# Patient Record
Sex: FEMALE | Race: WHITE | NOT HISPANIC OR LATINO | Employment: OTHER | URBAN - METROPOLITAN AREA
[De-identification: names, ages, dates, MRNs, and addresses within clinical notes are randomized per-mention and may not be internally consistent; named-entity substitution may affect disease eponyms.]

---

## 2017-03-03 ENCOUNTER — TELEPHONE (OUTPATIENT)
Dept: FAMILY MEDICINE | Facility: CLINIC | Age: 62
End: 2017-03-03

## 2017-03-03 NOTE — LETTER
[facility]  [address]  [facility] [address]  Phone: [phone]    March 3, 2017    Jessie Tamayo  [address] [place] [id number]              Dear Ms. Tamayo,       This is a reminder that it is time to make your appointment for your annual mammogram. Your last mammogram we have on file is 11/12/14. You may make an appointment for your Digital Mammogram by calling our scheduling line at [phone] to schedule at one of our locations.    If you are experiencing breast symptoms or concerns such as a new lump or breast pain you should first contact your health care provider before scheduling your mammogram. Your physician may want to see you prior to your visit.    We would like to take this opportunity to remind you that along with an annual mammogram, the American Cancer Society recommends an annual breast examination by your physician or health care provider.    If you are a patient currently enrolled in the [place] [facility] Program or the [facility], you must be seen by your provider for a clinical breast examination prior to your mammogram.    Please disregard this notice if you have already scheduled an appointment. Or, if you have had this done elsewhere, please have your records sent to the clinic.     Thank you and we look forward to seeing you in the near future for your annual screening mammogram.        Sincerely,      Your [facility] Care Team/ la

## 2017-03-03 NOTE — LETTER
Formerly named [facility]  [address]  [facility] [address]  Phone: [phone]    March 3, 2017    Jessie Tamayo  [address] [place] [phone]              Dear Ms. Tamayo,      In review of our electronic medical record, we believe that your colon cancer screening is not up to date. (Your last colonoscopy we have on file is 12/1/06 and were to repeat in 5 years due to polyps). Routine screening is recommended for most patients every ten years between the ages of 50 and 80 if no polyps. Currently, most physicians recommend colonoscopy. If you have already had this procedure at another facility, we would like to have you get a copy of the report to us. If you would like more information about colonoscopy, and you have access to the internet, please visit the [facility] web site below.   If you are ready to schedule your colonoscopy, please call [phone] to schedule your appointment. The instructions for the prep are included in this letter.       [email or web address]    It is possible that you are seeing someone else for your primary health care.  If you have had either of these tests done elsewhere, let us know where it was done, approximately when, and the results or have your records sent to the clinic. We will update your chart and this will eliminate sending you reminder letters and phone calls.        Sincerely,      Your [facility] Care Team

## 2017-03-03 NOTE — TELEPHONE ENCOUNTER
Panel Management Review      Patient has the following on her problem list: None      Composite cancer screening  Chart review shows that this patient is due/due soon for the following Mammogram and Colonoscopy  Summary:    Patient is due/failing the following:   COLONOSCOPY and MAMMOGRAM    Action needed:   Pt due for mammogram and colonoscopy    Type of outreach:    Sent letter.    Questions for provider review:    None                                                                                                                                    Aimee Pacheco MA

## 2017-05-17 ENCOUNTER — TELEPHONE (OUTPATIENT)
Dept: FAMILY MEDICINE | Facility: CLINIC | Age: 62
End: 2017-05-17

## 2017-05-17 DIAGNOSIS — Z12.11 COLON CANCER SCREENING: Primary | ICD-10-CM

## 2017-05-17 NOTE — TELEPHONE ENCOUNTER
Panel Management Review      Patient has the following on her problem list: None      Composite cancer screening  Chart review shows that this patient is due/due soon for the following Mammogram and Colonoscopy  Summary:    Patient is due/failing the following:   COLONOSCOPY and MAMMOGRAM    Action needed:   Pt to schedule mammogram and colonoscopy. Please give pt schedule information when she calls back.    Type of outreach:    Phone, left message for patient to call back.     Questions for provider review:    None                                                                                                                                    If you should have any questions or desire further information about your condition or plan of care, please don't hesitate to call our office, or schedule an appointment at your convenience.         Chart routed to Care Team .

## 2017-05-30 NOTE — TELEPHONE ENCOUNTER
Left message on voice mail with phone # to schedule her mammogram and reminded her she is due for colonoscopy or FIT. Instructed pt to call back on what colon cancer screen she would like to complete and we can mail info to her home.

## 2017-06-03 ENCOUNTER — HEALTH MAINTENANCE LETTER (OUTPATIENT)
Age: 62
End: 2017-06-03

## 2017-06-24 ENCOUNTER — HEALTH MAINTENANCE LETTER (OUTPATIENT)
Age: 62
End: 2017-06-24

## 2017-07-11 ENCOUNTER — OFFICE VISIT (OUTPATIENT)
Dept: OBGYN | Facility: CLINIC | Age: 62
End: 2017-07-11
Payer: COMMERCIAL

## 2017-07-11 VITALS
HEART RATE: 80 BPM | DIASTOLIC BLOOD PRESSURE: 88 MMHG | WEIGHT: 210 LBS | HEIGHT: 65 IN | BODY MASS INDEX: 34.99 KG/M2 | SYSTOLIC BLOOD PRESSURE: 135 MMHG

## 2017-07-11 DIAGNOSIS — N94.11 INTROITAL DYSPAREUNIA: ICD-10-CM

## 2017-07-11 DIAGNOSIS — Z00.00 ROUTINE GENERAL MEDICAL EXAMINATION AT A HEALTH CARE FACILITY: Primary | ICD-10-CM

## 2017-07-11 DIAGNOSIS — R87.810 CERVICAL HIGH RISK HPV (HUMAN PAPILLOMAVIRUS) TEST POSITIVE: ICD-10-CM

## 2017-07-11 DIAGNOSIS — N95.2 VAGINAL ATROPHY: ICD-10-CM

## 2017-07-11 PROCEDURE — 88175 CYTOPATH C/V AUTO FLUID REDO: CPT | Performed by: OBSTETRICS & GYNECOLOGY

## 2017-07-11 PROCEDURE — 99396 PREV VISIT EST AGE 40-64: CPT | Performed by: OBSTETRICS & GYNECOLOGY

## 2017-07-11 PROCEDURE — 87624 HPV HI-RISK TYP POOLED RSLT: CPT | Performed by: OBSTETRICS & GYNECOLOGY

## 2017-07-11 NOTE — PROGRESS NOTES
SUBJECTIVE:   CC: Jessie Tamayo is an 61 year old woman who presents for preventive health visit.     Healthy Habits:    Do you get at least three servings of calcium containing foods daily (dairy, green leafy vegetables, etc.)? yes    Amount of exercise or daily activities, outside of work: 3-6 day(s) per week    Problems taking medications regularly not applicable    Medication side effects: No    Have you had an eye exam in the past two years? Yes-cataracts    Do you see a dentist twice per year? yes    Do you have sleep apnea, excessive snoring or daytime drowsiness?no        Still having pain with intercourse. Due for Mammo, Pap, colonoscopy    Today's PHQ-2 Score:   PHQ-2 ( 1999 Pf[employer]) 7/11/2017 4/7/2016   Q1: Little interest or pleasure in doing things 0 0   Q2: Feeling down, depressed or hopeless 0 0   PHQ-2 Score 0 0   Q1: Little interest or pleasure in doing things - -   Q2: Feeling down, depressed or hopeless - -   PHQ-2 Score - -       Abuse: Current or Past(Physical, Sexual or Emotional)- No  Do you feel safe in your environment - Yes    Social History   Substance Use Topics     Smoking status: Never Smoker     Smokeless tobacco: Never Used     Alcohol use Yes      Comment: rare     The patient does not drink >3 drinks per day nor >7 drinks per week.    Reviewed orders with patient.  Reviewed health maintenance and updated orders accordingly - Yes  BP Readings from Last 3 Encounters:   07/11/17 135/88   12/01/16 135/76   05/19/16 118/70    Wt Readings from Last 3 Encounters:   07/11/17 210 lb (95.3 kg)   12/01/16 210 lb (95.3 kg)   05/19/16 209 lb 9.6 oz (95.1 kg)                  Patient Active Problem List   Diagnosis     Hyperlipidemia LDL goal <130     Nevus, Pigmented - vulvar mohr     Recurrent cold sores     Hearing loss     health care home     Advanced directives, HC info letter sent     Fall     Cervical high risk HPV (human papillomavirus) test positive     Past Surgical History:    Procedure Laterality Date     C TREAT ECTOPIC PREG,RMV TUBE/OVARY  1985    ectopic, right side-ovary and tube removed     HC COLONOSCOPY THRU STOMA, DIAGNOSTIC  2006    normal     OPEN REDUCTION INTERNAL FIXATION FOREARM  7/31/2012    Procedure: OPEN REDUCTION INTERNAL FIXATION FOREARM;  Open Reduction Internal Fixation, Irrigation & Debridment  of Right Distal Radius, application short arm splint;  Surgeon: Wade Beard MD;  Location: [facility] OR     TONSILLECTOMY & ADENOIDECTOMY  1960       Social History   Substance Use Topics     Smoking status: Never Smoker     Smokeless tobacco: Never Used     Alcohol use Yes      Comment: rare     Family History   Problem Relation Age of Onset     CANCER Mother      lung cancer     CEREBROVASCULAR DISEASE Father      Hypertension Father      Alcohol/Drug Father      DIABETES No family hx of      C.A.D. No family hx of      Breast Cancer No family hx of      Cancer - colorectal No family hx of      Prostate Cancer No family hx of      Melanoma No family hx of          Current Outpatient Prescriptions   Medication Sig Dispense Refill     Multiple Vitamins-Minerals (DAILY MULTI VITAMIN/MINERALS PO)        ibuprofen (ADVIL,MOTRIN) 200 MG tablet Take 200 mg by mouth every 4 hours as needed for mild pain       No Known Allergies    Patient over age 50, mutual decision to screen reflected in health maintenance.    Pertinent mammograms are reviewed under the imaging tab.  History of abnormal Pap smear:   Last 3 Pap Results:   PAP (no units)   Date Value   04/07/2016 NIL   01/23/2015 NIL   11/01/2011 NIL   HR HPV + 16    Reviewed and updated as needed this visit by clinical staff  Tobacco  Allergies  Meds  Med Hx  Surg Hx  Fam Hx  Soc Hx        Reviewed and updated as needed this visit by Provider        Past Medical History:   Diagnosis Date     Actinic keratosis      Cervical high risk HPV (human papillomavirus) test positive 1/29/2015, 4/7/16     +HR HPV type 16.   "    H/O colposcopy with cervical biopsy 2015    Negative     Hyperlipemia      Osteopenias      Status post colposcopy 16    ECC - negative      Past Surgical History:   Procedure Laterality Date     C TREAT ECTOPIC PREG,RMV TUBE/OVARY      ectopic, right side-ovary and tube removed     HC COLONOSCOPY THRU STOMA, DIAGNOSTIC      normal     OPEN REDUCTION INTERNAL FIXATION FOREARM  2012    Procedure: OPEN REDUCTION INTERNAL FIXATION FOREARM;  Open Reduction Internal Fixation, Irrigation & Debridment  of Right Distal Radius, application short arm splint;  Surgeon: Wade Beard MD;  Location: [facility] OR     TONSILLECTOMY & ADENOIDECTOMY       Obstetric History       T0      L0     SAB0   TAB0   Ectopic1   Multiple0   Live Births0       # Outcome Date GA Lbr Felice/2nd Weight Sex Delivery Anes PTL Lv   1 Ectopic                   ROS:  C: NEGATIVE for fever, chills, change in weight  I: NEGATIVE for worrisome rashes, moles or lesions  E: NEGATIVE for vision changes or irritation  ENT: NEGATIVE for ear, mouth and throat problems  R: NEGATIVE for significant cough or SOB  B: NEGATIVE for masses, tenderness or discharge  CV: NEGATIVE for chest pain, palpitations or peripheral edema  GI: NEGATIVE for nausea, abdominal pain, heartburn, or change in bowel habits   menopausal female: vaginal dryness and sexual function using condoms as there is vaginal discomfort  M: NEGATIVE for significant arthralgias or myalgia  N: NEGATIVE for weakness, dizziness or paresthesias  E: NEGATIVE for temperature intolerance, skin/hair changes  H: NEGATIVE for bleeding problems  P: NEGATIVE for changes in mood or affect     OBJECTIVE:   /88 (BP Location: Left arm, Cuff Size: Adult Large)  Pulse 80  Ht 5' 5\" (1.651 m)  Wt 210 lb (95.3 kg)  LMP 2008  BMI 34.95 kg/m2  EXAM:  GENERAL APPEARANCE: healthy, alert and no distress  EYES: Eyes grossly normal to inspection, PERRL and " conjunctivae and sclerae normal  NECK: no adenopathy, no asymmetry, masses, or scars and thyroid normal to palpation  RESP: lungs clear to auscultation - no rales, rhonchi or wheezes  BREAST: normal without masses, tenderness or nipple discharge and no palpable axillary masses or adenopathy  CV: regular rate and rhythm, normal S1 S2, no S3 or S4, no murmur, click or rub, no peripheral edema and peripheral pulses strong  ABDOMEN: soft, nontender, no hepatosplenomegaly, no masses and bowel sounds normal   (female): normal female external genitalia, normal urethral meatus, vaginal mucosal atrophy noted, normal cervix, adnexae, and uterus without masses. and hyperemia noted of the introitus  MS: no musculoskeletal defects are noted and gait is age appropriate without ataxia  SKIN: no suspicious lesions or rashes  NEURO: Normal strength and tone, sensory exam grossly normal, mentation intact and speech normal  PSYCH: mentation appears normal and affect normal/bright    ASSESSMENT/PLAN:   1. Routine general medical examination at a health care facility  Normal examination with the exception of the vaginal atrophy/hyperemia of the introitus- GASTROENTEROLOGY ADULT REF PROCEDURE ONLY  - *MA Screening Digital Bilateral; Future  - Pap imaged thin layer diagnostic with HPV (select HPV order below)  - HPV High Risk Types DNA Cervical    2. Cervical high risk HPV (human papillomavirus) test positive  Negative Pap smear  - Pap imaged thin layer diagnostic with HPV (select HPV order below)  - HPV High Risk Types DNA Cervical  Consider next steps  3. Vaginal atrophy    - ESTRADIOL 0.1MG/GM CREAM; Apply a small amount to the vaginal opening 3 times weekly  Dispense: 60 g; Refill: 1    4. Introital dyspareunia    - ESTRADIOL 0.1MG/GM CREAM; Apply a small amount to the vaginal opening 3 times weekly  Dispense: 60 g; Refill: 1    COUNSELING:   Reviewed preventive health counseling, as reflected in patient instructions       Regular  "exercise       Healthy diet/nutrition         reports that she has never smoked. She has never used smokeless tobacco.    Estimated body mass index is 34.95 kg/(m^2) as calculated from the following:    Height as of this encounter: 5' 5\" (1.651 m).    Weight as of this encounter: 210 lb (95.3 kg).   Weight management plan: Patient was referred to their PCP to discuss a diet and exercise plan.    Counseling Resources:  ATP IV Guidelines  Pooled Cohorts Equation Calculator  Breast Cancer Risk Calculator  FRAX Risk Assessment  ICSI Preventive Guidelines  Dietary Guidelines for Americans, 2010  USDA's MyPlate  ASA Prophylaxis  Lung CA Screening    Jefe Koenig MD  [facility]  "

## 2017-07-11 NOTE — NURSING NOTE
"Initial /88 (BP Location: Left arm, Cuff Size: Adult Large)  Pulse 80  Ht 5' 5\" (1.651 m)  Wt 210 lb (95.3 kg)  LMP 03/08/2008  BMI 34.95 kg/m2 Estimated body mass index is 34.95 kg/(m^2) as calculated from the following:    Height as of this encounter: 5' 5\" (1.651 m).    Weight as of this encounter: 210 lb (95.3 kg). .      "

## 2017-07-11 NOTE — MR AVS SNAPSHOT
After Visit Summary   7/11/2017    Jessie Tamayo    MRN: [id number]           Patient Information     Date Of Birth          1955        Visit Information        Provider Department      7/11/2017 10:00 AM Jefe Koenig MD [facility]        Today's Diagnoses     Routine general medical examination at a health care facility    -  1    Cervical high risk HPV (human papillomavirus) test positive        Vaginal atrophy        Introital dyspareunia          Care Instructions      Preventive Health Recommendations  Female Ages 50 - 64    Yearly exam: See your health care provider every year in order to  o Review health changes.   o Discuss preventive care.    o Review your medicines if your doctor has prescribed any.      Get a Pap test every three years (unless you have an abnormal result and your provider advises testing more often).    If you get Pap tests with HPV test, you only need to test every 5 years, unless you have an abnormal result.     You do not need a Pap test if your uterus was removed (hysterectomy) and you have not had cancer.    You should be tested each year for STDs (sexually transmitted diseases) if you're at risk.     Have a mammogram every 1 to 2 years.    Have a colonoscopy at age 50, or have a yearly FIT test (stool test). These exams screen for colon cancer.      Have a cholesterol test every 5 years, or more often if advised.    Have a diabetes test (fasting glucose) every three years. If you are at risk for diabetes, you should have this test more often.     If you are at risk for osteoporosis (brittle bone disease), think about having a bone density scan (DEXA).    Shots: Get a flu shot each year. Get a tetanus shot every 10 years.    Nutrition:     Eat at least 5 servings of fruits and vegetables each day.    Eat whole-grain bread, whole-wheat pasta and brown rice instead of white grains and rice.    Talk to your provider about Calcium and  Vitamin D.     Lifestyle    Exercise at least 150 minutes a week (30 minutes a day, 5 days a week). This will help you control your weight and prevent disease.    Limit alcohol to one drink per day.    No smoking.     Wear sunscreen to prevent skin cancer.     See your dentist every six months for an exam and cleaning.    See your eye doctor every 1 to 2 years.            Follow-ups after your visit        Additional Services     GASTROENTEROLOGY ADULT REF PROCEDURE ONLY       Last Lab Result: Creatinine (mg/dL)       Date                     Value                 11/11/2009               0.78             ----------  Body mass index is 34.95 kg/(m^2).     Needed:  No  Language:  English    Patient will be contacted to schedule procedure.     Please be aware that coverage of these services is subject to the terms and limitations of your health insurance plan.  Call member services at your health plan with any benefit or coverage questions.  Any procedures must be performed at a [place] facility OR coordinated by your clinic's referral office.    Please bring the following with you to your appointment:    (1) Any X-Rays, CTs or MRIs which have been performed.  Contact the facility where they were done to arrange for  prior to your scheduled appointment.    (2) List of current medications   (3) This referral request   (4) Any documents/labs given to you for this referral                  Your next 10 appointments already scheduled     Aug 30, 2017   Procedure with Dioni Murcia MD   [facility] ([facility])    [address] [place], [place] [id number]   [phone]           Located in the Clinics and Surgery Center at [address] [place] [address].   parking is very convenient and highly recommended.  is a $6 flat rate fee.  Both  and self parkers should enter the main arrival plaza from [address]  "[address]; parking attendants will direct you based on your parking preference.              Who to contact     If you have questions or need follow up information about today's clinic visit or your schedule please contact [facility] directly at [phone].  Normal or non-critical lab and imaging results will be communicated to you by MyChart, letter or phone within 4 business days after the clinic has received the results. If you do not hear from us within 7 days, please contact the clinic through [facility] or phone. If you have a critical or abnormal lab result, we will notify you by phone as soon as possible.  Submit refill requests through [facility] or call your pharmacy and they will forward the refill request to us. Please allow 3 business days for your refill to be completed.          Additional Information About Your Visit        [facility] [employer] Information     [facility] gives you secure access to your electronic health record. If you see a primary care provider, you can also send messages to your care team and make appointments. If you have questions, please call your primary care clinic.  If you do not have a primary care provider, please call [phone] and they will assist you.        Care EveryWhere ID     This is your Care EveryWhere ID. This could be used by other organizations to access your [facility] medical records  [id number]        Your Vitals Were     Pulse Height Last Period BMI (Body Mass Index)          80 5' 5\" (1.651 m) 03/08/2008 34.95 kg/m2         Blood Pressure from Last 3 Encounters:   07/11/17 135/88   12/01/16 135/76   05/19/16 118/70    Weight from Last 3 Encounters:   07/11/17 210 lb (95.3 kg)   12/01/16 210 lb (95.3 kg)   05/19/16 209 lb 9.6 oz (95.1 kg)              We Performed the Following     GASTROENTEROLOGY ADULT REF PROCEDURE ONLY     HPV High Risk Types DNA Cervical     Pap imaged thin layer diagnostic with HPV (select HPV order below)          Today's Medication " Changes          These changes are accurate as of: 7/11/17 11:59 PM.  If you have any questions, ask your nurse or doctor.               Start taking these medicines.        Dose/Directions    ESTRADIOL 0.1MG/GM CREAM   Used for:  Vaginal atrophy, Introital dyspareunia   Started by:  Jefe Koenig MD        Apply a small amount to the vaginal opening 3 times weekly   Quantity:  60 g   Refills:  1         Stop taking these medicines if you haven't already. Please contact your care team if you have questions.     triamcinolone 0.1 % cream   Commonly known as:  KENALOG   Stopped by:  Jefe Koenig MD                Where to get your medicines      These medications were sent to [facility] [address] [place] [facility] [address] [facility]  [address] [facility] [address]     Phone:  [phone]     ESTRADIOL 0.1MG/GM CREAM                Primary Care Provider Office Phone #    [facility] [phone]       No address on file        Equal Access to Services     KAILEY ALMEIDA : Hadii marcus baltazar hadasho Soomaali, waaxda luqadaha, qaybta kaalmada adeegyada, sai marie . So [facility] [phone].    ATENCIÓN: Si habla español, tiene a avalos disposición servicios gratuitos de asistencia lingüística. Llame al [phone].    We comply with applicable federal civil rights laws and [place] laws. We do not discriminate on the basis of race, color, national origin, age, disability sex, sexual orientation or gender identity.            Thank you!     Thank you for choosing [facility]  for your care. Our goal is always to provide you with excellent care. Hearing back from our patients is one way we can continue to improve our services. Please take a few minutes to complete the written survey that you may receive in the mail after your visit with us. Thank you!             Your Updated Medication List - Protect others around you: Learn how to safely use, store  and throw away your medicines at www.disposemymeds.org.          This list is accurate as of: 7/11/17 11:59 PM.  Always use your most recent med list.                   Brand Name Dispense Instructions for use Diagnosis    DAILY MULTI VITAMIN/MINERALS PO           ESTRADIOL 0.1MG/GM CREAM     60 g    Apply a small amount to the vaginal opening 3 times weekly    Vaginal atrophy, Introital dyspareunia       ibuprofen 200 MG tablet    ADVIL/MOTRIN     Take 200 mg by mouth every 4 hours as needed for mild pain    Cervical high risk HPV (human papillomavirus) test positive

## 2017-07-13 LAB
COPATH REPORT: NORMAL
PAP: NORMAL

## 2017-07-14 LAB
FINAL DIAGNOSIS: ABNORMAL
HPV HR 12 DNA CVX QL NAA+PROBE: NEGATIVE
HPV16 DNA SPEC QL NAA+PROBE: POSITIVE
HPV18 DNA SPEC QL NAA+PROBE: NEGATIVE
SPECIMEN DESCRIPTION: ABNORMAL

## 2017-08-25 ENCOUNTER — TELEPHONE (OUTPATIENT)
Dept: GASTROENTEROLOGY | Facility: CLINIC | Age: 62
End: 2017-08-25

## 2017-08-25 DIAGNOSIS — Z12.11 ENCOUNTER FOR SCREENING COLONOSCOPY: Primary | ICD-10-CM

## 2017-08-25 NOTE — TELEPHONE ENCOUNTER
Patient scheduled for Colonoscopy    Indication for procedure. Screening    Referring Provider. Jefe Koenig MD    ? Not Needed    Arrival time verified? Yes, 0630    Facility location verified? Yes, [facility] [facility]    Instructions given regarding prep and procedure    Prep Type Golytely    Are you taking any anticoagulants or blood thinners? No    Instructions given? N/A    Electronic implanted devices? No    Pre procedure teaching completed? Yes    Transportation from procedure?     H&P / Pre op physical completed? N/A

## 2017-08-30 ENCOUNTER — SURGERY (OUTPATIENT)
Age: 62
End: 2017-08-30

## 2017-08-30 ENCOUNTER — HOSPITAL ENCOUNTER (OUTPATIENT)
Facility: AMBULATORY SURGERY CENTER | Age: 62
End: 2017-08-30
Attending: INTERNAL MEDICINE

## 2017-08-30 VITALS
RESPIRATION RATE: 22 BRPM | WEIGHT: 210 LBS | DIASTOLIC BLOOD PRESSURE: 83 MMHG | BODY MASS INDEX: 34.99 KG/M2 | HEIGHT: 65 IN | TEMPERATURE: 96.8 F | SYSTOLIC BLOOD PRESSURE: 135 MMHG | OXYGEN SATURATION: 97 %

## 2017-08-30 RX ORDER — LIDOCAINE 40 MG/G
CREAM TOPICAL
Status: DISCONTINUED | OUTPATIENT
Start: 2017-08-30 | End: 2017-08-31 | Stop reason: HOSPADM

## 2017-08-30 RX ORDER — FENTANYL CITRATE 50 UG/ML
INJECTION, SOLUTION INTRAMUSCULAR; INTRAVENOUS PRN
Status: DISCONTINUED | OUTPATIENT
Start: 2017-08-30 | End: 2017-08-30 | Stop reason: HOSPADM

## 2017-08-30 RX ORDER — ONDANSETRON 2 MG/ML
4 INJECTION INTRAMUSCULAR; INTRAVENOUS
Status: DISCONTINUED | OUTPATIENT
Start: 2017-08-30 | End: 2017-08-31 | Stop reason: HOSPADM

## 2017-08-30 RX ADMIN — FENTANYL CITRATE 50 MCG: 50 INJECTION, SOLUTION INTRAMUSCULAR; INTRAVENOUS at 07:59

## 2017-08-30 RX ADMIN — FENTANYL CITRATE 25 MCG: 50 INJECTION, SOLUTION INTRAMUSCULAR; INTRAVENOUS at 08:08

## 2017-08-30 RX ADMIN — FENTANYL CITRATE 50 MCG: 50 INJECTION, SOLUTION INTRAMUSCULAR; INTRAVENOUS at 08:14

## 2017-08-30 NOTE — DISCHARGE INSTRUCTIONS
[facility]  Home Care Following Your Procedure  Call a doctor if you have signs of infection (fever, growing tenderness at the surgery site, a large amount of drainage or bleeding, severe pain, foul-smelling drainage, redness, swelling).  Your doctor is:  Dr.Byron Kain Murcia, Colon Rectal: [phone]  Or dial [phone] and ask for the resident on call for:  Colon Rectal  For emergency care, call the:  [facility]:  [phone] (TTY for hearing impaired: [phone])

## 2017-08-31 LAB — COPATH REPORT: NORMAL

## 2017-09-01 LAB — COLONOSCOPY: NORMAL

## 2017-11-06 ENCOUNTER — HOSPITAL ENCOUNTER (OUTPATIENT)
Dept: MAMMOGRAPHY | Facility: CLINIC | Age: 62
Discharge: HOME | End: 2017-11-06
Attending: FAMILY MEDICINE | Admitting: FAMILY MEDICINE
Payer: COMMERCIAL

## 2017-11-06 DIAGNOSIS — Z12.31 VISIT FOR SCREENING MAMMOGRAM: ICD-10-CM

## 2017-11-06 PROCEDURE — G0202 SCR MAMMO BI INCL CAD: HCPCS

## 2018-01-26 ENCOUNTER — TELEPHONE (OUTPATIENT)
Dept: OBGYN | Facility: CLINIC | Age: 63
End: 2018-01-26

## 2018-01-26 NOTE — TELEPHONE ENCOUNTER
----- Message from Jefe Koenig MD sent at 1/25/2018  2:05 PM CST -----  Regarding: needs colpo  63 yo  HPV +16  Neg colposcopy  Persistent +16

## 2018-01-26 NOTE — TELEPHONE ENCOUNTER
Left message for patient to call back.  Dr Koenig would like patient to have a visit for a colposcopy in regards to her history of abnormal paps.  He went to a conference on abnormal pap smears, and upon return would like patient to come in for a colposcopy.  He will explain in more detail when the patient comes in for appointment.

## 2018-02-13 NOTE — TELEPHONE ENCOUNTER
I spoke with pt today and informed her that Dr. Koenig is recommending she come in for a colposcopy. The patient verbalizes understanding and agrees with the plan. Pt states that her insurance only allows her to have a pap or a colposcopy in one calendar year so now that it is 2018 she can have the colposcopy done. Pt states she will call the [facility] to schedule the colposcopy.   Vivien Johnson RN  Pap Tracking

## 2018-03-22 NOTE — TELEPHONE ENCOUNTER
Dr. Koenig,    ANGELES - Pt has not scheduled the colposcopy yet. Spoke with pt 2/13/18. Pt states she'll schedule the colp.     1/23/2015:Pap--NIL, +HR HPV type 16. Plan [place]-  2/27/15: [place] - Negative. Plan cotest in 1 year.   4/7/16: Pap - NIL, + HR HPV 16. Plan colp  5/19/16: [place] - ECC negative. Plan cotest in 1 year.   7/11/17: Pap: NIL, +HR HPV type 16. Plan colp  11/29/17 [place] not done within 3 months. Tracking updated for 6 mo colp/pap.   1/25/2018 Case reviewed @ [facility]- persistent HPV+16- needs to follow up with colposcopy    Vivien Johnson, RN  Pap Tracking

## 2018-04-13 ENCOUNTER — OFFICE VISIT (OUTPATIENT)
Dept: FAMILY MEDICINE | Facility: CLINIC | Age: 63
End: 2018-04-13
Payer: COMMERCIAL

## 2018-04-13 VITALS
HEART RATE: 72 BPM | RESPIRATION RATE: 16 BRPM | HEIGHT: 65 IN | DIASTOLIC BLOOD PRESSURE: 74 MMHG | TEMPERATURE: 98.1 F | SYSTOLIC BLOOD PRESSURE: 136 MMHG | WEIGHT: 209.4 LBS | BODY MASS INDEX: 34.89 KG/M2

## 2018-04-13 DIAGNOSIS — Z01.818 PREOP GENERAL PHYSICAL EXAM: Primary | ICD-10-CM

## 2018-04-13 DIAGNOSIS — Z11.59 ENCOUNTER FOR HEPATITIS C SCREENING TEST FOR LOW RISK PATIENT: ICD-10-CM

## 2018-04-13 DIAGNOSIS — H25.9 AGE-RELATED CATARACT OF BOTH EYES, UNSPECIFIED AGE-RELATED CATARACT TYPE: ICD-10-CM

## 2018-04-13 PROCEDURE — 99214 OFFICE O/P EST MOD 30 MIN: CPT | Performed by: NURSE PRACTITIONER

## 2018-04-13 PROCEDURE — 86803 HEPATITIS C AB TEST: CPT | Performed by: NURSE PRACTITIONER

## 2018-04-13 PROCEDURE — 36415 COLL VENOUS BLD VENIPUNCTURE: CPT | Performed by: NURSE PRACTITIONER

## 2018-04-13 ASSESSMENT — PAIN SCALES - GENERAL: PAINLEVEL: NO PAIN (0)

## 2018-04-13 NOTE — MR AVS SNAPSHOT
After Visit Summary   4/13/2018    Jessie Tamayo    MRN: [id number]           Patient Information     Date Of Birth          1955        Visit Information        Provider Department      4/13/2018 2:00 PM Sabine Krishnamurthy APRN [facility]        Today's Diagnoses     Preop general physical exam    -  1    Age-related cataract of both eyes, unspecified age-related cataract type        Encounter for hepatitis C screening test for low risk patient          Care Instructions      Before Your Surgery      Call your surgeon if there is any change in your health. This includes signs of a cold or flu (such as a sore throat, runny nose, cough, rash or fever).    Do not smoke, drink alcohol or take over the counter medicine (unless your surgeon or primary care doctor tells you to) for the 24 hours before and after surgery.    If you take prescribed drugs: Follow your doctor s orders about which medicines to take and which to stop until after surgery.    Eating and drinking prior to surgery: follow the instructions from your surgeon    Take a shower or bath the night before surgery. Use the soap your surgeon gave you to gently clean your skin. If you do not have soap from your surgeon, use your regular soap. Do not shave or scrub the surgery site.  Wear clean pajamas and have clean sheets on your bed.     Be well rested the night before surgery               Follow-ups after your visit        Who to contact     Normal or non-critical lab and imaging results will be communicated to you by Accelitechart, letter or phone within 4 business days after the clinic has received the results. If you do not hear from us within 7 days, please contact the clinic through MyChart or phone. If you have a critical or abnormal lab result, we will notify you by phone as soon as possible.  Submit refill requests through Zaise[employer] or call your pharmacy and they will forward the refill request to us. Please  "allow 3 business days for your refill to be completed.          If you need to speak with a  for additional information , please call: [phone]           Additional Information About Your Visit        MyChart Information     B[employer] gives you secure access to your electronic health record. If you see a primary care provider, you can also send messages to your care team and make appointments. If you have questions, please call your primary care clinic.  If you do not have a primary care provider, please call [phone] and they will assist you.        Care EveryWhere ID     This is your Care EveryWhere ID. This could be used by other organizations to access your [place] medical records  [id number]        Your Vitals Were     Pulse Temperature Respirations Height Last Period BMI (Body Mass Index)    72 98.1  F (36.7  C) (Tympanic) 16 5' 4.72\" (1.644 m) 03/08/2008 35.14 kg/m2       Blood Pressure from Last 3 Encounters:   04/13/18 136/74   08/30/17 135/83   07/11/17 135/88    Weight from Last 3 Encounters:   04/13/18 209 lb 6.4 oz (95 kg)   08/30/17 210 lb (95.3 kg)   07/11/17 210 lb (95.3 kg)              We Performed the Following     Hepatitis C Screen Reflex to HCV RNA Quant and Genotype        Primary Care Provider Office Phone # Fax #    [facility] [phone] [address] [facility] [address]        Equal Access to Services     KAILEY ALMEIDA : Hadii marucs ku hadasho Soomaali, waaxda luqadaha, qaybta kaalmada adeegkei, sai acuña. So [facility] [phone].    ATENCIÓN: Si habla español, tiene a avalos disposición servicios gratuitos de asistencia lingüística. Llame al [phone].    We comply with applicable federal civil rights laws and [place] laws. We do not discriminate on the basis of race, color, national origin, age, disability, sex, sexual orientation, or gender identity.            Thank you!     Thank you for " choosing [facility]  for your care. Our goal is always to provide you with excellent care. Hearing back from our patients is one way we can continue to improve our services. Please take a few minutes to complete the written survey that you may receive in the mail after your visit with us. Thank you!             Your Updated Medication List - Protect others around you: Learn how to safely use, store and throw away your medicines at www.disposemymeds.org.          This list is accurate as of 4/13/18  2:36 PM.  Always use your most recent med list.                   Brand Name Dispense Instructions for use Diagnosis    DAILY MULTI VITAMIN/MINERALS PO           ESTRADIOL 0.1MG/GM CREAM     60 g    Apply a small amount to the vaginal opening 3 times weekly    Vaginal atrophy, Introital dyspareunia       VITAMIN D (CHOLECALCIFEROL) PO      Take 800 Units by mouth daily

## 2018-04-13 NOTE — PATIENT INSTRUCTIONS
Before Your Surgery      Call your surgeon if there is any change in your health. This includes signs of a cold or flu (such as a sore throat, runny nose, cough, rash or fever).    Do not smoke, drink alcohol or take over the counter medicine (unless your surgeon or primary care doctor tells you to) for the 24 hours before and after surgery.    If you take prescribed drugs: Follow your doctor s orders about which medicines to take and which to stop until after surgery.    Eating and drinking prior to surgery: follow the instructions from your surgeon    Take a shower or bath the night before surgery. Use the soap your surgeon gave you to gently clean your skin. If you do not have soap from your surgeon, use your regular soap. Do not shave or scrub the surgery site.  Wear clean pajamas and have clean sheets on your bed.     Be well rested the night before surgery

## 2018-04-13 NOTE — PROGRESS NOTES
[facility]  [address] [address]  [phone]  Dept: [phone]    PRE-OP EVALUATION:  Today's date: 2018    Jessie Tamayo (: 1955) presents for pre-operative evaluation assessment as requested by Dr. Hand.  She requires evaluation and anesthesia risk assessment prior to undergoing surgery/procedure for treatment of bilateral cataracts .18 OD & 5-3-18 OS   Cataract removal with  IOL implant     Fax number for surgical facility: [phone]  Primary Physician: [facility]  Type of Anesthesia Anticipated: to be determined     Patient has a Health Care Directive or Living Will: no, but family is aware of wishes    Preop Questions 2018   Who is doing your surgery? Dr. Atul Hand-[facility]   What are you having done? fan cataracts   Date of Surgery/Procedure: 18 OD & 5-3-18 OS   Facility or Hospital where procedure/surgery will be performed: [facility]    1.  Do you have a history of Heart attack, stroke, stent, coronary bypass surgery, or other heart surgery? No   2.  Do you ever have any pain or discomfort in your chest? No   3.  Do you have a history of  Heart Failure? No   4.   Are you troubled by shortness of breath when:  walking on a level surface, or up a slight hill, or at night? No   5.  Do you currently have a cold, bronchitis or other respiratory infection? No   6.  Do you have a cough, shortness of breath, or wheezing? No   7.  Do you sometimes get pains in the calves of your legs when you walk? No   8. Do you or anyone in your family have previous history of blood clots? No   9.  Do you or does anyone in your family have a serious bleeding problem such as prolonged bleeding following surgeries or cuts? UNKNOWN   10. Have you ever had problems with anemia or been told to take iron pills? No   11. Have you had any abnormal blood loss such as black, tarry or bloody stools, or abnormal vaginal bleeding? No    12. Have you ever had a blood transfusion? UNKNOWN   13. Have you or any of your relatives ever had problems with anesthesia? UNKNOWN    14. Do you have sleep apnea, excessive snoring or daytime drowsiness? No   15. Do you have any prosthetic heart valves? No   16. Do you have prosthetic joints? No   17. Is there any chance that you may be pregnant? No         HPI:     HPI related to upcoming procedure: bilat cataract removal  With  IOL implant 4-19-18 OD & 5-3-18 OS      See problem list for active medical problems.  Problems all longstanding and stable, except as noted/documented.  See ROS for pertinent symptoms related to these conditions.                                                                                                                                                          .    MEDICAL HISTORY:     Patient Active Problem List    Diagnosis Date Noted     Introital dyspareunia 07/11/2017     Priority: Medium     Vaginal atrophy 07/11/2017     Priority: Medium     Cervical high risk HPV (human papillomavirus) test positive 01/29/2015     Priority: Medium     1/23/2015:Pap--NIL, +HR HPV type 16. Plan [place]-  2/27/15: [place] - Negative. Plan cotest in 1 year.   4/7/16: Pap - NIL, + HR HPV 16. Plan colp  5/19/16: [place] - ECC negative. Plan cotest in 1 year.   7/11/17: Pap: NIL, +HR HPV type 16. Plan colp  11/29/17 [place] not done within 3 months. Tracking updated for 6 mo colp/pap.   1/25/2018 Case reviewed @ [facility]- persistent HPV+16- needs to follow up with colposcopy       Fall 07/31/2012     Priority: Medium     Hearing loss 04/30/2009     Priority: Medium     Problem list name updated by automated process. 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 Please refer if Care Coordination services are needed.    Presenting Problem Signs and Symptoms Treatment Plan   Questions or concerns   during clinic hours   I will call my clinic directly:  [place] [address]  [phone].   Questions or concerns outside clinic hours   I will call the 24 hour nurse line at   [phone] or [facility].   Need to schedule an appointment   I will call the 24 hour scheduling team at [phone] or my clinic directly at [phone].    Same day treatment     I will call my clinic first, nurse line if after hours, urgent care and express care if needed.   Clinic care coordination services (regular clinic hours)     I will call a clinic care coordinator directly:     Segun Ritter RN  Mon, Tues, Fri - [phone]  Wed, Thurs - [phone]    Christen Newberry :    [phone]    Or call my clinic at [phone] and ask to speak with care coordination.   Crisis Services: Behavioral or Mental Health  Crisis Connection 24 Hour Phone Line  [phone]    [facility] 24 Hour Crisis Services  [phone]    [facility] (Behavioral Health Providers) Network [phone]    [facility]   [phone]       Emergency treatment -- Immediately    CAll 911             Past Medical History:   Diagnosis Date     Actinic keratosis      Cervical high risk HPV (human papillomavirus) test positive 1/29/2015, 4/7/16     +HR HPV type 16.      H/O colposcopy with cervical biopsy 2/2015    Negative     Hyperlipemia      Osteopenias      Status post colposcopy 5/19/16    ECC - negative     Past Surgical History:   Procedure Laterality Date     C TREAT ECTOPIC PREG,RMV TUBE/OVARY  1985    ectopic, right side-ovary and tube removed     HC COLONOSCOPY THRU STOMA, DIAGNOSTIC  2006    normal     OPEN REDUCTION INTERNAL  FIXATION FOREARM  7/31/2012    Procedure: OPEN REDUCTION INTERNAL FIXATION FOREARM;  Open Reduction Internal Fixation, Irrigation & Debridment  of Right Distal Radius, application short arm splint;  Surgeon: Wade Beard MD;  Location:  OR     TONSILLECTOMY & ADENOIDECTOMY  1960     Current Outpatient Prescriptions   Medication Sig Dispense Refill     VITAMIN D, CHOLECALCIFEROL, PO Take 800 Units by mouth daily       Multiple Vitamins-Minerals (DAILY MULTI VITAMIN/MINERALS PO)        ESTRADIOL 0.1MG/GM CREAM Apply a small amount to the vaginal opening 3 times weekly (Patient not taking: Reported on 4/13/2018) 60 g 1     ibuprofen (ADVIL,MOTRIN) 200 MG tablet Take 200 mg by mouth every 4 hours as needed for mild pain       OTC products: no recent use of OTC ASA, NSAIDS or Steroids    Not on File   Latex Allergy: NO    Social History   Substance Use Topics     Smoking status: Never Smoker     Smokeless tobacco: Never Used     Alcohol use Yes      Comment: very little     History   Drug Use No       REVIEW OF SYSTEMS:   CONSTITUTIONAL: NEGATIVE for fever, chills, change in weight  INTEGUMENTARY/SKIN: NEGATIVE for worrisome rashes, moles or lesions  EYES: POSITIVE for blurred vision bilateral related to cataracts   ENT/MOUTH: NEGATIVE for ear, mouth and throat problems  RESP: NEGATIVE for significant cough or SOB  BREAST: NEGATIVE for masses, tenderness or discharge  CV: NEGATIVE for chest pain, palpitations or peripheral edema  GI: NEGATIVE for nausea, abdominal pain, heartburn, or change in bowel habits  : NEGATIVE for frequency, dysuria, or hematuria  MUSCULOSKELETAL: NEGATIVE for significant arthralgias or myalgia  NEURO: NEGATIVE for weakness, dizziness or paresthesias  ENDOCRINE: NEGATIVE for temperature intolerance, skin/hair changes  HEME: NEGATIVE for bleeding problems  PSYCHIATRIC: NEGATIVE for changes in mood or affect    EXAM:   /74 (BP Location: Right arm, Patient Position: Chair,  "Cuff Size: Adult Large)  Pulse 72  Temp 98.1  F (36.7  C) (Tympanic)  Resp 16  Ht 5' 4.72\" (1.644 m)  Wt 209 lb 6.4 oz (95 kg)  LMP 03/08/2008  BMI 35.14 kg/m2    GENERAL APPEARANCE: healthy, alert and no distress     EYES: Eyes grossly normal to inspection, PERRL and eyes track well      HENT: ear canals and TM's normal, nose and mouth without ulcers or lesions, oral mucous membranes moist, oropharynx clear and teeth in good repair      NECK: no adenopathy, no asymmetry, masses, or scars and thyroid normal to palpation     RESP: lungs clear to auscultation - no rales, rhonchi or wheezes     CV: regular rates and rhythm, normal S1 S2, no S3 or S4 and no murmur, click or rub     ABDOMEN:  soft, nontender, no HSM or masses and bowel sounds normal     MS: extremities normal- no gross deformities noted, no evidence of inflammation in joints, FROM in all extremities.     SKIN: no suspicious lesions or rashes     NEURO: Normal strength and tone, sensory exam grossly normal, mentation intact, speech normal, oriented times 3, cranial nerves 2-12 intact, Romberg negative, rapid alternating movements normal and proprioception normal     PSYCH: mentation appears normal. and affect normal/bright     LYMPHATICS: No cervical adenopathy    DIAGNOSTICS:     ASSESSMENT/PLAN:      ICD-10-CM    1. Preop general physical exam Z01.818    2. Age-related cataract of both eyes, unspecified age-related cataract type H25.9    3. Encounter for hepatitis C screening test for low risk patient Z11.59 Hepatitis C Screen Reflex to HCV RNA Quant and Genotype       Patient Instructions     Before Your Surgery      Call your surgeon if there is any change in your health. This includes signs of a cold or flu (such as a sore throat, runny nose, cough, rash or fever).    Do not smoke, drink alcohol or take over the counter medicine (unless your surgeon or primary care doctor tells you to) for the 24 hours before and after surgery.    If you take " prescribed drugs: Follow your doctor s orders about which medicines to take and which to stop until after surgery.    Eating and drinking prior to surgery: follow the instructions from your surgeon    Take a shower or bath the night before surgery. Use the soap your surgeon gave you to gently clean your skin. If you do not have soap from your surgeon, use your regular soap. Do not shave or scrub the surgery site.  Wear clean pajamas and have clean sheets on your bed.     Be well rested the night before surgery                 No results for input(s): HGB, PLT, INR, NA, POTASSIUM, CR, A1C in the last 22128 hours.     IMPRESSION:   Reason for surgery/procedure: bilat cataract     The proposed surgical procedure is considered LOW risk.    REVISED CARDIAC RISK INDEX  The patient has the following serious cardiovascular risks for perioperative complications such as (MI, PE, VFib and 3  AV Block):  No serious cardiac risks  INTERPRETATION: 0 risks: Class I (very low risk - 0.4% complication rate)    The patient has the following additional risks for perioperative complications:  No identified additional risks  Morbid obesity      ICD-10-CM    1. Preop general physical exam Z01.818        RECOMMENDATIONS:       Cardiovascular Risk  Performs 4 METs exercise without symptoms (Climb a flight of stairs) .       --Patient is on no chronic medications    APPROVAL GIVEN to proceed with proposed procedure, without further diagnostic evaluation       Signed Electronically by: PRASAD JAIN NP, APRN CNP    Copy of this evaluation report is provided to requesting physician.    [facility] Preop Guidelines    Revised Cardiac Risk Index

## 2018-04-13 NOTE — NURSING NOTE
"Chief Complaint   Patient presents with     Pre-Op Exam       Initial /74 (BP Location: Right arm, Patient Position: Chair, Cuff Size: Adult Large)  Pulse 72  Temp 98.1  F (36.7  C) (Tympanic)  Resp 16  Ht 5' 4.72\" (1.644 m)  Wt 209 lb 6.4 oz (95 kg)  LMP 03/08/2008  BMI 35.14 kg/m2 Estimated body mass index is 35.14 kg/(m^2) as calculated from the following:    Height as of this encounter: 5' 4.72\" (1.644 m).    Weight as of this encounter: 209 lb 6.4 oz (95 kg).  Medication Reconciliation: complete     Rabia Kathleen CMA (AAMA)      "

## 2018-04-16 LAB — HCV AB SERPL QL IA: NONREACTIVE

## 2018-04-21 ENCOUNTER — HEALTH MAINTENANCE LETTER (OUTPATIENT)
Age: 63
End: 2018-04-21

## 2018-09-25 ENCOUNTER — OFFICE VISIT (OUTPATIENT)
Dept: OBGYN | Facility: CLINIC | Age: 63
End: 2018-09-25
Payer: COMMERCIAL

## 2018-09-25 ENCOUNTER — RESULT FOLLOW UP (OUTPATIENT)
Dept: OBGYN | Facility: CLINIC | Age: 63
End: 2018-09-25

## 2018-09-25 VITALS
DIASTOLIC BLOOD PRESSURE: 85 MMHG | SYSTOLIC BLOOD PRESSURE: 145 MMHG | RESPIRATION RATE: 16 BRPM | WEIGHT: 209 LBS | HEART RATE: 68 BPM | HEIGHT: 64 IN | BODY MASS INDEX: 35.68 KG/M2 | TEMPERATURE: 98.3 F

## 2018-09-25 DIAGNOSIS — N95.2 VAGINAL ATROPHY: ICD-10-CM

## 2018-09-25 DIAGNOSIS — R87.810 CERVICAL HIGH RISK HPV (HUMAN PAPILLOMAVIRUS) TEST POSITIVE: ICD-10-CM

## 2018-09-25 DIAGNOSIS — R87.611 PAP SMEAR OF CERVIX WITH ASCUS, CANNOT EXCLUDE HGSIL: ICD-10-CM

## 2018-09-25 DIAGNOSIS — R87.810 CERVICAL HIGH RISK HPV (HUMAN PAPILLOMAVIRUS) TEST POSITIVE: Primary | ICD-10-CM

## 2018-09-25 DIAGNOSIS — N94.11 INTROITAL DYSPAREUNIA: ICD-10-CM

## 2018-09-25 PROCEDURE — 88342 IMHCHEM/IMCYTCHM 1ST ANTB: CPT | Performed by: OBSTETRICS & GYNECOLOGY

## 2018-09-25 PROCEDURE — 88305 TISSUE EXAM BY PATHOLOGIST: CPT | Performed by: OBSTETRICS & GYNECOLOGY

## 2018-09-25 PROCEDURE — 88141 CYTOPATH C/V INTERPRET: CPT | Performed by: PATHOLOGY

## 2018-09-25 PROCEDURE — 99212 OFFICE O/P EST SF 10 MIN: CPT | Mod: 25 | Performed by: OBSTETRICS & GYNECOLOGY

## 2018-09-25 PROCEDURE — 87624 HPV HI-RISK TYP POOLED RSLT: CPT | Performed by: OBSTETRICS & GYNECOLOGY

## 2018-09-25 PROCEDURE — 88341 IMHCHEM/IMCYTCHM EA ADD ANTB: CPT | Performed by: OBSTETRICS & GYNECOLOGY

## 2018-09-25 PROCEDURE — 57454 BX/CURETT OF CERVIX W/SCOPE: CPT | Performed by: OBSTETRICS & GYNECOLOGY

## 2018-09-25 PROCEDURE — 88175 CYTOPATH C/V AUTO FLUID REDO: CPT | Performed by: OBSTETRICS & GYNECOLOGY

## 2018-09-25 NOTE — PROGRESS NOTES
Jessie Tamayo is a 62 year old female who presents for repeat colposcopy, referred by Pap RN. Pap smear 14 months ago showed: normal  with HR HPV 16. The prior pap showed same.   Colposcopy in 2016 showed negative   Past Medical History:   Diagnosis Date     Actinic keratosis      Cervical high risk HPV (human papillomavirus) test positive 1/29/2015, 4/7/16     +HR HPV type 16.      H/O colposcopy with cervical biopsy 2/2015    Negative     Hyperlipemia      Osteopenias      Status post colposcopy 5/19/16    ECC - negative     Family History   Problem Relation Age of Onset     Cancer Mother      lung cancer     Other Cancer Mother      Lung Ca     Cerebrovascular Disease Father      Hypertension Father      Alcohol/Drug Father      Diabetes Other      paternal aunt     Hyperlipidemia Other      Other Cancer Other      Cervical Ca     Other Cancer Other      Ovarian Ca     Obesity Other      Diabetes No family hx of      C.A.D. No family hx of      Breast Cancer No family hx of      Cancer - colorectal No family hx of      Prostate Cancer No family hx of      Melanoma No family hx of        Previous history of abnormal paps?: No  History of cryotherapy (freezing)?: : No  History of veneral diseases: : No  Do you desire testing for any of these diseases? : No  History of genital warts:  No  Visible warts now?:  No  Previously treated? If so, how?:  No     Patient's last menstrual period was 03/08/2008.  Type of contraception: post menopausal status    History   Smoking Status     Never Smoker   Smokeless Tobacco     Never Used     History of sexual abuse:  No  Allergies as of 09/25/2018     (No Known Allergies)        PROCEDURE:  Before the procedure, it was ensured that the patient was educated regarding the nature of her findings to date, the implications of them, and what was to be done. She has been made aware of the role of HPV, the natural history of infection, ways to minimize her future risk, the effect of  HPV on the cervix, and treatment options available should they be indicated. The   pathophysiology of the cervix, including a discussion of squamous vs. endometrial cells, and squamous metaplasia have all been reviewed, using illustrations and sketches. The details of the colposcopic procedure were reviewed, as well as the risks of missed diagnoses, pain, infection and bleeding. All questions were answered before proceeding, and informed consent was therefore obtained.    Bimanual examination: was not done  Unenhanced examination of the cervix was normal without lesions.  Pap smear and endocervical sampling  obtained    Pap repeated?:  Yes  Friable cervix  SCJ seen?:  no  Endocervical speculum needed?:  Yes not successful    ECC done?:  Yes plus brush  Lugol's solution used?:  Yes no lesions  Satisfactory examination?:  no    Vaginal vault: normal to cursory inspection  Vaginal stenosis pigmented nevus periurethral  Urethra normal?:  yes  Labia normal?:  yes  Perineum normal?:  yes  Rectum normal?:  yes    FINDINGS:  Please see image   Cervix: no visible lesions  Procedure: biopsies taken (not including ECC): 2.    Procedure summary: Patient tolerated procedure well     Assessment: Normal exam without visible pathology      Plan: Specimens labelled and sent to pathology., Will base further treatment on pathology findings., post biopsy instructions given to patient and call to discuss Pathology results.  (R82.985) Cervical high risk HPV (human papillomavirus) test positive  (primary encounter diagnosis)  Comment: 16 positive   Plan: COLP CERVIX/UPPER VAGINA W BX CERVIX/ENDOCERV         CURETT            (N95.2) Vaginal atrophy  Comment: improvement but ran out of estradiol  Plan: ESTRADIOL 0.1MG/GM CREAM        refill    (N94.11) Introital dyspareunia  Comment:   Plan: ESTRADIOL 0.1MG/GM CREAM            Jefe Koenig

## 2018-09-25 NOTE — LETTER
September 14, 2019      Jessie Tamayo  [address] [place] [id number]    Dear ,      At [facility], your health and wellness is our primary concern. That is why we are following up on a colposcopy from 9/25/18. Your provider had recommended that you have a Pap smear and HPV test completed by 9/25/19. Our records do not show that this has been scheduled.    It is important to complete the follow up that your provider has suggested for you to ensure that there are no worsening changes which may, over time, develop into cancer.      Please contact our office at  [phone] to schedule an appointment for a Pap smear and HPV test at your earliest convenience. If you have questions or concerns, please call the clinic and we will be happy to assist you.    If you have completed the tests outside of [facility], please have the results forwarded to our office. We will update the chart for your primary Physician to review before your next annual physical.     Thank you for choosing [facility]!    Sincerely,      Your [facility] Care Team/krishan

## 2018-09-25 NOTE — MR AVS SNAPSHOT
After Visit Summary   9/25/2018    Jessie Tamayo    MRN: [id number]           Patient Information     Date Of Birth          1955        Visit Information        Provider Department      9/25/2018 10:30 AM Jefe Koenig MD [facility] [address]        Today's Diagnoses     Cervical high risk HPV (human papillomavirus) test positive    -  1    Vaginal atrophy        Introital dyspareunia           Follow-ups after your visit        Follow-up notes from your care team     Return in about 1 year (around 9/25/2019).      Who to contact     If you have questions or need follow up information about today's clinic visit or your schedule please contact [facility] directly at [phone].  Normal or non-critical lab and imaging results will be communicated to you by MyChart, letter or phone within 4 business days after the clinic has received the results. If you do not hear from us within 7 days, please contact the clinic through Neolanehart or phone. If you have a critical or abnormal lab result, we will notify you by phone as soon as possible.  Submit refill requests through Sellaround or call your pharmacy and they will forward the refill request to us. Please allow 3 business days for your refill to be completed.          Additional Information About Your Visit        MyChart Information     Sellaround gives you secure access to your electronic health record. If you see a primary care provider, you can also send messages to your care team and make appointments. If you have questions, please call your primary care clinic.  If you do not have a primary care provider, please call [phone] and they will assist you.        Care EveryWhere ID     This is your Care EveryWhere ID. This could be used by other organizations to access your [place] medical records  [id number]        Your Vitals Were     Pulse Temperature Respirations Height Last Period BMI (Body Mass Index)    68 98.3  " F (36.8  C) 16 5' 4\" (1.626 m) 03/08/2008 35.87 kg/m2       Blood Pressure from Last 3 Encounters:   09/25/18 145/85   04/13/18 136/74   08/30/17 135/83    Weight from Last 3 Encounters:   09/25/18 209 lb (94.8 kg)   04/13/18 209 lb 6.4 oz (95 kg)   08/30/17 210 lb (95.3 kg)              We Performed the Following     COLP CERVIX/UPPER VAGINA W BX CERVIX/ENDOCERV CURETT          Where to get your medicines      These medications were sent to [facility] [address] [facility] [address]  [address], [facility] [address]     Phone:  [phone]     ESTRADIOL 0.1MG/GM CREAM          Primary Care Provider Office Phone # Fax #    [facility] [phone] [address] [facility] [address]        Equal Access to Services     KAILEY ALMEIDA AH: Hadii aad ku hadasho Soomaali, waaxda luqadaha, qaybta kaalmada adeegyada, waxay mehranin hayalonzo marie . So [facility] [phone].    ATENCIÓN: Si habla español, tiene a avalos disposición servicios gratuitos de asistencia lingüística. Llame al [phone].    We comply with applicable federal civil rights laws and [place] laws. We do not discriminate on the basis of race, color, national origin, age, disability, sex, sexual orientation, or gender identity.            Thank you!     Thank you for choosing [facility]  for your care. Our goal is always to provide you with excellent care. Hearing back from our patients is one way we can continue to improve our services. Please take a few minutes to complete the written survey that you may receive in the mail after your visit with us. Thank you!             Your Updated Medication List - Protect others around you: Learn how to safely use, store and throw away your medicines at www.disposemymeds.org.          This list is accurate as of 9/25/18 11:30 AM.  Always use your most recent med list.                   Brand Name Dispense Instructions for " use Diagnosis    DAILY MULTI VITAMIN/MINERALS PO           ESTRADIOL 0.1MG/GM CREAM     60 g    Apply a small amount to the vaginal opening 3 times weekly    Vaginal atrophy, Introital dyspareunia       VITAMIN D (CHOLECALCIFEROL) PO      Take 800 Units by mouth daily

## 2018-09-25 NOTE — LETTER
September 14, 2019      Jessie Tamayo  [address] [place] [id number]    Dear ,      At [facility], your health and wellness is our primary concern. That is why we are following up on a colposcopy from 9/25/18. Your provider had recommended that you have a Pap smear and HPV test completed by 9/25/19. Our records do not show that this has been scheduled.    It is important to complete the follow up that your provider has suggested for you to ensure that there are no worsening changes which may, over time, develop into cancer.      Please contact our office at  [phone] to schedule an appointment for a Pap smear and HPV test at your earliest convenience. If you have questions or concerns, please call the clinic and we will be happy to assist you.    If you have completed the tests outside of [facility] [place] [facility], please have the results forwarded to our office. We will update the chart for your primary Physician to review before your next annual physical.     Thank you for choosing [facility] [place] [facility]!    Sincerely,      Your [place] Care Team/krishan

## 2018-09-28 LAB — COPATH REPORT: NORMAL

## 2018-09-28 NOTE — PROGRESS NOTES
Please inform Jessie of her normal results.  I recommend that she have co-testing in 1 year  Jefe Koenig

## 2018-09-29 LAB
COPATH REPORT: ABNORMAL
PAP: ABNORMAL

## 2018-10-01 NOTE — PROGRESS NOTES
1/23/2015:Pap--NIL, +HR HPV type 16. Plan Ramona-  2/27/15: Ramona - Negative. Plan cotest in 1 year.   4/7/16: Pap - NIL, + HR HPV 16. Plan colp  5/19/16: Ramona - ECC negative. Plan cotest in 1 year.   7/11/17: Pap: NIL, +HR HPV type 16. Plan colp  11/29/17 Ramona not done within 3 months. Tracking updated for 6 mo colp/pap.   1/25/2018 Case reviewed @ [facility]- persistent HPV+16- needs to follow up with colposcopy  04/18/18 Patient is lost to follow-up.   9/25/18 Ramona Bx & ECC - Negative. ASCUS Pap, + HR HPV 16 (neg 18 & other). Plan cotest in 1 year.   10/1/18 Message left to return call. Pt notified.   9/14/19 Cotest reminder letter sent (rlm)  10/2/19 Pt notified. (casey) Pt transferring care to [facility] due to insurance. Tracking discontinued. (casey)

## 2018-10-03 LAB
FINAL DIAGNOSIS: ABNORMAL
HPV HR 12 DNA CVX QL NAA+PROBE: NEGATIVE
HPV16 DNA SPEC QL NAA+PROBE: POSITIVE
HPV18 DNA SPEC QL NAA+PROBE: NEGATIVE
SPECIMEN DESCRIPTION: ABNORMAL
SPECIMEN SOURCE CVX/VAG CYTO: ABNORMAL

## 2018-11-29 ENCOUNTER — OFFICE VISIT (OUTPATIENT)
Dept: FAMILY MEDICINE | Facility: CLINIC | Age: 63
End: 2018-11-29
Payer: COMMERCIAL

## 2018-11-29 VITALS
TEMPERATURE: 99 F | DIASTOLIC BLOOD PRESSURE: 70 MMHG | HEIGHT: 64 IN | SYSTOLIC BLOOD PRESSURE: 130 MMHG | HEART RATE: 68 BPM | BODY MASS INDEX: 35.58 KG/M2 | WEIGHT: 208.4 LBS | OXYGEN SATURATION: 95 %

## 2018-11-29 DIAGNOSIS — J18.9 PNEUMONIA OF LEFT LOWER LOBE DUE TO INFECTIOUS ORGANISM: Primary | ICD-10-CM

## 2018-11-29 DIAGNOSIS — L98.9 SKIN LESION: ICD-10-CM

## 2018-11-29 PROCEDURE — 99213 OFFICE O/P EST LOW 20 MIN: CPT | Performed by: NURSE PRACTITIONER

## 2018-11-29 RX ORDER — ALBUTEROL SULFATE 90 UG/1
AEROSOL, METERED RESPIRATORY (INHALATION)
Qty: 1 INHALER | Refills: 1 | Status: SHIPPED | OUTPATIENT
Start: 2018-11-29 | End: 2019-02-27

## 2018-11-29 RX ORDER — AZITHROMYCIN 250 MG/1
TABLET, FILM COATED ORAL
Qty: 6 TABLET | Refills: 0 | Status: SHIPPED | OUTPATIENT
Start: 2018-11-29 | End: 2019-02-27

## 2018-11-29 ASSESSMENT — ENCOUNTER SYMPTOMS
WHEEZING: 0
EYE DISCHARGE: 0
MYALGIAS: 1
FATIGUE: 1
LIGHT-HEADEDNESS: 0
FEVER: 0
RHINORRHEA: 0
COUGH: 1
DIAPHORESIS: 0
CONSTIPATION: 0
DIZZINESS: 0
DIARRHEA: 0
NAUSEA: 0
EYE ITCHING: 0
SORE THROAT: 1
SHORTNESS OF BREATH: 0
SINUS PRESSURE: 0
HEADACHES: 0
CHILLS: 0

## 2018-11-29 NOTE — MR AVS SNAPSHOT
After Visit Summary   11/29/2018    Jessie Tamayo    MRN: [id number]           Patient Information     Date Of Birth          1955        Visit Information        Provider Department      11/29/2018 3:20 PM Brenda Berkowitz APRN [facility]        Today's Diagnoses     Pneumonia of left lower lobe due to infectious organism (H)    -  1      Care Instructions    These are general instructions and may not be specific to you. Please call, email or follow up if you have any questions or concerns.     When You Have Pneumonia  You have been diagnosed with pneumonia. This is a serious lung infection. Most cases of pneumonia are caused by bacteria. Pneumonia most often occurs in older adults, young children, and people with chronic health problems.  Home care    Take your medicine exactly as directed. Don t skip doses. Continue taking your antibiotics as until they are all gone, even if you start to feel better. This will prevent the pneumonia from coming back.    Drink at least 8 glasses of water daily, unless directed otherwise. This helps to loosen and thin secretions so that you can cough them up.    Use a cool-mist humidifier in your bedroom. Be sure to clean the humidifier daily.    Don t use medicines to suppress your cough unless your cough is dry, painful, or interferes with your sleep. Coughing up mucus is normal. You may use an expectorant if your healthcare provider says it s okay.    You can use warm compresses or a heating pad on the lowest setting to relieve chest discomfort. Use several times a day for 15-20 minutes at a time. To prevent injury to your skin, set the temperature to warm, not hot. Don t put the compress or pad directly on your skin. Make certain it has a cover or wrap it in a towel. This is to prevent skin burns.    Get plenty of rest until your fever, shortness of breath, and chest pain go away.    Plan to get a flu shot every year. The flu is a  common cause of pneumonia. Getting a flu shot every year can help prevent both the flu and pneumonia.  Getting the pneumococcal vaccine  Talk with your healthcare provider about getting the pneumococcal vaccine. Pneumococcal pneumonia is caused by bacteria that spread from person to person. It can cause minor problems, such as ear infections. But it can also turn into life-threatening illnesses of the lungs (pneumonia), the covering of the brain and spinal cord (meningitis), and the blood (bacteremia).  Children under 2 years of age, adults over age 65, people with certain health conditions, and smokers are at the highest risk of pneumococcal disease. This vaccine can help prevent pneumococcal disease in both adults and children. Some people should not have the vaccine. Make sure to ask your healthcare provider if you should have the vaccine.   Follow-up care  Make a follow-up appointment as directed by our staff.  When to call your healthcare provider  Call your healthcare provider right away if you have any of the following:    Fever of 100.4 F (38 C) or higher, or as directed by your healthcare provider    Mucus from the lungs (sputum) that s yellow, green, bloody, or smells bad    A large amount of sputum    Vomiting    Symptoms that get worse  Call 911  Call 911 right away if you have any of the following:    Chest pain    Trouble breathing    Blue lips or fingernails   Date Last Reviewed: 11/1/2016 2000-2018 [employer]. [address] [place] [address]. All rights reserved. This information is not intended as a substitute for professional medical care. Always follow your healthcare professional's instructions.                Follow-ups after your visit        Who to contact     Normal or non-critical lab and imaging results will be communicated to you by MyChart, letter or phone within 4 business days after the clinic has received the results. If you do not hear from us within 7  "days, please contact the clinic through DediServe or phone. If you have a critical or abnormal lab result, we will notify you by phone as soon as possible.  Submit refill requests through DediServe or call your pharmacy and they will forward the refill request to us. Please allow 3 business days for your refill to be completed.          If you need to speak with a  for additional information , please call: [phone]           Additional Information About Your Visit        DediServe Information     DediServe gives you secure access to your electronic health record. If you see a primary care provider, you can also send messages to your care team and make appointments. If you have questions, please call your primary care clinic.  If you do not have a primary care provider, please call [phone] and they will assist you.        Care EveryWhere ID     This is your Care EveryWhere ID. This could be used by other organizations to access your [place] medical records  [id number]        Your Vitals Were     Pulse Temperature Height Last Period Pulse Oximetry BMI (Body Mass Index)    68 99  F (37.2  C) (Tympanic) 5' 4\" (1.626 m) 03/08/2008 95% 35.77 kg/m2       Blood Pressure from Last 3 Encounters:   11/29/18 130/70   09/25/18 145/85   04/13/18 136/74    Weight from Last 3 Encounters:   11/29/18 208 lb 6.4 oz (94.5 kg)   09/25/18 209 lb (94.8 kg)   04/13/18 209 lb 6.4 oz (95 kg)              Today, you had the following     No orders found for display         Today's Medication Changes          These changes are accurate as of 11/29/18  3:47 PM.  If you have any questions, ask your nurse or doctor.               Start taking these medicines.        Dose/Directions    albuterol 108 (90 Base) MCG/ACT inhaler   Commonly known as:  PROAIR HFA/PROVENTIL HFA/VENTOLIN HFA   Used for:  Pneumonia of left lower lobe due to infectious organism (H)   Started by:  Brenda Berkowitz APRN CNP        Use  2 puffs " three times per day for the next 2-3 days then every 6 hours as needed   Quantity:  1 Inhaler   Refills:  1       azithromycin 250 MG tablet   Commonly known as:  ZITHROMAX   Used for:  Pneumonia of left lower lobe due to infectious organism (H)   Started by:  Brenda Berkowitz APRN CNP        Two tablets first day, then one tablet daily for four days.   Quantity:  6 tablet   Refills:  0            Where to get your medicines      These medications were sent to [facility] [place] [address] [address]     Phone:  [phone]     albuterol 108 (90 Base) MCG/ACT inhaler    azithromycin 250 MG tablet                Primary Care Provider Office Phone # Fax #    [facility] [phone] [address] [facility] [address]        Equal Access to Services     KAILEY ALMEIDA : Hadii marcus baltazar hadasho Sowang, waaxda luqadaha, qaybta kaalmada adeegyada, sai cantu hayalonzo marie . So [facility] [phone].    ATENCIÓN: Si habla español, tiene a avalos disposición servicios gratuitos de asistencia lingüística. Llame al [phone].    We comply with applicable federal civil rights laws and [place] laws. We do not discriminate on the basis of race, color, national origin, age, disability, sex, sexual orientation, or gender identity.            Thank you!     Thank you for choosing [facility]  for your care. Our goal is always to provide you with excellent care. Hearing back from our patients is one way we can continue to improve our services. Please take a few minutes to complete the written survey that you may receive in the mail after your visit with us. Thank you!             Your Updated Medication List - Protect others around you: Learn how to safely use, store and throw away your medicines at www.disposemymeds.org.          This list is accurate as of 11/29/18  3:47 PM.  Always use your most recent  med list.                   Brand Name Dispense Instructions for use Diagnosis    albuterol 108 (90 Base) MCG/ACT inhaler    PROAIR HFA/PROVENTIL HFA/VENTOLIN HFA    1 Inhaler    Use  2 puffs three times per day for the next 2-3 days then every 6 hours as needed    Pneumonia of left lower lobe due to infectious organism (H)       azithromycin 250 MG tablet    ZITHROMAX    6 tablet    Two tablets first day, then one tablet daily for four days.    Pneumonia of left lower lobe due to infectious organism (H)       DAILY MULTI VITAMIN/MINERALS PO           ESTRADIOL 0.1MG/GM CREAM     60 g    Apply a small amount to the vaginal opening 3 times weekly    Vaginal atrophy, Introital dyspareunia       VITAMIN D (CHOLECALCIFEROL) PO      Take 800 Units by mouth daily

## 2018-11-29 NOTE — PATIENT INSTRUCTIONS
These are general instructions and may not be specific to you. Please call, email or follow up if you have any questions or concerns.     When You Have Pneumonia  You have been diagnosed with pneumonia. This is a serious lung infection. Most cases of pneumonia are caused by bacteria. Pneumonia most often occurs in older adults, young children, and people with chronic health problems.  Home care    Take your medicine exactly as directed. Don t skip doses. Continue taking your antibiotics as until they are all gone, even if you start to feel better. This will prevent the pneumonia from coming back.    Drink at least 8 glasses of water daily, unless directed otherwise. This helps to loosen and thin secretions so that you can cough them up.    Use a cool-mist humidifier in your bedroom. Be sure to clean the humidifier daily.    Don t use medicines to suppress your cough unless your cough is dry, painful, or interferes with your sleep. Coughing up mucus is normal. You may use an expectorant if your healthcare provider says it s okay.    You can use warm compresses or a heating pad on the lowest setting to relieve chest discomfort. Use several times a day for 15-20 minutes at a time. To prevent injury to your skin, set the temperature to warm, not hot. Don t put the compress or pad directly on your skin. Make certain it has a cover or wrap it in a towel. This is to prevent skin burns.    Get plenty of rest until your fever, shortness of breath, and chest pain go away.    Plan to get a flu shot every year. The flu is a common cause of pneumonia. Getting a flu shot every year can help prevent both the flu and pneumonia.  Getting the pneumococcal vaccine  Talk with your healthcare provider about getting the pneumococcal vaccine. Pneumococcal pneumonia is caused by bacteria that spread from person to person. It can cause minor problems, such as ear infections. But it can also turn into life-threatening illnesses of the lungs  (pneumonia), the covering of the brain and spinal cord (meningitis), and the blood (bacteremia).  Children under 2 years of age, adults over age 65, people with certain health conditions, and smokers are at the highest risk of pneumococcal disease. This vaccine can help prevent pneumococcal disease in both adults and children. Some people should not have the vaccine. Make sure to ask your healthcare provider if you should have the vaccine.   Follow-up care  Make a follow-up appointment as directed by our staff.  When to call your healthcare provider  Call your healthcare provider right away if you have any of the following:    Fever of 100.4 F (38 C) or higher, or as directed by your healthcare provider    Mucus from the lungs (sputum) that s yellow, green, bloody, or smells bad    A large amount of sputum    Vomiting    Symptoms that get worse  Call 911  Call 911 right away if you have any of the following:    Chest pain    Trouble breathing    Blue lips or fingernails   Date Last Reviewed: 11/1/2016 2000-2018 [employer]. [address] [place] [address], [place] [address]. All rights reserved. This information is not intended as a substitute for professional medical care. Always follow your healthcare professional's instructions.

## 2018-11-29 NOTE — PROGRESS NOTES
SUBJECTIVE:   Jessie Tamayo is a 63 year old female who presents to clinic today for the following health issues:      ENT Symptoms             Symptoms: cc Present Absent Comment   Fever/Chills   x improved   Fatigue  x  x1 week   Muscle Aches  x  x1 week   Eye Irritation   x    Sneezing  x     Nasal Jose Roberto/Drg  x  Congestion and drainage x2-3 weeks   Sinus Pressure/Pain   x improved   Loss of smell   x    Dental pain   x    Sore Throat  x  Sore throat x2-3 weeks   Swollen Glands  x     Ear Pain/Fullness  x  Ears feel full   Cough x   2-3 weeks   Wheeze   x    Chest Pain   x    Shortness of breath   x    Rash   x    Other         Symptom duration:  2-3 weeks   Symptom severity:  moderate   Treatments tried:  OTC medications   Contacts:  none     Has not been feeling well for the last few weeks. Then yesterday and over the last week has been worse. Unsure if had a fever at home. Has had chills at home. Cough had been productive, but now is dry. Congestion and headache.     Spot under right eye tht has been there for the last 6 mo. Has been getting some bigger and now is staying the same for the last 3 mo. Does not itch or bleed. Does not interfere with vision.     Problem list and histories reviewed & adjusted, as indicated.  Additional history: as documented    Current Outpatient Prescriptions   Medication Sig Dispense Refill     albuterol (PROAIR HFA/PROVENTIL HFA/VENTOLIN HFA) 108 (90 Base) MCG/ACT inhaler Use  2 puffs three times per day for the next 2-3 days then every 6 hours as needed 1 Inhaler 1     azithromycin (ZITHROMAX) 250 MG tablet Two tablets first day, then one tablet daily for four days. 6 tablet 0     Multiple Vitamins-Minerals (DAILY MULTI VITAMIN/MINERALS PO)        VITAMIN D, CHOLECALCIFEROL, PO Take 800 Units by mouth daily       ESTRADIOL 0.1MG/GM CREAM Apply a small amount to the vaginal opening 3 times weekly (Patient not taking: Reported on 11/29/2018) 60 g 3     No Known  "Allergies    Reviewed and updated as needed this visit by clinical staff  Tobacco  Allergies  Meds  Med Hx  Surg Hx  Fam Hx  Soc Hx      Reviewed and updated as needed this visit by Provider         ROS:  Review of Systems   Constitutional: Positive for fatigue. Negative for chills, diaphoresis and fever.   HENT: Positive for congestion, ear pain, sneezing and sore throat. Negative for ear discharge, hearing loss, rhinorrhea and sinus pressure.    Eyes: Negative for discharge and itching.   Respiratory: Positive for cough. Negative for shortness of breath and wheezing.    Gastrointestinal: Negative for constipation, diarrhea and nausea.   Musculoskeletal: Positive for myalgias.   Skin: Negative for rash.        Area under right eye     Neurological: Negative for dizziness, light-headedness and headaches.       OBJECTIVE:     /70 (BP Location: Right arm, Cuff Size: Adult Large)  Pulse 68  Temp 99  F (37.2  C) (Tympanic)  Ht 5' 4\" (1.626 m)  Wt 208 lb 6.4 oz (94.5 kg)  LMP 03/08/2008  SpO2 95%  BMI 35.77 kg/m2  Body mass index is 35.77 kg/(m^2).  Physical Exam   Constitutional: She appears well-developed.   HENT:   Head:       Right Ear: Tympanic membrane and external ear normal.   Left Ear: Tympanic membrane and external ear normal.   Nose: No mucosal edema or rhinorrhea.   Cardiovascular: Normal rate, regular rhythm and normal heart sounds.    Pulmonary/Chest: Effort normal. She has rhonchi in the left middle field and the left lower field.   Abdominal: Soft. Bowel sounds are normal.   Neurological: She is alert.   Skin: Skin is warm and dry.   Psychiatric: She has a normal mood and affect.         ASSESSMENT/PLAN:   1. Pneumonia of left lower lobe due to infectious organism (H)  Discussion held with Bailee Banegas chest x-ray in clinic today.  Educated regarding warning signs to watch for and when would need to seek immediate medical attention.  Reviewed treatment plan.  Reviewed fever and pain " control with tylenol and/or ibuprofen  Instructed to call or return for:  --Symptoms not improved in the next 3 days  --Worsening symptom  --New unexplained symptoms develop   - azithromycin (ZITHROMAX) 250 MG tablet; Two tablets first day, then one tablet daily for four days.  Dispense: 6 tablet; Refill: 0  - albuterol (PROAIR HFA/PROVENTIL HFA/VENTOLIN HFA) 108 (90 Base) MCG/ACT inhaler; Use  2 puffs three times per day for the next 2-3 days then every 6 hours as needed  Dispense: 1 Inhaler; Refill: 1    2. Skin lesion  Order placed, plans to call per self and set up  - DERMATOLOGY REFERRAL        SANDI [facility]dujar [facility]

## 2019-02-27 ENCOUNTER — OFFICE VISIT (OUTPATIENT)
Dept: FAMILY MEDICINE | Facility: CLINIC | Age: 64
End: 2019-02-27
Payer: COMMERCIAL

## 2019-02-27 VITALS
HEART RATE: 64 BPM | TEMPERATURE: 97.9 F | OXYGEN SATURATION: 97 % | SYSTOLIC BLOOD PRESSURE: 157 MMHG | RESPIRATION RATE: 16 BRPM | DIASTOLIC BLOOD PRESSURE: 84 MMHG

## 2019-02-27 DIAGNOSIS — Z71.84 TRAVEL ADVICE ENCOUNTER: Primary | ICD-10-CM

## 2019-02-27 PROCEDURE — 90707 MMR VACCINE SC: CPT | Mod: GA | Performed by: NURSE PRACTITIONER

## 2019-02-27 PROCEDURE — 90472 IMMUNIZATION ADMIN EACH ADD: CPT | Mod: GA | Performed by: NURSE PRACTITIONER

## 2019-02-27 PROCEDURE — 90632 HEPA VACCINE ADULT IM: CPT | Mod: GA | Performed by: NURSE PRACTITIONER

## 2019-02-27 PROCEDURE — 90691 TYPHOID VACCINE IM: CPT | Mod: GA | Performed by: NURSE PRACTITIONER

## 2019-02-27 PROCEDURE — 90471 IMMUNIZATION ADMIN: CPT | Mod: GA | Performed by: NURSE PRACTITIONER

## 2019-02-27 PROCEDURE — 99402 PREV MED CNSL INDIV APPRX 30: CPT | Mod: 25 | Performed by: NURSE PRACTITIONER

## 2019-02-27 RX ORDER — AZITHROMYCIN 500 MG/1
500 TABLET, FILM COATED ORAL DAILY
Qty: 3 TABLET | Refills: 0 | Status: SHIPPED | OUTPATIENT
Start: 2019-02-27 | End: 2019-03-02

## 2019-02-27 NOTE — PATIENT INSTRUCTIONS
Today February 27, 2019 you received the    Hepatitis A Vaccine - Please return on 8/26/19 or later for your 2nd and final dose.    MMR (Measles Mumps Rubella) Vaccine    Typhoid - injectable. This vaccine is valid for two years.   .    These appointments can be made as a NURSE ONLY visit.    **It is very important for the vaccinations to be given on the scheduled day(s), this helps ensure you receive the full effectiveness of the vaccine.**    Please call [facility] with any questions [phone]    Thank you for visiting [facility]

## 2019-02-27 NOTE — PROGRESS NOTES
Nurse Note      Itinerary:  [place]       Departure Date: 04/03/2019      Return Date: 04/10/2019      Length of Trip 7 days       Reason for Travel: Tourism           Urban or rural: both      Accommodations: Resort         IMMUNIZATION HISTORY  Have you received any immunizations within the past 4 weeks?  No  Have you ever fainted from having your blood drawn or from an injection?  No  Have you ever had a fever reaction to vaccination?  No  Have you ever had any bad reaction or side effect from any vaccination?  No  Have you ever had hepatitis A or B vaccine?  No  Do you live (or work closely) with anyone who has AIDS, an AIDS-like condition, any other immune disorder or who is on chemotherapy for cancer?  No  Do you have a family history of immunodeficiency?  No  Have you received any injection of immune globulin or any blood products during the past 12 months?  No    Patient roomed by CASANDRA Kapadia  Jessie Tamayo is a 63 year old female seen today with spouse for counsultation for international travel to [place] for Tourism.  Patient will be departing in  5 week(s) and staying for   1 week(s) and  traveling with a group.      Patient itinerary :  will be in the [facility] which presents risk for Dengue Fever, Chikungungya and Zika. exposure.      Patient's activities will include sightseeing and beach activities (salt water).    Patient's country of birth is [place]    Special medical concerns: none  Pre-travel questionnaire was completed by patient and reviewed by provider.     Vitals: LMP 03/08/2008   BMI= There is no height or weight on file to calculate BMI.    EXAM:  General:  Well-nourished, well-developed in no acute distress.  Appears to be stated age, interacts appropriately and expresses understanding of information given to patient.    Current Outpatient Medications   Medication Sig Dispense Refill     albuterol (PROAIR HFA/PROVENTIL HFA/VENTOLIN HFA) 108 (90  Base) MCG/ACT inhaler Use  2 puffs three times per day for the next 2-3 days then every 6 hours as needed 1 Inhaler 1     azithromycin (ZITHROMAX) 250 MG tablet Two tablets first day, then one tablet daily for four days. 6 tablet 0     ESTRADIOL 0.1MG/GM CREAM Apply a small amount to the vaginal opening 3 times weekly (Patient not taking: Reported on 11/29/2018) 60 g 3     Multiple Vitamins-Minerals (DAILY MULTI VITAMIN/MINERALS PO)        VITAMIN D, CHOLECALCIFEROL, PO Take 800 Units by mouth daily       Patient Active Problem List   Diagnosis     Hyperlipidemia LDL goal <130     Nevus, Pigmented - vulvar mohr     Recurrent cold sores     Hearing loss     health care home     Advanced directives, HC info letter sent     Fall     Cervical high risk HPV (human papillomavirus) test positive     Introital dyspareunia     Vaginal atrophy     No Known Allergies      Immunizations discussed include:   Hepatitis A:  Ordered/given today, risks, benefits and side effects reviewed  Hepatitis B: Declined  Not concerned about risk of disease  Influenza: Up to date  Typhoid: Ordered/given today, risks, benefits and side effects reviewed  Rabies: Declined  reviewed managment of a animal bite or scratch (washing wound, seek medical care within 24 hours for post exposure prophylaxis )  Yellow Fever: Not indicated  Japanese Encephalitis: Not indicated  Meningococcus: Not indicated  Tetanus/Diphtheria: Up to date  Measles/Mumps/Rubella: Ordered/given today  Cholera: Not needed  Polio: Up to date  Pneumococcal: Under age of 65  Varicella: Immune by disease history per patient report  Zostavax:  Not indicated  Shingrix: deferred  HPV:  Not indicated  TB:  Low risk     Altitude Exposure on this trip: no  Past tolerance to Altitude: na    ASSESSMENT/PLAN:    ICD-10-CM    1. Travel advice encounter Z71.89 azithromycin (ZITHROMAX) 500 MG tablet     VACCINE ADMINISTRATION, INITIAL     VACCINE ADMINISTRATION, EACH ADDITIONAL     I have  reviewed general recommendations for safe travel   including: food/water precautions, insect precautions, safer sex   practices given high prevalence of Zika, HIV and other STDs,   roadway safety. Educational materials and [employer] report provided.    Malaraia prophylaxis recommended: none  Symptomatic treatment for traveler's diarrhea: azithromycin  Altitude illness prevention and treatment: na      Evacuation insurance advised and resources were provided to patient.    Total visit time 30 minutes  with over 50% of time spent counseling patient as detailed above.    Andie Ritchie CNP

## 2019-02-28 NOTE — NURSING NOTE
Screening Questionnaire for Adult Immunization    Are you sick today?   No   Do you have allergies to medications, food, a vaccine component or latex?   No   Have you ever had a serious reaction after receiving a vaccination?   No   Do you have a long-term health problem with heart disease, lung disease, asthma, kidney disease, metabolic disease (e.g. diabetes), anemia, or other blood disorder?   No   Do you have cancer, leukemia, HIV/AIDS, or any other immune system problem?   No   In the past 3 months, have you taken medications that affect  your immune system, such as prednisone, other steroids, or anticancer drugs; drugs for the treatment of rheumatoid arthritis, Crohn s disease, or psoriasis; or have you had radiation treatments?   No   Have you had a seizure, or a brain or other nervous system problem?   No   During the past year, have you received a transfusion of blood or blood     products, or been given immune (gamma) globulin or antiviral drug?   No   For women: Are you pregnant or is there a chance you could become        pregnant during the next month?   No   Have you received any vaccinations in the past 4 weeks?   No     Immunization questionnaire answers were all negative.        Per orders of MIGUEL ANGEL Ritchie, injection of MMR, Typhoid, and Hep A given by Faye Garcia. Patient instructed to remain in clinic for 15 minutes afterwards, and to report any adverse reaction to me immediately.       Screening performed by Faye Garcia on 2/27/2019 at 6:16 PM.

## 2019-03-22 DIAGNOSIS — L98.9 SKIN LESION: Primary | ICD-10-CM

## 2019-03-25 ENCOUNTER — MYC MEDICAL ADVICE (OUTPATIENT)
Dept: FAMILY MEDICINE | Facility: CLINIC | Age: 64
End: 2019-03-25

## 2019-03-25 DIAGNOSIS — H91.93 DECREASED HEARING OF BOTH EARS: Primary | ICD-10-CM

## 2019-04-15 ENCOUNTER — OFFICE VISIT (OUTPATIENT)
Dept: AUDIOLOGY | Facility: CLINIC | Age: 64
End: 2019-04-15
Payer: COMMERCIAL

## 2019-04-15 DIAGNOSIS — H90.3 SENSORINEURAL HEARING LOSS, BILATERAL: Primary | ICD-10-CM

## 2019-04-15 PROCEDURE — 92557 COMPREHENSIVE HEARING TEST: CPT | Performed by: AUDIOLOGIST

## 2019-04-15 PROCEDURE — 92550 TYMPANOMETRY & REFLEX THRESH: CPT | Performed by: AUDIOLOGIST

## 2019-04-15 PROCEDURE — 99207 ZZC NO CHARGE LOS: CPT | Performed by: AUDIOLOGIST

## 2019-04-15 NOTE — PROGRESS NOTES
AUDIOLOGY REPORT    SUBJECTIVE:  Jessie Tamayo is a 63 year old female who was seen in the Audiology Clinic at [facility] for an audiologic evaluation, referred by Sabine Krishnamurthy CNP.  No previous audiograms are available at today's appointment.  The patient reports difficulty hearing on the phone and with her spouse. The patient denies bilateral tinnitus, bilateral otalgia and history of noise exposure.     OBJECTIVE:  Otoscopic exam indicates ears are clear of cerumen bilaterally     Pure Tone Thresholds assessed using conventional audiometry with good  reliability from 250-8000 Hz bilaterally using circumaural headphones     RIGHT:  normal, mild and moderate-severe sensorineural hearing loss    LEFT:    normal, mild and moderate-severe sensorineural hearing loss    Tympanogram:    RIGHT: normal eardrum mobility ,1000 Hz ipsi/contra reflexes present     LEFT:   normal eardrum mobility  ,1000 Hz ipsi/contra reflexes present     Speech Reception Threshold:    RIGHT: 20 dB HL    LEFT:   25 dB HL  Word Recognition Score:     RIGHT: 100% at 60 dB HL using NU-6 recorded word list.    LEFT:   96% at 60 dB HL using NU-6 recorded word list.      ASSESSMENT:   Normal sloping to mild to moderate-severe sensorineural hearing loss bilaterally.      Today s results were discussed with the patient in detail.     PLAN: Patient was counseled regarding hearing loss and impact on communication.    Please call this clinic with questions regarding these results or recommendations.        Renetta Hassan M.A. CHANDAN-AAA  Clinical audiologist [place] # [id number]  4/15/2019

## 2019-09-18 ASSESSMENT — ENCOUNTER SYMPTOMS
COUGH: 0
PARESTHESIAS: 0
NAUSEA: 0
CHILLS: 0
HEMATOCHEZIA: 0
NERVOUS/ANXIOUS: 0
ARTHRALGIAS: 0
SHORTNESS OF BREATH: 0
BREAST MASS: 0
FREQUENCY: 1
DIZZINESS: 0
SORE THROAT: 0
CONSTIPATION: 0
DIARRHEA: 0
HEARTBURN: 0
MYALGIAS: 0
EYE PAIN: 0
PALPITATIONS: 0
ABDOMINAL PAIN: 0
HEMATURIA: 0
JOINT SWELLING: 0
HEADACHES: 0
WEAKNESS: 0
DYSURIA: 0
FEVER: 0

## 2019-09-18 NOTE — PATIENT INSTRUCTIONS
I suggest you start fish oil once per day to help triglycerides.     Check with insurance to see when DEXA scan is covered.  Dx E28.39 Estrogen Deficiency    Discussed Shingrex vaccine, please inquire at pharmacy for coverage and administration.      I recommend you use over-the-counter Debrox ear drops, 5-10 drops to affected ear twice per day for a total of 4 days.  Follow-up if no improvement.            Preventive Health Recommendations  Female Ages 50 - 64    Yearly exam: See your health care provider every year in order to  o Review health changes.   o Discuss preventive care.    o Review your medicines if your doctor has prescribed any.      Get a Pap test every three years (unless you have an abnormal result and your provider advises testing more often).    If you get Pap tests with HPV test, you only need to test every 5 years, unless you have an abnormal result.     You do not need a Pap test if your uterus was removed (hysterectomy) and you have not had cancer.    You should be tested each year for STDs (sexually transmitted diseases) if you're at risk.     Have a mammogram every 1 to 2 years.    Have a colonoscopy at age 50, or have a yearly FIT test (stool test). These exams screen for colon cancer.      Have a cholesterol test every 5 years, or more often if advised.    Have a diabetes test (fasting glucose) every three years. If you are at risk for diabetes, you should have this test more often.     If you are at risk for osteoporosis (brittle bone disease), think about having a bone density scan (DEXA).    Shots: Get a flu shot each year. Get a tetanus shot every 10 years.    Nutrition:     Eat at least 5 servings of fruits and vegetables each day.    Eat whole-grain bread, whole-wheat pasta and brown rice instead of white grains and rice.    Get adequate Calcium and Vitamin D.     Lifestyle    Exercise at least 150 minutes a week (30 minutes a day, 5 days a week). This will help you control your  weight and prevent disease.    Limit alcohol to one drink per day.    No smoking.     Wear sunscreen to prevent skin cancer.     See your dentist every six months for an exam and cleaning.    See your eye doctor every 1 to 2 years.

## 2019-09-20 ENCOUNTER — OFFICE VISIT (OUTPATIENT)
Dept: FAMILY MEDICINE | Facility: CLINIC | Age: 64
End: 2019-09-20
Payer: COMMERCIAL

## 2019-09-20 VITALS
RESPIRATION RATE: 14 BRPM | SYSTOLIC BLOOD PRESSURE: 134 MMHG | TEMPERATURE: 98.5 F | DIASTOLIC BLOOD PRESSURE: 76 MMHG | HEART RATE: 68 BPM

## 2019-09-20 DIAGNOSIS — Z23 NEED FOR VACCINATION: ICD-10-CM

## 2019-09-20 DIAGNOSIS — R87.810 CERVICAL HIGH RISK HPV (HUMAN PAPILLOMAVIRUS) TEST POSITIVE: ICD-10-CM

## 2019-09-20 DIAGNOSIS — Z00.00 ROUTINE GENERAL MEDICAL EXAMINATION AT A HEALTH CARE FACILITY: Primary | ICD-10-CM

## 2019-09-20 DIAGNOSIS — N95.2 ATROPHIC VAGINITIS: ICD-10-CM

## 2019-09-20 DIAGNOSIS — Z12.39 BREAST CANCER SCREENING: ICD-10-CM

## 2019-09-20 DIAGNOSIS — Z12.4 SCREENING FOR CERVICAL CANCER: ICD-10-CM

## 2019-09-20 PROCEDURE — 99396 PREV VISIT EST AGE 40-64: CPT | Mod: 25 | Performed by: PHYSICIAN ASSISTANT

## 2019-09-20 PROCEDURE — G0123 SCREEN CERV/VAG THIN LAYER: HCPCS | Performed by: PHYSICIAN ASSISTANT

## 2019-09-20 PROCEDURE — 90682 RIV4 VACC RECOMBINANT DNA IM: CPT | Performed by: PHYSICIAN ASSISTANT

## 2019-09-20 PROCEDURE — 90632 HEPA VACCINE ADULT IM: CPT | Performed by: PHYSICIAN ASSISTANT

## 2019-09-20 PROCEDURE — 90471 IMMUNIZATION ADMIN: CPT | Performed by: PHYSICIAN ASSISTANT

## 2019-09-20 PROCEDURE — 90472 IMMUNIZATION ADMIN EACH ADD: CPT | Performed by: PHYSICIAN ASSISTANT

## 2019-09-20 PROCEDURE — G0124 SCREEN C/V THIN LAYER BY MD: HCPCS | Performed by: PHYSICIAN ASSISTANT

## 2019-09-20 PROCEDURE — 87624 HPV HI-RISK TYP POOLED RSLT: CPT | Performed by: PHYSICIAN ASSISTANT

## 2019-09-20 PROCEDURE — 90750 HZV VACC RECOMBINANT IM: CPT | Performed by: PHYSICIAN ASSISTANT

## 2019-09-20 RX ORDER — ESTRADIOL 0.1 MG/G
2 CREAM VAGINAL
Qty: 42.5 G | Refills: 1 | Status: SHIPPED | OUTPATIENT
Start: 2019-09-23 | End: 2021-01-19

## 2019-09-20 ASSESSMENT — ENCOUNTER SYMPTOMS
JOINT SWELLING: 0
PALPITATIONS: 0
FEVER: 0
NAUSEA: 0
SHORTNESS OF BREATH: 0
HEADACHES: 0
ABDOMINAL PAIN: 0
DYSURIA: 0
CONSTIPATION: 0
FREQUENCY: 1
CHILLS: 0
NERVOUS/ANXIOUS: 0
MYALGIAS: 0
HEMATOCHEZIA: 0
EYE PAIN: 0
WEAKNESS: 0
HEARTBURN: 0
HEMATURIA: 0
DIARRHEA: 0
ARTHRALGIAS: 0
COUGH: 0
PARESTHESIAS: 0
BREAST MASS: 0
SORE THROAT: 0
DIZZINESS: 0

## 2019-09-20 ASSESSMENT — PAIN SCALES - GENERAL: PAINLEVEL: NO PAIN (0)

## 2019-09-20 NOTE — NURSING NOTE
"Initial /76 (BP Location: Left arm, Patient Position: Chair, Cuff Size: Adult Large)   Pulse 68   Temp 98.5  F (36.9  C) (Tympanic)   Resp 14   LMP 03/08/2008  Estimated body mass index is 35.77 kg/m  as calculated from the following:    Height as of 11/29/18: 1.626 m (5' 4\").    Weight as of 11/29/18: 94.5 kg (208 lb 6.4 oz). .    Rabia Kathleen CMA ([facility])    "

## 2019-09-30 LAB
COPATH REPORT: ABNORMAL
PAP: ABNORMAL

## 2020-09-10 ENCOUNTER — TRANSFERRED RECORDS (OUTPATIENT)
Dept: HEALTH INFORMATION MANAGEMENT | Facility: CLINIC | Age: 65
End: 2020-09-10

## 2020-09-10 LAB
HPV ABSTRACT: ABNORMAL
PAP-ABSTRACT: ABNORMAL

## 2021-01-19 ENCOUNTER — TELEPHONE (OUTPATIENT)
Dept: OBGYN | Facility: CLINIC | Age: 66
End: 2021-01-19

## 2021-01-19 ENCOUNTER — OFFICE VISIT (OUTPATIENT)
Dept: OBGYN | Facility: CLINIC | Age: 66
End: 2021-01-19
Payer: COMMERCIAL

## 2021-01-19 VITALS
HEART RATE: 76 BPM | RESPIRATION RATE: 16 BRPM | HEIGHT: 65 IN | WEIGHT: 217.1 LBS | SYSTOLIC BLOOD PRESSURE: 140 MMHG | BODY MASS INDEX: 36.17 KG/M2 | DIASTOLIC BLOOD PRESSURE: 90 MMHG | TEMPERATURE: 98.8 F

## 2021-01-19 DIAGNOSIS — R87.810 CERVICAL HIGH RISK HPV (HUMAN PAPILLOMAVIRUS) TEST POSITIVE: ICD-10-CM

## 2021-01-19 DIAGNOSIS — R87.611 PAP SMEAR OF CERVIX WITH ASCUS, CANNOT EXCLUDE HGSIL: ICD-10-CM

## 2021-01-19 DIAGNOSIS — N95.2 ATROPHIC VAGINITIS: ICD-10-CM

## 2021-01-19 PROCEDURE — 57452 EXAM OF CERVIX W/SCOPE: CPT | Performed by: OBSTETRICS & GYNECOLOGY

## 2021-01-19 RX ORDER — ESTRADIOL 0.1 MG/G
2 CREAM VAGINAL
Qty: 42.5 G | Refills: 1 | Status: SHIPPED | OUTPATIENT
Start: 2021-01-21 | End: 2022-02-23

## 2021-01-19 ASSESSMENT — MIFFLIN-ST. JEOR: SCORE: 1526.67

## 2021-01-19 NOTE — PROGRESS NOTES
Colposcopy/LEEP    Date/Time: 1/19/2021 11:18 AM  Performed by: Jefe Koenig MD  Authorized by: Jefe Koenig MD     Consent:     Consent obtained:  Written    Consent given by:  Patient    Procedural risks discussed:  Bleeding and repeat procedure    Patient questions answered: yes      Patient agrees, verbalizes understanding, and wants to proceed: yes      Educational handouts given: no      Instructions and paperwork completed: yes    Pre-procedure:     Pre-procedure timeout performed: yes    Indication:     Indication:  ASC-H and HPV    HPV Indications:  16  Procedure:     Procedure: Colposcopy only      Under satisfactory analgesia the patient was prepped and draped in the dorsal lithotomy position: yes      [place] speculum was placed in the vagina: yes      Under colposcopic examination the transition zone was seen in entirety: no      Intracervical block was performed: no      Tampon inserted: no      Monsel's solution was applied: no      Biopsy(s): no      Specimen to pathology: no    Post-procedure:     Findings: Friable cervix      Patient tolerance of procedure:  Patient tolerated the procedure well with no immediate complications  Comments:      Colposcopy and LEEP under anesthesia

## 2021-01-19 NOTE — TELEPHONE ENCOUNTER
"You are now scheduled for surgery at [facility]. Below are the details for your surgery. Please read the \"Preparing for Your Surgery\" instructions and let us know if you have any questions.     Surgeon: Jefe Koenig MD    Surgical Procedure: COLPOSCOPY, WITH BIOPSY, LEEP procedure (N/A)     Date of Procedure: 02/08/21  Time: 9:20am  Arrival Time: 8:20am (Time can change; to be confirmed a couple days prior by pre-op surgery nurse who will be calling)    Home Preparation:   Preop physical exam:  Needed within 30 days of surgery  Pre-op Scheduled With:  KAYLEIGH Bah PCP/FP     Date: 01/25/21 Time: 10:20am    COVID:  02/05/21 @ 1:30 - [place]    Shower with Hibiclens the night before and the morning of surgery, gently cleaning skin from neck to feet - see included instructions.     Take medications with a sip of water the morning of surgery (if approved by your doctor).    May have a light meal, toast and clear liquids, up to 8 hrs before surgery.     May have clear liquids (liquids one can see through) up to 4 hrs before surgery.    Nothing after 4 hrs before surgery.    Stop aspirin 7-10 days before surgery; Stop NSAIDS (Ibuprofen, Naproxen, etc) 5 days before surgery; Stop Plavix 7-10 days before surgery.     Sincerely,    [facility] [phone]; Fax #: [phone]  Same Day Surgery [phone]; Fax: [phone]    Pre-Op Appt Date: Patient to schedule within 30 days of surgery  Post-Op Appt Date: To be determined by provider.   Packet sent out: Yes  Pre-cert/Authorization completed: TBD by Financial Securing Office  MA Sterilization/Hysterectomy Acknowledgment Consent signed: Not Applicable      "

## 2021-01-20 ENCOUNTER — TELEPHONE (OUTPATIENT)
Dept: OBGYN | Facility: CLINIC | Age: 66
End: 2021-01-20

## 2021-01-20 NOTE — TELEPHONE ENCOUNTER
Reason for Call:  Other call back    Detailed comments: pt calling stating she is having a procedure done in Feb for a Colposcopy and biopsy LEEP. She would like to get the CPT codes for this procedure so she can check w/ insurance on coverage     Phone Number Patient can be reached at: Home number on file [phone] (home)    Best Time: any    Can we leave a detailed message on this number? YES    Call taken on 1/20/2021 at 12:13 PM by Victoria Keys

## 2021-01-21 NOTE — TELEPHONE ENCOUNTER
Message sent to clinic  this morning - await response for codes.    -Yamile Szymanski  Clinic Station

## 2021-01-22 NOTE — TELEPHONE ENCOUNTER
Left a message for patient to call back.  Gave pt the code from our clinic  of [id number] - if procedures are done together she states there would be just one billing code.    -Yamile Szymanski  Clinic Station   -[facility]

## 2021-01-25 ENCOUNTER — OFFICE VISIT (OUTPATIENT)
Dept: FAMILY MEDICINE | Facility: CLINIC | Age: 66
End: 2021-01-25
Payer: COMMERCIAL

## 2021-01-25 VITALS
WEIGHT: 217.4 LBS | TEMPERATURE: 97.7 F | RESPIRATION RATE: 24 BRPM | DIASTOLIC BLOOD PRESSURE: 74 MMHG | HEART RATE: 72 BPM | BODY MASS INDEX: 36.46 KG/M2 | SYSTOLIC BLOOD PRESSURE: 136 MMHG

## 2021-01-25 DIAGNOSIS — E78.5 HYPERLIPIDEMIA LDL GOAL <100: ICD-10-CM

## 2021-01-25 DIAGNOSIS — Z13.1 SCREENING FOR DIABETES MELLITUS: ICD-10-CM

## 2021-01-25 DIAGNOSIS — Z01.818 PREOP GENERAL PHYSICAL EXAM: Primary | ICD-10-CM

## 2021-01-25 DIAGNOSIS — E66.01 MORBID OBESITY (H): ICD-10-CM

## 2021-01-25 DIAGNOSIS — Z12.31 ENCOUNTER FOR SCREENING MAMMOGRAM FOR BREAST CANCER: ICD-10-CM

## 2021-01-25 PROCEDURE — 99214 OFFICE O/P EST MOD 30 MIN: CPT | Performed by: NURSE PRACTITIONER

## 2021-01-25 RX ORDER — NYSTATIN 100000 [USP'U]/G
1 POWDER TOPICAL
COMMUNITY
Start: 2020-09-04 | End: 2022-02-23

## 2021-01-25 ASSESSMENT — ENCOUNTER SYMPTOMS
VOMITING: 0
ACTIVITY CHANGE: 0
NERVOUS/ANXIOUS: 0
FATIGUE: 0
RHINORRHEA: 0
SLEEP DISTURBANCE: 0
POLYPHAGIA: 0
DYSPHORIC MOOD: 0
NAUSEA: 0
HEADACHES: 0
DIARRHEA: 0
SORE THROAT: 0
ABDOMINAL PAIN: 0
DIZZINESS: 0
SHORTNESS OF BREATH: 0
POLYDIPSIA: 0
FREQUENCY: 0
CHEST TIGHTNESS: 0
NUMBNESS: 0
LIGHT-HEADEDNESS: 0
WHEEZING: 0
CONSTIPATION: 0
DIFFICULTY URINATING: 0
COUGH: 0
ABDOMINAL DISTENTION: 0
PALPITATIONS: 0
ARTHRALGIAS: 0

## 2021-01-25 ASSESSMENT — PAIN SCALES - GENERAL: PAINLEVEL: NO PAIN (0)

## 2021-01-25 NOTE — PATIENT INSTRUCTIONS

## 2021-01-25 NOTE — H&P (VIEW-ONLY)
[facility] [place]  [address]  [place] [address]  Phone: [phone]  [facility]  Pre-op Performing Provider: [facility]    PREOPERATIVE EVALUATION:  Today's date: 1/25/2021    Jessie Tamayo is a 65 year old female who presents for a preoperative evaluation.    Surgical Information:  Surgery/Procedure: colposcopy, with biopsy, leep procedure  Surgery Location: [facility]  Surgeon: Dr. Koenig  Surgery Date: 2/8/21  Time of Surgery: 9:20 am  Where patient plans to recover: At home with family  Fax number for surgical facility: Note does not need to be faxed, will be available electronically in Epic.    Type of Anesthesia Anticipated: MAC with Block    Subjective     HPI related to upcoming procedure: Here today for pre-op. Is going to be having COVID test on 2/5/21. Is going to be having colposcopy and a LEEP. Tried to do in the office and was unable.  Has high risk HPV and last Pap showed ASCUS.    History of elevated cholesterol.  Had been controlling with diet and exercise.Used to go to the gym. Now with COVID is not able to. Has been on statin in the past and that caused muscle pain. Took simvastatin in the past.  Is open to trying alternative medication in the future if needed.    Does self breast exams without area of concern.  Needs to get set up for mammogram.  No family history of breast cancer.    Had influenza vaccine in Oct 2020      Preop Questions 1/22/2021   1. Have you ever had a heart attack or stroke? No   2. Have you ever had surgery on your heart or blood vessels, such as a stent placement, a coronary artery bypass, or surgery on an artery in your head, neck, heart, or legs? No   3. Do you have chest pain with activity? No   4. Do you have a history of  heart failure? No   5. Do you currently have a cold, bronchitis or symptoms of other infection? No   6. Do you have a cough, shortness  of breath, or wheezing? No   7. Do you or anyone in your family have previous history of blood clots? No   8. Do you or does anyone in your family have a serious bleeding problem such as prolonged bleeding following surgeries or cuts? YES - Did have a lot of bleeding with tonsillectomy.    9. Have you ever had problems with anemia or been told to take iron pills? No   10. Have you had any abnormal blood loss such as black, tarry or bloody stools, or abnormal vaginal bleeding? YES - Is currently being seen by GYN for this.    11. Have you ever had a blood transfusion? No   12. Are you willing to have a blood transfusion if it is medically needed before, during, or after your surgery? Yes   13. Have you or any of your relatives ever had problems with anesthesia? No   14. Do you have sleep apnea, excessive snoring or daytime drowsiness? No   15. Do you have any artifical heart valves or other implanted medical devices like a pacemaker, defibrillator, or continuous glucose monitor? No   16. Do you have artificial joints? No   17. Are you allergic to latex? No   18. Is there any chance that you may be pregnant? -       Health Care Directive:  Patient does not have a Health Care Directive or Living Will: Patient states has Advance Directive and will bring in a copy to clinic.  [id number]}      Review of Systems   Constitutional: Negative for activity change and fatigue.   HENT: Negative for ear pain, rhinorrhea and sore throat.    Respiratory: Negative for cough, chest tightness, shortness of breath and wheezing.    Cardiovascular: Negative for chest pain, palpitations and leg swelling.   Gastrointestinal: Negative for abdominal distention, abdominal pain, constipation, diarrhea, nausea and vomiting.   Endocrine: Negative for cold intolerance, heat intolerance, polydipsia, polyphagia and polyuria.   Genitourinary: Negative for difficulty urinating, enuresis, frequency and urgency.        Vaginal bleeding     Musculoskeletal:  Negative for arthralgias.   Skin: Negative for rash.   Neurological: Negative for dizziness, light-headedness, numbness and headaches.   Psychiatric/Behavioral: Negative for dysphoric mood and sleep disturbance. The patient is not nervous/anxious.          Patient Active Problem List    Diagnosis Date Noted     Morbid obesity (H) 01/25/2021     Priority: Medium     Atrophic vaginitis 01/19/2021     Priority: Medium     Added automatically from request for surgery [id number]       Cervical high risk HPV (human papillomavirus) test positive 10/01/2019     Priority: Medium     Introital dyspareunia 07/11/2017     Priority: Medium     Vaginal atrophy 07/11/2017     Priority: Medium     Pap smear of cervix with ASCUS, cannot exclude HGSIL 01/29/2015     Priority: Medium     1/23/2015:Pap--NIL, +HR HPV type 16. Plan [place]-  2/27/15: [place] - Negative. Plan [place] in 1 year.   4/7/16: Pap - NIL, + HR HPV 16. Plan colp  5/19/16: [place] - ECC negative. Plan [place] in 1 year.   7/11/17: Pap: NIL, +HR HPV type 16. Plan colp  11/29/17 [place] not done within 3 months. Tracking updated for 6 mo colp/pap.   1/25/2018 Case reviewed @ [facility]- persistent HPV+16- needs to follow up with colposcopy  04/18/18 Patient is lost to follow-up.   9/25/18 [place] Bx & ECC - Negative. ASCUS Pap, + HR HPV 16 (neg 18 & other). Plan [place] in 1 year.   9/20/19 ASC-H Pap, + HR HPV 16 (Neg 18 & other). Plan colp  10/2/19 Pt transferring care due to insurance change.        Fall 07/31/2012     Priority: Medium     Hearing loss 04/30/2009     Priority: Medium     Problem list name updated by automated process. 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     Priority: Medium     Hyperlipidemia LDL goal <130 08/06/2008     Priority: Medium     Advanced directives, HC info letter sent 12/27/2011     Priority: Low     health care home 12/19/2011     Priority: Low     EMERGENCY CARE  PLAN  Geni 3, 2013: No current Care Coordination follow up planned. Please refer if Care Coordination services are needed.    Presenting Problem Signs and Symptoms Treatment Plan   Questions or concerns   during clinic hours   I will call my clinic directly:  [place] [address]  [phone].   Questions or concerns outside clinic hours   I will call the 24 hour nurse line at   [phone] or [facility].   Need to schedule an appointment   I will call the 24 hour scheduling team at [phone] or my clinic directly at [phone].    Same day treatment     I will call my clinic first, nurse line if after hours, urgent care and express care if needed.   Clinic care coordination services (regular clinic hours)     I will call a clinic care coordinator directly:     Segun Ritter RN  Mon, Tues, Fri - [phone]  Wed, Thurs - [phone]    Christen Newberry :    [phone]    Or call my clinic at [phone] and ask to speak with care coordination.   Crisis Services: Behavioral or Mental Health  [facility] 24 Hour Phone Line  [phone]    [facility] 24 Hour Crisis Services  [phone]    [facility] (Behavioral Health Providers) Network [phone]    [facility]   [phone]       Emergency treatment -- Immediately    CAll 911             Past Medical History:   Diagnosis Date     Abnormal Pap smear of cervix 2019    see problem list     Actinic keratosis      Cervical high risk HPV (human papillomavirus) test positive 1/29/2015, 4/7/16, 7/11/17, 2019    see problem list     H/O colposcopy with cervical biopsy 2/2015    Negative     Hyperlipemia      Lichen sclerosus 2020     Osteopenias      Status post colposcopy 5/19/16    ECC - negative     Past Surgical History:   Procedure Laterality Date     C TREAT ECTOPIC PREG,RMV TUBE/OVARY  1985    ectopic, right side-ovary and tube removed     EYE SURGERY      cataracts bilateral      HC COLONOSCOPY THRU STOMA, DIAGNOSTIC  2006    normal     OPEN REDUCTION INTERNAL FIXATION FOREARM  7/31/2012    Procedure: OPEN REDUCTION INTERNAL FIXATION FOREARM;  Open Reduction Internal Fixation, Irrigation & Debridment  of Right Distal Radius, application short arm splint;  Surgeon: Wade Beard MD;  Location: [facility] OR     TONSILLECTOMY & ADENOIDECTOMY  1960     Current Outpatient Medications   Medication Sig Dispense Refill     estradiol (ESTRACE) 0.1 MG/GM vaginal cream Place 2 g vaginally twice a week Start approximately 2 months prior to pelvic exam (Patient not taking: Reported on 1/25/2021) 42.5 g 1     nystatin (MYCOSTATIN) 524894 UNIT/GM external powder Apply 1 strip topically         Allergies   Allergen Reactions     Seasonal Allergies         Social History     Tobacco Use     Smoking status: Never Smoker     Smokeless tobacco: Never Used   Substance Use Topics     Alcohol use: Yes     Comment: very little     Family History   Problem Relation Age of Onset     Cancer Mother         lung cancer     Other Cancer Mother         Lung Ca     Cerebrovascular Disease Father      Hypertension Father      Alcohol/Drug Father      Diabetes Other         paternal aunt     Hyperlipidemia Other      Other Cancer Other         Cervical Ca     Other Cancer Other         Ovarian Ca     Obesity Other      Diabetes No family hx of      C.A.D. No family hx of      Breast Cancer No family hx of      Cancer - colorectal No family hx of      Prostate Cancer No family hx of      Melanoma No family hx of      History   Drug Use No         Objective     /74   Pulse 72   Temp 97.7  F (36.5  C) (Tympanic)   Resp 24   Wt 98.6 kg (217 lb 6.4 oz)   LMP 03/08/2008   BMI 36.46 kg/m      Physical Exam  Constitutional:       Appearance: Normal appearance. She is well-developed.   HENT:      Head: Normocephalic and atraumatic.      Right Ear: Tympanic membrane and external ear normal. No middle ear effusion.       Left Ear: Tympanic membrane and external ear normal.  No middle ear effusion.      Nose: No mucosal edema.      Mouth/Throat:      Pharynx: Uvula midline.   Eyes:      Pupils: Pupils are equal, round, and reactive to light.   Neck:      Musculoskeletal: Normal range of motion.      Thyroid: No thyromegaly.      Vascular: No carotid bruit.   Cardiovascular:      Rate and Rhythm: Normal rate and regular rhythm.      Heart sounds: Normal heart sounds.   Pulmonary:      Effort: Pulmonary effort is normal.      Breath sounds: Normal breath sounds.   Abdominal:      General: Bowel sounds are normal.      Palpations: Abdomen is soft.      Tenderness: There is no abdominal tenderness.   Musculoskeletal: Normal range of motion.   Skin:     General: Skin is warm and dry.      Findings: No rash.   Neurological:      Mental Status: She is alert.   Psychiatric:         Behavior: Behavior normal.         No results for input(s): HGB, PLT, INR, NA, POTASSIUM, CR, A1C in the last 50398 hours.     Diagnostics:  No labs were ordered during this visit.   No EKG required, no history of coronary heart disease, significant arrhythmia, peripheral arterial disease or other structural heart disease.    Revised Cardiac Risk Index (RCRI):  The patient has the following serious cardiovascular risks for perioperative complications:   - No serious cardiac risks = 0 points     RCRI Interpretation: 0 points: Class I (very low risk - 0.4% complication rate)       Assessment & Plan   The proposed surgical procedure is considered LOW risk.    Preop general physical exam  Okay for surgery    Encounter for screening mammogram for breast cancer  Order placed, plans to call per self and set up  - MA Screening Digital Bilateral; Future    Morbid obesity (H)  Activity last due to Covid.  Plans to restart when able.    Hyperlipidemia LDL goal <100  We will set up for fasting lab appointment.  Had previously been controlling by diet and exercise  however, due to Covid has not been as active as typically is.  Is open to starting alternative medication in the future if needed  - Lipid panel reflex to direct LDL Fasting; Future    Screening for diabetes mellitus  We will set up for fasting lab appointment  - Basic metabolic panel; Future       Risks and Recommendations:  The patient has the following additional risks and recommendations for perioperative complications:   - No identified additional risk factors other than previously addressed    Medication Instructions:  Patient is on no chronic medications    RECOMMENDATION:  APPROVAL GIVEN to proceed with proposed procedure, without further diagnostic evaluation.    Signed Electronically by: SANDI Andujar CNP    Copy of this evaluation report is provided to requesting physician.    Preop [facility] Preop Guidelines    Revised Cardiac Risk Index

## 2021-01-25 NOTE — PROGRESS NOTES
[facility] [place]  [address] [address]  [place] [address]  Phone: [phone]  [facility]  Pre-op Performing Provider: [facility]    PREOPERATIVE EVALUATION:  Today's date: 1/25/2021    Jessie Tamayo is a 65 year old female who presents for a preoperative evaluation.    Surgical Information:  Surgery/Procedure: colposcopy, with biopsy, leep procedure  Surgery Location: [facility]  Surgeon: Dr. Koenig  Surgery Date: 2/8/21  Time of Surgery: 9:20 am  Where patient plans to recover: At home with family  Fax number for surgical facility: Note does not need to be faxed, will be available electronically in Epic.    Type of Anesthesia Anticipated: MAC with Block    Subjective     HPI related to upcoming procedure: Here today for pre-op. Is going to be having COVID test on 2/5/21. Is going to be having colposcopy and a LEEP. Tried to do in the office and was unable.  Has high risk HPV and last Pap showed ASCUS.    History of elevated cholesterol.  Had been controlling with diet and exercise.Used to go to the gym. Now with COVID is not able to. Has been on statin in the past and that caused muscle pain. Took simvastatin in the past.  Is open to trying alternative medication in the future if needed.    Does self breast exams without area of concern.  Needs to get set up for mammogram.  No family history of breast cancer.    Had influenza vaccine in Oct 2020      Preop Questions 1/22/2021   1. Have you ever had a heart attack or stroke? No   2. Have you ever had surgery on your heart or blood vessels, such as a stent placement, a coronary artery bypass, or surgery on an artery in your head, neck, heart, or legs? No   3. Do you have chest pain with activity? No   4. Do you have a history of  heart failure? No   5. Do you currently have a cold, bronchitis or symptoms of other infection? No   6. Do you have a cough, shortness  of breath, or wheezing? No   7. Do you or anyone in your family have previous history of blood clots? No   8. Do you or does anyone in your family have a serious bleeding problem such as prolonged bleeding following surgeries or cuts? YES - Did have a lot of bleeding with tonsillectomy.    9. Have you ever had problems with anemia or been told to take iron pills? No   10. Have you had any abnormal blood loss such as black, tarry or bloody stools, or abnormal vaginal bleeding? YES - Is currently being seen by GYN for this.    11. Have you ever had a blood transfusion? No   12. Are you willing to have a blood transfusion if it is medically needed before, during, or after your surgery? Yes   13. Have you or any of your relatives ever had problems with anesthesia? No   14. Do you have sleep apnea, excessive snoring or daytime drowsiness? No   15. Do you have any artifical heart valves or other implanted medical devices like a pacemaker, defibrillator, or continuous glucose monitor? No   16. Do you have artificial joints? No   17. Are you allergic to latex? No   18. Is there any chance that you may be pregnant? -       Health Care Directive:  Patient does not have a Health Care Directive or Living Will: Patient states has Advance Directive and will bring in a copy to clinic.  [id number]}      Review of Systems   Constitutional: Negative for activity change and fatigue.   HENT: Negative for ear pain, rhinorrhea and sore throat.    Respiratory: Negative for cough, chest tightness, shortness of breath and wheezing.    Cardiovascular: Negative for chest pain, palpitations and leg swelling.   Gastrointestinal: Negative for abdominal distention, abdominal pain, constipation, diarrhea, nausea and vomiting.   Endocrine: Negative for cold intolerance, heat intolerance, polydipsia, polyphagia and polyuria.   Genitourinary: Negative for difficulty urinating, enuresis, frequency and urgency.        Vaginal bleeding     Musculoskeletal:  Negative for arthralgias.   Skin: Negative for rash.   Neurological: Negative for dizziness, light-headedness, numbness and headaches.   Psychiatric/Behavioral: Negative for dysphoric mood and sleep disturbance. The patient is not nervous/anxious.          Patient Active Problem List    Diagnosis Date Noted     Morbid obesity (H) 01/25/2021     Priority: Medium     Atrophic vaginitis 01/19/2021     Priority: Medium     Added automatically from request for surgery [id number]       Cervical high risk HPV (human papillomavirus) test positive 10/01/2019     Priority: Medium     Introital dyspareunia 07/11/2017     Priority: Medium     Vaginal atrophy 07/11/2017     Priority: Medium     Pap smear of cervix with ASCUS, cannot exclude HGSIL 01/29/2015     Priority: Medium     1/23/2015:Pap--NIL, +HR HPV type 16. Plan [place]-  2/27/15: [place] - Negative. Plan [place] in 1 year.   4/7/16: Pap - NIL, + HR HPV 16. Plan colp  5/19/16: [place] - ECC negative. Plan [place] in 1 year.   7/11/17: Pap: NIL, +HR HPV type 16. Plan colp  11/29/17 [place] not done within 3 months. Tracking updated for 6 mo colp/pap.   1/25/2018 Case reviewed @ [facility]- persistent HPV+16- needs to follow up with colposcopy  04/18/18 Patient is lost to follow-up.   9/25/18 [place] Bx & ECC - Negative. ASCUS Pap, + HR HPV 16 (neg 18 & other). Plan [place] in 1 year.   9/20/19 ASC-H Pap, + HR HPV 16 (Neg 18 & other). Plan colp  10/2/19 Pt transferring care due to insurance change.        Fall 07/31/2012     Priority: Medium     Hearing loss 04/30/2009     Priority: Medium     Problem list name updated by automated process. 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     Priority: Medium     Hyperlipidemia LDL goal <130 08/06/2008     Priority: Medium     Advanced directives, HC info letter sent 12/27/2011     Priority: Low     health care home 12/19/2011     Priority: Low     EMERGENCY CARE  PLAN  Geni 3, 2013: No current Care Coordination follow up planned. Please refer if Care Coordination services are needed.    Presenting Problem Signs and Symptoms Treatment Plan   Questions or concerns   during clinic hours   I will call my clinic directly:  [place] [address]  [phone].   Questions or concerns outside clinic hours   I will call the 24 hour nurse line at   [phone] or [facility].   Need to schedule an appointment   I will call the 24 hour scheduling team at [phone] or my clinic directly at [phone].    Same day treatment     I will call my clinic first, nurse line if after hours, urgent care and express care if needed.   Clinic care coordination services (regular clinic hours)     I will call a clinic care coordinator directly:     Segun Ritter RN  Mon, Tues, Fri - [phone]  Wed, Thurs - [phone]    Christen Newberry :    [phone]    Or call my clinic at [phone] and ask to speak with care coordination.   Crisis Services: Behavioral or Mental Health  [facility] 24 Hour Phone Line  [phone]    [facility] 24 Hour Crisis Services  [phone]    [facility] (Behavioral Health Providers) Network [phone]    State mental health facility   [phone]       Emergency treatment -- Immediately    CAll 911             Past Medical History:   Diagnosis Date     Abnormal Pap smear of cervix 2019    see problem list     Actinic keratosis      Cervical high risk HPV (human papillomavirus) test positive 1/29/2015, 4/7/16, 7/11/17, 2019    see problem list     H/O colposcopy with cervical biopsy 2/2015    Negative     Hyperlipemia      Lichen sclerosus 2020     Osteopenias      Status post colposcopy 5/19/16    ECC - negative     Past Surgical History:   Procedure Laterality Date     C TREAT ECTOPIC PREG,RMV TUBE/OVARY  1985    ectopic, right side-ovary and tube removed     EYE SURGERY      cataracts bilateral      HC COLONOSCOPY THRU STOMA, DIAGNOSTIC  2006    normal     OPEN REDUCTION INTERNAL FIXATION FOREARM  7/31/2012    Procedure: OPEN REDUCTION INTERNAL FIXATION FOREARM;  Open Reduction Internal Fixation, Irrigation & Debridment  of Right Distal Radius, application short arm splint;  Surgeon: Wade Beard MD;  Location: [facility] OR     TONSILLECTOMY & ADENOIDECTOMY  1960     Current Outpatient Medications   Medication Sig Dispense Refill     estradiol (ESTRACE) 0.1 MG/GM vaginal cream Place 2 g vaginally twice a week Start approximately 2 months prior to pelvic exam (Patient not taking: Reported on 1/25/2021) 42.5 g 1     nystatin (MYCOSTATIN) 584988 UNIT/GM external powder Apply 1 strip topically         Allergies   Allergen Reactions     Seasonal Allergies         Social History     Tobacco Use     Smoking status: Never Smoker     Smokeless tobacco: Never Used   Substance Use Topics     Alcohol use: Yes     Comment: very little     Family History   Problem Relation Age of Onset     Cancer Mother         lung cancer     Other Cancer Mother         Lung Ca     Cerebrovascular Disease Father      Hypertension Father      Alcohol/Drug Father      Diabetes Other         paternal aunt     Hyperlipidemia Other      Other Cancer Other         Cervical Ca     Other Cancer Other         Ovarian Ca     Obesity Other      Diabetes No family hx of      C.A.D. No family hx of      Breast Cancer No family hx of      Cancer - colorectal No family hx of      Prostate Cancer No family hx of      Melanoma No family hx of      History   Drug Use No         Objective     /74   Pulse 72   Temp 97.7  F (36.5  C) (Tympanic)   Resp 24   Wt 98.6 kg (217 lb 6.4 oz)   LMP 03/08/2008   BMI 36.46 kg/m      Physical Exam  Constitutional:       Appearance: Normal appearance. She is well-developed.   HENT:      Head: Normocephalic and atraumatic.      Right Ear: Tympanic membrane and external ear normal. No middle ear effusion.       Left Ear: Tympanic membrane and external ear normal.  No middle ear effusion.      Nose: No mucosal edema.      Mouth/Throat:      Pharynx: Uvula midline.   Eyes:      Pupils: Pupils are equal, round, and reactive to light.   Neck:      Musculoskeletal: Normal range of motion.      Thyroid: No thyromegaly.      Vascular: No carotid bruit.   Cardiovascular:      Rate and Rhythm: Normal rate and regular rhythm.      Heart sounds: Normal heart sounds.   Pulmonary:      Effort: Pulmonary effort is normal.      Breath sounds: Normal breath sounds.   Abdominal:      General: Bowel sounds are normal.      Palpations: Abdomen is soft.      Tenderness: There is no abdominal tenderness.   Musculoskeletal: Normal range of motion.   Skin:     General: Skin is warm and dry.      Findings: No rash.   Neurological:      Mental Status: She is alert.   Psychiatric:         Behavior: Behavior normal.         No results for input(s): HGB, PLT, INR, NA, POTASSIUM, CR, A1C in the last 96669 hours.     Diagnostics:  No labs were ordered during this visit.   No EKG required, no history of coronary heart disease, significant arrhythmia, peripheral arterial disease or other structural heart disease.    Revised Cardiac Risk Index (RCRI):  The patient has the following serious cardiovascular risks for perioperative complications:   - No serious cardiac risks = 0 points     RCRI Interpretation: 0 points: Class I (very low risk - 0.4% complication rate)       Assessment & Plan   The proposed surgical procedure is considered LOW risk.    Preop general physical exam  Okay for surgery    Encounter for screening mammogram for breast cancer  Order placed, plans to call per self and set up  - MA Screening Digital Bilateral; Future    Morbid obesity (H)  Activity last due to Covid.  Plans to restart when able.    Hyperlipidemia LDL goal <100  We will set up for fasting lab appointment.  Had previously been controlling by diet and exercise  however, due to Covid has not been as active as typically is.  Is open to starting alternative medication in the future if needed  - Lipid panel reflex to direct LDL Fasting; Future    Screening for diabetes mellitus  We will set up for fasting lab appointment  - Basic metabolic panel; Future       Risks and Recommendations:  The patient has the following additional risks and recommendations for perioperative complications:   - No identified additional risk factors other than previously addressed    Medication Instructions:  Patient is on no chronic medications    RECOMMENDATION:  APPROVAL GIVEN to proceed with proposed procedure, without further diagnostic evaluation.    Signed Electronically by: SANDI Andujar CNP    Copy of this evaluation report is provided to requesting physician.    Preop [facility] Preop Guidelines    Revised Cardiac Risk Index

## 2021-01-27 DIAGNOSIS — Z11.59 ENCOUNTER FOR SCREENING FOR OTHER VIRAL DISEASES: Primary | ICD-10-CM

## 2021-02-02 ENCOUNTER — ANESTHESIA EVENT (OUTPATIENT)
Dept: SURGERY | Facility: CLINIC | Age: 66
End: 2021-02-02
Payer: COMMERCIAL

## 2021-02-05 DIAGNOSIS — Z13.1 SCREENING FOR DIABETES MELLITUS: ICD-10-CM

## 2021-02-05 DIAGNOSIS — Z11.59 ENCOUNTER FOR SCREENING FOR OTHER VIRAL DISEASES: ICD-10-CM

## 2021-02-05 DIAGNOSIS — E78.5 HYPERLIPIDEMIA LDL GOAL <100: ICD-10-CM

## 2021-02-05 LAB
SARS-COV-2 RNA RESP QL NAA+PROBE: NORMAL
SPECIMEN SOURCE: NORMAL

## 2021-02-05 PROCEDURE — U0005 INFEC AGEN DETEC AMPLI PROBE: HCPCS | Performed by: OBSTETRICS & GYNECOLOGY

## 2021-02-05 PROCEDURE — U0003 INFECTIOUS AGENT DETECTION BY NUCLEIC ACID (DNA OR RNA); SEVERE ACUTE RESPIRATORY SYNDROME CORONAVIRUS 2 (SARS-COV-2) (CORONAVIRUS DISEASE [COVID-19]), AMPLIFIED PROBE TECHNIQUE, MAKING USE OF HIGH THROUGHPUT TECHNOLOGIES AS DESCRIBED BY CMS-2020-01-R: HCPCS | Performed by: OBSTETRICS & GYNECOLOGY

## 2021-02-06 LAB
LABORATORY COMMENT REPORT: NORMAL
SARS-COV-2 RNA RESP QL NAA+PROBE: NEGATIVE
SPECIMEN SOURCE: NORMAL

## 2021-02-08 ENCOUNTER — HOSPITAL ENCOUNTER (OUTPATIENT)
Facility: CLINIC | Age: 66
Discharge: HOME | End: 2021-02-08
Attending: OBSTETRICS & GYNECOLOGY | Admitting: OBSTETRICS & GYNECOLOGY
Payer: COMMERCIAL

## 2021-02-08 ENCOUNTER — ANESTHESIA (OUTPATIENT)
Dept: SURGERY | Facility: CLINIC | Age: 66
End: 2021-02-08
Payer: COMMERCIAL

## 2021-02-08 VITALS
SYSTOLIC BLOOD PRESSURE: 142 MMHG | TEMPERATURE: 98.2 F | HEART RATE: 59 BPM | DIASTOLIC BLOOD PRESSURE: 70 MMHG | BODY MASS INDEX: 36.15 KG/M2 | HEIGHT: 65 IN | RESPIRATION RATE: 16 BRPM | OXYGEN SATURATION: 96 % | WEIGHT: 217 LBS

## 2021-02-08 DIAGNOSIS — R87.611 PAP SMEAR OF CERVIX WITH ASCUS, CANNOT EXCLUDE HGSIL: ICD-10-CM

## 2021-02-08 DIAGNOSIS — R87.810 CERVICAL HIGH RISK HPV (HUMAN PAPILLOMAVIRUS) TEST POSITIVE: ICD-10-CM

## 2021-02-08 DIAGNOSIS — R87.611 ATYPICAL SQUAMOUS CELLS CANNOT EXCLUDE HIGH GRADE SQUAMOUS INTRAEPITHELIAL LESION ON CYTOLOGIC SMEAR OF CERVIX (ASC-H): Primary | ICD-10-CM

## 2021-02-08 DIAGNOSIS — N95.2 ATROPHIC VAGINITIS: ICD-10-CM

## 2021-02-08 PROCEDURE — 250N000011 HC RX IP 250 OP 636: Performed by: OBSTETRICS & GYNECOLOGY

## 2021-02-08 PROCEDURE — 360N000075 HC SURGERY LEVEL 2, PER MIN: Performed by: OBSTETRICS & GYNECOLOGY

## 2021-02-08 PROCEDURE — 258N000003 HC RX IP 258 OP 636: Performed by: NURSE ANESTHETIST, CERTIFIED REGISTERED

## 2021-02-08 PROCEDURE — 250N000009 HC RX 250: Performed by: NURSE ANESTHETIST, CERTIFIED REGISTERED

## 2021-02-08 PROCEDURE — 250N000009 HC RX 250: Performed by: OBSTETRICS & GYNECOLOGY

## 2021-02-08 PROCEDURE — 272N000001 HC OR GENERAL SUPPLY STERILE: Performed by: OBSTETRICS & GYNECOLOGY

## 2021-02-08 PROCEDURE — 250N000013 HC RX MED GY IP 250 OP 250 PS 637: Performed by: NURSE ANESTHETIST, CERTIFIED REGISTERED

## 2021-02-08 PROCEDURE — 999N000141 HC STATISTIC PRE-PROCEDURE NURSING ASSESSMENT: Performed by: OBSTETRICS & GYNECOLOGY

## 2021-02-08 PROCEDURE — 88307 TISSUE EXAM BY PATHOLOGIST: CPT | Mod: 26 | Performed by: PATHOLOGY

## 2021-02-08 PROCEDURE — 370N000017 HC ANESTHESIA TECHNICAL FEE, PER MIN: Performed by: OBSTETRICS & GYNECOLOGY

## 2021-02-08 PROCEDURE — 57461 CONZ OF CERVIX W/SCOPE LEEP: CPT | Performed by: OBSTETRICS & GYNECOLOGY

## 2021-02-08 PROCEDURE — 88305 TISSUE EXAM BY PATHOLOGIST: CPT | Mod: TC | Performed by: OBSTETRICS & GYNECOLOGY

## 2021-02-08 PROCEDURE — 88305 TISSUE EXAM BY PATHOLOGIST: CPT | Mod: 26 | Performed by: PATHOLOGY

## 2021-02-08 PROCEDURE — 88307 TISSUE EXAM BY PATHOLOGIST: CPT | Mod: TC | Performed by: OBSTETRICS & GYNECOLOGY

## 2021-02-08 PROCEDURE — 250N000013 HC RX MED GY IP 250 OP 250 PS 637: Performed by: OBSTETRICS & GYNECOLOGY

## 2021-02-08 PROCEDURE — 250N000011 HC RX IP 250 OP 636: Performed by: NURSE ANESTHETIST, CERTIFIED REGISTERED

## 2021-02-08 PROCEDURE — 710N000012 HC RECOVERY PHASE 2, PER MINUTE: Performed by: OBSTETRICS & GYNECOLOGY

## 2021-02-08 RX ORDER — IBUPROFEN 200 MG
600 TABLET ORAL EVERY 6 HOURS PRN
COMMUNITY
Start: 2021-02-08 | End: 2022-02-23

## 2021-02-08 RX ORDER — SODIUM CHLORIDE, SODIUM LACTATE, POTASSIUM CHLORIDE, CALCIUM CHLORIDE 600; 310; 30; 20 MG/100ML; MG/100ML; MG/100ML; MG/100ML
INJECTION, SOLUTION INTRAVENOUS CONTINUOUS
Status: DISCONTINUED | OUTPATIENT
Start: 2021-02-08 | End: 2021-02-08 | Stop reason: HOSPADM

## 2021-02-08 RX ORDER — KETOROLAC TROMETHAMINE 15 MG/ML
15 INJECTION, SOLUTION INTRAMUSCULAR; INTRAVENOUS ONCE
Status: COMPLETED | OUTPATIENT
Start: 2021-02-08 | End: 2021-02-08

## 2021-02-08 RX ORDER — MEPERIDINE HYDROCHLORIDE 25 MG/ML
12.5 INJECTION INTRAMUSCULAR; INTRAVENOUS; SUBCUTANEOUS
Status: DISCONTINUED | OUTPATIENT
Start: 2021-02-08 | End: 2021-02-08 | Stop reason: HOSPADM

## 2021-02-08 RX ORDER — FENTANYL CITRATE 50 UG/ML
25-50 INJECTION, SOLUTION INTRAMUSCULAR; INTRAVENOUS
Status: DISCONTINUED | OUTPATIENT
Start: 2021-02-08 | End: 2021-02-08 | Stop reason: HOSPADM

## 2021-02-08 RX ORDER — NALOXONE HYDROCHLORIDE 0.4 MG/ML
0.4 INJECTION, SOLUTION INTRAMUSCULAR; INTRAVENOUS; SUBCUTANEOUS
Status: DISCONTINUED | OUTPATIENT
Start: 2021-02-08 | End: 2021-02-08 | Stop reason: HOSPADM

## 2021-02-08 RX ORDER — METOPROLOL TARTRATE 1 MG/ML
INJECTION, SOLUTION INTRAVENOUS PRN
Status: DISCONTINUED | OUTPATIENT
Start: 2021-02-08 | End: 2021-02-08

## 2021-02-08 RX ORDER — HYDROCODONE BITARTRATE AND ACETAMINOPHEN 5; 325 MG/1; MG/1
1 TABLET ORAL
Status: DISCONTINUED | OUTPATIENT
Start: 2021-02-08 | End: 2021-02-08 | Stop reason: HOSPADM

## 2021-02-08 RX ORDER — IBUPROFEN 600 MG/1
600 TABLET, FILM COATED ORAL
Status: DISCONTINUED | OUTPATIENT
Start: 2021-02-08 | End: 2021-02-08 | Stop reason: HOSPADM

## 2021-02-08 RX ORDER — NALOXONE HYDROCHLORIDE 0.4 MG/ML
0.2 INJECTION, SOLUTION INTRAMUSCULAR; INTRAVENOUS; SUBCUTANEOUS
Status: DISCONTINUED | OUTPATIENT
Start: 2021-02-08 | End: 2021-02-08 | Stop reason: HOSPADM

## 2021-02-08 RX ORDER — ONDANSETRON 2 MG/ML
4 INJECTION INTRAMUSCULAR; INTRAVENOUS EVERY 30 MIN PRN
Status: DISCONTINUED | OUTPATIENT
Start: 2021-02-08 | End: 2021-02-08 | Stop reason: HOSPADM

## 2021-02-08 RX ORDER — PROPOFOL 10 MG/ML
INJECTION, EMULSION INTRAVENOUS CONTINUOUS PRN
Status: DISCONTINUED | OUTPATIENT
Start: 2021-02-08 | End: 2021-02-08

## 2021-02-08 RX ORDER — ONDANSETRON 4 MG/1
4 TABLET, ORALLY DISINTEGRATING ORAL EVERY 30 MIN PRN
Status: DISCONTINUED | OUTPATIENT
Start: 2021-02-08 | End: 2021-02-08 | Stop reason: HOSPADM

## 2021-02-08 RX ORDER — IODINE AND POTASSIUM IODIDE 50; 100 MG/ML; MG/ML
LIQUID ORAL PRN
Status: DISCONTINUED | OUTPATIENT
Start: 2021-02-08 | End: 2021-02-08 | Stop reason: HOSPADM

## 2021-02-08 RX ORDER — BUPIVACAINE HYDROCHLORIDE AND EPINEPHRINE 5; 5 MG/ML; UG/ML
INJECTION, SOLUTION PERINEURAL PRN
Status: DISCONTINUED | OUTPATIENT
Start: 2021-02-08 | End: 2021-02-08 | Stop reason: HOSPADM

## 2021-02-08 RX ORDER — KETAMINE HYDROCHLORIDE 10 MG/ML
INJECTION, SOLUTION INTRAMUSCULAR; INTRAVENOUS PRN
Status: DISCONTINUED | OUTPATIENT
Start: 2021-02-08 | End: 2021-02-08

## 2021-02-08 RX ORDER — LIDOCAINE 40 MG/G
CREAM TOPICAL
Status: DISCONTINUED | OUTPATIENT
Start: 2021-02-08 | End: 2021-02-08 | Stop reason: HOSPADM

## 2021-02-08 RX ORDER — ACETAMINOPHEN 325 MG/1
975 TABLET ORAL ONCE
Status: COMPLETED | OUTPATIENT
Start: 2021-02-08 | End: 2021-02-08

## 2021-02-08 RX ORDER — FENTANYL CITRATE 50 UG/ML
INJECTION, SOLUTION INTRAMUSCULAR; INTRAVENOUS PRN
Status: DISCONTINUED | OUTPATIENT
Start: 2021-02-08 | End: 2021-02-08

## 2021-02-08 RX ADMIN — SODIUM CHLORIDE, POTASSIUM CHLORIDE, SODIUM LACTATE AND CALCIUM CHLORIDE: 600; 310; 30; 20 INJECTION, SOLUTION INTRAVENOUS at 07:01

## 2021-02-08 RX ADMIN — LIDOCAINE HYDROCHLORIDE 0.1 ML: 10 INJECTION, SOLUTION EPIDURAL; INFILTRATION; INTRACAUDAL; PERINEURAL at 07:01

## 2021-02-08 RX ADMIN — ACETAMINOPHEN 975 MG: 325 TABLET, FILM COATED ORAL at 07:05

## 2021-02-08 RX ADMIN — SODIUM CHLORIDE, POTASSIUM CHLORIDE, SODIUM LACTATE AND CALCIUM CHLORIDE: 600; 310; 30; 20 INJECTION, SOLUTION INTRAVENOUS at 07:37

## 2021-02-08 RX ADMIN — FENTANYL CITRATE 50 MCG: 50 INJECTION, SOLUTION INTRAMUSCULAR; INTRAVENOUS at 07:39

## 2021-02-08 RX ADMIN — FENTANYL CITRATE 50 MCG: 50 INJECTION, SOLUTION INTRAMUSCULAR; INTRAVENOUS at 07:47

## 2021-02-08 RX ADMIN — KETOROLAC TROMETHAMINE 15 MG: 15 INJECTION, SOLUTION INTRAMUSCULAR; INTRAVENOUS at 07:02

## 2021-02-08 RX ADMIN — KETAMINE HYDROCHLORIDE 25 MG: 10 INJECTION INTRAMUSCULAR; INTRAVENOUS at 07:47

## 2021-02-08 RX ADMIN — LIDOCAINE HYDROCHLORIDE 50 MG: 10 INJECTION, SOLUTION EPIDURAL; INFILTRATION; INTRACAUDAL; PERINEURAL at 07:47

## 2021-02-08 RX ADMIN — METOPROLOL TARTRATE 2.5 MG: 5 INJECTION INTRAVENOUS at 08:00

## 2021-02-08 RX ADMIN — PROPOFOL 125 MCG/KG/MIN: 10 INJECTION, EMULSION INTRAVENOUS at 07:47

## 2021-02-08 RX ADMIN — MIDAZOLAM 2 MG: 1 INJECTION INTRAMUSCULAR; INTRAVENOUS at 07:39

## 2021-02-08 ASSESSMENT — MIFFLIN-ST. JEOR: SCORE: 1526.22

## 2021-02-08 NOTE — ANESTHESIA POSTPROCEDURE EVALUATION
Patient: Jessie Tamayo    Procedure(s):  COLPOSCOPY, WITH BIOPSY, LEEP procedure    Diagnosis:Cervical high risk HPV (human papillomavirus) test positive [R87.810]  Pap smear of cervix with ASCUS, cannot exclude HGSIL [R87.611]  Atrophic vaginitis [N95.2]  Diagnosis Additional Information: No value filed.    Anesthesia Type:  MAC    Note:     Postop Pain Control: Uneventful            Sign Out: Well controlled pain   PONV: No   Neuro/Psych: Uneventful            Sign Out: Acceptable/Baseline neuro status   Airway/Respiratory: Uneventful            Sign Out: AIRWAY IN SITU/Resp. Support   CV/Hemodynamics: Uneventful            Sign Out: Acceptable CV status   Other NRE: NONE   DID A NON-ROUTINE EVENT OCCUR? No         Last vitals:  Vitals:    02/08/21 0632   BP: (!) 146/65   Resp: 20   Temp: 36.8  C (98.2  F)   SpO2: 95%       Last vitals prior to Anesthesia Care Transfer:  CRNA VITALS  2/8/2021 0747 - 2/8/2021 0824      2/8/2021             Resp Rate (observed):  (!) 3          Electronically Signed By: SANDI Domingo CRNA  February 8, 2021  8:24 AM

## 2021-02-08 NOTE — ANESTHESIA CARE TRANSFER NOTE
Patient: Jessie Tamayo    Procedure(s):  COLPOSCOPY, WITH BIOPSY, LEEP procedure    Diagnosis: Cervical high risk HPV (human papillomavirus) test positive [R87.810]  Pap smear of cervix with ASCUS, cannot exclude HGSIL [R87.611]  Atrophic vaginitis [N95.2]  Diagnosis Additional Information: No value filed.    Anesthesia Type:   MAC     Note:    Oropharynx: spontaneously breathing  Level of Consciousness: drowsy  Oxygen Supplementation: face mask  Level of Supplemental Oxygen (L/min / FiO2): 8  Independent Airway: airway patency satisfactory and stable  Dentition: dentition changed  Vital Signs Stable: post-procedure vital signs reviewed and stable  Report to RN Given: handoff report given  Patient transferred to: Phase II    Handoff Report: Identifed the Patient, Identified the Reponsible Provider, Reviewed the pertinent medical history, Discussed the surgical course, Reviewed Intra-OP anesthesia mangement and issues during anesthesia, Set expectations for post-procedure period and Allowed opportunity for questions and acknowledgement of understanding      Vitals: (Last set prior to Anesthesia Care Transfer)  CRNA VITALS  2/8/2021 0747 - 2/8/2021 0824      2/8/2021             Resp Rate (observed):  (!) 3        Electronically Signed By: SANDI Domingo CRNA  February 8, 2021  8:24 AM

## 2021-02-08 NOTE — OP NOTE
[facility] Gynecology  Operative Note    Pre-operative diagnosis: Cervical high risk HPV (human papillomavirus) test positive [R87.810]  Pap smear of cervix with ASCUS, cannot exclude HGSIL [R87.611]  Atrophic vaginitis [N95.2]   Post-operative diagnosis: Same   Procedure: Colposcopy  LEEP   Surgeon: Jefe Koenig MD   Assistant(s): None   Anesthesia: Paracervical block:  .5% Buvicaine and with epinephrine   Estimated blood loss: 10 ml   Total IV fluids: (See anesthesia record)  600ml   Blood transfusion: No transfusion was given during surgery   Total urine output: Not measured   Drains: None   Specimens: LEEP and Post LEEP ECC   Findings: Normal appearing vagina, vulva and cervox  Vaginal stenosis   Complications: None   Condition: Stable   Comments: Jessie Tamayo   1955  [id number]      Jessie Tamayo  presented for the above procedure.  She has ASC-H and HPV#16, is s/p attempted colposcopy in clinic  I met with Jessie  and her , Baljeet and discussed the planned procedure as well as the expected post operative course.  Risks of complications were noted and postoperative signs to watch for outlined.  Questions were answered and consent signed.  She was taken to the OR [facility] where she was placed in the supine position. She underwent MAC anesthesia.   She was then placed in the Dorso-lithotomy position in [facility].  An examination under anesthesia was performed that showed: vaginal stenosis    She was prepped and draped.  A timeout was held confirming her identity and proposed procedures. All were in agreement.        Attempt was made to place a coated speculum in the vagina.  The speculum was able to be placed but not opened due to her vaginal stenosis.  The speculum was removed and a lateral vaginal wall retractor was placed with a combination of this plus [employer] retractor and diallo swabs was able to visualize the cervix.  There were no significant abnormalities  noted.  Tissue was treated with 5% acetic acid solution with no acetowhite change noted.  Lugol solution was placed with no significant decrease in uptake.  Due to the fact that she had an ASCUS H and HPV #16, I LEEP was performed using a 1 x 1 cm loop.  A single pass was made and the tissue removed and sent for pathologic evaluation.  A post LEEP ECC was performed and the tissue sent separately.  Hemostasis was assured with electrocautery.  The cervix had been instilled with 0.5% bupivacaine with epinephrine prior to the LEEP procedure.  She did tolerate procedure well.  All vaginal instruments removed.  Sponge needle counts were correct.  She was awakened and taken to the PACU in good condition.    Jefe Koenig MD

## 2021-02-08 NOTE — DISCHARGE INSTRUCTIONS
Same Day Surgery Discharge Instructions  Special Precautions After Surgery - Adult    1. It is not unusual to feel lightheaded or faint, up to 24 hours after surgery or while taking pain medication.  If you have these symptoms; sit for a few minutes before standing and have someone assist you when getting up.  2. You should rest and relax for the next 24 hours and must have someone stay with you for at least 24 hours after your discharge.  3. DO NOT DRIVE any vehicle or operate mechanical equipment for 24 hours following the end of your surgery.  DO NOT DRIVE while taking narcotic pain medications that have been prescribed by your physician.  If you had a limb operated on, you must be able to use it fully to drive.  4. DO NOT drink alcoholic beverages for 24 hours following surgery or while taking prescription pain medication.  5. Drink clear liquids (apple juice, ginger ale, broth, 7-Up, etc.).  Progress to your regular diet as you feel able.  6. Any questions call your physician and do not make important decisions for 24 hours.       INCISIONAL CARE  ? May bathe / shower after 12 hours. Sitz baths will help with any discomfort.     Call for an appointment to return to the clinic   Dr Koenig will call you with pathology    Medications:  ? Ibuprofen (Motrin, Advil):  Next dose: anytime after 12:30 p.m..  ? Follow the instructions on the bottle.     Additional discharge instructions: as written per MD.  __________________________________________________________________________________________________________________________________  IMPORTANT NUMBERS:    [facility] Main Number:  [phone], [phone]  Pharmacy:  [phone]  Same Day Surgery:  [phone], Monday - Friday until 8:30 p.m.  Urgent Care:  [phone]  Emergency Room:  [phone]      [facility]:  [phone]                                                                             [facility]:   [phone] option 1  [facility]:  [phone]     [facility]:  [phone]   [facility]:  [phone]   [facility]: [phone]  [facility]:  [phone]

## 2021-02-08 NOTE — ANESTHESIA PREPROCEDURE EVALUATION
Anesthesia Pre-Procedure Evaluation    Patient: Jessie Tamayo   MRN: [id number] : 1955        Preoperative Diagnosis: Cervical high risk HPV (human papillomavirus) test positive [R87.810]  Pap smear of cervix with ASCUS, cannot exclude HGSIL [R87.611]  Atrophic vaginitis [N95.2]   Procedure : Procedure(s):  COLPOSCOPY, WITH BIOPSY, LEEP procedure     Past Medical History:   Diagnosis Date     Abnormal Pap smear of cervix     see problem list     Actinic keratosis      Cervical high risk HPV (human papillomavirus) test positive 2015, 16, 2019    see problem list     H/O colposcopy with cervical biopsy 2015    Negative     Hyperlipemia      Lichen sclerosus      Osteopenias      Status post colposcopy 16    ECC - negative      Past Surgical History:   Procedure Laterality Date     C TREAT ECTOPIC PREG,RMV TUBE/OVARY  1985    ectopic, right side-ovary and tube removed     EYE SURGERY      cataracts bilateral     HC COLONOSCOPY THRU STOMA, DIAGNOSTIC      normal     OPEN REDUCTION INTERNAL FIXATION FOREARM  2012    Procedure: OPEN REDUCTION INTERNAL FIXATION FOREARM;  Open Reduction Internal Fixation, Irrigation & Debridment  of Right Distal Radius, application short arm splint;  Surgeon: Wade Beard MD;  Location: [facility]     TONSILLECTOMY & ADENOIDECTOMY  1960      Allergies   Allergen Reactions     Seasonal Allergies       Social History     Tobacco Use     Smoking status: Never Smoker     Smokeless tobacco: Never Used   Substance Use Topics     Alcohol use: Yes     Comment: very little      Wt Readings from Last 1 Encounters:   21 98.4 kg (217 lb)        Anesthesia Evaluation            ROS/MED HX  ENT/Pulmonary:  - neg pulmonary ROS     Neurologic:  - neg neurologic ROS     Cardiovascular:  - neg cardiovascular ROS     METS/Exercise Tolerance:     Hematologic:  - neg hematologic  ROS     Musculoskeletal:  - neg musculoskeletal ROS      GI/Hepatic:  - neg GI/hepatic ROS     Renal/Genitourinary: Comment: HPV +  Vaginal atrophy  - neg Renal ROS     Endo:     (+) Obesity,     Psychiatric/Substance Use:  - neg psychiatric ROS     Infectious Disease:  - neg infectious disease ROS     Malignancy:  - neg malignancy ROS     Other:  - neg other ROS          Physical Exam    Airway  airway exam normal      Mallampati: II   TM distance: > 3 FB   Neck ROM: full   Mouth opening: > 3 cm    Respiratory Devices and Support         Dental  no notable dental history         Cardiovascular   cardiovascular exam normal          Pulmonary   pulmonary exam normal                OUTSIDE LABS:  CBC:   Lab Results   Component Value Date    WBC 8.4 11/11/2009    WBC 6.0 11/10/2009    HGB 12.3 11/11/2009    HGB 12.0 11/10/2009    HCT 37.9 11/11/2009    HCT 37.2 11/10/2009     11/11/2009     11/10/2009     BMP:   Lab Results   Component Value Date     11/11/2009     11/10/2009    POTASSIUM 4.0 11/11/2009    POTASSIUM 3.9 11/10/2009    CHLORIDE 102 11/11/2009    CHLORIDE 100 11/10/2009    CO2 30 11/11/2009    CO2 33 (H) 11/10/2009    BUN 11 11/11/2009    BUN 10 11/10/2009    CR 0.78 11/11/2009    CR 0.90 11/10/2009     (A) 02/18/2012    GLC 82 12/22/2009     COAGS:   Lab Results   Component Value Date    PTT 41 (H) 11/10/2009    INR 1.08 11/10/2009     POC: No results found for: BGM, HCG, HCGS  HEPATIC:   Lab Results   Component Value Date    ALBUMIN 4.7 10/15/2008    PROTTOTAL 8.0 10/15/2008    ALT 15 07/06/2011    AST 28 10/15/2008    ALKPHOS 84 10/15/2008    BILITOTAL 0.4 10/15/2008     OTHER:   Lab Results   Component Value Date    KIM 9.4 11/11/2009    TSH 2.02 04/14/2009       Anesthesia Plan    ASA Status:  2   NPO Status:  NPO Appropriate    Anesthesia Type: MAC     - Reason for MAC: Deep or markedly invasive procedure (G8)   Induction: Intravenous           Consents    Anesthesia Plan(s) and associated risks, benefits, and realistic  alternatives discussed. Questions answered and patient/representative(s) expressed understanding.     - Discussed with:  Patient         Postoperative Care    Pain management: IV analgesics, Multi-modal analgesia.   PONV prophylaxis: Ondansetron (or other 5HT-3)     Comments:                SANDI Domingo CRNA

## 2021-02-09 LAB — COPATH REPORT: NORMAL

## 2021-02-13 ENCOUNTER — MYC MEDICAL ADVICE (OUTPATIENT)
Dept: FAMILY MEDICINE | Facility: CLINIC | Age: 66
End: 2021-02-13

## 2021-02-15 NOTE — TELEPHONE ENCOUNTER
Jayy message sent to patient to clarify message.    Faye BSN-RN  Triage Nurse  [facility]: [facility]

## 2021-03-08 DIAGNOSIS — E78.5 HYPERLIPIDEMIA LDL GOAL <100: ICD-10-CM

## 2021-03-08 DIAGNOSIS — Z13.1 SCREENING FOR DIABETES MELLITUS: ICD-10-CM

## 2021-03-08 LAB
ANION GAP SERPL CALCULATED.3IONS-SCNC: 3 MMOL/L (ref 3–14)
BUN SERPL-MCNC: 14 MG/DL (ref 7–30)
CALCIUM SERPL-MCNC: 9.3 MG/DL (ref 8.5–10.1)
CHLORIDE SERPL-SCNC: 106 MMOL/L (ref 94–109)
CHOLEST SERPL-MCNC: 243 MG/DL
CO2 SERPL-SCNC: 29 MMOL/L (ref 20–32)
CREAT SERPL-MCNC: 0.84 MG/DL (ref 0.52–1.04)
GFR SERPL CREATININE-BSD FRML MDRD: 73 ML/MIN/{1.73_M2}
GLUCOSE SERPL-MCNC: 83 MG/DL (ref 70–99)
HDLC SERPL-MCNC: 58 MG/DL
LDLC SERPL CALC-MCNC: 167 MG/DL
NONHDLC SERPL-MCNC: 185 MG/DL
POTASSIUM SERPL-SCNC: 4.2 MMOL/L (ref 3.4–5.3)
SODIUM SERPL-SCNC: 138 MMOL/L (ref 133–144)
TRIGL SERPL-MCNC: 88 MG/DL

## 2021-03-08 PROCEDURE — 80061 LIPID PANEL: CPT | Performed by: NURSE PRACTITIONER

## 2021-03-08 PROCEDURE — 36415 COLL VENOUS BLD VENIPUNCTURE: CPT | Performed by: NURSE PRACTITIONER

## 2021-03-08 PROCEDURE — 80048 BASIC METABOLIC PNL TOTAL CA: CPT | Performed by: NURSE PRACTITIONER

## 2021-06-08 ENCOUNTER — TELEPHONE (OUTPATIENT)
Dept: OBGYN | Facility: CLINIC | Age: 66
End: 2021-06-08

## 2021-06-08 NOTE — TELEPHONE ENCOUNTER
Panel Management Review    Health Maintenance List    Health Maintenance   Topic Date Due     HIV SCREENING  Never done     MAMMO SCREENING  11/06/2019     COLPOSCOPY  11/20/2019     MEDICARE ANNUAL WELLNESS VISIT  11/05/2020     FALL RISK ASSESSMENT  Never done     Pneumococcal Vaccine: 65+ Years (1 of 1 - PPSV23) Never done     PHQ-2  01/01/2021     COVID-19 Vaccine (2 - Moderna 2-dose series) 03/12/2021     DTAP/TDAP/TD IMMUNIZATION (3 - Td) 07/31/2022     ADVANCE CARE PLANNING  01/25/2026     LIPID  03/08/2026     DEXA  08/16/2026     COLORECTAL CANCER SCREENING  08/30/2027     HEPATITIS C SCREENING  Completed     INFLUENZA VACCINE  Completed     ZOSTER IMMUNIZATION  Completed     Pneumococcal Vaccine: Pediatrics (0 to 5 Years) and At-Risk Patients (6 to 64 Years)  Aged Out     IPV IMMUNIZATION  Aged Out     MENINGITIS IMMUNIZATION  Aged Out     HEPATITIS B IMMUNIZATION  Aged Out       Composite cancer screening  Chart review shows that this patient is due/due soon for the following Mammogram  Lab Results   Component Value Date    PAP ASC-H 09/20/2019     Past Surgical History:   Procedure Laterality Date     C TREAT ECTOPIC PREG,RMV TUBE/OVARY  1985    ectopic, right side-ovary and tube removed     COLPOSCOPY, BIOPSY, COMBINED N/A 2/8/2021    Procedure: COLPOSCOPY, WITH BIOPSY, LEEP procedure;  Surgeon: Jefe Koenig MD;  Location: [facility] OR     EYE SURGERY      cataracts bilateral     HC COLONOSCOPY THRU STOMA, DIAGNOSTIC  2006    normal     OPEN REDUCTION INTERNAL FIXATION FOREARM  7/31/2012    Procedure: OPEN REDUCTION INTERNAL FIXATION FOREARM;  Open Reduction Internal Fixation, Irrigation & Debridment  of Right Distal Radius, application short arm splint;  Surgeon: Wade Beard MD;  Location:  OR     TONSILLECTOMY & ADENOIDECTOMY  1960       Is hysterectomy listed in surgical history? No   Is mastectomy listed in surgical history? No     Summary:    Patient is due/failing the  following:   Mammogram    Action needed: Patient needs office visit for mammogram.    Type of outreach:  Sent [employer] message.      Staff Signature:  HOLLY AGUSTIN MA

## 2021-12-30 ENCOUNTER — TELEPHONE (OUTPATIENT)
Dept: OBGYN | Facility: CLINIC | Age: 66
End: 2021-12-30
Payer: COMMERCIAL

## 2021-12-30 NOTE — TELEPHONE ENCOUNTER
Panel Management Review        Health Maintenance List    Health Maintenance   Topic Date Due     MAMMO SCREENING  11/06/2019     COLPOSCOPY  11/20/2019     MEDICARE ANNUAL WELLNESS VISIT  11/05/2020     FALL RISK ASSESSMENT  Never done     Pneumococcal Vaccine: 65+ Years (1 of 1 - PPSV23) Never done     PHQ-2  01/01/2021     DTAP/TDAP/TD IMMUNIZATION (3 - Td or Tdap) 07/31/2022     ADVANCE CARE PLANNING  01/25/2026     LIPID  03/08/2026     DEXA  08/16/2026     COLORECTAL CANCER SCREENING  08/30/2027     HEPATITIS C SCREENING  Completed     INFLUENZA VACCINE  Completed     ZOSTER IMMUNIZATION  Completed     COVID-19 Vaccine  Completed     IPV IMMUNIZATION  Aged Out     MENINGITIS IMMUNIZATION  Aged Out     HEPATITIS B IMMUNIZATION  Aged Out       Composite cancer screening  Chart review shows that this patient is due/due soon for the following Mammogram  Lab Results   Component Value Date    PAP ASC-H 09/20/2019     Past Surgical History:   Procedure Laterality Date     COLPOSCOPY, BIOPSY, COMBINED N/A 2/8/2021    Procedure: COLPOSCOPY, WITH BIOPSY, LEEP procedure;  Surgeon: Jefe Koenig MD;  Location: [facility] OR     EYE SURGERY      cataracts bilateral     OPEN REDUCTION INTERNAL FIXATION FOREARM  7/31/2012    Procedure: OPEN REDUCTION INTERNAL FIXATION FOREARM;  Open Reduction Internal Fixation, Irrigation & Debridment  of Right Distal Radius, application short arm splint;  Surgeon: Wade Beard MD;  Location: [facility] OR     TONSILLECTOMY & ADENOIDECTOMY  1960     Z TREAT ECTOPIC PREG,RMV TUBE/OVARY  1985    ectopic, right side-ovary and tube removed     ZZ COLONOSCOPY THRU STOMA, DIAGNOSTIC  2006    normal       Is hysterectomy listed in surgical history? No   Is mastectomy listed in surgical history? No     Summary:    Patient is due/failing the following:   Mammogram    Action needed: Patient needs office visit for mammogram.    Type of outreach:  Sent [email or web address] message.      Staff Signature:   Reina Sam MA

## 2021-12-30 NOTE — LETTER
December 30, 2021      Jessie Tamayo  [address] [place] [id number]        Dear Jessie,         To ensure we are providing the best quality care, we have reviewed your chart and see that you are due for:    Breast Cancer Screening:    Please call [phone] to schedule a Mammogram.    You may call Dept: [phone] if you have any questions. If you have completed the mammogram outside of [facility], please have the results forwarded to our office Fax # [phone]. We will update the chart for your primary Physician to review before your next annual physical.            Sincerely,        Jefe Koenig MD

## 2022-02-23 ENCOUNTER — OFFICE VISIT (OUTPATIENT)
Dept: FAMILY MEDICINE | Facility: CLINIC | Age: 67
End: 2022-02-23
Payer: COMMERCIAL

## 2022-02-23 VITALS
BODY MASS INDEX: 34.66 KG/M2 | HEART RATE: 70 BPM | WEIGHT: 208 LBS | TEMPERATURE: 97.3 F | RESPIRATION RATE: 12 BRPM | OXYGEN SATURATION: 98 % | DIASTOLIC BLOOD PRESSURE: 78 MMHG | SYSTOLIC BLOOD PRESSURE: 152 MMHG | HEIGHT: 65 IN

## 2022-02-23 DIAGNOSIS — B37.2 YEAST INFECTION OF THE SKIN: Primary | ICD-10-CM

## 2022-02-23 DIAGNOSIS — Z12.31 VISIT FOR SCREENING MAMMOGRAM: ICD-10-CM

## 2022-02-23 DIAGNOSIS — L90.0 LICHEN SCLEROSUS: ICD-10-CM

## 2022-02-23 PROBLEM — E66.09 CLASS 1 OBESITY DUE TO EXCESS CALORIES WITHOUT SERIOUS COMORBIDITY WITH BODY MASS INDEX (BMI) OF 34.0 TO 34.9 IN ADULT: Status: ACTIVE | Noted: 2021-01-25

## 2022-02-23 PROBLEM — E66.811 CLASS 1 OBESITY DUE TO EXCESS CALORIES WITHOUT SERIOUS COMORBIDITY WITH BODY MASS INDEX (BMI) OF 34.0 TO 34.9 IN ADULT: Status: ACTIVE | Noted: 2021-01-25

## 2022-02-23 PROCEDURE — 99213 OFFICE O/P EST LOW 20 MIN: CPT | Performed by: NURSE PRACTITIONER

## 2022-02-23 RX ORDER — NYSTATIN 100000 [USP'U]/G
POWDER TOPICAL 2 TIMES DAILY PRN
Qty: 30 G | Refills: 3 | Status: SHIPPED | OUTPATIENT
Start: 2022-02-23 | End: 2023-01-05

## 2022-02-23 NOTE — PROGRESS NOTES
Assessment & Plan     Yeast infection of the skin  - nystatin (MYCOSTATIN) 739074 UNIT/GM external powder; Apply topically 2 times daily as needed    Lichen sclerosus  Patient has a long h/o lichen sclerosus.  Exam today concerning - recommend OB/GYN referral for further eval, consider biopsy to r/o squamous cell carcinoma.  - Ob/Gyn Referral; Future    Visit for screening mammogram  - MA SCREENING DIGITAL BILAT - Future  (s+30); Future      The risks, benefits and treatment options of prescribed medications or other treatments have been discussed with the patient. The patient verbalized their understanding and should call or follow up if no improvement or if they develop further problems.    SANDI Yeager CNP  [facility] LINDY Roman is a 66 year old who presents for the following health issues     History of Present Illness     Reason for visit:  #1=bleeding Right labia area,  #2-rash in panniculus region  Symptom onset:  3-7 days ago  Symptoms include:  Bright red bleeding for #1, rash for #2  Symptom intensity:  Mild  Symptom progression:  Improving  Had these symptoms before:  Yes  Has tried/received treatment for these symptoms:  Yes  Previous treatment was successful:  Yes  Prior treatment description:  Fungal powder for #2  What makes it better:  Triple antibiotic ointment on #1    She eats 2-3 servings of fruits and vegetables daily.She consumes 1 sweetened beverage(s) daily.She exercises with enough effort to increase her heart rate 10 to 19 minutes per day.  She exercises with enough effort to increase her heart rate 4 days per week.   She is taking medications regularly.             Review of Systems   Constitutional, HEENT, cardiovascular, pulmonary, gi and gu systems are negative, except as otherwise noted.      Objective    BP (!) 152/78 (BP Location: Right arm, Cuff Size: Adult Large)   Pulse 70   Temp 97.3  F (36.3  C) (Tympanic)   Resp 12   Ht  "1.645 m (5' 4.75\")   Wt 94.3 kg (208 lb)   LMP 03/08/2008   SpO2 98%   BMI 34.88 kg/m    Body mass index is 34.88 kg/m .  Physical Exam   GENERAL: healthy, alert and no distress   (female): right labia, close to introitus, erythematous plaque with erosions, friable.   SKIN: lower abdominal folds - mild amount of shiny erythema.                "

## 2022-03-15 ENCOUNTER — HOSPITAL ENCOUNTER (OUTPATIENT)
Dept: MAMMOGRAPHY | Facility: CLINIC | Age: 67
Discharge: HOME | End: 2022-03-15
Attending: NURSE PRACTITIONER | Admitting: NURSE PRACTITIONER
Payer: COMMERCIAL

## 2022-03-15 DIAGNOSIS — Z12.31 VISIT FOR SCREENING MAMMOGRAM: ICD-10-CM

## 2022-03-15 PROCEDURE — 77067 SCR MAMMO BI INCL CAD: CPT

## 2022-03-23 ENCOUNTER — OFFICE VISIT (OUTPATIENT)
Dept: OBGYN | Facility: CLINIC | Age: 67
End: 2022-03-23
Payer: COMMERCIAL

## 2022-03-23 VITALS
BODY MASS INDEX: 35.22 KG/M2 | RESPIRATION RATE: 16 BRPM | WEIGHT: 211.4 LBS | SYSTOLIC BLOOD PRESSURE: 150 MMHG | TEMPERATURE: 97 F | HEIGHT: 65 IN | DIASTOLIC BLOOD PRESSURE: 78 MMHG | HEART RATE: 73 BPM

## 2022-03-23 DIAGNOSIS — R87.810 CERVICAL HIGH RISK HPV (HUMAN PAPILLOMAVIRUS) TEST POSITIVE: ICD-10-CM

## 2022-03-23 DIAGNOSIS — R87.611 PAP SMEAR OF CERVIX WITH ASCUS, CANNOT EXCLUDE HGSIL: ICD-10-CM

## 2022-03-23 PROCEDURE — 88305 TISSUE EXAM BY PATHOLOGIST: CPT | Performed by: PATHOLOGY

## 2022-03-23 PROCEDURE — 88342 IMHCHEM/IMCYTCHM 1ST ANTB: CPT | Performed by: PATHOLOGY

## 2022-03-23 PROCEDURE — 57454 BX/CURETT OF CERVIX W/SCOPE: CPT | Performed by: OBSTETRICS & GYNECOLOGY

## 2022-03-23 PROCEDURE — 88360 TUMOR IMMUNOHISTOCHEM/MANUAL: CPT | Mod: 59 | Performed by: PATHOLOGY

## 2022-03-23 RX ORDER — PENICILLIN V POTASSIUM 500 MG/1
500 TABLET, FILM COATED ORAL 4 TIMES DAILY
COMMUNITY
End: 2022-05-26

## 2022-03-23 NOTE — PROGRESS NOTES
GYN: Colposcopy/LEEP    Date/Time: 3/23/2022 1:35 PM  Performed by: Jefe Koenig MD  Authorized by: Jefe Koenig MD     Consent:     Consent obtained:  Written    Consent given by:  Patient    Procedural risks discussed:  Bleeding, infection, possible continued pain and repeat procedure    Patient questions answered: yes      Patient agrees, verbalizes understanding, and wants to proceed: yes      Educational handouts given: no      Instructions and paperwork completed: yes    Pre-procedure:     Pre-procedure timeout performed: yes      Premeds:  Ibuprofen    Prepped with: acetic acid and Lugol      Local anesthetic:  Lidocaine 1% W/O epi  Indication:     Indication:  ASC-US and HPV    HPV Indications:  16  Procedure:     Procedure: Colposcopy w/ cervical biopsy and ECC      Under satisfactory analgesia the patient was prepped and draped in the dorsal lithotomy position: yes      [place] speculum was placed in the vagina: yes      Under colposcopic examination the transition zone was seen in entirety: yes      Intracervical block was performed: yes      Endocervix was curetted using a Kevorkian curette: yes      Cervical biopsy performed with a cervical biopsy punch: yes      Tampon inserted: no      Monsel's solution was applied: yes      Biopsy(s): yes      Location:  3:00    Specimen to pathology: yes    Post-procedure:     Findings: Negative      Impression: Normal appearing cervix      Patient tolerance of procedure:  Patient tolerated the procedure well with no immediate complications  Comments:        (R87.810) Cervical high risk HPV (human papillomavirus) test positive  Comment:  Previous   Plan: colpo    (R87.611) Pap smear of cervix with ASCUS, cannot exclude HGSIL  Comment: ASCUS-H now S/p LEEP with negative pathology  Plan: colposcopy  [facility]    Jefe Koenig MD

## 2022-03-30 ENCOUNTER — PATIENT OUTREACH (OUTPATIENT)
Dept: OBGYN | Facility: CLINIC | Age: 67
End: 2022-03-30
Payer: COMMERCIAL

## 2022-03-30 LAB
PATH REPORT.COMMENTS IMP SPEC: NORMAL
PATH REPORT.FINAL DX SPEC: NORMAL
PATH REPORT.FINAL DX SPEC: NORMAL
PATH REPORT.GROSS SPEC: NORMAL
PATH REPORT.GROSS SPEC: NORMAL
PATH REPORT.MICROSCOPIC SPEC OTHER STN: NORMAL
PATH REPORT.RELEVANT HX SPEC: NORMAL
PATH REPORT.RELEVANT HX SPEC: NORMAL
PHOTO IMAGE: NORMAL
PHOTO IMAGE: NORMAL

## 2022-05-22 ASSESSMENT — ENCOUNTER SYMPTOMS
DIARRHEA: 0
HEMATOCHEZIA: 0
SORE THROAT: 0
JOINT SWELLING: 0
PARESTHESIAS: 0
ABDOMINAL PAIN: 0
FREQUENCY: 1
EYE PAIN: 0
BREAST MASS: 0
FEVER: 0
HEADACHES: 0
MYALGIAS: 0
WEAKNESS: 0
CHILLS: 0
ARTHRALGIAS: 0
NAUSEA: 0
HEARTBURN: 0
COUGH: 0
DIZZINESS: 0
NERVOUS/ANXIOUS: 0
DYSURIA: 0
SHORTNESS OF BREATH: 0
CONSTIPATION: 0
PALPITATIONS: 0

## 2022-05-22 ASSESSMENT — ACTIVITIES OF DAILY LIVING (ADL): CURRENT_FUNCTION: NO ASSISTANCE NEEDED

## 2022-05-26 ENCOUNTER — OFFICE VISIT (OUTPATIENT)
Dept: FAMILY MEDICINE | Facility: CLINIC | Age: 67
End: 2022-05-26
Payer: COMMERCIAL

## 2022-05-26 VITALS
HEIGHT: 64 IN | OXYGEN SATURATION: 97 % | HEART RATE: 74 BPM | DIASTOLIC BLOOD PRESSURE: 78 MMHG | TEMPERATURE: 98 F | BODY MASS INDEX: 36.54 KG/M2 | WEIGHT: 214 LBS | SYSTOLIC BLOOD PRESSURE: 136 MMHG

## 2022-05-26 DIAGNOSIS — E66.01 MORBID OBESITY (H): ICD-10-CM

## 2022-05-26 DIAGNOSIS — Z00.00 ENCOUNTER FOR MEDICARE ANNUAL WELLNESS EXAM: Primary | ICD-10-CM

## 2022-05-26 DIAGNOSIS — L43.9 LICHEN PLANUS: ICD-10-CM

## 2022-05-26 PROCEDURE — G0438 PPPS, INITIAL VISIT: HCPCS | Performed by: FAMILY MEDICINE

## 2022-05-26 RX ORDER — TRIAMCINOLONE ACETONIDE 5 MG/G
CREAM TOPICAL 2 TIMES DAILY
Qty: 15 G | Refills: 1 | Status: SHIPPED | OUTPATIENT
Start: 2022-05-26 | End: 2023-07-27

## 2022-05-26 RX ORDER — NYSTATIN 100000 U/G
CREAM TOPICAL 2 TIMES DAILY
Qty: 15 G | Refills: 1 | Status: SHIPPED | OUTPATIENT
Start: 2022-05-26 | End: 2023-07-27

## 2022-05-26 ASSESSMENT — ENCOUNTER SYMPTOMS
NAUSEA: 0
BREAST MASS: 0
DYSURIA: 0
JOINT SWELLING: 0
HEARTBURN: 0
NERVOUS/ANXIOUS: 0
HEMATOCHEZIA: 0
DIARRHEA: 0
FREQUENCY: 1
PARESTHESIAS: 0
ABDOMINAL PAIN: 0
SHORTNESS OF BREATH: 0
PALPITATIONS: 0
CHILLS: 0
CONSTIPATION: 0
SORE THROAT: 0
ARTHRALGIAS: 0
HEADACHES: 0
FEVER: 0
MYALGIAS: 0
COUGH: 0
EYE PAIN: 0
WEAKNESS: 0
DIZZINESS: 0

## 2022-05-26 ASSESSMENT — ACTIVITIES OF DAILY LIVING (ADL): CURRENT_FUNCTION: NO ASSISTANCE NEEDED

## 2022-05-26 NOTE — PATIENT INSTRUCTIONS
Patient Education   Personalized Prevention Plan  You are due for the preventive services outlined below.  Your care team is available to assist you in scheduling these services.  If you have already completed any of these items, please share that information with your care team to update in your medical record.  Health Maintenance Due   Topic Date Due     COVID-19 Vaccine (4 - Booster for Moderna series) 02/28/2022

## 2022-05-26 NOTE — PROGRESS NOTES
"SUBJECTIVE:   Jessie Tamayo is a 66 year old female who presents for Preventive Visit.    Labs done throught   Patient has been advised of split billing requirements and indicates understanding: Yes  Are you in the first 12 months of your Medicare coverage?  No    Healthy Habits:     In general, how would you rate your overall health?  Good    Frequency of exercise:  2-3 days/week    Duration of exercise:  Other    Do you usually eat at least 4 servings of fruit and vegetables a day, include whole grains    & fiber and avoid regularly eating high fat or \"junk\" foods?  No    Taking medications regularly:  Yes    Medication side effects:  None    Ability to successfully perform activities of daily living:  No assistance needed    Home Safety:  No safety concerns identified    Hearing Impairment:  Feel that people are mumbling or not speaking clearly and difficulty understanding soft or whispered speech    In the past 6 months, have you been bothered by leaking of urine?  No    In general, how would you rate your overall mental or emotional health?  Good      PHQ-2 Total Score: 0    Do you feel safe in your environment? Yes    Have you ever done Advance Care Planning? (For example, a Health Directive, POLST, or a discussion with a medical provider or your loved ones about your wishes): No, advance care planning information given to patient to review.  Patient plans to discuss their wishes with loved ones or provider.        Fall risk  Fallen 2 or more times in the past year?: No  Any fall with injury in the past year?: No      Cognitive Screening   [facility]        Do you have sleep apnea, excessive snoring or daytime drowsiness?: no      Reviewed and updated as needed this visit by clinical staff   Tobacco  Allergies  Meds   Med Hx  Surg Hx  Fam Hx  Soc Hx          Reviewed and updated as needed this visit by Provider                   Social History     Tobacco Use     Smoking status: Never " Smoker     Smokeless tobacco: Never Used   Substance Use Topics     Alcohol use: Yes     Comment: very little     If you drink alcohol do you typically have >3 drinks per day or >7 drinks per week? No    No flowsheet data found.          Current providers sharing in care for this patient include:   Patient Care Team:  [facility] as PCP - Jefe Mosley MD as Assigned OBGYN Provider  Mary Mayo APRN CNP as Assigned PCP    The following health maintenance items are reviewed in Epic and correct as of today:  Health Maintenance Due   Topic Date Due     COVID-19 Vaccine (4 - Booster for Moderna series) 02/28/2022     Labs reviewed in [facility]  Mammogram Screening: Mammogram Screening: Recommended mammography every 1-2 years with patient discussion and risk factor consideration    Breast CA Risk Assessment (FHS-7) 5/22/2022   Do you have a family history of breast, colon, or ovarian cancer? No / Unknown           Pertinent mammograms are reviewed under the imaging tab.    Review of Systems   Constitutional: Negative for chills and fever.   HENT: Negative for congestion, ear pain, hearing loss and sore throat.    Eyes: Negative for pain and visual disturbance.   Respiratory: Negative for cough and shortness of breath.    Cardiovascular: Negative for chest pain, palpitations and peripheral edema.   Gastrointestinal: Negative for abdominal pain, constipation, diarrhea, heartburn, hematochezia and nausea.   Breasts:  Negative for tenderness, breast mass and discharge.   Genitourinary: Positive for frequency, genital sores, pelvic pain, vaginal bleeding and vaginal discharge. Negative for dysuria and urgency.   Musculoskeletal: Negative for arthralgias, joint swelling and myalgias.   Skin: Negative for rash.   Neurological: Negative for dizziness, weakness, headaches and paresthesias.   Psychiatric/Behavioral: Negative for mood changes. The patient is not nervous/anxious.   "      History   Smoking Status     Never Smoker   Smokeless Tobacco     Never Used     BP Readings from Last 1 Encounters:   05/26/22 136/78     Recent Labs   Lab Test 03/08/21  0748 02/11/16  0000   CHOL 243* 219*   HDL 58 53   * 136   TRIG 88 153     No results for input(s): A1C in the last 00993 hours.  The 10-year ASCVD risk score (Clara BARRAZA Jr., et al., 2013) is: 7.5%    Values used to calculate the score:      Age: 66 years      Sex: Female      Is Non- : No      Diabetic: No      Tobacco smoker: No      Systolic Blood Pressure: 136 mmHg      Is BP treated: No      HDL Cholesterol: 58 mg/dL      Total Cholesterol: 243 mg/dL  She has been using the lume for her rashes and it has worked   She also has a red area on the labia that is bleeding and sore has been coming and going     OBJECTIVE:   /78   Pulse 74   Temp 98  F (36.7  C) (Tympanic)   Ht 1.632 m (5' 4.25\")   Wt 97.1 kg (214 lb)   LMP 03/08/2008   SpO2 97%   BMI 36.45 kg/m   Estimated body mass index is 36.45 kg/m  as calculated from the following:    Height as of this encounter: 1.632 m (5' 4.25\").    Weight as of this encounter: 97.1 kg (214 lb).  Physical Exam   GENERAL: healthy, alert and no distress  EYES: Eyes grossly normal to inspection, PERRL and conjunctivae and sclerae normal  HENT: ear canals and TM's normal, nose and mouth without ulcers or lesions  NECK: no adenopathy, no asymmetry, masses, or scars and thyroid normal to palpation  RESP: lungs clear to auscultation - no rales, rhonchi or wheezes  BREAST: normal without masses, tenderness or nipple discharge and no palpable axillary masses or adenopathy  CV: regular rate and rhythm, normal S1 S2, no S3 or S4, no murmur, click or rub, no peripheral edema and peripheral pulses strong  ABDOMEN: soft, nontender, no hepatosplenomegaly, no masses and bowel sounds normal  MS: no gross musculoskeletal defects noted, no edema  SKIN: no suspicious lesions or " "rashes  NEURO: Normal strength and tone, mentation intact and speech normal  PSYCH: mentation appears normal, affect normal/bright    Diagnostic Test Results:  Labs reviewed in Epic  No results found for any visits on 05/26/22.    ASSESSMENT / PLAN:   (Z00.00) Encounter for Medicare annual wellness exam  (primary encounter diagnosis)  Comment:   Plan:     (L43.9) Lichen planus  Comment:   Plan: nystatin (MYCOSTATIN) 513175 UNIT/GM external         cream, triamcinolone (ARISTOCORT HP) 0.5 %         external cream        She has not treated this in a while will restart medications consider topical estrogen     (E66.01) Morbid obesity (H)  Comment:   Plan: we did discuss workin on her diet and increasing exercise         COUNSELING:  Reviewed preventive health counseling, as reflected in patient instructions    Estimated body mass index is 36.45 kg/m  as calculated from the following:    Height as of this encounter: 1.632 m (5' 4.25\").    Weight as of this encounter: 97.1 kg (214 lb).    Weight management plan: Discussed healthy diet and exercise guidelines  Discussed referral to weight management   She reports that she has never smoked. She has never used smokeless tobacco.      Appropriate preventive services were discussed with this patient, including applicable screening as appropriate for cardiovascular disease, diabetes, osteopenia/osteoporosis, and glaucoma.  As appropriate for age/gender, discussed screening for colorectal cancer, prostate cancer, breast cancer, and cervical cancer. Checklist reviewing preventive services available has been given to the patient.    Reviewed patients plan of care and provided an AVS. The Basic Care Plan (routine screening as documented in Health Maintenance) for Jessie meets the Care Plan requirement. This Care Plan has been established and reviewed with the Patient.    Counseling Resources:  ATP IV Guidelines  Pooled Cohorts Equation Calculator  Breast Cancer Risk " Calculator  Breast Cancer: Medication to Reduce Risk  FRAX Risk Assessment  ICSI Preventive Guidelines  Dietary Guidelines for Americans, 2010  [employer]'s MyPlate  ASA Prophylaxis  Lung CA Screening    lAba Layton MD  [facility]    Identified Health Risks:

## 2022-08-11 ENCOUNTER — OFFICE VISIT (OUTPATIENT)
Dept: FAMILY MEDICINE | Facility: CLINIC | Age: 67
End: 2022-08-11
Payer: COMMERCIAL

## 2022-08-11 VITALS — DIASTOLIC BLOOD PRESSURE: 86 MMHG | HEART RATE: 79 BPM | RESPIRATION RATE: 32 BRPM | SYSTOLIC BLOOD PRESSURE: 140 MMHG

## 2022-08-11 DIAGNOSIS — J20.9 ACUTE BRONCHITIS, UNSPECIFIED ORGANISM: Primary | ICD-10-CM

## 2022-08-11 DIAGNOSIS — E66.01 MORBID OBESITY (H): ICD-10-CM

## 2022-08-11 PROCEDURE — 99213 OFFICE O/P EST LOW 20 MIN: CPT | Mod: CS | Performed by: FAMILY MEDICINE

## 2022-08-11 PROCEDURE — U0003 INFECTIOUS AGENT DETECTION BY NUCLEIC ACID (DNA OR RNA); SEVERE ACUTE RESPIRATORY SYNDROME CORONAVIRUS 2 (SARS-COV-2) (CORONAVIRUS DISEASE [COVID-19]), AMPLIFIED PROBE TECHNIQUE, MAKING USE OF HIGH THROUGHPUT TECHNOLOGIES AS DESCRIBED BY CMS-2020-01-R: HCPCS | Performed by: FAMILY MEDICINE

## 2022-08-11 PROCEDURE — U0005 INFEC AGEN DETEC AMPLI PROBE: HCPCS | Performed by: FAMILY MEDICINE

## 2022-08-11 NOTE — PROGRESS NOTES
Assessment/ Plan  1. Acute bronchitis, unspecified organism  10 days of cough, some shortness of breath, initially had some wheezing  Now, exam without signs of pneumonitis, O2 sat between 95 to 97%  No rales or egophony    Home COVID tests negative, will retest again.  Would be atypical since cough is productive    Treat for acute bronchitis  Augmentin    Patient recently had minor dog bite, right leg.  Mostly abrasion but some puncture.  No sign of infection.  Up-to-date with tetanus  - amoxicillin-clavulanate (AUGMENTIN) 875-125 MG tablet; Take 1 tablet by mouth 2 times daily  Dispense: 10 tablet; Refill: 0  - Symptomatic; Unknown COVID-19 Virus (Coronavirus) by PCR Nose     There is no height or weight on file to calculate BMI.    Subjective  CC:  chief complaint  HPI:  66-year-old  10 days of cough, initial chills, no fever noted  Sore throat, right ear pain and plugging  Now cough which is productive and severe  Moderate shortness of breath  Wheezing at first and heard a few crackles  No exposure to COVID.  Granddaughter was sick as well, tested negative as did patient with home test.  Patient Active Problem List   Diagnosis     Hyperlipidemia LDL goal <130     Nevus, Pigmented - vulvar mohr     Recurrent cold sores     Hearing loss     Pap smear of cervix with ASCUS, cannot exclude HGSIL     Introital dyspareunia     Vaginal atrophy     Cervical high risk HPV (human papillomavirus) test positive     Atrophic vaginitis     Class 1 obesity due to excess calories without serious comorbidity with body mass index (BMI) of 34.0 to 34.9 in adult     Morbid obesity (H)     Current medications reviewed as follows:  nystatin (MYCOSTATIN) 971915 UNIT/GM external cream, Apply topically 2 times daily Use with the triamcinolone  nystatin (MYCOSTATIN) 367177 UNIT/GM external powder, Apply topically 2 times daily as needed  triamcinolone (ARISTOCORT HP) 0.5 % external cream, Apply topically 2 times daily    No current  facility-administered medications on file prior to visit.     History   Smoking Status     Never Smoker   Smokeless Tobacco     Never Used     Social History     Social History Narrative     Not on file     Patient Care Team:  Clinic, [facility] (Inactive) as PCP - Jefe Mosley MD as Assigned OBGYN Provider  Alba Layton MD as Assigned PCP  ROS  As above      Objective  Physical Exam  Vitals:    08/11/22 1543   BP: (!) 140/86   BP Location: Left arm   Patient Position: Sitting   Cuff Size: Adult Large   Pulse: 79   Resp: (!) 32     Patient is alert, oriented and in no distress.    Conjunctiva, lids appear normal.  Nares are normal bilaterally.    TMs are visualized bilaterally and appear normal    There is no adenopathy in the neck.  Oral cavity is without any notable lesion,   oropharynx appears normal without any erythema, exudate, petechia    Chest appears normal,   auscultation reveals :  normal breath sounds,   no wheezing,  no rales   no rhonchi.  No egophony    Diagnostics  COVID test pending    Please note: Voice recognition software was used in this dictation.  It may therefore contain typographical errors.

## 2022-08-12 LAB — SARS-COV-2 RNA RESP QL NAA+PROBE: NEGATIVE

## 2022-12-15 ENCOUNTER — VIRTUAL VISIT (OUTPATIENT)
Dept: FAMILY MEDICINE | Facility: CLINIC | Age: 67
End: 2022-12-15
Payer: COMMERCIAL

## 2022-12-15 DIAGNOSIS — R05.8 UPPER AIRWAY COUGH SYNDROME: ICD-10-CM

## 2022-12-15 DIAGNOSIS — E66.01 MORBID OBESITY (H): ICD-10-CM

## 2022-12-15 DIAGNOSIS — U07.1 INFECTION DUE TO 2019 NOVEL CORONAVIRUS: Primary | ICD-10-CM

## 2022-12-15 PROCEDURE — 99213 OFFICE O/P EST LOW 20 MIN: CPT | Mod: CS | Performed by: INTERNAL MEDICINE

## 2022-12-15 RX ORDER — AZITHROMYCIN 250 MG/1
TABLET, FILM COATED ORAL
Qty: 6 TABLET | Refills: 2 | Status: SHIPPED | OUTPATIENT
Start: 2022-12-15 | End: 2023-01-05

## 2022-12-15 RX ORDER — NIRMATRELVIR AND RITONAVIR 300-100 MG
3 KIT ORAL 2 TIMES DAILY
Qty: 30 EACH | Refills: 0 | Status: SHIPPED | OUTPATIENT
Start: 2022-12-15 | End: 2023-01-05

## 2022-12-15 NOTE — PROGRESS NOTES
Jessie is a 67 year old who is being evaluated via a billable video visit.      How would you like to obtain your AVS? MyChart  If the video visit is dropped, the invitation should be resent by: Text to cell phone: [phone]  Will anyone else be joining your video visit? No    [facility] Internal Medicine Progress Note           Assessment and Plan:     Infection due to 2019 novel coronavirus  - nirmatrelvir and ritonavir (PAXLOVID, 300/100,) therapy pack; Take 3 tablets by mouth 2 times daily    Upper airway cough syndrome  - azithromycin (ZITHROMAX) 250 MG tablet; Two tablets first day, then one tablet daily for four days.    Morbid obesity (H)  No associated conditions such as diabetes.        Interval History:     COVID-19 Symptom Review  How many days ago did these symptoms start? Yesterday    Are any of the following symptoms significant for you?    New or worsening difficulty breathing? No    Worsening cough? Yes, it's a dry cough.     Fever or chills? Yes, I felt feverish or had chills.    Headache: YES    Sore throat: YES    Chest pain: No    Diarrhea: No    Body aches? YES    What treatments has patient tried? Dayquil and Ibuprofen   Does patient live in a nursing home, group home, or shelter? No  Does patient have a way to get food/medications during quarantined? Yes, I have a friend or family member who can help me.                Significant Problems:   Patient Active Problem List   Diagnosis     Hyperlipidemia LDL goal <130     Nevus, Pigmented - vulvar mohr     Recurrent cold sores     Hearing loss     Pap smear of cervix with ASCUS, cannot exclude HGSIL     Introital dyspareunia     Vaginal atrophy     Cervical high risk HPV (human papillomavirus) test positive     Atrophic vaginitis     Class 1 obesity due to excess calories without serious comorbidity with body mass index (BMI) of 34.0 to 34.9 in adult     Morbid obesity (H)              Review of Systems:   CONSTITUTIONAL: NEGATIVE  for fever, chills, change in weight  INTEGUMENTARY/SKIN: NEGATIVE for worrisome rashes, moles or lesions  EYES: NEGATIVE for vision changes or irritation  RESP:POSITIVE for cough-non productive  BREAST: NEGATIVE for masses, tenderness or discharge  CV: NEGATIVE for chest pain, palpitations or peripheral edema  GI: NEGATIVE for nausea, abdominal pain, heartburn, or change in bowel habits  : NEGATIVE for frequency, dysuria, or hematuria  MUSCULOSKELETAL: NEGATIVE for significant arthralgias or myalgia  NEURO: POSITIVE for HX headaches-musculoskelatal  ENDOCRINE: NEGATIVE for temperature intolerance, skin/hair changes  HEME: NEGATIVE for bleeding problems  PSYCHIATRIC: NEGATIVE for changes in mood or affect               Physical Exam:   Vital signs not obtained due to nature of visit.    Constitutional: Awake, alert, cooperative, no apparent distress, and appears stated age.  Eyes: extra-ocular muscles intact and sclera clear  ENT: normocepalic, without obvious abnormality  Lungs: no increased work of breathing, good air exchange and no retractions  Neurologic: Mental Status Exam:  Level of Alertness:   awake  Orientation:   person, place, time  Memory:   normal  Neuropsychiatric: Normal affect, mood, orientation, memory and insight.          Data:   None     Disposition:  Follow-up in two weeks.      Lefty Farris MD  Internal Medicine  [facility] Team       Video-Visit Details     Type of service:  Video Visit     Video Start Time: 5:40 PM  Video End Time: 5:50 PM    Originating Location (pt. Location): Home     Distant Location (provider location):  [facility]      Platform used for Video Visit: [employer]

## 2023-01-05 ENCOUNTER — OFFICE VISIT (OUTPATIENT)
Dept: FAMILY MEDICINE | Facility: CLINIC | Age: 68
End: 2023-01-05
Payer: COMMERCIAL

## 2023-01-05 VITALS
DIASTOLIC BLOOD PRESSURE: 78 MMHG | HEIGHT: 64 IN | BODY MASS INDEX: 36.88 KG/M2 | SYSTOLIC BLOOD PRESSURE: 126 MMHG | HEART RATE: 74 BPM | WEIGHT: 216 LBS | OXYGEN SATURATION: 96 %

## 2023-01-05 DIAGNOSIS — S76.311A HAMSTRING MUSCLE STRAIN, RIGHT, INITIAL ENCOUNTER: Primary | ICD-10-CM

## 2023-01-05 PROCEDURE — 99213 OFFICE O/P EST LOW 20 MIN: CPT | Performed by: FAMILY MEDICINE

## 2023-01-05 NOTE — PATIENT INSTRUCTIONS
If you need to get a sooner appointment , call [phone] and push option 2 and this should get you to the [facility]

## 2023-01-05 NOTE — PROGRESS NOTES
"  Assessment & Plan     Hamstring muscle strain, right, initial encounter  Ice heat as needed  - Physical Therapy Referral; Future      BMI:   Estimated body mass index is 36.79 kg/m  as calculated from the following:    Height as of this encounter: 1.632 m (5' 4.25\").    Weight as of this encounter: 98 kg (216 lb).       Physical therapy an dstretching     No follow-ups on file.    Alba Layton MD  [facility] SVETA Roman is a 67 year old, presenting for the following health issues:  Leg Pain (3 months. )      History of Present Illness       Reason for visit:  Left hamstring pain with walking  Symptom onset:  More than a month  Symptoms include:  Left hamstring pain with walking, fan leg pain at night  Symptom intensity:  Moderate  Symptom progression:  Worsening  Had these symptoms before:  No  What makes it worse:  More walking  What makes it better:  Stop walking, leg elevated, rest, water    She eats 2-3 servings of fruits and vegetables daily.She consumes 0 sweetened beverage(s) daily.She exercises with enough effort to increase her heart rate 10 to 19 minutes per day.  She exercises with enough effort to increase her heart rate 3 or less days per week.   She is taking medications regularly.         She has been having increased posterior leg pain and she has been having to decrease the length of the   Dr. Liberty is seeing her in a few weeks   She did have covid recently and she is recovered   Review of Systems   Constitutional, HEENT, cardiovascular, pulmonary, gi and gu systems are negative, except as otherwise noted.      Objective    /78   Pulse 74   Ht 1.632 m (5' 4.25\")   Wt 98 kg (216 lb)   LMP 03/08/2008   SpO2 96%   BMI 36.79 kg/m    Body mass index is 36.79 kg/m .  Physical Exam   GENERAL: healthy, alert and no distress  MS: LLE exam shows normal strength and muscle mass, no deformities, no erythema, induration, or nodules, no evidence of joint " instability and tender along the hamstring distally   SKIN: no suspicious lesions or rashes        Alba Layton M.D.

## 2023-01-09 ENCOUNTER — THERAPY VISIT (OUTPATIENT)
Dept: PHYSICAL THERAPY | Facility: CLINIC | Age: 68
End: 2023-01-09
Attending: FAMILY MEDICINE
Payer: COMMERCIAL

## 2023-01-09 DIAGNOSIS — S76.311A HAMSTRING MUSCLE STRAIN, RIGHT, INITIAL ENCOUNTER: ICD-10-CM

## 2023-01-09 DIAGNOSIS — S76.302A LEFT HAMSTRING INJURY: ICD-10-CM

## 2023-01-09 PROCEDURE — 97161 PT EVAL LOW COMPLEX 20 MIN: CPT | Mod: GP | Performed by: PHYSICAL THERAPIST

## 2023-01-09 PROCEDURE — 97110 THERAPEUTIC EXERCISES: CPT | Mod: GP | Performed by: PHYSICAL THERAPIST

## 2023-01-09 NOTE — PROGRESS NOTES
Physical Therapy Initial Evaluation  Subjective:  The history is provided by the patient and medical records. No  was used.   Patient Health History  Jessie Tamayo being seen for Left hamstring pain when walking.     Problem began: 8/25/2022.   Problem occurred: While walking   Pain is reported as 0/10 on pain scale.  General health as reported by patient is good.  Pertinent medical history includes: history of fractures, menopausal and overweight.   Red flags:  None as reported by patient.  Medical allergies: none.   Surgeries include:  Orthopedic surgery and other. Other surgery history details: T&A 1960,  Ectopic Preg removal.    Current medications:  Other. Other medications details: PRN creams.    Current occupation is happily retired.   Primary job tasks include:  Computer work, driving and prolonged sitting.                  Therapist Generated HPI Evaluation  Problem details: L hamstring pain  Typically walks/bikes  Used to walk 3 miles, is very limited now and no longer walks  Can bike without restriction  Improves w/ sitting and elevated, icing.  Of note, will be leaving for [place] for the month so no follow ups scheduled.  .         Type of problem:  Left hip.    This is a chronic condition.  Condition occurred with:  Insidious onset.  Where condition occurred: for unknown reasons.  Site of Pain: hamstring, distally.  Pain is described as cramping, sharp and shooting and is intermittent.    Since onset symptoms are gradually worsening.  Symptoms are exacerbated by walking  and relieved by ice and rest (stopping walking).      Barriers include:  None as reported by patient.                        Objective:  System         Lumbar/SI Evaluation  ROM:    AROM Lumbar:   Flexion:        To floor , no symptoms  Ext:                    Moderate restriction, no symptoms. no change w/ repetitive ext in stand   Side Bend:        Left:     Right:   Rotation:           Left:     Right:   Side  Glide:        Left:     Right:                   Neural Tension/Mobility:      Left side:SLR; SLR w/DF or Slump  negative.     Right side:   SLR w/DF; Slump or SLR  negative.                                             Hip Evaluation  Hip PROM:  Hip PROM:  Left Hip:   Normal  Right Hip:                           Hip Strength:  Hip Strength:    Left:    Normal (mild weakness w/ L knee flexion/hamstring testing compared to R. painfre)  Right:  Normal                Knee Flexion:  Left: 4+/5   Pain:  Knee Extension:  Left: 5/5   Pain:                 Knee Evaluation:  ROM:  PROM: normal                              General     ROS    Assessment/Plan:    Patient is a 67 year old female with left side hip and left side knee complaints.    Patient has the following significant findings with corresponding treatment plan.                Diagnosis 1:  Left hamstring pain  Pain -  US, electric stimulation, manual therapy and home program  Decreased ROM/flexibility - manual therapy and therapeutic exercise  Decreased strength - therapeutic exercise, therapeutic activities and home program  Impaired muscle performance - neuro re-education and home program    Therapy Evaluation Codes:   1) History comprised of:   Personal factors that impact the plan of care:      Time since onset of symptoms.    Comorbidity factors that impact the plan of care are:      Menopausal and Overweight.     Medications impacting care: None.  2) Examination of Body Systems comprised of:   Body structures and functions that impact the plan of care:      Hip, Knee and Lumbar spine.   Activity limitations that impact the plan of care are:      Walking.  3) Clinical presentation characteristics are:   Stable/Uncomplicated.  4) Decision-Making    Low complexity using standardized patient assessment instrument and/or measureable assessment of functional outcome.  Cumulative Therapy Evaluation is: Low complexity.    Previous and current functional  limitations:  (See Goal Flow Sheet for this information)    Short term and Long term goals: (See Goal Flow Sheet for this information)     Communication ability:  Patient appears to be able to clearly communicate and understand verbal and written communication and follow directions correctly.  Treatment Explanation - The following has been discussed with the patient:   RX ordered/plan of care  Anticipated outcomes  Possible risks and side effects  This patient would benefit from PT intervention to resume normal activities.   Rehab potential is good.    Frequency:  1 X week, once daily  Duration:  for 6 weeks  (However pt will be out of town for month in [place], recheck upon return.   Discharge Plan:  Achieve all LTG.  Independent in home treatment program.  Reach maximal therapeutic benefit.    Please refer to the daily flowsheet for treatment today, total treatment time and time spent performing 1:1 timed codes.

## 2023-01-09 NOTE — PROGRESS NOTES
[facility]    OUTPATIENT Physical Therapy ORTHOPEDIC EVALUATION  PLAN OF TREATMENT FOR OUTPATIENT REHABILITATION  (COMPLETE FOR INITIAL CLAIMS ONLY)  Patient's Last Name, First Name, M.I.  YOB: 1955  Jessie Tamayo    Provider s Name:  [facility]   Medical Record No.  [id number]   Start of Care Date:  01/09/23   Onset Date:   08/25/23   Treatment Diagnosis:  Left hamstring pain Medical Diagnosis:     Hamstring muscle strain, right, initial encounter  Left hamstring injury       Goals:     01/09/23 0500   Body Part   Goals listed below are for Left leg   Goal #1   Goal #1 ambulation   Previous Functional Level Miles patient could walk   Performance Level 3 +   Current Functional Level Miles patient can walk   Performance Level <1/2 mile w/ significant symptoms   STG Target Performance Miles patient will be able to  walk   Performance Level 1/2 without increased symptoms   Rationale for safe household ambulation;for safe outdoor household ambulation;for safe community ambulation;to maintain proper body mechanics/posture while ambulating to avoid additional compensatory injury due to improper gait mechanics;to promote a healthy and active lifestyle   Due Date 01/30/23    LTG Target Performance Miles patient will be able to  walk   Performance Level 1 without increased symptoms   Rationale for safe household ambulation;for safe outdoor household ambulation;for safe community ambulation;to maintain proper body mechanics/posture while ambulating to avoid additional compensatory injury due to improper gait mechanics;to promote a healthy and active lifestyle   Due Date 03/06/23         Therapy Frequency:  1x/week  Predicted Duration of Therapy Intervention:  6 week    Maral Donovan PT                 I CERTIFY THE NEED FOR THESE SERVICES FURNISHED UNDER        THIS PLAN OF  TREATMENT AND WHILE UNDER MY CARE     (Physician co-signature of this document indicates review and certification of the therapy plan).                     Certification Date From:  01/09/23   Certification Date To:  02/20/23    Referring Provider:  Alba Layton    Initial Assessment        See Epic Evaluation SOC Date: 01/09/23

## 2023-03-13 PROBLEM — S76.302A LEFT HAMSTRING INJURY: Status: RESOLVED | Noted: 2023-01-09 | Resolved: 2023-03-13

## 2023-03-28 ENCOUNTER — OFFICE VISIT (OUTPATIENT)
Dept: OBGYN | Facility: CLINIC | Age: 68
End: 2023-03-28
Payer: COMMERCIAL

## 2023-03-28 VITALS
HEIGHT: 64 IN | SYSTOLIC BLOOD PRESSURE: 157 MMHG | BODY MASS INDEX: 37.37 KG/M2 | RESPIRATION RATE: 16 BRPM | TEMPERATURE: 97.5 F | HEART RATE: 69 BPM | DIASTOLIC BLOOD PRESSURE: 88 MMHG | WEIGHT: 218.9 LBS

## 2023-03-28 DIAGNOSIS — N36.8 URETHRAL BLEEDING: ICD-10-CM

## 2023-03-28 DIAGNOSIS — R87.810 CERVICAL HIGH RISK HPV (HUMAN PAPILLOMAVIRUS) TEST POSITIVE: Primary | ICD-10-CM

## 2023-03-28 DIAGNOSIS — R87.611 PAP SMEAR OF CERVIX WITH ASCUS, CANNOT EXCLUDE HGSIL: ICD-10-CM

## 2023-03-28 PROCEDURE — 87624 HPV HI-RISK TYP POOLED RSLT: CPT | Performed by: OBSTETRICS & GYNECOLOGY

## 2023-03-28 PROCEDURE — 99213 OFFICE O/P EST LOW 20 MIN: CPT | Performed by: OBSTETRICS & GYNECOLOGY

## 2023-03-28 PROCEDURE — 88141 CYTOPATH C/V INTERPRET: CPT | Performed by: PATHOLOGY

## 2023-03-28 PROCEDURE — 88175 CYTOPATH C/V AUTO FLUID REDO: CPT | Performed by: OBSTETRICS & GYNECOLOGY

## 2023-03-28 NOTE — PROGRESS NOTES
Jessie Tamayo is a 67 year old female  is here today for a follow-up Pap Smear.  Her last Pap was:  9/10/2020 and results were:  ASCUS-H.  She has had colposcopy on 2/8/2021.  The colposcopic results were:    She did have a procedure performed on her cervix.  The procedure was:  LEEP on 2/8/2021.   She underwent a Colposcopy in 3/23/2022. Biopsies were negative for dysplasia This is her first follow-up pap smear.    Pelvic exam:  External genitalia with erythematous plaques bilaterally  Urethra- mid position and well supported, suburethral tissue thickened and friable with bleeding elicited after placement of the speculum  Vagina is stenotic and cervix difficult to see pap smear done, exam chaperoned by nurse.       The patient's past medical history, social history and family history is reviewed at our visit and updated in EPIC.    New diagnosis of Lichen Planus- on topical steroids.  She is not sexually active    ASSESSMENT:/PLAN:  (R87.810) Cervical high risk HPV (human papillomavirus) test positive  (primary encounter diagnosis)  Comment: past history  Plan: Pap diagnostic with HPV            (R87.611) Pap smear of cervix with ASCUS, cannot exclude HGSIL  Comment: previous history  Plan: Pap diagnostic with HPV            (N36.8) Urethral bleeding  Comment: suburethral area  Plan: Adult Uro/Gyn  Referral      Jefe Koenig MD

## 2023-04-03 LAB
BKR LAB AP GYN ADEQUACY: ABNORMAL
BKR LAB AP GYN INTERPRETATION: ABNORMAL
BKR LAB AP HPV REFLEX: ABNORMAL
BKR LAB AP PREVIOUS ABNL DX: ABNORMAL
BKR LAB AP PREVIOUS ABNORMAL: ABNORMAL
PATH REPORT.COMMENTS IMP SPEC: ABNORMAL
PATH REPORT.COMMENTS IMP SPEC: ABNORMAL
PATH REPORT.RELEVANT HX SPEC: ABNORMAL

## 2023-04-03 NOTE — TELEPHONE ENCOUNTER
MEDICAL RECORDS REQUEST   [facility]  Urology Clinic  [address] [place] [address]  PHONE: [phone]  Fax: [phone]        FUTURE VISIT INFORMATION                                                   Jessie Tamayo : 1955 scheduled for future visit at [facility]    APPOINTMENT INFORMATION:    Date: 5/10/23    Provider:  Avril    Reason for Visit/Diagnosis: Urethral bleeding    REFERRAL INFORMATION:    Referring provider:  Jefe Koenig MD    Specialty: OBGYN    Referring providers clinic:  [facility]    Clinic contact number:      RECORDS REQUESTED FOR VISIT                                                     NOTES  STATUS/DETAILS   OFFICE NOTE from referring provider  yes- OV/referral 3/28/23- Jefe Koenig MD  Ov 3/23/22, 21   OFFICE NOTE from other specialist  no   DISCHARGE SUMMARY from hospital  no   DISCHARGE REPORT from the ER  no   OPERATIVE REPORT  no   MEDICATION LIST  yes   LABS     URINALYSIS (UA)  no   URINE CYTOLOGY  no   PATHOLOGY REPORT & SLIDES  yes- Endocervix, Cervix Path report from 3/23/22     PRE-VISIT CHECKLIST      Record collection complete Yes

## 2023-04-05 LAB
HUMAN PAPILLOMA VIRUS 16 DNA: POSITIVE
HUMAN PAPILLOMA VIRUS 18 DNA: NEGATIVE
HUMAN PAPILLOMA VIRUS FINAL DIAGNOSIS: ABNORMAL
HUMAN PAPILLOMA VIRUS OTHER HR: NEGATIVE

## 2023-04-06 ENCOUNTER — PATIENT OUTREACH (OUTPATIENT)
Dept: OBGYN | Facility: CLINIC | Age: 68
End: 2023-04-06
Payer: COMMERCIAL

## 2023-04-06 DIAGNOSIS — R87.611 PAP SMEAR OF CERVIX WITH ASCUS, CANNOT EXCLUDE HGSIL: ICD-10-CM

## 2023-05-10 ENCOUNTER — PRE VISIT (OUTPATIENT)
Dept: UROLOGY | Facility: CLINIC | Age: 68
End: 2023-05-10

## 2023-05-10 ENCOUNTER — OFFICE VISIT (OUTPATIENT)
Dept: UROLOGY | Facility: CLINIC | Age: 68
End: 2023-05-10
Payer: COMMERCIAL

## 2023-05-10 ENCOUNTER — TELEPHONE (OUTPATIENT)
Dept: DERMATOLOGY | Facility: CLINIC | Age: 68
End: 2023-05-10

## 2023-05-10 ENCOUNTER — ALLIED HEALTH/NURSE VISIT (OUTPATIENT)
Dept: UROLOGY | Facility: CLINIC | Age: 68
End: 2023-05-10
Payer: COMMERCIAL

## 2023-05-10 VITALS
HEIGHT: 64 IN | WEIGHT: 210 LBS | SYSTOLIC BLOOD PRESSURE: 146 MMHG | HEART RATE: 75 BPM | BODY MASS INDEX: 35.85 KG/M2 | OXYGEN SATURATION: 96 % | DIASTOLIC BLOOD PRESSURE: 83 MMHG

## 2023-05-10 DIAGNOSIS — R35.0 FREQUENCY OF MICTURITION: ICD-10-CM

## 2023-05-10 DIAGNOSIS — N36.8 URETHRAL BLEEDING: Primary | ICD-10-CM

## 2023-05-10 DIAGNOSIS — L90.0 LICHEN SCLEROSUS: Primary | ICD-10-CM

## 2023-05-10 DIAGNOSIS — N95.2 VAGINAL ATROPHY: Primary | ICD-10-CM

## 2023-05-10 DIAGNOSIS — N36.8 URETHRAL BLEEDING: ICD-10-CM

## 2023-05-10 DIAGNOSIS — N89.8 VAGINAL MASS: ICD-10-CM

## 2023-05-10 PROCEDURE — 99204 OFFICE O/P NEW MOD 45 MIN: CPT | Performed by: UROLOGY

## 2023-05-10 PROCEDURE — 99207 PR NO CHARGE NURSE ONLY: CPT

## 2023-05-10 RX ORDER — CEFAZOLIN SODIUM 2 G/50ML
2 SOLUTION INTRAVENOUS
Status: CANCELLED | OUTPATIENT
Start: 2023-05-10

## 2023-05-10 RX ORDER — ESTRADIOL 0.1 MG/G
2 CREAM VAGINAL
Qty: 42.5 G | Refills: 11 | Status: SHIPPED | OUTPATIENT
Start: 2023-05-11 | End: 2023-07-27

## 2023-05-10 RX ORDER — CEFAZOLIN SODIUM 2 G/50ML
2 SOLUTION INTRAVENOUS SEE ADMIN INSTRUCTIONS
Status: CANCELLED | OUTPATIENT
Start: 2023-05-10

## 2023-05-10 NOTE — NURSING NOTE
"Chief Complaint   Patient presents with     Consult For     Pt stated \"that she has been bleeding in her vaginal area\". \"Doctor in [place] said that she has a lump in the opening of her urethra\". Pt also stated that she has \"HVP 16 positive in the vaginal area as well as Lichen\".        Blood pressure (!) 146/83, pulse 75, height 1.626 m (5' 4\"), weight 95.3 kg (210 lb), last menstrual period 03/08/2008, SpO2 96 %, not currently breastfeeding. Body mass index is 36.05 kg/m .    Patient Active Problem List   Diagnosis     Hyperlipidemia LDL goal <130     Nevus, Pigmented - vulvar mohr     Recurrent cold sores     Hearing loss     Pap smear of cervix with ASCUS, cannot exclude HGSIL     Introital dyspareunia     Vaginal atrophy     Cervical high risk HPV (human papillomavirus) test positive     Atrophic vaginitis     Class 1 obesity due to excess calories without serious comorbidity with body mass index (BMI) of 34.0 to 34.9 in adult     Morbid obesity (H)       Allergies   Allergen Reactions     Seasonal Allergies        Current Outpatient Medications   Medication Sig Dispense Refill     nystatin (MYCOSTATIN) 782402 UNIT/GM external cream Apply topically 2 times daily Use with the triamcinolone (Patient not taking: Reported on 5/10/2023) 15 g 1     triamcinolone (ARISTOCORT HP) 0.5 % external cream Apply topically 2 times daily (Patient not taking: Reported on 5/10/2023) 15 g 1       Social History     Tobacco Use     Smoking status: Never     Smokeless tobacco: Never   Vaping Use     Vaping status: Never Used     Passive vaping exposure: Yes   Substance Use Topics     Alcohol use: Yes     Comment: very little     Drug use: No       Amarilys Tay  5/10/2023  8:29 AM     "

## 2023-05-10 NOTE — LETTER
"5/10/2023       RE: Jessie Tamayo  [address] [address]     Dear Colleague,    Thank you for referring your patient, Jessie Tamayo, to the [facility] [place] at [facility]. Please see a copy of my visit note below.    HPI:  Jessie Tamayo is a 67 year old female with vaginal bleeding and thickening around the urethra    Reviewed previous notes from Dr. Koenig on 3/28/23   Per her exam:  \"External genitalia with erythematous plaques bilaterally  Urethra- mid position and well supported, suburethral tissue thickened and friable with bleeding elicited after placement of the speculum\"    Exam:  BP (!) 146/83   Pulse 75   Ht 1.626 m (5' 4\")   Wt 95.3 kg (210 lb)   LMP 03/08/2008   SpO2 96%   BMI 36.05 kg/m    GENERAL: Healthy, alert and no distress  EYES: Eyes grossly normal to inspection.  No discharge or erythema, or obvious scleral/conjunctival abnormalities.  RESP: No audible wheeze, cough, or visible cyanosis.  No visible retractions or increased work of breathing.    SKIN: Visible skin clear. No significant rash, abnormal pigmentation or lesions.  NEURO: Cranial nerves grossly intact.  Mentation and speech appropriate for age.  PSYCH: Mentation appears normal, affect normal/bright, judgement and insight intact, normal speech and appearance well-groomed.   On the urethra in the vagina towards the left is a plaque like lesion with surrounding erythema  The external vaginal tissues appear atrophic and there is Lichen sclerosis present.      Assessment & Plan   68 y/o female with some vaginal bleeding and lichen sclerosis as well as vaginal atrophy.  She also has a lesion on the urethra in the vagina as well as lesions in the vagina.  We discussed referral to derm as well as cystoscopy with excision biopsy of the lesions.  -schedule above procedure.    Lakeisha Mackenzie MD  [facility] " [place]      ==========================      Additional Coding Information:    Time spent:  45 minutes spent on the date of the encounter doing chart review, history and exam, documentation and further activities per the note

## 2023-05-10 NOTE — PROGRESS NOTES
Pre Op Teaching Flowsheet       Pre and Post op Patient Education  Relevant Diagnosis:  Urethral bleeding  Surgical procedure:  CYSTOSCOPY AND EXCISIONAL BIOPSY OF THE VAGINAL TISSUE AROUND THE URETHRA. (look in the bladder and sample small area in the vagina near the urethra)  Teaching Topic:  Pre and post op teaching  Person Involved in teaching: Yes    Motivation Level:  Asks Questions: Yes  Eager to Learn: Yes  Cooperative: Yes  Receptive (willing/able to accept information):  Yes    Patient demonstrates understanding of the following:  Date of surgery:  Surgery scheduler to call the patient   Location of surgery:  [facility] - 5th Floor  History and Physical and any other testing necessary prior to surgery: Yes  Required time line for completion of History and Physical and any pre-op testing: Yes    Patient demonstrates understanding of the following:  Pre-op bowel prep:  N/A  Pre-op showering/scrub information with PCMX Soap: Yes  Blood thinner medications discussed and when to stop (if applicable):  N/A only using creams  Discussed no visitor's at this time due to increase Covid-19 cases and how we need to make sure everyone stays safe.    Infection Prevention:   Patient demonstrates understanding of the following:  Surgical procedure site care taught: Yes  Signs and symptoms of infection taught: Yes      Post-op follow-up:  Discussed how to contact the hospital, nurse, and clinic scheduling staff if necessary. (See packet information)    Instructional materials used/given/mailed:  [facility] Packet, post op teaching sheet, Map, Soap, and with the arrival/location information to come closer to the surgery date.    Surgical instructions packet given to patient in office:  N/A    Follow up: Discussed arranging for someone to drive you home. ( No public transportation)  Someone needed to stay the first twenty hours after surgery: Yes      referral: not needed     home:  Spouse     Care Giver:  Spouse    PCP:  Dr Narayanan    Patient notified she will need a urine culture one week before surgery.    Discuss details post procedure. Patient does have a 8 day trip the last week of June

## 2023-05-10 NOTE — TELEPHONE ENCOUNTER
This encounter is being sent to inform the clinic that this patient has a referral fromLakeisha Mackenzie MD  for the diagnoses ofLichen sclerosus [L90.0]  and has requested that this patient be seen within 1-2 weeks and/or with Any dr any location.  Based on the availability of our provider(s), we are unable to accommodate this request.      Were all sites offered this patient?  Yes      Does scheduling algorithm request to schedule next available?  Patient appointment has not been scheduled.  Please review the referral request for accommodation and contact the patient.  If unable to accommodate, please resubmit a referral and indicate a preferred partner or affiliate location using Provider Finder or Scheduling Instructions field.

## 2023-05-10 NOTE — PROGRESS NOTES
"HPI:  Jessie Tamayo is a 67 year old female with vaginal bleeding and thickening around the urethra    Reviewed previous notes from Dr. Koenig on 3/28/23   Per her exam:  \"External genitalia with erythematous plaques bilaterally  Urethra- mid position and well supported, suburethral tissue thickened and friable with bleeding elicited after placement of the speculum\"    Exam:  BP (!) 146/83   Pulse 75   Ht 1.626 m (5' 4\")   Wt 95.3 kg (210 lb)   LMP 03/08/2008   SpO2 96%   BMI 36.05 kg/m    GENERAL: Healthy, alert and no distress  EYES: Eyes grossly normal to inspection.  No discharge or erythema, or obvious scleral/conjunctival abnormalities.  RESP: No audible wheeze, cough, or visible cyanosis.  No visible retractions or increased work of breathing.    SKIN: Visible skin clear. No significant rash, abnormal pigmentation or lesions.  NEURO: Cranial nerves grossly intact.  Mentation and speech appropriate for age.  PSYCH: Mentation appears normal, affect normal/bright, judgement and insight intact, normal speech and appearance well-groomed.   On the urethra in the vagina towards the left is a plaque like lesion with surrounding erythema  The external vaginal tissues appear atrophic and there is Lichen sclerosis present.      Assessment & Plan   66 y/o female with some vaginal bleeding and lichen sclerosis as well as vaginal atrophy.  She also has a lesion on the urethra in the vagina as well as lesions in the vagina.  We discussed referral to derm as well as cystoscopy with excision biopsy of the lesions.  -schedule above procedure.    Lakeisha Mackenzie MD  [facility] [place]      ==========================      Additional Coding Information:    Time spent:  45 minutes spent on the date of the encounter doing chart review, history and exam, documentation and further activities per the note            "

## 2023-05-11 NOTE — TELEPHONE ENCOUNTER
This encounter is being sent to inform the clinic that this patient has a referral fromLakeisha Mackenzie MD  for the diagnoses ofLichen sclerosus [L90.0]    and has requested that this patient be seen within 1-2 weeks and/or with Any.  Based on the availability of our provider(s), we are unable to accommodate this request.      Were all sites offered this patient?  Yes      Does scheduling algorithm request to schedule next available?  Patient has been scheduled for the first available opening with Dr Banuelos on 06/13/2023.  We have informed the patient that the clinic will review their referral and reach out if a sooner appointment is medically necessary.

## 2023-05-15 ENCOUNTER — MYC MEDICAL ADVICE (OUTPATIENT)
Dept: UROLOGY | Facility: CLINIC | Age: 68
End: 2023-05-15
Payer: COMMERCIAL

## 2023-05-15 PROBLEM — N89.8 VAGINAL MASS: Status: ACTIVE | Noted: 2023-05-15

## 2023-05-15 PROBLEM — N95.2 VAGINAL ATROPHY: Status: ACTIVE | Noted: 2017-07-11

## 2023-05-15 PROBLEM — N36.8 URETHRAL BLEEDING: Status: ACTIVE | Noted: 2023-05-15

## 2023-05-16 NOTE — TELEPHONE ENCOUNTER
FUTURE VISIT INFORMATION      FUTURE VISIT INFORMATION:    Date: 6.13.23    Time: 8:00    Location: [facility]  REFERRAL INFORMATION:    Referring provider:  Avril    Referring providers clinic:  Urology    Reason for visit/diagnosis  Lichen sclerosus [L90.0], referred by Lakeisha Mackenzie MD, recs in [facility], Per Pt    RECORDS REQUESTED FROM:       Clinic name Comments Records Status Imaging Status   Urology 5.10.23  [facility]    OB/Gyn 3.28.23  [facility]    FP 2.23.22  [facility]

## 2023-05-24 ENCOUNTER — OFFICE VISIT (OUTPATIENT)
Dept: FAMILY MEDICINE | Facility: CLINIC | Age: 68
End: 2023-05-24
Payer: COMMERCIAL

## 2023-05-24 VITALS
DIASTOLIC BLOOD PRESSURE: 64 MMHG | HEIGHT: 65 IN | TEMPERATURE: 97.3 F | SYSTOLIC BLOOD PRESSURE: 138 MMHG | RESPIRATION RATE: 18 BRPM | BODY MASS INDEX: 36.02 KG/M2 | HEART RATE: 92 BPM | WEIGHT: 216.2 LBS | OXYGEN SATURATION: 96 %

## 2023-05-24 DIAGNOSIS — R35.0 FREQUENCY OF MICTURITION: ICD-10-CM

## 2023-05-24 DIAGNOSIS — Z78.9 NONSMOKER: ICD-10-CM

## 2023-05-24 DIAGNOSIS — H61.21 IMPACTED CERUMEN OF RIGHT EAR: ICD-10-CM

## 2023-05-24 DIAGNOSIS — N89.8 VAGINAL MASS: ICD-10-CM

## 2023-05-24 DIAGNOSIS — N36.8 URETHRAL BLEEDING: ICD-10-CM

## 2023-05-24 DIAGNOSIS — Z01.818 PREOP GENERAL PHYSICAL EXAM: Primary | ICD-10-CM

## 2023-05-24 DIAGNOSIS — N95.2 ATROPHIC VAGINITIS: ICD-10-CM

## 2023-05-24 DIAGNOSIS — L90.0 LICHEN SCLEROSUS: ICD-10-CM

## 2023-05-24 PROBLEM — E66.01 MORBID OBESITY (H): Status: RESOLVED | Noted: 2022-05-26 | Resolved: 2023-05-24

## 2023-05-24 LAB
ALBUMIN UR-MCNC: ABNORMAL MG/DL
ANION GAP SERPL CALCULATED.3IONS-SCNC: 5 MMOL/L (ref 3–14)
APPEARANCE UR: CLEAR
BILIRUB UR QL STRIP: NEGATIVE
BUN SERPL-MCNC: 13 MG/DL (ref 7–30)
CALCIUM SERPL-MCNC: 9 MG/DL (ref 8.5–10.1)
CHLORIDE BLD-SCNC: 109 MMOL/L (ref 94–109)
CO2 SERPL-SCNC: 28 MMOL/L (ref 20–32)
COLOR UR AUTO: YELLOW
CREAT SERPL-MCNC: 0.84 MG/DL (ref 0.52–1.04)
ERYTHROCYTE [DISTWIDTH] IN BLOOD BY AUTOMATED COUNT: 13 % (ref 10–15)
GFR SERPL CREATININE-BSD FRML MDRD: 76 ML/MIN/1.73M2
GLUCOSE BLD-MCNC: 103 MG/DL (ref 70–99)
GLUCOSE UR STRIP-MCNC: NEGATIVE MG/DL
HCT VFR BLD AUTO: 39.9 % (ref 35–47)
HGB BLD-MCNC: 13 G/DL (ref 11.7–15.7)
HGB UR QL STRIP: ABNORMAL
KETONES UR STRIP-MCNC: NEGATIVE MG/DL
LEUKOCYTE ESTERASE UR QL STRIP: ABNORMAL
MCH RBC QN AUTO: 30.7 PG (ref 26.5–33)
MCHC RBC AUTO-ENTMCNC: 32.6 G/DL (ref 31.5–36.5)
MCV RBC AUTO: 94 FL (ref 78–100)
MUCOUS THREADS #/AREA URNS LPF: PRESENT /LPF
NITRATE UR QL: NEGATIVE
PH UR STRIP: 5.5 [PH] (ref 5–7)
PLATELET # BLD AUTO: 224 10E3/UL (ref 150–450)
POTASSIUM BLD-SCNC: 4.2 MMOL/L (ref 3.4–5.3)
RBC # BLD AUTO: 4.23 10E6/UL (ref 3.8–5.2)
RBC #/AREA URNS AUTO: ABNORMAL /HPF
SODIUM SERPL-SCNC: 142 MMOL/L (ref 133–144)
SP GR UR STRIP: 1.02 (ref 1–1.03)
SQUAMOUS #/AREA URNS AUTO: ABNORMAL /LPF
UROBILINOGEN UR STRIP-ACNC: 0.2 E.U./DL
WBC # BLD AUTO: 6.5 10E3/UL (ref 4–11)
WBC #/AREA URNS AUTO: ABNORMAL /HPF
WBC CLUMPS #/AREA URNS HPF: PRESENT /HPF

## 2023-05-24 PROCEDURE — 87086 URINE CULTURE/COLONY COUNT: CPT | Performed by: STUDENT IN AN ORGANIZED HEALTH CARE EDUCATION/TRAINING PROGRAM

## 2023-05-24 PROCEDURE — 81001 URINALYSIS AUTO W/SCOPE: CPT | Performed by: STUDENT IN AN ORGANIZED HEALTH CARE EDUCATION/TRAINING PROGRAM

## 2023-05-24 PROCEDURE — 87186 SC STD MICRODIL/AGAR DIL: CPT | Performed by: STUDENT IN AN ORGANIZED HEALTH CARE EDUCATION/TRAINING PROGRAM

## 2023-05-24 PROCEDURE — 93000 ELECTROCARDIOGRAM COMPLETE: CPT | Performed by: STUDENT IN AN ORGANIZED HEALTH CARE EDUCATION/TRAINING PROGRAM

## 2023-05-24 PROCEDURE — 85027 COMPLETE CBC AUTOMATED: CPT | Performed by: STUDENT IN AN ORGANIZED HEALTH CARE EDUCATION/TRAINING PROGRAM

## 2023-05-24 PROCEDURE — 99213 OFFICE O/P EST LOW 20 MIN: CPT | Performed by: STUDENT IN AN ORGANIZED HEALTH CARE EDUCATION/TRAINING PROGRAM

## 2023-05-24 PROCEDURE — 87088 URINE BACTERIA CULTURE: CPT | Performed by: STUDENT IN AN ORGANIZED HEALTH CARE EDUCATION/TRAINING PROGRAM

## 2023-05-24 PROCEDURE — 36415 COLL VENOUS BLD VENIPUNCTURE: CPT | Performed by: STUDENT IN AN ORGANIZED HEALTH CARE EDUCATION/TRAINING PROGRAM

## 2023-05-24 PROCEDURE — 80048 BASIC METABOLIC PNL TOTAL CA: CPT | Performed by: STUDENT IN AN ORGANIZED HEALTH CARE EDUCATION/TRAINING PROGRAM

## 2023-05-24 ASSESSMENT — PAIN SCALES - GENERAL: PAINLEVEL: NO PAIN (0)

## 2023-05-24 NOTE — PATIENT INSTRUCTIONS
For informational purposes only. Not to replace the advice of your health care provider. Copyright   2003,  [employer] [facility]. All rights reserved. Clinically reviewed by Marivel Duong MD. [employer] [id number] - REV .  Preparing for Your Surgery  Getting started  A nurse will call you to review your health history and instructions. They will give you an arrival time based on your scheduled surgery time. Please be ready to share:  Your doctor's clinic name and phone number  Your medical, surgical, and anesthesia history  A list of allergies and sensitivities  A list of medicines, including herbal treatments and over-the-counter drugs  Whether the patient has a legal guardian (ask how to send us the papers in advance)  Please tell us if you're pregnant--or if there's any chance you might be pregnant. Some surgeries may injure a fetus (unborn baby), so they require a pregnancy test. Surgeries that are safe for a fetus don't always need a test, and you can choose whether to have one.   If you have a child who's having surgery, please ask for a copy of Preparing for Your Child's Surgery.    Preparing for surgery  Within 10 to 30 days of surgery: Have a pre-op exam (sometimes called an H&P, or History and Physical). This can be done at a clinic or pre-operative center.  If you're having a , you may not need this exam. Talk to your care team.  At your pre-op exam, talk to your care team about all medicines you take. If you need to stop any medicines before surgery, ask when to start taking them again.  We do this for your safety. Many medicines can make you bleed too much during surgery. Some change how well surgery (anesthesia) drugs work.  Call your insurance company to let them know you're having surgery. (If you don't have insurance, call [phone].)  Call your clinic if there's any change in your health. This includes signs of a cold or flu (sore throat, runny nose, cough, rash, fever). It  also includes a scrape or scratch near the surgery site.  If you have questions on the day of surgery, call your hospital or surgery center.  Eating and drinking guidelines  For your safety: Unless your surgeon tells you otherwise, follow the guidelines below.  Eat and drink as usual until 8 hours before you arrive for surgery. After that, no food or milk.  Drink clear liquids until 2 hours before you arrive. These are liquids you can see through, like water, Gatorade, and Propel Water. They also include plain black coffee and tea (no cream or milk), candy, and breath mints. You can spit out gum when you arrive.  If you drink alcohol: Stop drinking it the night before surgery.  If your care team tells you to take medicine on the morning of surgery, it's okay to take it with a sip of water.  Preventing infection  Shower or bathe the night before and morning of your surgery. Follow the instructions your clinic gave you. (If no instructions, use regular soap.)  Don't shave or clip hair near your surgery site. We'll remove the hair if needed.  Don't smoke or vape the morning of surgery. You may chew nicotine gum up to 2 hours before surgery. A nicotine patch is okay.  Note: Some surgeries require you to completely quit smoking and nicotine. Check with your surgeon.  Your care team will make every effort to keep you safe from infection. We will:  Clean our hands often with soap and water (or an alcohol-based hand rub).  Clean the skin at your surgery site with a special soap that kills germs.  Give you a special gown to keep you warm. (Cold raises the risk of infection.)  Wear special hair covers, masks, gowns and gloves during surgery.  Give antibiotic medicine, if prescribed. Not all surgeries need antibiotics.  What to bring on the day of surgery  Photo ID and insurance card  Copy of your health care directive, if you have one  Glasses and hearing aids (bring cases)  You can't wear contacts during surgery  Inhaler and  eye drops, if you use them (tell us about these when you arrive)  CPAP machine or breathing device, if you use them  A few personal items, if spending the night  If you have . . .  A pacemaker, ICD (cardiac defibrillator) or other implant: Bring the ID card.  An implanted stimulator: Bring the remote control.  A legal guardian: Bring a copy of the certified (court-stamped) guardianship papers.  Please remove any jewelry, including body piercings. Leave jewelry and other valuables at home.  If you're going home the day of surgery  You must have a responsible adult drive you home. They should stay with you overnight as well.  If you don't have someone to stay with you, and you aren't safe to go home alone, we may keep you overnight. Insurance often won't pay for this.  After surgery  If it's hard to control your pain or you need more pain medicine, please call your surgeon's office.  Questions?   If you have any questions for your care team, list them here: _________________________________________________________________________________________________________________________________________________________________________ ____________________________________ ____________________________________ ____________________________________    How to Take Your Medication Before Surgery  - Take all of your medications before surgery as usual

## 2023-05-24 NOTE — H&P (VIEW-ONLY)
[facility] [place]  [address]  [place] [address]  Phone: [phone]  [facility]  Pre-op Performing Provider: SHAQ CHIU      PREOPERATIVE EVALUATION:  Today's date: 5/24/2023    Jessie Tamayo is a 67 year old female who presents for a preoperative evaluation.      5/24/2023     8:29 AM   Additional Questions   Roomed by Bebe   Accompanied by Self     Surgical Information:  Surgery/Procedure: CYSTOSCOPY AND EXCISIONAL BIOPSY OF THE VAGINAL TISSUE AROUND THE URETHRA.  (look in the bladder and sample small area in the vagina near the urethra)  Surgery Location: [facility]   Surgeon: Lakeisha Mackenzie MD  Surgery Date: 5/30/23  Time of Surgery: 2:05pm  Where patient plans to recover: At home with family  Fax number for surgical facility: Note does not need to be faxed, will be available electronically in Epic.    Assessment & Plan     The proposed surgical procedure is considered INTERMEDIATE risk.    1. Preop general physical exam    - EKG 12-lead complete w/read - Clinics  - Basic metabolic panel  (Ca, Cl, CO2, Creat, Gluc, K, Na, BUN); Future  - CBC with platelets; Future  - CBC with platelets  - Basic metabolic panel  (Ca, Cl, CO2, Creat, Gluc, K, Na, BUN)    2. Lichen sclerosus    - Routine UA with microscopic - No culture  - Urine Culture Aerobic Bacterial    3. Atrophic vaginitis    4. Frequency of micturition    - Urine Culture Aerobic Bacterial    5. Urethral bleeding  6. Vaginal mass    7. Nonsmoker    8 Cerumen impaction , right  Recommend Debrox            - No identified additional risk factors other than previously addressed    Antiplatelet or Anticoagulation Medication Instructions:   - Patient is on no antiplatelet or anticoagulation medications.    Additional Medication Instructions:  Patient is to take all scheduled medications on the day of surgery    RECOMMENDATION:  APPROVAL  GIVEN to proceed with proposed procedure, without further diagnostic evaluation.    Subjective       HPI related to upcoming procedure: vaginal atrophy with erythematous plaques, thickened suburethral tissue and bleeding        5/23/2023     1:22 PM   Preop Questions   1. Have you ever had a heart attack or stroke? No   2. Have you ever had surgery on your heart or blood vessels, such as a stent placement, a coronary artery bypass, or surgery on an artery in your head, neck, heart, or legs? No   3. Do you have chest pain with activity? No   4. Do you have a history of  heart failure? No   5. Do you currently have a cold, bronchitis or symptoms of other infection? No   6. Do you have a cough, shortness of breath, or wheezing? No   7. Do you or anyone in your family have previous history of blood clots? UNKNOWN -    8. Do you or does anyone in your family have a serious bleeding problem such as prolonged bleeding following surgeries or cuts? UNKNOWN -    9. Have you ever had problems with anemia or been told to take iron pills? No   10. Have you had any abnormal blood loss such as black, tarry or bloody stools, or abnormal vaginal bleeding? YES - vaginal bleeding   11. Have you ever had a blood transfusion? No   12. Are you willing to have a blood transfusion if it is medically needed before, during, or after your surgery? Yes   13. Have you or any of your relatives ever had problems with anesthesia? UNKNOWN -    14. Do you have sleep apnea, excessive snoring or daytime drowsiness? No   15. Do you have any artifical heart valves or other implanted medical devices like a pacemaker, defibrillator, or continuous glucose monitor? No   16. Do you have artificial joints? No   17. Are you allergic to latex? No       Health Care Directive:  Patient does not have a Health Care Directive or Living Will: Discussed advance care planning with patient; information given to patient to review.    Preoperative Review of :    reviewed - no record of controlled substances prescribed.      Status of Chronic Conditions:  See problem list for active medical problems.  Problems all longstanding and stable, except as noted/documented.  See ROS for pertinent symptoms related to these conditions.      Review of Systems  Constitutional, neuro, ENT, endocrine, pulmonary, cardiac, gastrointestinal, genitourinary, musculoskeletal, integument and psychiatric systems are negative, except as otherwise noted.    Patient Active Problem List    Diagnosis Date Noted     Urethral bleeding 05/15/2023     Priority: Medium     Added automatically from request for surgery [id number]       Vaginal mass 05/15/2023     Priority: Medium     Added automatically from request for surgery [id number]       Morbid obesity (H) 05/26/2022     Priority: Medium     Class 1 obesity due to excess calories without serious comorbidity with body mass index (BMI) of 34.0 to 34.9 in adult 01/25/2021     Priority: Medium     Atrophic vaginitis 01/19/2021     Priority: Medium     Added automatically from request for surgery [id number]       Cervical high risk HPV (human papillomavirus) test positive 10/01/2019     Priority: Medium     Introital dyspareunia 07/11/2017     Priority: Medium     Vaginal atrophy 07/11/2017     Priority: Medium     Pap smear of cervix with ASCUS, cannot exclude HGSIL 01/29/2015     Priority: Medium     1/23/15 NIL, +HR HPV type 16. Plan Artemas  2/27/15 Artemas Negative. Plan cotest in 1 year.   4/7/16 NIL, + HR HPV 16. Plan colp  5/19/16 Artemas ECC negative. Plan cotest in 1 year.   7/11/17 NIL, +HR HPV type 16. Plan colp  4/18/18 Lost to follow-up for pap tracking   9/25/18 Artemas Bx & ECC - Negative. ASCUS Pap, + HR HPV 16. Plan cotest in 1 year.   9/20/19 ASC-H Pap, + HR HPV 16. Plan colp  10/2/19 Pt transferring care due to insurance change.   12/16/19 Colpo bx suggestive of KAYLA I, ECC neg (Care Everywhere)  9/10/20 ASC-H, +HR HPV (unknown strain) per Care  Everywhere  2/8/21 [place] negative  3/23/22 Colpo bx and ECC neg. Plan: cotest in 1 year  3/28/23 HSIL, +HR HPV 16. Plan: colposcopy due before 6/28/23 4/7/23 Pt notified       Hearing loss 04/30/2009     Priority: Medium     Problem list name updated by automated process. 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     Priority: Medium     Hyperlipidemia LDL goal <130 08/06/2008     Priority: Medium      Past Medical History:   Diagnosis Date     Abnormal Pap smear of cervix 2019    see problem list     Actinic keratosis      Cervical high risk HPV (human papillomavirus) test positive 1/29/2015, 4/7/16, 7/11/17, 2019    see problem list     H/O colposcopy with cervical biopsy 2/2015    Negative     Hyperlipemia      Lichen sclerosus 2020     Osteopenias      Status post colposcopy 5/19/16    ECC - negative     Past Surgical History:   Procedure Laterality Date     COLPOSCOPY, BIOPSY, COMBINED N/A 2/8/2021    Procedure: COLPOSCOPY, WITH BIOPSY, [place] procedure;  Surgeon: Jefe Koenig MD;  Location: [facility]     EYE SURGERY      cataracts bilateral     OPEN REDUCTION INTERNAL FIXATION FOREARM  7/31/2012    Procedure: OPEN REDUCTION INTERNAL FIXATION FOREARM;  Open Reduction Internal Fixation, Irrigation & Debridment  of Right Distal Radius, application short arm splint;  Surgeon: Wade Beard MD;  Location: [facility]     TONSILLECTOMY & ADENOIDECTOMY  1960     [facility] TREAT ECTOPIC PREG,RMV TUBE/OVARY  1985    ectopic, right side-ovary and tube removed     [facility] COLONOSCOPY THRU STOMA, DIAGNOSTIC  2006    normal     Current Outpatient Medications   Medication Sig Dispense Refill     estradiol (ESTRACE) 0.1 MG/GM vaginal cream Place 2 g vaginally twice a week 42.5 g 11     nystatin (MYCOSTATIN) 173191 UNIT/GM external cream Apply topically 2 times daily Use with the triamcinolone (Patient not taking: Reported on 5/24/2023) 15 g  1     triamcinolone (ARISTOCORT HP) 0.5 % external cream Apply topically 2 times daily (Patient not taking: Reported on 5/24/2023) 15 g 1       Allergies   Allergen Reactions     Seasonal Allergies         Social History     Tobacco Use     Smoking status: Never     Passive exposure: Never     Smokeless tobacco: Never   Vaping Use     Vaping status: Never Used     Passive vaping exposure: Yes   Substance Use Topics     Alcohol use: Yes     Comment: very little     Family History   Problem Relation Age of Onset     Cancer Mother         lung cancer     Other Cancer Mother         Lung Ca     Cerebrovascular Disease Father      Hypertension Father      Alcohol/Drug Father      Diabetes Other         paternal aunt     Hyperlipidemia Other      Obesity Other         self     Other Cancer Other         Cervical Ca     Other Cancer Other         Ovarian Ca     Obesity Other      Diabetes Other         paternal aunt     Hypertension Other         father     Cerebrovascular Disease Other         father     Other Cancer Other         mother, Lung     Mental Illness Nephew         nephew     Obesity Niece         niece     C.A.D. No family hx of      Breast Cancer No family hx of      Cancer - colorectal No family hx of      Prostate Cancer No family hx of      Melanoma No family hx of      History   Drug Use No         Objective     LMP 03/08/2008     Physical Exam    GENERAL APPEARANCE: healthy, alert and no distress     EYES: EOMI,  PERRL     HENT: ear canals is otherwise normal and cerumen impaction in the right sean and nose and mouth without ulcers or lesions     NECK:  thyroid normal to palpation     RESP: lungs clear to auscultation - no rales, rhonchi or wheezes     CV: regular rates and rhythm, normal S1 S2, no S3 or S4 and no murmur, click or rub     ABDOMEN:  soft, nontender, no HSM or masses and bowel sounds normal     MS: extremities normal- no gross deformities noted, no evidence of inflammation in joints, FROM  in all extremities.     SKIN: no suspicious lesions or rashes     NEURO: Normal strength and tone, sensory exam grossly normal, mentation intact and speech normal     PSYCH: mentation appears normal. and affect normal/bright      No results for input(s): HGB, PLT, INR, NA, POTASSIUM, CR, A1C in the last 80533 hours.     Diagnostics:  Labs pending at this time.  Results will be reviewed when available.   EKG: appears normal, NSR, unchanged from previous tracings    Revised Cardiac Risk Index (RCRI):  The patient has the following serious cardiovascular risks for perioperative complications:   - No serious cardiac risks = 0 points     RCRI Interpretation: 0 points: Class I (very low risk - 0.4% complication rate)           Signed Electronically by: Chloe Hernandez MD  Copy of this evaluation report is provided to requesting physician.

## 2023-05-24 NOTE — PROGRESS NOTES
[facility] [place]  [address]  [place] [address]  Phone: [phone]  [facility]  Pre-op Performing Provider: SHAQ CHIU      PREOPERATIVE EVALUATION:  Today's date: 5/24/2023    Jessie Tamayo is a 67 year old female who presents for a preoperative evaluation.      5/24/2023     8:29 AM   Additional Questions   Roomed by Bebe   Accompanied by Self     Surgical Information:  Surgery/Procedure: CYSTOSCOPY AND EXCISIONAL BIOPSY OF THE VAGINAL TISSUE AROUND THE URETHRA.  (look in the bladder and sample small area in the vagina near the urethra)  Surgery Location: [facility]   Surgeon: Lakeisha Mackenzie MD  Surgery Date: 5/30/23  Time of Surgery: 2:05pm  Where patient plans to recover: At home with family  Fax number for surgical facility: Note does not need to be faxed, will be available electronically in Epic.    Assessment & Plan     The proposed surgical procedure is considered INTERMEDIATE risk.    1. Preop general physical exam    - EKG 12-lead complete w/read - Clinics  - Basic metabolic panel  (Ca, Cl, CO2, Creat, Gluc, K, Na, BUN); Future  - CBC with platelets; Future  - CBC with platelets  - Basic metabolic panel  (Ca, Cl, CO2, Creat, Gluc, K, Na, BUN)    2. Lichen sclerosus    - Routine UA with microscopic - No culture  - Urine Culture Aerobic Bacterial    3. Atrophic vaginitis    4. Frequency of micturition    - Urine Culture Aerobic Bacterial    5. Urethral bleeding  6. Vaginal mass    7. Nonsmoker    8 Cerumen impaction , right  Recommend Debrox            - No identified additional risk factors other than previously addressed    Antiplatelet or Anticoagulation Medication Instructions:   - Patient is on no antiplatelet or anticoagulation medications.    Additional Medication Instructions:  Patient is to take all scheduled medications on the day of surgery    RECOMMENDATION:  APPROVAL  GIVEN to proceed with proposed procedure, without further diagnostic evaluation.    Subjective       HPI related to upcoming procedure: vaginal atrophy with erythematous plaques, thickened suburethral tissue and bleeding        5/23/2023     1:22 PM   Preop Questions   1. Have you ever had a heart attack or stroke? No   2. Have you ever had surgery on your heart or blood vessels, such as a stent placement, a coronary artery bypass, or surgery on an artery in your head, neck, heart, or legs? No   3. Do you have chest pain with activity? No   4. Do you have a history of  heart failure? No   5. Do you currently have a cold, bronchitis or symptoms of other infection? No   6. Do you have a cough, shortness of breath, or wheezing? No   7. Do you or anyone in your family have previous history of blood clots? UNKNOWN -    8. Do you or does anyone in your family have a serious bleeding problem such as prolonged bleeding following surgeries or cuts? UNKNOWN -    9. Have you ever had problems with anemia or been told to take iron pills? No   10. Have you had any abnormal blood loss such as black, tarry or bloody stools, or abnormal vaginal bleeding? YES - vaginal bleeding   11. Have you ever had a blood transfusion? No   12. Are you willing to have a blood transfusion if it is medically needed before, during, or after your surgery? Yes   13. Have you or any of your relatives ever had problems with anesthesia? UNKNOWN -    14. Do you have sleep apnea, excessive snoring or daytime drowsiness? No   15. Do you have any artifical heart valves or other implanted medical devices like a pacemaker, defibrillator, or continuous glucose monitor? No   16. Do you have artificial joints? No   17. Are you allergic to latex? No       Health Care Directive:  Patient does not have a Health Care Directive or Living Will: Discussed advance care planning with patient; information given to patient to review.    Preoperative Review of :    reviewed - no record of controlled substances prescribed.      Status of Chronic Conditions:  See problem list for active medical problems.  Problems all longstanding and stable, except as noted/documented.  See ROS for pertinent symptoms related to these conditions.      Review of Systems  Constitutional, neuro, ENT, endocrine, pulmonary, cardiac, gastrointestinal, genitourinary, musculoskeletal, integument and psychiatric systems are negative, except as otherwise noted.    Patient Active Problem List    Diagnosis Date Noted     Urethral bleeding 05/15/2023     Priority: Medium     Added automatically from request for surgery [id number]       Vaginal mass 05/15/2023     Priority: Medium     Added automatically from request for surgery [id number]       Morbid obesity (H) 05/26/2022     Priority: Medium     Class 1 obesity due to excess calories without serious comorbidity with body mass index (BMI) of 34.0 to 34.9 in adult 01/25/2021     Priority: Medium     Atrophic vaginitis 01/19/2021     Priority: Medium     Added automatically from request for surgery [id number]       Cervical high risk HPV (human papillomavirus) test positive 10/01/2019     Priority: Medium     Introital dyspareunia 07/11/2017     Priority: Medium     Vaginal atrophy 07/11/2017     Priority: Medium     Pap smear of cervix with ASCUS, cannot exclude HGSIL 01/29/2015     Priority: Medium     1/23/15 NIL, +HR HPV type 16. Plan [place]  2/27/15 [place] Negative. Plan [place] in 1 year.   4/7/16 NIL, + HR HPV 16. Plan colp  5/19/16 [place] ECC negative. Plan [place] in 1 year.   7/11/17 NIL, +HR HPV type 16. Plan colp  4/18/18 Lost to follow-up for pap tracking   9/25/18 [place] Bx & ECC - Negative. ASCUS Pap, + HR HPV 16. Plan [place] in 1 year.   9/20/19 ASC-H Pap, + HR HPV 16. Plan colp  10/2/19 Pt transferring care due to insurance change.   12/16/19 Colpo bx suggestive of KAYLA I, ECC neg (Care Everywhere)  9/10/20 ASC-H, +HR HPV (unknown strain) per Care  Everywhere  2/8/21 [place] negative  3/23/22 Colpo bx and ECC neg. Plan: [place] in 1 year  3/28/23 HSIL, +HR HPV 16. Plan: colposcopy due before 6/28/23 4/7/23 Pt notified       Hearing loss 04/30/2009     Priority: Medium     Problem list name updated by automated process. 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     Priority: Medium     Hyperlipidemia LDL goal <130 08/06/2008     Priority: Medium      Past Medical History:   Diagnosis Date     Abnormal Pap smear of cervix 2019    see problem list     Actinic keratosis      Cervical high risk HPV (human papillomavirus) test positive 1/29/2015, 4/7/16, 7/11/17, 2019    see problem list     H/O colposcopy with cervical biopsy 2/2015    Negative     Hyperlipemia      Lichen sclerosus 2020     Osteopenias      Status post colposcopy 5/19/16    ECC - negative     Past Surgical History:   Procedure Laterality Date     COLPOSCOPY, BIOPSY, COMBINED N/A 2/8/2021    Procedure: COLPOSCOPY, WITH BIOPSY, [place] procedure;  Surgeon: Jefe Koenig MD;  Location: [facility]     EYE SURGERY      cataracts bilateral     OPEN REDUCTION INTERNAL FIXATION FOREARM  7/31/2012    Procedure: OPEN REDUCTION INTERNAL FIXATION FOREARM;  Open Reduction Internal Fixation, Irrigation & Debridment  of Right Distal Radius, application short arm splint;  Surgeon: Wade Beard MD;  Location: [facility]     TONSILLECTOMY & ADENOIDECTOMY  1960     [facility] TREAT ECTOPIC PREG,RMV TUBE/OVARY  1985    ectopic, right side-ovary and tube removed     [facility] COLONOSCOPY THRU STOMA, DIAGNOSTIC  2006    normal     Current Outpatient Medications   Medication Sig Dispense Refill     estradiol (ESTRACE) 0.1 MG/GM vaginal cream Place 2 g vaginally twice a week 42.5 g 11     nystatin (MYCOSTATIN) [id number] UNIT/GM external cream Apply topically 2 times daily Use with the triamcinolone (Patient not taking: Reported on 5/24/2023) 15 g  1     triamcinolone (ARISTOCORT HP) 0.5 % external cream Apply topically 2 times daily (Patient not taking: Reported on 5/24/2023) 15 g 1       Allergies   Allergen Reactions     Seasonal Allergies         Social History     Tobacco Use     Smoking status: Never     Passive exposure: Never     Smokeless tobacco: Never   Vaping Use     Vaping status: Never Used     Passive vaping exposure: Yes   Substance Use Topics     Alcohol use: Yes     Comment: very little     Family History   Problem Relation Age of Onset     Cancer Mother         lung cancer     Other Cancer Mother         Lung Ca     Cerebrovascular Disease Father      Hypertension Father      Alcohol/Drug Father      Diabetes Other         paternal aunt     Hyperlipidemia Other      Obesity Other         self     Other Cancer Other         Cervical Ca     Other Cancer Other         Ovarian Ca     Obesity Other      Diabetes Other         paternal aunt     Hypertension Other         father     Cerebrovascular Disease Other         father     Other Cancer Other         mother, Lung     Mental Illness Nephew         nephew     Obesity Niece         niece     C.A.D. No family hx of      Breast Cancer No family hx of      Cancer - colorectal No family hx of      Prostate Cancer No family hx of      Melanoma No family hx of      History   Drug Use No         Objective     LMP 03/08/2008     Physical Exam    GENERAL APPEARANCE: healthy, alert and no distress     EYES: EOMI,  PERRL     HENT: ear canals is otherwise normal and cerumen impaction in the right sean and nose and mouth without ulcers or lesions     NECK:  thyroid normal to palpation     RESP: lungs clear to auscultation - no rales, rhonchi or wheezes     CV: regular rates and rhythm, normal S1 S2, no S3 or S4 and no murmur, click or rub     ABDOMEN:  soft, nontender, no HSM or masses and bowel sounds normal     MS: extremities normal- no gross deformities noted, no evidence of inflammation in joints, FROM  in all extremities.     SKIN: no suspicious lesions or rashes     NEURO: Normal strength and tone, sensory exam grossly normal, mentation intact and speech normal     PSYCH: mentation appears normal. and affect normal/bright      No results for input(s): HGB, PLT, INR, NA, POTASSIUM, CR, A1C in the last 74177 hours.     Diagnostics:  Labs pending at this time.  Results will be reviewed when available.   EKG: appears normal, NSR, unchanged from previous tracings    Revised Cardiac Risk Index (RCRI):  The patient has the following serious cardiovascular risks for perioperative complications:   - No serious cardiac risks = 0 points     RCRI Interpretation: 0 points: Class I (very low risk - 0.4% complication rate)           Signed Electronically by: Chloe Hernandez MD  Copy of this evaluation report is provided to requesting physician.

## 2023-05-25 DIAGNOSIS — N39.0 UTI (URINARY TRACT INFECTION): Primary | ICD-10-CM

## 2023-05-25 RX ORDER — SULFAMETHOXAZOLE/TRIMETHOPRIM 800-160 MG
1 TABLET ORAL 2 TIMES DAILY
Qty: 6 TABLET | Refills: 0 | Status: SHIPPED | OUTPATIENT
Start: 2023-05-26 | End: 2023-05-29

## 2023-05-26 ENCOUNTER — ANESTHESIA EVENT (OUTPATIENT)
Dept: SURGERY | Facility: AMBULATORY SURGERY CENTER | Age: 68
End: 2023-05-26
Payer: COMMERCIAL

## 2023-05-27 LAB
BACTERIA UR CULT: ABNORMAL

## 2023-05-30 ENCOUNTER — ANESTHESIA (OUTPATIENT)
Dept: SURGERY | Facility: AMBULATORY SURGERY CENTER | Age: 68
End: 2023-05-30
Payer: COMMERCIAL

## 2023-05-30 ENCOUNTER — HOSPITAL ENCOUNTER (OUTPATIENT)
Facility: AMBULATORY SURGERY CENTER | Age: 68
Discharge: HOME | End: 2023-05-30
Attending: UROLOGY
Payer: COMMERCIAL

## 2023-05-30 VITALS
TEMPERATURE: 97.4 F | BODY MASS INDEX: 36.02 KG/M2 | SYSTOLIC BLOOD PRESSURE: 122 MMHG | HEART RATE: 73 BPM | RESPIRATION RATE: 16 BRPM | OXYGEN SATURATION: 97 % | WEIGHT: 216.2 LBS | HEIGHT: 65 IN | DIASTOLIC BLOOD PRESSURE: 72 MMHG

## 2023-05-30 DIAGNOSIS — N36.8 URETHRAL BLEEDING: ICD-10-CM

## 2023-05-30 DIAGNOSIS — N89.8 VAGINAL MASS: ICD-10-CM

## 2023-05-30 PROCEDURE — 88305 TISSUE EXAM BY PATHOLOGIST: CPT | Mod: TC | Performed by: UROLOGY

## 2023-05-30 PROCEDURE — 88342 IMHCHEM/IMCYTCHM 1ST ANTB: CPT | Mod: 26 | Performed by: PATHOLOGY

## 2023-05-30 PROCEDURE — 52204 CYSTOSCOPY W/BIOPSY(S): CPT | Mod: GC | Performed by: UROLOGY

## 2023-05-30 PROCEDURE — 88305 TISSUE EXAM BY PATHOLOGIST: CPT | Mod: 26 | Performed by: PATHOLOGY

## 2023-05-30 PROCEDURE — 52204 CYSTOSCOPY W/BIOPSY(S): CPT

## 2023-05-30 RX ORDER — SODIUM CHLORIDE, SODIUM LACTATE, POTASSIUM CHLORIDE, CALCIUM CHLORIDE 600; 310; 30; 20 MG/100ML; MG/100ML; MG/100ML; MG/100ML
INJECTION, SOLUTION INTRAVENOUS CONTINUOUS
Status: DISCONTINUED | OUTPATIENT
Start: 2023-05-30 | End: 2023-05-31 | Stop reason: HOSPADM

## 2023-05-30 RX ORDER — CEFAZOLIN SODIUM 2 G/50ML
2 SOLUTION INTRAVENOUS
Status: COMPLETED | OUTPATIENT
Start: 2023-05-30 | End: 2023-05-30

## 2023-05-30 RX ORDER — OXYCODONE HYDROCHLORIDE 5 MG/1
5 TABLET ORAL
Status: DISCONTINUED | OUTPATIENT
Start: 2023-05-30 | End: 2023-05-31 | Stop reason: HOSPADM

## 2023-05-30 RX ORDER — ACETAMINOPHEN 325 MG/1
975 TABLET ORAL ONCE
Status: COMPLETED | OUTPATIENT
Start: 2023-05-30 | End: 2023-05-30

## 2023-05-30 RX ORDER — PROPOFOL 10 MG/ML
INJECTION, EMULSION INTRAVENOUS PRN
Status: DISCONTINUED | OUTPATIENT
Start: 2023-05-30 | End: 2023-05-30

## 2023-05-30 RX ORDER — GLYCOPYRROLATE 0.2 MG/ML
INJECTION, SOLUTION INTRAMUSCULAR; INTRAVENOUS PRN
Status: DISCONTINUED | OUTPATIENT
Start: 2023-05-30 | End: 2023-05-30

## 2023-05-30 RX ORDER — ONDANSETRON 4 MG/1
4 TABLET, ORALLY DISINTEGRATING ORAL EVERY 30 MIN PRN
Status: DISCONTINUED | OUTPATIENT
Start: 2023-05-30 | End: 2023-05-31 | Stop reason: HOSPADM

## 2023-05-30 RX ORDER — FENTANYL CITRATE 50 UG/ML
25 INJECTION, SOLUTION INTRAMUSCULAR; INTRAVENOUS EVERY 5 MIN PRN
Status: DISCONTINUED | OUTPATIENT
Start: 2023-05-30 | End: 2023-05-31 | Stop reason: HOSPADM

## 2023-05-30 RX ORDER — ONDANSETRON 2 MG/ML
4 INJECTION INTRAMUSCULAR; INTRAVENOUS EVERY 30 MIN PRN
Status: DISCONTINUED | OUTPATIENT
Start: 2023-05-30 | End: 2023-05-31 | Stop reason: HOSPADM

## 2023-05-30 RX ORDER — ONDANSETRON 2 MG/ML
INJECTION INTRAMUSCULAR; INTRAVENOUS PRN
Status: DISCONTINUED | OUTPATIENT
Start: 2023-05-30 | End: 2023-05-30

## 2023-05-30 RX ORDER — LIDOCAINE 40 MG/G
CREAM TOPICAL
Status: DISCONTINUED | OUTPATIENT
Start: 2023-05-30 | End: 2023-05-31 | Stop reason: HOSPADM

## 2023-05-30 RX ORDER — LIDOCAINE HYDROCHLORIDE 20 MG/ML
INJECTION, SOLUTION INFILTRATION; PERINEURAL PRN
Status: DISCONTINUED | OUTPATIENT
Start: 2023-05-30 | End: 2023-05-30

## 2023-05-30 RX ORDER — OXYCODONE HYDROCHLORIDE 5 MG/1
10 TABLET ORAL
Status: DISCONTINUED | OUTPATIENT
Start: 2023-05-30 | End: 2023-05-31 | Stop reason: HOSPADM

## 2023-05-30 RX ORDER — PROPOFOL 10 MG/ML
INJECTION, EMULSION INTRAVENOUS CONTINUOUS PRN
Status: DISCONTINUED | OUTPATIENT
Start: 2023-05-30 | End: 2023-05-30

## 2023-05-30 RX ORDER — BUPIVACAINE HYDROCHLORIDE AND EPINEPHRINE 5; 5 MG/ML; UG/ML
INJECTION, SOLUTION PERINEURAL PRN
Status: DISCONTINUED | OUTPATIENT
Start: 2023-05-30 | End: 2023-05-30 | Stop reason: HOSPADM

## 2023-05-30 RX ORDER — FENTANYL CITRATE 50 UG/ML
50 INJECTION, SOLUTION INTRAMUSCULAR; INTRAVENOUS EVERY 5 MIN PRN
Status: DISCONTINUED | OUTPATIENT
Start: 2023-05-30 | End: 2023-05-31 | Stop reason: HOSPADM

## 2023-05-30 RX ORDER — CEFAZOLIN SODIUM 2 G/50ML
2 SOLUTION INTRAVENOUS SEE ADMIN INSTRUCTIONS
Status: DISCONTINUED | OUTPATIENT
Start: 2023-05-30 | End: 2023-05-31 | Stop reason: HOSPADM

## 2023-05-30 RX ORDER — HYDROMORPHONE HYDROCHLORIDE 1 MG/ML
0.2 INJECTION, SOLUTION INTRAMUSCULAR; INTRAVENOUS; SUBCUTANEOUS EVERY 5 MIN PRN
Status: DISCONTINUED | OUTPATIENT
Start: 2023-05-30 | End: 2023-05-31 | Stop reason: HOSPADM

## 2023-05-30 RX ORDER — HYDROMORPHONE HYDROCHLORIDE 1 MG/ML
0.4 INJECTION, SOLUTION INTRAMUSCULAR; INTRAVENOUS; SUBCUTANEOUS EVERY 5 MIN PRN
Status: DISCONTINUED | OUTPATIENT
Start: 2023-05-30 | End: 2023-05-31 | Stop reason: HOSPADM

## 2023-05-30 RX ORDER — FENTANYL CITRATE 50 UG/ML
INJECTION, SOLUTION INTRAMUSCULAR; INTRAVENOUS PRN
Status: DISCONTINUED | OUTPATIENT
Start: 2023-05-30 | End: 2023-05-30

## 2023-05-30 RX ADMIN — PROPOFOL 250 MCG/KG/MIN: 10 INJECTION, EMULSION INTRAVENOUS at 13:55

## 2023-05-30 RX ADMIN — FENTANYL CITRATE 50 MCG: 50 INJECTION, SOLUTION INTRAMUSCULAR; INTRAVENOUS at 14:08

## 2023-05-30 RX ADMIN — SODIUM CHLORIDE, SODIUM LACTATE, POTASSIUM CHLORIDE, CALCIUM CHLORIDE: 600; 310; 30; 20 INJECTION, SOLUTION INTRAVENOUS at 13:49

## 2023-05-30 RX ADMIN — PROPOFOL 50 MG: 10 INJECTION, EMULSION INTRAVENOUS at 14:13

## 2023-05-30 RX ADMIN — GLYCOPYRROLATE 0.2 MG: 0.2 INJECTION, SOLUTION INTRAMUSCULAR; INTRAVENOUS at 13:56

## 2023-05-30 RX ADMIN — Medication 0.5 MG: at 14:22

## 2023-05-30 RX ADMIN — FENTANYL CITRATE 50 MCG: 50 INJECTION, SOLUTION INTRAMUSCULAR; INTRAVENOUS at 13:57

## 2023-05-30 RX ADMIN — ONDANSETRON 4 MG: 2 INJECTION INTRAMUSCULAR; INTRAVENOUS at 14:01

## 2023-05-30 RX ADMIN — PROPOFOL 50 MG: 10 INJECTION, EMULSION INTRAVENOUS at 14:09

## 2023-05-30 RX ADMIN — ACETAMINOPHEN 975 MG: 325 TABLET ORAL at 13:01

## 2023-05-30 RX ADMIN — LIDOCAINE HYDROCHLORIDE 60 MG: 20 INJECTION, SOLUTION INFILTRATION; PERINEURAL at 13:55

## 2023-05-30 RX ADMIN — CEFAZOLIN SODIUM 2 G: 2 SOLUTION INTRAVENOUS at 13:49

## 2023-05-30 RX ADMIN — PROPOFOL 50 MG: 10 INJECTION, EMULSION INTRAVENOUS at 14:05

## 2023-05-30 RX ADMIN — PROPOFOL 50 MG: 10 INJECTION, EMULSION INTRAVENOUS at 14:18

## 2023-05-30 NOTE — OP NOTE
OPERATIVE REPORT 5/30/23    PREOPERATIVE DIAGNOSIS: urethral bleeding, lichen sclerosis    POSTOPERATIVE DIAGNOSIS: Same    PROCEDURES PERFORMED:   1. Cystourethroscopy  2. Vaginal biopsy    STAFF SURGEON: Lakeisha Mackenzie MD  RESIDENT(S):  Annalise Patricio MD    ANESTHESIA: MAC    ESTIMATED BLOOD LOSS: < 10 mL.     DRAINS: None    OPERATIVE INDICATIONS:   Jessie Tamayo is a(n) 67 year old female with a history of urethral bleeding in setting of vaginal atrophy and lichen sclerosus. The patient elected for cystoscopy and vaginal biopsy, after a discussion of all risks, benefits, and alternatives.    DESCRIPTION OF PROCEDURE:   After verification of informed consent was obtained, the patient was brought to the operating room, and moved to the operating table. After adequate anesthesia was induced, the patient was repositioned in dorsal lithotomy position and prepped and draped in the usual sterile fashion. A formal timeout was performed to confirm the correct patient, procedure and operative site.     Exam revealed lichenified changes to bilateral labia majora and friable vestibular tissue (patient had significant bleeding from prep alone.) The urethra was somewhat stenotic and would not accommodate 19Fr rigid cystoscope, therefore a 16 Fr flexible cystoscope was inserted. Gentle pressure was required to access the bladder. The ureteral orifices were in their orthotopic positions bilaterally. There were no intravesical stones or diverticuli and the bladder was mildly trabeculated. There were no intravesical lesions. See media tab for urethral pictures - there were no obvious lesions but the entire distal urethra was erythematous and stenosed (16Fr.)    A representative sample of periurethral tissue was obtained and passed off for permanent pathology. The surrounding tissue was extremely friable and therefore we opted not to place any sutures. The biopsied area was dressed with bacitracin.    The procedure was  then concluded. The patient was transferred to the postanesthesia care unit in stable condition and tolerated the procedure well.    POSTOP PLAN:  1. Discharge when meeting PACU criteria  2. Follow up for path review in 1-2 weeks    Patient was seen, evaluated and plan was formulated in conjunction with me and I agree with the above.  I was present for the entire procedure.  Lakeisha Mackenzie MD

## 2023-05-30 NOTE — INTERVAL H&P NOTE
"I have reviewed the surgical (or preoperative) H&P that is linked to this encounter, and examined the patient. There are no significant changes    Clinical Conditions Present on Arrival:  Clinically Significant Risk Factors Present on Admission                  # Obesity: Estimated body mass index is 36.24 kg/m  as calculated from the following:    Height as of this encounter: 1.645 m (5' 4.76\").    Weight as of this encounter: 98.1 kg (216 lb 3.2 oz).       "

## 2023-05-30 NOTE — ANESTHESIA POSTPROCEDURE EVALUATION
Patient: Jessie Tamayo    Procedure: Procedure(s):  CYSTOSCOPY AND EXCISIONAL BIOPSY OF THE VAGINAL TISSUE AROUND THE URETHRA.  (look in the bladder and sample small area in the vagina near the urethra)       Anesthesia Type:  MAC    Note:  Disposition: Outpatient   Postop Pain Control: Uneventful            Sign Out: Well controlled pain   PONV: No   Neuro/Psych: Uneventful            Sign Out: Acceptable/Baseline neuro status   Airway/Respiratory: Uneventful            Sign Out: Acceptable/Baseline resp. status   CV/Hemodynamics: Uneventful            Sign Out: Acceptable CV status; No obvious hypovolemia; No obvious fluid overload   Other NRE: NONE   DID A NON-ROUTINE EVENT OCCUR? No           Last vitals:  Vitals Value Taken Time   /72 05/30/23 1515   Temp 36.3  C (97.4  F) 05/30/23 1515   Pulse 73 05/30/23 1500   Resp 16 05/30/23 1432   SpO2 97 % 05/30/23 1515       Electronically Signed By: Romeo Rao MD  May 30, 2023  4:21 PM

## 2023-05-30 NOTE — DISCHARGE INSTRUCTIONS
[facility]  Home Care Following Anesthesia  For 24 hours after surgery:  Get plenty of rest.  A responsible adult must stay with you for at least 24 hours after you leave the surgery center.  Do not drive or use heavy equipment.  If you have weakness or tingling, don't drive or use heavy equipment until this feeling goes away.   Do not drink alcohol.   Avoid strenuous or risky activities.  Ask for help when climbing stairs.  You may feel lightheaded.  IF so, sit for a few minutes before standing.  Have someone help you get up.   If you have nausea (feel sick to your stomach): Drink only clear liquids such as apple juice, ginger ale, broth or 7-Up.  Rest may also help.  Be sure to drink enough fluids.  Move to a regular diet as you feel able.   You may have a slight fever.  Call the doctor if your fever is over 100 F (37.7 C) (taken under the tongue) or lasts longer than 24 hours.  You may have a dry mouth, a sore throat, muscle aches or trouble sleeping. These should go away after 24 hours.  Do not make important or legal decisions.   It is recommended to avoid smoking.               Tips for taking pain medications  To get the best pain relief possible, remember these points:  Take pain medications as directed, before pain becomes severe.  Pain medication can upset your stomach: taking it with food may help.  Constipation is a common side effect of pain medication. Drink plenty of  fluids.  Eat foods high in fiber. Take a stool softener if recommended by your doctor or pharmacist.  Do not drink alcohol, drive or operate machinery while taking pain medications.  Ask about other ways to control pain, such as with heat, ice or relaxation.    Tylenol/Acetaminophen Consumption  To help encourage the safe use of acetaminophen, the makers of TYLENOL  have lowered the maximum daily dose for single-ingredient Extra Strength TYLENOL  (acetaminophen) products sold in the [place] from 8 pills  per day (4,000 mg) to 6 pills per day (3,000 mg). The dosing interval has also changed from 2 pills every 4-6 hours to 2 pills every 6 hours.  If you feel your pain relief is insufficient, you may take Tylenol/Acetaminophen in addition to your narcotic pain medication.   Be careful not to exceed 3,000 mg of Tylenol/Acetaminophen in a 24 hour period from all sources.  If you are taking extra strength Tylenol/acetaminophen (500 mg), the maximum dose is 6 tablets in 24 hours.  If you are taking regular strength acetaminophen (325 mg), the maximum dose is 9 tablets in 24 hours.  You received 975 mg of Tylenol at 13:01 PM today. Next dose is available at 7:01 PM as needed per package instruction.    Call a doctor for any of the following:  Signs of infection (fever, growing tenderness at the surgery site, a large amount of drainage or bleeding, severe pain, foul-smelling drainage, redness, swelling).  It has been over 8 to 10 hours since surgery and you are still not able to urinate (pass water).  Headache for over 24 hours.  Numbness, tingling or weakness the day after surgery (if you had spinal anesthesia).  Signs of Covid-19 infection (temperature over 100 degrees, shortness of breath, cough, loss of taste/smell, generalized body aches, persistent headache, chills, sore throat, nausea/vomiting/diarrhea)  Your doctor is:       Dr. Lakeisha Mackenzie, Prostate and Urology: [phone]               Or dial [phone] and ask for the resident on call for:  Prostate Urology  For emergency care, call the:  [facility]:  [phone] (TTY for hearing impaired: [phone])

## 2023-05-30 NOTE — ANESTHESIA CARE TRANSFER NOTE
Patient: Jessie Tamayo    Procedure: Procedure(s):  CYSTOSCOPY AND EXCISIONAL BIOPSY OF THE VAGINAL TISSUE AROUND THE URETHRA.  (look in the bladder and sample small area in the vagina near the urethra)       Diagnosis: Urethral bleeding [N36.8]  Vaginal atrophy [N95.2]  Vaginal mass [N89.8]  Diagnosis Additional Information: No value filed.    Anesthesia Type:   MAC     Note:    Oropharynx: oropharynx clear of all foreign objects and spontaneously breathing  Level of Consciousness: awake  Oxygen Supplementation: room air    Independent Airway: airway patency satisfactory and stable  Dentition: dentition unchanged  Vital Signs Stable: post-procedure vital signs reviewed and stable  Report to RN Given: handoff report given  Patient transferred to: Phase II    Handoff Report: Identifed the Patient, Identified the Reponsible Provider, Reviewed the pertinent medical history, Discussed the surgical course, Reviewed Intra-OP anesthesia mangement and issues during anesthesia, Set expectations for post-procedure period and Allowed opportunity for questions and acknowledgement of understanding      Vitals:  Vitals Value Taken Time   /65 05/30/23 1432   Temp 36.4  C (97.6  F) 05/30/23 1432   Pulse 99 05/30/23 1432   Resp 16 05/30/23 1432   SpO2 97 % 05/30/23 1432       Electronically Signed By: SANDI Rascon CRNA  May 30, 2023  2:35 PM

## 2023-05-30 NOTE — ANESTHESIA PREPROCEDURE EVALUATION
Anesthesia Pre-Procedure Evaluation    Patient: Jessie Tamayo   MRN: [id number] : 1955        Procedure : Procedure(s):  CYSTOSCOPY AND EXCISIONAL BIOPSY OF THE VAGINAL TISSUE AROUND THE URETHRA.  (look in the bladder and sample small area in the vagina near the urethra)          Past Medical History:   Diagnosis Date     Abnormal Pap smear of cervix     see problem list     Actinic keratosis      Cervical high risk HPV (human papillomavirus) test positive 2015, 16, 2019    see problem list     H/O colposcopy with cervical biopsy 2015    Negative     Hyperlipemia      Lichen sclerosus      Osteopenias      Status post colposcopy 16    ECC - negative      Past Surgical History:   Procedure Laterality Date     COLPOSCOPY, BIOPSY, COMBINED N/A 2021    Procedure: COLPOSCOPY, WITH BIOPSY, LEEP procedure;  Surgeon: Jefe Koenig MD;  Location: [facility]     EYE SURGERY      cataracts bilateral     OPEN REDUCTION INTERNAL FIXATION FOREARM  2012    Procedure: OPEN REDUCTION INTERNAL FIXATION FOREARM;  Open Reduction Internal Fixation, Irrigation & Debridment  of Right Distal Radius, application short arm splint;  Surgeon: Wade Beard MD;  Location: [facility]     TONSILLECTOMY & ADENOIDECTOMY       Z TREAT ECTOPIC PREG,RMV TUBE/OVARY  1985    ectopic, right side-ovary and tube removed     [facility]      normal      Allergies   Allergen Reactions     Seasonal Allergies       Social History     Tobacco Use     Smoking status: Never     Passive exposure: Never     Smokeless tobacco: Never   Vaping Use     Vaping status: Never Used     Passive vaping exposure: Yes   Substance Use Topics     Alcohol use: Yes     Comment: very little      Wt Readings from Last 1 Encounters:   23 98.1 kg (216 lb 3.2 oz)        Anesthesia Evaluation   Pt has had prior anesthetic.     No history of anesthetic complications       ROS/MED  HX  ENT/Pulmonary:  - neg pulmonary ROS     Neurologic:  - neg neurologic ROS     Cardiovascular:  - neg cardiovascular ROS     METS/Exercise Tolerance:     Hematologic:  - neg hematologic  ROS     Musculoskeletal:  - neg musculoskeletal ROS     GI/Hepatic:  - neg GI/hepatic ROS     Renal/Genitourinary: Comment: HPV +  Vaginal atrophy  - neg Renal ROS     Endo:     (+) Obesity,     Psychiatric/Substance Use:  - neg psychiatric ROS     Infectious Disease:  - neg infectious disease ROS     Malignancy:  - neg malignancy ROS     Other:  - neg other ROS          Physical Exam    Airway  airway exam normal      Mallampati: II       Respiratory Devices and Support         Dental       (+) Completely normal teeth      Cardiovascular   cardiovascular exam normal          Pulmonary   pulmonary exam normal                OUTSIDE LABS:  CBC:   Lab Results   Component Value Date    WBC 6.5 05/24/2023    WBC 8.4 11/11/2009    HGB 13.0 05/24/2023    HGB 12.3 11/11/2009    HCT 39.9 05/24/2023    HCT 37.9 11/11/2009     05/24/2023     11/11/2009     BMP:   Lab Results   Component Value Date     05/24/2023     03/08/2021    POTASSIUM 4.2 05/24/2023    POTASSIUM 4.2 03/08/2021    CHLORIDE 109 05/24/2023    CHLORIDE 106 03/08/2021    CO2 28 05/24/2023    CO2 29 03/08/2021    BUN 13 05/24/2023    BUN 14 03/08/2021    CR 0.84 05/24/2023    CR 0.84 03/08/2021     (H) 05/24/2023    GLC 83 03/08/2021     COAGS:   Lab Results   Component Value Date    PTT 41 (H) 11/10/2009    INR 1.08 11/10/2009     POC: No results found for: BGM, HCG, HCGS  HEPATIC:   Lab Results   Component Value Date    ALBUMIN 4.7 10/15/2008    PROTTOTAL 8.0 10/15/2008    ALT 15 07/06/2011    AST 28 10/15/2008    ALKPHOS 84 10/15/2008    BILITOTAL 0.4 10/15/2008     OTHER:   Lab Results   Component Value Date    KIM 9.0 05/24/2023    TSH 2.02 04/14/2009       Anesthesia Plan    ASA Status:  2   NPO Status:  NPO Appropriate    Anesthesia  Type: MAC.     - Reason for MAC: immobility needed   Induction: Intravenous.   Maintenance: TIVA.        Consents    Anesthesia Plan(s) and associated risks, benefits, and realistic alternatives discussed. Questions answered and patient/representative(s) expressed understanding.    - Discussed:     - Discussed with:  Patient      - Extended Intubation/Ventilatory Support Discussed: No.      - Patient is DNR/DNI Status: No    Use of blood products discussed: No .     Postoperative Care    Pain management: Multi-modal analgesia, IV analgesics.   PONV prophylaxis: Background Propofol Infusion     Comments:                Romeo Rao MD

## 2023-06-01 ENCOUNTER — TELEPHONE (OUTPATIENT)
Dept: UROLOGY | Facility: CLINIC | Age: 68
End: 2023-06-01
Payer: COMMERCIAL

## 2023-06-01 NOTE — TELEPHONE ENCOUNTER
Spoke with spouse praveen, pt not available and he stated she is having issues receiving calls on her cell. Provided direct callback number for when pt is available to schedule her post op

## 2023-06-01 NOTE — TELEPHONE ENCOUNTER
[facility] Call Center    Phone Message    May a detailed message be left on voicemail: yes     Reason for Call: Patient is calling to schedule her post op visit. Please reach out to her via [employer] or home phone number listed. Thank you    Action Taken: Message routed to:  [facility]    Travel Screening: Not Applicable

## 2023-06-06 NOTE — TELEPHONE ENCOUNTER
----- Message from Lucy Helton RN sent at 6/2/2023  5:38 PM CDT -----  Regarding: FW: follow up path review 1 week  Please call to schedule this patient to see Dr Mackenzie for post op visit in one week  6/7/23 at 9 am  Please call  Lucy  ----- Message -----  From: Annalise Patricio MD  Sent: 5/30/2023   2:31 PM CDT  To: Lucy Helton RN  Subject: follow up path review 1 week                     Hi Lucy,    Can you please help schedule a path review for Ms Tamayo in around 1 week with Dr Mackenzie? Thank you!    Shala

## 2023-06-07 ENCOUNTER — TELEPHONE (OUTPATIENT)
Dept: OBGYN | Facility: CLINIC | Age: 68
End: 2023-06-07

## 2023-06-07 ENCOUNTER — VIRTUAL VISIT (OUTPATIENT)
Dept: UROLOGY | Facility: CLINIC | Age: 68
End: 2023-06-07
Payer: COMMERCIAL

## 2023-06-07 ENCOUNTER — PATIENT OUTREACH (OUTPATIENT)
Dept: ONCOLOGY | Facility: CLINIC | Age: 68
End: 2023-06-07

## 2023-06-07 ENCOUNTER — PATIENT OUTREACH (OUTPATIENT)
Dept: UROLOGY | Facility: CLINIC | Age: 68
End: 2023-06-07

## 2023-06-07 DIAGNOSIS — L90.0 LICHEN SCLEROSUS: ICD-10-CM

## 2023-06-07 DIAGNOSIS — C68.0 PRIMARY SQUAMOUS CELL CARCINOMA OF URETHRA (H): Primary | ICD-10-CM

## 2023-06-07 DIAGNOSIS — D06.9 CIN III (CERVICAL INTRAEPITHELIAL NEOPLASIA III): Primary | ICD-10-CM

## 2023-06-07 DIAGNOSIS — N36.8 URETHRAL BLEEDING: ICD-10-CM

## 2023-06-07 PROCEDURE — 99213 OFFICE O/P EST LOW 20 MIN: CPT | Mod: VID | Performed by: UROLOGY

## 2023-06-07 NOTE — PATIENT INSTRUCTIONS
-CT abd/pelvis  -apt with Dr. Ahmadei ASAP  -apt. With Dr. Rose ALLISON   He took medication for three days stop taking on Monday.

## 2023-06-07 NOTE — PROGRESS NOTES
"New Patient Hematology / Oncology Nurse Navigator Note     Referral Date: 6/7/23    Referring provider: Lakeisha Mackenzie MD  Urology    Referring Clinic/Organization: [facility]     Referred to: GynOnc (referral was placed for OBGYN in error)    Requested provider (if applicable): Dr. Rose. Will discuss scheduling with Dr. Rose or Dr. Stahl's dysplasia given her medical history.    Evaluation for : \"Dr Mackenzie wants patient to see Dr Rose-vaginal bleeding and lichen sclerosis as well as vaginal atrophy but then found to have SCC of periurethral tissue with HPV association and some suspicion for lympatic invasion.\"     Clinical History (per Nurse review of records provided):      5/30 path showing:  Final Diagnosis   Periurethral tissue, biopsy:  -Invasive moderately differentiated squamous cell carcinoma, HPV-associated  -Focus suspicious for lymphovascular invasion    -- BOOKMARKED    3/28 PAP/HPV  Component Ref Range & Units      Other HR HPV Negative Negative     HPV16 DNA Negative Positive Abnormal      HPV18 DNA Negative Negative     FINAL DIAGNOSIS     This patient's sample is positive for HPV 16 DNA.     Interpretation    Abnormal   High-grade squamous intraepithelial lesion (HSIL) encompassing mod/severe dysplasia, CIS, CIN2, CIN3         Virtual Visit with Urology today (referrin) -- BOOKMARKED     5/30 Op Note: CYSTOSCOPY AND EXCISIONAL BIOPSY OF THE VAGINAL TISSUE AROUND THE URETHRA.  (look in the bladder and sample small area in the vagina near the urethra)-- BOOKMARKED    Clinical Assessment / Barriers to Care (Per Nurse):  Pt lives in [facility]     Records Location: [facility]     Records Needed:   N/A    Additional testing needed prior to consult:   N/A    Referral updates and Plan:   OUTGOING CALL to pt, spouse answered.     Gave generic introduction introducing my role as nurse navigator with [facility]. Spouse stated pt is aware of the referral, but out currently and her cell phone " isn't working. Provided my direct contact info and pt will call me back to discuss referral/scheduling.     Patient returned call. Discussed referral and scheduling. She is open to seeing any GynOnc provider. Reports frustration with how long it took to get in with urology and to obtain biopsy. Patient states she was considering transferring care to [facility] until I called. Discussed scheduling with Dr. Stahl as she specializes in Dysplasia given her h/o dysplasia and also new SCC. Dr. Stahl has openings tomorrow at [facility]. Pt is thrilled to be seen so quickly and would be happy to establish with Dr. Stahl. Call transferred to NPS to arrange appointment and also assist with scheduling CT ordered by urology. Pt has my direct contact info if any future questions arise. Patient was very thankful for the call.     Sharri Fajardo, LISBETHN, RN, PHN, OCN  Hematology/Oncology Nurse Navigator  [facility]  [phone]

## 2023-06-07 NOTE — PROGRESS NOTES
"Reason for Visit:  F/u on pathology    Clinical Data:  Ms. Tamayo is a 67 year old female with vaginal bleeding and thickening around the urethra    Reviewed previous notes from Dr. Koenig on 3/28/23   Per her exam:  \"External genitalia with erythematous plaques bilaterally  Urethra- mid position and well supported, suburethral tissue thickened and friable with bleeding elicited after placement of the speculum\"    On previous exam the urethra in the vagina towards the left is a plaque like lesion with surrounding erythema  The external vaginal tissues appear atrophic and there is Lichen sclerosis present.    She therefore went to the OR for biopsy  The pathology was reviewed with the patient  5/30/23  Final Diagnosis   Periurethral tissue, biopsy:  -Invasive moderately differentiated squamous cell carcinoma, HPV-associated  -Focus suspicious for lymphovascular invasion       Assessment & Plan   66 y/o female with some vaginal bleeding and lichen sclerosis as well as vaginal atrophy but then found to have SCC of periurethral tissue with HPV association and some suspicion for lympatic invasion.  She also has some involvement of the vagina and on cystoscopy there is some stenosis of the meatus with erythema distally.  Results were discussed with the patient.  Will obtain CT for staging and have her see urological oncology and gyn/onc  -CT abd/pelvis  -apt with Dr. Ahmadei ASAP  -apt. With Dr. Rose Mackenzie MD  [facility]OLOGY [facility] [place]      "

## 2023-06-07 NOTE — TELEPHONE ENCOUNTER
"[facility] Call Center    Phone Message    May a detailed message be left on voicemail: yes     Reason for Call: Appointment Intake    Referring Provider Name: Dr. Mackenzie   Diagnosis and/or Symptoms: Dr Mackenzie wants patient to see Dr Rose-vaginal bleeding and lichen sclerosis as well as vaginal atrophy but then found to have SCC of periurethral tissue with HPV association and some suspicion for lympatic invasion.    Referral sent to OBGYN. No provider named \"Dr. Rose\". Writer sending to [facility] for clinic review. Please review and follow-up with patient. Appt is needed in 3-5 days.    Action Taken: Message routed to:  Other: [facility]    Travel Screening: Not Applicable                                                                      "

## 2023-06-07 NOTE — LETTER
"6/7/2023       RE: Jessie Tamayo  [address] [address]     Dear Colleague,    Thank you for referring your patient, Jessie Tamayo, to the [facility] UROLOGY CLINIC [place] at [facility]. Please see a copy of my visit note below.    Virtual Visit Details    Type of service:  Video Visit   Video Start Time: 8:58 AM  Video End Time:9:19 AM    Originating Location (pt. Location): Home    Distant Location (provider location):  Off-site  Platform used for Video Visit: Daly    Reason for Visit:  F/u on pathology    Clinical Data:  Ms. Tamayo is a 67 year old female with vaginal bleeding and thickening around the urethra    Reviewed previous notes from Dr. Koenig on 3/28/23   Per her exam:  \"External genitalia with erythematous plaques bilaterally  Urethra- mid position and well supported, suburethral tissue thickened and friable with bleeding elicited after placement of the speculum\"    On previous exam the urethra in the vagina towards the left is a plaque like lesion with surrounding erythema  The external vaginal tissues appear atrophic and there is Lichen sclerosis present.    She therefore went to the OR for biopsy  The pathology was reviewed with the patient  5/30/23  Final Diagnosis   Periurethral tissue, biopsy:  -Invasive moderately differentiated squamous cell carcinoma, HPV-associated  -Focus suspicious for lymphovascular invasion       Assessment & Plan   68 y/o female with some vaginal bleeding and lichen sclerosis as well as vaginal atrophy but then found to have SCC of periurethral tissue with HPV association and some suspicion for lympatic invasion.  She also has some involvement of the vagina and on cystoscopy there is some stenosis of the meatus with erythema distally.  Results were discussed with the patient.  Will obtain CT for staging and have her see urological oncology and gyn/onc  -CT abd/pelvis  -apt with Dr. Ji " ASAP  -apt. With Dr. Rose Mackenzie MD  [facility] UROLOGY CLINIC [place]

## 2023-06-07 NOTE — TELEPHONE ENCOUNTER
Writer reached out to pt and spoke with pts  as pt was not home.     Writer relayed the need for a follow up appointment with Dr Márquez per Dr. Shabazz recommendations. Pts  expressed understanding.     Writer scheduled pt for June 14th at 2:45 for inperson visit. Pt will reach out to writer with any questions or concerns.     Writer provided contact information.     Will continue to follow as needed.

## 2023-06-07 NOTE — PROGRESS NOTES
Virtual Visit Details    Type of service:  Video Visit   Video Start Time: 8:58 AM  Video End Time:9:19 AM    Originating Location (pt. Location): Home    Distant Location (provider location):  Off-site  Platform used for Video Visit: Well

## 2023-06-07 NOTE — NURSING NOTE
Is the patient currently in the state of [place]? YES    Visit mode:VIDEO    If the visit is dropped, the patient can be reconnected by: VIDEO VISIT: Text to cell phone: [phone]    Will anyone else be joining the visit? NO      How would you like to obtain your AVS? MyChart    Are changes needed to the allergy or medication list? NO    Reason for visit: RECHECK (post op )

## 2023-06-07 NOTE — PROGRESS NOTES
Consult Note on Referred Patient  [facility]  [facility]       Date: 2023       Referring Provider/Urologist:  Dr. 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      History of Present Illness:   Jessie Tamayo (she/her/hers) is a 67 year old referred to the [facility] by urologist Dr. Mackenzie for evaluation of a periurethral biopsy showing HPV-associated squamous cell carcinoma. Medical history significat for lichen sclerosus. History as follows:    *HPV vaccination status: unvaccinated    07, 10/15/08, 09, 11: Pap test NILM.     1/23/15: Pap test NILM, hrHPV16+.  2/27/15: Cervical polyp & cervical biopsy pathology: Negative for dysplasia/malignancy.    16: Pap test NILM, hrHPV16+.   16: Endocervical curettings pathology negative for dysplasia/malignancy.    17: Pap test NILM, hrHPV16+.   -Colposcopy recommended, not performed.     18:   -Pap test ASCUS, hrHPV16+.   -Endocervical curettings & cervical biopsy pathology: negative for dysplasia/malignancy.     19:  Pap test ASC-H, hrHPV16+.   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     9/10/20: Pap test ASC-H, hrHPV+ (NOS).  21: LEEP.   -Pathology: LEEP & endocervical curettings: negative for dysplasia/malignancy.    3/23/22: Endocervical curettings & cervical biopsy pathology: negative for dysplasia/malignancy.    3/28/23:   -Pap test HSIL, hrHPV16+.   -Findings by gynecologist Dr. Koenig: External genitalia with erythematous plaques bilaterally  Urethra- mid position and well  supported, suburethral tissue thickened and friable with bleeding elicited after placement of the speculum  Vagina is stenotic and cervix difficult to see.   5/30/23: Cystourethroscopy, vaginal biopsy by urologist Dr. Mackenzie.   -Findings: Exam revealed lichenified changes to bilateral labia majora and friable vestibular tissue (patient had significant bleeding from prep alone.) The urethra was somewhat stenotic and would not accommodate 19Fr rigid cystoscope, therefore a 16 Fr flexible cystoscope was inserted. Gentle pressure was required to access the bladder. The ureteral orifices were in their orthotopic positions bilaterally. There were no intravesical stones or diverticuli and the bladder was mildly trabeculated. There were no intravesical lesions. See media tab for urethral pictures - there were no obvious lesions but the entire distal urethra was erythematous and stenosed (16Fr.)  -Pathology: Invasive moderately-differentiated squamous cell carcinoma, HPV-associated, with focus suspicious for lymphovascular space invasion; IHC: p16+.        Subjective:  Jessie presents to the gyn onc HPV clinic today for a scheduled consultation, unaccompanied.  Jessie reports that has has never felt quite right since an ectopic pregnancy years ago. She describes mild pelvic pain, bloating. She thought things would improve after menopause, but she has continued having irregular bleeding. She has never had a 1 year period of time without bleeding. Her pelvic pain is mild and chronic. She does note possibly an increased inguinal nodule on the right side since spring 2023.    Jessie has a long history of lichen sclerosus, monitored by Dr. Koenig, although has never been prescribed treatment until ~1 year ago when her PCP prescribed triamcinolone cream and estradiol cream.   She has been following with Dr. Koenig with multiple colposcopies, as reviewed above.   She does have bilateral elbow pain, although notes that she has  been gardening lately.  Jessie does note over the past few months she has had urinary frequency and decreased flow, increased nocturia. She does note that she has to increase the pressure by bending forward to void. She does note improved voiding ability since the cystoscopy.   Jessie reports that she has a cycling trip to [facility] - and would prefer not to cancel this trip unless it would significantly impact her prognosis.       Review of Systems:   ROS: 10 point ROS neg other than the symptoms noted above in the HPI.       Past Medical History:  Past Medical History:   Diagnosis Date     Actinic keratosis      Hyperlipemia      Lichen sclerosus      Osteopenias        Past Surgical History:  Past Surgical History:   Procedure Laterality Date     COLPOSCOPY, BIOPSY, COMBINED N/A 2021    Procedure: COLPOSCOPY, WITH BIOPSY, LEEP procedure;  Surgeon: Jefe Koenig MD;  Location: [facility] OR     CYSTOSCOPY N/A 2023    Procedure: CYSTOSCOPY AND EXCISIONAL BIOPSY OF THE VAGINAL TISSUE AROUND THE URETHRA.  (look in the bladder and sample small area in the vagina near the urethra);  Surgeon: Lakeisha Mackenzie MD;  Location: [facility] OR     EYE SURGERY      cataracts bilateral     OPEN REDUCTION INTERNAL FIXATION FOREARM  2012    Procedure: OPEN REDUCTION INTERNAL FIXATION FOREARM;  Open Reduction Internal Fixation, Irrigation & Debridment  of Right Distal Radius, application short arm splint;  Surgeon: Wade Beard MD;  Location:  OR     TONSILLECTOMY & ADENOIDECTOMY       [facility] TREAT ECTOPIC PREG,RMV TUBE/OVARY      ectopic, right side-ovary and tube removed     [facility] COLONOSCOPY THRU STOMA, DIAGNOSTIC      normal       Gynecologic History:  Menopause age unknown.   History of hormone therapy in .   No history of STIs.  Not currently sexually active due to perineal pain and bleeding since 2019.       Obstetric History:  OB History    Para Term  AB Living    1 0 0 0 1 0   SAB IAB Ectopic Multiple Live Births   0 0 1 0 0      # Outcome Date GA Lbr Felice/2nd Weight Sex Delivery Anes PTL Lv   1 Ectopic             She has 1 adopted child.       Health Maintenance:  Last Mammogram: 3/15/22              Result: normal        Last Colonoscopy: 17              Result: normal (polyps)                    Last DEXA Scan: 11              Result: abnormal (osteopenia)    Last Diabetes Screening; 19 Result: Hemoglobin A1C 5.4%    Last Thyroid Screenin/14/09 Result: Normal TSH    Last Cholest Screening:      3/8/17 Result: Elevated total & LDL cholesterol    COVID vaccination: Completed Moderna vaccine 3/12/21; Boosters 10/28/21, 6/10/22, 22.       Current Medications:   Current Outpatient Medications   Medication     estradiol (ESTRACE) 0.1 MG/GM vaginal cream     nystatin (MYCOSTATIN) 912089 UNIT/GM external cream     triamcinolone (ARISTOCORT HP) 0.5 % external cream     No current facility-administered medications for this visit.        Allergies:   Allergies   Allergen Reactions     Seasonal Allergies          Social History:  Patient lives with her . Retired, previously worked as a [employer] at [employer]. She now spends her days cycling and gardening. No activity limitations. She has a son who is a pathologist in the [employer], who trained at [employer].  She does not have Advanced Directives, but has identified her son Prakash Tamayo as her desired Healthcare Power of .    Social History     Tobacco Use     Smoking status: Never     Passive exposure: Never     Smokeless tobacco: Never   Vaping Use     Vaping status: Never Used     Passive vaping exposure: Yes   Substance Use Topics     Alcohol use: Yes     Comment: very little       History   Drug Use No       Family History:   The patient's family history is notable for a second-degree maternal relative with ovarian cancer. No known family history of breast,  uterine, colon, urothelial/renal,  "prostate or pancreatic cancers.  No known family history of melanoma.   Family History   Problem Relation Age of Onset     Lung Cancer Mother      Cerebrovascular Disease Father      Hypertension Father      Alcohol/Drug Father      Obesity Niece         niece     Mental Illness Nephew         nephew     Diabetes Other         paternal aunt     Hyperlipidemia Other      Obesity Other         self     Cervical Cancer Other      Ovarian Cancer Other      Obesity Other      Diabetes Other         paternal aunt     Hypertension Other         father     Cerebrovascular Disease Other         father     C.A.D. No family hx of      Breast Cancer No family hx of      Cancer - colorectal No family hx of      Prostate Cancer No family hx of      Melanoma No family hx of        Physical Exam:   /82 (BP Location: Right arm, Patient Position: Chair, Cuff Size: Adult Large)   Pulse 66   Ht 1.645 m (5' 4.76\")   Wt 97.5 kg (215 lb)   LMP 03/08/2008   SpO2 97%   BMI 36.04 kg/m    Body mass index is 36.04 kg/m .    General Appearance: healthy and alert, no distress     HEENT:  no thyromegaly, no palpable nodules or masses        Cardiovascular: regular rate and rhythm, no gallops, rubs or murmurs     Respiratory: lungs clear, no rales, rhonchi or wheezes, normal diaphragmatic excursion    Musculoskeletal: extremities non tender and without edema    Skin: no lesions or rashes     Neurological: normal gait, no gross defects     Psychiatric: appropriate mood and affect                              Hematological: normal cervical, supraclavicular lymph nodes. Left inguinal lymph nodes normal to palpation. Enlarged firm, fixed right inguinal lymph node ~3-4 cm in size.      Gastrointestinal:       abdomen soft, non-tender, non-distended, no organomegaly or masses    Genitourinary: External genitalia notable for erythema and desquamation of the posterior medial labia, c/w lichen sclerosus changes. When the labia are , a " friable mass is visible at the distal anterior vagina, just posterior to the urethra. On bimanual exam, the cervix is small and normal in contour. The palpable vaginal tumor is limited to the anterior distal vagina, approximately 4-5 cm laterally x 3 cm cranio-caudal. Tumor is friable. Remainder of the vagina smooth to palpation. Digital anorectal exam is without nodularity. No palpable tumor in the rectovaginal septum.   On speculum exam, the proximal vaginal/cervical exam is limited due to patient discomfort. The anterior vaginal tumor cannot be visualized until the speculum is dissembled and only the posterior blade is placed.       Lichen sclerosus changes      Vaginal tumor        Laboratory Examination:  5/24/23 CBC: WBC 6.5, hemoglobin 13.0, platelets 224.   5/24/23 BMP: Creatinine 0.84.         Performance Status:  ECOG Grade 0.      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     Medical history significant for lichen sclerosus.     Social history significant for the fact that her son is a pathologist in the [place] who trained at [employer].    A total of 70 minutes was spent with the patient, 50 minutes of which were spent in counseling the patient and/or treatment planning; an additional 10 minutes was spent in chart review/documentation.      Plan:     1.)    Vaginal cancer: I reviewed the pathology results with Jessie and discussed recommended management. Will need to evaluate for metastatic disease. Provided the disease is localized, recommended primary chemoradiation therapy (likely external beam pelvic radiation with inguinal boost and chemosensitization with cisplatin, followed by interstitial brachytherapy); if distant metastatic disease is diagnosed then would recommend chemotherapy with consideration of radiation therapy prn for  palliation of symptoms.     After discussion of risks/benefits, Jessie would like to proceed with the recommended management. Plan as follows:  -PET/CT in the next  2 weeks to evaluate for distant metastatic disease (will cancel currently scheduled CT).   -Pelvic MRI with vaginal gel in the next 2 weeks to evaluate the local tumor.   -Referral to radiation oncology for consultation.  -Discussed possible need for EUA, placement of fiducial markers--will call to schedule prn.   -RTC after imaging and radiation oncology consultation complete to review results and discuss definitive treatment plan (virtual visit okay).   -Will discuss with Dr. Mackenzie--my clinical and pathology impression is that this is a primary vaginal tumor not a urologic primary. Jessie currently has a urologic oncology consultation scheduled for next week, and may be able to cancel that appointment if Dr. Mackenzie agrees with this assessment. I will notify Jessie of my recommendation after talking with Dr. Mackenzie.  We may also need to work with Dr. Mackenzie re: urinary management during/after treatment.  ADDENDUM:   Discussed with Dr. Mackenzie, who agrees this is a primary vaginal cancer. She has also discussed this with the urologic oncologist Dr. Márquez, who agrees with gyn onc being primary, but who is happy to be involved as needed due to the proximity of the tumor to the urethra. We will also work with him regarding urinary management during/after treatment.   Tried to call patient, but she was not available. I sent her a [employer] message with the information, and telephone number for her to call with questions.  -Will schedule treatment initiation after her biking trip to [facility].     2.) Lichen sclerosus:   -Will biopsy the desquamated tissue at the posterior medial vulva for confirmation if we go to the OR for an EUA.  -Continue current management for now (triamcinolone cream + estrogen cream), but discussed that we will likely change to a higher-potency  clobetasol cream in the future.      3.) Genetic risk factors were assessed and the patient does not meet the qualifications for a referral.      4.) Labs and/or tests ordered include:  1) Pelvic MRI; 2) PET/CT.      5.) Health maintenance issues addressed today include none.    6.) Code status:  Full-code.    7.) Prescriptions: None.      Reina Stahl MD, MS, FACOG, FACS  6/8/2023  3:42 PM        CC  Patient Care Team:  Alba Layton MD as PCP - Jefe Mosley MD as Assigned OBGYN Provider  Alba Layton MD as Assigned PCP  Lakeisha Matos MD as MD (Urology)  Lawrence Banuelos MD as MD (Dermatology)  Lakeisha Matos MD as Assigned Surgical Provider  Lakeisha Matos MD as MD (Urology)  Reina Stahl MD as MD (Gynecologic Oncology)  LAKEISHA MATOS

## 2023-06-08 ENCOUNTER — OFFICE VISIT (OUTPATIENT)
Dept: ONCOLOGY | Facility: CLINIC | Age: 68
End: 2023-06-08
Attending: UROLOGY
Payer: COMMERCIAL

## 2023-06-08 VITALS
SYSTOLIC BLOOD PRESSURE: 134 MMHG | WEIGHT: 215 LBS | BODY MASS INDEX: 35.82 KG/M2 | DIASTOLIC BLOOD PRESSURE: 82 MMHG | HEIGHT: 65 IN | OXYGEN SATURATION: 97 % | HEART RATE: 66 BPM

## 2023-06-08 DIAGNOSIS — C52 VAGINAL CANCER (H): Primary | ICD-10-CM

## 2023-06-08 DIAGNOSIS — L90.0 LICHEN SCLEROSUS: ICD-10-CM

## 2023-06-08 PROCEDURE — 99205 OFFICE O/P NEW HI 60 MIN: CPT | Performed by: OBSTETRICS & GYNECOLOGY

## 2023-06-08 ASSESSMENT — PAIN SCALES - GENERAL: PAINLEVEL: NO PAIN (0)

## 2023-06-08 NOTE — NURSING NOTE
"Oncology Rooming Note    June 8, 2023 1:26 PM   Jessie Tamayo is a 67 year old female who presents for:    Chief Complaint   Patient presents with     Oncology Clinic Visit     New Patient     Initial Vitals: /82 (BP Location: Right arm, Patient Position: Chair, Cuff Size: Adult Large)   Pulse 66   Ht 1.645 m (5' 4.76\")   Wt 97.5 kg (215 lb)   LMP 03/08/2008   SpO2 97%   BMI 36.04 kg/m   Estimated body mass index is 36.04 kg/m  as calculated from the following:    Height as of this encounter: 1.645 m (5' 4.76\").    Weight as of this encounter: 97.5 kg (215 lb). Body surface area is 2.11 meters squared.  No Pain (0) Comment: Data Unavailable   Patient's last menstrual period was 03/08/2008.  Allergies reviewed: Yes  Medications reviewed: Yes    Medications: Medication refills not needed today.  Pharmacy name entered into EPIC:    [id number] - [place], [place] - [address] [facility] - [place], [place] - [address] [id number] - [place], [place] - [address] [address]    Clinical concerns: New Patient     Dr. Stahl was notified.      Rabia Johnson CMA              "

## 2023-06-08 NOTE — Clinical Note
Hi Dr. Willi Tamayo is a patient you are seeing for consultation next week regarding her suburethral vaginal cancer. Feel free to contact me if you would like to discuss--pager [phone]; cell [phone] (best to text first since I rarely answer numbers I do not recognize).   Thanks for sharing in her care.  --florinda Stahl MD, MS, FACOG, FACS 6/8/2023 4:14 PM

## 2023-06-08 NOTE — LETTER
2023         RE: Jessie Tamayo  [address] [facility] [address]        Dear Colleague,    Thank you for referring your patient, Jessie Tamayo, to the [facility]. Please see a copy of my visit note below.    Consult Note on Referred Patient  [facility]  [facility]       Date: 2023       Referring Provider/Urologist:  Dr. Lakeisha Mackenzie MD  [address] [place] [address]     Gynecologist:   Jefe Koenig MD  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      History of Present Illness:   Jessie Tamayo (she/her/hers) is a 67 year old referred to the [facility] by urologist Dr. Mackenzie for evaluation of a periurethral biopsy showing HPV-associated squamous cell carcinoma. Medical history significat for lichen sclerosus. History as follows:    *HPV vaccination status: unvaccinated    07, 10/15/08, 09, 11: Pap test NILM.     1/23/15: Pap test NILM, hrHPV16+.  2/27/15: Cervical polyp & cervical biopsy pathology: Negative for dysplasia/malignancy.    16: Pap test NILM, hrHPV16+.   16: Endocervical curettings pathology negative for dysplasia/malignancy.    17: Pap test NILM, hrHPV16+.   -Colposcopy recommended, not performed.     18:   -Pap test ASCUS, hrHPV16+.   -Endocervical curettings & cervical biopsy pathology: negative for dysplasia/malignancy.     19:  Pap test ASC-H, hrHPV16+.   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     9/10/20: Pap test ASC-H, hrHPV+ (NOS).  21: LEEP.   -Pathology: LEEP & endocervical curettings: negative for dysplasia/malignancy.    3/23/22:  Endocervical curettings & cervical biopsy pathology: negative for dysplasia/malignancy.    3/28/23:   -Pap test HSIL, hrHPV16+.   -Findings by gynecologist Dr. Koenig: External genitalia with erythematous plaques bilaterally  Urethra- mid position and well supported, suburethral tissue thickened and friable with bleeding elicited after placement of the speculum  Vagina is stenotic and cervix difficult to see.   5/30/23: Cystourethroscopy, vaginal biopsy by urologist Dr. Mackenzie.   -Findings: Exam revealed lichenified changes to bilateral labia majora and friable vestibular tissue (patient had significant bleeding from prep alone.) The urethra was somewhat stenotic and would not accommodate 19Fr rigid cystoscope, therefore a 16 Fr flexible cystoscope was inserted. Gentle pressure was required to access the bladder. The ureteral orifices were in their orthotopic positions bilaterally. There were no intravesical stones or diverticuli and the bladder was mildly trabeculated. There were no intravesical lesions. See media tab for urethral pictures - there were no obvious lesions but the entire distal urethra was erythematous and stenosed (16Fr.)  -Pathology: Invasive moderately-differentiated squamous cell carcinoma, HPV-associated, with focus suspicious for lymphovascular space invasion; IHC: p16+.        Subjective:  Jessie presents to the gyn onc HPV clinic today for a scheduled consultation, unaccompanied.  Jessie reports that has has never felt quite right since an ectopic pregnancy years ago. She describes mild pelvic pain, bloating. She thought things would improve after menopause, but she has continued having irregular bleeding. She has never had a 1 year period of time without bleeding. Her pelvic pain is mild and chronic. She does note possibly an increased inguinal nodule on the right side since spring 2023.    Jessie has a long history of lichen sclerosus, monitored by Dr. Koenig, although has never been  prescribed treatment until ~1 year ago when her PCP prescribed triamcinolone cream and estradiol cream.   She has been following with Dr. Koenig with multiple colposcopies, as reviewed above.   She does have bilateral elbow pain, although notes that she has been gardening lately.  Jessie does note over the past few months she has had urinary frequency and decreased flow, increased nocturia. She does note that she has to increase the pressure by bending forward to void. She does note improved voiding ability since the cystoscopy.   Jessie reports that she has a cycling trip to [facility] 6/22-7/2 and would prefer not to cancel this trip unless it would significantly impact her prognosis.       Review of Systems:   ROS: 10 point ROS neg other than the symptoms noted above in the HPI.       Past Medical History:  Past Medical History:   Diagnosis Date     Actinic keratosis      Hyperlipemia      Lichen sclerosus 2020     Osteopenias        Past Surgical History:  Past Surgical History:   Procedure Laterality Date     COLPOSCOPY, BIOPSY, COMBINED N/A 2/8/2021    Procedure: COLPOSCOPY, WITH BIOPSY, LEEP procedure;  Surgeon: Jefe Koenig MD;  Location: [facility] OR     CYSTOSCOPY N/A 5/30/2023    Procedure: CYSTOSCOPY AND EXCISIONAL BIOPSY OF THE VAGINAL TISSUE AROUND THE URETHRA.  (look in the bladder and sample small area in the vagina near the urethra);  Surgeon: Lakeisha Mackenzie MD;  Location: [facility] OR     EYE SURGERY      cataracts bilateral     OPEN REDUCTION INTERNAL FIXATION FOREARM  7/31/2012    Procedure: OPEN REDUCTION INTERNAL FIXATION FOREARM;  Open Reduction Internal Fixation, Irrigation & Debridment  of Right Distal Radius, application short arm splint;  Surgeon: Wade Beard MD;  Location: [facility] OR     TONSILLECTOMY & ADENOIDECTOMY  1960     Z TREAT ECTOPIC PREG,RMV TUBE/OVARY  1985    ectopic, right side-ovary and tube removed     [facility]    normal        Gynecologic History:  Menopause age unknown.   History of hormone therapy in .   No history of STIs.  Not currently sexually active due to perineal pain and bleeding since 2019.       Obstetric History:  OB History    Para Term  AB Living   1 0 0 0 1 0   SAB IAB Ectopic Multiple Live Births   0 0 1 0 0      # Outcome Date GA Lbr Felice/2nd Weight Sex Delivery Anes PTL Lv   1 Ectopic             She has 1 adopted child.       Health Maintenance:  Last Mammogram: 3/15/22              Result: normal        Last Colonoscopy: 17              Result: normal (polyps)                    Last DEXA Scan: 11              Result: abnormal (osteopenia)    Last Diabetes Screening; 19 Result: Hemoglobin A1C 5.4%    Last Thyroid Screenin/14/09 Result: Normal TSH    Last Cholest Screening:      3/8/17 Result: Elevated total & LDL cholesterol    COVID vaccination: Completed Moderna vaccine 3/12/21; Boosters 10/28/21, 6/10/22, 22.       Current Medications:   Current Outpatient Medications   Medication     estradiol (ESTRACE) 0.1 MG/GM vaginal cream     nystatin (MYCOSTATIN) 201567 UNIT/GM external cream     triamcinolone (ARISTOCORT HP) 0.5 % external cream     No current facility-administered medications for this visit.        Allergies:   Allergies   Allergen Reactions     Seasonal Allergies          Social History:  Patient lives with her . 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 She now spends her days cycling and gardening. No activity limitations. She has a son who is a pathologist in the [facility], who trained at [employer] [facility] [employer].  She does not have Advanced Directives, but has identified her son Prakash Tamayo as her desired Healthcare Power of .    Social History     Tobacco Use     Smoking status: Never     Passive exposure: Never     Smokeless tobacco: Never   Vaping Use     Vaping status: Never Used     Passive vaping exposure: Yes   Substance Use Topics      "Alcohol use: Yes     Comment: very little       History   Drug Use No       Family History:   The patient's family history is notable for a second-degree maternal relative with ovarian cancer. No known family history of breast,  uterine, colon, urothelial/renal, prostate or pancreatic cancers.  No known family history of melanoma.   Family History   Problem Relation Age of Onset     Lung Cancer Mother      Cerebrovascular Disease Father      Hypertension Father      Alcohol/Drug Father      Obesity Niece         niece     Mental Illness Nephew         nephew     Diabetes Other         paternal aunt     Hyperlipidemia Other      Obesity Other         self     Cervical Cancer Other      Ovarian Cancer Other      Obesity Other      Diabetes Other         paternal aunt     Hypertension Other         father     Cerebrovascular Disease Other         father     C.A.D. No family hx of      Breast Cancer No family hx of      Cancer - colorectal No family hx of      Prostate Cancer No family hx of      Melanoma No family hx of        Physical Exam:   /82 (BP Location: Right arm, Patient Position: Chair, Cuff Size: Adult Large)   Pulse 66   Ht 1.645 m (5' 4.76\")   Wt 97.5 kg (215 lb)   LMP 03/08/2008   SpO2 97%   BMI 36.04 kg/m    Body mass index is 36.04 kg/m .    General Appearance: healthy and alert, no distress     HEENT:  no thyromegaly, no palpable nodules or masses        Cardiovascular: regular rate and rhythm, no gallops, rubs or murmurs     Respiratory: lungs clear, no rales, rhonchi or wheezes, normal diaphragmatic excursion    Musculoskeletal: extremities non tender and without edema    Skin: no lesions or rashes     Neurological: normal gait, no gross defects     Psychiatric: appropriate mood and affect                              Hematological: normal cervical, supraclavicular lymph nodes. Left inguinal lymph nodes normal to palpation. Enlarged firm, fixed right inguinal lymph node ~3-4 cm in size. "      Gastrointestinal:       abdomen soft, non-tender, non-distended, no organomegaly or masses    Genitourinary: External genitalia notable for erythema and desquamation of the posterior medial labia, c/w lichen sclerosus changes. When the labia are , a friable mass is visible at the distal anterior vagina, just posterior to the urethra. On bimanual exam, the cervix is small and normal in contour. The palpable vaginal tumor is limited to the anterior distal vagina, approximately 4-5 cm laterally x 3 cm cranio-caudal. Tumor is friable. Remainder of the vagina smooth to palpation. Digital anorectal exam is without nodularity. No palpable tumor in the rectovaginal septum.   On speculum exam, the proximal vaginal/cervical exam is limited due to patient discomfort. The anterior vaginal tumor cannot be visualized until the speculum is dissembled and only the posterior blade is placed.       Lichen sclerosus changes      Vaginal tumor        Laboratory Examination:  5/24/23 CBC: WBC 6.5, hemoglobin 13.0, platelets 224.   5/24/23 BMP: Creatinine 0.84.         Performance Status:  ECOG Grade 0.      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     Medical history significant for lichen sclerosus.     Social history significant for the fact that her son is a pathologist in the [place] who trained at [employer].    A total of 70 minutes was spent with the patient, 50 minutes of which were spent in counseling the patient and/or treatment planning; an additional 10 minutes was spent in chart review/documentation.      Plan:     1.)    Vaginal cancer: I reviewed the pathology results with Jessie and discussed recommended management. Will need to evaluate for metastatic disease. Provided the disease is localized, recommended primary chemoradiation therapy  (likely external beam pelvic radiation with inguinal boost and chemosensitization with cisplatin, followed by interstitial brachytherapy); if distant metastatic disease is diagnosed then would recommend chemotherapy with consideration of radiation therapy prn for palliation of symptoms.     After discussion of risks/benefits, Jessie would like to proceed with the recommended management. Plan as follows:  -PET/CT in the next  2 weeks to evaluate for distant metastatic disease (will cancel currently scheduled CT).   -Pelvic MRI with vaginal gel in the next 2 weeks to evaluate the local tumor.   -Referral to radiation oncology for consultation.  -Discussed possible need for EUA, placement of fiducial markers--will call to schedule prn.   -RTC after imaging and radiation oncology consultation complete to review results and discuss definitive treatment plan (virtual visit okay).   -Will discuss with Dr. Mackenzie--my clinical and pathology impression is that this is a primary vaginal tumor not a urologic primary. Jessie currently has a urologic oncology consultation scheduled for next week, and may be able to cancel that appointment if Dr. Mackenzie agrees with this assessment. I will notify Jessie of my recommendation after talking with Dr. Mackenzie.  We may also need to work with Dr. Mackenzie re: urinary management during/after treatment.   -Will schedule treatment initiation after her biking trip to [facility].     2.) Lichen sclerosus:   -Will biopsy the desquamated tissue at the posterior medial vulva for confirmation if we go to the OR for an EUA.  -Continue current management for now (triamcinolone cream + estrogen cream), but discussed that we will likely change to a higher-potency clobetasol cream in the future.      3.) Genetic risk factors were assessed and the patient does not meet the qualifications for a referral.      4.) Labs and/or tests ordered include:  1) Pelvic MRI; 2) PET/CT.      5.) Health maintenance issues  addressed today include none.    6.) Code status:  Full-code.    7.) Prescriptions: None.      Reina Stahl MD, MS, FACOG, FACS  6/8/2023  3:42 PM        CC  Patient Care Team:  Alba Layton MD as PCP - Jefe Mosley MD as Assigned OBGYN Provider  Alba Layton MD as Assigned PCP  Lakeisha Matos MD as MD (Urology)  Lawrence Banuelos MD as MD (Dermatology)  Lakeisha Matos MD as Assigned Surgical Provider  Lakiesha Matos MD as MD (Urology)  Reina Stahl MD as MD (Gynecologic Oncology)  LAKEISHA MATOS        Again, thank you for allowing me to participate in the care of your patient.        Sincerely,        Reina Stahl MD

## 2023-06-08 NOTE — Clinical Note
Hi Dr. 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 Given her exam and history, and your cystourethroscopy findings, I favor that this is all a primary vaginal cancer. We are going to get a pelvic MRI to evaluate the local tumor and PET/CT to assess for metastatic disease, and likely recommend primary chemoradiation therapy unless she has distant metastatic disease. I would love to talk with you to ensure that you do not think she also has a second urologic cancer--pager [phone], cell [phone] (texting first best).  --florinda Stahl MD, MS, FACOG, FACS 6/8/2023 3:26 PM

## 2023-06-08 NOTE — PATIENT INSTRUCTIONS
SCHEDULING:  -Pelvic MRI in the next 2 weeks (UMN)  -PET/CT in the next 2 weeks (UMN or MG)  -Radiation oncology visit in the next 2 weeks (UMN)  -RTC after imaging/radiation consult to discuss management (MD, virtual visit)      DIAGNOSIS:  Vaginal cancer, HPV-associated, with concern for right inguinal lymph node metastasis.    Lichen Sclerosus.      PLAN:  1) Vaginal cancer:   -Pelvic MRI to evaluate extent of the vaginal tumor.   -PET/CT to evaluate for metastatic disease.   -Radiation oncology consultation.   -It is possible that the radiation oncologists will want to go to the OR for a pelvic exam under anesthesia--if so, then we will call to schedule.   -Return to clinic after the imaging is complete to review results and discuss treatment.   --Treatment will likely comprise chemoradiation therapy followed by interstitial brachytherapy with needles inserted directly into the vaginal tumor.   --If distant metastatic disease is diagnosed, then will likely start with systemic therapy (chemotherapy) with consideration of radiation therapy for bleeding management.     -Keep the urologic oncology consultation for now, but we may advise cancelling after I talk with Dr. Mackenzie if she agrees that this is all likely primary vaginal cancer.     2) Lichen sclerosus: Continue current treatment (triamcinolone cream, estradiol cream), but in the future may discuss changing to a higher potency steroid cream called clobetasol.       Please call with any questions or concerns. [phone]       Reina Stahl MD, MS, FACOG, FACS  6/8/2023  3:23 PM

## 2023-06-08 NOTE — Clinical Note
Reinaldo Arriaga I am referring this patient to you who has an HPV-associated vaginal cancer. I am orderling a PET/CT and pelvic MRI with vaginal gel, but on exam I think she has a bulky right inguinal lymph node. Let me know if you think we should schedule an EUA, fiducial marker placement. The tumor is just posterior to the urethra, but on cystourethroscopy it looks like the tumor is not invading into the urethra; I am also trying to talk with Dr. Mackenzie to ensure she does not think there is a second urologic cancer, as she also referred this patient to urologic oncology.  Thanks.  --florinda Stahl MD, MS, FACOG, FACS 6/8/2023 3:28 PM

## 2023-06-09 ENCOUNTER — OFFICE VISIT (OUTPATIENT)
Dept: DERMATOLOGY | Facility: CLINIC | Age: 68
End: 2023-06-09
Attending: UROLOGY
Payer: COMMERCIAL

## 2023-06-09 ENCOUNTER — PATIENT OUTREACH (OUTPATIENT)
Dept: ONCOLOGY | Facility: CLINIC | Age: 68
End: 2023-06-09

## 2023-06-09 DIAGNOSIS — L90.0 LICHEN SCLEROSUS: ICD-10-CM

## 2023-06-09 DIAGNOSIS — D48.5 NEOPLASM OF UNCERTAIN BEHAVIOR OF SKIN: ICD-10-CM

## 2023-06-09 LAB
PATH REPORT.ADDENDUM SPEC: ABNORMAL
PATH REPORT.COMMENTS IMP SPEC: ABNORMAL
PATH REPORT.COMMENTS IMP SPEC: YES
PATH REPORT.FINAL DX SPEC: ABNORMAL
PATH REPORT.GROSS SPEC: ABNORMAL
PATH REPORT.MICROSCOPIC SPEC OTHER STN: ABNORMAL
PATH REPORT.RELEVANT HX SPEC: ABNORMAL
PHOTO IMAGE: ABNORMAL

## 2023-06-09 PROCEDURE — 11102 TANGNTL BX SKIN SINGLE LES: CPT | Performed by: STUDENT IN AN ORGANIZED HEALTH CARE EDUCATION/TRAINING PROGRAM

## 2023-06-09 PROCEDURE — 99204 OFFICE O/P NEW MOD 45 MIN: CPT | Mod: 25 | Performed by: STUDENT IN AN ORGANIZED HEALTH CARE EDUCATION/TRAINING PROGRAM

## 2023-06-09 PROCEDURE — 88305 TISSUE EXAM BY PATHOLOGIST: CPT | Mod: TC | Performed by: STUDENT IN AN ORGANIZED HEALTH CARE EDUCATION/TRAINING PROGRAM

## 2023-06-09 PROCEDURE — 88305 TISSUE EXAM BY PATHOLOGIST: CPT | Mod: 26 | Performed by: DERMATOLOGY

## 2023-06-09 RX ORDER — CLOBETASOL PROPIONATE 0.5 MG/G
OINTMENT TOPICAL 2 TIMES DAILY
Qty: 60 G | Refills: 1 | Status: SHIPPED | OUTPATIENT
Start: 2023-06-09 | End: 2023-07-27

## 2023-06-09 NOTE — NURSING NOTE
Dermatology Rooming Note    Jessie Tamayo's goals for this visit include:   Chief Complaint   Patient presents with     Derm Problem     Patient reeces she has lichen scerosus and also two spots of concern on her lip and right wrist.     Romeo Pillai, EMT-B

## 2023-06-09 NOTE — PROGRESS NOTES
[facility] Dermatology Note  Encounter Date: Jun 9, 2023  Office Visit     Dermatology Problem List:  1. 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   There is a well-demarcated red friable plaque extending from the introitus with more predominance on the right labia majora. There is less cigarette paper like pale skin changes that would be more consistent with LSA. Furthermore, there is no fusion of the labia major to the labia minora. A biopsy was performed today on the right vaginal introitus to confirm the diagnosis. On physical exam there appears to be a larger burden of SCC over lichen sclerosis changes. Notes from Dr. Jose Ramon paez they gynecologic oncologist was reviewed. Dr. Stahl also was planning on a biopsy to be performed in the OR.    - See biopsy note below   - Will follow up with pathology results and develop a plan  - Clobetasol 0.05% ointment prescribed, however, patient will hold on using the topical medication until path results are confirmed.       # Impetigo vs HSV   Patient has a one month history of a crusting, possibly blistered lesion located on her philtrum. The lesion is in the late stages of resolution and given her history of HSV, it is unclear of the diagnosis.    - Patient reassured that she is likely not infectious at this time and that both possible diagnosis are not harmful to her health   - If her symptoms recur, she will send a photo via my chart for early evaluation.     #Actinic keratosis  Proper evaluation and treatment was deferred at this visit secondary to prioritization of her vulvar irration.  -Plan for dedicated evaluation and discussion of treatment options at next visit.     Procedures  Performed:   - Shave biopsy procedure note, location(s): right vaginal introitus. After discussion of benefits and risks including but not limited to bleeding, infection, scar, incomplete removal, recurrence, and non-diagnostic biopsy, written consent and photographs were obtained. The area was cleaned with isopropyl alcohol. 0.5mL of 1% lidocaine with epinephrine was injected to obtain adequate anesthesia of lesion(s). Shave biopsy at site(s) performed. Hemostasis was achieved with aluminium chloride. Petrolatum ointment and a sterile dressing were applied. The patient tolerated the procedure and no complications were noted. The patient was provided with verbal and written post care instructions.   - Procedure(s) performed by faculty.     Follow-up: pending path results    Staff and Medical Student:     Celia Flower, MS4    Lawrence Banuelos MD  ____________________________________________    CC: No chief complaint on file.    HPI:  Ms. Jessie Tamayo is a(n) 67 year old female who presents today as a new patient for evaluation of persistent vulvar irritation and bleeding. She has had these symptoms for approximately 2 years, and her symptoms have gotten worse. She states that she is extremely itchy and every time she wipes she experiences pain. She had been applying triamcinolone cream two to five times daily, however, she stopped therapy recently to make sure her symptoms were present for this appointment. She also has been prescribed nystatin cream and more recently estrogen cream for her symptoms.     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     Patient is otherwise feeling well, without additional skin concerns.      Physical Exam:  SKIN: Focused examination of vulva and the face was performed.  - Vulva: well-demarcated red friable plaque with an appreciable lip that can be slightly lifted off the skin extending from the  introitus with predominance over the right labia majora. No significant cigarette paper changes are appreciated and there is no labia major/labia minora fusion.   - Face: scabbed relatively round papule present on the philtrum not overlapping the upper vermilion boarder, multiple scaly papules and plaques present on lateral face bilaterally and on the forehead.   - No other lesions of concern on areas examined.     Medications:  Current Outpatient Medications   Medication     estradiol (ESTRACE) 0.1 MG/GM vaginal cream     nystatin (MYCOSTATIN) 974796 UNIT/GM external cream     triamcinolone (ARISTOCORT HP) 0.5 % external cream     No current facility-administered medications for this visit.      Past Medical History:   Patient Active Problem List   Diagnosis     Hyperlipidemia LDL goal <130     Recurrent cold sores     Hearing loss     Pap smear of cervix with ASCUS, cannot exclude HGSIL     Introital dyspareunia     Vaginal atrophy     Cervical high risk HPV (human papillomavirus) test positive     Atrophic vaginitis     Class 1 obesity due to excess calories without serious comorbidity with body mass index (BMI) of 34.0 to 34.9 in adult     Urethral bleeding     Vaginal cancer (H)     Benign neoplasm of colon     Past Medical History:   Diagnosis Date     Actinic keratosis      Hyperlipemia      Lichen sclerosus 2020     Osteopenias         CC Lakeisha Mackenzie MD  [address] [place] [address] on close of this encounter.

## 2023-06-09 NOTE — PROGRESS NOTES
Dermatology Rooming Note    Jessie Tamayo's goals for this visit include:   Chief Complaint   Patient presents with     Derm Problem     Patient believes she has lichen sclerosus near and around the groin and also two spots of concern on her lip and right wrist.     Romeo Pillai, EMT-B

## 2023-06-09 NOTE — PROGRESS NOTES
Referral received for Radiation ONcology services for vaginal cancer. Per Dr Stahl, to be scheduled with Dr. Klein or Dr. Pacheco. Referral instructions updated and sent to [facility] for completion. Patient aware of referral per chart review.

## 2023-06-09 NOTE — TELEPHONE ENCOUNTER
MEDICAL RECORDS REQUEST   Radiation Oncology  [address] [place] [address]  Fax: [phone]          FUTURE VISIT INFORMATION                                                   Jessie Tamayo, : 1955 scheduled for future visit at [facility] Radiation Oncology    RECORDS REQUESTED FOR VISIT                                                     GYNECOLOGY     OFFICE NOTE from PCP [facility] 23: Dr. Chloe Hernandez   OFFICE NOTE from OB/GYN [facility] 23: Dr. Jefe Koenig   OFFICE NOTE from urologist Epic 23: Dr. Lakeisha Mackenzie   OFFICE NOTE from gyn onc [facility] 23: Dr. Reina Stahl   OPERATIVE/BIOPSY REPORTS (include exam under anes) [facility] 23: CYSTOSCOPY AND EXCISIONAL BIOPSY OF THE VAGINAL TISSUE AROUND THE URETHRA.  (look in the bladder and sample small area in the vagina near the urethra)    21: COLPOSCOPY, WITH BIOPSY, LEEP procedure   MEDICATION LIST [facility]    LABS     PATHOLOGY REPORTS Reports in Epic 23: [id number]  22: [id number]  21:   18: [id number]  16: [id number]  02/27/15: [id number]   ANYTHING RELATED TO DIAGNOSIS Epic Most recent 23

## 2023-06-09 NOTE — PATIENT INSTRUCTIONS
We prescribed you clobetasol and reviewed pictures of LSA. Hold off on using the clobetasol until we get the results of the biopsy. At that time we will have a better picture of what is LSA and what SCC and we can review a treatment plan at that time       Wound Care After a Biopsy    What is a skin biopsy?  A skin biopsy allows the doctor to examine a very small piece of tissue under the microscope to determine the diagnosis and the best treatment for the skin condition. A local anesthetic (numbing medicine) is injected with a very small needle into the skin area to be tested. A small piece of skin is taken from the area. Sometimes a suture (stitch) is used.     What are the risks of a skin biopsy?  I will experience scar, bleeding, swelling, pain, crusting and redness. I may experience incomplete removal or recurrence. Risks of this procedure are excessive bleeding, bruising, infection, nerve damage, numbness, thick (hypertrophic or keloidal) scar and non-diagnostic biopsy.    How should I care for my wound for the first 24 hours?  Keep the wound dry and covered for 24 hours  If it bleeds, hold direct pressure on the area for 15 minutes. If bleeding does not stop, call us or go to the emergency room  Avoid strenuous exercise the first 1-2 days or as your doctor instructs you    How should I care for the wound after 24 hours?  After 24 hours, remove the bandage  You may bathe or shower as normal  If you had a scalp biopsy, you can shampoo as usual and can use shower water to clean the biopsy site daily  Clean the wound once a day with gentle soap and water  Do not scrub, be gentle  Apply white petroleum/Vaseline after cleaning the wound with a cotton swab or a clean finger, and keep the site covered with a Bandaid /bandage. Bandages are not necessary with a scalp biopsy  If you are unable to cover the site with a Bandaid /bandage, re-apply ointment 2-3 times a day to keep the site moist. Moisture will help with  healing  Avoid strenuous activity for first 1-2 days  Avoid lakes, rivers, pools, and oceans until the stitches are removed or the site is healed    How do I clean my wound?  Wash hands thoroughly with soap or use hand  before all wound care  Clean the wound with gentle soap and water  Apply white petroleum/Vaseline  to wound after it is clean  Replace the Bandaid /bandage to keep the wound covered for the first few days or as instructed by your doctor  If you had a scalp biopsy, warm shower water to the area on a daily basis should suffice    What should I use to clean my wound?   Cotton-tipped applicators (Qtips )  White petroleum jelly (Vaseline ). Use a clean new container and use Q-tips to apply.  Bandaids  as needed  Gentle soap     How should I care for my wound long term?  Do not get your wound dirty  Keep up with wound care for one week or until the area is healed.  A small scab will form and fall off by itself when the area is completely healed. The area will be red and will become pink in color as it heals. Sun protection is very important for how your scar will turn out. Sunscreen with an SPF 30 or greater is recommended once the area is healed.  You should have some soreness but it should be mild and slowly go away over several days. Talk to your doctor about using tylenol for pain,    When should I call my doctor?  If you have increased:   Pain or swelling  Pus or drainage (clear or slightly yellow drainage is ok)  Temperature over 100F  Spreading redness or warmth around wound    When will I hear about my results?  The biopsy results can take 2 weeks to come back.  Your results will automatically release to [employer] before your provider has even reviewed them.  The clinic will call you with the results, send you a [employer] message, or have you schedule a follow-up clinic or phone time to discuss the results.  Contact our clinics if you do not hear from us in 2 weeks.    Who should I call with  questions?  [facility]: [phone]  [facility]: [phone]  For urgent needs outside of business hours call the [facility] at [phone] and ask for the dermatology resident on call

## 2023-06-09 NOTE — LETTER
6/9/2023       RE: Jessie Tamayo  [address] [address]     Dear Colleague,    Thank you for referring your patient, Jessie Tamayo, to the [facility] DERMATOLOGY CLINIC [place] at [facility]. Please see a copy of my visit note below.    [facility] Dermatology Note  Encounter Date: Jun 9, 2023  Office Visit     Dermatology Problem List:  1. 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   There is a well-demarcated red friable plaque extending from the introitus with more predominance on the right labia majora. There is less cigarette paper like pale skin changes that would be more consistent with LSA. Furthermore, there is no fusion of the labia major to the labia minora. A biopsy was performed today on the right vaginal introitus to confirm the diagnosis. On physical exam there appears to be a larger burden of SCC over lichen sclerosis changes. Notes from Dr. Jose Ramon paez they gynecologic oncologist was reviewed. Dr. Stahl also was planning on a biopsy to be performed in the OR.    - See biopsy note below   - Will follow up with pathology results and develop a plan  - Clobetasol 0.05% ointment prescribed, however, patient will hold on using the topical medication until path results are confirmed.       # Impetigo vs HSV   Patient has a one month history of a crusting, possibly blistered lesion located on her philtrum. The lesion is in the late stages of resolution and given her history of HSV, it is unclear of the diagnosis.    - Patient reassured that she is likely not infectious at this time and that both possible diagnosis are not harmful to her health   - If  her symptoms recur, she will send a photo via my chart for early evaluation.     #Actinic keratosis  Proper evaluation and treatment was deferred at this visit secondary to prioritization of her vulvar irration.  -Plan for dedicated evaluation and discussion of treatment options at next visit.     Procedures Performed:   - Shave biopsy procedure note, location(s): right vaginal introitus. After discussion of benefits and risks including but not limited to bleeding, infection, scar, incomplete removal, recurrence, and non-diagnostic biopsy, written consent and photographs were obtained. The area was cleaned with isopropyl alcohol. 0.5mL of 1% lidocaine with epinephrine was injected to obtain adequate anesthesia of lesion(s). Shave biopsy at site(s) performed. Hemostasis was achieved with aluminium chloride. Petrolatum ointment and a sterile dressing were applied. The patient tolerated the procedure and no complications were noted. The patient was provided with verbal and written post care instructions.   - Procedure(s) performed by faculty.     Follow-up: pending path results    Staff and Medical Student:     Celia Flower, MS4    Lawrence Banuelos MD  ____________________________________________    CC: No chief complaint on file.    HPI:  Ms. Jessie Tamayo is a(n) 67 year old female who presents today as a new patient for evaluation of persistent vulvar irritation and bleeding. She has had these symptoms for approximately 2 years, and her symptoms have gotten worse. She states that she is extremely itchy and every time she wipes she experiences pain. She had been applying triamcinolone cream two to five times daily, however, she stopped therapy recently to make sure her symptoms were present for this appointment. She also has been prescribed nystatin cream and more recently estrogen cream for her symptoms.     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     Patient is otherwise feeling well, without additional skin concerns.      Physical Exam:  SKIN: Focused examination of vulva and the face was performed.  - Vulva: well-demarcated red friable plaque with an appreciable lip that can be slightly lifted off the skin extending from the introitus with predominance over the right labia majora. No significant cigarette paper changes are appreciated and there is no labia major/labia minora fusion.   - Face: scabbed relatively round papule present on the philtrum not overlapping the upper vermilion boarder, multiple scaly papules and plaques present on lateral face bilaterally and on the forehead.   - No other lesions of concern on areas examined.     Medications:  Current Outpatient Medications   Medication    estradiol (ESTRACE) 0.1 MG/GM vaginal cream    nystatin (MYCOSTATIN) 259407 UNIT/GM external cream    triamcinolone (ARISTOCORT HP) 0.5 % external cream     No current facility-administered medications for this visit.      Past Medical History:   Patient Active Problem List   Diagnosis    Hyperlipidemia LDL goal <130    Recurrent cold sores    Hearing loss    Pap smear of cervix with ASCUS, cannot exclude HGSIL    Introital dyspareunia    Vaginal atrophy    Cervical high risk HPV (human papillomavirus) test positive    Atrophic vaginitis    Class 1 obesity due to excess calories without serious comorbidity with body mass index (BMI) of 34.0 to 34.9 in adult    Urethral bleeding    Vaginal cancer (H)    Benign neoplasm of colon     Past Medical History:   Diagnosis Date    Actinic keratosis     Hyperlipemia     Lichen sclerosus 2020    Osteopenias         CC Lakeisha Mackenzie MD  [address] [place] [address] on close of this encounter.      Attestation signed by Lawrence Banuelos MD at 6/9/2023  9:33 AM (Updated):  I have personally examined this patient and agree with the medical student's documentation and plan of care. I  have reviewed and amended the medical student's note as necessary. I personally performed all procedure(s).The documentation accurately reflects my clinical observations, diagnoses, treatment and follow-up plans.     #LSA vs SCC  - Friable plaque suspicious for SCC bx today, results will differentiate whether topical vs oncologic treatment is most appropriate. Suspicious for SCC given friability and lack of extensive LSA involvement elsewhere   - clobetasol rx'd incase path c/w LSA  - Son is pathologist, discussed I would be happy to set up a time to talk with him on the phone          Lawrence Banuelos M.D.   Dermatology Staff       Dermatology Rooming Note    Jessie Tamayo's goals for this visit include:   Chief Complaint   Patient presents with    Derm Problem     Patient believes she has lichen sclerosus near and around the groin and also two spots of concern on her lip and right wrist.     Romeo Pillai, EMT-B

## 2023-06-12 ENCOUNTER — PRE VISIT (OUTPATIENT)
Dept: RADIATION ONCOLOGY | Facility: CLINIC | Age: 68
End: 2023-06-12
Payer: COMMERCIAL

## 2023-06-12 ENCOUNTER — OFFICE VISIT (OUTPATIENT)
Dept: RADIATION ONCOLOGY | Facility: CLINIC | Age: 68
End: 2023-06-12
Attending: OBSTETRICS & GYNECOLOGY
Payer: COMMERCIAL

## 2023-06-12 VITALS — BODY MASS INDEX: 36.38 KG/M2 | WEIGHT: 217 LBS

## 2023-06-12 DIAGNOSIS — C52 VAGINAL CANCER (H): ICD-10-CM

## 2023-06-12 LAB
PATH REPORT.COMMENTS IMP SPEC: ABNORMAL
PATH REPORT.COMMENTS IMP SPEC: ABNORMAL
PATH REPORT.COMMENTS IMP SPEC: YES
PATH REPORT.FINAL DX SPEC: ABNORMAL
PATH REPORT.GROSS SPEC: ABNORMAL
PATH REPORT.MICROSCOPIC SPEC OTHER STN: ABNORMAL
PATH REPORT.RELEVANT HX SPEC: ABNORMAL

## 2023-06-12 PROCEDURE — G0463 HOSPITAL OUTPT CLINIC VISIT: HCPCS | Performed by: RADIOLOGY

## 2023-06-12 PROCEDURE — 99205 OFFICE O/P NEW HI 60 MIN: CPT | Mod: GC | Performed by: RADIOLOGY

## 2023-06-12 PROCEDURE — 99417 PROLNG OP E/M EACH 15 MIN: CPT | Performed by: RADIOLOGY

## 2023-06-12 PROCEDURE — A4648 IMPLANTABLE TISSUE MARKER: HCPCS | Performed by: RADIOLOGY

## 2023-06-12 ASSESSMENT — ENCOUNTER SYMPTOMS
CONSTITUTIONAL NEGATIVE: 1
CARDIOVASCULAR NEGATIVE: 1
NEUROLOGICAL NEGATIVE: 1
FREQUENCY: 1
HEMATURIA: 1
EYES NEGATIVE: 1
GASTROINTESTINAL NEGATIVE: 1
PSYCHIATRIC NEGATIVE: 1
RESPIRATORY NEGATIVE: 1

## 2023-06-12 NOTE — PROGRESS NOTES
RADIATION ONCOLOGY CONSULTATION  DATE OF VISIT: 2023    Name: Jessie Tamayo MRN: [id number]   : 1955   Date of Service: 2023 Referring: Dr. 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  Pelvic MRI and PET CT scan will be scheduled in the near future.    Oncologic History:  3/28/2023: Pap smear: High-grade squamous intraepithelial lesion encompassing moderate/severe dysplasia, CIS, KAYLA-2, KAYLA-3.  HPV 16+.  Patient was experiencing urethral/vaginal bleeding.    2023: Procedure with Dr. Mackenzie (Urology): Patient undergoes cystourethroscopy and vaginal biopsy. Exam showed lichenified changes the bilateral labia majora and friable vestibular tissue.  Urethra was somewhat stenotic.  The urethra showed no  obvious lesions but the entire distal urethra was erythematous and stenosed. Pathology demonstrated invasive moderately differentiated squamous cell carcinoma, HPV associated.  LVSI suspected.  Combined positive score: 50%.  Tumor proportion score: 40%.  Low PD-L1 expression.    2023: Follow-up with Dr. Stahl (Gynecologic Oncology): Patient reported that over the few months she has had increased urinary frequency and decreased flow.  Reported increased nocturia.  Patient noted that she has a cycling trip to [place] on 2022 - 2022 and would prefer not to cancel this trip.  Patient also reported increased size of a right inguinal nodule since the spring 2023.  Physical exam at this encounter showed thinning  and desquamation of the posterior medial labia consistent with lichen sclerotic changes.  When the labia are  there was a friable mass visible at the distal anterior vagina just posterior to the urethra.  Notable vaginal tumor is limited to the anterior distal vagina, approximately 4-5 cm laterally and 3 cm craniocaudally. No palpable tumor in the rectovaginal septum.  There was then enlarged, firm, fixed right inguinal lymph node approximately 3-4 cm in greatest dimension. Dr. Stahl recommended definitive chemoradiotherapy.  Her case was discussed with Dr. Mackenzie and Dr. Márquez and both are in agreement that this is a primary vaginal cancer.  A PET CT scan and a pelvic MRI scan were ordered.  If these can be done before her trip to [place], then treatment will start after she returns from her trip.    On interview, the patient reports continued hematuria every time she voids.  This is accompanied with dysuria rated at 4/10.  Patient also reports increased urinary frequency, up to 3 times per night.  She denies any recent unexplained weight loss, change in bowel habits, lower extremity edema, pelvic pain, fever, chills, nausea, vomiting.    Past Medical History:  Past Medical History:   Diagnosis Date     Actinic keratosis      Hyperlipemia      Lichen sclerosus 2020     Osteopenias      Past Surgical History:  Past Surgical History:   Procedure Laterality Date     COLONOSCOPY  2006    normal     COLPOSCOPY, BIOPSY, COMBINED N/A 02/08/2021    Procedure: COLPOSCOPY, WITH BIOPSY, LEEP procedure;  Surgeon: Jefe Koenig MD;  Location: [facility] OR     CYSTOSCOPY N/A 05/30/2023    Procedure: CYSTOSCOPY AND EXCISIONAL BIOPSY OF THE VAGINAL TISSUE AROUND THE URETHRA.  (look in the bladder and sample small area in the vagina near the urethra);  Surgeon: Lakeisha Mackenzie MD;  Location: [facility] OR     EYE SURGERY      cataracts bilateral     OPEN REDUCTION INTERNAL FIXATION FOREARM  07/31/2012    Procedure: OPEN  REDUCTION INTERNAL FIXATION FOREARM;  Open Reduction Internal Fixation, Irrigation & Debridment  of Right Distal Radius, application short arm splint;  Surgeon: Wade Beard MD;  Location: [facility] OR     TONSILLECTOMY & ADENOIDECTOMY  1960     [facility] TREAT ECTOPIC PREG,RMV TUBE/OVARY  1985    ectopic, right side-ovary and tube removed     Chemotherapy History: None    Radiation History: None    Implanted Cardiac Devices: None    Medications:  Current Outpatient Medications   Medication     clobetasol (TEMOVATE) 0.05 % external ointment     estradiol (ESTRACE) 0.1 MG/GM vaginal cream     nystatin (MYCOSTATIN) 039391 UNIT/GM external cream     triamcinolone (ARISTOCORT HP) 0.5 % external cream     No current facility-administered medications for this visit.     Allergies:  Allergies   Allergen Reactions     Seasonal Allergies      Social History:  Social History     Socioeconomic History     Marital status:      Spouse name: Jesse     Number of children: 0     Years of education: Not on file     Highest education level: Not on file   Occupational History     Employer: [employer]   Tobacco Use     Smoking status: Never     Passive exposure: Never     Smokeless tobacco: Never   Vaping Use     Vaping status: Never Used     Passive vaping exposure: Yes   Substance and Sexual Activity     Alcohol use: Yes     Comment: very little     Drug use: No     Sexual activity: Not Currently     Partners: Male     Birth control/protection: Post-menopausal   Other Topics Concern     Parent/sibling w/ CABG, MI or angioplasty before 65F 55M? No   Social History Narrative     Not on file     Social Determinants of Health     Financial Resource Strain: Not on file   Food Insecurity: Not on file   Transportation Needs: Not on file   Physical Activity: Not on file   Stress: Not on file   Social Connections: Not on file   Intimate Partner Violence: Not on file   Housing Stability: Not on file     Family  History:  Family History   Problem Relation Age of Onset     Lung Cancer Mother 44     Cerebrovascular Disease Father      Hypertension Father      Alcohol/Drug Father      Obesity Niece         niece     Mental Illness Nephew         nephew     Diabetes Other         paternal aunt     Hyperlipidemia Other      Obesity Other         self     Cervical Cancer Maternal Aunt      Ovarian Cancer Maternal Aunt 60     Obesity Other      Diabetes Other         paternal aunt     Hypertension Other         father     Cerebrovascular Disease Other         father     C.A.D. No family hx of      Breast Cancer No family hx of      Cancer - colorectal No family hx of      Prostate Cancer No family hx of      Melanoma No family hx of      Review of Systems   A 12-point review of systems was performed. Pertinent findings are noted in the HPI.    Physical Exam   ECOG Status: 1    Vitals:  Wt 98.4 kg (217 lb)   BMI 36.38 kg/m    Gen: Alert, in no acute distress  HEENT: Normocephalic, atraumatic  Neck: No palpable adenopathy  Pulm: Breathing comfortably on room air, no wheezing, stridor or respiratory distress  CV: Regular rate, extremities are warm and well-perfused, no cyanosis, no pedal edema  Abdominal: Nondistended  Extremities: Without cyanosis or edema  Pelvic: There is a polypoid, friable mass visualized in the anterior introitus, on palpation it extends approximately 3 to 4 cm from the introitus and from 8:00 to 5:00, the rectovaginal septum is free of tumor, cervix was not visualized due to patient discomfort.  Musculoskeletal: Normal bulk and tone  Skin: Normal color and turgor  Neuro: A/Ox3, CN II-XII intact, normal gait   Lymph: There is a palpable right inguinal lymph node measuring approximately 3.5 cm which is mobile, no other inguinal adenopathy palpable    Procedure: A single gold fiducial marker was placed at the superior aspect of the midline vaginal mass.  Patient tolerated procedure well.    Imaging/Path/Labs    Imaging: Please refer to history of present illness.    Path: Please refer to history of present illness.    Assessment and Plan   Ms. 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    We reviewed the findings so far on physical examination and imaging and discussed the rationale for recommending definitive chemoradiation for her squamous cell carcinoma of the vagina.  We discussed the anticipated course of therapy including external beam chemoradiation followed by HDR brachytherapy, likely interstitial.  We reviewed the anticipated acute side effects, potential long-term risks and expected outcome from treatment.  The patient had several very pertinent questions which were answered such that she verbalized understanding of the issues.    She is planning a bicycle trip to [place].  We will try and schedule her MRI pelvis and PET CT scan before she leaves on her trip.  If this can be accomplished, then she will start shortly after she returns from [place].    Patient was seen and discussed with my attending physician, Dr. Pacheco.    Josue Bowen MD, MS PGY-2  PGY-2 Radiation Oncology  Department of Radiation Oncology  [facility]  Phone: [phone]    Ms. Tamayo was seen and examined by me. Note above by Dr. Bowen was reviewed and edited by me and reflects our mutual findings and plan of care.  125 minutes spent by me on the date of the encounter doing chart review, history and exam, documentation and further activities per the note.  I independently reviewed patient's imaging, medical records with the majority of time spent in counseling and coordination of care.    Marisa Pacheco MD  Department of Radiation Oncology  [facility]  [facility]  Patient Care Team:  Alba Layton MD as PCP - Jefe Mosley MD as Assigned OBGYN Provider  Alba Layton MD as Assigned PCP  Lakeisha Mackenzie MD as MD (Urology)  Lawrence Banuelos MD as MD (Dermatology)  Lakeisha Mackenzie MD as Assigned Surgical Provider  Lakeisha Mackenzie MD as MD (Urology)  Reina Stahl MD as MD (Gynecologic Oncology)  Marisa Pacheco MD as MD (Radiation Oncology)

## 2023-06-12 NOTE — LETTER
2023         RE: Jessie Tamayo  [address] [facility] [address]        Dear Colleague,    Thank you for referring your patient, Jessie Tamayo, to the [facility] RADIATION ONCOLOGY. Please see a copy of my visit note below.      RADIATION ONCOLOGY CONSULTATION  DATE OF VISIT: 2023    Name: Jessie Tamayo MRN: [id number]   : 1955   Date of Service: 2023 Referring: Dr. 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  Pelvic MRI and PET CT scan will be scheduled in the near future.    Oncologic History:  3/28/2023: Pap smear: High-grade squamous intraepithelial lesion encompassing moderate/severe dysplasia, CIS, KAYLA-2, KAYLA-3.  HPV 16+.  Patient was experiencing urethral/vaginal bleeding.    2023: Procedure with Dr. Mackenzie (Urology): Patient undergoes cystourethroscopy and vaginal biopsy. Exam showed lichenified changes the bilateral labia majora and friable vestibular tissue.  Urethra was somewhat stenotic.  The urethra showed no  obvious lesions but the entire distal urethra was erythematous and stenosed. Pathology demonstrated invasive moderately differentiated squamous cell carcinoma, HPV associated.  LVSI suspected.  Combined positive score: 50%.  Tumor proportion score: 40%.  Low PD-L1 expression.    2023: Follow-up with Dr. Stahl (Gynecologic Oncology): Patient reported that over the few months she has had increased urinary frequency and decreased flow.  Reported increased nocturia.   Patient noted that she has a cycling trip to [facility] on 6/21/2022 - 7/7/2022 and would prefer not to cancel this trip.  Patient also reported increased size of a right inguinal nodule since the spring 2023.  Physical exam at this encounter showed thinning and desquamation of the posterior medial labia consistent with lichen sclerotic changes.  When the labia are  there was a friable mass visible at the distal anterior vagina just posterior to the urethra.  Notable vaginal tumor is limited to the anterior distal vagina, approximately 4-5 cm laterally and 3 cm craniocaudally. No palpable tumor in the rectovaginal septum.  There was then enlarged, firm, fixed right inguinal lymph node approximately 3-4 cm in greatest dimension. Dr. Stahl recommended definitive chemoradiotherapy.  Her case was discussed with Dr. Mackenzie and Dr. Márquez and both are in agreement that this is a primary vaginal cancer.  A PET CT scan and a pelvic MRI scan were ordered.  If these can be done before her trip to [facility], then treatment will start after she returns from her trip.    On interview, the patient reports continued hematuria every time she voids.  This is accompanied with dysuria rated at 4/10.  Patient also reports increased urinary frequency, up to 3 times per night.  She denies any recent unexplained weight loss, change in bowel habits, lower extremity edema, pelvic pain, fever, chills, nausea, vomiting.    Past Medical History:  Past Medical History:   Diagnosis Date    Actinic keratosis     Hyperlipemia     Lichen sclerosus 2020    Osteopenias      Past Surgical History:  Past Surgical History:   Procedure Laterality Date    COLONOSCOPY  2006    normal    COLPOSCOPY, BIOPSY, COMBINED N/A 02/08/2021    Procedure: COLPOSCOPY, WITH BIOPSY, LEEP procedure;  Surgeon: Jefe Koenig MD;  Location: [facility] OR    CYSTOSCOPY N/A 05/30/2023    Procedure: CYSTOSCOPY AND EXCISIONAL BIOPSY OF THE VAGINAL TISSUE AROUND THE URETHRA.   (look in the bladder and sample small area in the vagina near the urethra);  Surgeon: Lakeisha Mackenzie MD;  Location: [facility] OR    EYE SURGERY      cataracts bilateral    OPEN REDUCTION INTERNAL FIXATION FOREARM  07/31/2012    Procedure: OPEN REDUCTION INTERNAL FIXATION FOREARM;  Open Reduction Internal Fixation, Irrigation & Debridment  of Right Distal Radius, application short arm splint;  Surgeon: Wade Beard MD;  Location: [facility] OR    TONSILLECTOMY & ADENOIDECTOMY  1960    [facility] TREAT ECTOPIC PREG,RMV TUBE/OVARY  1985    ectopic, right side-ovary and tube removed     Chemotherapy History: None    Radiation History: None    Implanted Cardiac Devices: None    Medications:  Current Outpatient Medications   Medication    clobetasol (TEMOVATE) 0.05 % external ointment    estradiol (ESTRACE) 0.1 MG/GM vaginal cream    nystatin (MYCOSTATIN) 566837 UNIT/GM external cream    triamcinolone (ARISTOCORT HP) 0.5 % external cream     No current facility-administered medications for this visit.     Allergies:  Allergies   Allergen Reactions    Seasonal Allergies      Social History:  Social History     Socioeconomic History    Marital status:      Spouse name: Jesse    Number of children: 0    Years of education: Not on file    Highest education level: Not on file   Occupational History     Employer: [employer]   Tobacco Use    Smoking status: Never     Passive exposure: Never    Smokeless tobacco: Never   Vaping Use    Vaping status: Never Used     Passive vaping exposure: Yes   Substance and Sexual Activity    Alcohol use: Yes     Comment: very little    Drug use: No    Sexual activity: Not Currently     Partners: Male     Birth control/protection: Post-menopausal   Other Topics Concern    Parent/sibling w/ CABG, MI or angioplasty before 65F 55M? No   Social History Narrative    Not on file     Social Determinants of Health     Financial Resource Strain: Not on file   Food Insecurity: Not on  file   Transportation Needs: Not on file   Physical Activity: Not on file   Stress: Not on file   Social Connections: Not on file   Intimate Partner Violence: Not on file   Housing Stability: Not on file     Family History:  Family History   Problem Relation Age of Onset    Lung Cancer Mother 44    Cerebrovascular Disease Father     Hypertension Father     Alcohol/Drug Father     Obesity Niece         niece    Mental Illness Nephew         nephew    Diabetes Other         paternal aunt    Hyperlipidemia Other     Obesity Other         self    Cervical Cancer Maternal Aunt     Ovarian Cancer Maternal Aunt 60    Obesity Other     Diabetes Other         paternal aunt    Hypertension Other         father    Cerebrovascular Disease Other         father    C.A.D. No family hx of     Breast Cancer No family hx of     Cancer - colorectal No family hx of     Prostate Cancer No family hx of     Melanoma No family hx of      Review of Systems   A 12-point review of systems was performed. Pertinent findings are noted in the HPI.    Physical Exam   ECOG Status: 1    Vitals:  Wt 98.4 kg (217 lb)   BMI 36.38 kg/m    Gen: Alert, in no acute distress  HEENT: Normocephalic, atraumatic  Neck: No palpable adenopathy  Pulm: Breathing comfortably on room air, no wheezing, stridor or respiratory distress  CV: Regular rate, extremities are warm and well-perfused, no cyanosis, no pedal edema  Abdominal: Nondistended  Extremities: Without cyanosis or edema  Pelvic: There is a polypoid, friable mass visualized in the anterior introitus, on palpation it extends approximately 3 to 4 cm from the introitus and from 8:00 to 5:00, the rectovaginal septum is free of tumor, cervix was not visualized due to patient discomfort.  Musculoskeletal: Normal bulk and tone  Skin: Normal color and turgor  Neuro: A/Ox3, CN II-XII intact, normal gait   Lymph: There is a palpable right inguinal lymph node measuring approximately 3.5 cm which is mobile, no other  inguinal adenopathy palpable    Procedure: A single gold fiducial marker was placed at the superior aspect of the midline vaginal mass.  Patient tolerated procedure well.    Imaging/Path/Labs   Imaging: Please refer to history of present illness.    Path: Please refer to history of present illness.    Assessment and Plan   Ms. 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    We reviewed the findings so far on physical examination and imaging and discussed the rationale for recommending definitive chemoradiation for her squamous cell carcinoma of the vagina.  We discussed the anticipated course of therapy including external beam chemoradiation followed by HDR brachytherapy, likely interstitial.  We reviewed the anticipated acute side effects, potential long-term risks and expected outcome from treatment.  The patient had several very pertinent questions which were answered such that she verbalized understanding of the issues.    She is planning a bicycle trip to [place].  We will try and schedule her MRI pelvis and PET CT scan before she leaves on her trip.  If this can be accomplished, then she will start shortly after she returns from [place].    Patient was seen and discussed with my attending physician, Dr. Pacheco.    Josue Bowen MD, MS PGY-2  PGY-2 Radiation Oncology  Department of Radiation Oncology  [facility] [place]  Phone: [phone]    Ms. Tamayo was seen and examined by me. Note above by Dr. Bowen was reviewed and edited by me and reflects our mutual findings and plan of care.  125 minutes spent by me on the date of the encounter doing chart review, history and exam, documentation and further activities per the note.  I independently reviewed patient's imaging,  medical records with the majority of time spent in counseling and coordination of care.    Marisa Pacheco MD  Department of Radiation Oncology  [facility]    CC  Patient Care Team:  Alba Layton MD as PCP - Jefe Mosley MD as Assigned OBGYN Provider

## 2023-06-12 NOTE — PROGRESS NOTES
HPI  INITIAL PATIENT ASSESSMENT    Diagnosis: Cancer    Prior radiation therapy: None    Prior chemotherapy: None    Prior hormonal therapy:No    Pain Eval:  Denies    Psychosocial  Living arrangements: Lives with   Fall Risk: independent   referral needs: Not needed    Advanced Directive: No  Implantable Cardiac Device? No    Onset of menarche: 16  LMP: Patient's last menstrual period was 03/08/2008.  Onset of menopause: 50's  Abnormal vaginal bleeding/discharge: No  Are you pregnant? No        Review of Systems   Constitutional: Negative.    HENT: Negative.    Eyes: Negative.    Respiratory: Negative.    Cardiovascular: Negative.    Gastrointestinal: Negative.    Genitourinary: Positive for frequency, hematuria and urgency.   Musculoskeletal: Positive for joint pain.   Skin: Negative.    Neurological: Negative.    Endo/Heme/Allergies: Negative.    Psychiatric/Behavioral: Negative.

## 2023-06-13 ENCOUNTER — PRE VISIT (OUTPATIENT)
Dept: DERMATOLOGY | Facility: CLINIC | Age: 68
End: 2023-06-13

## 2023-06-14 ENCOUNTER — OFFICE VISIT (OUTPATIENT)
Dept: UROLOGY | Facility: CLINIC | Age: 68
End: 2023-06-14
Payer: COMMERCIAL

## 2023-06-14 VITALS
BODY MASS INDEX: 37.22 KG/M2 | WEIGHT: 218 LBS | HEIGHT: 64 IN | SYSTOLIC BLOOD PRESSURE: 146 MMHG | HEART RATE: 69 BPM | DIASTOLIC BLOOD PRESSURE: 85 MMHG

## 2023-06-14 DIAGNOSIS — C52 PRIMARY SQUAMOUS CELL CARCINOMA OF VAGINA (H): Primary | ICD-10-CM

## 2023-06-14 DIAGNOSIS — N35.819 OTHER STRICTURE OF URETHRA IN MALE: ICD-10-CM

## 2023-06-14 DIAGNOSIS — E66.01 MORBID OBESITY (H): ICD-10-CM

## 2023-06-14 PROCEDURE — 99215 OFFICE O/P EST HI 40 MIN: CPT | Performed by: UROLOGY

## 2023-06-14 ASSESSMENT — PAIN SCALES - GENERAL: PAINLEVEL: NO PAIN (0)

## 2023-06-14 NOTE — NURSING NOTE
"Chief Complaint   Patient presents with     Follow Up     SCC of urethra       Blood pressure (!) 146/85, pulse 69, height 1.626 m (5' 4\"), weight 98.9 kg (218 lb), last menstrual period 03/08/2008, not currently breastfeeding. Body mass index is 37.42 kg/m .    Patient Active Problem List   Diagnosis     Hyperlipidemia LDL goal <130     Recurrent cold sores     Hearing loss     Pap smear of cervix with ASCUS, cannot exclude HGSIL     Introital dyspareunia     Vaginal atrophy     Cervical high risk HPV (human papillomavirus) test positive     Atrophic vaginitis     Class 1 obesity due to excess calories without serious comorbidity with body mass index (BMI) of 34.0 to 34.9 in adult     Urethral bleeding     Vaginal cancer (H)     Benign neoplasm of colon       Allergies   Allergen Reactions     Seasonal Allergies        Current Outpatient Medications   Medication Sig Dispense Refill     clobetasol (TEMOVATE) 0.05 % external ointment Apply topically 2 times daily 60 g 1     estradiol (ESTRACE) 0.1 MG/GM vaginal cream Place 2 g vaginally twice a week 42.5 g 11     nystatin (MYCOSTATIN) [id number] UNIT/GM external cream Apply topically 2 times daily Use with the triamcinolone 15 g 1     triamcinolone (ARISTOCORT HP) 0.5 % external cream Apply topically 2 times daily 15 g 1       Social History     Tobacco Use     Smoking status: Never     Passive exposure: Never     Smokeless tobacco: Never   Vaping Use     Vaping status: Never Used     Passive vaping exposure: Yes   Substance Use Topics     Alcohol use: Yes     Comment: very little     Drug use: No       Priscilla Rao  6/14/2023  3:10 PM  "

## 2023-06-14 NOTE — LETTER
"6/14/2023       RE: Jessie Tamayo  [address] [place] [id number]     Dear Colleague,    Thank you for referring your patient, Jessie Tamayo, to the [facility] UROLOGY CL[facility]IC [place] at [facility]. Please see a copy of my visit note below.    Urology Clinic     HPI  Jessie Tamayo is a 67 year old female with recent diagnosis locally advanced vaginal SCC, here to further discuss the management.    She was initially seen by Dr. Mackenzie for vaginal bleeding and thickening around the urethra causing lower urinary tract symptoms including weak stream and straining.  Office exam revealed a plaque-like lesion in the urethra/vagina and therefore she was taken to the OR for exam under anesthesia, cystoscopy and potential biopsy on 5/30/2023: \"Exam revealed lichenified changes to bilateral labia majora and friable vestibular tissue (patient had significant bleeding from prep alone.) The urethra was somewhat stenotic and would not accommodate 19Fr rigid cystoscope, therefore a 16 Fr flexible cystoscope was inserted. Gentle pressure was required to access the bladder. The ureteral orifices were in their orthotopic positions bilaterally. There were no intravesical stones or diverticuli and the bladder was mildly trabeculated. There were no intravesical lesions. 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  She was then referred to me and also gynecology oncology.  She saw Dr. Reina Stahl on 6/8/2023 and her examination revealed a vaginal tumor: \"When the labia are , a friable mass is visible at the distal anterior vagina, just posterior to the urethra. On bimanual exam, the cervix is small and normal in contour. The palpable vaginal tumor is limited to the anterior " "distal vagina, approximately 4-5 cm laterally x 3 cm cranio-caudal. Tumor is friable. Remainder of the vagina smooth to palpation. Digital anorectal exam is without nodularity. No palpable tumor in the rectovaginal septum. On speculum exam, the proximal vaginal/cervical exam is limited due to patient discomfort. The anterior vaginal tumor cannot be visualized until the speculum is dissembled and only the posterior blade is placed.\"    She is now scheduled for staging imaging in the form of MRI of pelvis and PET scan.  The tentative plan per Dr. Stahl is chemoradiation followed by consolidative surgery.    She reports temporary relief in her urinary symptoms after the procedure with Dr. Mackenzie but he straining and weak stream have recurred and is quite bothersome for her.    PHYSICAL EXAM  BP (!) 146/85   Pulse 69   Ht 1.626 m (5' 4\")   Wt 98.9 kg (218 lb)   LMP 03/08/2008   BMI 37.42 kg/m     Constitutional: AO, pleasant, NAD  Resp: Non-labored breathing on room air  Abd: Soft, NT, ND  : Deferred  Extremities: WWP  Labs  Lab Results   Component Value Date    WBC 6.5 05/24/2023    WBC 8.4 11/11/2009     Lab Results   Component Value Date    RBC 4.23 05/24/2023    RBC 4.00 11/11/2009     Lab Results   Component Value Date    HGB 13.0 05/24/2023    HGB 12.3 11/11/2009     Lab Results   Component Value Date    HCT 39.9 05/24/2023    HCT 37.9 11/11/2009     No components found for: MCT  Lab Results   Component Value Date    MCV 94 05/24/2023    MCV 95 11/11/2009     Lab Results   Component Value Date    MCH 30.7 05/24/2023    MCH 30.8 11/11/2009     Lab Results   Component Value Date    MCHC 32.6 05/24/2023    MCHC 32.5 11/11/2009     Lab Results   Component Value Date    RDW 13.0 05/24/2023    RDW 12.7 11/11/2009     Lab Results   Component Value Date     05/24/2023     11/11/2009        Last Comprehensive Metabolic Panel:  Sodium   Date Value Ref Range Status   05/24/2023 142 133 - 144 mmol/L Final "   03/08/2021 138 133 - 144 mmol/L Final     Potassium   Date Value Ref Range Status   05/24/2023 4.2 3.4 - 5.3 mmol/L Final   03/08/2021 4.2 3.4 - 5.3 mmol/L Final     Chloride   Date Value Ref Range Status   05/24/2023 109 94 - 109 mmol/L Final   03/08/2021 106 94 - 109 mmol/L Final     Carbon Dioxide   Date Value Ref Range Status   03/08/2021 29 20 - 32 mmol/L Final     Carbon Dioxide (CO2)   Date Value Ref Range Status   05/24/2023 28 20 - 32 mmol/L Final     Anion Gap   Date Value Ref Range Status   05/24/2023 5 3 - 14 mmol/L Final   03/08/2021 3 3 - 14 mmol/L Final     Glucose   Date Value Ref Range Status   05/24/2023 103 (H) 70 - 99 mg/dL Final   03/08/2021 83 70 - 99 mg/dL Final     Comment:     Fasting specimen     Urea Nitrogen   Date Value Ref Range Status   05/24/2023 13 7 - 30 mg/dL Final   03/08/2021 14 7 - 30 mg/dL Final     Creatinine   Date Value Ref Range Status   05/24/2023 0.84 0.52 - 1.04 mg/dL Final   03/08/2021 0.84 0.52 - 1.04 mg/dL Final     GFR Estimate   Date Value Ref Range Status   05/24/2023 76 >60 mL/min/1.73m2 Final     Comment:     eGFR calculated using 2021 CKD-EPI equation.   03/08/2021 73 >60 mL/min/[1.73_m2] Final     Comment:     Non  GFR Calc  Starting 12/18/2018, serum creatinine based estimated GFR (eGFR) will be   calculated using the Chronic Kidney Disease Epidemiology Collaboration   (CKD-EPI) equation.       Calcium   Date Value Ref Range Status   05/24/2023 9.0 8.5 - 10.1 mg/dL Final   03/08/2021 9.3 8.5 - 10.1 mg/dL Final     Bilirubin Total   Date Value Ref Range Status   10/15/2008 0.4 0.2 - 1.3 mg/dL Final     Alkaline Phosphatase   Date Value Ref Range Status   10/15/2008 84 40 - 150 U/L Final     ALT   Date Value Ref Range Status   07/06/2011 15 0 - 50 U/L Final     AST   Date Value Ref Range Status   10/15/2008 28 0 - 45 U/L Final       Imaging   No new imaging to review    ASSESSMENT AND PLAN  67 year old female with locally advanced vaginal SCC  as well as meatal stenosis    I told Jessie that the plan put in place by gynecology oncology team sounds reasonable and based on the cystoscopy and exam by both Dr. Mackenzie and Favio, it does not appear that urethra or bladder are directly involved.  The meatus however could be involved by the tumor.  I told her that a distal urethrectomy or meatoplasty could be an option prior to the start of chemoradiation but after further deliberation and discussion with Dr. Mackenzie, I would be worried about the postoperative healing with the start of chemoradiation and potential delay in her treatment.  Therefore I would recommend an nursing visit for PVR assessment and if needed CIC teaching for the period of chemoradiation treatment and then address her meatal stenosis/distal urethral stricture intraoperatively at the time of consolidative surgery.    Plan   Proceed with staging imaging as scheduled  Nursing visit next week for PVR assessment and CIC teaching    40 total minutes spent on the date of the encounter including direct interaction with the patient, performing chart review, documentation and further activities as noted above    Kieran Márquez MD   Department of Urology   [facility]

## 2023-06-16 ENCOUNTER — HOSPITAL ENCOUNTER (OUTPATIENT)
Dept: MRI IMAGING | Facility: CLINIC | Age: 68
Discharge: HOME | End: 2023-06-16
Attending: OBSTETRICS & GYNECOLOGY | Admitting: OBSTETRICS & GYNECOLOGY
Payer: COMMERCIAL

## 2023-06-16 DIAGNOSIS — C52 VAGINAL CANCER (H): ICD-10-CM

## 2023-06-16 PROCEDURE — 72197 MRI PELVIS W/O & W/DYE: CPT | Mod: 26 | Performed by: RADIOLOGY

## 2023-06-16 PROCEDURE — 255N000002 HC RX 255 OP 636: Performed by: OBSTETRICS & GYNECOLOGY

## 2023-06-16 PROCEDURE — A9585 GADOBUTROL INJECTION: HCPCS | Performed by: OBSTETRICS & GYNECOLOGY

## 2023-06-16 PROCEDURE — 72197 MRI PELVIS W/O & W/DYE: CPT

## 2023-06-16 RX ORDER — GADOBUTROL 604.72 MG/ML
10 INJECTION INTRAVENOUS ONCE
Status: COMPLETED | OUTPATIENT
Start: 2023-06-16 | End: 2023-06-16

## 2023-06-16 RX ADMIN — GADOBUTROL 10 ML: 604.72 INJECTION INTRAVENOUS at 15:01

## 2023-06-18 PROBLEM — E66.01 MORBID OBESITY (H): Status: ACTIVE | Noted: 2023-06-18

## 2023-06-18 NOTE — PROGRESS NOTES
"Urology Clinic     HPI  Jessie Tamayo is a 67 year old female with recent diagnosis locally advanced vaginal SCC, here to further discuss the management.    She was initially seen by Dr. Mackenzie for vaginal bleeding and thickening around the urethra causing lower urinary tract symptoms including weak stream and straining.  Office exam revealed a plaque-like lesion in the urethra/vagina and therefore she was taken to the OR for exam under anesthesia, cystoscopy and potential biopsy on 5/30/2023: \"Exam revealed lichenified changes to bilateral labia majora and friable vestibular tissue (patient had significant bleeding from prep alone.) The urethra was somewhat stenotic and would not accommodate 19Fr rigid cystoscope, therefore a 16 Fr flexible cystoscope was inserted. Gentle pressure was required to access the bladder. The ureteral orifices were in their orthotopic positions bilaterally. There were no intravesical stones or diverticuli and the bladder was mildly trabeculated. There were no intravesical lesions. 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  She was then referred to me and also gynecology oncology.  She saw Dr. Reina Stahl on 6/8/2023 and her examination revealed a vaginal tumor: \"When the labia are , a friable mass is visible at the distal anterior vagina, just posterior to the urethra. On bimanual exam, the cervix is small and normal in contour. The palpable vaginal tumor is limited to the anterior distal vagina, approximately 4-5 cm laterally x 3 cm cranio-caudal. Tumor is friable. Remainder of the vagina smooth to palpation. Digital anorectal exam is without nodularity. No palpable tumor in the rectovaginal septum. On speculum exam, the proximal vaginal/cervical exam is limited due to patient " "discomfort. The anterior vaginal tumor cannot be visualized until the speculum is dissembled and only the posterior blade is placed.\"    She is now scheduled for staging imaging in the form of MRI of pelvis and PET scan.  The tentative plan per Dr. Stahl is chemoradiation followed by consolidative surgery.    She reports temporary relief in her urinary symptoms after the procedure with Dr. Mackenzie but he straining and weak stream have recurred and is quite bothersome for her.    PHYSICAL EXAM  BP (!) 146/85   Pulse 69   Ht 1.626 m (5' 4\")   Wt 98.9 kg (218 lb)   LMP 03/08/2008   BMI 37.42 kg/m     Constitutional: AO, pleasant, NAD  Resp: Non-labored breathing on room air  Abd: Soft, NT, ND  : Deferred  Extremities: WWP  Labs  Lab Results   Component Value Date    WBC 6.5 05/24/2023    WBC 8.4 11/11/2009     Lab Results   Component Value Date    RBC 4.23 05/24/2023    RBC 4.00 11/11/2009     Lab Results   Component Value Date    HGB 13.0 05/24/2023    HGB 12.3 11/11/2009     Lab Results   Component Value Date    HCT 39.9 05/24/2023    HCT 37.9 11/11/2009     No components found for: MCT  Lab Results   Component Value Date    MCV 94 05/24/2023    MCV 95 11/11/2009     Lab Results   Component Value Date    MCH 30.7 05/24/2023    MCH 30.8 11/11/2009     Lab Results   Component Value Date    MCHC 32.6 05/24/2023    MCHC 32.5 11/11/2009     Lab Results   Component Value Date    RDW 13.0 05/24/2023    RDW 12.7 11/11/2009     Lab Results   Component Value Date     05/24/2023     11/11/2009        Last Comprehensive Metabolic Panel:  Sodium   Date Value Ref Range Status   05/24/2023 142 133 - 144 mmol/L Final   03/08/2021 138 133 - 144 mmol/L Final     Potassium   Date Value Ref Range Status   05/24/2023 4.2 3.4 - 5.3 mmol/L Final   03/08/2021 4.2 3.4 - 5.3 mmol/L Final     Chloride   Date Value Ref Range Status   05/24/2023 109 94 - 109 mmol/L Final   03/08/2021 106 94 - 109 mmol/L Final     Carbon Dioxide "   Date Value Ref Range Status   03/08/2021 29 20 - 32 mmol/L Final     Carbon Dioxide (CO2)   Date Value Ref Range Status   05/24/2023 28 20 - 32 mmol/L Final     Anion Gap   Date Value Ref Range Status   05/24/2023 5 3 - 14 mmol/L Final   03/08/2021 3 3 - 14 mmol/L Final     Glucose   Date Value Ref Range Status   05/24/2023 103 (H) 70 - 99 mg/dL Final   03/08/2021 83 70 - 99 mg/dL Final     Comment:     Fasting specimen     Urea Nitrogen   Date Value Ref Range Status   05/24/2023 13 7 - 30 mg/dL Final   03/08/2021 14 7 - 30 mg/dL Final     Creatinine   Date Value Ref Range Status   05/24/2023 0.84 0.52 - 1.04 mg/dL Final   03/08/2021 0.84 0.52 - 1.04 mg/dL Final     GFR Estimate   Date Value Ref Range Status   05/24/2023 76 >60 mL/min/1.73m2 Final     Comment:     eGFR calculated using 2021 CKD-EPI equation.   03/08/2021 73 >60 mL/min/[1.73_m2] Final     Comment:     Non  GFR Calc  Starting 12/18/2018, serum creatinine based estimated GFR (eGFR) will be   calculated using the Chronic Kidney Disease Epidemiology Collaboration   (CKD-EPI) equation.       Calcium   Date Value Ref Range Status   05/24/2023 9.0 8.5 - 10.1 mg/dL Final   03/08/2021 9.3 8.5 - 10.1 mg/dL Final     Bilirubin Total   Date Value Ref Range Status   10/15/2008 0.4 0.2 - 1.3 mg/dL Final     Alkaline Phosphatase   Date Value Ref Range Status   10/15/2008 84 40 - 150 U/L Final     ALT   Date Value Ref Range Status   07/06/2011 15 0 - 50 U/L Final     AST   Date Value Ref Range Status   10/15/2008 28 0 - 45 U/L Final       Imaging   No new imaging to review    ASSESSMENT AND PLAN  67 year old female with locally advanced vaginal SCC as well as meatal stenosis    I told Jessie that the plan put in place by gynecology oncology team sounds reasonable and based on the cystoscopy and exam by both Dr. Mackenzie and Favio, it does not appear that urethra or bladder are directly involved.  The meatus however could be involved by the tumor.  I  told her that a distal urethrectomy or meatoplasty could be an option prior to the start of chemoradiation but after further deliberation and discussion with Dr. Mackenzie, I would be worried about the postoperative healing with the start of chemoradiation and potential delay in her treatment.  Therefore I would recommend an nursing visit for PVR assessment and if needed CIC teaching for the period of chemoradiation treatment and then address her meatal stenosis/distal urethral stricture intraoperatively at the time of consolidative surgery.    Plan   Proceed with staging imaging as scheduled  Nursing visit next week for PVR assessment and CIC teaching    40 total minutes spent on the date of the encounter including direct interaction with the patient, performing chart review, documentation and further activities as noted above    Kieran Márquez MD   Department of Urology   [facility]

## 2023-06-20 ENCOUNTER — ANCILLARY PROCEDURE (OUTPATIENT)
Dept: PET IMAGING | Facility: CLINIC | Age: 68
End: 2023-06-20
Attending: OBSTETRICS & GYNECOLOGY
Payer: COMMERCIAL

## 2023-06-20 ENCOUNTER — PATIENT OUTREACH (OUTPATIENT)
Dept: UROLOGY | Facility: CLINIC | Age: 68
End: 2023-06-20
Payer: COMMERCIAL

## 2023-06-20 DIAGNOSIS — C52 VAGINAL CANCER (H): ICD-10-CM

## 2023-06-20 DIAGNOSIS — Z08 ENCOUNTER FOR FOLLOW-UP EXAMINATION AFTER COMPLETED TREATMENT FOR MALIGNANT NEOPLASM: ICD-10-CM

## 2023-06-20 LAB — GLUCOSE SERPL-MCNC: 80 MG/DL (ref 70–99)

## 2023-06-20 RX ORDER — FUROSEMIDE 10 MG/ML
40 INJECTION INTRAMUSCULAR; INTRAVENOUS ONCE
Status: COMPLETED | OUTPATIENT
Start: 2023-06-20 | End: 2023-06-20

## 2023-06-20 RX ORDER — IOPAMIDOL 755 MG/ML
50-125 INJECTION, SOLUTION INTRAVASCULAR ONCE
Status: COMPLETED | OUTPATIENT
Start: 2023-06-20 | End: 2023-06-20

## 2023-06-20 RX ADMIN — IOPAMIDOL 112 ML: 755 INJECTION, SOLUTION INTRAVASCULAR at 09:49

## 2023-06-20 RX ADMIN — FUROSEMIDE 40 MG: 10 INJECTION INTRAMUSCULAR; INTRAVENOUS at 09:49

## 2023-06-20 NOTE — DISCHARGE INSTRUCTIONS

## 2023-06-20 NOTE — TELEPHONE ENCOUNTER
Writer reached out to pt at the request of the provider to inform her of the need of a NV for a PVR and CIC teaching prior to chemoradiation tx.     Pt is going on a planned trip and wont be back until mid July. Pt notes that she would be available July 13th at 3:00. Pt has been scheduled for that day and time.     All questions answered.     Will continue to follow as needed.

## 2023-06-21 NOTE — TELEPHONE ENCOUNTER
"Copied note from provider:    \"  Reina Stahl MD  You 7 minutes ago (12:11 PM)     DT  This patient no longer needs to be in the Pap tracking system. She has a metastatic vaginal cancer and will be completely taken care of by gyn onc.    \"  "

## 2023-06-21 NOTE — TELEPHONE ENCOUNTER
Hi Dr Stahl,    This patient saw you on 6/8/23 for a new diagnosis of vaginal cancer found on a biopsy by urologist Dr Mackenzie. This patient has been being tracked by our pap team as she had HSIL pap, +HPV 16 on 3/28/23 and was recommended to have a colposcopy. Colposcopy not yet done. Recent MRI shows no evidence of invasion to the cervix. Would you still advise the colposcopy to be completed? If so, would you like to do this at your office or have her return to Dr Koenig for this? Thank you    Andie Richard RN BSN, Pap Tracking

## 2023-07-11 NOTE — PATIENT INSTRUCTIONS
SCHEDULING:  -RTC to initiate cisplatin chemosensitization day 1 or 2 of radiation therapy.  -Q2 week follow-up during chemotherapy to assess side-effects (NP, virtual visit).  -Prechemotherapy labs: CBC with diff, CMP, magnesium.      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       PLAN:  1) Vaginal cancer: Plan for primary chemoradiation therapy with curative intent.  -External beam pelvic radiation therapy with weekly chemosensitization with cisplatin x~5 weeks.  -Interstitial brachytherapy (radiation implants in the primary tumor).   -1-month post-chemoradiation visit, then 4-months post-chemoradiation visit with repeat PET/CT and pelvic exam to assess disease response to treatment.    Chemotherapy counseling and written information provided by [facility].    2) Risk of urethral stenosis: Follow-up with urologist Dr. Márquez's nurse for post-void residual assessment and catheterization teaching. If you have trouble voiding/catheterizing during therapy, please call Dr. Márquez's clinic and/or go to the ED for evaluation if after business hours.      Please call with any questions or concerns. [phone]       Reina Stahl MD, MS, FACOG, FACS  7/14/2023  12:46 PM

## 2023-07-11 NOTE — PROGRESS NOTES
Follow-up Note on Referred Patient  Gynecologic Oncology Clinic       Date: 2023       Referring Provider/Urologist:  Dr. 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      History of Present Illness:   Jessie Tamayo (she/her/hers) is a 67 year old referred to the [facility] Gynecologic Oncology HPV Clinic by urologist Dr. Mackenzie for evaluation of a periurethral biopsy showing HPV-associated squamous cell carcinoma. Medical history significat for lichen sclerosus. History as follows:    *HPV vaccination status: unvaccinated    07, 10/15/08, 09, 11: Pap test NILM.     1/23/15: Pap test NILM, hrHPV16+.  2/27/15: Cervical polyp & cervical biopsy pathology: Negative for dysplasia/malignancy.    16: Pap test NILM, hrHPV16+.   16: Endocervical curettings pathology negative for dysplasia/malignancy.    17: Pap test NILM, hrHPV16+.   -Colposcopy recommended, not performed.     18:   -Pap test ASCUS, hrHPV16+.   -Endocervical curettings & cervical biopsy pathology: negative for dysplasia/malignancy.     19:  Pap test ASC-H, hrHPV16+.   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     9/10/20: Pap test ASC-H, hrHPV+ (NOS).  21: LEEP.   -Pathology: LEEP & endocervical curettings: negative for dysplasia/malignancy.    3/23/22: Endocervical curettings & cervical biopsy pathology: negative for dysplasia/malignancy.    3/28/23:   -Pap test HSIL, hrHPV16+.    -Findings by gynecologist Dr. Koenig: External genitalia with erythematous plaques bilaterally  Urethra- mid position and well supported, suburethral tissue thickened and friable with bleeding elicited after placement of the speculum  Vagina is stenotic and cervix difficult to see.   5/30/23: Cystourethroscopy, vaginal biopsy by urologist Dr. Mackenzie.   -Findings: Exam revealed lichenified changes to bilateral labia majora and friable vestibular tissue (patient had significant bleeding from prep alone.) The urethra was somewhat stenotic and would not accommodate 19Fr rigid cystoscope, therefore a 16 Fr flexible cystoscope was inserted. Gentle pressure was required to access the bladder. The ureteral orifices were in their orthotopic positions bilaterally. There were no intravesical stones or diverticuli and the bladder was mildly trabeculated. There were no intravesical lesions. See media tab for urethral pictures - there were no obvious lesions but the entire distal urethra was erythematous and stenosed (16Fr.)  -Pathology: Invasive moderately-differentiated squamous cell carcinoma, HPV-associated, with focus suspicious for lymphovascular space invasion; IHC: p16+.    6/8/23: Gynecologic Oncology consultation.  -Findings:   External genitalia notable for erythema and desquamation of the posterior medial labia, c/w lichen sclerosus changes. When the labia are , a friable mass is visible at the distal anterior vagina, just posterior to the urethra. On bimanual exam, the cervix is small and normal in contour. The palpable vaginal tumor is limited to the anterior distal vagina, approximately 4-5 cm laterally x 3 cm cranio-caudal. Tumor is friable. Remainder of the vagina smooth to palpation. Digital anorectal exam is without nodularity. No palpable tumor in the rectovaginal septum.   Enlarged firm, fixed right inguinal lymph node ~3-4 cm in size.       Subjective:  Jessie returns to the gyn onc clinic today  for a scheduled follow-up visit, accompanied by her  and son.   Jessie had a wonderful time in [place] biking. She had no vaginal bleeding, although did have some yellow discharge; unclear if this is urinary leakage or other discharge. No pain. She thinks the right inguinal mass may have increased in size a bit.   She saw Dr. Márquez's nurse yesterday to learn how to straight cath. She reports that the RN had some difficulty accessing the urethra, and Jessie has not yet tried catheterizing.   Overall, Jessie is feeling well and is ready to start treatment.       Review of Systems:   ROS: 10 point ROS neg other than the symptoms noted above in the HPI.       Past Medical History:  Past Medical History:   Diagnosis Date     Actinic keratosis      Hyperlipemia      Lichen sclerosus 2020     Osteopenias        Past Surgical History:  Past Surgical History:   Procedure Laterality Date     COLONOSCOPY  2006    normal     COLPOSCOPY, BIOPSY, COMBINED N/A 02/08/2021    Procedure: COLPOSCOPY, WITH BIOPSY, LEEP procedure;  Surgeon: Jefe Koenig MD;  Location: [facility] OR     CYSTOSCOPY N/A 05/30/2023    Procedure: CYSTOSCOPY AND EXCISIONAL BIOPSY OF THE VAGINAL TISSUE AROUND THE URETHRA.  (look in the bladder and sample small area in the vagina near the urethra);  Surgeon: Lakeisha Mackenzie MD;  [place] [facility] [place]     EYE SURGERY      cataracts bilateral     OPEN REDUCTION INTERNAL FIXATION FOREARM  07/31/2012    Procedure: OPEN REDUCTION INTERNAL FIXATION FOREARM;  Open Reduction Internal Fixation, Irrigation & Debridment  of Right Distal Radius, application short arm splint;  Surgeon: Wade Beard MD;  Location: [facility] OR     TONSILLECTOMY & ADENOIDECTOMY  1960     [facility] TREAT ECTOPIC PREG,RMV TUBE/OVARY  1985    ectopic, right side-ovary and tube removed       Gynecologic History:  Menopause age unknown.   History of hormone therapy in 2005.   No history of STIs.  Not currently sexually active due to  perineal pain and bleeding since 2019.       Obstetric History:  OB History    Para Term  AB Living   1 0 0 0 1 0   SAB IAB Ectopic Multiple Live Births   0 0 1 0 0      # Outcome Date GA Lbr Felice/2nd Weight Sex Delivery Anes PTL Lv   1 Ectopic            1 adopted child.       Health Maintenance:  Last Mammogram: 3/15/22              Result: normal        Last Colonoscopy: 17              Result: normal (polyps)                    Last DEXA Scan: 11              Result: abnormal (osteopenia)    Last Diabetes Screening; 19 Result: Hemoglobin A1C 5.4%    Last Thyroid Screenin/14/09 Result: Normal TSH    Last Cholest Screening:      3/8/17 Result: Elevated total & LDL cholesterol    COVID vaccination: Completed Moderna vaccine 3/12/21; Boosters 10/28/21, 6/10/22, 22.       Current Medications:   Current Outpatient Medications   Medication     clobetasol (TEMOVATE) 0.05 % external ointment     estradiol (ESTRACE) 0.1 MG/GM vaginal cream     nystatin (MYCOSTATIN) 414763 UNIT/GM external cream     triamcinolone (ARISTOCORT HP) 0.5 % external cream     No current facility-administered medications for this visit.        Allergies:   Allergies   Allergen Reactions     Seasonal Allergies          Social History:  Patient lives with her . Retired, previously worked as a H[employer] at [employer]. She now spends her days cycling and gardening. No activity limitations. She has a son who is a pathologist in the [employer], who trained at [employer].  She does not have Advanced Directives, but has identified her son Prakash Tamayo as her desired Healthcare Power of .    Social History     Tobacco Use     Smoking status: Never     Passive exposure: Never     Smokeless tobacco: Never   Substance Use Topics     Alcohol use: Yes     Comment: very little       History   Drug Use No       Family History:   The patient's family history is notable for a second-degree maternal relative with ovarian  "cancer. No known family history of breast,  uterine, colon, urothelial/renal, prostate or pancreatic cancers.  No known family history of melanoma.   Family History   Problem Relation Age of Onset     Lung Cancer Mother 44     Cerebrovascular Disease Father      Hypertension Father      Alcohol/Drug Father      Obesity Niece         niece     Mental Illness Nephew         nephew     Diabetes Other         paternal aunt     Hyperlipidemia Other      Obesity Other         self     Cervical Cancer Maternal Aunt      Ovarian Cancer Maternal Aunt 60     Obesity Other      Diabetes Other         paternal aunt     Hypertension Other         father     Cerebrovascular Disease Other         father     C.A.D. No family hx of      Breast Cancer No family hx of      Cancer - colorectal No family hx of      Prostate Cancer No family hx of      Melanoma No family hx of        Physical Exam:   BP (!) 145/91 (BP Location: Right arm, Patient Position: Sitting, Cuff Size: Adult Regular)   Pulse 86   Temp 98.8  F (37.1  C) (Oral)   Resp 16   Ht 1.626 m (5' 4\")   Wt 98.2 kg (216 lb 9.6 oz)   LMP 03/08/2008   SpO2 96%   BMI 37.18 kg/m    Body mass index is 37.18 kg/m .    General Appearance: healthy and alert, no distress     Musculoskeletal: extremities without edema    Skin: no lesions or rashes     Neurological: normal gait, no gross defects     Psychiatric: appropriate mood and affect                                 Radiographic Examination:  6/16/23: Pelvic MRI: Radiographic Stage IVB vaginal cancer. I have personally reviewed and interpreted the MRI images.   Irregular enhancing lesion along the anterior vaginal wall  measuring approximately 2.5 x 0.9 cm at the  level of the vaginal introitus with irregular enhancement along the  anterior vagina extending towards the vaginal cuff. The cervix  demonstrates normal enhancement without evidence for invasion.   Partially visualized right inguinal lymphadenopathy concerning " for  metastasis. Partially visualized 3.5 cm short axis node. Noted medial to this measures 3.6 x 2.4 cm. Subcentimeter  left inguinal nodes. Right external iliac borderline node measuring  1.1 cm short axis also noted.  There is subtle asymmetric enhancement in the left sacrum partially  Visualized and sagittal. This region is not fully evaluated on  remainder of the sequences. There may be some minimal restricted  diffusion in this region.    6/20/23: Staging PET/CT: Radiographic Stage IVB vaginal cancer. I have personally reviewed and interpreted the PET/CT images.   HEAD/NECK:  1.3 cm hypoattenuating right thyroid nodule demonstrates mild FDG  uptake with SUV max of 4.6.  There is a 1.0 x 0.9 cm enhancing nodule in the left parotid gland  with hypermetabolic uptake, SUV max of 5.4.  CHEST:   There are scattered sub-4 mm solid pulmonary nodules bilaterally,  below the size threshold for characterization on PET, for example a 2  mm solid pulmonary nodule in the right upper lobe anteriorly. Additionally, there are multiple non-FDG avid nodules  along the major and minor fissures, likely intrafissural lymph nodes.  ABDOMEN AND PELVIS:  Intense focal hypermetabolic uptake within the vaginal introitus with  associated soft tissue thickening on CT, SUV max of 13.6.  There are multiple enlarged, markedly hypermetabolic right inguinal  lymph nodes, the largest most superficial lymph node measures 3.5 x  3.3 x 3.7 cm with SUV  max of 15.6.   Few mildly avid, non-enlarged left inguinal nodes are likely reactive.  Non-avid subcentimeter hypodense lesion in the caudate lobe likely  represents a cyst. Non-avid 1 cm hypodense lesion in the  spleen likely represent an incidental cyst/hemangioma.       Performance Status:  ECOG Grade 0.      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     Medical  history significant for lichen sclerosus.     Social history significant for the fact that her son is a pathologist in the [place] who trained at [facility].    A total of 20 minutes was spent with the patient, 100% of which were spent in counseling the patient and/or treatment planning; an additional 5 minutes was spent in chart review/documentation.      Plan:     1.)    Vaginal cancer: I reviewed the PET/CT images with Jessie, and provided her with copies of the PET/CT and MRI reports; also previously provided her with copies of the reports via [employer]. Imaging results/staging are consistent with the clinical exam. Her case was also discussed at the [facility] gyn onc/radiation oncology tumor conference, and recommendation is for standard-of-care chemoradiation therapy; surgical resection is not an option given the metastatic disease.     After discussion of risks/benefits, Jessie agrees to proceed with the recommended primary chemoradiation therapy with curative intent with weekly IV cisplatin 40 mg/m2 chemosensitization followed by interstitial brachytherapy. Plan as follows:  -Proceed with radiation simulation today as planned.  -Will schedule follow-up for initiation of cisplatin chemosensitization day 1 or 2 of radiation therapy. Chemosensitization plan extrapolated from cervical cancer data. Given the rarity of vaginal cancer, there are no vaginal cancer specific studies and no NCCN guidelines; all treatments are extrapolated from the closely-reated cervical cancer data.   -NP evaluation every 2 weeks to assess chemotherapy side-effects (virtual visits okay).  -RTC ~1 month after completion of chemoradiation therapy for post-therapy visit (MD, virtual or in-person per patient preference), and then 3 months later (4 months post-chemoradiation) with repeat PET/CT and exam to assess diease response (MD, in-person).    Side-effects:  --Chemotherapy counseling to be provided by [facility] via telephone.  --Risk of urethral  stenosis: Instructed in self-catheterization. Follow-up with Dr. Márquez's clinic if she has difficulty.     2.) Lichen sclerosus: Biopsy at right vaginal introitus shows HSIL. This will be included in the radiation field for her vaginal cancer treatment. Vulvar exam is also clinically concerning for lichen sclerosus. Will address topical treatment after skin healing post-radiation therapy.      3.) Genetic risk factors were assessed and the patient does not meet the qualifications for a referral.      4.) Labs and/or tests ordered include: 1) Pre-chemotherapy labs: CBC with diff, CMP, magnesium.     5.) Health maintenance issues addressed today include none.    6.) Code status:  Full-code.    7.) Prescriptions: None; Standard prophylactic antiemetics to be prescribed at the time of chemotherapy.         Reina Stahl MD, MS, FACOG, FACS  7/14/2023  1:31 PM          CC  Patient Care Team:  Alba Layton MD as PCP - General  LibertyJefe russo MD as Assigned OBGYN Provider  Alba Layton MD as Assigned PCP  Lakeisha Matos MD as MD (Urology)  Lawrence Banuelos MD as MD (Dermatology)  Lakeisha Matos MD as Assigned Surgical Provider  Lakeisha Matos MD as MD (Urology)  Reina Stahl MD as MD (Gynecologic Oncology)  Marisa Pacheco MD as MD (Radiation Oncology)  Marisa Pacheco MD as Assigned Cancer Care Provider  LAKEISHA MATOS

## 2023-07-13 ENCOUNTER — OFFICE VISIT (OUTPATIENT)
Dept: UROLOGY | Facility: CLINIC | Age: 68
End: 2023-07-13
Payer: COMMERCIAL

## 2023-07-13 DIAGNOSIS — C52 PRIMARY SQUAMOUS CELL CARCINOMA OF VAGINA (H): Primary | ICD-10-CM

## 2023-07-13 PROCEDURE — 51798 US URINE CAPACITY MEASURE: CPT

## 2023-07-13 NOTE — PROGRESS NOTES
No chief complaint on file.      Patient Active Problem List   Diagnosis     Hyperlipidemia LDL goal <130     Recurrent cold sores     Hearing loss     Pap smear of cervix with ASCUS, cannot exclude HGSIL     Introital dyspareunia     Vaginal atrophy     Cervical high risk HPV (human papillomavirus) test positive     Atrophic vaginitis     Class 1 obesity due to excess calories without serious comorbidity with body mass index (BMI) of 34.0 to 34.9 in adult     Urethral bleeding     Vaginal cancer (H)     Benign neoplasm of colon     Morbid obesity (H)       Allergies   Allergen Reactions     Seasonal Allergies        Current Outpatient Medications   Medication Sig Dispense Refill     clobetasol (TEMOVATE) 0.05 % external ointment Apply topically 2 times daily 60 g 1     estradiol (ESTRACE) 0.1 MG/GM vaginal cream Place 2 g vaginally twice a week 42.5 g 11     nystatin (MYCOSTATIN) 412345 UNIT/GM external cream Apply topically 2 times daily Use with the triamcinolone 15 g 1     triamcinolone (ARISTOCORT HP) 0.5 % external cream Apply topically 2 times daily 15 g 1       Social History     Tobacco Use     Smoking status: Never     Passive exposure: Never     Smokeless tobacco: Never   Vaping Use     Vaping Use: Never used   Substance Use Topics     Alcohol use: Yes     Comment: very little     Drug use: No       Jessie Tamayo comes into clinic today at the request of Dr Márquez for PVR and CIC teaching.    Patient diagnosis: Vaginal cancer    This service provided today was under the direct supervision of Dr Betancourt, who was available if needed.    Jessie Tamayo presented to clinic today to be taught CIC. Patient to catheterize with a 14 Fr female catheter 2 times daily and as needed. Patient educated about the importance of hand hygiene before and after catheterizing. Patient then taught to cleanse urethral meatus. Patient taught CIC in normal clean fashion with a 14fr female catheter  Patient tolerated this without  complications noted. Patient was informed to contact us with any questions or concerns.    Pts PVR 56    Writer will fax order to [facility] per pts preference.     Cyndi Rouse RN  7/13/2023  3:23 PM

## 2023-07-13 NOTE — PROGRESS NOTES
[facility]    Once completed please fax to [phone]  E-script to [email or web address]    A. Please include patient demographics and chart notes (ex: indefinite retention) with this order     Name: Jessie Tamayo   : 1955   Phone: [phone]  Address: [address] [place] [id number]    B. Diagnosis     Retention of urine (788.20/R33.9)   Urinary Incontinence (788.30/R30)    Incomplete Bladder Emptying (788.21/R39-14)   Urge Incontinence (788.31/N39.41)    Other Specified Retention of Urine (788.29/R33.8)  x Other:vaginal cancer     C. Order Information/Start Date: 2023    Number of Refills: lifetime  Length of Need: lifetime     Patient may receive up to a 90-day supply at one time; therefore, quantity will be three (3) times amount below.                                       CHECK PRODUCT Frisian FREQ OF USE QUANTITY PER STRAIGHT  COUDE    ONE  SIZE PER DAY MONTH TO DISPESE     x Intermittent female Catheter 12 3 60 x     Intermittent Catheter with         Insertion Supplies          Condom or External Catheters          ADDITIONAL PRODUCTS/ITEMS ORDERED (check all that apply)     PRODUCT FREQ OF USE QUANTITY PER  ___ Patient Choice     PER MONTH MONTH TO DISPENSE  ___ Bard   x Tube of Lubricant  2 2  __x_ Coloplast    Leg Bags    ___ C[employer] Medical    Night Bags    ___ GentleCath    Penile Clamp (Cunningham)    ___ Hi-Slip    Disinfection/BZK-PDI Wipes    ___ Chesapeake    Thomas Insertion Tray    ___Lo Fric    Vacuum Erection Device    ___Magic3    Thomas Catheters:    ___ MTG    Straight    ___ Tyson-Teleflex    Coude    ___ SpediCath    Arabic size        Balloon size         D. Physician's Information:      Physician's Name: Dr Márquez-Urology  Phone: [phone]  Date: 23    [facility]  [place] [facility]  [address] [place] [address] [address] [place] [address], [address]    Nilam Trejo    Office: [phone]  Mobile: [phone]  Fax: [phone]  [email or web address] [address].[facility]

## 2023-07-14 ENCOUNTER — HOSPITAL ENCOUNTER (OUTPATIENT)
Dept: CT IMAGING | Facility: CLINIC | Age: 68
Discharge: HOME | End: 2023-07-14
Attending: RADIOLOGY
Payer: COMMERCIAL

## 2023-07-14 ENCOUNTER — ONCOLOGY VISIT (OUTPATIENT)
Dept: ONCOLOGY | Facility: CLINIC | Age: 68
End: 2023-07-14
Attending: OBSTETRICS & GYNECOLOGY
Payer: COMMERCIAL

## 2023-07-14 ENCOUNTER — TRANSFERRED RECORDS (OUTPATIENT)
Dept: HEALTH INFORMATION MANAGEMENT | Facility: CLINIC | Age: 68
End: 2023-07-14

## 2023-07-14 ENCOUNTER — OFFICE VISIT (OUTPATIENT)
Dept: RADIATION ONCOLOGY | Facility: CLINIC | Age: 68
End: 2023-07-14
Attending: OBSTETRICS & GYNECOLOGY
Payer: COMMERCIAL

## 2023-07-14 VITALS
WEIGHT: 216.6 LBS | OXYGEN SATURATION: 96 % | BODY MASS INDEX: 36.98 KG/M2 | TEMPERATURE: 98.8 F | RESPIRATION RATE: 16 BRPM | DIASTOLIC BLOOD PRESSURE: 91 MMHG | SYSTOLIC BLOOD PRESSURE: 145 MMHG | HEIGHT: 64 IN | HEART RATE: 86 BPM

## 2023-07-14 DIAGNOSIS — C52 VAGINAL CANCER (H): Primary | ICD-10-CM

## 2023-07-14 DIAGNOSIS — C44.92 SCC (SQUAMOUS CELL CARCINOMA): ICD-10-CM

## 2023-07-14 DIAGNOSIS — C52 VAGINAL CANCER (H): ICD-10-CM

## 2023-07-14 DIAGNOSIS — N36.8 URETHRAL OBSTRUCTION: ICD-10-CM

## 2023-07-14 PROCEDURE — 250N000011 HC RX IP 250 OP 636: Performed by: RADIOLOGY

## 2023-07-14 PROCEDURE — G0463 HOSPITAL OUTPT CLINIC VISIT: HCPCS | Performed by: OBSTETRICS & GYNECOLOGY

## 2023-07-14 PROCEDURE — 999N000128 HC STATISTIC PERIPHERAL IV START W/O US GUIDANCE

## 2023-07-14 PROCEDURE — 99213 OFFICE O/P EST LOW 20 MIN: CPT | Performed by: OBSTETRICS & GYNECOLOGY

## 2023-07-14 PROCEDURE — 77014 CT THERAPY CORRELATE: CPT | Mod: TC

## 2023-07-14 PROCEDURE — 77334 RADIATION TREATMENT AID(S): CPT | Mod: 26 | Performed by: RADIOLOGY

## 2023-07-14 PROCEDURE — 77334 RADIATION TREATMENT AID(S): CPT | Performed by: RADIOLOGY

## 2023-07-14 RX ORDER — DEXTROSE, SODIUM CHLORIDE, AND POTASSIUM CHLORIDE 5; .45; .15 G/100ML; G/100ML; G/100ML
2000 INJECTION INTRAVENOUS ONCE
Status: CANCELLED
Start: 2023-08-28 | End: 2023-08-28

## 2023-07-14 RX ORDER — HEPARIN SODIUM (PORCINE) LOCK FLUSH IV SOLN 100 UNIT/ML 100 UNIT/ML
5 SOLUTION INTRAVENOUS
Status: CANCELLED | OUTPATIENT
Start: 2023-08-14

## 2023-07-14 RX ORDER — ALBUTEROL SULFATE 90 UG/1
1-2 AEROSOL, METERED RESPIRATORY (INHALATION)
Status: CANCELLED
Start: 2023-08-21

## 2023-07-14 RX ORDER — PALONOSETRON 0.05 MG/ML
0.25 INJECTION, SOLUTION INTRAVENOUS ONCE
Status: CANCELLED | OUTPATIENT
Start: 2023-09-04

## 2023-07-14 RX ORDER — MEPERIDINE HYDROCHLORIDE 25 MG/ML
25 INJECTION INTRAMUSCULAR; INTRAVENOUS; SUBCUTANEOUS EVERY 30 MIN PRN
Status: CANCELLED | OUTPATIENT
Start: 2023-08-28

## 2023-07-14 RX ORDER — PALONOSETRON 0.05 MG/ML
0.25 INJECTION, SOLUTION INTRAVENOUS ONCE
Status: CANCELLED | OUTPATIENT
Start: 2023-08-07

## 2023-07-14 RX ORDER — PALONOSETRON 0.05 MG/ML
0.25 INJECTION, SOLUTION INTRAVENOUS ONCE
Status: CANCELLED | OUTPATIENT
Start: 2023-07-31

## 2023-07-14 RX ORDER — IOPAMIDOL 755 MG/ML
120 INJECTION, SOLUTION INTRAVASCULAR ONCE
Status: COMPLETED | OUTPATIENT
Start: 2023-07-14 | End: 2023-07-14

## 2023-07-14 RX ORDER — HEPARIN SODIUM,PORCINE 10 UNIT/ML
5-20 VIAL (ML) INTRAVENOUS DAILY PRN
Status: CANCELLED | OUTPATIENT
Start: 2023-08-14

## 2023-07-14 RX ORDER — HEPARIN SODIUM,PORCINE 10 UNIT/ML
5-20 VIAL (ML) INTRAVENOUS DAILY PRN
Status: CANCELLED | OUTPATIENT
Start: 2023-08-21

## 2023-07-14 RX ORDER — DEXTROSE, SODIUM CHLORIDE, AND POTASSIUM CHLORIDE 5; .45; .15 G/100ML; G/100ML; G/100ML
2000 INJECTION INTRAVENOUS ONCE
Status: CANCELLED
Start: 2023-08-07 | End: 2023-08-07

## 2023-07-14 RX ORDER — DIPHENHYDRAMINE HYDROCHLORIDE 50 MG/ML
50 INJECTION INTRAMUSCULAR; INTRAVENOUS
Status: CANCELLED
Start: 2023-08-14

## 2023-07-14 RX ORDER — METHYLPREDNISOLONE SODIUM SUCCINATE 125 MG/2ML
125 INJECTION, POWDER, LYOPHILIZED, FOR SOLUTION INTRAMUSCULAR; INTRAVENOUS
Status: CANCELLED
Start: 2023-08-14

## 2023-07-14 RX ORDER — HEPARIN SODIUM,PORCINE 10 UNIT/ML
5-20 VIAL (ML) INTRAVENOUS DAILY PRN
Status: CANCELLED | OUTPATIENT
Start: 2023-08-28

## 2023-07-14 RX ORDER — MEPERIDINE HYDROCHLORIDE 25 MG/ML
25 INJECTION INTRAMUSCULAR; INTRAVENOUS; SUBCUTANEOUS EVERY 30 MIN PRN
Status: CANCELLED | OUTPATIENT
Start: 2023-08-14

## 2023-07-14 RX ORDER — DEXTROSE, SODIUM CHLORIDE, AND POTASSIUM CHLORIDE 5; .45; .15 G/100ML; G/100ML; G/100ML
2000 INJECTION INTRAVENOUS ONCE
Status: CANCELLED
Start: 2023-09-04 | End: 2023-09-04

## 2023-07-14 RX ORDER — EPINEPHRINE 1 MG/ML
0.3 INJECTION, SOLUTION INTRAMUSCULAR; SUBCUTANEOUS EVERY 5 MIN PRN
Status: CANCELLED | OUTPATIENT
Start: 2023-09-04

## 2023-07-14 RX ORDER — HEPARIN SODIUM (PORCINE) LOCK FLUSH IV SOLN 100 UNIT/ML 100 UNIT/ML
5 SOLUTION INTRAVENOUS
Status: CANCELLED | OUTPATIENT
Start: 2023-08-07

## 2023-07-14 RX ORDER — MEPERIDINE HYDROCHLORIDE 25 MG/ML
25 INJECTION INTRAMUSCULAR; INTRAVENOUS; SUBCUTANEOUS EVERY 30 MIN PRN
Status: CANCELLED | OUTPATIENT
Start: 2023-08-07

## 2023-07-14 RX ORDER — ALBUTEROL SULFATE 90 UG/1
1-2 AEROSOL, METERED RESPIRATORY (INHALATION)
Status: CANCELLED
Start: 2023-08-07

## 2023-07-14 RX ORDER — LORAZEPAM 2 MG/ML
0.5 INJECTION INTRAMUSCULAR EVERY 4 HOURS PRN
Status: CANCELLED | OUTPATIENT
Start: 2023-08-07

## 2023-07-14 RX ORDER — HEPARIN SODIUM (PORCINE) LOCK FLUSH IV SOLN 100 UNIT/ML 100 UNIT/ML
5 SOLUTION INTRAVENOUS
Status: CANCELLED | OUTPATIENT
Start: 2023-08-21

## 2023-07-14 RX ORDER — ALBUTEROL SULFATE 90 UG/1
1-2 AEROSOL, METERED RESPIRATORY (INHALATION)
Status: CANCELLED
Start: 2023-08-14

## 2023-07-14 RX ORDER — DIPHENHYDRAMINE HYDROCHLORIDE 50 MG/ML
50 INJECTION INTRAMUSCULAR; INTRAVENOUS
Status: CANCELLED
Start: 2023-08-21

## 2023-07-14 RX ORDER — PALONOSETRON 0.05 MG/ML
0.25 INJECTION, SOLUTION INTRAVENOUS ONCE
Status: CANCELLED | OUTPATIENT
Start: 2023-08-14

## 2023-07-14 RX ORDER — EPINEPHRINE 1 MG/ML
0.3 INJECTION, SOLUTION INTRAMUSCULAR; SUBCUTANEOUS EVERY 5 MIN PRN
Status: CANCELLED | OUTPATIENT
Start: 2023-08-14

## 2023-07-14 RX ORDER — DIPHENHYDRAMINE HYDROCHLORIDE 50 MG/ML
50 INJECTION INTRAMUSCULAR; INTRAVENOUS
Status: CANCELLED
Start: 2023-09-04

## 2023-07-14 RX ORDER — METHYLPREDNISOLONE SODIUM SUCCINATE 125 MG/2ML
125 INJECTION, POWDER, LYOPHILIZED, FOR SOLUTION INTRAMUSCULAR; INTRAVENOUS
Status: CANCELLED
Start: 2023-08-21

## 2023-07-14 RX ORDER — EPINEPHRINE 1 MG/ML
0.3 INJECTION, SOLUTION INTRAMUSCULAR; SUBCUTANEOUS EVERY 5 MIN PRN
Status: CANCELLED | OUTPATIENT
Start: 2023-08-07

## 2023-07-14 RX ORDER — HEPARIN SODIUM (PORCINE) LOCK FLUSH IV SOLN 100 UNIT/ML 100 UNIT/ML
5 SOLUTION INTRAVENOUS
Status: CANCELLED | OUTPATIENT
Start: 2023-09-04

## 2023-07-14 RX ORDER — HEPARIN SODIUM (PORCINE) LOCK FLUSH IV SOLN 100 UNIT/ML 100 UNIT/ML
5 SOLUTION INTRAVENOUS
Status: CANCELLED | OUTPATIENT
Start: 2023-07-31

## 2023-07-14 RX ORDER — MEPERIDINE HYDROCHLORIDE 25 MG/ML
25 INJECTION INTRAMUSCULAR; INTRAVENOUS; SUBCUTANEOUS EVERY 30 MIN PRN
Status: CANCELLED | OUTPATIENT
Start: 2023-07-31

## 2023-07-14 RX ORDER — MEPERIDINE HYDROCHLORIDE 25 MG/ML
25 INJECTION INTRAMUSCULAR; INTRAVENOUS; SUBCUTANEOUS EVERY 30 MIN PRN
Status: CANCELLED | OUTPATIENT
Start: 2023-08-21

## 2023-07-14 RX ORDER — PALONOSETRON 0.05 MG/ML
0.25 INJECTION, SOLUTION INTRAVENOUS ONCE
Status: CANCELLED | OUTPATIENT
Start: 2023-08-28

## 2023-07-14 RX ORDER — HEPARIN SODIUM (PORCINE) LOCK FLUSH IV SOLN 100 UNIT/ML 100 UNIT/ML
5 SOLUTION INTRAVENOUS
Status: CANCELLED | OUTPATIENT
Start: 2023-08-28

## 2023-07-14 RX ORDER — EPINEPHRINE 1 MG/ML
0.3 INJECTION, SOLUTION INTRAMUSCULAR; SUBCUTANEOUS EVERY 5 MIN PRN
Status: CANCELLED | OUTPATIENT
Start: 2023-08-28

## 2023-07-14 RX ORDER — ALBUTEROL SULFATE 0.83 MG/ML
2.5 SOLUTION RESPIRATORY (INHALATION)
Status: CANCELLED | OUTPATIENT
Start: 2023-08-28

## 2023-07-14 RX ORDER — ALBUTEROL SULFATE 0.83 MG/ML
2.5 SOLUTION RESPIRATORY (INHALATION)
Status: CANCELLED | OUTPATIENT
Start: 2023-08-07

## 2023-07-14 RX ORDER — LORAZEPAM 2 MG/ML
0.5 INJECTION INTRAMUSCULAR EVERY 4 HOURS PRN
Status: CANCELLED | OUTPATIENT
Start: 2023-07-31

## 2023-07-14 RX ORDER — ALBUTEROL SULFATE 90 UG/1
1-2 AEROSOL, METERED RESPIRATORY (INHALATION)
Status: CANCELLED
Start: 2023-08-28

## 2023-07-14 RX ORDER — DIPHENHYDRAMINE HYDROCHLORIDE 50 MG/ML
50 INJECTION INTRAMUSCULAR; INTRAVENOUS
Status: CANCELLED
Start: 2023-08-07

## 2023-07-14 RX ORDER — HEPARIN SODIUM,PORCINE 10 UNIT/ML
5-20 VIAL (ML) INTRAVENOUS DAILY PRN
Status: CANCELLED | OUTPATIENT
Start: 2023-08-07

## 2023-07-14 RX ORDER — HEPARIN SODIUM,PORCINE 10 UNIT/ML
5-20 VIAL (ML) INTRAVENOUS DAILY PRN
Status: CANCELLED | OUTPATIENT
Start: 2023-09-04

## 2023-07-14 RX ORDER — MEPERIDINE HYDROCHLORIDE 25 MG/ML
25 INJECTION INTRAMUSCULAR; INTRAVENOUS; SUBCUTANEOUS EVERY 30 MIN PRN
Status: CANCELLED | OUTPATIENT
Start: 2023-09-04

## 2023-07-14 RX ORDER — LORAZEPAM 2 MG/ML
0.5 INJECTION INTRAMUSCULAR EVERY 4 HOURS PRN
Status: CANCELLED | OUTPATIENT
Start: 2023-08-14

## 2023-07-14 RX ORDER — LORAZEPAM 2 MG/ML
0.5 INJECTION INTRAMUSCULAR EVERY 4 HOURS PRN
Status: CANCELLED | OUTPATIENT
Start: 2023-09-04

## 2023-07-14 RX ORDER — ALBUTEROL SULFATE 0.83 MG/ML
2.5 SOLUTION RESPIRATORY (INHALATION)
Status: CANCELLED | OUTPATIENT
Start: 2023-08-14

## 2023-07-14 RX ORDER — EPINEPHRINE 1 MG/ML
0.3 INJECTION, SOLUTION INTRAMUSCULAR; SUBCUTANEOUS EVERY 5 MIN PRN
Status: CANCELLED | OUTPATIENT
Start: 2023-07-31

## 2023-07-14 RX ORDER — DEXTROSE, SODIUM CHLORIDE, AND POTASSIUM CHLORIDE 5; .45; .15 G/100ML; G/100ML; G/100ML
2000 INJECTION INTRAVENOUS ONCE
Status: CANCELLED
Start: 2023-08-14 | End: 2023-08-14

## 2023-07-14 RX ORDER — DEXTROSE, SODIUM CHLORIDE, AND POTASSIUM CHLORIDE 5; .45; .15 G/100ML; G/100ML; G/100ML
2000 INJECTION INTRAVENOUS ONCE
Status: CANCELLED
Start: 2023-07-31 | End: 2023-07-31

## 2023-07-14 RX ORDER — METHYLPREDNISOLONE SODIUM SUCCINATE 125 MG/2ML
125 INJECTION, POWDER, LYOPHILIZED, FOR SOLUTION INTRAMUSCULAR; INTRAVENOUS
Status: CANCELLED
Start: 2023-08-07

## 2023-07-14 RX ORDER — PALONOSETRON 0.05 MG/ML
0.25 INJECTION, SOLUTION INTRAVENOUS ONCE
Status: CANCELLED | OUTPATIENT
Start: 2023-08-21

## 2023-07-14 RX ORDER — ALBUTEROL SULFATE 0.83 MG/ML
2.5 SOLUTION RESPIRATORY (INHALATION)
Status: CANCELLED | OUTPATIENT
Start: 2023-08-21

## 2023-07-14 RX ORDER — METHYLPREDNISOLONE SODIUM SUCCINATE 125 MG/2ML
125 INJECTION, POWDER, LYOPHILIZED, FOR SOLUTION INTRAMUSCULAR; INTRAVENOUS
Status: CANCELLED
Start: 2023-09-04

## 2023-07-14 RX ORDER — METHYLPREDNISOLONE SODIUM SUCCINATE 125 MG/2ML
125 INJECTION, POWDER, LYOPHILIZED, FOR SOLUTION INTRAMUSCULAR; INTRAVENOUS
Status: CANCELLED
Start: 2023-08-28

## 2023-07-14 RX ORDER — DEXTROSE, SODIUM CHLORIDE, AND POTASSIUM CHLORIDE 5; .45; .15 G/100ML; G/100ML; G/100ML
2000 INJECTION INTRAVENOUS ONCE
Status: CANCELLED
Start: 2023-08-21 | End: 2023-08-21

## 2023-07-14 RX ORDER — LORAZEPAM 2 MG/ML
0.5 INJECTION INTRAMUSCULAR EVERY 4 HOURS PRN
Status: CANCELLED | OUTPATIENT
Start: 2023-08-21

## 2023-07-14 RX ORDER — LORAZEPAM 2 MG/ML
0.5 INJECTION INTRAMUSCULAR EVERY 4 HOURS PRN
Status: CANCELLED | OUTPATIENT
Start: 2023-08-28

## 2023-07-14 RX ORDER — METHYLPREDNISOLONE SODIUM SUCCINATE 125 MG/2ML
125 INJECTION, POWDER, LYOPHILIZED, FOR SOLUTION INTRAMUSCULAR; INTRAVENOUS
Status: CANCELLED
Start: 2023-07-31

## 2023-07-14 RX ORDER — ALBUTEROL SULFATE 0.83 MG/ML
2.5 SOLUTION RESPIRATORY (INHALATION)
Status: CANCELLED | OUTPATIENT
Start: 2023-07-31

## 2023-07-14 RX ORDER — HEPARIN SODIUM,PORCINE 10 UNIT/ML
5-20 VIAL (ML) INTRAVENOUS DAILY PRN
Status: CANCELLED | OUTPATIENT
Start: 2023-07-31

## 2023-07-14 RX ORDER — EPINEPHRINE 1 MG/ML
0.3 INJECTION, SOLUTION INTRAMUSCULAR; SUBCUTANEOUS EVERY 5 MIN PRN
Status: CANCELLED | OUTPATIENT
Start: 2023-08-21

## 2023-07-14 RX ORDER — ALBUTEROL SULFATE 0.83 MG/ML
2.5 SOLUTION RESPIRATORY (INHALATION)
Status: CANCELLED | OUTPATIENT
Start: 2023-09-04

## 2023-07-14 RX ORDER — ALBUTEROL SULFATE 90 UG/1
1-2 AEROSOL, METERED RESPIRATORY (INHALATION)
Status: CANCELLED
Start: 2023-09-04

## 2023-07-14 RX ORDER — DIPHENHYDRAMINE HYDROCHLORIDE 50 MG/ML
50 INJECTION INTRAMUSCULAR; INTRAVENOUS
Status: CANCELLED
Start: 2023-08-28

## 2023-07-14 RX ORDER — DIPHENHYDRAMINE HYDROCHLORIDE 50 MG/ML
50 INJECTION INTRAMUSCULAR; INTRAVENOUS
Status: CANCELLED
Start: 2023-07-31

## 2023-07-14 RX ORDER — ALBUTEROL SULFATE 90 UG/1
1-2 AEROSOL, METERED RESPIRATORY (INHALATION)
Status: CANCELLED
Start: 2023-07-31

## 2023-07-14 RX ADMIN — IOPAMIDOL 120 ML: 755 INJECTION, SOLUTION INTRAVENOUS at 14:32

## 2023-07-14 ASSESSMENT — PAIN SCALES - GENERAL: PAINLEVEL: NO PAIN (0)

## 2023-07-14 NOTE — NURSING NOTE
"Oncology Rooming Note    July 14, 2023 11:33 AM   Jessie Tamayo is a 67 year old female who presents for:    Chief Complaint   Patient presents with     Oncology Clinic Visit     Follow-up for vaginal cancer.      Initial Vitals: BP (!) 145/91 (BP Location: Right arm, Patient Position: Sitting, Cuff Size: Adult Regular)   Pulse 86   Temp 98.8  F (37.1  C) (Oral)   Resp 16   Ht 1.626 m (5' 4\")   Wt 98.2 kg (216 lb 9.6 oz)   LMP 03/08/2008   SpO2 96%   BMI 37.18 kg/m   Estimated body mass index is 37.18 kg/m  as calculated from the following:    Height as of this encounter: 1.626 m (5' 4\").    Weight as of this encounter: 98.2 kg (216 lb 9.6 oz). Body surface area is 2.11 meters squared.  No Pain (0) Comment: Data Unavailable   Patient's last menstrual period was 03/08/2008.  Allergies reviewed: Yes  Medications reviewed: Yes    Medications: Medication refills not needed today.  Pharmacy name entered into EPIC:    [id number] - [place] - [address] [facility] [address] [facility] - [place] - [address] [facility] - [place] - [address] [facility]    Clinical concerns: Mass on right inguinal area is slightly larger.       Arron Franks              "

## 2023-07-14 NOTE — LETTER
2023         RE: Jessie Tamayo  [address] [facility] [address]        Dear Colleague,    Thank you for referring your patient, Jessie Tamayo, to the [facility]. Please see a copy of my visit note below.    Follow-up Note on Referred Patient  Gynecologic Oncology Clinic       Date: 2023       Referring Provider/Urologist:  Dr. 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      History of Present Illness:   Jessie Tamayo (she/her/hers) is a 67 year old referred to the [facility] Gynecologic Oncology HPV Clinic by urologist Dr. Mackenzie for evaluation of a periurethral biopsy showing HPV-associated squamous cell carcinoma. Medical history significat for lichen sclerosus. History as follows:    *HPV vaccination status: unvaccinated    07, 10/15/08, 09, 11: Pap test NILM.     1/23/15: Pap test NILM, hrHPV16+.  2/27/15: Cervical polyp & cervical biopsy pathology: Negative for dysplasia/malignancy.    16: Pap test NILM, hrHPV16+.   16: Endocervical curettings pathology negative for dysplasia/malignancy.    17: Pap test NILM, hrHPV16+.   -Colposcopy recommended, not performed.     18:   -Pap test ASCUS, hrHPV16+.   -Endocervical curettings & cervical biopsy pathology: negative for dysplasia/malignancy.     19:  Pap test ASC-H, hrHPV16+.   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     9/10/20: Pap test ASC-H, hrHPV+  (NOS).  2/8/21: LEEP.   -Pathology: LEEP & endocervical curettings: negative for dysplasia/malignancy.    3/23/22: Endocervical curettings & cervical biopsy pathology: negative for dysplasia/malignancy.    3/28/23:   -Pap test HSIL, hrHPV16+.   -Findings by gynecologist Dr. Koenig: External genitalia with erythematous plaques bilaterally  Urethra- mid position and well supported, suburethral tissue thickened and friable with bleeding elicited after placement of the speculum  Vagina is stenotic and cervix difficult to see.   5/30/23: Cystourethroscopy, vaginal biopsy by urologist Dr. Mackenzie.   -Findings: Exam revealed lichenified changes to bilateral labia majora and friable vestibular tissue (patient had significant bleeding from prep alone.) The urethra was somewhat stenotic and would not accommodate 19Fr rigid cystoscope, therefore a 16 Fr flexible cystoscope was inserted. Gentle pressure was required to access the bladder. The ureteral orifices were in their orthotopic positions bilaterally. There were no intravesical stones or diverticuli and the bladder was mildly trabeculated. There were no intravesical lesions. See media tab for urethral pictures - there were no obvious lesions but the entire distal urethra was erythematous and stenosed (16Fr.)  -Pathology: Invasive moderately-differentiated squamous cell carcinoma, HPV-associated, with focus suspicious for lymphovascular space invasion; IHC: p16+.    6/8/23: Gynecologic Oncology consultation.  -Findings:   External genitalia notable for erythema and desquamation of the posterior medial labia, c/w lichen sclerosus changes. When the labia are , a friable mass is visible at the distal anterior vagina, just posterior to the urethra. On bimanual exam, the cervix is small and normal in contour. The palpable vaginal tumor is limited to the anterior distal vagina, approximately 4-5 cm laterally x 3 cm cranio-caudal. Tumor is friable. Remainder of the  vagina smooth to palpation. Digital anorectal exam is without nodularity. No palpable tumor in the rectovaginal septum.   Enlarged firm, fixed right inguinal lymph node ~3-4 cm in size.       Subjective:  Jessie returns to the gyn onc clinic today for a scheduled follow-up visit, accompanied by her  and son.   Jessie had a wonderful time in [place] biking. She had no vaginal bleeding, although did have some yellow discharge; unclear if this is urinary leakage or other discharge. No pain. She thinks the right inguinal mass may have increased in size a bit.   She saw Dr. Márquez's nurse yesterday to learn how to straight cath. She reports that the RN had some difficulty accessing the urethra, and Jessie has not yet tried catheterizing.   Overall, Jessie is feeling well and is ready to start treatment.       Review of Systems:   ROS: 10 point ROS neg other than the symptoms noted above in the HPI.       Past Medical History:  Past Medical History:   Diagnosis Date    Actinic keratosis     Hyperlipemia     Lichen sclerosus 2020    Osteopenias        Past Surgical History:  Past Surgical History:   Procedure Laterality Date    COLONOSCOPY  2006    normal    COLPOSCOPY, BIOPSY, COMBINED N/A 02/08/2021    Procedure: COLPOSCOPY, WITH BIOPSY, LEEP procedure;  Surgeon: Jefe Koenig MD;  Location: [facility] OR    CYSTOSCOPY N/A 05/30/2023    Procedure: CYSTOSCOPY AND EXCISIONAL BIOPSY OF THE VAGINAL TISSUE AROUND THE URETHRA.  (look in the bladder and sample small area in the vagina near the urethra);  Surgeon: Lakeisha Mackenzie MD;  [place] [facility] [place]    EYE SURGERY      cataracts bilateral    OPEN REDUCTION INTERNAL FIXATION FOREARM  07/31/2012    Procedure: OPEN REDUCTION INTERNAL FIXATION FOREARM;  Open Reduction Internal Fixation, Irrigation & Debridment  of Right Distal Radius, application short arm splint;  Surgeon: Wade Beard MD;  Location:  OR    TONSILLECTOMY & ADENOIDECTOMY  [address] [place] [address] TREAT  ECTOPIC PREG,RMV TUBE/OVARY      ectopic, right side-ovary and tube removed       Gynecologic History:  Menopause age unknown.   History of hormone therapy in .   No history of STIs.  Not currently sexually active due to perineal pain and bleeding since .       Obstetric History:  OB History    Para Term  AB Living   1 0 0 0 1 0   SAB IAB Ectopic Multiple Live Births   0 0 1 0 0      # Outcome Date GA Lbr Felice/2nd Weight Sex Delivery Anes PTL Lv   1 Ectopic            1 adopted child.       Health Maintenance:  Last Mammogram: 3/15/22              Result: normal        Last Colonoscopy: 17              Result: normal (polyps)                    Last DEXA Scan: 11              Result: abnormal (osteopenia)    Last Diabetes Screening; 19 Result: Hemoglobin A1C 5.4%    Last Thyroid Screenin/14/09 Result: Normal TSH    Last Cholest Screening:      3/8/17 Result: Elevated total & LDL cholesterol    COVID vaccination: Completed Moderna vaccine 3/12/21; Boosters 10/28/21, 6/10/22, 22.       Current Medications:   Current Outpatient Medications   Medication    clobetasol (TEMOVATE) 0.05 % external ointment    estradiol (ESTRACE) 0.1 MG/GM vaginal cream    nystatin (MYCOSTATIN) 378857 UNIT/GM external cream    triamcinolone (ARISTOCORT HP) 0.5 % external cream     No current facility-administered medications for this visit.        Allergies:   Allergies   Allergen Reactions    Seasonal Allergies          Social History:  Patient lives with her . 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 She now spends her days cycling and gardening. No activity limitations. She has a son who is a pathologist in the [facility], who trained at [employer].  She does not have Advanced Directives, but has identified her son Prakash Tamayo as her desired Healthcare Power of .    Social History     Tobacco Use    Smoking status: Never     Passive exposure: Never    Smokeless  "tobacco: Never   Substance Use Topics    Alcohol use: Yes     Comment: very little       History   Drug Use No       Family History:   The patient's family history is notable for a second-degree maternal relative with ovarian cancer. No known family history of breast,  uterine, colon, urothelial/renal, prostate or pancreatic cancers.  No known family history of melanoma.   Family History   Problem Relation Age of Onset    Lung Cancer Mother 44    Cerebrovascular Disease Father     Hypertension Father     Alcohol/Drug Father     Obesity Niece         niece    Mental Illness Nephew         nephew    Diabetes Other         paternal aunt    Hyperlipidemia Other     Obesity Other         self    Cervical Cancer Maternal Aunt     Ovarian Cancer Maternal Aunt 60    Obesity Other     Diabetes Other         paternal aunt    Hypertension Other         father    Cerebrovascular Disease Other         father    C.A.D. No family hx of     Breast Cancer No family hx of     Cancer - colorectal No family hx of     Prostate Cancer No family hx of     Melanoma No family hx of        Physical Exam:   BP (!) 145/91 (BP Location: Right arm, Patient Position: Sitting, Cuff Size: Adult Regular)   Pulse 86   Temp 98.8  F (37.1  C) (Oral)   Resp 16   Ht 1.626 m (5' 4\")   Wt 98.2 kg (216 lb 9.6 oz)   LMP 03/08/2008   SpO2 96%   BMI 37.18 kg/m    Body mass index is 37.18 kg/m .    General Appearance: healthy and alert, no distress     Musculoskeletal: extremities without edema    Skin: no lesions or rashes     Neurological: normal gait, no gross defects     Psychiatric: appropriate mood and affect                                 Radiographic Examination:  6/16/23: Pelvic MRI: Radiographic Stage IVB vaginal cancer. I have personally reviewed and interpreted the MRI images.   Irregular enhancing lesion along the anterior vaginal wall  measuring approximately 2.5 x 0.9 cm at the  level of the vaginal introitus with irregular enhancement " along the  anterior vagina extending towards the vaginal cuff. The cervix  demonstrates normal enhancement without evidence for invasion.   Partially visualized right inguinal lymphadenopathy concerning for  metastasis. Partially visualized 3.5 cm short axis node. Noted medial to this measures 3.6 x 2.4 cm. Subcentimeter  left inguinal nodes. Right external iliac borderline node measuring  1.1 cm short axis also noted.  There is subtle asymmetric enhancement in the left sacrum partially  Visualized and sagittal. This region is not fully evaluated on  remainder of the sequences. There may be some minimal restricted  diffusion in this region.    6/20/23: Staging PET/CT: Radiographic Stage IVB vaginal cancer. I have personally reviewed and interpreted the PET/CT images.   HEAD/NECK:  1.3 cm hypoattenuating right thyroid nodule demonstrates mild FDG  uptake with SUV max of 4.6.  There is a 1.0 x 0.9 cm enhancing nodule in the left parotid gland  with hypermetabolic uptake, SUV max of 5.4.  CHEST:   There are scattered sub-4 mm solid pulmonary nodules bilaterally,  below the size threshold for characterization on PET, for example a 2  mm solid pulmonary nodule in the right upper lobe anteriorly. Additionally, there are multiple non-FDG avid nodules  along the major and minor fissures, likely intrafissural lymph nodes.  ABDOMEN AND PELVIS:  Intense focal hypermetabolic uptake within the vaginal introitus with  associated soft tissue thickening on CT, SUV max of 13.6.  There are multiple enlarged, markedly hypermetabolic right inguinal  lymph nodes, the largest most superficial lymph node measures 3.5 x  3.3 x 3.7 cm with SUV  max of 15.6.   Few mildly avid, non-enlarged left inguinal nodes are likely reactive.  Non-avid subcentimeter hypodense lesion in the caudate lobe likely  represents a cyst. Non-avid 1 cm hypodense lesion in the  spleen likely represent an incidental cyst/hemangioma.       Performance Status:  ECOG  Grade 0.      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     Medical history significant for lichen sclerosus.     Social history significant for the fact that her son is a pathologist in the [place] who trained at [facility].    A total of 20 minutes was spent with the patient, 100% of which were spent in counseling the patient and/or treatment planning; an additional 5 minutes was spent in chart review/documentation.      Plan:     1.)    Vaginal cancer: I reviewed the PET/CT images with Jessie, and provided her with copies of the PET/CT and MRI reports; also previously provided her with copies of the reports via [employer]. Imaging results/staging are consistent with the clinical exam. Her case was also discussed at the [facility] gyn onc/radiation oncology tumor conference, and recommendation is for standard-of-care chemoradiation therapy; surgical resection is not an option given the metastatic disease.     After discussion of risks/benefits, Jessie agrees to proceed with the recommended primary chemoradiation therapy with curative intent with weekly IV cisplatin 40 mg/m2 chemosensitization followed by interstitial brachytherapy. Plan as follows:  -Proceed with radiation simulation today as planned.  -Will schedule follow-up for initiation of cisplatin chemosensitization day 1 or 2 of radiation therapy. Chemosensitization plan extrapolated from cervical cancer data. Given the rarity of vaginal cancer, there are no vaginal cancer specific studies and no NCCN guidelines; all treatments are extrapolated from the closely-reated cervical cancer data.   -NP evaluation every 2 weeks to assess chemotherapy side-effects (virtual visits okay).  -RTC ~1 month after completion of chemoradiation therapy for post-therapy visit (MD, virtual or in-person per patient preference), and then 3 months later (4  months post-chemoradiation) with repeat PET/CT and exam to assess diease response (MD, in-person).    Side-effects:  --Chemotherapy counseling to be provided by SANT[facility] Chavarria via telephone.  --Risk of urethral stenosis: Instructed in self-catheterization. Follow-up with Dr. Márquez's clinic if she has difficulty.     2.) Lichen sclerosus: Biopsy at right vaginal introitus shows HSIL. This will be included in the radiation field for her vaginal cancer treatment. Vulvar exam is also clinically concerning for lichen sclerosus. Will address topical treatment after skin healing post-radiation therapy.      3.) Genetic risk factors were assessed and the patient does not meet the qualifications for a referral.      4.) Labs and/or tests ordered include: 1) Pre-chemotherapy labs: CBC with diff, CMP, magnesium.     5.) Health maintenance issues addressed today include none.    6.) Code status:  Full-code.    7.) Prescriptions: None; Standard prophylactic antiemetics to be prescribed at the time of chemotherapy.         Reina Stahl MD, MS, FACOG, FACS  7/14/2023  1:31 PM          CC  Patient Care Team:  Alba Layton MD as PCP - General

## 2023-07-14 NOTE — LETTER
7/14/2023         RE: Jessie Tamayo  [address] [place] [id number]        Dear Colleague,    Thank you for referring your patient, Jessie Tamayo, to the [facility] RADIATION ONCOLOGY. Please see a copy of my visit note below.    A radiation therapy treatment planning simulation was performed.  Please see the [facility] record for documentation.    Marisa Pacheco MD  Radiation Oncology      Again, thank you for allowing me to participate in the care of your patient.        Sincerely,        Marisa Pacheco MD

## 2023-07-14 NOTE — PROGRESS NOTES
A radiation therapy treatment planning simulation was performed.  Please see the [facility] record for documentation.    Marisa Pacheco MD  Radiation Oncology

## 2023-07-19 ASSESSMENT — ENCOUNTER SYMPTOMS
HEMATURIA: 0
DECREASED CONCENTRATION: 0
FLANK PAIN: 1
INSOMNIA: 1
DIFFICULTY URINATING: 0
NERVOUS/ANXIOUS: 1
DEPRESSION: 0
DECREASED LIBIDO: 0
HOT FLASHES: 0
BRUISES/BLEEDS EASILY: 0
DYSURIA: 1
PANIC: 0
SWOLLEN GLANDS: 1

## 2023-07-20 NOTE — PROGRESS NOTES
Virtual Visit Details    Type of service:  Vid:eo Visit   Video Start Time:  8:04 am  Video End Time: 8:56 am    Originating Location (pt. 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  She presents for a disease management visit by video.    Oncology History:  07, 10/15/08, 09, 11: Pap test NILM.      1/23/15: Pap test NILM, hrHPV16+.  2/27/15: Cervical polyp & cervical biopsy pathology: Negative for dysplasia/malignancy.     16: Pap test NILM, hrHPV16+.   16: Endocervical curettings pathology negative for dysplasia/malignancy.     17: Pap test NILM, hrHPV16+.   -Colposcopy recommended, not performed.      18:   -Pap test ASCUS, hrHPV16+.   -Endocervical curettings & cervical biopsy pathology: negative for dysplasia/malignancy.      19:  Pap test ASC-H, hrHPV16+.   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      9/10/20: Pap test ASC-H, hrHPV+ (NOS).  21: LEEP.   -Pathology: LEEP & endocervical curettings: negative for dysplasia/malignancy.     3/23/22: Endocervical curettings & cervical biopsy pathology: negative for dysplasia/malignancy.     3/28/23:   -Pap test HSIL, hrHPV16+.   -Findings by gynecologist Dr. Koenig: External genitalia with erythematous plaques bilaterally  Urethra- mid position and well supported, suburethral tissue thickened and friable with bleeding elicited after placement of the speculum  Vagina is stenotic and cervix difficult to see.   23: Cystourethroscopy, vaginal biopsy by urologist Dr. Mackenzie.   -Findings:  Exam revealed lichenified changes to bilateral labia majora and friable vestibular tissue (patient had significant bleeding from prep alone.) The urethra was somewhat stenotic and would not accommodate 19Fr rigid cystoscope, therefore a 16 Fr flexible cystoscope was inserted. Gentle pressure was required to access the bladder. The ureteral orifices were in their orthotopic positions bilaterally. There were no intravesical stones or diverticuli and the bladder was mildly trabeculated. There were no intravesical lesions. See media tab for urethral pictures - there were no obvious lesions but the entire distal urethra was erythematous and stenosed (16Fr.)  -Pathology: Invasive moderately-differentiated squamous cell carcinoma, HPV-associated, with focus suspicious for lymphovascular space invasion; IHC: p16+.    6/8/23: Gynecologic Oncology consultation.  -Findings:   External genitalia notable for erythema and desquamation of the posterior medial labia, c/w lichen sclerosus changes. When the labia are , a friable mass is visible at the distal anterior vagina, just posterior to the urethra. On bimanual exam, the cervix is small and normal in contour. The palpable vaginal tumor is limited to the anterior distal vagina, approximately 4-5 cm laterally x 3 cm cranio-caudal. Tumor is friable. Remainder of the vagina smooth to palpation. Digital anorectal exam is without nodularity. No palpable tumor in the rectovaginal septum.   Enlarged firm, fixed right inguinal lymph node ~3-4 cm in size.     6/16/23: Pelvic MRI  IMPRESSION:   1. Irregular enhancing lesion along the anterior vaginal wall at the  level of the introitus measuring 2.5 x 0.9 cm with irregular  enhancement along the anterior vaginal wall towards the level of the  vaginal cuff however there is no definitive evidence or abnormal  signal intensity involving the cervix to suggest invasion.  2. Right inguinal lymphadenopathy compatible with metastatic  disease.   3. There is some asymmetric enhancement involving the left sacrum,  incompletely evaluated on this study. Further evaluation of this and  further staging of the chest, abdomen and pelvis will be performed on  PET/CT scheduled for 6/20/2023.    6/20/23: PET CT  IMPRESSION:   1. Intense focal hypermetabolic FDG uptake in biopsy-proven vaginal  squamous cell carcinoma.  2. Multiple enlarged, hypermetabolic right inguinal nodes likely  represent regional metastasis. No definite evidence of distant  metastatic disease.  3. Scattered sub-4 mm solid pulmonary nodules are below the size  threshold for characterization on PET. Attention on follow-up with CT.  4. 1.0 cm enhancing left parotid gland nodule with hypermetabolic  uptake could represent a primary parotid neoplasm such as pleomorphic  adenoma or Warthin's tumor; consider ultrasound for further  evaluation.  5. 1.3 cm hypoattenuating right thyroid nodule with mild FDG uptake  warrants ultrasound for characterization.     Plan: Cisplatin radiation                Today she reports feeling well overall but is having increasing pelvic discomfort and urethral pain.  She is concerned she may have a UTI.  She did reach out to urology yesterday who offered a suprapubic catheter versus a Thomas catheter as she has been unable to successfully cath herself due to the periurethral mass.  She is urinating in small amounts frequently.  She does not feel as though she cannot void.  No fevers or chills.  She is leaning towards choosing a suprapubic catheter which she can have placed tomorrow no vaginal bleeding.  She is having some yellow vaginal/urethral discharge.  She did have some leg swelling after flying that has since resolved.  She is drinking about 40 to 60 ounces of water per day.  Has some hot flashes and feels like she did when she went through menopause.  Normal bowel function.  No nausea or vomiting.  Mild decrease in her appetite but has been able to eat  and drink without difficulty.  No weight loss.  No chest pain or shortness of breath.  No history of diabetes.  No baseline numbness or tingling in her fingers or toes.  She is hoping to not need a port placed.  She has noticed that her right inguinal mass has increased in size.  It is firm and mobile as it has been.              Review of Systems     Constitutional:  Negative for fever, chills, weight loss, weight gain, fatigue, decreased appetite, night sweats, recent stressors, height gain, height loss, post-operative complications, incisional pain, hallucinations, increased energy, hyperactivity and confused.   HENT:  Negative for ear pain, hearing loss, tinnitus, nosebleeds, trouble swallowing, hoarse voice, mouth sores, sore throat, ear discharge, tooth pain, gum tenderness, taste disturbance, smell disturbance, hearing aid, bleeding gums, dry mouth, sinus pain, sinus congestion and neck mass.    Eyes:  Negative for double vision, eye pain, redness, eye pain, decreased vision, eye watering, eye bulging, eye dryness, flashing lights, spots, floaters, strabismus, tunnel vision, jaundice and eye irritation.   Respiratory:   Negative for cough, hemoptysis, sputum production, shortness of breath, wheezing, sleep disturbances due to breathing, snores loudly, respiratory pain, dyspnea on exertion, cough disturbing sleep and postural dyspnea.    Cardiovascular:  Positive for edema. Negative for chest pain, dyspnea on exertion, palpitations, orthopnea, claudication, leg swelling, fingers/toes turn blue, hypertension, hypotension, syncope, history of heart murmur, chest pain on exertion, chest pain at rest, pacemaker, few scattered varicosities, leg pain, sleep disturbances due to breathing, tachycardia, light-headedness and exercise intolerance.   Gastrointestinal:  Negative for heartburn, nausea, vomiting, abdominal pain, diarrhea, constipation, blood in stool, melena, rectal pain, bloating, hemorrhoids, bowel  incontinence, jaundice, rectal bleeding, coffee ground emesis and change in stool.   Genitourinary:  Positive for bladder incontinence, dysuria, urgency, flank pain and nocturia. Negative for hematuria, vaginal discharge, difficulty urinating, genital sores, dyspareunia, decreased libido, voiding less frequently, arousal difficulty, abnormal vaginal bleeding, excessive menstruation, menstrual changes, hot flashes, vaginal dryness and postmenopausal bleeding.   Musculoskeletal:  Negative for myalgias, back pain, joint swelling, arthralgias, stiffness, muscle cramps, neck pain, bone pain, muscle weakness and fracture.   Skin:  Negative for nail changes, itching, poor wound healing, rash, hair changes, skin changes, acne, warts, poor wound healing, scarring, flaky skin, Raynaud's phenomenon, sensitivity to sunlight and skin thickening.   Neurological:  Negative for dizziness, tingling, tremors, speech change, seizures, loss of consciousness, weakness, light-headedness, numbness, headaches, disturbances in coordination, extremity numbness, memory loss, difficulty walking and paralysis.   Endo/Heme:  Positive for swollen glands. Negative for anemia and bruises/bleeds easily.   Psychiatric/Behavioral:  Negative for depression, hallucinations, memory loss, decreased concentration, mood swings and panic attacks.    Breast:  Negative for breast discharge, breast mass, breast pain and nipple retraction.   Endocrine:  Negative for altered temperature regulation, polyphagia, polydipsia, unwanted hair growth and change in facial hair.        Past Medical History:    Past Medical History:   Diagnosis Date    Actinic keratosis     Hyperlipemia     Lichen sclerosus 2020    Osteopenias          Past Surgical History:    Past Surgical History:   Procedure Laterality Date    COLONOSCOPY  2006    normal    COLPOSCOPY, BIOPSY, COMBINED N/A 02/08/2021    Procedure: COLPOSCOPY, WITH BIOPSY, LEEP procedure;  Surgeon: Jefe Koenig,  MD;  Location: [facility] OR    CYSTOSCOPY N/A 05/30/2023    Procedure: CYSTOSCOPY AND EXCISIONAL BIOPSY OF THE VAGINAL TISSUE AROUND THE URETHRA.  (look in the bladder and sample small area in the vagina near the urethra);  Surgeon: Lakeisha Mackenzie MD;  Location: [facility] OR    EYE SURGERY      cataracts bilateral    OPEN REDUCTION INTERNAL FIXATION FOREARM  07/31/2012    Procedure: OPEN REDUCTION INTERNAL FIXATION FOREARM;  Open Reduction Internal Fixation, Irrigation & Debridment  of Right Distal Radius, application short arm splint;  Surgeon: Wade Beard MD;  Location: [facility] OR    TONSILLECTOMY & ADENOIDECTOMY  1960    [facility] TREAT ECTOPIC PREG,RMV TUBE/OVARY  1985    ectopic, right side-ovary and tube removed         Health Maintenance Due   Topic Date Due    COVID-19 Vaccine (6 - Moderna series) 03/11/2023    MEDICARE ANNUAL WELLNESS VISIT  05/26/2023    COLPOSCOPY  06/28/2023       Current Medications:     Current Outpatient Medications   Medication Sig Dispense Refill    clobetasol (TEMOVATE) 0.05 % external ointment Apply topically 2 times daily 60 g 1    estradiol (ESTRACE) 0.1 MG/GM vaginal cream Place 2 g vaginally twice a week 42.5 g 11    nystatin (MYCOSTATIN) 072151 UNIT/GM external cream Apply topically 2 times daily Use with the triamcinolone 15 g 1    triamcinolone (ARISTOCORT HP) 0.5 % external cream Apply topically 2 times daily 15 g 1         Allergies:        Allergies   Allergen Reactions    Seasonal Allergies         Social History:     Social History     Tobacco Use    Smoking status: Never     Passive exposure: Never    Smokeless tobacco: Never   Substance Use Topics    Alcohol use: Yes     Comment: very little       History   Drug Use No         Family History:     The patient's family history is notable for:    Family History   Problem Relation Age of Onset    Lung Cancer Mother 44    Cerebrovascular Disease Father     Hypertension Father     Alcohol/Drug Father     Obesity Niece         " niece    Mental Illness Nephew         nephew    Diabetes Other         paternal aunt    Hyperlipidemia Other     Obesity Other         self    Cervical Cancer Maternal Aunt     Ovarian Cancer Maternal Aunt 60    Obesity Other     Diabetes Other         paternal aunt    Hypertension Other         father    Cerebrovascular Disease Other         father    C.A.D. No family hx of     Breast Cancer No family hx of     Cancer - colorectal No family hx of     Prostate Cancer No family hx of     Melanoma No family hx of          Physical Exam:     Ht 1.626 m (5' 4\")   Wt 98 kg (216 lb)   LMP 03/08/2008   BMI 37.08 kg/m    Body mass index is 37.08 kg/m .      General Appearance: healthy and alert, no distress    Eyes:  Eyes grossly normal to inspection.  No discharge or erythema, or obvious scleral/conjunctival abnormalities.    Respiratory: No audible wheeze, cough, or visible cyanosis.  No visible retractions or increased work of breathing.     Musculoskeletal: extremities non tender and without edema    Skin: no lesions or rashes on visible skin    Neurological: normal gait, no gross defects     Psychiatric: appropriate mood and affect. Mentation appears normal, affect normal/bright, judgement and insight intact, normal speech and appearance well-groomed                            The rest of a comprehensive physical examination is deferred due to video visit restrictions.      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  She presents for a disease management visit by video.      70 minutes spent on the date of the encounter doing chart review, history and exam, documentation, and further activities as noted above.              Plan:     1.) Vaginal cancer:  Reviewed patient s diagnosis and treatment plan (including cisplatin) as recommended by Dr. Stahl. The goal of treatment is curative intent.  Plan for MD visit ~1 month after " completion of chemoradiation therapy for post-therapy visit (virtual or in-person per patient preference), and then 3 months later (4 months post-chemoradiation) with repeat PET/CT and exam to assess diease response (MD, in-person).  Currently scheduled appointments available via My Chart.  Reviewed signs and symptoms for when she should contact the clinic or seek additional care; and contact information of the Gynecologic Oncology Clinic.  Patient to contact the clinic with any questions or concerns in the interim.     Discussed that the administration of chemotherapy can carry certain risks, including failure to obtain the desired result, discomforts, injury, and the need for additional therapy. Reviewed possible side effects of these drugs including: Allergic reaction; Pancytopenia including Anemia causing weakness/fatigue, Low WBC causing infection, Low platelets causing bruising/bleeding; Mouth sores; Hair loss; Fatigue; Brief periods of forgetfulness; Nausea and vomiting; Neuropathy; Diarrhea/Constipation; Hair loss; Skin and nail changes; Liver effects; Heart effects; Kidney/Bladder effects; Lung effects; Loss of appetite; Weight gain or loss; Visual/Auditory changes; Sexual effects; Mood effects; Menopausal symptoms; Reproductive/Fertility Effects; Other. Discussed that the patient may have side effects from chemotherapy that have not been discussed today because each patient may respond differently to chemotherapy and could have side effects that have not been previously reported by others.     Discussed ways to manage certain side effects at home and when to call the clinic or go to the emergency department.     Patient was provided via mail by [facility] with a copy of [facility] drug information regarding (cisplation drugs); [employer] [facility] Cancer Care packet including: Getting Ready for Chemotherapy, Comparing Chemotherapy, Immunotherapy, and targeted Therapy, Your Cancer Care team and [facility]  Understanding Clinical Trials, Honoring Choices, Cancer Care Services, and Integrative Therapies.     Reviewed resources available including nutrition services, rehabilitation and exercise, pharmacy services, tobacco cessation programs, social work services, spiritual health, cancer risk management program, cancer survivorship program, and palliative care program.     Discussed nutrition and the importance of eating small frequent meal, high protein, and drinking 8-10 glasses of noncaffeinated and nonalcoholic beverages.         Genetic risk factors were assessed and she does not meet qualification for referral.    Labs and/or tests ordered include:  None.     2.) Health maintenance:  Issues addressed today include following up with PCP for annual health maintenance and non-gynecologic issues.     3.) Dysuria: Increased pain and burning with urination.  Unable to successfully straight cath at home.  She has been in touch with urology who offered a suprapubic catheter.  Let her know that from our perspective a suprapubic catheter is ok.  Encouraged her to reach out to radiation oncology so they are aware and can weigh in but suspect it will be ok from their perspective as well.  She should also let urology know about her increased pelvic pressure and discomfort with urination as she may have a UTI but these may also be symptoms from the mass.  Regardless they may recommend an antibiotic.         Andie Rios, DNP, APRN, FNP-C  Nurse Practitioner   Division of Gynecologic Oncology  Pager: [phone]     CC  Patient Care Team:  Alba Layton MD as PCP - Jefe Mosley MD as Assigned OBGYN Provider  Alba Layton MD as Assigned PCP  Lakeisha Mackenzie MD as MD (Urology)  Lawrence Banuelos MD as MD (Dermatology)  Lakeisha Mackenzie MD as Assigned Surgical Provider  Lakeisha Mackenzie MD as MD (Urology)  Reina Stahl MD as MD (Gynecologic Oncology)  Marisa Pacheco MD as MD  (Radiation Oncology)  Marisa Pacheco MD as Assigned Cancer Care Provider  SELF, REFERRED

## 2023-07-24 ENCOUNTER — PATIENT OUTREACH (OUTPATIENT)
Dept: UROLOGY | Facility: CLINIC | Age: 68
End: 2023-07-24
Payer: COMMERCIAL

## 2023-07-24 NOTE — TELEPHONE ENCOUNTER
Writer reached out to pt to discuss options regarding difficulty cathing.     Per provider, pt could have an SPT placed for the duration of her treatment or an indwelling catheter that is exchanged every 2 weeks.     No answer, left generic name and number.     Will continue to follow as needed

## 2023-07-25 ENCOUNTER — VIRTUAL VISIT (OUTPATIENT)
Dept: ONCOLOGY | Facility: CLINIC | Age: 68
End: 2023-07-25
Attending: NURSE PRACTITIONER
Payer: COMMERCIAL

## 2023-07-25 ENCOUNTER — TELEPHONE (OUTPATIENT)
Dept: UROLOGY | Facility: CLINIC | Age: 68
End: 2023-07-25

## 2023-07-25 ENCOUNTER — APPOINTMENT (OUTPATIENT)
Dept: RADIATION ONCOLOGY | Facility: CLINIC | Age: 68
End: 2023-07-25
Attending: OBSTETRICS & GYNECOLOGY
Payer: COMMERCIAL

## 2023-07-25 ENCOUNTER — LAB (OUTPATIENT)
Dept: LAB | Facility: CLINIC | Age: 68
End: 2023-07-25
Payer: COMMERCIAL

## 2023-07-25 VITALS — BODY MASS INDEX: 36.88 KG/M2 | WEIGHT: 216 LBS | HEIGHT: 64 IN

## 2023-07-25 DIAGNOSIS — N39.0 UTI (URINARY TRACT INFECTION): Primary | ICD-10-CM

## 2023-07-25 DIAGNOSIS — C52 VAGINAL CANCER (H): Primary | ICD-10-CM

## 2023-07-25 DIAGNOSIS — N39.0 UTI (URINARY TRACT INFECTION): ICD-10-CM

## 2023-07-25 PROCEDURE — 87086 URINE CULTURE/COLONY COUNT: CPT | Performed by: UROLOGY

## 2023-07-25 PROCEDURE — 99000 SPECIMEN HANDLING OFFICE-LAB: CPT | Performed by: PATHOLOGY

## 2023-07-25 PROCEDURE — 99417 PROLNG OP E/M EACH 15 MIN: CPT | Mod: VID | Performed by: NURSE PRACTITIONER

## 2023-07-25 PROCEDURE — 99215 OFFICE O/P EST HI 40 MIN: CPT | Mod: VID | Performed by: NURSE PRACTITIONER

## 2023-07-25 ASSESSMENT — ENCOUNTER SYMPTOMS
VOMITING: 0
BOWEL INCONTINENCE: 0
NAIL CHANGES: 0
SMELL DISTURBANCE: 0
MEMORY LOSS: 0
EYE IRRITATION: 0
NAUSEA: 0
LOSS OF CONSCIOUSNESS: 0
ABDOMINAL PAIN: 0
NERVOUS/ANXIOUS: 1
DOUBLE VISION: 0
TREMORS: 0
PARALYSIS: 0
HEADACHES: 0
EXTREMITY NUMBNESS: 0
JAUNDICE: 0
EXERCISE INTOLERANCE: 0
STIFFNESS: 0
POLYPHAGIA: 0
FLANK PAIN: 1
DIFFICULTY URINATING: 0
PALPITATIONS: 0
SKIN CHANGES: 0
SPUTUM PRODUCTION: 0
TASTE DISTURBANCE: 0
DISTURBANCES IN COORDINATION: 0
ALTERED TEMPERATURE REGULATION: 0
BREAST MASS: 0
EYE WATERING: 0
DIARRHEA: 0
NECK PAIN: 0
DYSPNEA ON EXERTION: 0
DYSURIA: 1
HEMOPTYSIS: 0
HEMATURIA: 0
SINUS PAIN: 0
HOT FLASHES: 0
WHEEZING: 0
SPEECH CHANGE: 0
BLOOD IN STOOL: 0
EYE PAIN: 0
SYNCOPE: 0
NUMBNESS: 0
CONSTIPATION: 0
POLYDIPSIA: 0
FEVER: 0
MUSCLE CRAMPS: 0
SLEEP DISTURBANCES DUE TO BREATHING: 0
LEG PAIN: 0
WEIGHT LOSS: 0
COUGH: 0
POSTURAL DYSPNEA: 0
DECREASED CONCENTRATION: 0
BACK PAIN: 0
HYPERTENSION: 0
PANIC: 0
LEG SWELLING: 0
ORTHOPNEA: 0
ARTHRALGIAS: 0
FATIGUE: 0
SHORTNESS OF BREATH: 0
MYALGIAS: 0
LIGHT-HEADEDNESS: 0
HOARSE VOICE: 0
NECK MASS: 0
WEAKNESS: 0
TINGLING: 0
RECTAL PAIN: 0
POOR WOUND HEALING: 0
RESPIRATORY PAIN: 0
DEPRESSION: 0
BREAST PAIN: 0
EYE REDNESS: 0
HEARTBURN: 0
TROUBLE SWALLOWING: 0
COUGH DISTURBING SLEEP: 0
HYPOTENSION: 0
JOINT SWELLING: 0
DECREASED APPETITE: 0
CLAUDICATION: 0
HALLUCINATIONS: 0
CHILLS: 0
TACHYCARDIA: 0
SWOLLEN GLANDS: 1
DECREASED LIBIDO: 0
SORE THROAT: 0
BRUISES/BLEEDS EASILY: 0
DIZZINESS: 0
BLOATING: 0
INSOMNIA: 1
RECTAL BLEEDING: 0
SNORES LOUDLY: 0
WEIGHT GAIN: 0
SEIZURES: 0
NIGHT SWEATS: 0
INCREASED ENERGY: 0
SINUS CONGESTION: 0
MUSCLE WEAKNESS: 0

## 2023-07-25 ASSESSMENT — PAIN SCALES - GENERAL: PAINLEVEL: NO PAIN (0)

## 2023-07-25 NOTE — NURSING NOTE
Is the patient currently in the state of [place]? YES    Visit mode:VIDEO    If the visit is dropped, the patient can be reconnected by: VIDEO VISIT: Send to e-mail at: [email or web address]    Will anyone else be joining the visit? NO      How would you like to obtain your AVS? MyChart    Are changes needed to the allergy or medication list? NO    Reason for visit: CAMILO GIBSON, VF

## 2023-07-25 NOTE — PATIENT INSTRUCTIONS
When to call for help:   A fever of 100.4 F or greater   Shaking or chills   Shortness of breath   Repeated signs of bleeding, such as nose bleeds, easy bruising or black tarry stools   Mouth sores that stop you from eating or cause pain when you swallow   Nausea and vomiting that do not get better   Diarrhea that does not get better   Extreme weakness   Feeling confused or extremely sleepy   Constipation for more than 48 hours   Swelling of feet or arms   Any new symptoms that you did not have before your treatments.    SANTOS Chavarria [phone]  The phone number for triage is [phone]  The phone number for the hospital is [phone], ask the  to page the gyn onc resident on call.

## 2023-07-25 NOTE — TELEPHONE ENCOUNTER
Patient calling to say she is not happy with her care and wanted to speak with Dr Mackenzie to discuss plan of care.   She reports urinary frequency, urinary urgency, flank pain, and blood in her urine after she tries to self catheterize.   Patient requested to discuss plan of care with Dr Mackenzie   Scheduled patient for UA/UC and appointment tomorrow with Dr Avril Helton, RN, BSN  Care Coordinator Urology  [facility]  Urology Clinic  [phone]

## 2023-07-26 ENCOUNTER — HOSPITAL ENCOUNTER (OUTPATIENT)
Facility: CLINIC | Age: 68
End: 2023-07-26
Attending: UROLOGY | Admitting: UROLOGY
Payer: COMMERCIAL

## 2023-07-26 ENCOUNTER — VIRTUAL VISIT (OUTPATIENT)
Dept: UROLOGY | Facility: CLINIC | Age: 68
End: 2023-07-26
Payer: COMMERCIAL

## 2023-07-26 DIAGNOSIS — N30.01 ACUTE CYSTITIS WITH HEMATURIA: Primary | ICD-10-CM

## 2023-07-26 DIAGNOSIS — C44.92 SCC (SQUAMOUS CELL CARCINOMA): Primary | ICD-10-CM

## 2023-07-26 DIAGNOSIS — R33.9 URINARY RETENTION: Primary | ICD-10-CM

## 2023-07-26 PROCEDURE — 99215 OFFICE O/P EST HI 40 MIN: CPT | Mod: VID | Performed by: UROLOGY

## 2023-07-26 RX ORDER — SULFAMETHOXAZOLE/TRIMETHOPRIM 800-160 MG
1 TABLET ORAL 2 TIMES DAILY
Qty: 10 TABLET | Refills: 0 | Status: SHIPPED | OUTPATIENT
Start: 2023-07-26 | End: 2023-08-01

## 2023-07-26 RX ORDER — CEFAZOLIN SODIUM 2 G/50ML
2 SOLUTION INTRAVENOUS
Status: CANCELLED | OUTPATIENT
Start: 2023-07-26

## 2023-07-26 RX ORDER — CEFAZOLIN SODIUM 2 G/50ML
2 SOLUTION INTRAVENOUS SEE ADMIN INSTRUCTIONS
Status: CANCELLED | OUTPATIENT
Start: 2023-07-26

## 2023-07-26 ASSESSMENT — PAIN SCALES - GENERAL: PAINLEVEL: EXTREME PAIN (8)

## 2023-07-26 NOTE — NURSING NOTE
Is the patient currently in the state of [place]? YES    Visit mode:VIDEO    If the visit is dropped, the patient can be reconnected by: VIDEO VISIT: Send to e-mail at: [email or web address]    Will anyone else be joining the visit? NO      How would you like to obtain your AVS? MyChart    Are changes needed to the allergy or medication list? NO    Reason for visit: RECHECK (Discuss care and plan )    Shelby Kocher

## 2023-07-26 NOTE — PROGRESS NOTES
Virtual Visit Details    Type of service:  Video Visit   Video Start Time: 2:19 PM  Video End Time:2:36 PM    Originating Location (pt. Location): Home    Distant Location (provider location):  Off-site  Platform used for Video Visit: [employer]

## 2023-07-26 NOTE — PROGRESS NOTES
"Reason for Visit:  F/u on self cathing    Clinical Data:  Ms. Tamayo is a 67 year old female with vaginal bleeding and thickening around the urethra    Reviewed previous notes from Dr. Koenig on 3/28/23   Per her exam:  \"External genitalia with erythematous plaques bilaterally  Urethra- mid position and well supported, suburethral tissue thickened and friable with bleeding elicited after placement of the speculum\"    On previous exam the urethra in the vagina towards the left is a plaque like lesion with surrounding erythema  The external vaginal tissues appear atrophic and there is Lichen sclerosis present.    She therefore went to the OR for biopsy  The pathology was reviewed with the patient  5/30/23  Final Diagnosis   Periurethral tissue, biopsy:  -Invasive moderately differentiated squamous cell carcinoma, HPV-associated  -Focus suspicious for lymphovascular invasion     She has been having some difficulty voiding.  She tried CIC but was not able to do it.  She is planning to do Chemo and radiation for the this.  She is voiding slowly but regularly.      Assessment & Plan   68 y/o female with some vaginal bleeding and lichen sclerosis as well as vaginal atrophy but then found to have SCC of periurethral tissue with HPV association and some suspicion for lympatic invasion.  She also has some involvement of the vagina and on cystoscopy there is some stenosis of the meatus with erythema distally.  She has been having difficulty voiding and so tried CIC but was not able to.  We therefore discussed an indwelling gonsalez.  She would like to proceed with this.  She was going to have an SPT by Dr. Márquez but would rather try a regular catheter first.    -treat acute cystitis with bactrim  -place catheter in on Friday in nursing clinic     Lakeisha Mackenzie MD  [facility] [place]    45 minutes spent by me on the date of the encounter doing chart review, history and exam, documentation and " further activities per the note

## 2023-07-26 NOTE — LETTER
"7/26/2023       RE: Jessie Tamayo  [address] [facility] [id number]     Dear Colleague,    Thank you for referring your patient, Jessie Tamayo, to the [facility] UROLOGY CLIN[facility] [place] at [facility]. Please see a copy of my visit note below.    Virtual Visit Details    Type of service:  Video Visit   Video Start Time: 2:19 PM  Video End Time:2:36 PM    Originating Location (pt. Location): Home    Distant Location (provider location):  Off-site  Platform used for Video Visit: Daly      Reason for Visit:  F/u on self cathing    Clinical Data:  Ms. Tamayo is a 67 year old female with vaginal bleeding and thickening around the urethra    Reviewed previous notes from Dr. Koenig on 3/28/23   Per her exam:  \"External genitalia with erythematous plaques bilaterally  Urethra- mid position and well supported, suburethral tissue thickened and friable with bleeding elicited after placement of the speculum\"    On previous exam the urethra in the vagina towards the left is a plaque like lesion with surrounding erythema  The external vaginal tissues appear atrophic and there is Lichen sclerosis present.    She therefore went to the OR for biopsy  The pathology was reviewed with the patient  5/30/23  Final Diagnosis   Periurethral tissue, biopsy:  -Invasive moderately differentiated squamous cell carcinoma, HPV-associated  -Focus suspicious for lymphovascular invasion     She has been having some difficulty voiding.  She tried CIC but was not able to do it.  She is planning to do Chemo and radiation for the this.  She is voiding slowly but regularly.      Assessment & Plan   66 y/o female with some vaginal bleeding and lichen sclerosis as well as vaginal atrophy but then found to have SCC of periurethral tissue with HPV association and some suspicion for lympatic invasion.  She also has some involvement of the vagina and on cystoscopy there is some stenosis of " the meatus with erythema distally.  She has been having difficulty voiding and so tried CIC but was not able to.  We therefore discussed an indwelling gonsalez.  She would like to proceed with this.  She was going to have an SPT by Dr. Márquez but would rather try a regular catheter first.    -treat acute cystitis with bactrim  -place catheter in on Friday in nursing clinic     Lakeisha Mackenzie MD  [facility] UROLOGY CLINIC [place]    45 minutes spent by me on the date of the encounter doing chart review, history and exam, documentation and further activities per the note

## 2023-07-27 ENCOUNTER — ALLIED HEALTH/NURSE VISIT (OUTPATIENT)
Dept: UROLOGY | Facility: CLINIC | Age: 68
End: 2023-07-27
Payer: COMMERCIAL

## 2023-07-27 ENCOUNTER — PREP FOR PROCEDURE (OUTPATIENT)
Dept: RADIATION ONCOLOGY | Facility: CLINIC | Age: 68
End: 2023-07-27

## 2023-07-27 ENCOUNTER — DOCUMENTATION ONLY (OUTPATIENT)
Dept: ONCOLOGY | Facility: CLINIC | Age: 68
End: 2023-07-27

## 2023-07-27 DIAGNOSIS — C52 VAGINAL CANCER (H): Primary | ICD-10-CM

## 2023-07-27 DIAGNOSIS — C52 PRIMARY SQUAMOUS CELL CARCINOMA OF VAGINA (H): ICD-10-CM

## 2023-07-27 DIAGNOSIS — R33.9 URINARY RETENTION: Primary | ICD-10-CM

## 2023-07-27 LAB — BACTERIA UR CULT: NORMAL

## 2023-07-27 PROCEDURE — 51702 INSERT TEMP BLADDER CATH: CPT

## 2023-07-27 NOTE — PROGRESS NOTES
Jessie Tamayo comes into clinic today at the request of Dr Mackenzie  Ordering Provider for Catheter insertion.        This service provided today was under the supervising provider of the day Dr. Turner, who was available if needed.    Lucy Helton RN Patient here for gonsalez catheter placement. Several attempts made to insert the catheter with success. A 14 fr coude with 10mL balloon placed. Patient was instructed how to empty the leg bag, large drainage bag, and exchanging between the two.  Patient verbalized understanding. Notified KJ the surgery scheduler.  Patient to return in one month for catheter exchange  No need for antibiotics since patient is taking bactrim currently for bladder infection.  Lucy Helton RN, BSN  Care Coordinator Urology  [facility]  Urology Clinic  [phone]

## 2023-07-27 NOTE — PROGRESS NOTES
Virtual Visit Details    Type of service:  Video Visit     Video Start time: 1434    Video End Time: 1503    Originating Location (pt. 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  She presents for a disease management visit by video.    Oncology History:  07, 10/15/08, 09, 11: Pap test NILM.      1/23/15: Pap test NILM, hrHPV16+.  2/27/15: Cervical polyp & cervical biopsy pathology: Negative for dysplasia/malignancy.     16: Pap test NILM, hrHPV16+.   16: Endocervical curettings pathology negative for dysplasia/malignancy.     17: Pap test NILM, hrHPV16+.   -Colposcopy recommended, not performed.      18:   -Pap test ASCUS, hrHPV16+.   -Endocervical curettings & cervical biopsy pathology: negative for dysplasia/malignancy.      19:  Pap test ASC-H, hrHPV16+.   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      9/10/20: Pap test ASC-H, hrHPV+ (NOS).  21: LEEP.   -Pathology: LEEP & endocervical curettings: negative for dysplasia/malignancy.     3/23/22: Endocervical curettings & cervical biopsy pathology: negative for dysplasia/malignancy.     3/28/23:   -Pap test HSIL, hrHPV16+.   -Findings by gynecologist Dr. Koenig: External genitalia with erythematous plaques bilaterally  Urethra- mid position and well supported, suburethral tissue thickened and friable with bleeding elicited after placement of the speculum  Vagina is stenotic and cervix difficult to see.   23: Cystourethroscopy, vaginal biopsy by urologist Dr. Mackenzie.   -Findings: Exam revealed lichenified changes to bilateral labia  majora and friable vestibular tissue (patient had significant bleeding from prep alone.) The urethra was somewhat stenotic and would not accommodate 19Fr rigid cystoscope, therefore a 16 Fr flexible cystoscope was inserted. Gentle pressure was required to access the bladder. The ureteral orifices were in their orthotopic positions bilaterally. There were no intravesical stones or diverticuli and the bladder was mildly trabeculated. There were no intravesical lesions. See media tab for urethral pictures - there were no obvious lesions but the entire distal urethra was erythematous and stenosed (16Fr.)  -Pathology: Invasive moderately-differentiated squamous cell carcinoma, HPV-associated, with focus suspicious for lymphovascular space invasion; IHC: p16+.    6/8/23: Gynecologic Oncology consultation.  -Findings:   External genitalia notable for erythema and desquamation of the posterior medial labia, c/w lichen sclerosus changes. When the labia are , a friable mass is visible at the distal anterior vagina, just posterior to the urethra. On bimanual exam, the cervix is small and normal in contour. The palpable vaginal tumor is limited to the anterior distal vagina, approximately 4-5 cm laterally x 3 cm cranio-caudal. Tumor is friable. Remainder of the vagina smooth to palpation. Digital anorectal exam is without nodularity. No palpable tumor in the rectovaginal septum.   Enlarged firm, fixed right inguinal lymph node ~3-4 cm in size.     6/16/23: Pelvic MRI  IMPRESSION:   1. Irregular enhancing lesion along the anterior vaginal wall at the  level of the introitus measuring 2.5 x 0.9 cm with irregular  enhancement along the anterior vaginal wall towards the level of the  vaginal cuff however there is no definitive evidence or abnormal  signal intensity involving the cervix to suggest invasion.  2. Right inguinal lymphadenopathy compatible with metastatic disease.   3. There is some asymmetric enhancement  involving the left sacrum,  incompletely evaluated on this study. Further evaluation of this and  further staging of the chest, abdomen and pelvis will be performed on  PET/CT scheduled for 6/20/2023.    6/20/23: PET CT  IMPRESSION:   1. Intense focal hypermetabolic FDG uptake in biopsy-proven vaginal  squamous cell carcinoma.  2. Multiple enlarged, hypermetabolic right inguinal nodes likely  represent regional metastasis. No definite evidence of distant  metastatic disease.  3. Scattered sub-4 mm solid pulmonary nodules are below the size  threshold for characterization on PET. Attention on follow-up with CT.  4. 1.0 cm enhancing left parotid gland nodule with hypermetabolic  uptake could represent a primary parotid neoplasm such as pleomorphic  adenoma or Warthin's tumor; consider ultrasound for further  evaluation.  5. 1.3 cm hypoattenuating right thyroid nodule with mild FDG uptake  warrants ultrasound for characterization.     Plan: Cisplatin radiation      7/27/2023: urinary gonsalez catheter placement. Treat acute cystitis with bactrim per Urology.     7/31/2023: plan C1D1 Cisplatin radiation             Today Jessie presents with her  via video. Jessie reports that she was able to have the gonsalez catheter placed yesterday. Pt is on Bactrim for 5 days for acute cystitis. Urine in gonsalez bag is clear and not cloudy no hematuria. No fevers or chills. No lower back pain. Reports that her leg swelling has resolved. No calf pain or erythema. Eating and drinking ok. No nausea or vomiting. Bowels are overall stable. Some constipation however last BM was yesterday. Weight stable. She denies any vaginal bleeding, no nausea/emesis, no lower extremity edema, and no difficulties eating or sleeping. She denies fevers or chills, and no chest pain or shortness of breath. Is having some pelvic discomfort post urinary catheter placement. No discomfort over right LN. She has noticed that her right inguinal mass has increased  "in size. It is firm and mobile as it has been. No baseline numbness or tingling in her fingers or toes. \"hard of hearing\" per pt age changes. No hearing loss medically diagnosed. No tinnitus.               Past Medical History:    Past Medical History:   Diagnosis Date    Actinic keratosis     Hyperlipemia     Lichen sclerosus 2020    Osteopenias     SCC (squamous cell carcinoma) 7/26/2023         Past Surgical History:    Past Surgical History:   Procedure Laterality Date    COLONOSCOPY  2006    normal    COLPOSCOPY, BIOPSY, COMBINED N/A 02/08/2021    Procedure: COLPOSCOPY, WITH BIOPSY, LEEP procedure;  Surgeon: Jefe Koenig MD;  Location: [facility] OR    CYSTOSCOPY N/A 05/30/2023    Procedure: CYSTOSCOPY AND EXCISIONAL BIOPSY OF THE VAGINAL TISSUE AROUND THE URETHRA.  (look in the bladder and sample small area in the vagina near the urethra);  Surgeon: Lakeisha Mackenzie MD;  Location: [facility] OR    EYE SURGERY      cataracts bilateral    OPEN REDUCTION INTERNAL FIXATION FOREARM  07/31/2012    Procedure: OPEN REDUCTION INTERNAL FIXATION FOREARM;  Open Reduction Internal Fixation, Irrigation & Debridment  of Right Distal Radius, application short arm splint;  Surgeon: Wade Beard MD;  Location: [facility] OR    TONSILLECTOMY & ADENOIDECTOMY  1960    [facility] TREAT ECTOPIC PREG,RMV TUBE/OVARY  1985    ectopic, right side-ovary and tube removed         Health Maintenance Due   Topic Date Due    COVID-19 Vaccine (6 - Moderna series) 03/11/2023    MEDICARE ANNUAL WELLNESS VISIT  05/26/2023    COLPOSCOPY  06/28/2023       Current Medications:     Current Outpatient Medications   Medication Sig Dispense Refill    sulfamethoxazole-trimethoprim (BACTRIM DS) 800-160 MG tablet Take 1 tablet by mouth 2 times daily 10 tablet 0         Allergies:        Allergies   Allergen Reactions    Seasonal Allergies         Social History:     Social History     Tobacco Use    Smoking status: Never     Passive exposure: Never    " "Smokeless tobacco: Never   Substance Use Topics    Alcohol use: Yes     Comment: very little       History   Drug Use No         Family History:     The patient's family history is notable for:    Family History   Problem Relation Age of Onset    Lung Cancer Mother 44    Cerebrovascular Disease Father     Hypertension Father     Alcohol/Drug Father     Obesity Niece         niece    Mental Illness Nephew         nephew    Diabetes Other         paternal aunt    Hyperlipidemia Other     Obesity Other         self    Cervical Cancer Maternal Aunt     Ovarian Cancer Maternal Aunt 60    Obesity Other     Diabetes Other         paternal aunt    Hypertension Other         father    Cerebrovascular Disease Other         father    C.A.D. No family hx of     Breast Cancer No family hx of     Cancer - colorectal No family hx of     Prostate Cancer No family hx of     Melanoma No family hx of          Physical Exam:     Ht 1.626 m (5' 4\")   Wt 98 kg (216 lb)   LMP 03/08/2008   BMI 37.08 kg/m    Body mass index is 37.08 kg/m .      General Appearance: healthy and alert, no distress    Eyes:  Eyes grossly normal to inspection.  No discharge or erythema, or obvious scleral/conjunctival abnormalities.    Respiratory: No audible wheeze, cough, or visible cyanosis.  No visible retractions or increased work of breathing.     Musculoskeletal: extremities non tender and without edema    Skin: no lesions or rashes on visible skin    Neurological: normal gait, no gross defects     Psychiatric: appropriate mood and affect. Mentation appears normal, affect normal/bright, judgement and insight intact, normal speech and appearance well-groomed                            The rest of a comprehensive physical examination is deferred due to video visit restrictions.      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  She presents for a disease management visit " "by video.      40 minutes spent on the date of the encounter doing chart review, history and exam, documentation, and further activities as noted above.              Plan:     1.) Vaginal cancer:  Plan for MD visit ~1 month after completion of chemoradiation therapy for post-therapy visit (virtual or in-person per patient preference), and then 3 months later (4 months post-chemoradiation) with repeat PET/CT and exam to assess diease response (MD, in-person).  Currently scheduled appointments available via My Chart.  Reviewed signs and symptoms for when she should contact the clinic or seek additional care; and contact information of the Gynecologic Oncology Clinic.  Patient to contact the clinic with any questions or concerns in the interim.     2.) Health maintenance:  Issues addressed today include following up with PCP for annual health maintenance and non-gynecologic issues.     3.)       Urinary retention: gonsalez catheter placed by urology on 7/27/23. Plan for one month return for exchange. Pt on bactrim currently for acute cystitis. Pt will contact urology to inquire about suppression abx after bactrim while on chemo and with current gonsalez in place.     4.)       Left parotid nodule: message sent to Dr. Stahl to clarify plan  -Addendum 8/1/2023: Per Dr. Stahl, \"We did not discuss the parotic nodule specifically. Since it is favored to be benign (pleomorphic adenoma or Warthin's tumor), I plan to just monitor on follow-up imaging, and at the end of treatment if still present we can consider ultrasound for additional evaluation. The more urgent issue is her metastatic vaginal cancer. \"    5.)        Mild pelvic pain: controlled with APAP. Reviewed safe dosing and to call in if pain is not controlled.              SANDI Wiggins, NP-BC  Women's Health Nurse Practitioner  Division of Gynecologic Oncology  [facility]            CC  Patient Care Team:  Alba Layton MD as PCP - " Jefe Mosley MD as Assigned OBGYN Provider  Alba Layton MD as Assigned PCP  Lakeisha Mackenzie MD as MD (Urology)  Lawrence Banuelos MD as MD (Dermatology)  Lakeisha Mackenzie MD as Assigned Surgical Provider  Lakeisha Mackenzie MD as MD (Urology)  Reina Stahl MD as MD (Gynecologic Oncology)  Marisa Pacheco MD as MD (Radiation Oncology)  Marisa Pacheco MD as Assigned Cancer Care Provider  SELF, REFERRED

## 2023-07-28 ENCOUNTER — VIRTUAL VISIT (OUTPATIENT)
Dept: ONCOLOGY | Facility: CLINIC | Age: 68
End: 2023-07-28
Attending: OBSTETRICS & GYNECOLOGY
Payer: COMMERCIAL

## 2023-07-28 ENCOUNTER — HOSPITAL ENCOUNTER (OUTPATIENT)
Facility: CLINIC | Age: 68
End: 2023-07-28
Attending: RADIOLOGY | Admitting: RADIOLOGY
Payer: COMMERCIAL

## 2023-07-28 VITALS — WEIGHT: 216 LBS | BODY MASS INDEX: 36.88 KG/M2 | HEIGHT: 64 IN

## 2023-07-28 DIAGNOSIS — C52 VAGINAL CANCER (H): Primary | ICD-10-CM

## 2023-07-28 PROCEDURE — 99215 OFFICE O/P EST HI 40 MIN: CPT | Mod: VID | Performed by: OBSTETRICS & GYNECOLOGY

## 2023-07-28 RX ORDER — CEFAZOLIN SODIUM 2 G/100ML
2 INJECTION, SOLUTION INTRAVENOUS SEE ADMIN INSTRUCTIONS
Status: CANCELLED | OUTPATIENT
Start: 2023-07-28

## 2023-07-28 RX ORDER — CEFAZOLIN SODIUM 2 G/100ML
2 INJECTION, SOLUTION INTRAVENOUS
Status: CANCELLED | OUTPATIENT
Start: 2023-07-28

## 2023-07-28 ASSESSMENT — PAIN SCALES - GENERAL: PAINLEVEL: MILD PAIN (2)

## 2023-07-28 NOTE — PATIENT INSTRUCTIONS
When to call for help:   A fever of 100.4 F or greater   Shaking or chills   Shortness of breath   Repeated signs of bleeding, such as nose bleeds, easy bruising or black tarry stools   Mouth sores that stop you from eating or cause pain when you swallow   Nausea and vomiting that do not get better   Diarrhea that does not get better   Extreme weakness   Feeling confused or extremely sleepy   Constipation for more than 48 hours   Swelling of feet or arms   Any new symptoms that you did not have before your treatments.     SANTOS Chavarria [phone]  The phone number for triage is [phone]  The phone number for the hospital is [phone], ask the  to page the gyn onc resident on call.

## 2023-07-28 NOTE — NURSING NOTE
Is the patient currently in the state of [place]? YES    Visit mode:VIDEO    If the visit is dropped, the patient can be reconnected by: VIDEO VISIT: Send to e-mail at: [email or web address]    Will anyone else be joining the visit? NO      How would you like to obtain your AVS? MyChart    Are changes needed to the allergy or medication list? NO    Reason for visit: CAMILO GIBSON, VF

## 2023-07-28 NOTE — LETTER
2023         RE: Jessie Tamayo  [address] [id number]        Dear Colleague,    Thank you for referring your patient, Jessie Tamayo, to the [facility]. Please see a copy of my visit note below.    Virtual Visit Details    Type of service:  Video Visit     Video Start time: 1434    Video End Time: 1503    Originating Location (pt. 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  She presents for a disease management visit by video.    Oncology History:  07, 10/15/08, 09, 11: Pap test NILM.      1/23/15: Pap test NILM, hrHPV16+.  2/27/15: Cervical polyp & cervical biopsy pathology: Negative for dysplasia/malignancy.     16: Pap test NILM, hrHPV16+.   16: Endocervical curettings pathology negative for dysplasia/malignancy.     17: Pap test NILM, hrHPV16+.   -Colposcopy recommended, not performed.      18:   -Pap test ASCUS, hrHPV16+.   -Endocervical curettings & cervical biopsy pathology: negative for dysplasia/malignancy.      19:  Pap test ASC-H, hrHPV16+.   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      9/10/20: Pap test ASC-H, hrHPV+ (NOS).  21: LEEP.   -Pathology: LEEP & endocervical curettings: negative for dysplasia/malignancy.     3/23/22: Endocervical curettings & cervical biopsy pathology: negative for dysplasia/malignancy.     3/28/23:   -Pap test HSIL, hrHPV16+.   -Findings by gynecologist Dr. Koenig: External genitalia with erythematous plaques bilaterally  Urethra- mid position and well supported, suburethral tissue thickened  and friable with bleeding elicited after placement of the speculum  Vagina is stenotic and cervix difficult to see.   5/30/23: Cystourethroscopy, vaginal biopsy by urologist Dr. Mackenzie.   -Findings: Exam revealed lichenified changes to bilateral labia majora and friable vestibular tissue (patient had significant bleeding from prep alone.) The urethra was somewhat stenotic and would not accommodate 19Fr rigid cystoscope, therefore a 16 Fr flexible cystoscope was inserted. Gentle pressure was required to access the bladder. The ureteral orifices were in their orthotopic positions bilaterally. There were no intravesical stones or diverticuli and the bladder was mildly trabeculated. There were no intravesical lesions. See media tab for urethral pictures - there were no obvious lesions but the entire distal urethra was erythematous and stenosed (16Fr.)  -Pathology: Invasive moderately-differentiated squamous cell carcinoma, HPV-associated, with focus suspicious for lymphovascular space invasion; IHC: p16+.    6/8/23: Gynecologic Oncology consultation.  -Findings:   External genitalia notable for erythema and desquamation of the posterior medial labia, c/w lichen sclerosus changes. When the labia are , a friable mass is visible at the distal anterior vagina, just posterior to the urethra. On bimanual exam, the cervix is small and normal in contour. The palpable vaginal tumor is limited to the anterior distal vagina, approximately 4-5 cm laterally x 3 cm cranio-caudal. Tumor is friable. Remainder of the vagina smooth to palpation. Digital anorectal exam is without nodularity. No palpable tumor in the rectovaginal septum.   Enlarged firm, fixed right inguinal lymph node ~3-4 cm in size.     6/16/23: Pelvic MRI  IMPRESSION:   1. Irregular enhancing lesion along the anterior vaginal wall at the  level of the introitus measuring 2.5 x 0.9 cm with irregular  enhancement along the anterior vaginal wall towards the level  of the  vaginal cuff however there is no definitive evidence or abnormal  signal intensity involving the cervix to suggest invasion.  2. Right inguinal lymphadenopathy compatible with metastatic disease.   3. There is some asymmetric enhancement involving the left sacrum,  incompletely evaluated on this study. Further evaluation of this and  further staging of the chest, abdomen and pelvis will be performed on  PET/CT scheduled for 6/20/2023.    6/20/23: PET CT  IMPRESSION:   1. Intense focal hypermetabolic FDG uptake in biopsy-proven vaginal  squamous cell carcinoma.  2. Multiple enlarged, hypermetabolic right inguinal nodes likely  represent regional metastasis. No definite evidence of distant  metastatic disease.  3. Scattered sub-4 mm solid pulmonary nodules are below the size  threshold for characterization on PET. Attention on follow-up with CT.  4. 1.0 cm enhancing left parotid gland nodule with hypermetabolic  uptake could represent a primary parotid neoplasm such as pleomorphic  adenoma or Warthin's tumor; consider ultrasound for further  evaluation.  5. 1.3 cm hypoattenuating right thyroid nodule with mild FDG uptake  warrants ultrasound for characterization.     Plan: Cisplatin radiation      7/27/2023: urinary gonsalez catheter placement. Treat acute cystitis with bactrim per Urology.     7/31/2023: plan C1D1 Cisplatin radiation             Today Jessie presents with her  via video. Jessie reports that she was able to have the gonsalez catheter placed yesterday. Pt is on Bactrim for 5 days for acute cystitis. Urine in gonsalez bag is clear and not cloudy no hematuria. No fevers or chills. No lower back pain. Reports that her leg swelling has resolved. No calf pain or erythema. Eating and drinking ok. No nausea or vomiting. Bowels are overall stable. Some constipation however last BM was yesterday. Weight stable. She denies any vaginal bleeding, no nausea/emesis, no lower extremity edema, and no difficulties  "eating or sleeping. She denies fevers or chills, and no chest pain or shortness of breath. Is having some pelvic discomfort post urinary catheter placement. No discomfort over right LN. She has noticed that her right inguinal mass has increased in size. It is firm and mobile as it has been. No baseline numbness or tingling in her fingers or toes. \"hard of hearing\" per pt age changes. No hearing loss medically diagnosed. No tinnitus.               Past Medical History:    Past Medical History:   Diagnosis Date     Actinic keratosis      Hyperlipemia      Lichen sclerosus 2020     Osteopenias      SCC (squamous cell carcinoma) 7/26/2023         Past Surgical History:    Past Surgical History:   Procedure Laterality Date     COLONOSCOPY  2006    normal     COLPOSCOPY, BIOPSY, COMBINED N/A 02/08/2021    Procedure: COLPOSCOPY, WITH BIOPSY, LEEP procedure;  Surgeon: Jefe Koenig MD;  Location: [facility] OR     CYSTOSCOPY N/A 05/30/2023    Procedure: CYSTOSCOPY AND EXCISIONAL BIOPSY OF THE VAGINAL TISSUE AROUND THE URETHRA.  (look in the bladder and sample small area in the vagina near the urethra);  Surgeon: Lakeisha Mackenzie MD;  Location: [facility] OR     EYE SURGERY      cataracts bilateral     OPEN REDUCTION INTERNAL FIXATION FOREARM  07/31/2012    Procedure: OPEN REDUCTION INTERNAL FIXATION FOREARM;  Open Reduction Internal Fixation, Irrigation & Debridment  of Right Distal Radius, application short arm splint;  Surgeon: Wade Beard MD;  Location:  OR     TONSILLECTOMY & ADENOIDECTOMY  1960     [facility] TREAT ECTOPIC PREG,RMV TUBE/OVARY  1985    ectopic, right side-ovary and tube removed         Health Maintenance Due   Topic Date Due     COVID-19 Vaccine (6 - Moderna series) 03/11/2023     MEDICARE ANNUAL WELLNESS VISIT  05/26/2023     COLPOSCOPY  06/28/2023       Current Medications:     Current Outpatient Medications   Medication Sig Dispense Refill     sulfamethoxazole-trimethoprim (BACTRIM DS) " "800-160 MG tablet Take 1 tablet by mouth 2 times daily 10 tablet 0         Allergies:        Allergies   Allergen Reactions     Seasonal Allergies         Social History:     Social History     Tobacco Use     Smoking status: Never     Passive exposure: Never     Smokeless tobacco: Never   Substance Use Topics     Alcohol use: Yes     Comment: very little       History   Drug Use No         Family History:     The patient's family history is notable for:    Family History   Problem Relation Age of Onset     Lung Cancer Mother 44     Cerebrovascular Disease Father      Hypertension Father      Alcohol/Drug Father      Obesity Niece         niece     Mental Illness Nephew         nephew     Diabetes Other         paternal aunt     Hyperlipidemia Other      Obesity Other         self     Cervical Cancer Maternal Aunt      Ovarian Cancer Maternal Aunt 60     Obesity Other      Diabetes Other         paternal aunt     Hypertension Other         father     Cerebrovascular Disease Other         father     C.A.D. No family hx of      Breast Cancer No family hx of      Cancer - colorectal No family hx of      Prostate Cancer No family hx of      Melanoma No family hx of          Physical Exam:     Ht 1.626 m (5' 4\")   Wt 98 kg (216 lb)   LMP 03/08/2008   BMI 37.08 kg/m    Body mass index is 37.08 kg/m .      General Appearance: healthy and alert, no distress    Eyes:  Eyes grossly normal to inspection.  No discharge or erythema, or obvious scleral/conjunctival abnormalities.    Respiratory: No audible wheeze, cough, or visible cyanosis.  No visible retractions or increased work of breathing.     Musculoskeletal: extremities non tender and without edema    Skin: no lesions or rashes on visible skin    Neurological: normal gait, no gross defects     Psychiatric: appropriate mood and affect. Mentation appears normal, affect normal/bright, judgement and insight intact, normal speech and appearance well-groomed                 "            The rest of a comprehensive physical examination is deferred due to video visit restrictions.      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  She presents for a disease management visit by video.      40 minutes spent on the date of the encounter doing chart review, history and exam, documentation, and further activities as noted above.              Plan:     1.) Vaginal cancer:  Plan for MD visit ~1 month after completion of chemoradiation therapy for post-therapy visit (virtual or in-person per patient preference), and then 3 months later (4 months post-chemoradiation) with repeat PET/CT and exam to assess diease response (MD, in-person).  Currently scheduled appointments available via My Chart.  Reviewed signs and symptoms for when she should contact the clinic or seek additional care; and contact information of the Gynecologic Oncology Clinic.  Patient to contact the clinic with any questions or concerns in the interim.     2.) Health maintenance:  Issues addressed today include following up with PCP for annual health maintenance and non-gynecologic issues.     3.)       Urinary retention: gonsalez catheter placed by urology on 7/27/23. Plan for one month return for exchange. Pt on bactrim currently for acute cystitis. Pt will contact urology to inquire about suppression abx after bactrim while on chemo and with current gonsalez in place.     4.)       Left parotid nodule: message sent to Dr. Stahl to clarify plan    5.)        Mild pelvic pain: controlled with APAP. Reviewed safe dosing and to call in if pain is not controlled.              SANDI Wiggins, NP-BC  Women's Health Nurse Practitioner  Division of Gynecologic Oncology  [facility]            CC  Patient Care Team:  Alba Layton MD as PCP - Jefe Mosley MD as Assigned OBGYN Provider  Alba Layton MD  as Assigned PCP  Lakeisha Mackenzie MD as MD (Urology)  Lawrence Banuelos MD as MD (Dermatology)  Lakeisha Mackenzie MD as Assigned Surgical Provider  Lakeisha Mackenzie MD as MD (Urology)  Reina Stahl MD as MD (Gynecologic Oncology)  Marisa Pacheco MD as MD (Radiation Oncology)  Marisa Pacheco MD as Assigned Cancer Care Provider  SELF, REFERRED              Again, thank you for allowing me to participate in the care of your patient.        Sincerely,        SANDI Munoz CNP

## 2023-07-28 NOTE — LETTER
2023         RE: Jessie Tamayo  [address] [address]        Dear Colleague,    Thank you for referring your patient, Jessie Tamayo, to the [facility]. Please see a copy of my visit note below.    Virtual Visit Details    Type of service:  Video Visit     Video Start time: 1434    Video End Time: 1503    Originating Location (pt. 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  She presents for a disease management visit by video.    Oncology History:  07, 10/15/08, 09, 11: Pap test NILM.      1/23/15: Pap test NILM, hrHPV16+.  2/27/15: Cervical polyp & cervical biopsy pathology: Negative for dysplasia/malignancy.     16: Pap test NILM, hrHPV16+.   16: Endocervical curettings pathology negative for dysplasia/malignancy.     17: Pap test NILM, hrHPV16+.   -Colposcopy recommended, not performed.      18:   -Pap test ASCUS, hrHPV16+.   -Endocervical curettings & cervical biopsy pathology: negative for dysplasia/malignancy.      19:  Pap test ASC-H, hrHPV16+.   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      9/10/20: Pap test ASC-H, hrHPV+ (NOS).  21: LEEP.   -Pathology: LEEP & endocervical curettings: negative for dysplasia/malignancy.     3/23/22: Endocervical curettings & cervical biopsy pathology: negative for dysplasia/malignancy.     3/28/23:   -Pap test HSIL, hrHPV16+.   -Findings by gynecologist Dr. Koenig: External genitalia with erythematous plaques bilaterally  Urethra- mid position and well supported, suburethral tissue thickened  and friable with bleeding elicited after placement of the speculum  Vagina is stenotic and cervix difficult to see.   5/30/23: Cystourethroscopy, vaginal biopsy by urologist Dr. Mackenzie.   -Findings: Exam revealed lichenified changes to bilateral labia majora and friable vestibular tissue (patient had significant bleeding from prep alone.) The urethra was somewhat stenotic and would not accommodate 19Fr rigid cystoscope, therefore a 16 Fr flexible cystoscope was inserted. Gentle pressure was required to access the bladder. The ureteral orifices were in their orthotopic positions bilaterally. There were no intravesical stones or diverticuli and the bladder was mildly trabeculated. There were no intravesical lesions. See media tab for urethral pictures - there were no obvious lesions but the entire distal urethra was erythematous and stenosed (16Fr.)  -Pathology: Invasive moderately-differentiated squamous cell carcinoma, HPV-associated, with focus suspicious for lymphovascular space invasion; IHC: p16+.    6/8/23: Gynecologic Oncology consultation.  -Findings:   External genitalia notable for erythema and desquamation of the posterior medial labia, c/w lichen sclerosus changes. When the labia are , a friable mass is visible at the distal anterior vagina, just posterior to the urethra. On bimanual exam, the cervix is small and normal in contour. The palpable vaginal tumor is limited to the anterior distal vagina, approximately 4-5 cm laterally x 3 cm cranio-caudal. Tumor is friable. Remainder of the vagina smooth to palpation. Digital anorectal exam is without nodularity. No palpable tumor in the rectovaginal septum.   Enlarged firm, fixed right inguinal lymph node ~3-4 cm in size.     6/16/23: Pelvic MRI  IMPRESSION:   1. Irregular enhancing lesion along the anterior vaginal wall at the  level of the introitus measuring 2.5 x 0.9 cm with irregular  enhancement along the anterior vaginal wall towards the level  of the  vaginal cuff however there is no definitive evidence or abnormal  signal intensity involving the cervix to suggest invasion.  2. Right inguinal lymphadenopathy compatible with metastatic disease.   3. There is some asymmetric enhancement involving the left sacrum,  incompletely evaluated on this study. Further evaluation of this and  further staging of the chest, abdomen and pelvis will be performed on  PET/CT scheduled for 6/20/2023.    6/20/23: PET CT  IMPRESSION:   1. Intense focal hypermetabolic FDG uptake in biopsy-proven vaginal  squamous cell carcinoma.  2. Multiple enlarged, hypermetabolic right inguinal nodes likely  represent regional metastasis. No definite evidence of distant  metastatic disease.  3. Scattered sub-4 mm solid pulmonary nodules are below the size  threshold for characterization on PET. Attention on follow-up with CT.  4. 1.0 cm enhancing left parotid gland nodule with hypermetabolic  uptake could represent a primary parotid neoplasm such as pleomorphic  adenoma or Warthin's tumor; consider ultrasound for further  evaluation.  5. 1.3 cm hypoattenuating right thyroid nodule with mild FDG uptake  warrants ultrasound for characterization.     Plan: Cisplatin radiation      7/27/2023: urinary gonsalez catheter placement. Treat acute cystitis with bactrim per Urology.     7/31/2023: plan C1D1 Cisplatin radiation             Today Jessie presents with her  via video. Jessie reports that she was able to have the gonsalez catheter placed yesterday. Pt is on Bactrim for 5 days for acute cystitis. Urine in gonsalez bag is clear and not cloudy no hematuria. No fevers or chills. No lower back pain. Reports that her leg swelling has resolved. No calf pain or erythema. Eating and drinking ok. No nausea or vomiting. Bowels are overall stable. Some constipation however last BM was yesterday. Weight stable. She denies any vaginal bleeding, no nausea/emesis, no lower extremity edema, and no difficulties  "eating or sleeping. She denies fevers or chills, and no chest pain or shortness of breath. Is having some pelvic discomfort post urinary catheter placement. No discomfort over right LN. She has noticed that her right inguinal mass has increased in size. It is firm and mobile as it has been. No baseline numbness or tingling in her fingers or toes. \"hard of hearing\" per pt age changes. No hearing loss medically diagnosed. No tinnitus.               Past Medical History:    Past Medical History:   Diagnosis Date     Actinic keratosis      Hyperlipemia      Lichen sclerosus 2020     Osteopenias      SCC (squamous cell carcinoma) 7/26/2023         Past Surgical History:    Past Surgical History:   Procedure Laterality Date     COLONOSCOPY  2006    normal     COLPOSCOPY, BIOPSY, COMBINED N/A 02/08/2021    Procedure: COLPOSCOPY, WITH BIOPSY, LEEP procedure;  Surgeon: Jefe Koenig MD;  Location: [facility] OR     CYSTOSCOPY N/A 05/30/2023    Procedure: CYSTOSCOPY AND EXCISIONAL BIOPSY OF THE VAGINAL TISSUE AROUND THE URETHRA.  (look in the bladder and sample small area in the vagina near the urethra);  Surgeon: Lakeisha Mackenzie MD;  Location: [facility] OR     EYE SURGERY      cataracts bilateral     OPEN REDUCTION INTERNAL FIXATION FOREARM  07/31/2012    Procedure: OPEN REDUCTION INTERNAL FIXATION FOREARM;  Open Reduction Internal Fixation, Irrigation & Debridment  of Right Distal Radius, application short arm splint;  Surgeon: Wade Beard MD;  Location:  OR     TONSILLECTOMY & ADENOIDECTOMY  1960     [facility] TREAT ECTOPIC PREG,RMV TUBE/OVARY  1985    ectopic, right side-ovary and tube removed         Health Maintenance Due   Topic Date Due     COVID-19 Vaccine (6 - Moderna series) 03/11/2023     MEDICARE ANNUAL WELLNESS VISIT  05/26/2023     COLPOSCOPY  06/28/2023       Current Medications:     Current Outpatient Medications   Medication Sig Dispense Refill     sulfamethoxazole-trimethoprim (BACTRIM DS) " "800-160 MG tablet Take 1 tablet by mouth 2 times daily 10 tablet 0         Allergies:        Allergies   Allergen Reactions     Seasonal Allergies         Social History:     Social History     Tobacco Use     Smoking status: Never     Passive exposure: Never     Smokeless tobacco: Never   Substance Use Topics     Alcohol use: Yes     Comment: very little       History   Drug Use No         Family History:     The patient's family history is notable for:    Family History   Problem Relation Age of Onset     Lung Cancer Mother 44     Cerebrovascular Disease Father      Hypertension Father      Alcohol/Drug Father      Obesity Niece         niece     Mental Illness Nephew         nephew     Diabetes Other         paternal aunt     Hyperlipidemia Other      Obesity Other         self     Cervical Cancer Maternal Aunt      Ovarian Cancer Maternal Aunt 60     Obesity Other      Diabetes Other         paternal aunt     Hypertension Other         father     Cerebrovascular Disease Other         father     C.A.D. No family hx of      Breast Cancer No family hx of      Cancer - colorectal No family hx of      Prostate Cancer No family hx of      Melanoma No family hx of          Physical Exam:     Ht 1.626 m (5' 4\")   Wt 98 kg (216 lb)   LMP 03/08/2008   BMI 37.08 kg/m    Body mass index is 37.08 kg/m .      General Appearance: healthy and alert, no distress    Eyes:  Eyes grossly normal to inspection.  No discharge or erythema, or obvious scleral/conjunctival abnormalities.    Respiratory: No audible wheeze, cough, or visible cyanosis.  No visible retractions or increased work of breathing.     Musculoskeletal: extremities non tender and without edema    Skin: no lesions or rashes on visible skin    Neurological: normal gait, no gross defects     Psychiatric: appropriate mood and affect. Mentation appears normal, affect normal/bright, judgement and insight intact, normal speech and appearance well-groomed                 "            The rest of a comprehensive physical examination is deferred due to video visit restrictions.      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  She presents for a disease management visit by video.      40 minutes spent on the date of the encounter doing chart review, history and exam, documentation, and further activities as noted above.              Plan:     1.) Vaginal cancer:  Plan for MD visit ~1 month after completion of chemoradiation therapy for post-therapy visit (virtual or in-person per patient preference), and then 3 months later (4 months post-chemoradiation) with repeat PET/CT and exam to assess diease response (MD, in-person).  Currently scheduled appointments available via My Chart.  Reviewed signs and symptoms for when she should contact the clinic or seek additional care; and contact information of the Gynecologic Oncology Clinic.  Patient to contact the clinic with any questions or concerns in the interim.     2.) Health maintenance:  Issues addressed today include following up with PCP for annual health maintenance and non-gynecologic issues.     3.)       Urinary retention: gonsalez catheter placed by urology on 7/27/23. Plan for one month return for exchange. Pt on bactrim currently for acute cystitis. Pt will contact urology to inquire about suppression abx after bactrim while on chemo and with current gonsalez in place.     4.)       Left parotid nodule: message sent to Dr. Stahl to clarify plan    5.)        Mild pelvic pain: controlled with APAP. Reviewed safe dosing and to call in if pain is not controlled.              SANDI Wiggins, NP-BC  Women's Health Nurse Practitioner  Division of Gynecologic Oncology  [facility]            CC  Patient Care Team:  Alba Layton MD as PCP - Jefe Mosley MD as Assigned OBGYN Provider  Alba Layton MD  as Assigned PCP  Lakeisha Mackenzie MD as MD (Urology)  Lawrence Banuelos MD as MD (Dermatology)  Lakeisha Mackenzie MD as Assigned Surgical Provider  Lakeisha Mackenzie MD as MD (Urology)  Reina Stahl MD as MD (Gynecologic Oncology)  Marisa Pacheco MD as MD (Radiation Oncology)  Marisa Pacheco MD as Assigned Cancer Care Provider  SELF, REFERRED              Again, thank you for allowing me to participate in the care of your patient.        Sincerely,        SANDI Munoz CNP

## 2023-07-29 RX ORDER — DEXAMETHASONE 4 MG/1
8 TABLET ORAL DAILY
Qty: 12 TABLET | Refills: 2 | Status: SHIPPED | OUTPATIENT
Start: 2023-07-29 | End: 2023-09-09

## 2023-07-29 RX ORDER — PROCHLORPERAZINE MALEATE 10 MG
10 TABLET ORAL EVERY 6 HOURS PRN
Qty: 30 TABLET | Refills: 2 | Status: SHIPPED | OUTPATIENT
Start: 2023-07-29 | End: 2023-11-01

## 2023-07-29 RX ORDER — ONDANSETRON 8 MG/1
8 TABLET, FILM COATED ORAL EVERY 8 HOURS PRN
Qty: 30 TABLET | Refills: 2 | Status: SHIPPED | OUTPATIENT
Start: 2023-07-29 | End: 2023-12-29

## 2023-07-31 ENCOUNTER — APPOINTMENT (OUTPATIENT)
Dept: RADIATION ONCOLOGY | Facility: CLINIC | Age: 68
End: 2023-07-31
Attending: RADIOLOGY
Payer: COMMERCIAL

## 2023-07-31 PROCEDURE — 77386 HC IMRT TREATMENT DELIVERY, COMPLEX: CPT | Performed by: RADIOLOGY

## 2023-07-31 PROCEDURE — 77014 PR CT GUIDE FOR PLACEMENT RADIATION THERAPY FIELDS: CPT | Mod: 26 | Performed by: RADIOLOGY

## 2023-08-01 ENCOUNTER — INFUSION THERAPY VISIT (OUTPATIENT)
Dept: ONCOLOGY | Facility: CLINIC | Age: 68
End: 2023-08-01
Attending: OBSTETRICS & GYNECOLOGY
Payer: COMMERCIAL

## 2023-08-01 ENCOUNTER — APPOINTMENT (OUTPATIENT)
Dept: LAB | Facility: CLINIC | Age: 68
End: 2023-08-01
Attending: OBSTETRICS & GYNECOLOGY
Payer: COMMERCIAL

## 2023-08-01 ENCOUNTER — OFFICE VISIT (OUTPATIENT)
Dept: RADIATION ONCOLOGY | Facility: CLINIC | Age: 68
End: 2023-08-01
Attending: RADIOLOGY
Payer: COMMERCIAL

## 2023-08-01 VITALS
WEIGHT: 211.5 LBS | BODY MASS INDEX: 36.11 KG/M2 | OXYGEN SATURATION: 97 % | DIASTOLIC BLOOD PRESSURE: 77 MMHG | RESPIRATION RATE: 16 BRPM | HEART RATE: 64 BPM | SYSTOLIC BLOOD PRESSURE: 148 MMHG | HEIGHT: 64 IN | TEMPERATURE: 98 F

## 2023-08-01 VITALS — BODY MASS INDEX: 36.53 KG/M2 | WEIGHT: 215.3 LBS

## 2023-08-01 DIAGNOSIS — C52 VAGINAL CANCER (H): Primary | ICD-10-CM

## 2023-08-01 DIAGNOSIS — C44.92 SCC (SQUAMOUS CELL CARCINOMA): ICD-10-CM

## 2023-08-01 LAB
ALBUMIN SERPL BCG-MCNC: 4.5 G/DL (ref 3.5–5.2)
ALP SERPL-CCNC: 76 U/L (ref 35–104)
ALT SERPL W P-5'-P-CCNC: 13 U/L (ref 0–50)
ANION GAP SERPL CALCULATED.3IONS-SCNC: 9 MMOL/L (ref 7–15)
AST SERPL W P-5'-P-CCNC: 21 U/L (ref 0–45)
BASOPHILS # BLD AUTO: 0.1 10E3/UL (ref 0–0.2)
BASOPHILS NFR BLD AUTO: 1 %
BILIRUB SERPL-MCNC: 0.3 MG/DL
BUN SERPL-MCNC: 14.2 MG/DL (ref 8–23)
CALCIUM SERPL-MCNC: 9.6 MG/DL (ref 8.8–10.2)
CHLORIDE SERPL-SCNC: 105 MMOL/L (ref 98–107)
CREAT SERPL-MCNC: 0.99 MG/DL (ref 0.51–0.95)
DEPRECATED HCO3 PLAS-SCNC: 24 MMOL/L (ref 22–29)
EOSINOPHIL # BLD AUTO: 0.1 10E3/UL (ref 0–0.7)
EOSINOPHIL NFR BLD AUTO: 1 %
ERYTHROCYTE [DISTWIDTH] IN BLOOD BY AUTOMATED COUNT: 13 % (ref 10–15)
GFR SERPL CREATININE-BSD FRML MDRD: 62 ML/MIN/1.73M2
GLUCOSE SERPL-MCNC: 113 MG/DL (ref 70–99)
HCT VFR BLD AUTO: 42.1 % (ref 35–47)
HGB BLD-MCNC: 13.5 G/DL (ref 11.7–15.7)
IMM GRANULOCYTES # BLD: 0.1 10E3/UL
IMM GRANULOCYTES NFR BLD: 1 %
LYMPHOCYTES # BLD AUTO: 1.2 10E3/UL (ref 0.8–5.3)
LYMPHOCYTES NFR BLD AUTO: 16 %
MAGNESIUM SERPL-MCNC: 2.3 MG/DL (ref 1.7–2.3)
MCH RBC QN AUTO: 29.8 PG (ref 26.5–33)
MCHC RBC AUTO-ENTMCNC: 32.1 G/DL (ref 31.5–36.5)
MCV RBC AUTO: 93 FL (ref 78–100)
MONOCYTES # BLD AUTO: 0.5 10E3/UL (ref 0–1.3)
MONOCYTES NFR BLD AUTO: 7 %
NEUTROPHILS # BLD AUTO: 5.8 10E3/UL (ref 1.6–8.3)
NEUTROPHILS NFR BLD AUTO: 74 %
NRBC # BLD AUTO: 0 10E3/UL
NRBC BLD AUTO-RTO: 0 /100
PLATELET # BLD AUTO: 248 10E3/UL (ref 150–450)
POTASSIUM SERPL-SCNC: 4.2 MMOL/L (ref 3.4–5.3)
PROT SERPL-MCNC: 7.2 G/DL (ref 6.4–8.3)
RBC # BLD AUTO: 4.53 10E6/UL (ref 3.8–5.2)
SODIUM SERPL-SCNC: 138 MMOL/L (ref 136–145)
WBC # BLD AUTO: 7.7 10E3/UL (ref 4–11)

## 2023-08-01 PROCEDURE — 250N000011 HC RX IP 250 OP 636: Mod: JZ | Performed by: OBSTETRICS & GYNECOLOGY

## 2023-08-01 PROCEDURE — 36415 COLL VENOUS BLD VENIPUNCTURE: CPT | Performed by: OBSTETRICS & GYNECOLOGY

## 2023-08-01 PROCEDURE — 83735 ASSAY OF MAGNESIUM: CPT | Performed by: OBSTETRICS & GYNECOLOGY

## 2023-08-01 PROCEDURE — 96361 HYDRATE IV INFUSION ADD-ON: CPT

## 2023-08-01 PROCEDURE — 96375 TX/PRO/DX INJ NEW DRUG ADDON: CPT

## 2023-08-01 PROCEDURE — 80053 COMPREHEN METABOLIC PANEL: CPT | Performed by: OBSTETRICS & GYNECOLOGY

## 2023-08-01 PROCEDURE — 258N000003 HC RX IP 258 OP 636: Performed by: OBSTETRICS & GYNECOLOGY

## 2023-08-01 PROCEDURE — 96413 CHEMO IV INFUSION 1 HR: CPT

## 2023-08-01 PROCEDURE — 77386 HC IMRT TREATMENT DELIVERY, COMPLEX: CPT | Performed by: RADIOLOGY

## 2023-08-01 PROCEDURE — 85048 AUTOMATED LEUKOCYTE COUNT: CPT | Performed by: OBSTETRICS & GYNECOLOGY

## 2023-08-01 PROCEDURE — 96367 TX/PROPH/DG ADDL SEQ IV INF: CPT

## 2023-08-01 PROCEDURE — 85014 HEMATOCRIT: CPT | Performed by: OBSTETRICS & GYNECOLOGY

## 2023-08-01 RX ORDER — PALONOSETRON 0.05 MG/ML
0.25 INJECTION, SOLUTION INTRAVENOUS ONCE
Status: COMPLETED | OUTPATIENT
Start: 2023-08-01 | End: 2023-08-01

## 2023-08-01 RX ORDER — DEXTROSE, SODIUM CHLORIDE, AND POTASSIUM CHLORIDE 5; .45; .15 G/100ML; G/100ML; G/100ML
2000 INJECTION INTRAVENOUS ONCE
Status: COMPLETED | OUTPATIENT
Start: 2023-08-01 | End: 2023-08-01

## 2023-08-01 RX ORDER — DOCUSATE SODIUM 100 MG/1
100 CAPSULE, LIQUID FILLED ORAL DAILY PRN
COMMUNITY
End: 2023-11-01

## 2023-08-01 RX ADMIN — DEXAMETHASONE SODIUM PHOSPHATE: 10 INJECTION, SOLUTION INTRAMUSCULAR; INTRAVENOUS at 08:35

## 2023-08-01 RX ADMIN — POTASSIUM CHLORIDE, DEXTROSE MONOHYDRATE AND SODIUM CHLORIDE 1000 ML: 150; 5; 450 INJECTION, SOLUTION INTRAVENOUS at 11:48

## 2023-08-01 RX ADMIN — POTASSIUM CHLORIDE, DEXTROSE MONOHYDRATE AND SODIUM CHLORIDE 1000 ML: 150; 5; 450 INJECTION, SOLUTION INTRAVENOUS at 08:06

## 2023-08-01 RX ADMIN — SODIUM CHLORIDE 250 ML: 9 INJECTION, SOLUTION INTRAVENOUS at 08:35

## 2023-08-01 RX ADMIN — PALONOSETRON HYDROCHLORIDE 0.25 MG: 0.25 INJECTION INTRAVENOUS at 08:19

## 2023-08-01 RX ADMIN — SODIUM CHLORIDE 80 MG: 9 INJECTION, SOLUTION INTRAVENOUS at 09:57

## 2023-08-01 ASSESSMENT — PAIN SCALES - GENERAL: PAINLEVEL: SEVERE PAIN (6)

## 2023-08-01 NOTE — PROGRESS NOTES
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 Tamayo presents to clinic today for her weekly on-treatment visit.  She tolerated chemotherapy well.  Since her simulation, she has had an indwelling Thomas catheter placed for urinary obstruction.    Nursing ROS:   Nutrition Alteration  Diet Type: Patient's Preference  Skin  Skin Reaction: 0 - No changes     Cardiovascular  Respiratory effort: 1 - Normal - without distress  Gastrointestinal  Nausea: 0 - None     Pain Assessment  0-10 Pain Scale: 0    PEG Tube: No  Electronic Cardiac Implant: No    Objective:   Wt 97.7 kg (215 lb 4.8 oz)   LMP 2008   BMI 36.53 kg/m    Gen: Appears well, NAD  Skin: No erythema  Extremities: No edema    Laboratory:  Lab Results   Component Value Date    WBC 7.7 2023    HGB 13.5 2023    HCT 42.1 2023    MCV 93 2023     2023     Lab Results   Component Value Date     2023    POTASSIUM 4.2 2023    CHLORIDE 105 2023    CO2 24 2023     (H) 2023     Magnesium   Date Value Ref Range Status   2023 2.3 1.7 - 2.3 mg/dL Final       Treatment-related toxicities (CTCAE v5.0):  Anorexia: Grade 0: No toxicity  Fatigue: Grade 0: No toxicity  Nausea: Grade 0: No toxicity  Pain: Grade 0: No toxicity  Diarrhea: Grade 0: No toxicity  Urinary tract pain: Grade 1: Mild pain  Dermatitis: Grade 0: No toxicity      ED visits/Hospitalizations: None    Missed Treatments: None    Mosaiq chart and setup  information reviewed  IGRT images reviewed    Medication Review  Med list reviewed with patient?: Yes    Assessment:    Ms. 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  She has tolerated the start of chemoradiation.    Plan:   1.  Continue treatment as planned      Marisa Pacheco  Department of Radiation Oncology  [facility]

## 2023-08-01 NOTE — PROGRESS NOTES
Infusion Nursing Note:  Jessie Tamayo presents today for Day 1 Cycle 1 Cisplatin.    Patient seen by provider today: No   present during visit today: Not Applicable.    Note: Patient new to oncology infusion room and is receiving Cisplatin for the first time. Pt oriented to infusion room and call light. New patient teaching done previously. Pt received new pt folder in infusion. Writer reinforced chemotherapy teaching/side effects and schedule. Patient instructed to call triage with questions/concerns or if he/she has chills and/or temperature greater than or equal to 100.5. Triage number: [phone]; After hours, weekends, or holidays, call [phone] and ask for oncology doctor on call. Patient presents to infusion feeling ok. Patient denies new acute discomfort and states no acute complaints or concerns needing to be addressed today. Specifically, pt denies s/s of infection such as fever, sore throat, cough, chest pain, shortness of breath, body aches, chills, headache, increased nasal congestion, or changes in taste/smell. Thomas catheter instructions reviewed with patient and . Lucy Helton RN with Urology made aware of patients inquiry whether PO Antibiotics were warranted post completion of Bactrim-she will reach out to patient if additions are ordered.      Intravenous Access:  Peripheral IV placed.    Treatment Conditions:  Lab Results   Component Value Date    HGB 13.5 08/01/2023    WBC 7.7 08/01/2023    ANEU 11.4 (H) 11/07/2009    ANEUTAUTO 5.8 08/01/2023     08/01/2023        Lab Results   Component Value Date     08/01/2023    POTASSIUM 4.2 08/01/2023    MAG 2.3 08/01/2023    CR 0.99 (H) 08/01/2023    KIM 9.6 08/01/2023    BILITOTAL 0.3 08/01/2023    ALBUMIN 4.5 08/01/2023    ALT 13 08/01/2023    AST 21 08/01/2023       Results reviewed, labs MET treatment parameters, ok to proceed with treatment.      Post Infusion Assessment:  Patient tolerated infusion without  incident.  Thomas catheter emptied multiple times pre-during infusion. Thomas clamped at 1320 in preparation for radiation  Blood return noted pre and post infusion.  Site patent and intact, free from redness, edema or discomfort.  No evidence of extravasations.  Access discontinued per protocol.       Discharge Plan:   Prescription refills given for Dex, compazine, zofran.  Discharge instructions reviewed with: Patient.  Patient and/or family verbalized understanding of discharge instructions and all questions answered.  Copy of AVS reviewed with patient and/or family.  Patient will return 8/7 for next appointment.  Patient discharged in stable condition accompanied by: .  Departure Mode: Ambulatory.      Aaron Reyes RN

## 2023-08-01 NOTE — NURSING NOTE
Chief Complaint   Patient presents with    Blood Draw     Labs drawn via PIV by RN in lab.  VS taken       Labs drawn from PIV placed by RN. Line flushed with saline. Vitals taken. Pt checked in for appointment(s).    Sarahy Diaz RN

## 2023-08-01 NOTE — LETTER
2023         RE: Jessie Tamayo  [address] [address]        Dear Colleague,    Thank you for referring your patient, Jessie Tamayo, to the [facility] RADIATION ONCOLOGY. Please see a copy of my visit note below.    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 Tamayo presents to clinic today for her weekly on-treatment visit.  She tolerated chemotherapy well.  Since her simulation, she has had an indwelling Thomas catheter placed for urinary obstruction.    Nursing ROS:   Nutrition Alteration  Diet Type: Patient's Preference  Skin  Skin Reaction: 0 - No changes     Cardiovascular  Respiratory effort: 1 - Normal - without distress  Gastrointestinal  Nausea: 0 - None     Pain Assessment  0-10 Pain Scale: 0    PEG Tube: No  Electronic Cardiac Implant: No    Objective:   Wt 97.7 kg (215 lb 4.8 oz)   LMP 2008   BMI 36.53 kg/m    Gen: Appears well, NAD  Skin: No erythema  Extremities: No edema    Laboratory:  Lab Results   Component Value Date    WBC 7.7 2023    HGB 13.5 2023    HCT 42.1 2023    MCV 93 2023     2023     Lab Results   Component Value Date     2023    POTASSIUM 4.2 2023    CHLORIDE 105 2023    CO2 24 2023     (H) 2023     Magnesium   Date Value Ref Range Status   2023 2.3 1.7 - 2.3 mg/dL Final       Treatment-related toxicities (CTCAE v5.0):  Anorexia: Grade 0: No toxicity  Fatigue: Grade 0: No  toxicity  Nausea: Grade 0: No toxicity  Pain: Grade 0: No toxicity  Diarrhea: Grade 0: No toxicity  Urinary tract pain: Grade 1: Mild pain  Dermatitis: Grade 0: No toxicity      ED visits/Hospitalizations: None    Missed Treatments: None    Mosaiq chart and setup information reviewed  IGRT images reviewed    Medication Review  Med list reviewed with patient?: Yes    Assessment:    Ms. 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  She has tolerated the start of chemoradiation.    Plan:   1.  Continue treatment as planned      Marisa Pacheco  Department of Radiation Oncology  [facility]

## 2023-08-01 NOTE — PATIENT INSTRUCTIONS
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 The catheter is placed into the bladder by inserting it through the urethra. The urethra is the opening that carries urine from the bladder to the outside of the body.  When the catheter is in the bladder, a small balloon is used to keep the catheter in place. The catheter lets urine drain from the bladder into a collection bag. Urinary catheters can be used in both men and women. A catheter that stays in place for a longer period of time is called an indwelling catheter.  A catheter may be needed because of certain medical conditions. These include an enlarged prostate or problems controlling urine. It may be used after surgery on the pelvis or urinary tract. Urinary catheters are also used when the lower part of the body is paralyzed.  When helping a loved one with a catheter, try to be relaxed. Caring for a catheter can be embarrassing for both of you. If you are calm and don't seem embarrassed, the person may feel more comfortable.  How do you take care of the catheter?  Wear disposable gloves when handling someone's catheter. Make sure to follow all of the instructions the doctor has given. And always wash your hands before and after you're done.  Here are some other things to remember when caring for someone's catheter:  Make sure that urine is running out of the catheter into the urine collection bag. And make sure that the catheter tubing does not get twisted or bent.  Keep the urine collection bag below the level of the bladder. At night it may be helpful to hang the bag on the side of the bed.  Make sure that the urine collection  bag does not drag and pull on the catheter.  It's okay to shower with a catheter and urine collection bag in place, unless the doctor says not to.  Check for swelling or signs of infection in the area around the catheter. Signs of infection include pus and irritated, swollen, red, or tender skin.  Clean the area around the catheter daily with soap and water. Dry with a clean towel afterward.  Do not apply powder or lotion to the skin around the catheter.  Do not tug or pull on the catheter.  Sexual intercourse may still be possible for people who wear a catheter. It's best to talk with a doctor about options.  How do you empty the bag?  The urine collection bag needs to be emptied regularly. It's best to empty the bag when it's about half full or at bedtime. If the doctor has asked you to measure the amount of urine, do that before you empty the urine into the toilet.  When you are ready to empty the bag, follow these steps:  Put on disposable gloves.  Remove the drain spout from its sleeve at the bottom of the collection bag. Open the valve on the spout.  Let the urine flow out of the bag and into the toilet or a container. Do not let the tubing or drain spout touch anything.  After you empty the bag, close the valve and put the drain spout back into its sleeve.  Remove your gloves, and throw them away.  Wash your hands with soap and water.  How do you care for someone after the catheter is removed?  After the catheter is taken out, the person may have trouble urinating. If this happens, try helping them sit in a few inches of warm water (sitz bath). If the urge to urinate comes during the sitz bath, it may be easier for them to urinate while still in the bath.  Some burning may happen the first few times the person urinates. If the burning lasts longer, it may be a sign of an infection.  If the catheter causes irritation or a rash, wearing loose, cotton underwear may help.  Watch closely for changes in the  "person's health. Be sure to contact their doctor if you notice any problems or if they are unable to urinate at all.  Where can you learn more?  Go to https://www.[employer].net/patiented  Enter X535 in the search box to learn more about \"Learning About How to Care for a Person's Indwelling Urinary Catheter.\"  Current as of: March 1, 2023               Content Version: 13.7    [id number] [employer].   Care instructions adapted under license by your healthcare professional. If you have questions about a medical condition or this instruction, always ask your healthcare professional. [employer] disclaims any warranty or liability for your use of this information.               [employer] Triage and after hours / weekends / holidays:  [phone]    Please call the triage or after hours line if you experience a temperature greater than or equal to 100.4, shaking chills, have uncontrolled nausea, vomiting and/or diarrhea, dizziness, shortness of breath, chest pain, bleeding, unexplained bruising, or if you have any other new/concerning symptoms, questions or concerns.      If you are having any concerning symptoms or wish to speak to a provider before your next infusion visit, please call triage to notify them so we can adequately serve you.     If you need a refill on a narcotic prescription or other medication, please call before your infusion appointment.                 August 2023 Sunday Monday Tuesday Wednesday Thursday Friday Saturday             1    LAB PERIPHERAL   7:00 AM   (15 min.)    [facility] DRAW   [facility]    ONC INFUSION 6 HR (360 MIN)   7:30 AM   (360 min.)    ONC INFUSION NURSE   [facility]    TREATMENT   9:15 AM   (30 min.)   [facility] RAD ONC ISAEL   [facility]    OTV   9:45 AM   (15 min.)   Marisa Pacheco MD   [facility] On[facility] 2    TREATMENT   9:15 " AM   (30 min.)   [facility] ONC [facility]   [facility] 3    TREATMENT   9:15 AM   (30 min.)   [facility] ONC [facility]   [facility] 4    TREATMENT   9:15 AM   (30 min.)   [facility] ONC [facility]   [facility]    RETURN CCSL   2:30 PM   (45 min.)   Brandy Oden APRN CNP   [facility] 5       6     7    LAB   6:30 AM   (15 min.)   [facility]   [facility]    ONC INFUSION 6 HR (360 MIN)   7:00 AM   (360 min.)   [facility] ONC INFUSION NURSE   [facility]    TREATMENT   9:15 AM   (30 min.)   [facility] ONC [facility]   [facility] 8    TREATMENT   9:15 AM   (30 min.)   [facility] ONC [facility]   [facility]    OTV   9:45 AM   (15 min.)   Marisa Pacheco MD   [facility] 9    TREATMENT   9:15 AM   (30 min.)   [facility]   [facility] 10    TREATMENT   9:15 AM   (30 min.)   [facility] ONC [facility]   [facility]    RETURN CCSL   1:15 PM   (45 min.)   Andie Rios APRN CNP   [facility] 11    TREATMENT   9:15 AM   (30 min.)   [facility] ONC [facility]EL   [facility] 12       13     14    LAB PERIPHERAL   6:30 AM   (15 min.)   [facility]   [facility]    ONC INFUSION 6 HR (360 MIN)   7:00 AM   (360 min.)   [facility] ONC INFUSION NURSE   [facility]    TREATMENT   9:15 AM   (30 min.)   [facility] ONC [facility]   [facility] 15    TREATMENT   9:15 AM   (30 min.)   [facility] ONC [facility]   [facility]    OTV   9:45 AM   (15 min.)   Marisa Pacheco MD   [facility] 16    TREATMENT   9:15 AM   (30 min.)   [facility] ON[facility]   [facility]  17    TREATMENT   9:15 AM   (30 min.)   [facility] ONC [facility]   [facility] 18    TREATMENT   9:15 AM   (30 min.)   [facility] ONC [facility]   [facility]    RETURN CCSL   2:30 PM   (45 min.)   Brandy Oden APRN CNP   [facility] 19       20     21    LAB PERIPHERAL   6:30 AM   (15 min.)   [facility]   [facility]    ONC INFUSION 6 HR (360 MIN)   7:00 AM   (360 min.)   [facility] ONC INFUSION NURSE   [facility]    TREATMENT   9:15 AM   (30 min.)   [facility] ONC [facility]EL   [facility] 22    TREATMENT   9:15 AM   (30 min.)   [facility] ONC [facility]EL   [facility]    OTV   9:45 AM   (15 min.)   Marisa Pacheco MD   [facility] 23    TREATMENT   9:15 AM   (30 min.)   [facility] ONC [facility]   [facility] 24    TREATMENT   9:15 AM   (30 min.)   [facility] ONC [facility]   [facility] 25    TREATMENT   9:15 AM   (30 min.)   [facility] ONC [facility]   [facility]    RETURN CCSL   1:30 PM   (45 min.)   Brandy Oden APRN CNP   [facility] [place] 26       27     28    LAB PERIPHERAL   6:30 AM   (15 min.)   [facility]   [facility]    ONC INFUSION 6 HR (360 MIN)   7:00 AM   (360 min.)   [facility] ONC INFUSION NURSE   [facility]    TREATMENT   9:15 AM   (30 min.)   [facility] ONC [facility]AEL   [facility] 29    TREATMENT   9:15 AM   (30 min.)   [facility] ONC IASEL   [facility]    OTV   9:45 AM   (15 min.)   Marisa Pacheco MD   [facility] 30    TREATMENT   9:15 AM   (30 min.)   [facility] ONC [facility]AEL   [facility] 31    TREATMENT   9:15 AM   (30 min.)    [facility] RAD ONC [facility]                         September 2023 Sunday Monday Tuesday Wednesday Thursday Friday Saturday                            1    TREATMENT   9:15 AM   (30 min.)   [facility] RAD ONC [facility] 2       3     4     5    TREATMENT   9:15 AM   (30 min.)   [facility] RAD ONC [facility]    OTV   9:45 AM   (15 min.)   Marisa Pacheco MD   [facility] Radiation On[facility] 6     7     8     9       10     11     12     13     14     15     16       17     18    INSERTION, NEEDLE, WITH ULTRASOUND GUIDANCE, FOR INTERSTITIAL BRACHYTHERAPY   7:30 AM   Marisa Pacheco MD [facility] OR 19     20    REMOVAL, INTERSTITIAL NEEDLE, AFTER TEMPORARY BRACHYTHERAPY   4:00 PM   Marisa Pacheco MD [facility] OR 21     22     23       24     25     26     27     28     29     30                       Lab Results:  Recent Results (from the past 12 hour(s))   Comprehensive metabolic panel    Collection Time: 08/01/23  6:44 AM   Result Value Ref Range    Sodium 138 136 - 145 mmol/L    Potassium 4.2 3.4 - 5.3 mmol/L    Chloride 105 98 - 107 mmol/L    Carbon Dioxide (CO2) 24 22 - 29 mmol/L    Anion Gap 9 7 - 15 mmol/L    Urea Nitrogen 14.2 8.0 - 23.0 mg/dL    Creatinine 0.99 (H) 0.51 - 0.95 mg/dL    Calcium 9.6 8.8 - 10.2 mg/dL    Glucose 113 (H) 70 - 99 mg/dL    Alkaline Phosphatase 76 35 - 104 U/L    AST 21 0 - 45 U/L    ALT 13 0 - 50 U/L    Protein Total 7.2 6.4 - 8.3 g/dL    Albumin 4.5 3.5 - 5.2 g/dL    Bilirubin Total 0.3 <=1.2 mg/dL    GFR Estimate 62 >60 mL/min/1.73m2   Magnesium    Collection Time: 08/01/23  6:44 AM   Result Value Ref Range    Magnesium 2.3 1.7 - 2.3 mg/dL   CBC with platelets and differential    Collection Time: 08/01/23  6:44 AM   Result Value Ref Range    WBC Count 7.7 4.0 - 11.0 10e3/uL    RBC Count 4.53 3.80 - 5.20 10e6/uL    Hemoglobin 13.5 11.7 - 15.7 g/dL    Hematocrit 42.1  35.0 - 47.0 %    MCV 93 78 - 100 fL    MCH 29.8 26.5 - 33.0 pg    MCHC 32.1 31.5 - 36.5 g/dL    RDW 13.0 10.0 - 15.0 %    Platelet Count 248 150 - 450 10e3/uL    % Neutrophils 74 %    % Lymphocytes 16 %    % Monocytes 7 %    % Eosinophils 1 %    % Basophils 1 %    % Immature Granulocytes 1 %    NRBCs per 100 WBC 0 <1 /100    Absolute Neutrophils 5.8 1.6 - 8.3 10e3/uL    Absolute Lymphocytes 1.2 0.8 - 5.3 10e3/uL    Absolute Monocytes 0.5 0.0 - 1.3 10e3/uL    Absolute Eosinophils 0.1 0.0 - 0.7 10e3/uL    Absolute Basophils 0.1 0.0 - 0.2 10e3/uL    Absolute Immature Granulocytes 0.1 <=0.4 10e3/uL    Absolute NRBCs 0.0 10e3/uL

## 2023-08-02 ENCOUNTER — APPOINTMENT (OUTPATIENT)
Dept: RADIATION ONCOLOGY | Facility: CLINIC | Age: 68
End: 2023-08-02
Attending: RADIOLOGY
Payer: COMMERCIAL

## 2023-08-02 PROCEDURE — 77014 PR CT GUIDE FOR PLACEMENT RADIATION THERAPY FIELDS: CPT | Mod: 26 | Performed by: RADIOLOGY

## 2023-08-02 PROCEDURE — 77386 HC IMRT TREATMENT DELIVERY, COMPLEX: CPT | Performed by: RADIOLOGY

## 2023-08-03 ENCOUNTER — APPOINTMENT (OUTPATIENT)
Dept: RADIATION ONCOLOGY | Facility: CLINIC | Age: 68
End: 2023-08-03
Attending: RADIOLOGY
Payer: COMMERCIAL

## 2023-08-03 PROCEDURE — 77386 HC IMRT TREATMENT DELIVERY, COMPLEX: CPT | Performed by: RADIOLOGY

## 2023-08-03 NOTE — PROGRESS NOTES
Virtual Visit Details    Type of service:  Video Visit   Video Start Time: 1429  Video End Time: 1501    Originating Location (pt. 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  She presents for follow up and disease management.    Oncology History:  07, 10/15/08, 09, 11: Pap test NILM.      1/23/15: Pap test NILM, hrHPV16+.  2/27/15: Cervical polyp & cervical biopsy pathology: Negative for dysplasia/malignancy.     16: Pap test NILM, hrHPV16+.   16: Endocervical curettings pathology negative for dysplasia/malignancy.     17: Pap test NILM, hrHPV16+.   -Colposcopy recommended, not performed.      18:   -Pap test ASCUS, hrHPV16+.   -Endocervical curettings & cervical biopsy pathology: negative for dysplasia/malignancy.      19:  Pap test ASC-H, hrHPV16+.   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      9/10/20: Pap test ASC-H, hrHPV+ (NOS).  21: LEEP.   -Pathology: LEEP & endocervical curettings: negative for dysplasia/malignancy.     3/23/22: Endocervical curettings & cervical biopsy pathology: negative for dysplasia/malignancy.     3/28/23:   -Pap test HSIL, hrHPV16+.   -Findings by gynecologist Dr. Koenig: External genitalia with erythematous plaques bilaterally  Urethra- mid position and well supported, suburethral tissue thickened and friable with bleeding elicited after placement of the speculum  Vagina is stenotic and cervix difficult to see.   23: Cystourethroscopy, vaginal biopsy by urologist Dr. Mackenzie.   -Findings: Exam revealed lichenified changes to bilateral labia majora and friable vestibular  tissue (patient had significant bleeding from prep alone.) The urethra was somewhat stenotic and would not accommodate 19Fr rigid cystoscope, therefore a 16 Fr flexible cystoscope was inserted. Gentle pressure was required to access the bladder. The ureteral orifices were in their orthotopic positions bilaterally. There were no intravesical stones or diverticuli and the bladder was mildly trabeculated. There were no intravesical lesions. See media tab for urethral pictures - there were no obvious lesions but the entire distal urethra was erythematous and stenosed (16Fr.)  -Pathology: Invasive moderately-differentiated squamous cell carcinoma, HPV-associated, with focus suspicious for lymphovascular space invasion; IHC: p16+.    6/8/23: Gynecologic Oncology consultation.  -Findings:   External genitalia notable for erythema and desquamation of the posterior medial labia, c/w lichen sclerosus changes. When the labia are , a friable mass is visible at the distal anterior vagina, just posterior to the urethra. On bimanual exam, the cervix is small and normal in contour. The palpable vaginal tumor is limited to the anterior distal vagina, approximately 4-5 cm laterally x 3 cm cranio-caudal. Tumor is friable. Remainder of the vagina smooth to palpation. Digital anorectal exam is without nodularity. No palpable tumor in the rectovaginal septum.   Enlarged firm, fixed right inguinal lymph node ~3-4 cm in size.     6/16/23: Pelvic MRI  IMPRESSION:   1. Irregular enhancing lesion along the anterior vaginal wall at the  level of the introitus measuring 2.5 x 0.9 cm with irregular  enhancement along the anterior vaginal wall towards the level of the  vaginal cuff however there is no definitive evidence or abnormal  signal intensity involving the cervix to suggest invasion.  2. Right inguinal lymphadenopathy compatible with metastatic disease.   3. There is some asymmetric enhancement involving the left  sacrum,  incompletely evaluated on this study. Further evaluation of this and  further staging of the chest, abdomen and pelvis will be performed on  PET/CT scheduled for 6/20/2023.    6/20/23: PET CT  IMPRESSION:   1. Intense focal hypermetabolic FDG uptake in biopsy-proven vaginal  squamous cell carcinoma.  2. Multiple enlarged, hypermetabolic right inguinal nodes likely  represent regional metastasis. No definite evidence of distant  metastatic disease.  3. Scattered sub-4 mm solid pulmonary nodules are below the size  threshold for characterization on PET. Attention on follow-up with CT.  4. 1.0 cm enhancing left parotid gland nodule with hypermetabolic  uptake could represent a primary parotid neoplasm such as pleomorphic  adenoma or Warthin's tumor; consider ultrasound for further  evaluation.  5. 1.3 cm hypoattenuating right thyroid nodule with mild FDG uptake  warrants ultrasound for characterization.     Plan: Cisplatin radiation      7/27/2023: urinary gonsalez catheter placement. Treat acute cystitis with bactrim per Urology.     8/1/2023: plan C1D1 Cisplatin radiation             Chemo went well this week with C1D1. Fatigue ongoing however this was manageable. Was hungry after chemotherapy with steroids. Has been taking Decadron as prescribed. Had dizziness this morning once after standing up too quickly  which she took Zofran for. Mild reflux yesterday. No n/v. Bowels are functional with Miralax. Taking Colace daily. Appetite is up and down. Eating peanut butter bagels and protein shakes, granola, yogurt. She denies any vaginal bleeding, no nausea/emesis, no lower extremity edema, and no difficulties eating or sleeping. She denies fevers or chills, and no chest pain or shortness of breath. Is having some pelvic discomfort post urinary catheter placement. No discomfort over right LN. She has noticed that her right inguinal mass has increased in size. It is firm and mobile as it has been. No baseline  "numbness or tingling in her fingers or toes. \"hard of hearing\" per pt age changes. No hearing loss medically diagnosed. No tinnitus.       Having issues with the gonsalez bag and gonsalez catheter. Pt feels that the tumor is resting on the gonsalez. With movement, pt will have discomfort. Did take Tylenol for this and leg pain which helped.                 Past Medical History:    Past Medical History:   Diagnosis Date    Actinic keratosis     Hyperlipemia     Lichen sclerosus 2020    Osteopenias     SCC (squamous cell carcinoma) 7/26/2023         Past Surgical History:    Past Surgical History:   Procedure Laterality Date    COLONOSCOPY  2006    normal    COLPOSCOPY, BIOPSY, COMBINED N/A 02/08/2021    Procedure: COLPOSCOPY, WITH BIOPSY, LEEP procedure;  Surgeon: Jefe Koenig MD;  Location: [facility] OR    CYSTOSCOPY N/A 05/30/2023    Procedure: CYSTOSCOPY AND EXCISIONAL BIOPSY OF THE VAGINAL TISSUE AROUND THE URETHRA.  (look in the bladder and sample small area in the vagina near the urethra);  Surgeon: Lakeisha Mackenzie MD;  Location: [facility] OR    EYE SURGERY      cataracts bilateral    OPEN REDUCTION INTERNAL FIXATION FOREARM  07/31/2012    Procedure: OPEN REDUCTION INTERNAL FIXATION FOREARM;  Open Reduction Internal Fixation, Irrigation & Debridment  of Right Distal Radius, application short arm splint;  Surgeon: Wade Beard MD;  Location: [facility]    TONSILLECTOMY & ADENOIDECTOMY  1960    [facility] TREAT ECTOPIC PREG,RMV TUBE/OVARY  1985    ectopic, right side-ovary and tube removed         Health Maintenance Due   Topic Date Due    MEDICARE ANNUAL WELLNESS VISIT  05/26/2023    COLPOSCOPY  06/28/2023       Current Medications:     Current Outpatient Medications   Medication Sig Dispense Refill    dexamethasone (DECADRON) 4 MG tablet Take 2 tablets (8 mg) by mouth daily Take for 3 days, starting the day after chemo on day 2 and 9. Take with food. If no nausea and vomiting with first cisplatin doses, may stop " "dexamethasone. 12 tablet 2    docusate sodium (COLACE) 100 MG capsule Take 100 mg by mouth daily      ondansetron (ZOFRAN) 8 MG tablet Take 1 tablet (8 mg) by mouth every 8 hours as needed for nausea (vomiting) Do not take for 3 days after chemo. 30 tablet 2    prochlorperazine (COMPAZINE) 10 MG tablet Take 1 tablet (10 mg) by mouth every 6 hours as needed for nausea or vomiting 30 tablet 2         Allergies:        Allergies   Allergen Reactions    Seasonal Allergies         Social History:     Social History     Tobacco Use    Smoking status: Never     Passive exposure: Never    Smokeless tobacco: Never   Substance Use Topics    Alcohol use: Yes     Comment: very little       History   Drug Use No         Family History:     The patient's family history is notable for:    Family History   Problem Relation Age of Onset    Lung Cancer Mother 44    Cerebrovascular Disease Father     Hypertension Father     Alcohol/Drug Father     Obesity Niece         niece    Mental Illness Nephew         nephew    Diabetes Other         paternal aunt    Hyperlipidemia Other     Obesity Other         self    Cervical Cancer Maternal Aunt     Ovarian Cancer Maternal Aunt 60    Obesity Other     Diabetes Other         paternal aunt    Hypertension Other         father    Cerebrovascular Disease Other         father    C.A.D. No family hx of     Breast Cancer No family hx of     Cancer - colorectal No family hx of     Prostate Cancer No family hx of     Melanoma No family hx of          Physical Exam:     Ht 1.626 m (5' 4\")   Wt 95.7 kg (211 lb)   LMP 03/08/2008   BMI 36.22 kg/m    Body mass index is 36.22 kg/m .      General Appearance: healthy and alert, no distress    Eyes:  Eyes grossly normal to inspection.  No discharge or erythema, or obvious scleral/conjunctival abnormalities.    Respiratory: No audible wheeze, cough, or visible cyanosis.  No visible retractions or increased work of breathing. " "    Musculoskeletal: extremities non tender and without edema    Skin: no lesions or rashes on visible skin    Neurological: normal gait, no gross defects     Psychiatric: appropriate mood and affect. Mentation appears normal, affect normal/bright, judgement and insight intact, normal speech and appearance well-groomed                            The rest of a comprehensive physical examination is deferred due to video visit restrictions.      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  She presents for follow up and disease management.    40 minutes spent on the date of the encounter doing chart review, history and exam, documentation, and further activities as noted above.              Plan:     1.) Vaginal cancer:  Plan for MD visit ~1 month after completion of chemoradiation therapy for post-therapy visit (virtual or in-person per patient preference), and then 3 months later (4 months post-chemoradiation) with repeat PET/CT and exam to assess diease response (MD, in-person).  Currently scheduled appointments available via My Chart.  Reviewed signs and symptoms for when she should contact the clinic or seek additional care; and contact information of the Gynecologic Oncology Clinic.  Patient to contact the clinic with any questions or concerns in the interim.     2.) Health maintenance:  Issues addressed today include following up with PCP for annual health maintenance and non-gynecologic issues.     3.)       Urinary retention: gonsalez catheter placed by urology on 7/27/23. Plan for one month return for exchange. Pt will contact urology to inquire about suppression abx after bactrim while on chemo and with current gonsalez in place. Reviewed gonsalez care with pt today.     4.)       Left parotid nodule: message sent to Dr. Stahl to clarify plan  -Addendum 8/1/2023: Per Dr. Stahl, \"We did not discuss the parotic nodule specifically. Since " "it is favored to be benign (pleomorphic adenoma or Warthin's tumor), I plan to just monitor on follow-up imaging, and at the end of treatment if still present we can consider ultrasound for additional evaluation. The more urgent issue is her metastatic vaginal cancer. \"    5.)        Mild pelvic pain: controlled with APAP. Reviewed safe dosing and to call in if pain is not controlled.      6.)        Mild reflux: reviewed to keep spacing meals every 2-3 hours. Ok to try Pepcid otc.     7.)        Dizziness: one episode however overall feeling well. Reviewed sitting to standing slower to prevent any orthostatic hypotension and to only take Zofran for nausea.         SANDI Wiggins, NP-BC  Women's Health Nurse Practitioner  Division of Gynecologic Oncology  [facility]            CC  Patient Care Team:  Alba Layton MD as PCP - Jefe Mosley MD as Assigned OBGYN Provider  Alba Layton MD as Assigned PCP  Lakeisha Mackenzie MD as MD (Urology)  Lawrence Banuelos MD as MD (Dermatology)  Lakeisha Mackenzie MD as Assigned Surgical Provider  Lakeisha Mackenzie MD as MD (Urology)  Reina Stahl MD as MD (Gynecologic Oncology)  Marisa Pacheco MD as MD (Radiation Oncology)  Reina Stahl MD as Assigned Cancer Care Provider  SELF, REFERRED          "

## 2023-08-04 ENCOUNTER — VIRTUAL VISIT (OUTPATIENT)
Dept: ONCOLOGY | Facility: CLINIC | Age: 68
End: 2023-08-04
Attending: OBSTETRICS & GYNECOLOGY
Payer: COMMERCIAL

## 2023-08-04 ENCOUNTER — APPOINTMENT (OUTPATIENT)
Dept: RADIATION ONCOLOGY | Facility: CLINIC | Age: 68
End: 2023-08-04
Attending: RADIOLOGY
Payer: COMMERCIAL

## 2023-08-04 VITALS — BODY MASS INDEX: 36.02 KG/M2 | WEIGHT: 211 LBS | HEIGHT: 64 IN

## 2023-08-04 DIAGNOSIS — C52 VAGINAL CANCER (H): Primary | ICD-10-CM

## 2023-08-04 DIAGNOSIS — C44.92 SCC (SQUAMOUS CELL CARCINOMA): ICD-10-CM

## 2023-08-04 DIAGNOSIS — L90.0 LICHEN SCLEROSUS: ICD-10-CM

## 2023-08-04 PROCEDURE — 77427 RADIATION TX MANAGEMENT X5: CPT | Performed by: RADIOLOGY

## 2023-08-04 PROCEDURE — 77336 RADIATION PHYSICS CONSULT: CPT | Performed by: RADIOLOGY

## 2023-08-04 PROCEDURE — 77014 PR CT GUIDE FOR PLACEMENT RADIATION THERAPY FIELDS: CPT | Mod: 26 | Performed by: RADIOLOGY

## 2023-08-04 PROCEDURE — 77386 HC IMRT TREATMENT DELIVERY, COMPLEX: CPT | Performed by: RADIOLOGY

## 2023-08-04 PROCEDURE — 99215 OFFICE O/P EST HI 40 MIN: CPT | Mod: VID | Performed by: OBSTETRICS & GYNECOLOGY

## 2023-08-04 ASSESSMENT — PAIN SCALES - GENERAL: PAINLEVEL: MILD PAIN (3)

## 2023-08-04 NOTE — TELEPHONE ENCOUNTER
FUTURE VISIT INFORMATION      SURGERY INFORMATION:  Date: 23  [facility]  Surgeon:  Marisa Pacheco MD   Anesthesia Type:  general with block  Procedure: INSERTION, NEEDLE, WITH ULTRASOUND GUIDANCE, FOR INTERSTITIAL BRACHYTHERAPY possible INSERTION, FIDUCIAL MARKER SEED, CERVIX, FOR RADIATION THERAPY     RECORDS REQUESTED FROM:       Primary Care Provider: [facility]    Most recent EKG+ [id number]

## 2023-08-04 NOTE — NURSING NOTE
Is the patient currently in the state of [place]? YES    Visit mode:VIDEO    If the visit is dropped, the patient can be reconnected by: VIDEO VISIT: Text to cell phone: [phone] Pt's  cell per pt    Will anyone else be joining the visit? Yes, Pt's  ( Baljeet) is with the pt and will be joining the video visit per pt.      How would you like to obtain your AVS? MyChart    Are changes needed to the allergy or medication list? NO    Reason for visit: RECHECK     No other pt vitals to report per pt    Marlin Medrano [facility]

## 2023-08-04 NOTE — LETTER
2023         RE: Jessie Tamayo  [address] [id number]        Dear Colleague,    Thank you for referring your patient, Jessie Tamayo, to the [facility]. Please see a copy of my visit note below.     Virtual Visit Details    Type of service:  Video Visit   Video Start Time: 1429  Video End Time: 1501    Originating Location (pt. 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  She presents for follow up and disease management.    Oncology History:  07, 10/15/08, 09, 11: Pap test NILM.      1/23/15: Pap test NILM, hrHPV16+.  2/27/15: Cervical polyp & cervical biopsy pathology: Negative for dysplasia/malignancy.     16: Pap test NILM, hrHPV16+.   16: Endocervical curettings pathology negative for dysplasia/malignancy.     17: Pap test NILM, hrHPV16+.   -Colposcopy recommended, not performed.      18:   -Pap test ASCUS, hrHPV16+.   -Endocervical curettings & cervical biopsy pathology: negative for dysplasia/malignancy.      19:  Pap test ASC-H, hrHPV16+.   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      9/10/20: Pap test ASC-H, hrHPV+ (NOS).  21: LEEP.   -Pathology: LEEP & endocervical curettings: negative for dysplasia/malignancy.     3/23/22: Endocervical curettings & cervical biopsy pathology: negative for dysplasia/malignancy.     3/28/23:   -Pap test HSIL, hrHPV16+.   -Findings by gynecologist Dr. Koenig: External genitalia with erythematous plaques bilaterally  Urethra- mid position and well supported, suburethral tissue thickened and friable with bleeding  elicited after placement of the speculum  Vagina is stenotic and cervix difficult to see.   5/30/23: Cystourethroscopy, vaginal biopsy by urologist Dr. Mackenzie.   -Findings: Exam revealed lichenified changes to bilateral labia majora and friable vestibular tissue (patient had significant bleeding from prep alone.) The urethra was somewhat stenotic and would not accommodate 19Fr rigid cystoscope, therefore a 16 Fr flexible cystoscope was inserted. Gentle pressure was required to access the bladder. The ureteral orifices were in their orthotopic positions bilaterally. There were no intravesical stones or diverticuli and the bladder was mildly trabeculated. There were no intravesical lesions. See media tab for urethral pictures - there were no obvious lesions but the entire distal urethra was erythematous and stenosed (16Fr.)  -Pathology: Invasive moderately-differentiated squamous cell carcinoma, HPV-associated, with focus suspicious for lymphovascular space invasion; IHC: p16+.    6/8/23: Gynecologic Oncology consultation.  -Findings:   External genitalia notable for erythema and desquamation of the posterior medial labia, c/w lichen sclerosus changes. When the labia are , a friable mass is visible at the distal anterior vagina, just posterior to the urethra. On bimanual exam, the cervix is small and normal in contour. The palpable vaginal tumor is limited to the anterior distal vagina, approximately 4-5 cm laterally x 3 cm cranio-caudal. Tumor is friable. Remainder of the vagina smooth to palpation. Digital anorectal exam is without nodularity. No palpable tumor in the rectovaginal septum.   Enlarged firm, fixed right inguinal lymph node ~3-4 cm in size.     6/16/23: Pelvic MRI  IMPRESSION:   1. Irregular enhancing lesion along the anterior vaginal wall at the  level of the introitus measuring 2.5 x 0.9 cm with irregular  enhancement along the anterior vaginal wall towards the level of the  vaginal cuff  however there is no definitive evidence or abnormal  signal intensity involving the cervix to suggest invasion.  2. Right inguinal lymphadenopathy compatible with metastatic disease.   3. There is some asymmetric enhancement involving the left sacrum,  incompletely evaluated on this study. Further evaluation of this and  further staging of the chest, abdomen and pelvis will be performed on  PET/CT scheduled for 6/20/2023.    6/20/23: PET CT  IMPRESSION:   1. Intense focal hypermetabolic FDG uptake in biopsy-proven vaginal  squamous cell carcinoma.  2. Multiple enlarged, hypermetabolic right inguinal nodes likely  represent regional metastasis. No definite evidence of distant  metastatic disease.  3. Scattered sub-4 mm solid pulmonary nodules are below the size  threshold for characterization on PET. Attention on follow-up with CT.  4. 1.0 cm enhancing left parotid gland nodule with hypermetabolic  uptake could represent a primary parotid neoplasm such as pleomorphic  adenoma or Warthin's tumor; consider ultrasound for further  evaluation.  5. 1.3 cm hypoattenuating right thyroid nodule with mild FDG uptake  warrants ultrasound for characterization.     Plan: Cisplatin radiation      7/27/2023: urinary gonsalez catheter placement. Treat acute cystitis with bactrim per Urology.     8/1/2023: plan C1D1 Cisplatin radiation             Chemo went well this week with C1D1. Fatigue ongoing however this was manageable. Was hungry after chemotherapy with steroids. Has been taking Decadron as prescribed. Had dizziness this morning once after standing up too quickly  which she took Zofran for. Mild reflux yesterday. No n/v. Bowels are functional with Miralax. Taking Colace daily. Appetite is up and down. Eating peanut butter bagels and protein shakes, granola, yogurt. She denies any vaginal bleeding, no nausea/emesis, no lower extremity edema, and no difficulties eating or sleeping. She denies fevers or chills, and no chest pain  "or shortness of breath. Is having some pelvic discomfort post urinary catheter placement. No discomfort over right LN. She has noticed that her right inguinal mass has increased in size. It is firm and mobile as it has been. No baseline numbness or tingling in her fingers or toes. \"hard of hearing\" per pt age changes. No hearing loss medically diagnosed. No tinnitus.       Having issues with the gonsalez bag and gonsalez catheter. Pt feels that the tumor is resting on the gonsalez. With movement, pt will have discomfort. Did take Tylenol for this and leg pain which helped.                 Past Medical History:    Past Medical History:   Diagnosis Date     Actinic keratosis      Hyperlipemia      Lichen sclerosus 2020     Osteopenias      SCC (squamous cell carcinoma) 7/26/2023         Past Surgical History:    Past Surgical History:   Procedure Laterality Date     COLONOSCOPY  2006    normal     COLPOSCOPY, BIOPSY, COMBINED N/A 02/08/2021    Procedure: COLPOSCOPY, WITH BIOPSY, LEEP procedure;  Surgeon: Jefe Koenig MD;  Location: [facility] OR     CYSTOSCOPY N/A 05/30/2023    Procedure: CYSTOSCOPY AND EXCISIONAL BIOPSY OF THE VAGINAL TISSUE AROUND THE URETHRA.  (look in the bladder and sample small area in the vagina near the urethra);  Surgeon: Lakeisha Mackenzie MD;  Location: [facility] OR     EYE SURGERY      cataracts bilateral     OPEN REDUCTION INTERNAL FIXATION FOREARM  07/31/2012    Procedure: OPEN REDUCTION INTERNAL FIXATION FOREARM;  Open Reduction Internal Fixation, Irrigation & Debridment  of Right Distal Radius, application short arm splint;  Surgeon: Wade Beard MD;  Location: [facility] OR     TONSILLECTOMY & ADENOIDECTOMY  1960     [facility] TREAT ECTOPIC PREG,RMV TUBE/OVARY  1985    ectopic, right side-ovary and tube removed         Health Maintenance Due   Topic Date Due     MEDICARE ANNUAL WELLNESS VISIT  05/26/2023     COLPOSCOPY  06/28/2023       Current Medications:     Current Outpatient Medications " "  Medication Sig Dispense Refill     dexamethasone (DECADRON) 4 MG tablet Take 2 tablets (8 mg) by mouth daily Take for 3 days, starting the day after chemo on day 2 and 9. Take with food. If no nausea and vomiting with first cisplatin doses, may stop dexamethasone. 12 tablet 2     docusate sodium (COLACE) 100 MG capsule Take 100 mg by mouth daily       ondansetron (ZOFRAN) 8 MG tablet Take 1 tablet (8 mg) by mouth every 8 hours as needed for nausea (vomiting) Do not take for 3 days after chemo. 30 tablet 2     prochlorperazine (COMPAZINE) 10 MG tablet Take 1 tablet (10 mg) by mouth every 6 hours as needed for nausea or vomiting 30 tablet 2         Allergies:        Allergies   Allergen Reactions     Seasonal Allergies         Social History:     Social History     Tobacco Use     Smoking status: Never     Passive exposure: Never     Smokeless tobacco: Never   Substance Use Topics     Alcohol use: Yes     Comment: very little       History   Drug Use No         Family History:     The patient's family history is notable for:    Family History   Problem Relation Age of Onset     Lung Cancer Mother 44     Cerebrovascular Disease Father      Hypertension Father      Alcohol/Drug Father      Obesity Niece         niece     Mental Illness Nephew         nephew     Diabetes Other         paternal aunt     Hyperlipidemia Other      Obesity Other         self     Cervical Cancer Maternal Aunt      Ovarian Cancer Maternal Aunt 60     Obesity Other      Diabetes Other         paternal aunt     Hypertension Other         father     Cerebrovascular Disease Other         father     C.A.D. No family hx of      Breast Cancer No family hx of      Cancer - colorectal No family hx of      Prostate Cancer No family hx of      Melanoma No family hx of          Physical Exam:     Ht 1.626 m (5' 4\")   Wt 95.7 kg (211 lb)   LMP 03/08/2008   BMI 36.22 kg/m    Body mass index is 36.22 kg/m .      General Appearance: healthy and alert, no " distress    Eyes:  Eyes grossly normal to inspection.  No discharge or erythema, or obvious scleral/conjunctival abnormalities.    Respiratory: No audible wheeze, cough, or visible cyanosis.  No visible retractions or increased work of breathing.     Musculoskeletal: extremities non tender and without edema    Skin: no lesions or rashes on visible skin    Neurological: normal gait, no gross defects     Psychiatric: appropriate mood and affect. Mentation appears normal, affect normal/bright, judgement and insight intact, normal speech and appearance well-groomed                            The rest of a comprehensive physical examination is deferred due to video visit restrictions.      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  She presents for follow up and disease management.    40 minutes spent on the date of the encounter doing chart review, history and exam, documentation, and further activities as noted above.              Plan:     1.) Vaginal cancer:  Plan for MD visit ~1 month after completion of chemoradiation therapy for post-therapy visit (virtual or in-person per patient preference), and then 3 months later (4 months post-chemoradiation) with repeat PET/CT and exam to assess diease response (MD, in-person).  Currently scheduled appointments available via My Chart.  Reviewed signs and symptoms for when she should contact the clinic or seek additional care; and contact information of the Gynecologic Oncology Clinic.  Patient to contact the clinic with any questions or concerns in the interim.     2.) Health maintenance:  Issues addressed today include following up with PCP for annual health maintenance and non-gynecologic issues.     3.)       Urinary retention: gonsalez catheter placed by urology on 7/27/23. Plan for one month return for exchange. Pt will contact urology to inquire about suppression abx after bactrim while  "on chemo and with current gonsalez in place. Reviewed gonsalez care with pt today.     4.)       Left parotid nodule: message sent to Dr. Stahl to clarify plan  -Addendum 8/1/2023: Per Dr. Stahl, \"We did not discuss the parotic nodule specifically. Since it is favored to be benign (pleomorphic adenoma or Warthin's tumor), I plan to just monitor on follow-up imaging, and at the end of treatment if still present we can consider ultrasound for additional evaluation. The more urgent issue is her metastatic vaginal cancer. \"    5.)        Mild pelvic pain: controlled with APAP. Reviewed safe dosing and to call in if pain is not controlled.      6.)        Mild reflux: reviewed to keep spacing meals every 2-3 hours. Ok to try Pepcid otc.     7.)        Dizziness: one episode however overall feeling well. Reviewed sitting to standing slower to prevent any orthostatic hypotension and to only take Zofran for nausea.         SANDI Wiggins, NP-BC  Women's Health Nurse Practitioner  Division of Gynecologic Oncology  [facility]            CC  Patient Care Team:  Alba Layton MD as PCP - General  LibertyJefe russo MD as Assigned OBGYN Provider  Alba Layton MD as Assigned PCP  Lakeisha Mackenzie MD as MD (Urology)  Lawrence Banuelos MD as MD (Dermatology)  Lakeisha Mackenzie MD as Assigned Surgical Provider  aLkeisha Mackenzie MD as MD (Urology)  Reina Stahl MD as MD (Gynecologic Oncology)  Marisa Pacheco MD as MD (Radiation Oncology)  Reina Stahl MD as Assigned Cancer Care Provider  SELF, REFERRED            Again, thank you for allowing me to participate in the care of your patient.        Sincerely,        SANDI Munoz CNP  "

## 2023-08-04 NOTE — LETTER
2023         RE: Jessie Tamayo  [address] [facility] [id number]        Dear Colleague,    Thank you for referring your patient, Jessie Tamayo, to the [facility]. Please see a copy of my visit note below.     Virtual Visit Details    Type of service:  Video Visit   Video Start Time: 1429  Video End Time: 1501    Originating Location (pt. 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  She presents for follow up and disease management.    Oncology History:  07, 10/15/08, 09, 11: Pap test NILM.      1/23/15: Pap test NILM, hrHPV16+.  2/27/15: Cervical polyp & cervical biopsy pathology: Negative for dysplasia/malignancy.     16: Pap test NILM, hrHPV16+.   16: Endocervical curettings pathology negative for dysplasia/malignancy.     17: Pap test NILM, hrHPV16+.   -Colposcopy recommended, not performed.      18:   -Pap test ASCUS, hrHPV16+.   -Endocervical curettings & cervical biopsy pathology: negative for dysplasia/malignancy.      19:  Pap test ASC-H, hrHPV16+.   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      9/10/20: Pap test ASC-H, hrHPV+ (NOS).  21: LEEP.   -Pathology: LEEP & endocervical curettings: negative for dysplasia/malignancy.     3/23/22: Endocervical curettings & cervical biopsy pathology: negative for dysplasia/malignancy.     3/28/23:   -Pap test HSIL, hrHPV16+.   -Findings by gynecologist Dr. Koenig: External genitalia with erythematous plaques bilaterally  Urethra- mid position and well supported, suburethral tissue thickened and friable with bleeding  elicited after placement of the speculum  Vagina is stenotic and cervix difficult to see.   5/30/23: Cystourethroscopy, vaginal biopsy by urologist Dr. Mackenzie.   -Findings: Exam revealed lichenified changes to bilateral labia majora and friable vestibular tissue (patient had significant bleeding from prep alone.) The urethra was somewhat stenotic and would not accommodate 19Fr rigid cystoscope, therefore a 16 Fr flexible cystoscope was inserted. Gentle pressure was required to access the bladder. The ureteral orifices were in their orthotopic positions bilaterally. There were no intravesical stones or diverticuli and the bladder was mildly trabeculated. There were no intravesical lesions. See media tab for urethral pictures - there were no obvious lesions but the entire distal urethra was erythematous and stenosed (16Fr.)  -Pathology: Invasive moderately-differentiated squamous cell carcinoma, HPV-associated, with focus suspicious for lymphovascular space invasion; IHC: p16+.    6/8/23: Gynecologic Oncology consultation.  -Findings:   External genitalia notable for erythema and desquamation of the posterior medial labia, c/w lichen sclerosus changes. When the labia are , a friable mass is visible at the distal anterior vagina, just posterior to the urethra. On bimanual exam, the cervix is small and normal in contour. The palpable vaginal tumor is limited to the anterior distal vagina, approximately 4-5 cm laterally x 3 cm cranio-caudal. Tumor is friable. Remainder of the vagina smooth to palpation. Digital anorectal exam is without nodularity. No palpable tumor in the rectovaginal septum.   Enlarged firm, fixed right inguinal lymph node ~3-4 cm in size.     6/16/23: Pelvic MRI  IMPRESSION:   1. Irregular enhancing lesion along the anterior vaginal wall at the  level of the introitus measuring 2.5 x 0.9 cm with irregular  enhancement along the anterior vaginal wall towards the level of the  vaginal cuff  however there is no definitive evidence or abnormal  signal intensity involving the cervix to suggest invasion.  2. Right inguinal lymphadenopathy compatible with metastatic disease.   3. There is some asymmetric enhancement involving the left sacrum,  incompletely evaluated on this study. Further evaluation of this and  further staging of the chest, abdomen and pelvis will be performed on  PET/CT scheduled for 6/20/2023.    6/20/23: PET CT  IMPRESSION:   1. Intense focal hypermetabolic FDG uptake in biopsy-proven vaginal  squamous cell carcinoma.  2. Multiple enlarged, hypermetabolic right inguinal nodes likely  represent regional metastasis. No definite evidence of distant  metastatic disease.  3. Scattered sub-4 mm solid pulmonary nodules are below the size  threshold for characterization on PET. Attention on follow-up with CT.  4. 1.0 cm enhancing left parotid gland nodule with hypermetabolic  uptake could represent a primary parotid neoplasm such as pleomorphic  adenoma or Warthin's tumor; consider ultrasound for further  evaluation.  5. 1.3 cm hypoattenuating right thyroid nodule with mild FDG uptake  warrants ultrasound for characterization.     Plan: Cisplatin radiation      7/27/2023: urinary gonsalez catheter placement. Treat acute cystitis with bactrim per Urology.     8/1/2023: plan C1D1 Cisplatin radiation             Chemo went well this week with C1D1. Fatigue ongoing however this was manageable. Was hungry after chemotherapy with steroids. Has been taking Decadron as prescribed. Had dizziness this morning once after standing up too quickly  which she took Zofran for. Mild reflux yesterday. No n/v. Bowels are functional with Miralax. Taking Colace daily. Appetite is up and down. Eating peanut butter bagels and protein shakes, granola, yogurt. She denies any vaginal bleeding, no nausea/emesis, no lower extremity edema, and no difficulties eating or sleeping. She denies fevers or chills, and no chest pain  "or shortness of breath. Is having some pelvic discomfort post urinary catheter placement. No discomfort over right LN. She has noticed that her right inguinal mass has increased in size. It is firm and mobile as it has been. No baseline numbness or tingling in her fingers or toes. \"hard of hearing\" per pt age changes. No hearing loss medically diagnosed. No tinnitus.       Having issues with the gonsalez bag and gonsalez catheter. Pt feels that the tumor is resting on the gonsalez. With movement, pt will have discomfort. Did take Tylenol for this and leg pain which helped.                 Past Medical History:    Past Medical History:   Diagnosis Date     Actinic keratosis      Hyperlipemia      Lichen sclerosus 2020     Osteopenias      SCC (squamous cell carcinoma) 7/26/2023         Past Surgical History:    Past Surgical History:   Procedure Laterality Date     COLONOSCOPY  2006    normal     COLPOSCOPY, BIOPSY, COMBINED N/A 02/08/2021    Procedure: COLPOSCOPY, WITH BIOPSY, LEEP procedure;  Surgeon: Jefe Koenig MD;  Location: [facility] OR     CYSTOSCOPY N/A 05/30/2023    Procedure: CYSTOSCOPY AND EXCISIONAL BIOPSY OF THE VAGINAL TISSUE AROUND THE URETHRA.  (look in the bladder and sample small area in the vagina near the urethra);  Surgeon: Lakeisha Mackenzie MD;  [place] [facility]     EYE SURGERY      cataracts bilateral     OPEN REDUCTION INTERNAL FIXATION FOREARM  07/31/2012    Procedure: OPEN REDUCTION INTERNAL FIXATION FOREARM;  Open Reduction Internal Fixation, Irrigation & Debridment  of Right Distal Radius, application short arm splint;  Surgeon: Wade Beard MD;  Location: [facility] OR     TONSILLECTOMY & ADENOIDECTOMY  1960     [facility] TREAT ECTOPIC PREG,RMV TUBE/OVARY  1985    ectopic, right side-ovary and tube removed         Health Maintenance Due   Topic Date Due     MEDICARE ANNUAL WELLNESS VISIT  05/26/2023     COLPOSCOPY  06/28/2023       Current Medications:     Current Outpatient Medications " "  Medication Sig Dispense Refill     dexamethasone (DECADRON) 4 MG tablet Take 2 tablets (8 mg) by mouth daily Take for 3 days, starting the day after chemo on day 2 and 9. Take with food. If no nausea and vomiting with first cisplatin doses, may stop dexamethasone. 12 tablet 2     docusate sodium (COLACE) 100 MG capsule Take 100 mg by mouth daily       ondansetron (ZOFRAN) 8 MG tablet Take 1 tablet (8 mg) by mouth every 8 hours as needed for nausea (vomiting) Do not take for 3 days after chemo. 30 tablet 2     prochlorperazine (COMPAZINE) 10 MG tablet Take 1 tablet (10 mg) by mouth every 6 hours as needed for nausea or vomiting 30 tablet 2         Allergies:        Allergies   Allergen Reactions     Seasonal Allergies         Social History:     Social History     Tobacco Use     Smoking status: Never     Passive exposure: Never     Smokeless tobacco: Never   Substance Use Topics     Alcohol use: Yes     Comment: very little       History   Drug Use No         Family History:     The patient's family history is notable for:    Family History   Problem Relation Age of Onset     Lung Cancer Mother 44     Cerebrovascular Disease Father      Hypertension Father      Alcohol/Drug Father      Obesity Niece         niece     Mental Illness Nephew         nephew     Diabetes Other         paternal aunt     Hyperlipidemia Other      Obesity Other         self     Cervical Cancer Maternal Aunt      Ovarian Cancer Maternal Aunt 60     Obesity Other      Diabetes Other         paternal aunt     Hypertension Other         father     Cerebrovascular Disease Other         father     C.A.D. No family hx of      Breast Cancer No family hx of      Cancer - colorectal No family hx of      Prostate Cancer No family hx of      Melanoma No family hx of          Physical Exam:     Ht 1.626 m (5' 4\")   Wt 95.7 kg (211 lb)   LMP 03/08/2008   BMI 36.22 kg/m    Body mass index is 36.22 kg/m .      General Appearance: healthy and alert, no " distress    Eyes:  Eyes grossly normal to inspection.  No discharge or erythema, or obvious scleral/conjunctival abnormalities.    Respiratory: No audible wheeze, cough, or visible cyanosis.  No visible retractions or increased work of breathing.     Musculoskeletal: extremities non tender and without edema    Skin: no lesions or rashes on visible skin    Neurological: normal gait, no gross defects     Psychiatric: appropriate mood and affect. Mentation appears normal, affect normal/bright, judgement and insight intact, normal speech and appearance well-groomed                            The rest of a comprehensive physical examination is deferred due to video visit restrictions.      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  She presents for follow up and disease management.    40 minutes spent on the date of the encounter doing chart review, history and exam, documentation, and further activities as noted above.              Plan:     1.) Vaginal cancer:  Plan for MD visit ~1 month after completion of chemoradiation therapy for post-therapy visit (virtual or in-person per patient preference), and then 3 months later (4 months post-chemoradiation) with repeat PET/CT and exam to assess diease response (MD, in-person).  Currently scheduled appointments available via My Chart.  Reviewed signs and symptoms for when she should contact the clinic or seek additional care; and contact information of the Gynecologic Oncology Clinic.  Patient to contact the clinic with any questions or concerns in the interim.     2.) Health maintenance:  Issues addressed today include following up with PCP for annual health maintenance and non-gynecologic issues.     3.)       Urinary retention: gonsalez catheter placed by urology on 7/27/23. Plan for one month return for exchange. Pt will contact urology to inquire about suppression abx after bactrim while  "on chemo and with current gonsalez in place. Reviewed gonsalez care with pt today.     4.)       Left parotid nodule: message sent to Dr. Stahl to clarify plan  -Addendum 8/1/2023: Per Dr. Stahl, \"We did not discuss the parotic nodule specifically. Since it is favored to be benign (pleomorphic adenoma or Warthin's tumor), I plan to just monitor on follow-up imaging, and at the end of treatment if still present we can consider ultrasound for additional evaluation. The more urgent issue is her metastatic vaginal cancer. \"    5.)        Mild pelvic pain: controlled with APAP. Reviewed safe dosing and to call in if pain is not controlled.      6.)        Mild reflux: reviewed to keep spacing meals every 2-3 hours. Ok to try Pepcid otc.     7.)        Dizziness: one episode however overall feeling well. Reviewed sitting to standing slower to prevent any orthostatic hypotension and to only take Zofran for nausea.         SANDI Wiggins, NP-BC  Women's Health Nurse Practitioner  Division of Gynecologic Oncology  [facility]            CC  Patient Care Team:  Alba Layton MD as PCP - General  LibertyJefe russo MD as Assigned OBGYN Provider  Alba Layton MD as Assigned PCP  Lakeisha Mackenzie MD as MD (Urology)  Lawrence Banuelos MD as MD (Dermatology)  Lakeisha Mackenzie MD as Assigned Surgical Provider  Lakeisha Mackenzie MD as MD (Urology)  Reina Stahl MD as MD (Gynecologic Oncology)  Marisa Pacheco MD as MD (Radiation Oncology)  Reina Stahl MD as Assigned Cancer Care Provider  SELF, REFERRED            Again, thank you for allowing me to participate in the care of your patient.        Sincerely,        SANDI Munoz CNP  "

## 2023-08-07 ENCOUNTER — INFUSION THERAPY VISIT (OUTPATIENT)
Dept: ONCOLOGY | Facility: CLINIC | Age: 68
End: 2023-08-07
Attending: OBSTETRICS & GYNECOLOGY
Payer: COMMERCIAL

## 2023-08-07 ENCOUNTER — APPOINTMENT (OUTPATIENT)
Dept: LAB | Facility: CLINIC | Age: 68
End: 2023-08-07
Payer: COMMERCIAL

## 2023-08-07 ENCOUNTER — TELEPHONE (OUTPATIENT)
Dept: UROLOGY | Facility: CLINIC | Age: 68
End: 2023-08-07

## 2023-08-07 ENCOUNTER — APPOINTMENT (OUTPATIENT)
Dept: RADIATION ONCOLOGY | Facility: CLINIC | Age: 68
End: 2023-08-07
Attending: RADIOLOGY
Payer: COMMERCIAL

## 2023-08-07 VITALS
HEART RATE: 91 BPM | DIASTOLIC BLOOD PRESSURE: 85 MMHG | BODY MASS INDEX: 35.79 KG/M2 | TEMPERATURE: 98.3 F | WEIGHT: 208.5 LBS | RESPIRATION RATE: 16 BRPM | SYSTOLIC BLOOD PRESSURE: 131 MMHG

## 2023-08-07 DIAGNOSIS — C44.92 SCC (SQUAMOUS CELL CARCINOMA): Primary | ICD-10-CM

## 2023-08-07 DIAGNOSIS — C52 VAGINAL CANCER (H): ICD-10-CM

## 2023-08-07 LAB
ALBUMIN SERPL BCG-MCNC: 4.2 G/DL (ref 3.5–5.2)
ALP SERPL-CCNC: 71 U/L (ref 35–104)
ALT SERPL W P-5'-P-CCNC: 19 U/L (ref 0–50)
ANION GAP SERPL CALCULATED.3IONS-SCNC: 9 MMOL/L (ref 7–15)
AST SERPL W P-5'-P-CCNC: 19 U/L (ref 0–45)
BASOPHILS # BLD AUTO: 0 10E3/UL (ref 0–0.2)
BASOPHILS NFR BLD AUTO: 0 %
BILIRUB SERPL-MCNC: 0.4 MG/DL
BUN SERPL-MCNC: 18.3 MG/DL (ref 8–23)
CALCIUM SERPL-MCNC: 9.4 MG/DL (ref 8.8–10.2)
CHLORIDE SERPL-SCNC: 100 MMOL/L (ref 98–107)
CREAT SERPL-MCNC: 0.95 MG/DL (ref 0.51–0.95)
DEPRECATED HCO3 PLAS-SCNC: 27 MMOL/L (ref 22–29)
EOSINOPHIL # BLD AUTO: 0.1 10E3/UL (ref 0–0.7)
EOSINOPHIL NFR BLD AUTO: 2 %
ERYTHROCYTE [DISTWIDTH] IN BLOOD BY AUTOMATED COUNT: 12.8 % (ref 10–15)
GFR SERPL CREATININE-BSD FRML MDRD: 65 ML/MIN/1.73M2
GLUCOSE SERPL-MCNC: 148 MG/DL (ref 70–99)
HCT VFR BLD AUTO: 43.1 % (ref 35–47)
HGB BLD-MCNC: 14.1 G/DL (ref 11.7–15.7)
IMM GRANULOCYTES # BLD: 0.1 10E3/UL
IMM GRANULOCYTES NFR BLD: 1 %
LYMPHOCYTES # BLD AUTO: 0.6 10E3/UL (ref 0.8–5.3)
LYMPHOCYTES NFR BLD AUTO: 9 %
MAGNESIUM SERPL-MCNC: 2.1 MG/DL (ref 1.7–2.3)
MCH RBC QN AUTO: 30.6 PG (ref 26.5–33)
MCHC RBC AUTO-ENTMCNC: 32.7 G/DL (ref 31.5–36.5)
MCV RBC AUTO: 94 FL (ref 78–100)
MONOCYTES # BLD AUTO: 0.3 10E3/UL (ref 0–1.3)
MONOCYTES NFR BLD AUTO: 5 %
NEUTROPHILS # BLD AUTO: 5.4 10E3/UL (ref 1.6–8.3)
NEUTROPHILS NFR BLD AUTO: 83 %
NRBC # BLD AUTO: 0 10E3/UL
NRBC BLD AUTO-RTO: 0 /100
PLATELET # BLD AUTO: 225 10E3/UL (ref 150–450)
POTASSIUM SERPL-SCNC: 3.8 MMOL/L (ref 3.4–5.3)
PROT SERPL-MCNC: 6.7 G/DL (ref 6.4–8.3)
RBC # BLD AUTO: 4.61 10E6/UL (ref 3.8–5.2)
SODIUM SERPL-SCNC: 136 MMOL/L (ref 136–145)
WBC # BLD AUTO: 6.5 10E3/UL (ref 4–11)

## 2023-08-07 PROCEDURE — 96367 TX/PROPH/DG ADDL SEQ IV INF: CPT

## 2023-08-07 PROCEDURE — 96361 HYDRATE IV INFUSION ADD-ON: CPT

## 2023-08-07 PROCEDURE — 80053 COMPREHEN METABOLIC PANEL: CPT | Performed by: OBSTETRICS & GYNECOLOGY

## 2023-08-07 PROCEDURE — 36415 COLL VENOUS BLD VENIPUNCTURE: CPT | Performed by: OBSTETRICS & GYNECOLOGY

## 2023-08-07 PROCEDURE — 258N000003 HC RX IP 258 OP 636: Performed by: OBSTETRICS & GYNECOLOGY

## 2023-08-07 PROCEDURE — 96375 TX/PRO/DX INJ NEW DRUG ADDON: CPT

## 2023-08-07 PROCEDURE — 96413 CHEMO IV INFUSION 1 HR: CPT

## 2023-08-07 PROCEDURE — 250N000011 HC RX IP 250 OP 636: Performed by: OBSTETRICS & GYNECOLOGY

## 2023-08-07 PROCEDURE — 83735 ASSAY OF MAGNESIUM: CPT | Performed by: OBSTETRICS & GYNECOLOGY

## 2023-08-07 PROCEDURE — 999N000127 HC STATISTIC PERIPHERAL IV START W US GUIDANCE

## 2023-08-07 PROCEDURE — 77386 HC IMRT TREATMENT DELIVERY, COMPLEX: CPT | Performed by: RADIOLOGY

## 2023-08-07 PROCEDURE — 85014 HEMATOCRIT: CPT | Performed by: OBSTETRICS & GYNECOLOGY

## 2023-08-07 RX ORDER — DEXTROSE, SODIUM CHLORIDE, AND POTASSIUM CHLORIDE 5; .45; .15 G/100ML; G/100ML; G/100ML
2000 INJECTION INTRAVENOUS ONCE
Status: COMPLETED | OUTPATIENT
Start: 2023-08-07 | End: 2023-08-07

## 2023-08-07 RX ORDER — PALONOSETRON 0.05 MG/ML
0.25 INJECTION, SOLUTION INTRAVENOUS ONCE
Status: COMPLETED | OUTPATIENT
Start: 2023-08-07 | End: 2023-08-07

## 2023-08-07 RX ADMIN — PALONOSETRON HYDROCHLORIDE 0.25 MG: 0.25 INJECTION INTRAVENOUS at 08:14

## 2023-08-07 RX ADMIN — POTASSIUM CHLORIDE, DEXTROSE MONOHYDRATE AND SODIUM CHLORIDE 2000 ML: 150; 5; 450 INJECTION, SOLUTION INTRAVENOUS at 08:29

## 2023-08-07 RX ADMIN — SODIUM CHLORIDE 80 MG: 9 INJECTION, SOLUTION INTRAVENOUS at 09:59

## 2023-08-07 RX ADMIN — DEXAMETHASONE SODIUM PHOSPHATE: 10 INJECTION, SOLUTION INTRAMUSCULAR; INTRAVENOUS at 08:09

## 2023-08-07 NOTE — PROGRESS NOTES
Infusion Nursing Note:  Jessie Tamayo presents today for Cycle 1 Day 8 Cisplatin.    Patient seen by provider today: No   present during visit today: Not Applicable.    Note: Pt presents to infusion today feeling well. Pt denies any fever/chills, chest pain, sob, cough, nausea/vomiting, or diarrhea/constipation.    Pt reports discomfort with her gonsalez catheter - she thinks the catheter is up against the tumor. Pt declined any intervention during visit and manages with Tylenol as needed at home.     Pt reports having bladder spasms through out the day that are painful.  Discussed symptoms with Brandy Oden CNP, per her recommendation pt should follow up with urology who placed the gonsalez catheter.     Intravenous Access:  Peripheral IV placed by vascular access.    Treatment Conditions:  Lab Results   Component Value Date    HGB 14.1 08/07/2023    WBC 6.5 08/07/2023    ANEU 11.4 (H) 11/07/2009    ANEUTAUTO 5.4 08/07/2023     08/07/2023        Lab Results   Component Value Date     08/07/2023    POTASSIUM 3.8 08/07/2023    MAG 2.1 08/07/2023    CR 0.95 08/07/2023    KIM 9.4 08/07/2023    BILITOTAL 0.4 08/07/2023    ALBUMIN 4.2 08/07/2023    ALT 19 08/07/2023    AST 19 08/07/2023       Results reviewed, labs MET treatment parameters, ok to proceed with treatment.      Post Infusion Assessment:  Patient tolerated infusion without incident.  Blood return noted pre and post infusion.  Site patent and intact, free from redness, edema or discomfort.  No evidence of extravasations.  Access discontinued per protocol.       Discharge Plan:   Patient declined prescription refills.  Discharge instructions reviewed with: Patient and Family.  Patient and/or family verbalized understanding of discharge instructions and all questions answered.  Copy of AVS reviewed with patient and/or family.  Patient will return 8/14/23 for next appointment.  Patient discharged in stable condition accompanied by: self  and .  Departure Mode: Ambulatory.      Gale De Leon RN

## 2023-08-07 NOTE — TELEPHONE ENCOUNTER
[facility] Call Center    Phone Message    May a detailed message be left on voicemail: yes     Reason for Call: Symptoms or Concerns     If patient has red-flag symptoms, warm transfer to triage line    Current symptom or concern: PT experiencing bladder spasms. Pt requesting to call spouse Baljeet    Symptoms have been present for:  6 day(s)    Has patient previously been seen for this? No      Are there any new or worsening symptoms? No    Action Taken: Message routed to:  Other: Uro    Travel Screening: Not Applicable

## 2023-08-07 NOTE — PATIENT INSTRUCTIONS
[facility] Triage and after hours / weekends / holidays:  [phone]    Please call the triage or after hours line if you experience a temperature greater than or equal to 100.4, shaking chills, have uncontrolled nausea, vomiting and/or diarrhea, dizziness, shortness of breath, chest pain, bleeding, unexplained bruising, or if you have any other new/concerning symptoms, questions or concerns.      If you are having any concerning symptoms or wish to speak to a provider before your next infusion visit, please call triage to notify them so we can adequately serve you.     If you need a refill on a narcotic prescription or other medication, please call before your infusion appointment.           Jessie,    Do not take your Ondansetron (Zofran) for the next 72 hours, as it interacts with the pre-medication we gave you today. You can resume this on 8/10/23 at 8:00 AM. Feel free to take Compazine in the meantime if you have any nausea.                August 2023 Sunday Monday Tuesday Wednesday Thursday Friday Saturday             1    LAB PERIPHERAL   7:00 AM   (15 min.)   [facility] LAB DRAW   [facility]    ONC INFUSION 6 HR (360 MIN)   7:30 AM   (360 min.)    ONC INFUSION NURSE   [facility]    TREATMENT   9:15 AM   (30 min.)   [facility]    OTV   9:45 AM   (15 min.)   Marisa Pacheco MD   [facility] Radiation Oncology 2    TREATMENT   9:15 AM   (30 min.)   [facility] RAD ONC ISAEL   [facility] Radiation Oncology 3    TREATMENT   9:15 AM   (30 min.)   P RAD ONC ISAEL   [facility] Radiation Oncology 4    TREATMENT   9:15 AM   (30 min.)   [facility]    RETURN CCSL   2:30 PM   (45 min.)   Brandy Oden APRN CNP   [facility] Karla 5       6     7    LAB   6:30 AM   (15 min.)   [facility]  [facility]    ONC INFUSION 6 HR (360 MIN)   7:00 AM   (360 min.)   [facility] ONC INFUSION NURSE   [facility]    TREATMENT   9:15 AM   (30 min.)   [facility] RAD ONC ISAEL   [facility] Radiation Oncology 8    TREATMENT   9:15 AM   (30 min.)   [facility] RAD ONC ISAEL   [facility] Radiation Oncology    OTV   9:45 AM   (15 min.)   Marisa Pacheco MD   [facility] Radiation Oncology 9    TREATMENT   9:15 AM   (30 min.)   [facility] RAD ONC ISAEL   [facility] Radiation Oncology 10    TREATMENT   9:15 AM   (30 min.)   [facility] RAD ONC ISAEL   [facility] Radiation Oncology    RETURN CCSL   1:15 PM   (45 min.)   Andie Rios APRN CNP   [facility] 11    TREATMENT   9:15 AM   (30 min.)   [facility] RAD ONC ISAEL   [facility] Radiation Oncology 12       13     14    LAB PERIPHERAL   6:30 AM   (15 min.)    [facility] DRAW   [facility]    ONC INFUSION 6 HR (360 MIN)   7:00 AM   (360 min.)   [facility] ONC INFUSION NURSE   [facility]    TREATMENT   9:15 AM   (30 min.)   [facility] RAD ONC ISAEL   [facility] Radiation [facility] 15    TREATMENT   9:15 AM   (30 min.)   [facility] RAD ONC ISAEL   [facility] Radiation Oncology    OTV   9:45 AM   (15 min.)   Marisa Pacheco MD   [facility] Radiation Oncology 16    TREATMENT   9:15 AM   (30 min.)   [facility] RAD ONC ISAEL   [facility] 17    TREATMENT   9:15 AM   (30 min.)   [facility] RAD ONC ISAEL   [facility] Radiation On[facility] 18    TREATMENT   9:15 AM   (30 min.)   [facility] RAD ONC ISAEL   [facility] Radiation Oncology    RETURN CCSL   2:30 PM   (45 min.)   Br[facility] CNP   [facility] 19       20     21    LAB PERIPHERAL   6:30 AM   (15 min.)   [facility] DRAW   [facility]    ONC INFUSION 6  HR (360 MIN)   7:00 AM   (360 min.)   [facility] ONC INFUSION NURSE   [facility]    TREATMENT   9:15 AM   (30 min.)   [facility] RAD ONC ISAEL   [facility] Radiation Oncology 22    TREATMENT   9:15 AM   (30 min.)   [facility] RAD ONC ISAEL   [facility] Radiation Oncology    OTV   9:45 AM   (15 min.)   Marisa Pacheco MD   [facility] Radiation Oncology 23    TREATMENT   9:15 AM   (30 min.)   [facility] RAD ONC ISAEL   [facility] Radiation Oncology 24    TREATMENT   9:15 AM   (30 min.)   [facility] RAD ONC ISAEL   [facility]iation [facility] 25    TREATMENT   9:15 AM   (30 min.)   [facility] RAD ONC ISAEL   [facility]iation Oncology    RETURN CCSL   1:30 PM   (45 min.)   Brandy Oden APRN CNP   [facility] 26       27     28    LAB PERIPHERAL   6:30 AM   (15 min.)   [facility] DRAW   [facility]    ONC INFUSION 6 HR (360 MIN)   7:00 AM   (360 min.)   [facility] ONC INFUSION NURSE   [facility]    TREATMENT   9:15 AM   (30 min.)   [facility] RAD ONC ISAEL   [facility] Radiation Oncology 29    TREATMENT   9:15 AM   (30 min.)   [facility] RAD ONC ISAEL   [facility]iation On[facility]    OTV   9:45 AM   (15 min.)   Marisa Pacheco MD   [facility] Radiation Oncology 30    TREATMENT   9:15 AM   (30 min.)   [facility] RAD ONC ISAEL   [facility]iation [facility] 31    TREATMENT   9:15 AM   (30 min.)   [facility] RAD ONC ISAEL   [facility] Radiation Oncology                       September 2023 Sunday Monday Tuesday Wednesday Thursday Friday Saturday                            1    TREATMENT   9:15 AM   (30 min.)   [facility] RAD ONC ISAEL   [facility] Radiation Oncology 2       3     4     5    TREATMENT   9:15 AM   (30 min.)   [facility] RAD ONC ISAEL   [facility] Radiation On[facility]    OTV   9:45 AM   (15 min.)   Junior  Marisa ADAMSON MD   [facility] Radiation Oncology 6    TREATMENT   8:45 AM   (30 min.)   [facility] RAD ONC ISAEL   [facility] Radiation Oncology 7    TREATMENT   8:45 AM   (30 min.)   [facility] RAD ONC ISAEL   [facility] Radiation Oncology 8    TREATMENT   8:45 AM   (30 min.)   P RAD ONC ISAEL   [facility] Radiation Oncology 9       10     11    MR PELVIS (GYN) WWO CONTRAST   7:30 AM   (45 min.)   [facility]   [facility]    TREATMENT   8:45 AM   (30 min.)   P RAD ONC ISAEL   [facility] Radiation Oncology    PAC EVAL  10:45 AM   (60 min.)   Lory Xie PA-C   [facility] [place] 12     13     14     15     16       17     18    [facility] HDR TREATMENT   5:30 AM   (120 min.)   Marisa Pacheco MD   [facility] Radiation Oncology    INSERTION, NEEDLE, WITH ULTRASOUND GUIDANCE, FOR INTERSTITIAL BRACHYTHERAPY   7:30 AM   Marisa Pacheco MD   [facility] OR    CT SIM  11:00 AM   (60 min.)   Marisa Pacheco MD   [facility] Radiation Oncology    [facility] HDR TREATMENT   3:00 PM   (60 min.)   Marisa Pacheco MD   [facility] Radiation Oncology 19    [facility] HDR TREATMENT   8:00 AM   (60 min.)   Marisa Pacheco MD   [facility] Radiation Oncology    [facility] HDR TREATMENT   2:30 PM   (60 min.)   Marisa Pacheco MD   [facility] Radiation Oncology 20    [facility] HDR TREATMENT   8:00 AM   (60 min.)   Marisa Pacheco MD   [facility] Radiation Oncology    [facility] HDR TREATMENT   2:30 PM   (60 min.)   Marisa Pacheco MD   [facility] Radiation Oncology    [facility] HDR TREATMENT   3:55 PM   (60 min.)   Marisa Pacheco MD   [facility] Radiation Oncology    REMOVAL, INTERSTITIAL NEEDLE, AFTER TEMPORARY BRACHYTHERAPY   4:00 PM   Marisa Pacheco MD [facility] OR 21     22     23       24     25     26     27     28     29     30                     Recent Results (from the past 24 hour(s))   Comprehensive metabolic panel    Collection Time: 08/07/23  6:21 AM   Result Value Ref Range    Sodium 136 136 - 145 mmol/L    Potassium 3.8 3.4 - 5.3 mmol/L    Chloride 100 98 - 107 mmol/L    Carbon Dioxide (CO2) 27 22 - 29 mmol/L    Anion Gap 9 7 - 15 mmol/L    Urea Nitrogen 18.3 8.0 - 23.0 mg/dL    Creatinine 0.95 0.51 - 0.95 mg/dL    Calcium 9.4 8.8 - 10.2 mg/dL    Glucose 148 (H) 70 - 99 mg/dL    Alkaline Phosphatase 71 35 - 104 U/L    AST 19 0 - 45 U/L    ALT 19 0 - 50 U/L    Protein Total 6.7 6.4 - 8.3 g/dL    Albumin 4.2 3.5 - 5.2 g/dL    Bilirubin Total 0.4 <=1.2 mg/dL    GFR Estimate 65 >60 mL/min/1.73m2   Magnesium    Collection Time: 08/07/23  6:21 AM   Result Value Ref Range    Magnesium 2.1 1.7 - 2.3 mg/dL   CBC with platelets and differential    Collection Time: 08/07/23  6:21 AM   Result Value Ref Range    WBC Count 6.5 4.0 - 11.0 10e3/uL    RBC Count 4.61 3.80 - 5.20 10e6/uL    Hemoglobin 14.1 11.7 - 15.7 g/dL    Hematocrit 43.1 35.0 - 47.0 %    MCV 94 78 - 100 fL    MCH 30.6 26.5 - 33.0 pg    MCHC 32.7 31.5 - 36.5 g/dL    RDW 12.8 10.0 - 15.0 %    Platelet Count 225 150 - 450 10e3/uL    % Neutrophils 83 %    % Lymphocytes 9 %    % Monocytes 5 %    % Eosinophils 2 %    % Basophils 0 %    % Immature Granulocytes 1 %    NRBCs per 100 WBC 0 <1 /100    Absolute Neutrophils 5.4 1.6 - 8.3 10e3/uL    Absolute Lymphocytes 0.6 (L) 0.8 - 5.3 10e3/uL    Absolute Monocytes 0.3 0.0 - 1.3 10e3/uL    Absolute Eosinophils 0.1 0.0 - 0.7 10e3/uL    Absolute Basophils 0.0 0.0 - 0.2 10e3/uL    Absolute Immature Granulocytes 0.1 <=0.4 10e3/uL    Absolute NRBCs 0.0 10e3/uL

## 2023-08-07 NOTE — TELEPHONE ENCOUNTER
Spoke to pt. Pt reports that whenever she fills her bladder prior to the radiation treatment, she is experiencing severe bladder spasms. She states she doesn't want to do it, due to the severity of the pain and it makes her sweat. Advised to try using tylenol 1000 mg 30-45 minutes prior to filling her bladder prior to radiation. She states she will try this and will call back if needed.     Janell Loja RN MSN

## 2023-08-08 ENCOUNTER — TELEPHONE (OUTPATIENT)
Dept: UROLOGY | Facility: CLINIC | Age: 68
End: 2023-08-08

## 2023-08-08 ENCOUNTER — OFFICE VISIT (OUTPATIENT)
Dept: RADIATION ONCOLOGY | Facility: CLINIC | Age: 68
End: 2023-08-08
Attending: RADIOLOGY
Payer: COMMERCIAL

## 2023-08-08 VITALS — WEIGHT: 214 LBS | BODY MASS INDEX: 36.73 KG/M2

## 2023-08-08 DIAGNOSIS — N32.89 BLADDER SPASMS: Primary | ICD-10-CM

## 2023-08-08 DIAGNOSIS — C52 VAGINAL CANCER (H): Primary | ICD-10-CM

## 2023-08-08 PROCEDURE — 77386 HC IMRT TREATMENT DELIVERY, COMPLEX: CPT | Performed by: RADIOLOGY

## 2023-08-08 RX ORDER — OXYBUTYNIN CHLORIDE 5 MG/1
5 TABLET ORAL 3 TIMES DAILY PRN
Qty: 30 TABLET | Refills: 0 | Status: SHIPPED | OUTPATIENT
Start: 2023-08-08 | End: 2023-08-10 | Stop reason: SINTOL

## 2023-08-08 NOTE — TELEPHONE ENCOUNTER
[facility] Call Center    Phone Message    May a detailed message be left on voicemail: yes     Reason for Call: Other: Pt returning call from clinic regarding her bladder spasms. Please call pt  [phone]    Action Taken: Message routed to:  Other: Uro    Travel Screening: Not Applicable

## 2023-08-08 NOTE — LETTER
2023         RE: Jessie Tamayo  [address] [address]        Dear Colleague,    Thank you for referring your patient, Jessie Tamayo, to the [facility] RADIATION ONCOLOGY. Please see a copy of my visit note below.    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 Tamayo presents to clinic today for her weekly on-treatment visit.  She tolerated chemotherapy well.  Since her simulation, she has had an indwelling Thomas catheter placed for urinary obstruction.  We have asked her to clamp the Thomas catheter and drink fluids prior to each radiation treatment to fill her bladder.  She is having a lot of difficulty with this due to pain and spasms.  She is working with Urology to help with the pain.    Nursing ROS:   Nutrition Alteration  Diet Type: Patient's Preference  Skin  Skin Reaction: 0 - No changes     Cardiovascular  Respiratory effort: 1 - Normal - without distress  Gastrointestinal  Nausea: 0 - None     Pain Assessment  0-10 Pain Scale: 0     PEG Tube: No  Electronic Cardiac Implant: No    Objective:   Wt 97.1 kg (214 lb)   LMP 2008   BMI 36.73 kg/m    Gen: Appears well, NAD  Skin: No erythema  Indwelling Thomas catheter  Extremities: No edema    Laboratory:  Lab Results   Component Value Date    WBC 6.5 2023    HGB 14.1 2023    HCT 43.1 2023    MCV 94 2023     2023     Lab Results   Component Value Date     2023    POTASSIUM 3.8 2023    CHLORIDE  100 08/07/2023    CO2 27 08/07/2023     (H) 08/07/2023     Magnesium   Date Value Ref Range Status   08/07/2023 2.1 1.7 - 2.3 mg/dL Final       Treatment-related toxicities (CTCAE v5.0):  Anorexia: Grade 0: No toxicity  Fatigue: Grade 0: No toxicity  Nausea: Grade 0: No toxicity  Pain: Grade 0: No toxicity  Diarrhea: Grade 0: No toxicity  Urinary tract pain: Grade 1: Mild pain  Dermatitis: Grade 0: No toxicity      ED visits/Hospitalizations: None    Missed Treatments: None    Mosaiq chart and setup information reviewed  IGRT images reviewed    Medication Review  Med list reviewed with patient?: Yes    Assessment:    Ms. 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  She has tolerated the start of chemoradiation.  She is having bladder spasms when trying to fill her bladder for radiation treatment.    Plan:   1.  Continue treatment as planned  2.  Patient will contact Urology for bladder spasms.  3.  Discussed sitz baths, barrier cream and low fiber diet.      Marisa Pacheco  Department of Radiation Oncology  [facility]

## 2023-08-08 NOTE — TELEPHONE ENCOUNTER
Attempted to call pt. Left detailed message to call clinic back at [phone].     Janell Loja RN MSN

## 2023-08-08 NOTE — TELEPHONE ENCOUNTER
[facility] Call Center    Phone Message    May a detailed message be left on voicemail: yes     Reason for Call: Other: Pt calling stating that tylenol didn't work for bladder spasm. Pt is looking for an alternative. Please call pt      Action Taken: Message routed to:  Other: Uro    Travel Screening: Not Applicable                 Discussed with patient. She says she has a gonsalez catheter in due to urethral cancer causing urinary retention and she has to fill up her bladder every day, which causes severe bladder spasms. I discussed with her that we can trial oxybutynin to help with the bladder spasms. Counseled on the side effects namely delirum being the very dangerous one and counseled on ED precautions. Patient verbalized understanding.     Homero Menjivar MD   PGY2 Urology

## 2023-08-08 NOTE — PROGRESS NOTES
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 Tamayo presents to clinic today for her weekly on-treatment visit.  She tolerated chemotherapy well.  Since her simulation, she has had an indwelling Thomas catheter placed for urinary obstruction.  We have asked her to clamp the Thomas catheter and drink fluids prior to each radiation treatment to fill her bladder.  She is having a lot of difficulty with this due to pain and spasms.  She is working with Urology to help with the pain.    Nursing ROS:   Nutrition Alteration  Diet Type: Patient's Preference  Skin  Skin Reaction: 0 - No changes     Cardiovascular  Respiratory effort: 1 - Normal - without distress  Gastrointestinal  Nausea: 0 - None     Pain Assessment  0-10 Pain Scale: 0     PEG Tube: No  Electronic Cardiac Implant: No    Objective:   Wt 97.1 kg (214 lb)   LMP 2008   BMI 36.73 kg/m    Gen: Appears well, NAD  Skin: No erythema  Indwelling Thomas catheter  Extremities: No edema    Laboratory:  Lab Results   Component Value Date    WBC 6.5 2023    HGB 14.1 2023    HCT 43.1 2023    MCV 94 2023     2023     Lab Results   Component Value Date     2023    POTASSIUM 3.8 2023    CHLORIDE 100 2023    CO2 27 2023     (H) 2023     Magnesium   Date Value Ref Range Status   2023 2.1 1.7 - 2.3 mg/dL Final       Treatment-related toxicities (CTCAE v5.0):  Anorexia: Grade 0: No toxicity  Fatigue: Grade 0: No  toxicity  Nausea: Grade 0: No toxicity  Pain: Grade 0: No toxicity  Diarrhea: Grade 0: No toxicity  Urinary tract pain: Grade 1: Mild pain  Dermatitis: Grade 0: No toxicity      ED visits/Hospitalizations: None    Missed Treatments: None    Mosaiq chart and setup information reviewed  IGRT images reviewed    Medication Review  Med list reviewed with patient?: Yes    Assessment:    Ms. 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  She has tolerated the start of chemoradiation.  She is having bladder spasms when trying to fill her bladder for radiation treatment.    Plan:   1.  Continue treatment as planned  2.  Patient will contact Urology for bladder spasms.  3.  Discussed sitz baths, barrier cream and low fiber diet.      Marisa Pacheco  Department of Radiation Oncology  [facility]

## 2023-08-08 NOTE — TELEPHONE ENCOUNTER
Patient returning call to nurse Maciel. Per patient, she is having issues w/her phone.  Please send her updates on her [employer] account.

## 2023-08-09 ENCOUNTER — APPOINTMENT (OUTPATIENT)
Dept: RADIATION ONCOLOGY | Facility: CLINIC | Age: 68
End: 2023-08-09
Attending: RADIOLOGY
Payer: COMMERCIAL

## 2023-08-09 PROCEDURE — 77386 HC IMRT TREATMENT DELIVERY, COMPLEX: CPT | Performed by: RADIOLOGY

## 2023-08-09 PROCEDURE — 77014 PR CT GUIDE FOR PLACEMENT RADIATION THERAPY FIELDS: CPT | Mod: 26 | Performed by: RADIOLOGY

## 2023-08-10 ENCOUNTER — VIRTUAL VISIT (OUTPATIENT)
Dept: ONCOLOGY | Facility: CLINIC | Age: 68
End: 2023-08-10
Attending: NURSE PRACTITIONER
Payer: COMMERCIAL

## 2023-08-10 ENCOUNTER — APPOINTMENT (OUTPATIENT)
Dept: RADIATION ONCOLOGY | Facility: CLINIC | Age: 68
End: 2023-08-10
Attending: RADIOLOGY
Payer: COMMERCIAL

## 2023-08-10 ENCOUNTER — OFFICE VISIT (OUTPATIENT)
Dept: FAMILY MEDICINE | Facility: CLINIC | Age: 68
End: 2023-08-10
Payer: COMMERCIAL

## 2023-08-10 ENCOUNTER — NURSE TRIAGE (OUTPATIENT)
Dept: NURSING | Facility: CLINIC | Age: 68
End: 2023-08-10
Payer: COMMERCIAL

## 2023-08-10 VITALS
BODY MASS INDEX: 36.22 KG/M2 | SYSTOLIC BLOOD PRESSURE: 130 MMHG | WEIGHT: 211 LBS | OXYGEN SATURATION: 98 % | HEART RATE: 76 BPM | DIASTOLIC BLOOD PRESSURE: 72 MMHG | TEMPERATURE: 98.8 F

## 2023-08-10 DIAGNOSIS — R23.3 PETECHIAE: Primary | ICD-10-CM

## 2023-08-10 DIAGNOSIS — L30.9 DERMATITIS: ICD-10-CM

## 2023-08-10 DIAGNOSIS — E66.01 CLASS 2 SEVERE OBESITY DUE TO EXCESS CALORIES WITH SERIOUS COMORBIDITY AND BODY MASS INDEX (BMI) OF 36.0 TO 36.9 IN ADULT (H): ICD-10-CM

## 2023-08-10 DIAGNOSIS — C52 VAGINAL CANCER (H): Primary | ICD-10-CM

## 2023-08-10 DIAGNOSIS — N32.89 BLADDER SPASM: ICD-10-CM

## 2023-08-10 DIAGNOSIS — L29.9 ITCHING: ICD-10-CM

## 2023-08-10 DIAGNOSIS — E66.812 CLASS 2 SEVERE OBESITY DUE TO EXCESS CALORIES WITH SERIOUS COMORBIDITY AND BODY MASS INDEX (BMI) OF 36.0 TO 36.9 IN ADULT (H): ICD-10-CM

## 2023-08-10 DIAGNOSIS — Z51.11 ENCOUNTER FOR ANTINEOPLASTIC CHEMOTHERAPY: ICD-10-CM

## 2023-08-10 DIAGNOSIS — T45.1X5A ADVERSE EFFECT OF CHEMOTHERAPY, INITIAL ENCOUNTER: ICD-10-CM

## 2023-08-10 LAB
BASOPHILS # BLD AUTO: 0 10E3/UL (ref 0–0.2)
BASOPHILS NFR BLD AUTO: 0 %
EOSINOPHIL # BLD AUTO: 0 10E3/UL (ref 0–0.7)
EOSINOPHIL NFR BLD AUTO: 0 %
ERYTHROCYTE [DISTWIDTH] IN BLOOD BY AUTOMATED COUNT: 12.5 % (ref 10–15)
HCT VFR BLD AUTO: 40.2 % (ref 35–47)
HGB BLD-MCNC: 13.2 G/DL (ref 11.7–15.7)
IMM GRANULOCYTES # BLD: 0.1 10E3/UL
IMM GRANULOCYTES NFR BLD: 1 %
LYMPHOCYTES # BLD AUTO: 0.3 10E3/UL (ref 0.8–5.3)
LYMPHOCYTES NFR BLD AUTO: 3 %
MCH RBC QN AUTO: 29.7 PG (ref 26.5–33)
MCHC RBC AUTO-ENTMCNC: 32.8 G/DL (ref 31.5–36.5)
MCV RBC AUTO: 90 FL (ref 78–100)
MONOCYTES # BLD AUTO: 0.7 10E3/UL (ref 0–1.3)
MONOCYTES NFR BLD AUTO: 7 %
NEUTROPHILS # BLD AUTO: 9.1 10E3/UL (ref 1.6–8.3)
NEUTROPHILS NFR BLD AUTO: 89 %
PLATELET # BLD AUTO: 214 10E3/UL (ref 150–450)
RBC # BLD AUTO: 4.45 10E6/UL (ref 3.8–5.2)
WBC # BLD AUTO: 10.2 10E3/UL (ref 4–11)

## 2023-08-10 PROCEDURE — 99213 OFFICE O/P EST LOW 20 MIN: CPT | Performed by: FAMILY MEDICINE

## 2023-08-10 PROCEDURE — 85025 COMPLETE CBC W/AUTO DIFF WBC: CPT | Performed by: FAMILY MEDICINE

## 2023-08-10 PROCEDURE — 36415 COLL VENOUS BLD VENIPUNCTURE: CPT | Performed by: FAMILY MEDICINE

## 2023-08-10 PROCEDURE — 99215 OFFICE O/P EST HI 40 MIN: CPT | Mod: VID | Performed by: NURSE PRACTITIONER

## 2023-08-10 PROCEDURE — 77386 HC IMRT TREATMENT DELIVERY, COMPLEX: CPT | Performed by: RADIOLOGY

## 2023-08-10 RX ORDER — TOLTERODINE TARTRATE 2 MG/1
2 TABLET, EXTENDED RELEASE ORAL 2 TIMES DAILY
Qty: 60 TABLET | Refills: 3 | Status: ON HOLD | OUTPATIENT
Start: 2023-08-10 | End: 2023-10-17

## 2023-08-10 RX ORDER — TRIAMCINOLONE ACETONIDE 1 MG/G
CREAM TOPICAL 2 TIMES DAILY
Qty: 15 G | Refills: 1 | Status: SHIPPED | OUTPATIENT
Start: 2023-08-10 | End: 2023-10-07

## 2023-08-10 RX ORDER — TOLTERODINE TARTRATE 2 MG/1
2 TABLET, EXTENDED RELEASE ORAL 2 TIMES DAILY
Qty: 60 TABLET | Refills: 3 | Status: SHIPPED | OUTPATIENT
Start: 2023-08-10 | End: 2023-08-10

## 2023-08-10 ASSESSMENT — PAIN SCALES - GENERAL: PAINLEVEL: NO PAIN (0)

## 2023-08-10 NOTE — NURSING NOTE
Is the patient currently in the state of [place]? YES    Visit mode:VIDEO    If the visit is dropped, the patient can be reconnected by: VIDEO VISIT: Text to cell phone: [phone]     Will anyone else be joining the visit? YES: How would they like to receive their invitation?       How would you like to obtain your AVS? MyChart    Are changes needed to the allergy or medication list? NO    Patient denies any changes since echeck-in regarding medication and allergies and states all information entered during echeck-in remains accurate.     Reason for visit: RECHECK    Rian BUENO

## 2023-08-10 NOTE — PROGRESS NOTES
Virtual Visit Details    Type of service:  Video Visit   Video Start Time: 1050  Video End Time:1125    Originating Location (pt. 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  She presents for follow up and disease management by video.     Oncology History:  07, 10/15/08, 09, 11: Pap test NILM.      1/23/15: Pap test NILM, hrHPV16+.  2/27/15: Cervical polyp & cervical biopsy pathology: Negative for dysplasia/malignancy.     16: Pap test NILM, hrHPV16+.   16: Endocervical curettings pathology negative for dysplasia/malignancy.     17: Pap test NILM, hrHPV16+.   -Colposcopy recommended, not performed.      18:   -Pap test ASCUS, hrHPV16+.   -Endocervical curettings & cervical biopsy pathology: negative for dysplasia/malignancy.      19:  Pap test ASC-H, hrHPV16+.   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      9/10/20: Pap test ASC-H, hrHPV+ (NOS).  21: LEEP.   -Pathology: LEEP & endocervical curettings: negative for dysplasia/malignancy.     3/23/22: Endocervical curettings & cervical biopsy pathology: negative for dysplasia/malignancy.     3/28/23:   -Pap test HSIL, hrHPV16+.   -Findings by gynecologist Dr. Koenig: External genitalia with erythematous plaques bilaterally  Urethra- mid position and well supported, suburethral tissue thickened and friable with bleeding elicited after placement of the speculum  Vagina is stenotic and cervix difficult to see.   23: Cystourethroscopy, vaginal biopsy by urologist Dr. Mackenzie.   -Findings: Exam revealed lichenified changes to bilateral labia majora and  friable vestibular tissue (patient had significant bleeding from prep alone.) The urethra was somewhat stenotic and would not accommodate 19Fr rigid cystoscope, therefore a 16 Fr flexible cystoscope was inserted. Gentle pressure was required to access the bladder. The ureteral orifices were in their orthotopic positions bilaterally. There were no intravesical stones or diverticuli and the bladder was mildly trabeculated. There were no intravesical lesions. See media tab for urethral pictures - there were no obvious lesions but the entire distal urethra was erythematous and stenosed (16Fr.)  -Pathology: Invasive moderately-differentiated squamous cell carcinoma, HPV-associated, with focus suspicious for lymphovascular space invasion; IHC: p16+.    6/8/23: Gynecologic Oncology consultation.  -Findings:   External genitalia notable for erythema and desquamation of the posterior medial labia, c/w lichen sclerosus changes. When the labia are , a friable mass is visible at the distal anterior vagina, just posterior to the urethra. On bimanual exam, the cervix is small and normal in contour. The palpable vaginal tumor is limited to the anterior distal vagina, approximately 4-5 cm laterally x 3 cm cranio-caudal. Tumor is friable. Remainder of the vagina smooth to palpation. Digital anorectal exam is without nodularity. No palpable tumor in the rectovaginal septum.   Enlarged firm, fixed right inguinal lymph node ~3-4 cm in size.      6/16/23: Pelvic MRI  IMPRESSION:   1. Irregular enhancing lesion along the anterior vaginal wall at the  level of the introitus measuring 2.5 x 0.9 cm with irregular  enhancement along the anterior vaginal wall towards the level of the  vaginal cuff however there is no definitive evidence or abnormal  signal intensity involving the cervix to suggest invasion.  2. Right inguinal lymphadenopathy compatible with metastatic disease.   3. There is some asymmetric enhancement involving the  left sacrum,  incompletely evaluated on this study. Further evaluation of this and  further staging of the chest, abdomen and pelvis will be performed on  PET/CT scheduled for 6/20/2023.     6/20/23: PET CT  IMPRESSION:   1. Intense focal hypermetabolic FDG uptake in biopsy-proven vaginal  squamous cell carcinoma.  2. Multiple enlarged, hypermetabolic right inguinal nodes likely  represent regional metastasis. No definite evidence of distant  metastatic disease.  3. Scattered sub-4 mm solid pulmonary nodules are below the size  threshold for characterization on PET. Attention on follow-up with CT.  4. 1.0 cm enhancing left parotid gland nodule with hypermetabolic  uptake could represent a primary parotid neoplasm such as pleomorphic  adenoma or Warthin's tumor; consider ultrasound for further  evaluation.  5. 1.3 cm hypoattenuating right thyroid nodule with mild FDG uptake  warrants ultrasound for characterization.      Plan: Cisplatin radiation      7/27/2023: urinary gonsalez catheter placement. Treat acute cystitis with bactrim per Urology.      8/1/2023: plan C1D1 Cisplatin radiation                Today she reports chemotherapy is going well;she denies any ringing in her ears; she has been having about 3 soft stools a day and did take Imodium once yesterday; she denies any diarrhea/watery stools. She has been taking her Decadron for the 3 days and will take Compazine as needed. She states her 's cooking smell is what make her feel nauseated. She denies any emesis. She states she does well on water intake and  she also has been doing popsicles. She does  have bladder spasms when she has to  fill her bladder prior to radiation and was started on Oxybutynin from Urology (has  indwelling cath); she took her 5th dose this morning and noticed her right forearm had tiny red dots and was itchy. She did use Hydrocortisone cream which helps; she denies any hives or issues any other place on her body. She will be  seeing her PCP later today for this. She is wondering abbot a support group for her or her .        Review of Systems:    Systemic           no weight changes; no fever; no chills; no night sweats; no appetite changes  Skin           no rashes, or lesions  Eye           no irritation; no changes in vision  Shantelle-Laryngeal           no dysphagia; no hoarseness   Pulmonary    no cough; no shortness of breath  Cardiovascular    no chest pain; no palpitations  Gastrointestinal    no diarrhea; no constipation; no abdominal pain; no changes in bowel habits; no blood in stool  Genitourinary   no urinary frequency; no urinary urgency; no dysuria; no pain; no abnormal vaginal discharge; no abnormal vaginal bleeding  Breast   No breast discharge; no breast changes; no breast pain  Musculoskeletal    no myalgias; no arthralgias; no back pain  Psychiatric           no depressed mood; no anxiety    Hematologic             no tender lymph nodes; no noticeable swellings or lumps   Endocrine    no hot flashes; no heat/cold intolerance         Neurological   no tremor; no numbness and tingling; no headaches; no difficulty sleeping      Past Medical History:    Past Medical History:   Diagnosis Date    Actinic keratosis     Hyperlipemia     Lichen sclerosus 2020    Osteopenias     SCC (squamous cell carcinoma) 7/26/2023         Past Surgical History:    Past Surgical History:   Procedure Laterality Date    COLONOSCOPY  2006    normal    COLPOSCOPY, BIOPSY, COMBINED N/A 02/08/2021    Procedure: COLPOSCOPY, WITH BIOPSY, LEEP procedure;  Surgeon: Jefe Koenig MD;  Location: [facility] OR    CYSTOSCOPY N/A 05/30/2023    Procedure: CYSTOSCOPY AND EXCISIONAL BIOPSY OF THE VAGINAL TISSUE AROUND THE URETHRA.  (look in the bladder and sample small area in the vagina near the urethra);  Surgeon: Lakeisha Mackenzie MD;  [place] [facility] [place]    EYE SURGERY      cataracts bilateral    OPEN REDUCTION INTERNAL FIXATION FOREARM  07/31/2012     Procedure: OPEN REDUCTION INTERNAL FIXATION FOREARM;  Open Reduction Internal Fixation, Irrigation & Debridment  of Right Distal Radius, application short arm splint;  Surgeon: Wade Beard MD;  Location: [facility] OR    TONSILLECTOMY & ADENOIDECTOMY  1960    [facility] TREAT ECTOPIC PREG,RMV TUBE/OVARY  1985    ectopic, right side-ovary and tube removed         Health Maintenance Due   Topic Date Due    MEDICARE ANNUAL WELLNESS VISIT  05/26/2023    COLPOSCOPY  06/28/2023       Current Medications:     Current Outpatient Medications   Medication Sig Dispense Refill    dexamethasone (DECADRON) 4 MG tablet Take 2 tablets (8 mg) by mouth daily Take for 3 days, starting the day after chemo on day 2 and 9. Take with food. If no nausea and vomiting with first cisplatin doses, may stop dexamethasone. 12 tablet 2    docusate sodium (COLACE) 100 MG capsule Take 100 mg by mouth daily      ondansetron (ZOFRAN) 8 MG tablet Take 1 tablet (8 mg) by mouth every 8 hours as needed for nausea (vomiting) Do not take for 3 days after chemo. 30 tablet 2    oxyBUTYnin (DITROPAN) 5 MG tablet Take 1 tablet (5 mg) by mouth 3 times daily as needed for bladder spasms 30 tablet 0    prochlorperazine (COMPAZINE) 10 MG tablet Take 1 tablet (10 mg) by mouth every 6 hours as needed for nausea or vomiting 30 tablet 2         Allergies:        Allergies   Allergen Reactions    Seasonal Allergies         Social History:     Social History     Tobacco Use    Smoking status: Never     Passive exposure: Never    Smokeless tobacco: Never   Substance Use Topics    Alcohol use: Yes     Comment: very little       History   Drug Use No         Family History:       Family History   Problem Relation Age of Onset    Lung Cancer Mother 44    Cerebrovascular Disease Father     Hypertension Father     Alcohol/Drug Father     Obesity Niece         niece    Mental Illness Nephew         nephew    Diabetes Other         paternal aunt    Hyperlipidemia Other      Obesity Other         self    Cervical Cancer Maternal Aunt     Ovarian Cancer Maternal Aunt 60    Obesity Other     Diabetes Other         paternal aunt    Hypertension Other         father    Cerebrovascular Disease Other         father    C.A.D. No family hx of     Breast Cancer No family hx of     Cancer - colorectal No family hx of     Prostate Cancer No family hx of     Melanoma No family hx of          Physical Exam:     LMP 03/08/2008   There is no height or weight on file to calculate BMI.    General Appearance: healthy and alert, no distress    Eyes:  Eyes grossly normal to inspection.  No discharge or erythema, or obvious scleral/conjunctival abnormalities.    Respiratory: No audible wheeze, cough, or visible cyanosis.  No visible retractions or increased work of breathing.     Musculoskeletal: extremities non tender and without edema    Skin: no lesions or rashes on visible skin    Neurological: normal gait, no gross defects     Psychiatric: appropriate mood and affect. Mentation appears normal, affect normal/bright, judgement and insight intact, normal speech and appearance well-groomed                            The rest of a comprehensive physical examination is deferred due to video visit restrictions.      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  She presents for follow up and disease management by video..     50 minutes spent on the date of the encounter doing chart review, history and exam, documentation and further activities as noted above      Plan:     1.)       Continue on current regimen with weekly rad onc and gyn onc visits with providers. Discussed importance of eating smaller meals with lean proteins/hard boiled eggs more often, adequate hydration of at least 64 oz water throughout the day, and pacing activities. Reviewed signs and symptoms for when she should contact the clinic or seek additional  care. Patient to contact the clinic with any questions or concerns in the interim.  Answered all of her questions to the best of my ability.     2.) Discussed possible contact dermatitis as well as medication reaction; can apply Benadryl cream if  hydrocortisone is not helpful; can take Zyrtec and apply a cold wet washcloth to area.      3.) Labs and/or tests ordered include:  mental health     4.) Health maintenance issues addressed today include annual health maintenance and non-gynecologic issues with PCP.    5.)        Referral placed for mental health  RNCC to reach out to patient regarding gynonc support group headed by SAMM Gomez, as well.    SANDI Gordillo, WHNP-BC, ANP-BC  Women's Health Nurse Practitioner  Adult Nurse Practitioner  Division of Gynecologic Oncology  .      CC  Patient Care Team:  Alba Layton MD as PCP - Jefe Mosley MD as Assigned OBGYN Provider  Alba Layton MD as Assigned PCP  Lakeisha Mackenzie MD as MD (Urology)  Lawrence Banuelos MD as MD (Dermatology)  Lakeisha Mackenzie MD as Assigned Surgical Provider  Lakeisha Mackenzie MD as MD (Urology)  Reina Stahl MD as MD (Gynecologic Oncology)  Marisa Pacheco MD as MD (Radiation Oncology)  Reina Stahl MD as Assigned Cancer Care Provider  Lily Fabian PA-C as Physician Assistant (Anesthesiology)  SELF, REFERRED

## 2023-08-10 NOTE — LETTER
8/10/2023         RE: Jessie Tamayo  [address] [id number]        Dear Colleague,    Thank you for referring your patient, Jessie Tamayo, to the [facility]. Please see a copy of my visit note below.    Virtual Visit Details    Type of service:  Video Visit   Video Start Time: 1050  Video End Time:1125    Originating Location (pt. 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  She presents for follow up and disease management by video.     Oncology History:  07, 10/15/08, 09, 11: Pap test NILM.      1/23/15: Pap test NILM, hrHPV16+.  2/27/15: Cervical polyp & cervical biopsy pathology: Negative for dysplasia/malignancy.     16: Pap test NILM, hrHPV16+.   16: Endocervical curettings pathology negative for dysplasia/malignancy.     17: Pap test NILM, hrHPV16+.   -Colposcopy recommended, not performed.      18:   -Pap test ASCUS, hrHPV16+.   -Endocervical curettings & cervical biopsy pathology: negative for dysplasia/malignancy.      19:  Pap test ASC-H, hrHPV16+.   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      9/10/20: Pap test ASC-H, hrHPV+ (NOS).  21: LEEP.   -Pathology: LEEP & endocervical curettings: negative for dysplasia/malignancy.     3/23/22: Endocervical curettings & cervical biopsy pathology: negative for dysplasia/malignancy.     3/28/23:   -Pap test HSIL, hrHPV16+.   -Findings by gynecologist Dr. Liberty: External genitalia with erythematous plaques bilaterally  Urethra- mid position and well supported, suburethral tissue thickened and  friable with bleeding elicited after placement of the speculum  Vagina is stenotic and cervix difficult to see.   5/30/23: Cystourethroscopy, vaginal biopsy by urologist Dr. Mackenzie.   -Findings: Exam revealed lichenified changes to bilateral labia majora and friable vestibular tissue (patient had significant bleeding from prep alone.) The urethra was somewhat stenotic and would not accommodate 19Fr rigid cystoscope, therefore a 16 Fr flexible cystoscope was inserted. Gentle pressure was required to access the bladder. The ureteral orifices were in their orthotopic positions bilaterally. There were no intravesical stones or diverticuli and the bladder was mildly trabeculated. There were no intravesical lesions. See media tab for urethral pictures - there were no obvious lesions but the entire distal urethra was erythematous and stenosed (16Fr.)  -Pathology: Invasive moderately-differentiated squamous cell carcinoma, HPV-associated, with focus suspicious for lymphovascular space invasion; IHC: p16+.    6/8/23: Gynecologic Oncology consultation.  -Findings:   External genitalia notable for erythema and desquamation of the posterior medial labia, c/w lichen sclerosus changes. When the labia are , a friable mass is visible at the distal anterior vagina, just posterior to the urethra. On bimanual exam, the cervix is small and normal in contour. The palpable vaginal tumor is limited to the anterior distal vagina, approximately 4-5 cm laterally x 3 cm cranio-caudal. Tumor is friable. Remainder of the vagina smooth to palpation. Digital anorectal exam is without nodularity. No palpable tumor in the rectovaginal septum.   Enlarged firm, fixed right inguinal lymph node ~3-4 cm in size.      6/16/23: Pelvic MRI  IMPRESSION:   1. Irregular enhancing lesion along the anterior vaginal wall at the  level of the introitus measuring 2.5 x 0.9 cm with irregular  enhancement along the anterior vaginal wall towards the level of  the  vaginal cuff however there is no definitive evidence or abnormal  signal intensity involving the cervix to suggest invasion.  2. Right inguinal lymphadenopathy compatible with metastatic disease.   3. There is some asymmetric enhancement involving the left sacrum,  incompletely evaluated on this study. Further evaluation of this and  further staging of the chest, abdomen and pelvis will be performed on  PET/CT scheduled for 6/20/2023.     6/20/23: PET CT  IMPRESSION:   1. Intense focal hypermetabolic FDG uptake in biopsy-proven vaginal  squamous cell carcinoma.  2. Multiple enlarged, hypermetabolic right inguinal nodes likely  represent regional metastasis. No definite evidence of distant  metastatic disease.  3. Scattered sub-4 mm solid pulmonary nodules are below the size  threshold for characterization on PET. Attention on follow-up with CT.  4. 1.0 cm enhancing left parotid gland nodule with hypermetabolic  uptake could represent a primary parotid neoplasm such as pleomorphic  adenoma or Warthin's tumor; consider ultrasound for further  evaluation.  5. 1.3 cm hypoattenuating right thyroid nodule with mild FDG uptake  warrants ultrasound for characterization.      Plan: Cisplatin radiation      7/27/2023: urinary gonsalez catheter placement. Treat acute cystitis with bactrim per Urology.      8/1/2023: plan C1D1 Cisplatin radiation                Today she reports chemotherapy is going well;she denies any ringing in her ears; she has been having about 3 soft stools a day and did take Imodium once yesterday; she denies any diarrhea/watery stools. She has been taking her Decadron for the 3 days and will take Compazine as needed. She states her 's cooking smell is what make her feel nauseated. She denies any emesis. She states she does well on water intake and  she also has been doing popsicles. She does  have bladder spasms when she has to  fill her bladder prior to radiation and was started on Oxybutynin  from Urology (has  indwelling cath); she took her 5th dose this morning and noticed her right forearm had tiny red dots and was itchy. She did use Hydrocortisone cream which helps; she denies any hives or issues any other place on her body. She will be seeing her PCP later today for this. She is wondering abbot a support group for her or her .        Review of Systems:    Systemic           no weight changes; no fever; no chills; no night sweats; no appetite changes  Skin           no rashes, or lesions  Eye           no irritation; no changes in vision  Shantelle-Laryngeal           no dysphagia; no hoarseness   Pulmonary    no cough; no shortness of breath  Cardiovascular    no chest pain; no palpitations  Gastrointestinal    no diarrhea; no constipation; no abdominal pain; no changes in bowel habits; no blood in stool  Genitourinary   no urinary frequency; no urinary urgency; no dysuria; no pain; no abnormal vaginal discharge; no abnormal vaginal bleeding  Breast   No breast discharge; no breast changes; no breast pain  Musculoskeletal    no myalgias; no arthralgias; no back pain  Psychiatric           no depressed mood; no anxiety    Hematologic             no tender lymph nodes; no noticeable swellings or lumps   Endocrine    no hot flashes; no heat/cold intolerance         Neurological   no tremor; no numbness and tingling; no headaches; no difficulty sleeping      Past Medical History:    Past Medical History:   Diagnosis Date     Actinic keratosis      Hyperlipemia      Lichen sclerosus 2020     Osteopenias      SCC (squamous cell carcinoma) 7/26/2023         Past Surgical History:    Past Surgical History:   Procedure Laterality Date     COLONOSCOPY  2006    normal     COLPOSCOPY, BIOPSY, COMBINED N/A 02/08/2021    Procedure: COLPOSCOPY, WITH BIOPSY, LEEP procedure;  Surgeon: Jefe Koenig MD;  Location: [facility] OR     CYSTOSCOPY N/A 05/30/2023    Procedure: CYSTOSCOPY AND EXCISIONAL BIOPSY OF THE  VAGINAL TISSUE AROUND THE URETHRA.  (look in the bladder and sample small area in the vagina near the urethra);  Surgeon: Lakeisha Mackenzie MD;  Location: [facility] OR     EYE SURGERY      cataracts bilateral     OPEN REDUCTION INTERNAL FIXATION FOREARM  07/31/2012    Procedure: OPEN REDUCTION INTERNAL FIXATION FOREARM;  Open Reduction Internal Fixation, Irrigation & Debridment  of Right Distal Radius, application short arm splint;  Surgeon: Wade Beard MD;  Location: [facility] OR     TONSILLECTOMY & ADENOIDECTOMY  1960     [facility] TREAT ECTOPIC PREG,RMV TUBE/OVARY  1985    ectopic, right side-ovary and tube removed         Health Maintenance Due   Topic Date Due     MEDICARE ANNUAL WELLNESS VISIT  05/26/2023     COLPOSCOPY  06/28/2023       Current Medications:     Current Outpatient Medications   Medication Sig Dispense Refill     dexamethasone (DECADRON) 4 MG tablet Take 2 tablets (8 mg) by mouth daily Take for 3 days, starting the day after chemo on day 2 and 9. Take with food. If no nausea and vomiting with first cisplatin doses, may stop dexamethasone. 12 tablet 2     docusate sodium (COLACE) 100 MG capsule Take 100 mg by mouth daily       ondansetron (ZOFRAN) 8 MG tablet Take 1 tablet (8 mg) by mouth every 8 hours as needed for nausea (vomiting) Do not take for 3 days after chemo. 30 tablet 2     oxyBUTYnin (DITROPAN) 5 MG tablet Take 1 tablet (5 mg) by mouth 3 times daily as needed for bladder spasms 30 tablet 0     prochlorperazine (COMPAZINE) 10 MG tablet Take 1 tablet (10 mg) by mouth every 6 hours as needed for nausea or vomiting 30 tablet 2         Allergies:        Allergies   Allergen Reactions     Seasonal Allergies         Social History:     Social History     Tobacco Use     Smoking status: Never     Passive exposure: Never     Smokeless tobacco: Never   Substance Use Topics     Alcohol use: Yes     Comment: very little       History   Drug Use No         Family History:       Family History    Problem Relation Age of Onset     Lung Cancer Mother 44     Cerebrovascular Disease Father      Hypertension Father      Alcohol/Drug Father      Obesity Niece         niece     Mental Illness Nephew         nephew     Diabetes Other         paternal aunt     Hyperlipidemia Other      Obesity Other         self     Cervical Cancer Maternal Aunt      Ovarian Cancer Maternal Aunt 60     Obesity Other      Diabetes Other         paternal aunt     Hypertension Other         father     Cerebrovascular Disease Other         father     C.A.D. No family hx of      Breast Cancer No family hx of      Cancer - colorectal No family hx of      Prostate Cancer No family hx of      Melanoma No family hx of          Physical Exam:     LMP 03/08/2008   There is no height or weight on file to calculate BMI.    General Appearance: healthy and alert, no distress    Eyes:  Eyes grossly normal to inspection.  No discharge or erythema, or obvious scleral/conjunctival abnormalities.    Respiratory: No audible wheeze, cough, or visible cyanosis.  No visible retractions or increased work of breathing.     Musculoskeletal: extremities non tender and without edema    Skin: no lesions or rashes on visible skin    Neurological: normal gait, no gross defects     Psychiatric: appropriate mood and affect. Mentation appears normal, affect normal/bright, judgement and insight intact, normal speech and appearance well-groomed                            The rest of a comprehensive physical examination is deferred due to video visit restrictions.      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  She presents for follow up and disease management by video..     50 minutes spent on the date of the encounter doing chart review, history and exam, documentation and further activities as noted above      Plan:     1.)       Continue on current regimen with weekly rad onc and  gyn onc visits with providers. Discussed importance of eating smaller meals with lean proteins/hard boiled eggs more often, adequate hydration of at least 64 oz water throughout the day, and pacing activities. Reviewed signs and symptoms for when she should contact the clinic or seek additional care. Patient to contact the clinic with any questions or concerns in the interim.  Answered all of her questions to the best of my ability.     2.) Discussed possible contact dermatitis as well as medication reaction; can apply Benadryl cream if  hydrocortisone is not helpful; can take Zyrtec and apply a cold wet washcloth to area.      3.) Labs and/or tests ordered include:  mental health     4.) Health maintenance issues addressed today include annual health maintenance and non-gynecologic issues with PCP.    5.)        Referral placed for mental health  RNCC to reach out to patient regarding gynonc support group headed by SAMM Gomez, as well.    SANDI Gordillo, WHNP-BC, ANP-BC  Women's Health Nurse Practitioner  Adult Nurse Practitioner  Division of Gynecologic Oncology  .      CC  Patient Care Team:  Alba Layton MD as PCP - General  Jefe Koenig MD as Assigned OBGYN Provider  Alba Layton MD as Assigned PCP  Lakeisha Mackenzie MD as MD (Urology)  Lawrence Banuelos MD as MD (Dermatology)  Lakeisha Mackenzie MD as Assigned Surgical Provider  Lakeisha Mackenzie MD as MD (Urology)  Reina Stahl MD as MD (Gynecologic Oncology)  Marisa Pacheco MD as MD (Radiation Oncology)  Reina Stahl MD as Assigned Cancer Care Provider  Lily Fabian PA-C as Physician Assistant (Anesthesiology)  SELF, REFERRED      Again, thank you for allowing me to participate in the care of your patient.        Sincerely,        SANDI Raines CNP

## 2023-08-10 NOTE — LETTER
8/10/2023         RE: Jessie Tamayo  [address] [facility], [address] [id number]        Dear Colleague,    Thank you for referring your patient, Jessie Tamayo, to the [facility]. Please see a copy of my visit note below.    Virtual Visit Details    Type of service:  Video Visit   Video Start Time: 1050  Video End Time:1125    Originating Location (pt. 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  She presents for follow up and disease management by video.     Oncology History:  07, 10/15/08, 09, 11: Pap test NILM.      1/23/15: Pap test NILM, hrHPV16+.  2/27/15: Cervical polyp & cervical biopsy pathology: Negative for dysplasia/malignancy.     16: Pap test NILM, hrHPV16+.   16: Endocervical curettings pathology negative for dysplasia/malignancy.     17: Pap test NILM, hrHPV16+.   -Colposcopy recommended, not performed.      18:   -Pap test ASCUS, hrHPV16+.   -Endocervical curettings & cervical biopsy pathology: negative for dysplasia/malignancy.      19:  Pap test ASC-H, hrHPV16+.   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      9/10/20: Pap test ASC-H, hrHPV+ (NOS).  21: LEEP.   -Pathology: LEEP & endocervical curettings: negative for dysplasia/malignancy.     3/23/22: Endocervical curettings & cervical biopsy pathology: negative for dysplasia/malignancy.     3/28/23:   -Pap test HSIL, hrHPV16+.   -Findings by gynecologist Dr. Liberty: External genitalia with erythematous plaques bilaterally  Urethra- mid position and well supported, suburethral tissue thickened and  friable with bleeding elicited after placement of the speculum  Vagina is stenotic and cervix difficult to see.   5/30/23: Cystourethroscopy, vaginal biopsy by urologist Dr. Mackenzie.   -Findings: Exam revealed lichenified changes to bilateral labia majora and friable vestibular tissue (patient had significant bleeding from prep alone.) The urethra was somewhat stenotic and would not accommodate 19Fr rigid cystoscope, therefore a 16 Fr flexible cystoscope was inserted. Gentle pressure was required to access the bladder. The ureteral orifices were in their orthotopic positions bilaterally. There were no intravesical stones or diverticuli and the bladder was mildly trabeculated. There were no intravesical lesions. See media tab for urethral pictures - there were no obvious lesions but the entire distal urethra was erythematous and stenosed (16Fr.)  -Pathology: Invasive moderately-differentiated squamous cell carcinoma, HPV-associated, with focus suspicious for lymphovascular space invasion; IHC: p16+.    6/8/23: Gynecologic Oncology consultation.  -Findings:   External genitalia notable for erythema and desquamation of the posterior medial labia, c/w lichen sclerosus changes. When the labia are , a friable mass is visible at the distal anterior vagina, just posterior to the urethra. On bimanual exam, the cervix is small and normal in contour. The palpable vaginal tumor is limited to the anterior distal vagina, approximately 4-5 cm laterally x 3 cm cranio-caudal. Tumor is friable. Remainder of the vagina smooth to palpation. Digital anorectal exam is without nodularity. No palpable tumor in the rectovaginal septum.   Enlarged firm, fixed right inguinal lymph node ~3-4 cm in size.      6/16/23: Pelvic MRI  IMPRESSION:   1. Irregular enhancing lesion along the anterior vaginal wall at the  level of the introitus measuring 2.5 x 0.9 cm with irregular  enhancement along the anterior vaginal wall towards the level of  the  vaginal cuff however there is no definitive evidence or abnormal  signal intensity involving the cervix to suggest invasion.  2. Right inguinal lymphadenopathy compatible with metastatic disease.   3. There is some asymmetric enhancement involving the left sacrum,  incompletely evaluated on this study. Further evaluation of this and  further staging of the chest, abdomen and pelvis will be performed on  PET/CT scheduled for 6/20/2023.     6/20/23: PET CT  IMPRESSION:   1. Intense focal hypermetabolic FDG uptake in biopsy-proven vaginal  squamous cell carcinoma.  2. Multiple enlarged, hypermetabolic right inguinal nodes likely  represent regional metastasis. No definite evidence of distant  metastatic disease.  3. Scattered sub-4 mm solid pulmonary nodules are below the size  threshold for characterization on PET. Attention on follow-up with CT.  4. 1.0 cm enhancing left parotid gland nodule with hypermetabolic  uptake could represent a primary parotid neoplasm such as pleomorphic  adenoma or Warthin's tumor; consider ultrasound for further  evaluation.  5. 1.3 cm hypoattenuating right thyroid nodule with mild FDG uptake  warrants ultrasound for characterization.      Plan: Cisplatin radiation      7/27/2023: urinary gonsalez catheter placement. Treat acute cystitis with bactrim per Urology.      8/1/2023: plan C1D1 Cisplatin radiation                Today she reports chemotherapy is going well;she denies any ringing in her ears; she has been having about 3 soft stools a day and did take Imodium once yesterday; she denies any diarrhea/watery stools. She has been taking her Decadron for the 3 days and will take Compazine as needed. She states her 's cooking smell is what make her feel nauseated. She denies any emesis. She states she does well on water intake and  she also has been doing popsicles. She does  have bladder spasms when she has to  fill her bladder prior to radiation and was started on Oxybutynin  from Urology (has  indwelling cath); she took her 5th dose this morning and noticed her right forearm had tiny red dots and was itchy. She did use Hydrocortisone cream which helps; she denies any hives or issues any other place on her body. She will be seeing her PCP later today for this. She is wondering abbot a support group for her or her .        Review of Systems:    Systemic           no weight changes; no fever; no chills; no night sweats; no appetite changes  Skin           no rashes, or lesions  Eye           no irritation; no changes in vision  Shantelle-Laryngeal           no dysphagia; no hoarseness   Pulmonary    no cough; no shortness of breath  Cardiovascular    no chest pain; no palpitations  Gastrointestinal    no diarrhea; no constipation; no abdominal pain; no changes in bowel habits; no blood in stool  Genitourinary   no urinary frequency; no urinary urgency; no dysuria; no pain; no abnormal vaginal discharge; no abnormal vaginal bleeding  Breast   No breast discharge; no breast changes; no breast pain  Musculoskeletal    no myalgias; no arthralgias; no back pain  Psychiatric           no depressed mood; no anxiety    Hematologic             no tender lymph nodes; no noticeable swellings or lumps   Endocrine    no hot flashes; no heat/cold intolerance         Neurological   no tremor; no numbness and tingling; no headaches; no difficulty sleeping      Past Medical History:    Past Medical History:   Diagnosis Date     Actinic keratosis      Hyperlipemia      Lichen sclerosus 2020     Osteopenias      SCC (squamous cell carcinoma) 7/26/2023         Past Surgical History:    Past Surgical History:   Procedure Laterality Date     COLONOSCOPY  2006    normal     COLPOSCOPY, BIOPSY, COMBINED N/A 02/08/2021    Procedure: COLPOSCOPY, WITH BIOPSY, LEEP procedure;  Surgeon: Jefe Koenig MD;  Location: [facility] OR     CYSTOSCOPY N/A 05/30/2023    Procedure: CYSTOSCOPY AND EXCISIONAL BIOPSY OF THE  VAGINAL TISSUE AROUND THE URETHRA.  (look in the bladder and sample small area in the vagina near the urethra);  Surgeon: Lakeisha Mackenzie MD;  Location: [facility] OR     EYE SURGERY      cataracts bilateral     OPEN REDUCTION INTERNAL FIXATION FOREARM  07/31/2012    Procedure: OPEN REDUCTION INTERNAL FIXATION FOREARM;  Open Reduction Internal Fixation, Irrigation & Debridment  of Right Distal Radius, application short arm splint;  Surgeon: Wade Beard MD;  Location: [facility] OR     TONSILLECTOMY & ADENOIDECTOMY  1960     [facility] TREAT ECTOPIC PREG,RMV TUBE/OVARY  1985    ectopic, right side-ovary and tube removed         Health Maintenance Due   Topic Date Due     MEDICARE ANNUAL WELLNESS VISIT  05/26/2023     COLPOSCOPY  06/28/2023       Current Medications:     Current Outpatient Medications   Medication Sig Dispense Refill     dexamethasone (DECADRON) 4 MG tablet Take 2 tablets (8 mg) by mouth daily Take for 3 days, starting the day after chemo on day 2 and 9. Take with food. If no nausea and vomiting with first cisplatin doses, may stop dexamethasone. 12 tablet 2     docusate sodium (COLACE) 100 MG capsule Take 100 mg by mouth daily       ondansetron (ZOFRAN) 8 MG tablet Take 1 tablet (8 mg) by mouth every 8 hours as needed for nausea (vomiting) Do not take for 3 days after chemo. 30 tablet 2     oxyBUTYnin (DITROPAN) 5 MG tablet Take 1 tablet (5 mg) by mouth 3 times daily as needed for bladder spasms 30 tablet 0     prochlorperazine (COMPAZINE) 10 MG tablet Take 1 tablet (10 mg) by mouth every 6 hours as needed for nausea or vomiting 30 tablet 2         Allergies:        Allergies   Allergen Reactions     Seasonal Allergies         Social History:     Social History     Tobacco Use     Smoking status: Never     Passive exposure: Never     Smokeless tobacco: Never   Substance Use Topics     Alcohol use: Yes     Comment: very little       History   Drug Use No         Family History:       Family History    Problem Relation Age of Onset     Lung Cancer Mother 44     Cerebrovascular Disease Father      Hypertension Father      Alcohol/Drug Father      Obesity Niece         niece     Mental Illness Nephew         nephew     Diabetes Other         paternal aunt     Hyperlipidemia Other      Obesity Other         self     Cervical Cancer Maternal Aunt      Ovarian Cancer Maternal Aunt 60     Obesity Other      Diabetes Other         paternal aunt     Hypertension Other         father     Cerebrovascular Disease Other         father     C.A.D. No family hx of      Breast Cancer No family hx of      Cancer - colorectal No family hx of      Prostate Cancer No family hx of      Melanoma No family hx of          Physical Exam:     LMP 03/08/2008   There is no height or weight on file to calculate BMI.    General Appearance: healthy and alert, no distress    Eyes:  Eyes grossly normal to inspection.  No discharge or erythema, or obvious scleral/conjunctival abnormalities.    Respiratory: No audible wheeze, cough, or visible cyanosis.  No visible retractions or increased work of breathing.     Musculoskeletal: extremities non tender and without edema    Skin: no lesions or rashes on visible skin    Neurological: normal gait, no gross defects     Psychiatric: appropriate mood and affect. Mentation appears normal, affect normal/bright, judgement and insight intact, normal speech and appearance well-groomed                            The rest of a comprehensive physical examination is deferred due to video visit restrictions.      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  She presents for follow up and disease management by video..     50 minutes spent on the date of the encounter doing chart review, history and exam, documentation and further activities as noted above      Plan:     1.)       Continue on current regimen with weekly rad onc and  gyn onc visits with providers. Discussed importance of eating smaller meals with lean proteins/hard boiled eggs more often, adequate hydration of at least 64 oz water throughout the day, and pacing activities. Reviewed signs and symptoms for when she should contact the clinic or seek additional care. Patient to contact the clinic with any questions or concerns in the interim.  Answered all of her questions to the best of my ability.     2.) Discussed possible contact dermatitis as well as medication reaction; can apply Benadryl cream if  hydrocortisone is not helpful; can take Zyrtec and apply a cold wet washcloth to area.      3.) Labs and/or tests ordered include:  mental health     4.) Health maintenance issues addressed today include annual health maintenance and non-gynecologic issues with PCP.    5.)        Referral placed for mental health  RNCC to reach out to patient regarding gynonc support group headed by SAMM Gomez, as well.    SANDI Gordillo, WHNP-BC, ANP-BC  Women's Health Nurse Practitioner  Adult Nurse Practitioner  Division of Gynecologic Oncology  .      CC  Patient Care Team:  Alba Layton MD as PCP - General  Jefe Koenig MD as Assigned OBGYN Provider  Alba Layton MD as Assigned PCP  Lakeisha aMckenzie MD as MD (Urology)  Lawrence Banuelos MD as MD (Dermatology)  Lakeisha Mackenzie MD as Assigned Surgical Provider  Lakeisha Mackenzie MD as MD (Urology)  Reina Stahl MD as MD (Gynecologic Oncology)  Marisa Pacheco MD as MD (Radiation Oncology)  Reina Stahl MD as Assigned Cancer Care Provider  Lily Fabian PA-C as Physician Assistant (Anesthesiology)  SELF, REFERRED      Again, thank you for allowing me to participate in the care of your patient.        Sincerely,        SANDI Raines CNP

## 2023-08-10 NOTE — PROGRESS NOTES
"  Assessment & Plan     Petechiae  Nl platelets may be due to her scratching   - CBC with platelets and differential; Future  - CBC with platelets and differential    Adverse effect of chemotherapy, initial encounter  - CBC with platelets and differential; Future  - CBC with platelets and differential    Bladder spasm  Will have her try this as she has a catheter and this causes spasm will try this   - tolterodine (DETROL) 2 MG tablet; Take 1 tablet (2 mg) by mouth 2 times daily    Itching  He is already on steroids will have her try topical I also asked her to contact her oncologist to see if this is something to worry about   - triamcinolone (KENALOG) 0.1 % external cream; Apply topically 2 times daily    Dermatitis  As above   - triamcinolone (KENALOG) 0.1 % external cream; Apply topically 2 times daily          6. Class 2 severe obesity due to excess calories with serious comorbidity and body mass index (BMI) of 36.0 to 36.9 in adult (H)  Right now she is on chemo and wll not be dieting or exercising         BMI:   Estimated body mass index is 36.22 kg/m  as calculated from the following:    Height as of 8/4/23: 1.626 m (5' 4\").    Weight as of this encounter: 95.7 kg (211 lb).   Weight management plan: not a focus right now    Call oncology and report the rash     Alba Layton MD  [facility] SVETA Roman is a 67 year old, presenting for the following health issues:  Hives (Anititch cream is helping, now starting on the left arm. )      History of Present Illness       Reason for visit:  Hives  Symptom onset:  Today  Symptoms include:  Itchy/red dots  Symptom intensity:  Mild  Symptom progression:  Improving  Had these symptoms before:  No    She eats 0-1 servings of fruits and vegetables daily.She consumes 1 sweetened beverage(s) daily.She exercises with enough effort to increase her heart rate 9 or less minutes per day.  She exercises with enough effort to increase her heart " rate 3 or less days per week.   She is taking medications regularly.     Started the Oxybutynin 5 days ago. Not helping dry mouth           Review of Systems   Constitutional, HEENT, cardiovascular, pulmonary, gi and gu systems are negative, except as otherwise noted.      Objective    /72 (BP Location: Right arm, Patient Position: Sitting, Cuff Size: Adult Large)   Pulse 76   Temp 98.8  F (37.1  C) (Tympanic)   Wt 95.7 kg (211 lb)   LMP 03/08/2008   SpO2 98%   BMI 36.22 kg/m    Body mass index is 36.22 kg/m .  Physical Exam   GENERAL: alert, no distress, and fatigued  SKIN: non blanching petechial like areas on flexor surfaces of both lower arms   PSYCH: mentation appears normal, anxious, and fatigued    Results for orders placed or performed in visit on 08/10/23   CBC with platelets and differential     Status: Abnormal   Result Value Ref Range    WBC Count 10.2 4.0 - 11.0 10e3/uL    RBC Count 4.45 3.80 - 5.20 10e6/uL    Hemoglobin 13.2 11.7 - 15.7 g/dL    Hematocrit 40.2 35.0 - 47.0 %    MCV 90 78 - 100 fL    MCH 29.7 26.5 - 33.0 pg    MCHC 32.8 31.5 - 36.5 g/dL    RDW 12.5 10.0 - 15.0 %    Platelet Count 214 150 - 450 10e3/uL    % Neutrophils 89 %    % Lymphocytes 3 %    % Monocytes 7 %    % Eosinophils 0 %    % Basophils 0 %    % Immature Granulocytes 1 %    Absolute Neutrophils 9.1 (H) 1.6 - 8.3 10e3/uL    Absolute Lymphocytes 0.3 (L) 0.8 - 5.3 10e3/uL    Absolute Monocytes 0.7 0.0 - 1.3 10e3/uL    Absolute Eosinophils 0.0 0.0 - 0.7 10e3/uL    Absolute Basophils 0.0 0.0 - 0.2 10e3/uL    Absolute Immature Granulocytes 0.1 <=0.4 10e3/uL   CBC with platelets and differential     Status: Abnormal    Narrative    The following orders were created for panel order CBC with platelets and differential.  Procedure                               Abnormality         Status                     ---------                               -----------         ------                     CBC with platelets and  amaury..[[id number]]  Abnormal            Final result                 Please view results for these tests on the individual orders.       Alba Layton M.D.

## 2023-08-10 NOTE — TELEPHONE ENCOUNTER
Got up at 5:30 and took Ditropan, decadron, Tylenol. Now right arm has a rash, lower between wrist and elbow. Ditropan just started yestereday. Today was dose five. She had chemo on Monday. Red and red dots, itches. Small like a mosquito bite.  I connected with scheduling for an appointment with her primary. She will also try for an appointment with Urology who ordered her medications. I told her if she can't get into either place, she should be seen in urgent care. She understands.  Kay Liu RN  [facility]    Reason for Disposition   Localized hives   Hives or itching    Additional Information   Negative: [1] Life-threatening reaction (anaphylaxis) in the past to similar substance (e.g., food, insect bite/sting, chemical, etc.) AND [2] < 2 hours since exposure   Negative: Difficulty breathing or wheezing now   Negative: [1] Swollen tongue AND [2] rapid onset   Negative: [1] Hoarseness or cough now AND [2] rapid onset   Negative: Shock suspected (e.g., cold/pale/clammy skin, too weak to stand, low BP, rapid pulse)     Dizziness usually happens in the morning.   Negative: Difficult to awaken or acting confused (e.g., disoriented, slurred speech)   Negative: Sounds like a life-threatening emergency to the triager   Negative: [1] Bee, wasp, or yellow jacket sting AND [2] within last 24 hours   Negative: Blood-colored, dark red or purple rash   Negative: [1] Drug rash suspected AND [2] started taking new medicine within last 2 weeks(Exception: antihistamine, eye drops, ear drops, decongestant or other OTC cough/cold medicines)   Negative: Doesn't match the SYMPTOMS of hives   Negative: Swollen tongue   Negative: [1] Widespread hives, itching or facial swelling AND [2] onset < 2 hours of exposure to high-risk allergen (e.g., sting, nuts, 1st dose of antibiotic)   Negative: Patient sounds very sick or weak to the triager   Negative: [1] MODERATE-SEVERE hives persist (i.e., hives interfere with normal  activities or work) AND [2] taking antihistamine (e.g., Benadryl, Claritin) > 24 hours   Negative: [1] Hives have become worse AND [2] taking oral steroids (e.g., prednisone) > 24 hours   Negative: Joint swelling   Negative: [1] Abdominal pain AND [2] pain present > 12 hours   Negative: Fever   Negative: [1] Widespread hives, itching or facial swelling AND [2] onset > 2 hours after exposure to high-risk allergen (e.g., sting, nuts, 1st dose of antibiotic)   Negative: [1] Hives from food reaction AND [2] diagnosis never confirmed by a doctor (or NP/PA)   Negative: Hives persist > 1 week   Negative: [1] Hives has occurred 3 or more times in the last year AND [2] the cause was not found   Negative: [1] Hives from food reaction AND [2] diagnosis already confirmed   Negative: [1] Life-threatening reaction (anaphylaxis) in the past to the same drug AND [2] < 2 hours since exposure   Negative: Difficulty breathing or wheezing   Negative: [1] Hoarseness or cough AND [2] started soon after 1st dose of drug   Negative: [1] Swollen tongue AND [2] started soon after 1st dose of drug   Negative: [1] Purple or blood-colored rash (spots or dots) AND [2] fever   Negative: Sounds like a life-threatening emergency to the triager   Negative: Rash is only on 1 part of the body (localized)   Negative: Taking new non-prescription (OTC) antihistamine, decongestant, ear drops, eye drops, or other OTC cough/cold medicine   Negative: Taking new prescription antihistamine, allergy medicine, asthma medicine, eye drops, ear drops or nose drops   Negative: Rash started more than 3 days after stopping new prescription medicine   Negative: Swollen tongue   Negative: [1] Widespread hives AND [2] onset < 2 hours of exposure to 1st dose of drug   Negative: Fever   Negative: Patient sounds very sick or weak to the triager   Negative: [1] Purple or blood-colored rash (spots or dots) AND [2] no fever AND [3] sounds well to triager   Negative: [1] Taking  new prescription medication AND [2] rash within 4 hours of 1st dose   Negative: Large or small blisters on skin (i.e., fluid filled bubbles or sacs)   Negative: Bloody crusts on lips or sores in mouth   Negative: Face or lip swelling    Protocols used: Rash - Widespread On Drugs-A-AH, Hives-A-AH

## 2023-08-11 ENCOUNTER — APPOINTMENT (OUTPATIENT)
Dept: RADIATION ONCOLOGY | Facility: CLINIC | Age: 68
End: 2023-08-11
Attending: RADIOLOGY
Payer: COMMERCIAL

## 2023-08-11 PROCEDURE — 77427 RADIATION TX MANAGEMENT X5: CPT | Performed by: RADIOLOGY

## 2023-08-11 PROCEDURE — 77386 HC IMRT TREATMENT DELIVERY, COMPLEX: CPT | Performed by: RADIOLOGY

## 2023-08-11 PROCEDURE — 77014 PR CT GUIDE FOR PLACEMENT RADIATION THERAPY FIELDS: CPT | Mod: 26 | Performed by: RADIOLOGY

## 2023-08-11 PROCEDURE — 77336 RADIATION PHYSICS CONSULT: CPT | Performed by: RADIOLOGY

## 2023-08-13 ENCOUNTER — NURSE TRIAGE (OUTPATIENT)
Dept: NURSING | Facility: CLINIC | Age: 68
End: 2023-08-13
Payer: COMMERCIAL

## 2023-08-13 NOTE — TELEPHONE ENCOUNTER
"Jessie calling concern after a bout of diarrhea yesterday she has a  red labia and red inner thigh. Barrier cream was applied and already seeing improvement. She has a urinary catheter and is scheduled to have  have her 3rd cycle of chemotherapy tomorrow for vaginal cancer. Temp.99.5. She reports her urine look dark yellow and cloudy. Output was 500 ml this am and 900 ml now. Care advice is to see physician within 24 hours. Pt will see oncologist tomorrow and lab appt. Jessie agreed to discuss concerns at appt.  Brandy Negron RN on 8/13/2023 at 6:52 PM      Reason for Disposition   [1] Cloudy urine lasts > 24 hours AND [2] not cleared by increasing fluid intake   [1] Mild vulvar rash (e.g., redness, tiny bumps, sore) of genital area AND [2] present < 24 hours    Additional Information   Negative: Patient sounds very sick or weak to the triager   Negative: [1] SEVERE pain AND [2] not improved 2 hours after pain medicine   Negative: [1] Genital area looks infected (e.g., draining sore, spreading redness) AND [2] fever   Negative: MODERATE-SEVERE itching (i.e., interferes with school, work, or sleep)   Negative: Genital area looks infected (e.g., draining sore, spreading redness)   Negative: Rash with painful tiny water blisters   Negative: [1] Rash (e.g., redness, tiny bumps, sore) of genital area AND [2] present > 24 hours   Negative: Tender lump (swelling or \"ball\") at vaginal opening   Negative: [1] Symptoms of a \"yeast infection\" (i.e., itchy, white discharge, not bad smelling) AND [2] not improved > 3 days following CARE ADVICE   Negative: [1] Vulvar itching AND [2] not improved > 3 days following CARE ADVICE   Negative: Patient is worried they have a sexually transmitted infection (STI)   Negative: [1] Itching of genital area AND [2] severe urine incontinence (e.g., uses sanitary napkin, incontinence pad, or diaper)   Negative: [1] Itching or dryness of genital area AND [2] nearing menopause or after " menopause   Negative: Pain with sexual intercourse (dyspareunia)  (Exception: feels like prior yeast infection, minor abrasion, minor rash < 24 hour duration, mild itching)   Negative: Pain in genital area is a chronic symptom (recurrent or ongoing AND present > 4 weeks)   Negative: Shock suspected (e.g., cold/pale/clammy skin, too weak to stand, low BP, rapid pulse)   Negative: Sounds like a life-threatening emergency to the triager   Negative: [1] Catheter was accidentally pulled-out AND [2] bright red continuous bleeding   Negative: SEVERE abdominal pain   Negative: Fever > 100.4 F (38.0 C)   Negative: [1] Drinking very little AND [2] dehydration suspected (e.g., no urine > 12 hours, very dry mouth, very lightheaded)   Negative: Patient sounds very sick or weak to the triager   Negative: Catheter was accidentally pulled-out   Negative: [1] Catheter is broken AND [2] is not usable   Negative: Bleeding around catheter (e.g., from penis or female urethra)   Negative: Lower abdominal pain or distention   Negative: [1] No urine in bag > 4 hours AND [2] catheter is not kinked   Negative: [1] Tea-colored or slightly red urine lasts > 24 hours AND [2] not cleared by increasing fluid intake    AND [3] no recent prostate or bladder surgery   Negative: All other vulvar symptoms  (Exception: feels like prior yeast infection, minor abrasion, mild rash < 24 hour duration, mild itching)   Negative: Mild vulvar itching    Protocols used: Vulvar Symptoms-A-AH, Urinary Catheter Symptoms and [id number]

## 2023-08-14 ENCOUNTER — ONCOLOGY VISIT (OUTPATIENT)
Dept: ONCOLOGY | Facility: CLINIC | Age: 68
End: 2023-08-14
Attending: NURSE PRACTITIONER
Payer: COMMERCIAL

## 2023-08-14 ENCOUNTER — INFUSION THERAPY VISIT (OUTPATIENT)
Dept: ONCOLOGY | Facility: CLINIC | Age: 68
End: 2023-08-14
Attending: OBSTETRICS & GYNECOLOGY
Payer: COMMERCIAL

## 2023-08-14 ENCOUNTER — APPOINTMENT (OUTPATIENT)
Dept: LAB | Facility: CLINIC | Age: 68
End: 2023-08-14
Attending: OBSTETRICS & GYNECOLOGY
Payer: COMMERCIAL

## 2023-08-14 ENCOUNTER — APPOINTMENT (OUTPATIENT)
Dept: RADIATION ONCOLOGY | Facility: CLINIC | Age: 68
End: 2023-08-14
Attending: RADIOLOGY
Payer: COMMERCIAL

## 2023-08-14 VITALS
OXYGEN SATURATION: 98 % | HEART RATE: 104 BPM | RESPIRATION RATE: 16 BRPM | WEIGHT: 211.3 LBS | TEMPERATURE: 98.1 F | SYSTOLIC BLOOD PRESSURE: 134 MMHG | DIASTOLIC BLOOD PRESSURE: 81 MMHG | BODY MASS INDEX: 36.27 KG/M2

## 2023-08-14 DIAGNOSIS — R30.0 DYSURIA: ICD-10-CM

## 2023-08-14 DIAGNOSIS — L53.9: ICD-10-CM

## 2023-08-14 DIAGNOSIS — C52 VAGINAL CANCER (H): Primary | ICD-10-CM

## 2023-08-14 DIAGNOSIS — C44.92 SCC (SQUAMOUS CELL CARCINOMA): ICD-10-CM

## 2023-08-14 DIAGNOSIS — Z97.8 FOLEY CATHETER IN PLACE: ICD-10-CM

## 2023-08-14 LAB
ALBUMIN SERPL BCG-MCNC: 4 G/DL (ref 3.5–5.2)
ALBUMIN UR-MCNC: 200 MG/DL
ALP SERPL-CCNC: 68 U/L (ref 35–104)
ALT SERPL W P-5'-P-CCNC: 13 U/L (ref 0–50)
ANION GAP SERPL CALCULATED.3IONS-SCNC: 10 MMOL/L (ref 7–15)
APPEARANCE UR: ABNORMAL
AST SERPL W P-5'-P-CCNC: 16 U/L (ref 0–45)
BACTERIA #/AREA URNS HPF: ABNORMAL /HPF
BASOPHILS # BLD AUTO: 0 10E3/UL (ref 0–0.2)
BASOPHILS NFR BLD AUTO: 0 %
BILIRUB SERPL-MCNC: 0.3 MG/DL
BILIRUB UR QL STRIP: NEGATIVE
BUN SERPL-MCNC: 14.5 MG/DL (ref 8–23)
CALCIUM SERPL-MCNC: 9.1 MG/DL (ref 8.8–10.2)
CHLORIDE SERPL-SCNC: 100 MMOL/L (ref 98–107)
COLOR UR AUTO: YELLOW
CREAT SERPL-MCNC: 0.91 MG/DL (ref 0.51–0.95)
DEPRECATED HCO3 PLAS-SCNC: 24 MMOL/L (ref 22–29)
EOSINOPHIL # BLD AUTO: 0 10E3/UL (ref 0–0.7)
EOSINOPHIL NFR BLD AUTO: 0 %
ERYTHROCYTE [DISTWIDTH] IN BLOOD BY AUTOMATED COUNT: 12.8 % (ref 10–15)
GFR SERPL CREATININE-BSD FRML MDRD: 69 ML/MIN/1.73M2
GLUCOSE SERPL-MCNC: 144 MG/DL (ref 70–99)
GLUCOSE UR STRIP-MCNC: NEGATIVE MG/DL
HCT VFR BLD AUTO: 40.2 % (ref 35–47)
HGB BLD-MCNC: 13.4 G/DL (ref 11.7–15.7)
HGB UR QL STRIP: ABNORMAL
IMM GRANULOCYTES # BLD: 0.1 10E3/UL
IMM GRANULOCYTES NFR BLD: 1 %
KETONES UR STRIP-MCNC: NEGATIVE MG/DL
LEUKOCYTE ESTERASE UR QL STRIP: ABNORMAL
LYMPHOCYTES # BLD AUTO: 0.3 10E3/UL (ref 0.8–5.3)
LYMPHOCYTES NFR BLD AUTO: 3 %
MAGNESIUM SERPL-MCNC: 1.8 MG/DL (ref 1.7–2.3)
MCH RBC QN AUTO: 30.6 PG (ref 26.5–33)
MCHC RBC AUTO-ENTMCNC: 33.3 G/DL (ref 31.5–36.5)
MCV RBC AUTO: 92 FL (ref 78–100)
MONOCYTES # BLD AUTO: 0.6 10E3/UL (ref 0–1.3)
MONOCYTES NFR BLD AUTO: 7 %
NEUTROPHILS # BLD AUTO: 7.8 10E3/UL (ref 1.6–8.3)
NEUTROPHILS NFR BLD AUTO: 89 %
NITRATE UR QL: NEGATIVE
NRBC # BLD AUTO: 0 10E3/UL
NRBC BLD AUTO-RTO: 0 /100
PH UR STRIP: 6 [PH] (ref 5–7)
PLATELET # BLD AUTO: 148 10E3/UL (ref 150–450)
POTASSIUM SERPL-SCNC: 3.8 MMOL/L (ref 3.4–5.3)
PROT SERPL-MCNC: 6.3 G/DL (ref 6.4–8.3)
RBC # BLD AUTO: 4.38 10E6/UL (ref 3.8–5.2)
RBC URINE: 41 /HPF
SODIUM SERPL-SCNC: 134 MMOL/L (ref 136–145)
SP GR UR STRIP: 1.01 (ref 1–1.03)
UROBILINOGEN UR STRIP-MCNC: NORMAL MG/DL
WBC # BLD AUTO: 8.7 10E3/UL (ref 4–11)
WBC CLUMPS #/AREA URNS HPF: PRESENT /HPF
WBC URINE: >182 /HPF

## 2023-08-14 PROCEDURE — 258N000003 HC RX IP 258 OP 636: Performed by: OBSTETRICS & GYNECOLOGY

## 2023-08-14 PROCEDURE — 81001 URINALYSIS AUTO W/SCOPE: CPT

## 2023-08-14 PROCEDURE — 96413 CHEMO IV INFUSION 1 HR: CPT

## 2023-08-14 PROCEDURE — 96361 HYDRATE IV INFUSION ADD-ON: CPT

## 2023-08-14 PROCEDURE — 85025 COMPLETE CBC W/AUTO DIFF WBC: CPT | Performed by: OBSTETRICS & GYNECOLOGY

## 2023-08-14 PROCEDURE — 77386 HC IMRT TREATMENT DELIVERY, COMPLEX: CPT | Performed by: RADIOLOGY

## 2023-08-14 PROCEDURE — 250N000011 HC RX IP 250 OP 636: Performed by: OBSTETRICS & GYNECOLOGY

## 2023-08-14 PROCEDURE — 96375 TX/PRO/DX INJ NEW DRUG ADDON: CPT

## 2023-08-14 PROCEDURE — 36415 COLL VENOUS BLD VENIPUNCTURE: CPT | Performed by: OBSTETRICS & GYNECOLOGY

## 2023-08-14 PROCEDURE — 80053 COMPREHEN METABOLIC PANEL: CPT | Performed by: OBSTETRICS & GYNECOLOGY

## 2023-08-14 PROCEDURE — 83735 ASSAY OF MAGNESIUM: CPT | Performed by: OBSTETRICS & GYNECOLOGY

## 2023-08-14 PROCEDURE — 96367 TX/PROPH/DG ADDL SEQ IV INF: CPT

## 2023-08-14 PROCEDURE — 99215 OFFICE O/P EST HI 40 MIN: CPT | Performed by: NURSE PRACTITIONER

## 2023-08-14 PROCEDURE — 87086 URINE CULTURE/COLONY COUNT: CPT

## 2023-08-14 RX ORDER — PALONOSETRON 0.05 MG/ML
0.25 INJECTION, SOLUTION INTRAVENOUS ONCE
Status: COMPLETED | OUTPATIENT
Start: 2023-08-14 | End: 2023-08-14

## 2023-08-14 RX ORDER — LOPERAMIDE HCL 2 MG
2 CAPSULE ORAL 2 TIMES DAILY PRN
Status: DISCONTINUED | OUTPATIENT
Start: 2023-08-14 | End: 2023-08-14 | Stop reason: HOSPADM

## 2023-08-14 RX ORDER — DEXTROSE, SODIUM CHLORIDE, AND POTASSIUM CHLORIDE 5; .45; .15 G/100ML; G/100ML; G/100ML
2000 INJECTION INTRAVENOUS ONCE
Status: COMPLETED | OUTPATIENT
Start: 2023-08-14 | End: 2023-08-14

## 2023-08-14 RX ADMIN — PALONOSETRON HYDROCHLORIDE 0.25 MG: 0.25 INJECTION INTRAVENOUS at 09:08

## 2023-08-14 RX ADMIN — DEXAMETHASONE SODIUM PHOSPHATE: 10 INJECTION, SOLUTION INTRAMUSCULAR; INTRAVENOUS at 09:11

## 2023-08-14 RX ADMIN — SODIUM CHLORIDE 80 MG: 9 INJECTION, SOLUTION INTRAVENOUS at 10:08

## 2023-08-14 RX ADMIN — POTASSIUM CHLORIDE, DEXTROSE MONOHYDRATE AND SODIUM CHLORIDE 2000 ML: 150; 5; 450 INJECTION, SOLUTION INTRAVENOUS at 08:13

## 2023-08-14 ASSESSMENT — PAIN SCALES - GENERAL: PAINLEVEL: SEVERE PAIN (7)

## 2023-08-14 NOTE — PATIENT INSTRUCTIONS
Contact Numbers  [facility]: [phone] (for symptom and scheduling needs)    Please call the [facility] Triage line if you experience a temperature greater than or equal to 100.4, shaking chills, have uncontrolled nausea, vomiting and/or diarrhea, dizziness, shortness of breath, chest pain, bleeding, unexplained bruising, or if you have any other new/concerning symptoms, questions or concerns.     If you are having any concerning symptoms or wish to speak to a provider before your next infusion visit, please call your care coordinator or triage to notify them so we can adequately serve you.     If you need a refill on a narcotic prescription or other medication, please call triage before your infusion appointment.           August 2023 Sunday Monday Tuesday Wednesday Thursday Friday Saturday             1    LAB PERIPHERAL   7:00 AM   (15 min.)   [facility] LAB DRAW   [facility]    ONC INFUSION 6 HR (360 MIN)   7:30 AM   (360 min.)    ONC INFUSION NURSE   [facility]    TREATMENT   9:15 AM   (30 min.)   [facility]    OTV   9:45 AM   (15 min.)   Marisa Pacheco MD   [facility] Radiation Oncology 2    TREATMENT   9:15 AM   (30 min.)   P RAD ONC ISAEL   [facility] Radiation Oncology 3    TREATMENT   9:15 AM   (30 min.)   P RAD ONC ISAEL   [facility] Radiation Oncology 4    TREATMENT   9:15 AM   (30 min.)   [facility]    RETURN CCSL   2:30 PM   (45 min.)   Brandy Oden APRN CNP   [facility] [place] 5       6     7    LAB   6:30 AM   (15 min.)    LAB   [facility] [place]    ONC INFUSION 6 HR (360 MIN)   7:00 AM   (360 min.)    ONC INFUSION NURSE   [facility]    TREATMENT   9:15 AM   (30 min.)   [facility]  [facility] 8    TREATMENT   9:15 AM   (30 min.)   [facility] RAD ONC ISAEL   [facility] Radiation Oncology    OTV   9:45 AM   (15 min.)   Marisa Pacheco MD   [facility] Radiation Oncology 9    TREATMENT   9:15 AM   (30 min.)   [facility] RAD ONC ISAEL   [facility] Radiation Oncology 10    TREATMENT   9:15 AM   (30 min.)   [facility] RAD ONC ISAEL   [facility] Radiation Oncology    RETURN CCSL  11:00 AM   (45 min.)   Betina Kramer APRN CNP   [facility]    OFFICE VISIT   2:40 PM   (30 min.)   Alba Layton MD   [facility] [place] 11    TREATMENT   9:15 AM   (30 min.)   [facility] RAD ONC ISAEL   [facility] Radiation Oncology 12       13     14    LAB PERIPHERAL   6:30 AM   (15 min.)   [facility] LAB DRAW   [facility]    ONC INFUSION 6 HR (360 MIN)   7:00 AM   (360 min.)   [facility] ONC INFUSION NURSE   [facility]    TREATMENT   9:15 AM   (30 min.)   [facility] RAD ONC ISAEL   [facility] Radiation Oncology 15    TREATMENT   9:15 AM   (30 min.)   [facility] RAD ONC ISAEL   [facility] Radiation Oncology    OTV   9:45 AM   (15 min.)   Marisa Pacheco MD   [facility] Radiation Oncology 16    TREATMENT   9:15 AM   (30 min.)   [facility] RAD ONC ISAEL   [facility] Radiation Oncology 17    TREATMENT   9:15 AM   (30 min.)   [facility] RAD ONC ISAEL   [facility] Radiation Oncology 18    TREATMENT   9:15 AM   (30 min.)   [facility] RAD ONC ISAEL   [facility] Radiation Oncology    RETURN CCSL   2:30 PM   (45 min.)   Brandy Oden APRN CNP   [facility] 19       20     21    LAB PERIPHERAL   6:30 AM   (15 min.)   [facility] LAB DRAW   [facility]    ONC INFUSION 6 HR (360 MIN)   7:00 AM   (360 min.)   [facility] ONC INFUSION NURSE   [facility]    TREATMENT   9:15  AM   (30 min.)   [facility] RAD ONC ISAEL   [facility] Radiation Oncology 22    TREATMENT   9:15 AM   (30 min.)   [facility] RAD ONC ISAEL   [facility] Radiation Oncology    OTV   9:45 AM   (15 min.)   Marisa Pacheco MD   [facility] Radiation Oncology 23    TREATMENT   9:15 AM   (30 min.)   [facility] RAD ONC ISAEL   [facility] Radiation Oncology 24    TREATMENT   9:15 AM   (30 min.)   [facility] RAD ONC ISAEL   [facility] Radiation Oncology 25    TREATMENT   9:15 AM   (30 min.)   [facility] RAD ONC ISAEL   [facility] Radiation Oncology    RETURN CCSL   1:30 PM   (45 min.)   Brandy Oden APRN CNP   [facility] 26       27     28    LAB PERIPHERAL   6:30 AM   (15 min.)    [facility] DRAW   [facility]    ONC INFUSION 6 HR (360 MIN)   7:00 AM   (360 min.)    ONC INFUSION NURSE   [facility]    TREATMENT   9:15 AM   (30 min.)   [facility] RAD ONC ISAEL   [facility] Radiation Oncology 29    TREATMENT   9:15 AM   (30 min.)   [facility] RAD ONC ISAEL   [facility] Radiation Oncology    OTV   9:45 AM   (15 min.)   Marisa Pacheco MD   [facility] Radiation Oncology 30    TREATMENT   9:15 AM   (30 min.)   [facility] RAD ONC ISAEL   [facility] Radiation Oncology 31    TREATMENT   9:15 AM   (30 min.)   [facility] RAD ONC ISAEL   [facility] Radiation Oncology                       September 2023 Sunday Monday Tuesday Wednesday Thursday Friday Saturday                            1    TREATMENT   9:15 AM   (30 min.)   [facility] RAD ONC ISAEL   [facility] Radiation Oncology 2       3     4     5    TREATMENT   9:15 AM   (30 min.)   [facility] RAD ONC ISAEL   [facility] Radiation Oncology    OTV   9:45 AM   (15 min.)   Marisa Pacheco MD   [facility] Radiation Oncology 6    TREATMENT   8:45 AM   (30 min.)   [facility] RAD ONC ISAEL   [facility] Radiation Oncology 7    TREATMENT   8:45 AM   (30 min.)   [facility] RAD ONC ISAEL   [facility] Radiation Oncology 8    TREATMENT   8:45 AM   (30 min.)   [facility] RAD ONC ISAEL   [facility] Radiation Oncology 9       10     11    TREATMENT   8:45 AM   (30 min.)   [facility] RAD ONC ISAEL   [facility] Radiation Oncology    PAC EVAL  10:00 AM   (60 min.)   Lily Fabian PA-C   [facility] [place]    MR PELVIS (GYN) WWO CONTRAST  11:30 AM   (45 min.)   UUMR2   [facility] 12     13     14     15     16       17     18    [facility] HDR TREATMENT   5:30 AM   (120 min.)   Marisa Pacheco MD   [facility] Radiation Oncology    INSERTION, NEEDLE, WITH ULTRASOUND GUIDANCE, FOR INTERSTITIAL BRACHYTHERAPY   7:30 AM   Marisa Pacheco MD [facility] OR    CT SIM  11:00 AM   (60 min.)   Marisa Pacheco MD   [facility]    [facility] HDR TREATMENT   3:00 PM   (60 min.)   Marisa Pacheco MD   [facility] 19    [facility] HDR TREATMENT   7:30 AM   (60 min.)   Marisa Pacheco MD   [facility]    [facility] HDR TREATMENT   2:00 PM   (60 min.)   Marisa Pacheco MD   [facility] 20    [facility] HDR TREATMENT   8:00 AM   (60 min.)   Marisa Pacheco MD   [facility]    [facility] HDR TREATMENT   2:30 PM   (60 min.)   Marisa Pacheco MD   [facility]    [facility] HDR TREATMENT   3:55 PM   (60 min.)   Marisa Pacheco MD   [facility]    REMOVAL, INTERSTITIAL NEEDLE, AFTER TEMPORARY BRACHYTHERAPY   4:00 PM   Marisa Pacheco MD [facility] OR 21     22     23       24     25     26     27     28     29     30                     Lab Results:  Recent Results (from the past 12 hour(s))   Comprehensive metabolic panel    Collection Time:  08/14/23  6:56 AM   Result Value Ref Range    Sodium 134 (L) 136 - 145 mmol/L    Potassium 3.8 3.4 - 5.3 mmol/L    Chloride 100 98 - 107 mmol/L    Carbon Dioxide (CO2) 24 22 - 29 mmol/L    Anion Gap 10 7 - 15 mmol/L    Urea Nitrogen 14.5 8.0 - 23.0 mg/dL    Creatinine 0.91 0.51 - 0.95 mg/dL    Calcium 9.1 8.8 - 10.2 mg/dL    Glucose 144 (H) 70 - 99 mg/dL    Alkaline Phosphatase 68 35 - 104 U/L    AST 16 0 - 45 U/L    ALT 13 0 - 50 U/L    Protein Total 6.3 (L) 6.4 - 8.3 g/dL    Albumin 4.0 3.5 - 5.2 g/dL    Bilirubin Total 0.3 <=1.2 mg/dL    GFR Estimate 69 >60 mL/min/1.73m2   Magnesium    Collection Time: 08/14/23  6:56 AM   Result Value Ref Range    Magnesium 1.8 1.7 - 2.3 mg/dL   CBC with platelets and differential    Collection Time: 08/14/23  6:56 AM   Result Value Ref Range    WBC Count 8.7 4.0 - 11.0 10e3/uL    RBC Count 4.38 3.80 - 5.20 10e6/uL    Hemoglobin 13.4 11.7 - 15.7 g/dL    Hematocrit 40.2 35.0 - 47.0 %    MCV 92 78 - 100 fL    MCH 30.6 26.5 - 33.0 pg    MCHC 33.3 31.5 - 36.5 g/dL    RDW 12.8 10.0 - 15.0 %    Platelet Count 148 (L) 150 - 450 10e3/uL    % Neutrophils 89 %    % Lymphocytes 3 %    % Monocytes 7 %    % Eosinophils 0 %    % Basophils 0 %    % Immature Granulocytes 1 %    NRBCs per 100 WBC 0 <1 /100    Absolute Neutrophils 7.8 1.6 - 8.3 10e3/uL    Absolute Lymphocytes 0.3 (L) 0.8 - 5.3 10e3/uL    Absolute Monocytes 0.6 0.0 - 1.3 10e3/uL    Absolute Eosinophils 0.0 0.0 - 0.7 10e3/uL    Absolute Basophils 0.0 0.0 - 0.2 10e3/uL    Absolute Immature Granulocytes 0.1 <=0.4 10e3/uL    Absolute NRBCs 0.0 10e3/uL   UA with Microscopic reflex to Culture    Collection Time: 08/14/23  8:57 AM    Specimen: Urine, Thomas Catheter   Result Value Ref Range    Color Urine Yellow Colorless, Straw, Light Yellow, Yellow    Appearance Urine Cloudy (A) Clear    Glucose Urine Negative Negative mg/dL    Bilirubin Urine Negative Negative    Ketones Urine Negative Negative mg/dL    Specific Gravity Urine 1.014  1.003 - 1.035    Blood Urine Large (A) Negative    pH Urine 6.0 5.0 - 7.0    Protein Albumin Urine 200 (A) Negative mg/dL    Urobilinogen Urine Normal Normal, 2.0 mg/dL    Nitrite Urine Negative Negative    Leukocyte Esterase Urine Large (A) Negative    Bacteria Urine Few (A) None Seen /HPF    WBC Clumps Urine Present (A) None Seen /HPF    RBC Urine 41 (H) <=2 /HPF    WBC Urine >182 (H) <=5 /HPF

## 2023-08-14 NOTE — PROGRESS NOTES
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  She presents for an acute visit.      Oncology History:  07, 10/15/08, 09, 11: Pap test NILM.      1/23/15: Pap test NILM, hrHPV16+.  2/27/15: Cervical polyp & cervical biopsy pathology: Negative for dysplasia/malignancy.     16: Pap test NILM, hrHPV16+.   16: Endocervical curettings pathology negative for dysplasia/malignancy.     17: Pap test NILM, hrHPV16+.   -Colposcopy recommended, not performed.      18:   -Pap test ASCUS, hrHPV16+.   -Endocervical curettings & cervical biopsy pathology: negative for dysplasia/malignancy.      19:  Pap test ASC-H, hrHPV16+.   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      9/10/20: Pap test ASC-H, hrHPV+ (NOS).  21: LEEP.   -Pathology: LEEP & endocervical curettings: negative for dysplasia/malignancy.     3/23/22: Endocervical curettings & cervical biopsy pathology: negative for dysplasia/malignancy.     3/28/23:   -Pap test HSIL, hrHPV16+.   -Findings by gynecologist Dr. Koenig: External genitalia with erythematous plaques bilaterally  Urethra- mid position and well supported, suburethral tissue thickened and friable with bleeding elicited after placement of the speculum  Vagina is stenotic and cervix difficult to see.   23: Cystourethroscopy, vaginal biopsy by urologist Dr. Mackenzie.   -Findings: Exam revealed lichenified changes to bilateral labia majora and friable vestibular tissue (patient had significant bleeding from prep alone.) The urethra was somewhat stenotic and would not accommodate 19Fr rigid cystoscope, therefore a 16 Fr flexible cystoscope was inserted. Gentle pressure was required to access the bladder. The  ureteral orifices were in their orthotopic positions bilaterally. There were no intravesical stones or diverticuli and the bladder was mildly trabeculated. There were no intravesical lesions. See media tab for urethral pictures - there were no obvious lesions but the entire distal urethra was erythematous and stenosed (16Fr.)  -Pathology: Invasive moderately-differentiated squamous cell carcinoma, HPV-associated, with focus suspicious for lymphovascular space invasion; IHC: p16+.    6/8/23: Gynecologic Oncology consultation.  -Findings:   External genitalia notable for erythema and desquamation of the posterior medial labia, c/w lichen sclerosus changes. When the labia are , a friable mass is visible at the distal anterior vagina, just posterior to the urethra. On bimanual exam, the cervix is small and normal in contour. The palpable vaginal tumor is limited to the anterior distal vagina, approximately 4-5 cm laterally x 3 cm cranio-caudal. Tumor is friable. Remainder of the vagina smooth to palpation. Digital anorectal exam is without nodularity. No palpable tumor in the rectovaginal septum.   Enlarged firm, fixed right inguinal lymph node ~3-4 cm in size.      6/16/23: Pelvic MRI  IMPRESSION:   1. Irregular enhancing lesion along the anterior vaginal wall at the  level of the introitus measuring 2.5 x 0.9 cm with irregular  enhancement along the anterior vaginal wall towards the level of the  vaginal cuff however there is no definitive evidence or abnormal  signal intensity involving the cervix to suggest invasion.  2. Right inguinal lymphadenopathy compatible with metastatic disease.   3. There is some asymmetric enhancement involving the left sacrum,  incompletely evaluated on this study. Further evaluation of this and  further staging of the chest, abdomen and pelvis will be performed on  PET/CT scheduled for 6/20/2023.     6/20/23: PET CT  IMPRESSION:   1. Intense focal hypermetabolic FDG uptake in  biopsy-proven vaginal  squamous cell carcinoma.  2. Multiple enlarged, hypermetabolic right inguinal nodes likely  represent regional metastasis. No definite evidence of distant  metastatic disease.  3. Scattered sub-4 mm solid pulmonary nodules are below the size  threshold for characterization on PET. Attention on follow-up with CT.  4. 1.0 cm enhancing left parotid gland nodule with hypermetabolic  uptake could represent a primary parotid neoplasm such as pleomorphic  adenoma or Warthin's tumor; consider ultrasound for further  evaluation.  5. 1.3 cm hypoattenuating right thyroid nodule with mild FDG uptake  warrants ultrasound for characterization.      Plan: Cisplatin radiation      7/27/2023: urinary gonsalez catheter placement. Treat acute cystitis with bactrim per Urology.      8/1/2023: plan C1D1 Cisplatin radiation         Today she reports that she started feeling like her bowls were going to be moving faster on Friday night; by Saturday morning she has soft stools which ultimately led to more liquid stools. She did take Imodium 4 times that day. The following day she did not pass any stool but was passing gas. This morning she ate breakfast and had a normal formed stool in lab, then she started having looser stools again. She states she did not do as well with fluids over the weekend. She denies any ringing in her ears. She did take Compazine yesterday due to her husbands cooking. She has noticed redness on her groin and states she gets this under her pannus and has used Nystatin powder in the past and has some at home. Her gonsalez also started having cloudier urine within the past two days. She denies any concerning urinary symptoms. She was previously treated with Bactrim x 5 days at the end of July after gonsalez placement.           Review of Systems:    Systemic           no weight changes; no fever; no chills; no night sweats; no appetite changes  Skin           no rashes, or lesions  Eye           no  irritation; no changes in vision  Shantelle-Laryngeal           no dysphagia; no hoarseness   Pulmonary    no cough; no shortness of breath  Cardiovascular    no chest pain; no palpitations  Gastrointestinal    no diarrhea; no constipation; no abdominal pain; no changes in bowel habits; no blood in stool  Genitourinary   no urinary frequency; no urinary urgency; no dysuria; no pain; no abnormal vaginal discharge; no abnormal vaginal bleeding  Breast   no breast discharge; no breast changes; no breast pain  Musculoskeletal    no myalgias; no arthralgias; no back pain  Psychiatric           no depressed mood; no anxiety    Hematologic               no tender lymph nodes; no noticeable swellings or lumps   Endocrine    no hot flashes; no heat/cold intolerance         Neurological   no tremor; no numbness and tingling; no headaches; no difficulty sleeping      Past Medical History:    Past Medical History:   Diagnosis Date    Actinic keratosis     Hyperlipemia     Lichen sclerosus 2020    Osteopenias     SCC (squamous cell carcinoma) 7/26/2023         Past Surgical History:    Past Surgical History:   Procedure Laterality Date    COLONOSCOPY  2006    normal    COLPOSCOPY, BIOPSY, COMBINED N/A 02/08/2021    Procedure: COLPOSCOPY, WITH BIOPSY, LEEP procedure;  Surgeon: Jefe Koenig MD;  Location: [facility] OR    CYSTOSCOPY N/A 05/30/2023    Procedure: CYSTOSCOPY AND EXCISIONAL BIOPSY OF THE VAGINAL TISSUE AROUND THE URETHRA.  (look in the bladder and sample small area in the vagina near the urethra);  Surgeon: Lakeisha Mackenzie MD;  Location: [facility] OR    EYE SURGERY      cataracts bilateral    OPEN REDUCTION INTERNAL FIXATION FOREARM  07/31/2012    Procedure: OPEN REDUCTION INTERNAL FIXATION FOREARM;  Open Reduction Internal Fixation, Irrigation & Debridment  of Right Distal Radius, application short arm splint;  Surgeon: Wade Beard MD;  Location:  OR    TONSILLECTOMY & ADENOIDECTOMY  [address] [place] [address] TREAT  ECTOPIC PREG,RMV TUBE/OVARY  1985    ectopic, right side-ovary and tube removed         Health Maintenance Due   Topic Date Due    MEDICARE ANNUAL WELLNESS VISIT  05/26/2023    COLPOSCOPY  06/28/2023       Current Medications:     Current Outpatient Medications   Medication Sig Dispense Refill    dexamethasone (DECADRON) 4 MG tablet Take 2 tablets (8 mg) by mouth daily Take for 3 days, starting the day after chemo on day 2 and 9. Take with food. If no nausea and vomiting with first cisplatin doses, may stop dexamethasone. 12 tablet 2    docusate sodium (COLACE) 100 MG capsule Take 100 mg by mouth daily      ondansetron (ZOFRAN) 8 MG tablet Take 1 tablet (8 mg) by mouth every 8 hours as needed for nausea (vomiting) Do not take for 3 days after chemo. 30 tablet 2    prochlorperazine (COMPAZINE) 10 MG tablet Take 1 tablet (10 mg) by mouth every 6 hours as needed for nausea or vomiting 30 tablet 2    tolterodine (DETROL) 2 MG tablet Take 1 tablet (2 mg) by mouth 2 times daily 60 tablet 3    triamcinolone (KENALOG) 0.1 % external cream Apply topically 2 times daily 15 g 1         Allergies:        Allergies   Allergen Reactions    Oxybutynin Itching    Seasonal Allergies         Social History:     Social History     Tobacco Use    Smoking status: Never     Passive exposure: Never    Smokeless tobacco: Never   Substance Use Topics    Alcohol use: Yes     Comment: very little       History   Drug Use No         Family History:       Family History   Problem Relation Age of Onset    Lung Cancer Mother 44    Cerebrovascular Disease Father     Hypertension Father     Alcohol/Drug Father     Obesity Niece         niece    Mental Illness Nephew         nephew    Diabetes Other         paternal aunt    Hyperlipidemia Other     Obesity Other         self    Cervical Cancer Maternal Aunt     Ovarian Cancer Maternal Aunt 60    Obesity Other     Diabetes Other         paternal aunt    Hypertension Other         father     Cerebrovascular Disease Other         father    C.A.D. No family hx of     Breast Cancer No family hx of     Cancer - colorectal No family hx of     Prostate Cancer No family hx of     Melanoma No family hx of          Physical Exam:     LMP 03/08/2008   There is no height or weight on file to calculate BMI.    General Appearance: healthy and alert, no distress     HEENT: no thyromegaly, no palpable nodules or masses        Cardiovascular: regular rate and rhythm, no gallops, rubs or murmurs     Respiratory: lungs clear, no rales, rhonchi or wheezes, normal diaphragmatic excursion    Musculoskeletal: extremities non tender and without edema    Skin: Bilateral lower inguinal area with erythema L>R; some erythema under pannus    Neurological: normal gait, no gross defects     Psychiatric: appropriate mood and affect                               Hematological: normal cervical, supraclavicular and inguinal lymph nodes     Gastrointestinal:       abdomen soft, non-tender, non-distended, no organomegaly or masses    Genitourinary: Thomas in place with jerzy colored urine; lighter colored urine in tubing      Assessment:    Jessie Tamayo is a 67 year old woman with a diagnosis of 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  She presents for an acute visit.     41 minutes spent on the date of the encounter doing chart review, history and exam, documentation and further activities as noted above      Plan:     1.)        Continue with current regimen. Have requested appts on 9/1 for labs, provider, and infusion for possible electrolyte replacement ahead of her brachytherapy.  Encouraged to discuss with radiation if she can use the Nystatin powder she has at home for her erythema/candidiasis.. Rx Imodium for use in infusion today (her  ultimately went to the pharmacy and bought). Encouraged to add in Gatorade zero 16-32 oz/day and to drink water to equal 64-80 oz/day; reviewed drinking fluids throughout  the day. Requested lab, provider and infusion for 9/1 ahead of brachytherapy to check electrolytes. Reviewed signs and symptoms for when she should contact the clinic or seek additional care. Patient to contact the clinic with any questions or concerns in the interim.  Answered all of her questions to the best of my ability.     2.) UAUC ordered today for her cloudy urine; will await UC results given no bothersome symptoms.  Encouraged her to keep her skin clean and dry; to wipe off with a clean wet washcloth if she is sweaty; can use a fan pointed at the area to help keep dry.     3.) Labs and/or tests ordered include:  UAUC..     4.) Health maintenance issues addressed today include annual health maintenance and non-gynecologic issues with PCP.    SANDI Gordillo, WHNP-BC, ANP-BC  Women's Health Nurse Practitioner  Adult Nurse Practitioner  Division of Gynecologic Oncology        CC  Patient Care Team:  Alba Layton MD as PCP - General  Jefe Koenig MD as Assigned OBGYN Provider  Alba Layton MD as Assigned PCP  Lakeisha Mackenzie MD as MD (Urology)  Lawrence Banuelos MD as MD (Dermatology)  Lakeisha Mackenzie MD as Assigned Surgical Provider  Lakeisha Mackenzie MD as MD (Urology)  Reina Stahl MD as MD (Gynecologic Oncology)  Marisa Pacheco MD as MD (Radiation Oncology)  Reina Stahl MD as Assigned Cancer Care Provider  Lily Fabian PA-C as Physician Assistant (Anesthesiology)  SELF, REFERRED

## 2023-08-14 NOTE — LETTER
2023         RE: Jessie Tamayo  [address] [facility] [address]        Dear Colleague,    Thank you for referring your patient, Jessie Tamayo, to the [facility]. Please see a copy of my visit note below.                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  She presents for an acute visit.      Oncology History:  07, 10/15/08, 09, 11: Pap test NILM.      1/23/15: Pap test NILM, hrHPV16+.  2/27/15: Cervical polyp & cervical biopsy pathology: Negative for dysplasia/malignancy.     16: Pap test NILM, hrHPV16+.   16: Endocervical curettings pathology negative for dysplasia/malignancy.     17: Pap test NILM, hrHPV16+.   -Colposcopy recommended, not performed.      18:   -Pap test ASCUS, hrHPV16+.   -Endocervical curettings & cervical biopsy pathology: negative for dysplasia/malignancy.      19:  Pap test ASC-H, hrHPV16+.   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      9/10/20: Pap test ASC-H, hrHPV+ (NOS).  21: LEEP.   -Pathology: LEEP & endocervical curettings: negative for dysplasia/malignancy.     3/23/22: Endocervical curettings & cervical biopsy pathology: negative for dysplasia/malignancy.     3/28/23:   -Pap test HSIL, hrHPV16+.   -Findings by gynecologist Dr. Koenig: External genitalia with erythematous plaques bilaterally  Urethra- mid position and well supported, suburethral tissue thickened and friable with bleeding elicited after placement of the speculum  Vagina is stenotic and cervix difficult to see.   23: Cystourethroscopy, vaginal biopsy by urologist Dr. Mackenzie.   -Findings: Exam revealed lichenified changes to bilateral labia majora and friable  vestibular tissue (patient had significant bleeding from prep alone.) The urethra was somewhat stenotic and would not accommodate 19Fr rigid cystoscope, therefore a 16 Fr flexible cystoscope was inserted. Gentle pressure was required to access the bladder. The ureteral orifices were in their orthotopic positions bilaterally. There were no intravesical stones or diverticuli and the bladder was mildly trabeculated. There were no intravesical lesions. See media tab for urethral pictures - there were no obvious lesions but the entire distal urethra was erythematous and stenosed (16Fr.)  -Pathology: Invasive moderately-differentiated squamous cell carcinoma, HPV-associated, with focus suspicious for lymphovascular space invasion; IHC: p16+.    6/8/23: Gynecologic Oncology consultation.  -Findings:   External genitalia notable for erythema and desquamation of the posterior medial labia, c/w lichen sclerosus changes. When the labia are , a friable mass is visible at the distal anterior vagina, just posterior to the urethra. On bimanual exam, the cervix is small and normal in contour. The palpable vaginal tumor is limited to the anterior distal vagina, approximately 4-5 cm laterally x 3 cm cranio-caudal. Tumor is friable. Remainder of the vagina smooth to palpation. Digital anorectal exam is without nodularity. No palpable tumor in the rectovaginal septum.   Enlarged firm, fixed right inguinal lymph node ~3-4 cm in size.      6/16/23: Pelvic MRI  IMPRESSION:   1. Irregular enhancing lesion along the anterior vaginal wall at the  level of the introitus measuring 2.5 x 0.9 cm with irregular  enhancement along the anterior vaginal wall towards the level of the  vaginal cuff however there is no definitive evidence or abnormal  signal intensity involving the cervix to suggest invasion.  2. Right inguinal lymphadenopathy compatible with metastatic disease.   3. There is some asymmetric enhancement involving the left  sacrum,  incompletely evaluated on this study. Further evaluation of this and  further staging of the chest, abdomen and pelvis will be performed on  PET/CT scheduled for 6/20/2023.     6/20/23: PET CT  IMPRESSION:   1. Intense focal hypermetabolic FDG uptake in biopsy-proven vaginal  squamous cell carcinoma.  2. Multiple enlarged, hypermetabolic right inguinal nodes likely  represent regional metastasis. No definite evidence of distant  metastatic disease.  3. Scattered sub-4 mm solid pulmonary nodules are below the size  threshold for characterization on PET. Attention on follow-up with CT.  4. 1.0 cm enhancing left parotid gland nodule with hypermetabolic  uptake could represent a primary parotid neoplasm such as pleomorphic  adenoma or Warthin's tumor; consider ultrasound for further  evaluation.  5. 1.3 cm hypoattenuating right thyroid nodule with mild FDG uptake  warrants ultrasound for characterization.      Plan: Cisplatin radiation      7/27/2023: urinary gonsalez catheter placement. Treat acute cystitis with bactrim per Urology.      8/1/2023: plan C1D1 Cisplatin radiation         Today she reports that she started feeling like her bowls were going to be moving faster on Friday night; by Saturday morning she has soft stools which ultimately led to more liquid stools. She did take Imodium 4 times that day. The following day she did not pass any stool but was passing gas. This morning she ate breakfast and had a normal formed stool in lab, then she started having looser stools again. She states she did not do as well with fluids over the weekend. She denies any ringing in her ears. She did take Compazine yesterday due to her husbands cooking. She has noticed redness on her groin and states she gets this under her pannus and has used Nystatin powder in the past and has some at home. Her gonsalez also started having cloudier urine within the past two days. She denies any concerning urinary symptoms. She was  previously treated with Bactrim x 5 days at the end of July after gonsalez placement.           Review of Systems:    Systemic           no weight changes; no fever; no chills; no night sweats; no appetite changes  Skin           no rashes, or lesions  Eye           no irritation; no changes in vision  Shantelle-Laryngeal           no dysphagia; no hoarseness   Pulmonary    no cough; no shortness of breath  Cardiovascular    no chest pain; no palpitations  Gastrointestinal    no diarrhea; no constipation; no abdominal pain; no changes in bowel habits; no blood in stool  Genitourinary   no urinary frequency; no urinary urgency; no dysuria; no pain; no abnormal vaginal discharge; no abnormal vaginal bleeding  Breast   no breast discharge; no breast changes; no breast pain  Musculoskeletal    no myalgias; no arthralgias; no back pain  Psychiatric           no depressed mood; no anxiety    Hematologic               no tender lymph nodes; no noticeable swellings or lumps   Endocrine    no hot flashes; no heat/cold intolerance         Neurological   no tremor; no numbness and tingling; no headaches; no difficulty sleeping      Past Medical History:    Past Medical History:   Diagnosis Date    Actinic keratosis     Hyperlipemia     Lichen sclerosus 2020    Osteopenias     SCC (squamous cell carcinoma) 7/26/2023         Past Surgical History:    Past Surgical History:   Procedure Laterality Date    COLONOSCOPY  2006    normal    COLPOSCOPY, BIOPSY, COMBINED N/A 02/08/2021    Procedure: COLPOSCOPY, WITH BIOPSY, LEEP procedure;  Surgeon: Jefe Koenig MD;  Location: [facility] OR    CYSTOSCOPY N/A 05/30/2023    Procedure: CYSTOSCOPY AND EXCISIONAL BIOPSY OF THE VAGINAL TISSUE AROUND THE URETHRA.  (look in the bladder and sample small area in the vagina near the urethra);  Surgeon: Lakeisha Mackenzie MD;  Location: [facility] OR    EYE SURGERY      cataracts bilateral    OPEN REDUCTION INTERNAL FIXATION [facility]  07/31/2012    Procedure: OPEN  REDUCTION INTERNAL FIXATION FOREARM;  Open Reduction Internal Fixation, Irrigation & Debridment  of Right Distal Radius, application short arm splint;  Surgeon: Wade Beard MD;  Location: [facility] OR    TONSILLECTOMY & ADENOIDECTOMY  1960    [facility] TREAT ECTOPIC PREG,RMV TUBE/OVARY  1985    ectopic, right side-ovary and tube removed         Health Maintenance Due   Topic Date Due    MEDICARE ANNUAL WELLNESS VISIT  05/26/2023    COLPOSCOPY  06/28/2023       Current Medications:     Current Outpatient Medications   Medication Sig Dispense Refill    dexamethasone (DECADRON) 4 MG tablet Take 2 tablets (8 mg) by mouth daily Take for 3 days, starting the day after chemo on day 2 and 9. Take with food. If no nausea and vomiting with first cisplatin doses, may stop dexamethasone. 12 tablet 2    docusate sodium (COLACE) 100 MG capsule Take 100 mg by mouth daily      ondansetron (ZOFRAN) 8 MG tablet Take 1 tablet (8 mg) by mouth every 8 hours as needed for nausea (vomiting) Do not take for 3 days after chemo. 30 tablet 2    prochlorperazine (COMPAZINE) 10 MG tablet Take 1 tablet (10 mg) by mouth every 6 hours as needed for nausea or vomiting 30 tablet 2    tolterodine (DETROL) 2 MG tablet Take 1 tablet (2 mg) by mouth 2 times daily 60 tablet 3    triamcinolone (KENALOG) 0.1 % external cream Apply topically 2 times daily 15 g 1         Allergies:        Allergies   Allergen Reactions    Oxybutynin Itching    Seasonal Allergies         Social History:     Social History     Tobacco Use    Smoking status: Never     Passive exposure: Never    Smokeless tobacco: Never   Substance Use Topics    Alcohol use: Yes     Comment: very little       History   Drug Use No         Family History:       Family History   Problem Relation Age of Onset    Lung Cancer Mother 44    Cerebrovascular Disease Father     Hypertension Father     Alcohol/Drug Father     Obesity Niece         niece    Mental Illness Nephew         nephew     Diabetes Other         paternal aunt    Hyperlipidemia Other     Obesity Other         self    Cervical Cancer Maternal Aunt     Ovarian Cancer Maternal Aunt 60    Obesity Other     Diabetes Other         paternal aunt    Hypertension Other         father    Cerebrovascular Disease Other         father    C.A.D. No family hx of     Breast Cancer No family hx of     Cancer - colorectal No family hx of     Prostate Cancer No family hx of     Melanoma No family hx of          Physical Exam:     LMP 03/08/2008   There is no height or weight on file to calculate BMI.    General Appearance: healthy and alert, no distress     HEENT: no thyromegaly, no palpable nodules or masses        Cardiovascular: regular rate and rhythm, no gallops, rubs or murmurs     Respiratory: lungs clear, no rales, rhonchi or wheezes, normal diaphragmatic excursion    Musculoskeletal: extremities non tender and without edema    Skin: Bilateral lower inguinal area with erythema L>R; some erythema under pannus    Neurological: normal gait, no gross defects     Psychiatric: appropriate mood and affect                               Hematological: normal cervical, supraclavicular and inguinal lymph nodes     Gastrointestinal:       abdomen soft, non-tender, non-distended, no organomegaly or masses    Genitourinary: Thomas in place with jerzy colored urine; lighter colored urine in tubing      Assessment:    Jessie Tamayo is a 67 year old woman with a diagnosis of 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  She presents for an acute visit.     41 minutes spent on the date of the encounter doing chart review, history and exam, documentation and further activities as noted above      Plan:     1.)        Continue with current regimen. Have requested appts on 9/1 for labs, provider, and infusion for possible electrolyte replacement ahead of her brachytherapy.  Encouraged to discuss with radiation if she can use the Nystatin powder she has at  home for her erythema/candidiasis.. Rx Imodium for use in infusion today (her  ultimately went to the pharmacy and bought). Encouraged to add in Gatorade zero 16-32 oz/day and to drink water to equal 64-80 oz/day; reviewed drinking fluids throughout the day. Requested lab, provider and infusion for 9/1 ahead of brachytherapy to check electrolytes. Reviewed signs and symptoms for when she should contact the clinic or seek additional care. Patient to contact the clinic with any questions or concerns in the interim.  Answered all of her questions to the best of my ability.     2.) UAUC ordered today for her cloudy urine; will await UC results given no bothersome symptoms.  Encouraged her to keep her skin clean and dry; to wipe off with a clean wet washcloth if she is sweaty; can use a fan pointed at the area to help keep dry.     3.) Labs and/or tests ordered include:  UAUC..     4.) Health maintenance issues addressed today include annual health maintenance and non-gynecologic issues with PCP.    SANDI Gordillo, WHNP-BC, ANP-BC  Women's Health Nurse Practitioner  Adult Nurse Practitioner  Division of Gynecologic Oncology        CC  Patient Care Team:  Alba Layton MD as PCP - General

## 2023-08-14 NOTE — NURSING NOTE
Chief Complaint   Patient presents with    Blood Draw     Labs drawn with  by RN. Vitals taken. Pt checked into next appointment.       Marlin Faulkner RN

## 2023-08-14 NOTE — PROGRESS NOTES
Infusion Nursing Note:  Jessie Tamayo presents today for Cycle 1 Day 15 Cisplatin.    Patient seen by provider today: No, visit with Betina Kramer NP on 8/10   present during visit today: Not Applicable.    Note: Patient presents to infusion today with several concerns. Reports multiple soft stools on Saturday (too many to count per patient). Took imodium with relief on Sunday, but is now having watery, loose stools this morning. No fevers or chills. No chest pains or SOB. Inner thighs/groin area is very red. Has applied a barrier cream to skin with some relief. Urine today appears dark yellow and very cloudy in gonsalez. Betina Kramer NP notified.     TORB @ 7582 Betina Kramer NP/ Doris Raya RN:  - OK to proceed with treatment today  - Let patient know diarrhea is normal around this time of treatment, keep taking imodium as needed  - Work on staying hydrated- drink Gatorade Zero, Pedialyte, etc,  - Collect UA from gonsalez  - Instruct patient to talk to radiation regarding irritated skin and have them look at it, make sure that they approve the creams she is using    Betina Kramer NP came to infusion to discuss UA results with patient.     Intravenous Access:  Peripheral IV placed by Vascular Access.    Treatment Conditions:  Lab Results   Component Value Date    HGB 13.4 08/14/2023    WBC 8.7 08/14/2023    ANEU 11.4 (H) 11/07/2009    ANEUTAUTO 7.8 08/14/2023     (L) 08/14/2023        Lab Results   Component Value Date     (L) 08/14/2023    POTASSIUM 3.8 08/14/2023    MAG 1.8 08/14/2023    CR 0.91 08/14/2023    KIM 9.1 08/14/2023    BILITOTAL 0.3 08/14/2023    ALBUMIN 4.0 08/14/2023    ALT 13 08/14/2023    AST 16 08/14/2023       Results reviewed, labs MET treatment parameters, ok to proceed with treatment.    Post Infusion Assessment:  Patient tolerated infusion without incident.  Blood return noted pre and post infusion.  Site patent and intact, free from redness, edema or  discomfort.  No evidence of extravasations.  Access discontinued per protocol.     Discharge Plan:   Prescription refills given for Dexamethasone.  Discharge instructions reviewed with: Patient.  Patient and/or family verbalized understanding of discharge instructions and all questions answered.  Copy of AVS reviewed with patient and/or family.  Patient will return 8/18 for next appointment.  Patient discharged in stable condition accompanied by: .  Departure Mode: Ambulatory.      Doris Raya RN

## 2023-08-15 ENCOUNTER — OFFICE VISIT (OUTPATIENT)
Dept: RADIATION ONCOLOGY | Facility: CLINIC | Age: 68
End: 2023-08-15
Attending: RADIOLOGY
Payer: COMMERCIAL

## 2023-08-15 VITALS
SYSTOLIC BLOOD PRESSURE: 128 MMHG | WEIGHT: 211 LBS | BODY MASS INDEX: 36.22 KG/M2 | HEART RATE: 68 BPM | DIASTOLIC BLOOD PRESSURE: 74 MMHG

## 2023-08-15 DIAGNOSIS — C52 VAGINAL CANCER (H): Primary | ICD-10-CM

## 2023-08-15 LAB — BACTERIA UR CULT: NORMAL

## 2023-08-15 PROCEDURE — 77386 HC IMRT TREATMENT DELIVERY, COMPLEX: CPT | Performed by: RADIOLOGY

## 2023-08-15 RX ORDER — CLOTRIMAZOLE 1 %
CREAM (GRAM) TOPICAL 2 TIMES DAILY
Qty: 45 G | Refills: 3 | Status: SHIPPED | OUTPATIENT
Start: 2023-08-15 | End: 2024-05-23

## 2023-08-15 NOTE — PROGRESS NOTES
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 Tamayo presents to clinic today for her weekly on-treatment visit.  She tolerated chemotherapy well.  Since her simulation, she has had an indwelling Thomas catheter placed for urinary obstruction.  We have asked her to clamp the Thomas catheter and drink fluids prior to each radiation treatment to fill her bladder.  She is having a lot of difficulty with this due to pain and spasms.  She is working with Urology to help with the pain.  Urology most recently treated her with Detrol for bladder spasms.  She reports being diagnosed with UTI and is awaiting culture sensitivity results prior to starting an antibiotic.  She is concerned that she may have a cutaneous yeast infection in the groin folds.    Nursing ROS:   Nutrition Alteration  Diet Type: Patient's Preference  Skin  Skin Reaction: 2 - Moderate to brisk erythema, patchy moist desquamation, mostly confined to skin folds and creases, moderate edema  Skin Progress: Groin redness prescribed  med     Cardiovascular  Respiratory effort: 1 - Normal - without distress  Gastrointestinal  Nausea: 0 - None  Diarrhea: 2 - Three to five soft or liquid bowel movements per day  Genitourinary  Urinary Status: 1 - Increase in frequency or nocturia up to 2x normal   Note: UTI     Pain Assessment  0-10 Pain Scale: 0        PEG Tube: No  Electronic Cardiac Implant: No    Objective:   /74   Pulse 68   Wt 95.7 kg (211 lb)   LMP 2008   BMI 36.22 kg/m     Gen: Appears well, NAD  Skin: Moderate erythema in the inguinal folds with scalloped margins suggestive of yeast infection, perianal skin with minimal erythema  Indwelling Thomas catheter  Extremities: No edema    Laboratory:  Lab Results   Component Value Date    WBC 8.7 08/14/2023    HGB 13.4 08/14/2023    HCT 40.2 08/14/2023    MCV 92 08/14/2023     (L) 08/14/2023     Lab Results   Component Value Date     (L) 08/14/2023    POTASSIUM 3.8 08/14/2023    CHLORIDE 100 08/14/2023    CO2 24 08/14/2023     (H) 08/14/2023     Magnesium   Date Value Ref Range Status   08/14/2023 1.8 1.7 - 2.3 mg/dL Final       Treatment-related toxicities (CTCAE v5.0):  Anorexia: Grade 1: Loss of appetite without alteration in eating habits  Fatigue: Grade 1: Fatigue relieved by rest  Nausea: Grade 0: No toxicity  Pain: Grade 2: Moderate pain; limiting instrumental ADL  Diarrhea: Grade 0: No toxicity  Urinary tract pain: Grade 2: Moderate pain; limiting instrumental ADL  Dermatitis: Grade 1: Faint erythema or dry desquamation      ED visits/Hospitalizations: None    Missed Treatments: None    Mosaiq chart and setup information reviewed  IGRT images reviewed    Medication Review  Med list reviewed with patient?: Yes    Assessment:    Ms. 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  She is tolerating chemoradiation and is most bothered by bladder spasms.  She has a possible UTI.    Plan:   1.  Continue treatment as planned  2.  Continue to work with urology regarding bladder spasms  3.  Discussed sitz baths, barrier cream and low fiber diet.  4.  Will likely need her Thomas catheter changed as it has been indwelling for approximately 30 days.      Marisa Pacheco  Department of Radiation Oncology  [facility]

## 2023-08-15 NOTE — LETTER
8/15/2023         RE: Jessie Tamayo  [address] [address]        Dear Colleague,    Thank you for referring your patient, Jessie Tamayo, to the [facility] RADIATION ONCOLOGY. Please see a copy of my visit note below.    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 Tamayo presents to clinic today for her weekly on-treatment visit.  She tolerated chemotherapy well.  Since her simulation, she has had an indwelling Thomas catheter placed for urinary obstruction.  We have asked her to clamp the Thomas catheter and drink fluids prior to each radiation treatment to fill her bladder.  She is having a lot of difficulty with this due to pain and spasms.  She is working with Urology to help with the pain.  Urology most recently treated her with Detrol for bladder spasms.  She reports being diagnosed with UTI and is awaiting culture sensitivity results prior to starting an antibiotic.  She is concerned that she may have a cutaneous yeast infection in the groin folds.    Nursing ROS:   Nutrition Alteration  Diet Type: Patient's Preference  Skin  Skin Reaction: 2 - Moderate to brisk erythema, patchy moist desquamation, mostly confined to skin folds and creases, moderate edema  Skin Progress: Groin redness prescribed  med     Cardiovascular  Respiratory effort: 1 - Normal - without distress  Gastrointestinal  Nausea: 0 - None  Diarrhea: 2 - Three to five soft or liquid bowel movements per day  Genitourinary  Urinary Status: 1  - Increase in frequency or nocturia up to 2x normal   Note: UTI     Pain Assessment  0-10 Pain Scale: 0        PEG Tube: No  Electronic Cardiac Implant: No    Objective:   /74   Pulse 68   Wt 95.7 kg (211 lb)   LMP 03/08/2008   BMI 36.22 kg/m    Gen: Appears well, NAD  Skin: Moderate erythema in the inguinal folds with scalloped margins suggestive of yeast infection, perianal skin with minimal erythema  Indwelling Thomas catheter  Extremities: No edema    Laboratory:  Lab Results   Component Value Date    WBC 8.7 08/14/2023    HGB 13.4 08/14/2023    HCT 40.2 08/14/2023    MCV 92 08/14/2023     (L) 08/14/2023     Lab Results   Component Value Date     (L) 08/14/2023    POTASSIUM 3.8 08/14/2023    CHLORIDE 100 08/14/2023    CO2 24 08/14/2023     (H) 08/14/2023     Magnesium   Date Value Ref Range Status   08/14/2023 1.8 1.7 - 2.3 mg/dL Final       Treatment-related toxicities (CTCAE v5.0):  Anorexia: Grade 1: Loss of appetite without alteration in eating habits  Fatigue: Grade 1: Fatigue relieved by rest  Nausea: Grade 0: No toxicity  Pain: Grade 2: Moderate pain; limiting instrumental ADL  Diarrhea: Grade 0: No toxicity  Urinary tract pain: Grade 2: Moderate pain; limiting instrumental ADL  Dermatitis: Grade 1: Faint erythema or dry desquamation      ED visits/Hospitalizations: None    Missed Treatments: None    Mosaiq chart and setup information reviewed  IGRT images reviewed    Medication Review  Med list reviewed with patient?: Yes    Assessment:    Ms. 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  She is tolerating chemoradiation and is most bothered by bladder spasms.  She has a possible UTI.    Plan:   1.  Continue treatment as planned  2.  Continue to work with urology regarding bladder spasms  3.  Discussed sitz baths, barrier cream and low fiber diet.  4.  Will likely need her Thomas catheter changed as it has been  indwelling for approximately 30 days.      Marisa Pacheco  Department of Radiation Oncology  [facility]

## 2023-08-16 ENCOUNTER — APPOINTMENT (OUTPATIENT)
Dept: RADIATION ONCOLOGY | Facility: CLINIC | Age: 68
End: 2023-08-16
Attending: RADIOLOGY
Payer: COMMERCIAL

## 2023-08-16 DIAGNOSIS — N30.01 ACUTE CYSTITIS WITH HEMATURIA: ICD-10-CM

## 2023-08-16 PROCEDURE — 77014 PR CT GUIDE FOR PLACEMENT RADIATION THERAPY FIELDS: CPT | Mod: 26 | Performed by: RADIOLOGY

## 2023-08-16 PROCEDURE — 77386 HC IMRT TREATMENT DELIVERY, COMPLEX: CPT | Performed by: RADIOLOGY

## 2023-08-16 RX ORDER — SULFAMETHOXAZOLE/TRIMETHOPRIM 800-160 MG
1 TABLET ORAL 2 TIMES DAILY
Qty: 14 TABLET | Refills: 0 | Status: ON HOLD | OUTPATIENT
Start: 2023-08-16 | End: 2023-09-05

## 2023-08-17 ENCOUNTER — APPOINTMENT (OUTPATIENT)
Dept: RADIATION ONCOLOGY | Facility: CLINIC | Age: 68
End: 2023-08-17
Attending: RADIOLOGY
Payer: COMMERCIAL

## 2023-08-17 ENCOUNTER — MYC MEDICAL ADVICE (OUTPATIENT)
Dept: UROLOGY | Facility: CLINIC | Age: 68
End: 2023-08-17

## 2023-08-17 DIAGNOSIS — N32.89 BLADDER SPASMS: Primary | ICD-10-CM

## 2023-08-17 PROCEDURE — 77014 PR CT GUIDE FOR PLACEMENT RADIATION THERAPY FIELDS: CPT | Mod: 26 | Performed by: STUDENT IN AN ORGANIZED HEALTH CARE EDUCATION/TRAINING PROGRAM

## 2023-08-17 PROCEDURE — 77386 HC IMRT TREATMENT DELIVERY, COMPLEX: CPT | Performed by: RADIOLOGY

## 2023-08-17 NOTE — PROGRESS NOTES
Virtual Visit Details    Type of service:  Video Visit   Video Start Time: 1503  Video End Time: 1524    Originating Location (pt. 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  She presents for follow up and disease management.    Oncology History:  07, 10/15/08, 09, 11: Pap test NILM.      1/23/15: Pap test NILM, hrHPV16+.  2/27/15: Cervical polyp & cervical biopsy pathology: Negative for dysplasia/malignancy.     16: Pap test NILM, hrHPV16+.   16: Endocervical curettings pathology negative for dysplasia/malignancy.     17: Pap test NILM, hrHPV16+.   -Colposcopy recommended, not performed.      18:   -Pap test ASCUS, hrHPV16+.   -Endocervical curettings & cervical biopsy pathology: negative for dysplasia/malignancy.      19:  Pap test ASC-H, hrHPV16+.   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      9/10/20: Pap test ASC-H, hrHPV+ (NOS).  21: LEEP.   -Pathology: LEEP & endocervical curettings: negative for dysplasia/malignancy.     3/23/22: Endocervical curettings & cervical biopsy pathology: negative for dysplasia/malignancy.     3/28/23:   -Pap test HSIL, hrHPV16+.   -Findings by gynecologist Dr. Koenig: External genitalia with erythematous plaques bilaterally  Urethra- mid position and well supported, suburethral tissue thickened and friable with bleeding elicited after placement of the speculum  Vagina is stenotic and cervix difficult to see.   23: Cystourethroscopy, vaginal biopsy by urologist Dr. Mackenzie.   -Findings: Exam revealed lichenified changes to bilateral labia majora and friable  vestibular tissue (patient had significant bleeding from prep alone.) The urethra was somewhat stenotic and would not accommodate 19Fr rigid cystoscope, therefore a 16 Fr flexible cystoscope was inserted. Gentle pressure was required to access the bladder. The ureteral orifices were in their orthotopic positions bilaterally. There were no intravesical stones or diverticuli and the bladder was mildly trabeculated. There were no intravesical lesions. See media tab for urethral pictures - there were no obvious lesions but the entire distal urethra was erythematous and stenosed (16Fr.)  -Pathology: Invasive moderately-differentiated squamous cell carcinoma, HPV-associated, with focus suspicious for lymphovascular space invasion; IHC: p16+.    6/8/23: Gynecologic Oncology consultation.  -Findings:   External genitalia notable for erythema and desquamation of the posterior medial labia, c/w lichen sclerosus changes. When the labia are , a friable mass is visible at the distal anterior vagina, just posterior to the urethra. On bimanual exam, the cervix is small and normal in contour. The palpable vaginal tumor is limited to the anterior distal vagina, approximately 4-5 cm laterally x 3 cm cranio-caudal. Tumor is friable. Remainder of the vagina smooth to palpation. Digital anorectal exam is without nodularity. No palpable tumor in the rectovaginal septum.   Enlarged firm, fixed right inguinal lymph node ~3-4 cm in size.     6/16/23: Pelvic MRI  IMPRESSION:   1. Irregular enhancing lesion along the anterior vaginal wall at the  level of the introitus measuring 2.5 x 0.9 cm with irregular  enhancement along the anterior vaginal wall towards the level of the  vaginal cuff however there is no definitive evidence or abnormal  signal intensity involving the cervix to suggest invasion.  2. Right inguinal lymphadenopathy compatible with metastatic disease.   3. There is some asymmetric enhancement involving the left  sacrum,  incompletely evaluated on this study. Further evaluation of this and  further staging of the chest, abdomen and pelvis will be performed on  PET/CT scheduled for 6/20/2023.    6/20/23: PET CT  IMPRESSION:   1. Intense focal hypermetabolic FDG uptake in biopsy-proven vaginal  squamous cell carcinoma.  2. Multiple enlarged, hypermetabolic right inguinal nodes likely  represent regional metastasis. No definite evidence of distant  metastatic disease.  3. Scattered sub-4 mm solid pulmonary nodules are below the size  threshold for characterization on PET. Attention on follow-up with CT.  4. 1.0 cm enhancing left parotid gland nodule with hypermetabolic  uptake could represent a primary parotid neoplasm such as pleomorphic  adenoma or Warthin's tumor; consider ultrasound for further  evaluation.  5. 1.3 cm hypoattenuating right thyroid nodule with mild FDG uptake  warrants ultrasound for characterization.     Plan: Cisplatin radiation      7/27/2023: urinary gonsalez catheter placement. Treat acute cystitis with bactrim per Urology.     8/1/2023: plan C1D1 Cisplatin radiation           Today Jessie presents via video with her . She reports that every day is painful to fill the bladder for radiation. Taking 1000 mg Tylenol and Detrol prior to clamping gonsalez. Needing pain coverage in the mornings with bladder full. Only having pain largely in the mornings. This pain largely resolves after unclamping gonsalez two hours later. Urine much improved clear and yellow; started Bactrim two days ago. No fevers or chills. Having bowel movements and diarrhea with radiation treatments (has imodium on hand). No new tinnitus. Hearing the same. No neuropathy. Overall eating and drinking is stable.     Had nausea this morning controlled with zofran. Eating and drinking ok. Rash resolving (see note from 8/14) groin rash improving with Lotrimin per Dr. Pacheco.  has reached out for group support. Pt will return their  call. She would like to apply for temporary handicap parking as walking longer distances increased her vulvar/bladder pain.               Past Medical History:    Past Medical History:   Diagnosis Date    Actinic keratosis     Hyperlipemia     Lichen sclerosus 2020    Osteopenias     SCC (squamous cell carcinoma) 7/26/2023         Past Surgical History:    Past Surgical History:   Procedure Laterality Date    COLONOSCOPY  2006    normal    COLPOSCOPY, BIOPSY, COMBINED N/A 02/08/2021    Procedure: COLPOSCOPY, WITH BIOPSY, LEEP procedure;  Surgeon: Jefe Koenig MD;  Location: [facility] OR    CYSTOSCOPY N/A 05/30/2023    Procedure: CYSTOSCOPY AND EXCISIONAL BIOPSY OF THE VAGINAL TISSUE AROUND THE URETHRA.  (look in the bladder and sample small area in the vagina near the urethra);  Surgeon: Lakeisha Mackenzie MD;  [place] [facility] [place]    [place]      cataracts bilateral    OPEN REDUCTION INTERNAL FIXATION FOREARM  07/31/2012    Procedure: OPEN REDUCTION INTERNAL FIXATION FOREARM;  Open Reduction Internal Fixation, Irrigation & Debridment  of Right Distal Radius, application short arm splint;  Surgeon: Wade Beard MD;  Location: [facility]    TONSILLECTOMY & ADENOIDECTOMY  1960    [facility] TREAT ECTOPIC PREG,RMV TUBE/OVARY  1985    ectopic, right side-ovary and tube removed         Health Maintenance Due   Topic Date Due    MEDICARE ANNUAL WELLNESS VISIT  05/26/2023    COLPOSCOPY  06/28/2023       Current Medications:     Current Outpatient Medications   Medication Sig Dispense Refill    clotrimazole (LOTRIMIN) 1 % external cream Apply topically 2 times daily To affected areas 45 g 3    dexamethasone (DECADRON) 4 MG tablet Take 2 tablets (8 mg) by mouth daily Take for 3 days, starting the day after chemo on day 2 and 9. Take with food. If no nausea and vomiting with first cisplatin doses, may stop dexamethasone. 12 tablet 2    docusate sodium (COLACE) 100 MG capsule Take 100 mg by mouth daily      ondansetron  (ZOFRAN) 8 MG tablet Take 1 tablet (8 mg) by mouth every 8 hours as needed for nausea (vomiting) Do not take for 3 days after chemo. 30 tablet 2    phenazopyridine (PYRIDIUM) 200 MG tablet Take 1 tablet (200 mg) by mouth 3 times daily as needed for irritation 21 tablet 1    prochlorperazine (COMPAZINE) 10 MG tablet Take 1 tablet (10 mg) by mouth every 6 hours as needed for nausea or vomiting 30 tablet 2    sulfamethoxazole-trimethoprim (BACTRIM DS) 800-160 MG tablet Take 1 tablet by mouth 2 times daily 14 tablet 0    tolterodine (DETROL) 2 MG tablet Take 1 tablet (2 mg) by mouth 2 times daily 60 tablet 3    triamcinolone (KENALOG) 0.1 % external cream Apply topically 2 times daily 15 g 1         Allergies:        Allergies   Allergen Reactions    Oxybutynin Itching    Seasonal Allergies         Social History:     Social History     Tobacco Use    Smoking status: Never     Passive exposure: Never    Smokeless tobacco: Never   Substance Use Topics    Alcohol use: Yes     Comment: very little       History   Drug Use No         Family History:     The patient's family history is notable for:    Family History   Problem Relation Age of Onset    Lung Cancer Mother 44    Cerebrovascular Disease Father     Hypertension Father     Alcohol/Drug Father     Obesity Niece         niece    Mental Illness Nephew         nephew    Diabetes Other         paternal aunt    Hyperlipidemia Other     Obesity Other         self    Cervical Cancer Maternal Aunt     Ovarian Cancer Maternal Aunt 60    Obesity Other     Diabetes Other         paternal aunt    Hypertension Other         father    Cerebrovascular Disease Other         father    C.A.D. No family hx of     Breast Cancer No family hx of     Cancer - colorectal No family hx of     Prostate Cancer No family hx of     Melanoma No family hx of          Physical Exam:     Wt 95.7 kg (211 lb)   LMP 03/08/2008   BMI 36.22 kg/m    Body mass index is 36.22 kg/m .      General  Appearance: healthy and alert, no distress    Eyes:  Eyes grossly normal to inspection.  No discharge or erythema, or obvious scleral/conjunctival abnormalities.    Respiratory: No audible wheeze, cough, or visible cyanosis.  No visible retractions or increased work of breathing.     Musculoskeletal: extremities non tender and without edema    Skin: no lesions or rashes on visible skin    Neurological: normal gait, no gross defects     Psychiatric: appropriate mood and affect. Mentation appears normal, affect normal/bright, judgement and insight intact, normal speech and appearance well-groomed                            The rest of a comprehensive physical examination is deferred due to video visit restrictions.      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  She presents for follow up and disease management.    30 minutes spent on the date of the encounter doing chart review, history and exam, documentation, and further activities as noted above.           Plan:     1.) Vaginal cancer:  Plan for MD visit ~1 month after completion of chemoradiation therapy for post-therapy visit (virtual or in-person per patient preference), and then 3 months later (4 months post-chemoradiation) with repeat PET/CT and exam to assess diease response (MD, in-person).  Currently scheduled appointments available via My Chart.  Reviewed signs and symptoms for when she should contact the clinic or seek additional care; and contact information of the Gynecologic Oncology Clinic.  Patient to contact the clinic with any questions or concerns in the interim.     Reviewed recent labs and weight trends today.     2.) Health maintenance:  Issues addressed today include following up with PCP for annual health maintenance and non-gynecologic issues.     3.)       Urinary retention: gonsalez catheter placed by urology on 7/27/23. Plan for one month return for  "exchange. Recent UTI; improving symptoms with Bactrim abx.     4.)       Left parotid nodule: message sent to Dr. Stahl to clarify plan  -Addendum 8/1/2023: Per Dr. Stahl, \"We did not discuss the parotic nodule specifically. Since it is favored to be benign (pleomorphic adenoma or Warthin's tumor), I plan to just monitor on follow-up imaging, and at the end of treatment if still present we can consider ultrasound for additional evaluation. The more urgent issue is her metastatic vaginal cancer. \"    5.)        Pelvic pain: overall controlled with APAP and Detrol. Improves after un clamping gonsalez catheter. Reviewed safe dosing of Tylenol and ibuprofen and to call in if pain is not controlled.  Pt prefers to not take opioids.     6.)        Mild reflux: reviewed to keep spacing meals every 2-3 hours. Ok to try Pepcid otc.     7.)        Dizziness: Reviewed sitting to standing slower to prevent any orthostatic hypotension and to only take Zofran for nausea. No HA/blurred vision.             SANDI Wiggins, ARIELLE-BC  Women's Health Nurse Practitioner  Division of Gynecologic Oncology  [facility]            CC  Patient Care Team:  Alba Layton MD as PCP - General  LibertyJefe russo MD as Assigned OBGYN Provider  Alba Layton MD as Assigned PCP  Lakeisha Mackenzie MD as MD (Urology)  Lawrence Banuelos MD as MD (Dermatology)  Lakeisha Mackenzie MD as Assigned Surgical Provider  Lakeisha Mackenzie MD as MD (Urology)  Reina Stahl MD as MD (Gynecologic Oncology)  Marisa Pacheco MD as MD (Radiation Oncology)  Reina Stahl MD as Assigned Cancer Care Provider  Liyl Fabian PA-C as Physician Assistant (Anesthesiology)  SELF, REFERRED        "

## 2023-08-18 ENCOUNTER — VIRTUAL VISIT (OUTPATIENT)
Dept: ONCOLOGY | Facility: CLINIC | Age: 68
End: 2023-08-18
Attending: OBSTETRICS & GYNECOLOGY
Payer: COMMERCIAL

## 2023-08-18 ENCOUNTER — APPOINTMENT (OUTPATIENT)
Dept: RADIATION ONCOLOGY | Facility: CLINIC | Age: 68
End: 2023-08-18
Attending: RADIOLOGY
Payer: COMMERCIAL

## 2023-08-18 VITALS — BODY MASS INDEX: 36.22 KG/M2 | WEIGHT: 211 LBS

## 2023-08-18 DIAGNOSIS — D06.9 CIN III (CERVICAL INTRAEPITHELIAL NEOPLASIA III): ICD-10-CM

## 2023-08-18 DIAGNOSIS — L90.0 LICHEN SCLEROSUS: ICD-10-CM

## 2023-08-18 DIAGNOSIS — C52 VAGINAL CANCER (H): Primary | ICD-10-CM

## 2023-08-18 PROCEDURE — 77336 RADIATION PHYSICS CONSULT: CPT | Performed by: RADIOLOGY

## 2023-08-18 PROCEDURE — 77014 PR CT GUIDE FOR PLACEMENT RADIATION THERAPY FIELDS: CPT | Mod: 26 | Performed by: RADIOLOGY

## 2023-08-18 PROCEDURE — 77427 RADIATION TX MANAGEMENT X5: CPT | Performed by: RADIOLOGY

## 2023-08-18 PROCEDURE — 99214 OFFICE O/P EST MOD 30 MIN: CPT | Mod: VID | Performed by: OBSTETRICS & GYNECOLOGY

## 2023-08-18 PROCEDURE — 77386 HC IMRT TREATMENT DELIVERY, COMPLEX: CPT | Performed by: RADIOLOGY

## 2023-08-18 RX ORDER — PHENAZOPYRIDINE HYDROCHLORIDE 200 MG/1
200 TABLET, FILM COATED ORAL 3 TIMES DAILY PRN
Qty: 21 TABLET | Refills: 1 | Status: ON HOLD | OUTPATIENT
Start: 2023-08-18 | End: 2023-09-06

## 2023-08-18 ASSESSMENT — PAIN SCALES - GENERAL: PAINLEVEL: MILD PAIN (3)

## 2023-08-18 NOTE — LETTER
2023         RE: Jessie Tamayo  [address] [address]        Dear Colleague,    Thank you for referring your patient, Jessie Tamayo, to the [facility]. Please see a copy of my visit note below.    Virtual Visit Details    Type of service:  Video Visit   Video Start Time: 1503  Video End Time: 1524    Originating Location (pt. 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  She presents for follow up and disease management.    Oncology History:  07, 10/15/08, 09, 11: Pap test NILM.      1/23/15: Pap test NILM, hrHPV16+.  2/27/15: Cervical polyp & cervical biopsy pathology: Negative for dysplasia/malignancy.     16: Pap test NILM, hrHPV16+.   16: Endocervical curettings pathology negative for dysplasia/malignancy.     17: Pap test NILM, hrHPV16+.   -Colposcopy recommended, not performed.      18:   -Pap test ASCUS, hrHPV16+.   -Endocervical curettings & cervical biopsy pathology: negative for dysplasia/malignancy.      19:  Pap test ASC-H, hrHPV16+.   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      9/10/20: Pap test ASC-H, hrHPV+ (NOS).  21: LEEP.   -Pathology: LEEP & endocervical curettings: negative for dysplasia/malignancy.     3/23/22: Endocervical curettings & cervical biopsy pathology: negative for dysplasia/malignancy.     3/28/23:   -Pap test HSIL, hrHPV16+.   -Findings by gynecologist Dr. Koenig: External genitalia with erythematous plaques bilaterally  Urethra- mid position and well supported, suburethral tissue thickened and friable with bleeding  elicited after placement of the speculum  Vagina is stenotic and cervix difficult to see.   5/30/23: Cystourethroscopy, vaginal biopsy by urologist Dr. Mackenzie.   -Findings: Exam revealed lichenified changes to bilateral labia majora and friable vestibular tissue (patient had significant bleeding from prep alone.) The urethra was somewhat stenotic and would not accommodate 19Fr rigid cystoscope, therefore a 16 Fr flexible cystoscope was inserted. Gentle pressure was required to access the bladder. The ureteral orifices were in their orthotopic positions bilaterally. There were no intravesical stones or diverticuli and the bladder was mildly trabeculated. There were no intravesical lesions. See media tab for urethral pictures - there were no obvious lesions but the entire distal urethra was erythematous and stenosed (16Fr.)  -Pathology: Invasive moderately-differentiated squamous cell carcinoma, HPV-associated, with focus suspicious for lymphovascular space invasion; IHC: p16+.    6/8/23: Gynecologic Oncology consultation.  -Findings:   External genitalia notable for erythema and desquamation of the posterior medial labia, c/w lichen sclerosus changes. When the labia are , a friable mass is visible at the distal anterior vagina, just posterior to the urethra. On bimanual exam, the cervix is small and normal in contour. The palpable vaginal tumor is limited to the anterior distal vagina, approximately 4-5 cm laterally x 3 cm cranio-caudal. Tumor is friable. Remainder of the vagina smooth to palpation. Digital anorectal exam is without nodularity. No palpable tumor in the rectovaginal septum.   Enlarged firm, fixed right inguinal lymph node ~3-4 cm in size.     6/16/23: Pelvic MRI  IMPRESSION:   1. Irregular enhancing lesion along the anterior vaginal wall at the  level of the introitus measuring 2.5 x 0.9 cm with irregular  enhancement along the anterior vaginal wall towards the level of the  vaginal cuff  however there is no definitive evidence or abnormal  signal intensity involving the cervix to suggest invasion.  2. Right inguinal lymphadenopathy compatible with metastatic disease.   3. There is some asymmetric enhancement involving the left sacrum,  incompletely evaluated on this study. Further evaluation of this and  further staging of the chest, abdomen and pelvis will be performed on  PET/CT scheduled for 6/20/2023.    6/20/23: PET CT  IMPRESSION:   1. Intense focal hypermetabolic FDG uptake in biopsy-proven vaginal  squamous cell carcinoma.  2. Multiple enlarged, hypermetabolic right inguinal nodes likely  represent regional metastasis. No definite evidence of distant  metastatic disease.  3. Scattered sub-4 mm solid pulmonary nodules are below the size  threshold for characterization on PET. Attention on follow-up with CT.  4. 1.0 cm enhancing left parotid gland nodule with hypermetabolic  uptake could represent a primary parotid neoplasm such as pleomorphic  adenoma or Warthin's tumor; consider ultrasound for further  evaluation.  5. 1.3 cm hypoattenuating right thyroid nodule with mild FDG uptake  warrants ultrasound for characterization.     Plan: Cisplatin radiation      7/27/2023: urinary gonsalez catheter placement. Treat acute cystitis with bactrim per Urology.     8/1/2023: plan C1D1 Cisplatin radiation           Today Jessie presents via video with her . She reports that every day is painful to fill the bladder for radiation. Taking 1000 mg Tylenol and Detrol prior to clamping gonsalez. Needing pain coverage in the mornings with bladder full. Only having pain largely in the mornings. This pain largely resolves after unclamping gonsalez two hours later. Urine much improved clear and yellow; started Bactrim two days ago. No fevers or chills. Having bowel movements and diarrhea with radiation treatments (has imodium on hand). No new tinnitus. Hearing the same. No neuropathy. Overall eating and drinking is  stable.     Had nausea this morning controlled with zofran. Eating and drinking ok. Rash resolving (see note from 8/14) groin rash improving with Lotrimin per Dr. Pacheco.  has reached out for group support. Pt will return their call. She would like to apply for temporary handicap parking as walking longer distances increased her vulvar/bladder pain.               Past Medical History:    Past Medical History:   Diagnosis Date     Actinic keratosis      Hyperlipemia      Lichen sclerosus 2020     Osteopenias      SCC (squamous cell carcinoma) 7/26/2023         Past Surgical History:    Past Surgical History:   Procedure Laterality Date     COLONOSCOPY  2006    normal     COLPOSCOPY, BIOPSY, COMBINED N/A 02/08/2021    Procedure: COLPOSCOPY, WITH BIOPSY, LEEP procedure;  Surgeon: Jefe Koenig MD;  Location: [facility] OR     CYSTOSCOPY N/A 05/30/2023    Procedure: CYSTOSCOPY AND EXCISIONAL BIOPSY OF THE VAGINAL TISSUE AROUND THE URETHRA.  (look in the bladder and sample small area in the vagina near the urethra);  Surgeon: Lakeisha Mackenzie MD;  Location: [facility] OR     EYE SURGERY      cataracts bilateral     OPEN REDUCTION INTERNAL FIXATION FOREARM  07/31/2012    Procedure: OPEN REDUCTION INTERNAL FIXATION FOREARM;  Open Reduction Internal Fixation, Irrigation & Debridment  of Right Distal Radius, application short arm splint;  Surgeon: Wade Beard MD;  Location: [facility] OR     TONSILLECTOMY & ADENOIDECTOMY  1960     [facility] TREAT ECTOPIC PREG,RMV TUBE/OVARY  1985    ectopic, right side-ovary and tube removed         Health Maintenance Due   Topic Date Due     MEDICARE ANNUAL WELLNESS VISIT  05/26/2023     COLPOSCOPY  06/28/2023       Current Medications:     Current Outpatient Medications   Medication Sig Dispense Refill     clotrimazole (LOTRIMIN) 1 % external cream Apply topically 2 times daily To affected areas 45 g 3     dexamethasone (DECADRON) 4 MG tablet Take 2 tablets (8 mg) by mouth  daily Take for 3 days, starting the day after chemo on day 2 and 9. Take with food. If no nausea and vomiting with first cisplatin doses, may stop dexamethasone. 12 tablet 2     docusate sodium (COLACE) 100 MG capsule Take 100 mg by mouth daily       ondansetron (ZOFRAN) 8 MG tablet Take 1 tablet (8 mg) by mouth every 8 hours as needed for nausea (vomiting) Do not take for 3 days after chemo. 30 tablet 2     phenazopyridine (PYRIDIUM) 200 MG tablet Take 1 tablet (200 mg) by mouth 3 times daily as needed for irritation 21 tablet 1     prochlorperazine (COMPAZINE) 10 MG tablet Take 1 tablet (10 mg) by mouth every 6 hours as needed for nausea or vomiting 30 tablet 2     sulfamethoxazole-trimethoprim (BACTRIM DS) 800-160 MG tablet Take 1 tablet by mouth 2 times daily 14 tablet 0     tolterodine (DETROL) 2 MG tablet Take 1 tablet (2 mg) by mouth 2 times daily 60 tablet 3     triamcinolone (KENALOG) 0.1 % external cream Apply topically 2 times daily 15 g 1         Allergies:        Allergies   Allergen Reactions     Oxybutynin Itching     Seasonal Allergies         Social History:     Social History     Tobacco Use     Smoking status: Never     Passive exposure: Never     Smokeless tobacco: Never   Substance Use Topics     Alcohol use: Yes     Comment: very little       History   Drug Use No         Family History:     The patient's family history is notable for:    Family History   Problem Relation Age of Onset     Lung Cancer Mother 44     Cerebrovascular Disease Father      Hypertension Father      Alcohol/Drug Father      Obesity Niece         niece     Mental Illness Nephew         nephew     Diabetes Other         paternal aunt     Hyperlipidemia Other      Obesity Other         self     Cervical Cancer Maternal Aunt      Ovarian Cancer Maternal Aunt 60     Obesity Other      Diabetes Other         paternal aunt     Hypertension Other         father     Cerebrovascular Disease Other         father     C.A.D. No  family hx of      Breast Cancer No family hx of      Cancer - colorectal No family hx of      Prostate Cancer No family hx of      Melanoma No family hx of          Physical Exam:     Wt 95.7 kg (211 lb)   LMP 03/08/2008   BMI 36.22 kg/m    Body mass index is 36.22 kg/m .      General Appearance: healthy and alert, no distress    Eyes:  Eyes grossly normal to inspection.  No discharge or erythema, or obvious scleral/conjunctival abnormalities.    Respiratory: No audible wheeze, cough, or visible cyanosis.  No visible retractions or increased work of breathing.     Musculoskeletal: extremities non tender and without edema    Skin: no lesions or rashes on visible skin    Neurological: normal gait, no gross defects     Psychiatric: appropriate mood and affect. Mentation appears normal, affect normal/bright, judgement and insight intact, normal speech and appearance well-groomed                            The rest of a comprehensive physical examination is deferred due to video visit restrictions.      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  She presents for follow up and disease management.    30 minutes spent on the date of the encounter doing chart review, history and exam, documentation, and further activities as noted above.           Plan:     1.) Vaginal cancer:  Plan for MD visit ~1 month after completion of chemoradiation therapy for post-therapy visit (virtual or in-person per patient preference), and then 3 months later (4 months post-chemoradiation) with repeat PET/CT and exam to assess diease response (MD, in-person).  Currently scheduled appointments available via My Chart.  Reviewed signs and symptoms for when she should contact the clinic or seek additional care; and contact information of the Gynecologic Oncology Clinic.  Patient to contact the clinic with any questions or concerns in the interim.     Reviewed  "recent labs and weight trends today.     2.) Health maintenance:  Issues addressed today include following up with PCP for annual health maintenance and non-gynecologic issues.     3.)       Urinary retention: gonsalez catheter placed by urology on 7/27/23. Plan for one month return for exchange. Recent UTI; improving symptoms with Bactrim abx.     4.)       Left parotid nodule: message sent to Dr. Stahl to clarify plan  -Addendum 8/1/2023: Per Dr. Stahl, \"We did not discuss the parotic nodule specifically. Since it is favored to be benign (pleomorphic adenoma or Warthin's tumor), I plan to just monitor on follow-up imaging, and at the end of treatment if still present we can consider ultrasound for additional evaluation. The more urgent issue is her metastatic vaginal cancer. \"    5.)        Pelvic pain: overall controlled with APAP and Detrol. Improves after un clamping gonsalez catheter. Reviewed safe dosing of Tylenol and ibuprofen and to call in if pain is not controlled.  Pt prefers to not take opioids.     6.)        Mild reflux: reviewed to keep spacing meals every 2-3 hours. Ok to try Pepcid otc.     7.)        Dizziness: Reviewed sitting to standing slower to prevent any orthostatic hypotension and to only take Zofran for nausea. No HA/blurred vision.             SANDI Wiggins, ARIELLE-BC  Women's Health Nurse Practitioner  Division of Gynecologic Oncology  [facility]            CC  Patient Care Team:  Alba Layton MD as PCP - Jefe Mosley MD as Assigned OBGYN Provider  Alba Layton MD as Assigned PCP  Lakeisha Mackenzie MD as MD (Urology)  Lawrence Banuelos MD as MD (Dermatology)  Lakeisha Mackenzie MD as Assigned Surgical Provider  Lakeisha Mackenzie MD as MD (Urology)  Reina Stahl MD as MD (Gynecologic Oncology)  Marisa Pacheco MD as MD (Radiation Oncology)  Reina Stahl MD as Assigned Cancer Care Provider  Lily Fabian PA-C " as Physician Assistant (Anesthesiology)  SELF, REFERRED          Again, thank you for allowing me to participate in the care of your patient.        Sincerely,        SANDI Munoz CNP

## 2023-08-18 NOTE — LETTER
2023         RE: Jessie Tamayo  [address], [place] [address]        Dear Colleague,    Thank you for referring your patient, Jessie Tamayo, to the [facility]. Please see a copy of my visit note below.    Virtual Visit Details    Type of service:  Video Visit   Video Start Time: 1503  Video End Time: 1524    Originating Location (pt. Location): home     Distant Location (provider location):  provider   Platform used for Video Visit: 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  She presents for follow up and disease management.    Oncology History:  07, 10/15/08, 09, 11: Pap test NILM.      1/23/15: Pap test NILM, hrHPV16+.  2/27/15: Cervical polyp & cervical biopsy pathology: Negative for dysplasia/malignancy.     16: Pap test NILM, hrHPV16+.   16: Endocervical curettings pathology negative for dysplasia/malignancy.     17: Pap test NILM, hrHPV16+.   -Colposcopy recommended, not performed.      18:   -Pap test ASCUS, hrHPV16+.   -Endocervical curettings & cervical biopsy pathology: negative for dysplasia/malignancy.      19:  Pap test ASC-H, hrHPV16+.   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      9/10/20: Pap test ASC-H, hrHPV+ (NOS).  21: LEEP.   -Pathology: LEEP & endocervical curettings: negative for dysplasia/malignancy.     3/23/22: Endocervical curettings & cervical biopsy pathology: negative for dysplasia/malignancy.     3/28/23:   -Pap test HSIL, hrHPV16+.   -Findings by gynecologist Dr. Koenig: External genitalia with erythematous plaques bilaterally  Urethra- mid position and well supported, suburethral tissue thickened and friable with bleeding  elicited after placement of the speculum  Vagina is stenotic and cervix difficult to see.   5/30/23: Cystourethroscopy, vaginal biopsy by urologist Dr. Mackenzie.   -Findings: Exam revealed lichenified changes to bilateral labia majora and friable vestibular tissue (patient had significant bleeding from prep alone.) The urethra was somewhat stenotic and would not accommodate 19Fr rigid cystoscope, therefore a 16 Fr flexible cystoscope was inserted. Gentle pressure was required to access the bladder. The ureteral orifices were in their orthotopic positions bilaterally. There were no intravesical stones or diverticuli and the bladder was mildly trabeculated. There were no intravesical lesions. See media tab for urethral pictures - there were no obvious lesions but the entire distal urethra was erythematous and stenosed (16Fr.)  -Pathology: Invasive moderately-differentiated squamous cell carcinoma, HPV-associated, with focus suspicious for lymphovascular space invasion; IHC: p16+.    6/8/23: Gynecologic Oncology consultation.  -Findings:   External genitalia notable for erythema and desquamation of the posterior medial labia, c/w lichen sclerosus changes. When the labia are , a friable mass is visible at the distal anterior vagina, just posterior to the urethra. On bimanual exam, the cervix is small and normal in contour. The palpable vaginal tumor is limited to the anterior distal vagina, approximately 4-5 cm laterally x 3 cm cranio-caudal. Tumor is friable. Remainder of the vagina smooth to palpation. Digital anorectal exam is without nodularity. No palpable tumor in the rectovaginal septum.   Enlarged firm, fixed right inguinal lymph node ~3-4 cm in size.     6/16/23: Pelvic MRI  IMPRESSION:   1. Irregular enhancing lesion along the anterior vaginal wall at the  level of the introitus measuring 2.5 x 0.9 cm with irregular  enhancement along the anterior vaginal wall towards the level of the  vaginal cuff  however there is no definitive evidence or abnormal  signal intensity involving the cervix to suggest invasion.  2. Right inguinal lymphadenopathy compatible with metastatic disease.   3. There is some asymmetric enhancement involving the left sacrum,  incompletely evaluated on this study. Further evaluation of this and  further staging of the chest, abdomen and pelvis will be performed on  PET/CT scheduled for 6/20/2023.    6/20/23: PET CT  IMPRESSION:   1. Intense focal hypermetabolic FDG uptake in biopsy-proven vaginal  squamous cell carcinoma.  2. Multiple enlarged, hypermetabolic right inguinal nodes likely  represent regional metastasis. No definite evidence of distant  metastatic disease.  3. Scattered sub-4 mm solid pulmonary nodules are below the size  threshold for characterization on PET. Attention on follow-up with CT.  4. 1.0 cm enhancing left parotid gland nodule with hypermetabolic  uptake could represent a primary parotid neoplasm such as pleomorphic  adenoma or Warthin's tumor; consider ultrasound for further  evaluation.  5. 1.3 cm hypoattenuating right thyroid nodule with mild FDG uptake  warrants ultrasound for characterization.     Plan: Cisplatin radiation      7/27/2023: urinary gonsalez catheter placement. Treat acute cystitis with bactrim per Urology.     8/1/2023: plan C1D1 Cisplatin radiation           Today Jessie presents via video with her . She reports that every day is painful to fill the bladder for radiation. Taking 1000 mg Tylenol and Detrol prior to clamping gonsalze. Needing pain coverage in the mornings with bladder full. Only having pain largely in the mornings. This pain largely resolves after unclamping gonsalez two hours later. Urine much improved clear and yellow; started Bactrim two days ago. No fevers or chills. Having bowel movements and diarrhea with radiation treatments (has imodium on hand). No new tinnitus. Hearing the same. No neuropathy. Overall eating and drinking is  stable.     Had nausea this morning controlled with zofran. Eating and drinking ok. Rash resolving (see note from 8/14) groin rash improving with Lotrimin per Dr. Pacheco.  has reached out for group support. Pt will return their call. She would like to apply for temporary handicap parking as walking longer distances increased her vulvar/bladder pain.               Past Medical History:    Past Medical History:   Diagnosis Date     Actinic keratosis      Hyperlipemia      Lichen sclerosus 2020     Osteopenias      SCC (squamous cell carcinoma) 7/26/2023         Past Surgical History:    Past Surgical History:   Procedure Laterality Date     COLONOSCOPY  2006    normal     COLPOSCOPY, BIOPSY, COMBINED N/A 02/08/2021    Procedure: COLPOSCOPY, WITH BIOPSY, LEEP procedure;  Surgeon: Jefe Koenig MD;  Location: [facility] OR     CYSTOSCOPY N/A 05/30/2023    Procedure: CYSTOSCOPY AND EXCISIONAL BIOPSY OF THE VAGINAL TISSUE AROUND THE URETHRA.  (look in the bladder and sample small area in the vagina near the urethra);  Surgeon: Lakeisha Mackenzie MD;  Location: [facility] OR     EYE SURGERY      cataracts bilateral     OPEN REDUCTION INTERNAL FIXATION FOREARM  07/31/2012    Procedure: OPEN REDUCTION INTERNAL FIXATION FOREARM;  Open Reduction Internal Fixation, Irrigation & Debridment  of Right Distal Radius, application short arm splint;  Surgeon: Wade Beard MD;  Location: [facility] OR     TONSILLECTOMY & ADENOIDECTOMY  1960     [facility] TREAT ECTOPIC PREG,RMV TUBE/OVARY  1985    ectopic, right side-ovary and tube removed         Health Maintenance Due   Topic Date Due     MEDICARE ANNUAL WELLNESS VISIT  05/26/2023     COLPOSCOPY  06/28/2023       Current Medications:     Current Outpatient Medications   Medication Sig Dispense Refill     clotrimazole (LOTRIMIN) 1 % external cream Apply topically 2 times daily To affected areas 45 g 3     dexamethasone (DECADRON) 4 MG tablet Take 2 tablets (8 mg) by mouth  daily Take for 3 days, starting the day after chemo on day 2 and 9. Take with food. If no nausea and vomiting with first cisplatin doses, may stop dexamethasone. 12 tablet 2     docusate sodium (COLACE) 100 MG capsule Take 100 mg by mouth daily       ondansetron (ZOFRAN) 8 MG tablet Take 1 tablet (8 mg) by mouth every 8 hours as needed for nausea (vomiting) Do not take for 3 days after chemo. 30 tablet 2     phenazopyridine (PYRIDIUM) 200 MG tablet Take 1 tablet (200 mg) by mouth 3 times daily as needed for irritation 21 tablet 1     prochlorperazine (COMPAZINE) 10 MG tablet Take 1 tablet (10 mg) by mouth every 6 hours as needed for nausea or vomiting 30 tablet 2     sulfamethoxazole-trimethoprim (BACTRIM DS) 800-160 MG tablet Take 1 tablet by mouth 2 times daily 14 tablet 0     tolterodine (DETROL) 2 MG tablet Take 1 tablet (2 mg) by mouth 2 times daily 60 tablet 3     triamcinolone (KENALOG) 0.1 % external cream Apply topically 2 times daily 15 g 1         Allergies:        Allergies   Allergen Reactions     Oxybutynin Itching     Seasonal Allergies         Social History:     Social History     Tobacco Use     Smoking status: Never     Passive exposure: Never     Smokeless tobacco: Never   Substance Use Topics     Alcohol use: Yes     Comment: very little       History   Drug Use No         Family History:     The patient's family history is notable for:    Family History   Problem Relation Age of Onset     Lung Cancer Mother 44     Cerebrovascular Disease Father      Hypertension Father      Alcohol/Drug Father      Obesity Niece         niece     Mental Illness Nephew         nephew     Diabetes Other         paternal aunt     Hyperlipidemia Other      Obesity Other         self     Cervical Cancer Maternal Aunt      Ovarian Cancer Maternal Aunt 60     Obesity Other      Diabetes Other         paternal aunt     Hypertension Other         father     Cerebrovascular Disease Other         father     C.A.D. No  family hx of      Breast Cancer No family hx of      Cancer - colorectal No family hx of      Prostate Cancer No family hx of      Melanoma No family hx of          Physical Exam:     Wt 95.7 kg (211 lb)   LMP 03/08/2008   BMI 36.22 kg/m    Body mass index is 36.22 kg/m .      General Appearance: healthy and alert, no distress    Eyes:  Eyes grossly normal to inspection.  No discharge or erythema, or obvious scleral/conjunctival abnormalities.    Respiratory: No audible wheeze, cough, or visible cyanosis.  No visible retractions or increased work of breathing.     Musculoskeletal: extremities non tender and without edema    Skin: no lesions or rashes on visible skin    Neurological: normal gait, no gross defects     Psychiatric: appropriate mood and affect. Mentation appears normal, affect normal/bright, judgement and insight intact, normal speech and appearance well-groomed                            The rest of a comprehensive physical examination is deferred due to video visit restrictions.      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  She presents for follow up and disease management.    30 minutes spent on the date of the encounter doing chart review, history and exam, documentation, and further activities as noted above.           Plan:     1.) Vaginal cancer:  Plan for MD visit ~1 month after completion of chemoradiation therapy for post-therapy visit (virtual or in-person per patient preference), and then 3 months later (4 months post-chemoradiation) with repeat PET/CT and exam to assess diease response (MD, in-person).  Currently scheduled appointments available via My Chart.  Reviewed signs and symptoms for when she should contact the clinic or seek additional care; and contact information of the Gynecologic Oncology Clinic.  Patient to contact the clinic with any questions or concerns in the interim.     Reviewed  "recent labs and weight trends today.     2.) Health maintenance:  Issues addressed today include following up with PCP for annual health maintenance and non-gynecologic issues.     3.)       Urinary retention: gonsalez catheter placed by urology on 7/27/23. Plan for one month return for exchange. Recent UTI; improving symptoms with Bactrim abx.     4.)       Left parotid nodule: message sent to Dr. Stahl to clarify plan  -Addendum 8/1/2023: Per Dr. Stahl, \"We did not discuss the parotic nodule specifically. Since it is favored to be benign (pleomorphic adenoma or Warthin's tumor), I plan to just monitor on follow-up imaging, and at the end of treatment if still present we can consider ultrasound for additional evaluation. The more urgent issue is her metastatic vaginal cancer. \"    5.)        Pelvic pain: overall controlled with APAP and Detrol. Improves after un clamping gonsalez catheter. Reviewed safe dosing of Tylenol and ibuprofen and to call in if pain is not controlled.  Pt prefers to not take opioids.     6.)        Mild reflux: reviewed to keep spacing meals every 2-3 hours. Ok to try Pepcid otc.     7.)        Dizziness: Reviewed sitting to standing slower to prevent any orthostatic hypotension and to only take Zofran for nausea. No HA/blurred vision.             SANDI Wiggins, ARIELLE-BC  Women's Health Nurse Practitioner  Division of Gynecologic Oncology  [facility]            CC  Patient Care Team:  Alba Layton MD as PCP - Jefe Mosley MD as Assigned OBGYN Provider  Alba Layton MD as Assigned PCP  Lakeisha Mackenzie MD as MD (Urology)  Lawrence Banuelos MD as MD (Dermatology)  Lakeisha Mackenzie MD as Assigned Surgical Provider  Lakeisha Mackenzie MD as MD (Urology)  Reina Stahl MD as MD (Gynecologic Oncology)  Marisa Pacheco MD as MD (Radiation Oncology)  Reina Stahl MD as Assigned Cancer Care Provider  Lily Fabian PA-C " as Physician Assistant (Anesthesiology)  SELF, REFERRED          Again, thank you for allowing me to participate in the care of your patient.        Sincerely,        SANDI Munoz CNP

## 2023-08-18 NOTE — NURSING NOTE
Is the patient currently in the state of [place]? YES    Visit mode:VIDEO    If the visit is dropped, the patient can be reconnected by: VIDEO VISIT: Send to e-mail at: [email or web address]    Will anyone else be joining the visit? NO    How would you like to obtain your AVS? MyChart    Are changes needed to the allergy or medication list? Pt stated no changes to allergies and Pt stated no med changes    Reason for visit: CAMILO Martinez LPN

## 2023-08-18 NOTE — NURSING NOTE
Notified the patient to hold on the SP tube catheter and started pyridium to help with bladder spasms   Patient to come in for teaching and catheter care   Lucy Helton, RN, BSN  Care Coordinator Urology  [facility] [place]  Urology [facility]  [phone]

## 2023-08-20 ENCOUNTER — NURSE TRIAGE (OUTPATIENT)
Dept: NURSING | Facility: CLINIC | Age: 68
End: 2023-08-20
Payer: COMMERCIAL

## 2023-08-20 ENCOUNTER — HEALTH MAINTENANCE LETTER (OUTPATIENT)
Age: 68
End: 2023-08-20

## 2023-08-21 ENCOUNTER — INFUSION THERAPY VISIT (OUTPATIENT)
Dept: ONCOLOGY | Facility: CLINIC | Age: 68
End: 2023-08-21
Attending: OBSTETRICS & GYNECOLOGY
Payer: COMMERCIAL

## 2023-08-21 ENCOUNTER — APPOINTMENT (OUTPATIENT)
Dept: RADIATION ONCOLOGY | Facility: CLINIC | Age: 68
End: 2023-08-21
Attending: RADIOLOGY
Payer: COMMERCIAL

## 2023-08-21 ENCOUNTER — APPOINTMENT (OUTPATIENT)
Dept: LAB | Facility: CLINIC | Age: 68
End: 2023-08-21
Attending: OBSTETRICS & GYNECOLOGY
Payer: COMMERCIAL

## 2023-08-21 VITALS
RESPIRATION RATE: 18 BRPM | BODY MASS INDEX: 35.99 KG/M2 | TEMPERATURE: 97.3 F | WEIGHT: 209.7 LBS | HEART RATE: 95 BPM | OXYGEN SATURATION: 96 % | SYSTOLIC BLOOD PRESSURE: 146 MMHG | DIASTOLIC BLOOD PRESSURE: 83 MMHG

## 2023-08-21 DIAGNOSIS — C52 VAGINAL CANCER (H): Primary | ICD-10-CM

## 2023-08-21 DIAGNOSIS — C44.92 SCC (SQUAMOUS CELL CARCINOMA): ICD-10-CM

## 2023-08-21 LAB
ALBUMIN SERPL BCG-MCNC: 3.9 G/DL (ref 3.5–5.2)
ALP SERPL-CCNC: 62 U/L (ref 35–104)
ALT SERPL W P-5'-P-CCNC: 19 U/L (ref 0–50)
ANION GAP SERPL CALCULATED.3IONS-SCNC: 11 MMOL/L (ref 7–15)
AST SERPL W P-5'-P-CCNC: 18 U/L (ref 0–45)
BASOPHILS # BLD AUTO: 0 10E3/UL (ref 0–0.2)
BASOPHILS NFR BLD AUTO: 1 %
BILIRUB SERPL-MCNC: 0.3 MG/DL
BUN SERPL-MCNC: 23.7 MG/DL (ref 8–23)
CALCIUM SERPL-MCNC: 9.2 MG/DL (ref 8.8–10.2)
CHLORIDE SERPL-SCNC: 101 MMOL/L (ref 98–107)
CREAT SERPL-MCNC: 1.09 MG/DL (ref 0.51–0.95)
DEPRECATED HCO3 PLAS-SCNC: 21 MMOL/L (ref 22–29)
EOSINOPHIL # BLD AUTO: 0 10E3/UL (ref 0–0.7)
EOSINOPHIL NFR BLD AUTO: 1 %
ERYTHROCYTE [DISTWIDTH] IN BLOOD BY AUTOMATED COUNT: 13.1 % (ref 10–15)
GFR SERPL CREATININE-BSD FRML MDRD: 55 ML/MIN/1.73M2
GLUCOSE SERPL-MCNC: 122 MG/DL (ref 70–99)
HCT VFR BLD AUTO: 36.3 % (ref 35–47)
HGB BLD-MCNC: 12 G/DL (ref 11.7–15.7)
IMM GRANULOCYTES # BLD: 0.1 10E3/UL
IMM GRANULOCYTES NFR BLD: 1 %
LYMPHOCYTES # BLD AUTO: 0.2 10E3/UL (ref 0.8–5.3)
LYMPHOCYTES NFR BLD AUTO: 5 %
MAGNESIUM SERPL-MCNC: 1.7 MG/DL (ref 1.7–2.3)
MCH RBC QN AUTO: 30.2 PG (ref 26.5–33)
MCHC RBC AUTO-ENTMCNC: 33.1 G/DL (ref 31.5–36.5)
MCV RBC AUTO: 91 FL (ref 78–100)
MONOCYTES # BLD AUTO: 0.4 10E3/UL (ref 0–1.3)
MONOCYTES NFR BLD AUTO: 11 %
NEUTROPHILS # BLD AUTO: 2.9 10E3/UL (ref 1.6–8.3)
NEUTROPHILS NFR BLD AUTO: 81 %
NRBC # BLD AUTO: 0 10E3/UL
NRBC BLD AUTO-RTO: 0 /100
PLATELET # BLD AUTO: 120 10E3/UL (ref 150–450)
POTASSIUM SERPL-SCNC: 4.3 MMOL/L (ref 3.4–5.3)
PROT SERPL-MCNC: 6.1 G/DL (ref 6.4–8.3)
RBC # BLD AUTO: 3.98 10E6/UL (ref 3.8–5.2)
SODIUM SERPL-SCNC: 133 MMOL/L (ref 136–145)
WBC # BLD AUTO: 3.6 10E3/UL (ref 4–11)

## 2023-08-21 PROCEDURE — 96361 HYDRATE IV INFUSION ADD-ON: CPT

## 2023-08-21 PROCEDURE — 96375 TX/PRO/DX INJ NEW DRUG ADDON: CPT

## 2023-08-21 PROCEDURE — 36415 COLL VENOUS BLD VENIPUNCTURE: CPT | Performed by: OBSTETRICS & GYNECOLOGY

## 2023-08-21 PROCEDURE — 258N000003 HC RX IP 258 OP 636: Performed by: OBSTETRICS & GYNECOLOGY

## 2023-08-21 PROCEDURE — 80053 COMPREHEN METABOLIC PANEL: CPT | Performed by: OBSTETRICS & GYNECOLOGY

## 2023-08-21 PROCEDURE — 77386 HC IMRT TREATMENT DELIVERY, COMPLEX: CPT | Performed by: RADIOLOGY

## 2023-08-21 PROCEDURE — 96367 TX/PROPH/DG ADDL SEQ IV INF: CPT

## 2023-08-21 PROCEDURE — 999N000127 HC STATISTIC PERIPHERAL IV START W US GUIDANCE

## 2023-08-21 PROCEDURE — 250N000011 HC RX IP 250 OP 636: Mod: JZ | Performed by: OBSTETRICS & GYNECOLOGY

## 2023-08-21 PROCEDURE — 85025 COMPLETE CBC W/AUTO DIFF WBC: CPT | Performed by: OBSTETRICS & GYNECOLOGY

## 2023-08-21 PROCEDURE — 96413 CHEMO IV INFUSION 1 HR: CPT

## 2023-08-21 PROCEDURE — 83735 ASSAY OF MAGNESIUM: CPT | Performed by: OBSTETRICS & GYNECOLOGY

## 2023-08-21 PROCEDURE — G0463 HOSPITAL OUTPT CLINIC VISIT: HCPCS | Mod: 25

## 2023-08-21 RX ORDER — OXYCODONE HYDROCHLORIDE 5 MG/1
5 TABLET ORAL EVERY 6 HOURS PRN
Qty: 15 TABLET | Refills: 0 | Status: SHIPPED | OUTPATIENT
Start: 2023-08-21 | End: 2023-08-28

## 2023-08-21 RX ORDER — PALONOSETRON 0.05 MG/ML
0.25 INJECTION, SOLUTION INTRAVENOUS ONCE
Status: COMPLETED | OUTPATIENT
Start: 2023-08-21 | End: 2023-08-21

## 2023-08-21 RX ORDER — ACETAMINOPHEN 500 MG
500-1000 TABLET ORAL EVERY 4 HOURS PRN
COMMUNITY
End: 2024-02-22

## 2023-08-21 RX ORDER — DEXTROSE, SODIUM CHLORIDE, AND POTASSIUM CHLORIDE 5; .45; .15 G/100ML; G/100ML; G/100ML
2000 INJECTION INTRAVENOUS ONCE
Status: COMPLETED | OUTPATIENT
Start: 2023-08-21 | End: 2023-08-21

## 2023-08-21 RX ADMIN — SODIUM CHLORIDE 80 MG: 9 INJECTION, SOLUTION INTRAVENOUS at 09:16

## 2023-08-21 RX ADMIN — PALONOSETRON HYDROCHLORIDE 0.25 MG: 0.25 INJECTION INTRAVENOUS at 08:17

## 2023-08-21 RX ADMIN — POTASSIUM CHLORIDE, DEXTROSE MONOHYDRATE AND SODIUM CHLORIDE 2000 ML: 150; 5; 450 INJECTION, SOLUTION INTRAVENOUS at 07:45

## 2023-08-21 RX ADMIN — DEXAMETHASONE SODIUM PHOSPHATE: 10 INJECTION, SOLUTION INTRAMUSCULAR; INTRAVENOUS at 08:23

## 2023-08-21 ASSESSMENT — PAIN SCALES - GENERAL: PAINLEVEL: EXTREME PAIN (9)

## 2023-08-21 NOTE — NURSING NOTE
Chief Complaint   Patient presents with    Blood Draw     Labs drawn via  by RN in lab. VS taken.      Labs collected from venipuncture by RN. Vitals taken. Checked in for appointment(s).    Ana Maria Cornejo RN

## 2023-08-21 NOTE — PATIENT INSTRUCTIONS
Below is Brandy's response to the triage nurse you spoke to on the phone.  Brandy wanted me to print this for you.  She also wants you to discuss the increased bladder spasms with urology to see if your Detrol needs adjustment.  ----------------    Looks like everything was negative for s/s of DVT per your documentation.    I told her safe APAP and Ibuprofen Friday.    Tylenol 1000 mg 3 times over 24 hours  Ibuprofen: 200 to 400 mg every 6 hours as needed  I offered her an opioid Friday for her pain but she declined.Let me know if she changes her mind.  OTC simethicone for gas pain.        Thanks,  SANDI Wiggins, [facility]-BC  Women's Health Nurse Practitioner  Division of Gynecologic Oncology  [facility] [place] Main Line: [phone]    Call triage nurse with chills and/or temperature greater than or equal to 100.4, uncontrolled nausea/vomiting, diarrhea, constipation, dizziness, shortness of breath, chest pain, bleeding, unexplained bruising, or any new/concerning symptoms, questions/concerns.   If you are having any concerning symptoms or wish to speak to a provider before your next infusion visit, please call your care coordinator or triage to notify them so we can adequately serve you.   Triage Nurse Line: [phone]    If after hours, weekends, or holidays [phone]

## 2023-08-21 NOTE — TELEPHONE ENCOUNTER
Patient has chemo tomorrow.  Patient states since starting this treatment she has had leg pains and gas.  Patient is wanting to know how much ibuprofen or tylenol she can have?    Patient denies any leg injury, no swelling, can walk, no fever, pain is on both legs, no blue feet, no rash, no numbness in legs or feet ( possibly some neuropathy in her toes?)  Pain is making sleeping difficult.    Care advise: discuss this at appointment tomorrow.  Ok to take 2 tylenol every 6 hours and 2 ibuprofen every 6 hours.  Call back if pain becomes severe, swelling, infection or any worsening symptoms.    Will route to cancer clinic to follow up with patient tomorrow.    Vivien Patel RN   08/20/23 10:32 PM  [facility] Nurse Advisor      Reason for Disposition   [1] MODERATE pain (e.g., interferes with normal activities, limping) AND [2] present > 3 days    Additional Information   Negative: Looks like a broken bone or dislocated joint (e.g., crooked or deformed)   Negative: Sounds like a life-threatening emergency to the triager   Negative: Followed a leg injury   Negative: Leg swelling is main symptom   Negative: Back pain radiating (shooting) into leg(s)   Negative: Knee pain is main symptom   Negative: Ankle pain is main symptom   Negative: Pregnant   Negative: Postpartum (from 0 to 6 weeks after delivery)   Negative: Chest pain   Negative: Difficulty breathing   Negative: Entire foot is cool or blue in comparison to other side   Negative: Unable to walk   Negative: [1] Red area or streak AND [2] fever   Negative: [1] Swollen joint AND [2] fever   Negative: [1] Cast on leg or ankle AND [2] now increased pain   Negative: Patient sounds very sick or weak to the triager   Negative: [1] SEVERE pain (e.g., excruciating, unable to do any normal activities) AND [2] not improved after 2 hours of pain medicine   Negative: [1] Thigh or calf pain AND [2] only 1 side AND [3] present > 1 hour (Exception: chronic unchanged  "pain)   Negative: [1] Thigh, calf, or ankle swelling AND [2] only 1 side   Negative: [1] Thigh, calf, or ankle swelling AND [2] bilateral AND [3] 1 side is more swollen   Negative: [1] Red area or streak AND [2] large (> 2 in. or 5 cm)   Negative: History of prior \"blood clot\" in leg or lungs (i.e., deep vein thrombosis, pulmonary embolism)   Negative: History of inherited increased risk of blood clots (e.g., Factor 5 Leiden, Anti-thrombin 3, Protein C or Protein S deficiency, Prothrombin mutation)   Negative: Major surgery in past month   Negative: Hip or leg fracture (broken bone) in past month (or had cast on leg or ankle in past month)   Negative: Illness requiring prolonged bedrest in past month (e.g., immobilization, long hospital stay)   Negative: Long-distance travel in past month (e.g., car, bus, train, plane; with trip lasting 6 or more hours)   Negative: Cancer treatment in past six months (or has cancer now)   Negative: [1] Painful rash AND [2] multiple small blisters grouped together (i.e., dermatomal distribution or \"band\" or \"stripe\")   Negative: Looks like a boil, infected sore, deep ulcer or other infected rash (spreading redness, pus)   Negative: [1] Localized rash is very painful AND [2] no fever   Negative: Numbness in a leg or foot (i.e., loss of sensation)   Negative: Localized pain, redness or hard lump along vein    Protocols used: Leg Pain-A-AH    "

## 2023-08-21 NOTE — PROGRESS NOTES
Infusion Nursing Note:  Jessie Tamayo presents today for Cycle 1 Day 22 Cisplat.    Patient seen by provider today: No   present during visit today: Not Applicable.    Note:  Evaluated by Brandy Oden TONY on Friday via video visit.  Had changes over the weekend.   Jessie started to have increased frequency of bladder spasms on Saturday. In addition, and is new, both legs would spasm down to toes along with the bladder spasms. Very painful. When the bladder spasm starts she immediately got to bathroom and would either pass gas or hard stool, which resolved the bladder spasm.  She told me that she was in the bathroom, more than not. Yesterday was pretty bad per patient and . She said her toes feel a little numb, started over weekend, is new. No real leg swelling. She said her legs are normal for her. She started taking Tylenol 1000mg (has had 2-3 times since Saturday), which does seem to help some. She called triage at some point and they had her add Ibuprofen, so she only took that once, which also seemed to help.   She can't tell me how long the spasms in the legs last. She said those don't stop when the bladder spasms do. She's had these spasms hourly today since being up this morning (the spasms woke her up). She also took a couple doses of GasX which seemed to help the gas. She feels well enough for chemo.     Discussed above w/Brandy Oden TONY.   OK for chemo   She should contact urology to discuss any possible dose adjustments in the Detrol.  Brandy placed ibuprofen/tylenol instructions in the triage phone note, I will print that out for Jessie.    Brandy discussed an opioid during visit on Friday, Jessie declined.  If she changes her mind, Brandy will sent.    Below is what Brandy added to triage note.   Looks like everything was negative for s/s of DVT per your documentation.    I told her safe APAP and Ibuprofen Friday.    Tylenol 1000 mg 3 times over 24 hours  Ibuprofen: 200 to 400 mg every 6  hours as needed  I offered her an opioid Friday for her pain but she declined.Let me know if she changes her mind.  OTC simethicone for gas pain.        Thanks,  SANDI Wiggins, [facility]-BC  Women's Health Nurse Practitioner  Division of Gynecologic Oncology  [facility]     Urology nurse did come to infusion to do some teaching on filling her bladder via gonsalez prior to radiation.  Per Jessie, urology will follow up with her regarding increased spasms after they talk with MD.      Jessie did start to have bladder spasms here during the last hour of her infusion.  No leg spasms.    Intravenous Access:  Peripheral IV placed by vascular access using U/S    Treatment Conditions:  Lab Results   Component Value Date    HGB 12.0 08/21/2023    WBC 3.6 (L) 08/21/2023    ANEU 11.4 (H) 11/07/2009    ANEUTAUTO 2.9 08/21/2023     (L) 08/21/2023        Lab Results   Component Value Date     (L) 08/21/2023    POTASSIUM 4.3 08/21/2023    MAG 1.7 08/21/2023    CR 1.09 (H) 08/21/2023    KIM 9.2 08/21/2023    BILITOTAL 0.3 08/21/2023    ALBUMIN 3.9 08/21/2023    ALT 19 08/21/2023    AST 18 08/21/2023       Results reviewed, labs MET treatment parameters, ok to proceed with treatment.      Has gonsalez.  Urine output has increased since arrival.    Post Infusion Assessment:  Patient tolerated infusion without incident.  Blood return noted pre and post infusion.  Site patent and intact, free from redness, edema or discomfort.  No evidence of extravasations.  Access discontinued per protocol.       Discharge Plan:   Prescription refills given for Oxycodone to [facility] per TONY, Jessie states she has Dexamethasone.  Copy of AVS reviewed with patient and/or family.  Patient will return 8/25/23 video visit with TONY, 8/28/23 labs/chemo for next appointment.  Patient discharged in stable condition accompanied by: .  Departure Mode: Wheelchair.      Dori Hood RN

## 2023-08-22 ENCOUNTER — OFFICE VISIT (OUTPATIENT)
Dept: RADIATION ONCOLOGY | Facility: CLINIC | Age: 68
End: 2023-08-22
Attending: RADIOLOGY
Payer: COMMERCIAL

## 2023-08-22 VITALS
BODY MASS INDEX: 35.87 KG/M2 | SYSTOLIC BLOOD PRESSURE: 152 MMHG | HEART RATE: 78 BPM | DIASTOLIC BLOOD PRESSURE: 86 MMHG | WEIGHT: 209 LBS

## 2023-08-22 DIAGNOSIS — C52 VAGINAL CANCER (H): Primary | ICD-10-CM

## 2023-08-22 PROCEDURE — 77386 HC IMRT TREATMENT DELIVERY, COMPLEX: CPT | Performed by: RADIOLOGY

## 2023-08-22 NOTE — PROGRESS NOTES
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 Tamayo presents to clinic today for her weekly on-treatment visit.  She tolerated chemotherapy well.  Since her simulation, she has had an indwelling Thomas catheter placed for urinary obstruction.  We have asked her to clamp the Thomas catheter and drink fluids prior to each radiation treatment to fill her bladder.  She has been having a lot of difficulty with this due to pain and spasms.  She recently started oxycodone and Pyridium and has had better control of her spasms with this regimen.  She is not having diarrhea.    Nursing ROS:   Nutrition Alteration  Diet Type: Patient's Preference  Skin  Skin Reaction: 2 - Moderate to brisk erythema, patchy moist desquamation, mostly confined to skin folds and creases, moderate edema  Skin Progress: Groin redness prescribed  med    Cardiovascular  Respiratory effort: 1 - Normal - without distress  Gastrointestinal  Nausea: 0 - None  Diarrhea: 2 - Three to five soft or liquid bowel movements per day  Genitourinary  Urinary Status: 1 - Increase in frequency or nocturia up to 2x normal   Note: Bladder spasms improved     Pain Assessment  0-10 Pain Scale: 5  Pain Treatment: Oxycocodne     PEG Tube: No  Electronic Cardiac Implant: No    Objective:   BP (!) 152/86   Pulse 78   Wt (P) 97.9 kg (215 lb 14.4 oz)   LMP 2008   BMI (P) 37.06 kg/m    Gen: Appears well, NAD  Skin: Moderate erythema in the inguinal folds with scalloped margins suggestive of  yeast infection, perianal skin with minimal erythema  Indwelling Thomas catheter  Extremities: No edema    Laboratory:  Lab Results   Component Value Date    WBC 3.6 (L) 08/21/2023    HGB 12.0 08/21/2023    HCT 36.3 08/21/2023    MCV 91 08/21/2023     (L) 08/21/2023     Lab Results   Component Value Date     (L) 08/21/2023    POTASSIUM 4.3 08/21/2023    CHLORIDE 101 08/21/2023    CO2 21 (L) 08/21/2023     (H) 08/21/2023     Magnesium   Date Value Ref Range Status   08/21/2023 1.7 1.7 - 2.3 mg/dL Final       Treatment-related toxicities (CTCAE v5.0):  Anorexia: Grade 1: Loss of appetite without alteration in eating habits  Fatigue: Grade 1: Fatigue relieved by rest  Nausea: Grade 0: No toxicity  Pain: Grade 2: Moderate pain; limiting instrumental ADL  Diarrhea: Grade 0: No toxicity  Urinary tract pain: Grade 2: Moderate pain; limiting instrumental ADL  Dermatitis: Grade 1: Faint erythema or dry desquamation    ED visits/Hospitalizations: None    Missed Treatments: None    Mosaiq chart and setup information reviewed  IGRT images reviewed    Medication Review  Med list reviewed with patient?: Yes    Assessment:    Ms. 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  Bladder spasms have improved with Pyridium and oxycodone.      Plan:   1.  Continue treatment as planned  2.  Continue oxycodone and Pyridium  3.  Discussed sitz baths, barrier cream and low fiber diet.  4.  Will likely need her Thomas catheter changed as it has been indwelling for approximately 30 days.      Mraisa Pacheco  Department of Radiation Oncology  [facility]

## 2023-08-22 NOTE — LETTER
2023         RE: Jessie Tamayo  [address] [address]        Dear Colleague,    Thank you for referring your patient, Jessie Tamayo, to the [facility] RADIATION ONCOLOGY. Please see a copy of my visit note below.    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 Tamayo presents to clinic today for her weekly on-treatment visit.  She tolerated chemotherapy well.  Since her simulation, she has had an indwelling Thomas catheter placed for urinary obstruction.  We have asked her to clamp the Thomas catheter and drink fluids prior to each radiation treatment to fill her bladder.  She has been having a lot of difficulty with this due to pain and spasms.  She recently started oxycodone and Pyridium and has had better control of her spasms with this regimen.  She is not having diarrhea.    Nursing ROS:   Nutrition Alteration  Diet Type: Patient's Preference  Skin  Skin Reaction: 2 - Moderate to brisk erythema, patchy moist desquamation, mostly confined to skin folds and creases, moderate edema  Skin Progress: Groin redness prescribed  med    Cardiovascular  Respiratory effort: 1 - Normal - without distress  Gastrointestinal  Nausea: 0 - None  Diarrhea: 2 - Three to five soft or liquid bowel movements per day  Genitourinary  Urinary Status: 1 - Increase in frequency or nocturia up to 2x normal   Note: Bladder spasms improved     Pain Assessment  0-10 Pain Scale: 5  Pain Treatment: Oxycocodne     PEG Tube:  No  Electronic Cardiac Implant: No    Objective:   BP (!) 152/86   Pulse 78   Wt (P) 97.9 kg (215 lb 14.4 oz)   LMP 03/08/2008   BMI (P) 37.06 kg/m    Gen: Appears well, NAD  Skin: Moderate erythema in the inguinal folds with scalloped margins suggestive of yeast infection, perianal skin with minimal erythema  Indwelling Thomas catheter  Extremities: No edema    Laboratory:  Lab Results   Component Value Date    WBC 3.6 (L) 08/21/2023    HGB 12.0 08/21/2023    HCT 36.3 08/21/2023    MCV 91 08/21/2023     (L) 08/21/2023     Lab Results   Component Value Date     (L) 08/21/2023    POTASSIUM 4.3 08/21/2023    CHLORIDE 101 08/21/2023    CO2 21 (L) 08/21/2023     (H) 08/21/2023     Magnesium   Date Value Ref Range Status   08/21/2023 1.7 1.7 - 2.3 mg/dL Final       Treatment-related toxicities (CTCAE v5.0):  Anorexia: Grade 1: Loss of appetite without alteration in eating habits  Fatigue: Grade 1: Fatigue relieved by rest  Nausea: Grade 0: No toxicity  Pain: Grade 2: Moderate pain; limiting instrumental ADL  Diarrhea: Grade 0: No toxicity  Urinary tract pain: Grade 2: Moderate pain; limiting instrumental ADL  Dermatitis: Grade 1: Faint erythema or dry desquamation    ED visits/Hospitalizations: None    Missed Treatments: None    Mosaiq chart and setup information reviewed  IGRT images reviewed    Medication Review  Med list reviewed with patient?: Yes    Assessment:    Ms. 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  Bladder spasms have improved with Pyridium and oxycodone.      Plan:   1.  Continue treatment as planned  2.  Continue oxycodone and Pyridium  3.  Discussed sitz baths, barrier cream and low fiber diet.  4.  Will likely need her Thomas catheter changed as it has been indwelling for approximately 30 days.      Marisa Pacheco  Department of Radiation Oncology  [facility]                 Again, thank you for allowing  me to participate in the care of your patient.        Sincerely,        Marisa Pacheco MD

## 2023-08-23 ENCOUNTER — APPOINTMENT (OUTPATIENT)
Dept: RADIATION ONCOLOGY | Facility: CLINIC | Age: 68
End: 2023-08-23
Attending: RADIOLOGY
Payer: COMMERCIAL

## 2023-08-23 PROCEDURE — 77386 HC IMRT TREATMENT DELIVERY, COMPLEX: CPT | Performed by: RADIOLOGY

## 2023-08-23 PROCEDURE — 77014 PR CT GUIDE FOR PLACEMENT RADIATION THERAPY FIELDS: CPT | Mod: 26 | Performed by: RADIOLOGY

## 2023-08-24 ENCOUNTER — APPOINTMENT (OUTPATIENT)
Dept: RADIATION ONCOLOGY | Facility: CLINIC | Age: 68
End: 2023-08-24
Attending: RADIOLOGY
Payer: COMMERCIAL

## 2023-08-24 PROCEDURE — 77014 PR CT GUIDE FOR PLACEMENT RADIATION THERAPY FIELDS: CPT | Mod: 26 | Performed by: RADIOLOGY

## 2023-08-24 PROCEDURE — 77386 HC IMRT TREATMENT DELIVERY, COMPLEX: CPT | Performed by: RADIOLOGY

## 2023-08-25 ENCOUNTER — VIRTUAL VISIT (OUTPATIENT)
Dept: ONCOLOGY | Facility: CLINIC | Age: 68
End: 2023-08-25
Attending: OBSTETRICS & GYNECOLOGY
Payer: COMMERCIAL

## 2023-08-25 ENCOUNTER — APPOINTMENT (OUTPATIENT)
Dept: RADIATION ONCOLOGY | Facility: CLINIC | Age: 68
End: 2023-08-25
Attending: RADIOLOGY
Payer: COMMERCIAL

## 2023-08-25 VITALS — HEIGHT: 65 IN | BODY MASS INDEX: 35.44 KG/M2

## 2023-08-25 DIAGNOSIS — Z51.11 ENCOUNTER FOR ANTINEOPLASTIC CHEMOTHERAPY: ICD-10-CM

## 2023-08-25 DIAGNOSIS — Z97.8 FOLEY CATHETER IN PLACE: ICD-10-CM

## 2023-08-25 DIAGNOSIS — C52 VAGINAL CANCER (H): Primary | ICD-10-CM

## 2023-08-25 PROCEDURE — 77336 RADIATION PHYSICS CONSULT: CPT | Performed by: RADIOLOGY

## 2023-08-25 PROCEDURE — 77386 HC IMRT TREATMENT DELIVERY, COMPLEX: CPT | Performed by: RADIOLOGY

## 2023-08-25 PROCEDURE — 77014 PR CT GUIDE FOR PLACEMENT RADIATION THERAPY FIELDS: CPT | Mod: 26 | Performed by: RADIOLOGY

## 2023-08-25 PROCEDURE — 77427 RADIATION TX MANAGEMENT X5: CPT | Performed by: RADIOLOGY

## 2023-08-25 PROCEDURE — 99214 OFFICE O/P EST MOD 30 MIN: CPT | Mod: VID | Performed by: OBSTETRICS & GYNECOLOGY

## 2023-08-25 ASSESSMENT — PAIN SCALES - GENERAL: PAINLEVEL: NO PAIN (0)

## 2023-08-25 NOTE — NURSING NOTE
Is the patient currently in the state of [place]? YES    Visit mode:VIDEO    If the visit is dropped, the patient can be reconnected by: VIDEO VISIT: Text to cell phone:   Telephone Information:   Mobile [phone]       Will anyone else be joining the visit? NO  (If patient encounters technical issues they should call [phone]    How would you like to obtain your AVS? MyChart    Are changes needed to the allergy or medication list? Pt stated no changes to allergies and Pt stated no med changes    Reason for visit: CAMILO KWON

## 2023-08-25 NOTE — PROGRESS NOTES
Virtual Visit Details    Type of service:  Video Visit   Video Start Time: 1331  Video End Time: 1552    Originating Location (pt. Location): home    Distant Location (provider location):  provider  Platform used for Video Visit: 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  She presents for follow up and disease management.    Oncology History:  07, 10/15/08, 09, 11: Pap test NILM.      1/23/15: Pap test NILM, hrHPV16+.  2/27/15: Cervical polyp & cervical biopsy pathology: Negative for dysplasia/malignancy.     16: Pap test NILM, hrHPV16+.   16: Endocervical curettings pathology negative for dysplasia/malignancy.     17: Pap test NILM, hrHPV16+.   -Colposcopy recommended, not performed.      18:   -Pap test ASCUS, hrHPV16+.   -Endocervical curettings & cervical biopsy pathology: negative for dysplasia/malignancy.      19:  Pap test ASC-H, hrHPV16+.   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      9/10/20: Pap test ASC-H, hrHPV+ (NOS).  21: LEEP.   -Pathology: LEEP & endocervical curettings: negative for dysplasia/malignancy.     3/23/22: Endocervical curettings & cervical biopsy pathology: negative for dysplasia/malignancy.     3/28/23:   -Pap test HSIL, hrHPV16+.   -Findings by gynecologist Dr. Koenig: External genitalia with erythematous plaques bilaterally  Urethra- mid position and well supported, suburethral tissue thickened and friable with bleeding elicited after placement of the speculum  Vagina is stenotic and cervix difficult to see.   23: Cystourethroscopy, vaginal biopsy by urologist Dr. Mackenzie.   -Findings: Exam revealed lichenified changes to bilateral labia majora and friable vestibular  tissue (patient had significant bleeding from prep alone.) The urethra was somewhat stenotic and would not accommodate 19Fr rigid cystoscope, therefore a 16 Fr flexible cystoscope was inserted. Gentle pressure was required to access the bladder. The ureteral orifices were in their orthotopic positions bilaterally. There were no intravesical stones or diverticuli and the bladder was mildly trabeculated. There were no intravesical lesions. See media tab for urethral pictures - there were no obvious lesions but the entire distal urethra was erythematous and stenosed (16Fr.)  -Pathology: Invasive moderately-differentiated squamous cell carcinoma, HPV-associated, with focus suspicious for lymphovascular space invasion; IHC: p16+.    6/8/23: Gynecologic Oncology consultation.  -Findings:   External genitalia notable for erythema and desquamation of the posterior medial labia, c/w lichen sclerosus changes. When the labia are , a friable mass is visible at the distal anterior vagina, just posterior to the urethra. On bimanual exam, the cervix is small and normal in contour. The palpable vaginal tumor is limited to the anterior distal vagina, approximately 4-5 cm laterally x 3 cm cranio-caudal. Tumor is friable. Remainder of the vagina smooth to palpation. Digital anorectal exam is without nodularity. No palpable tumor in the rectovaginal septum.   Enlarged firm, fixed right inguinal lymph node ~3-4 cm in size.     6/16/23: Pelvic MRI  IMPRESSION:   1. Irregular enhancing lesion along the anterior vaginal wall at the  level of the introitus measuring 2.5 x 0.9 cm with irregular  enhancement along the anterior vaginal wall towards the level of the  vaginal cuff however there is no definitive evidence or abnormal  signal intensity involving the cervix to suggest invasion.  2. Right inguinal lymphadenopathy compatible with metastatic disease.   3. There is some asymmetric enhancement involving the left  sacrum,  incompletely evaluated on this study. Further evaluation of this and  further staging of the chest, abdomen and pelvis will be performed on  PET/CT scheduled for 6/20/2023.    6/20/23: PET CT  IMPRESSION:   1. Intense focal hypermetabolic FDG uptake in biopsy-proven vaginal  squamous cell carcinoma.  2. Multiple enlarged, hypermetabolic right inguinal nodes likely  represent regional metastasis. No definite evidence of distant  metastatic disease.  3. Scattered sub-4 mm solid pulmonary nodules are below the size  threshold for characterization on PET. Attention on follow-up with CT.  4. 1.0 cm enhancing left parotid gland nodule with hypermetabolic  uptake could represent a primary parotid neoplasm such as pleomorphic  adenoma or Warthin's tumor; consider ultrasound for further  evaluation.  5. 1.3 cm hypoattenuating right thyroid nodule with mild FDG uptake  warrants ultrasound for characterization.     Plan: Cisplatin radiation      7/27/2023: urinary gonsalez catheter placement. Treat acute cystitis with bactrim per Urology.     8/1/2023: plan C1D1 Cisplatin radiation           Oxycodone helping pain with bladder filling. Continues Detrol and pyridium per Urology. Will have pain prior to a bowel movement. Some constipation and diarrhea. Taking imodium and colace prn.     Urine much improved clear and orange/yellow (on pyridium). Completed Bactrim 8/23. No fevers or chills. Rare intermittent tinnitus (resolved now). Hearing the same. Overall eating and drinking is stable. Reports bottom of toes are numb no pain. No neuropathy in the hands.     Nausea controlled with Zofran. Eating and drinking ok. Rash resolving (see note from 8/14) groin rash improving with Lotrimin per Dr. Pacheco.               Past Medical History:    Past Medical History:   Diagnosis Date    Actinic keratosis     Hyperlipemia     Lichen sclerosus 2020    Osteopenias     SCC (squamous cell carcinoma) 7/26/2023         Past Surgical  History:    Past Surgical History:   Procedure Laterality Date    COLONOSCOPY  2006    normal    COLPOSCOPY, BIOPSY, COMBINED N/A 02/08/2021    Procedure: COLPOSCOPY, WITH BIOPSY, LEEP procedure;  Surgeon: Jefe Koenig MD;  Location: [facility] OR    CYSTOSCOPY N/A 05/30/2023    Procedure: CYSTOSCOPY AND EXCISIONAL BIOPSY OF THE VAGINAL TISSUE AROUND THE URETHRA.  (look in the bladder and sample small area in the vagina near the urethra);  Surgeon: Lakeisha Mackenzie MD;  [place] [facility] [place]    EYE SURGERY      cataracts bilateral    OPEN REDUCTION INTERNAL FIXATION FOREARM  07/31/2012    Procedure: OPEN REDUCTION INTERNAL FIXATION FOREARM;  Open Reduction Internal Fixation, Irrigation & Debridment  of Right Distal Radius, application short arm splint;  Surgeon: Wade Beard MD;  Location:  OR    TONSILLECTOMY & ADENOIDECTOMY  1960    [facility] TREAT ECTOPIC PREG,RMV TUBE/OVARY  1985    ectopic, right side-ovary and tube removed         Health Maintenance Due   Topic Date Due    MEDICARE ANNUAL WELLNESS VISIT  05/26/2023    COLPOSCOPY  06/28/2023    INFLUENZA VACCINE (1) 09/01/2023       Current Medications:     Current Outpatient Medications   Medication Sig Dispense Refill    acetaminophen (TYLENOL) 500 MG tablet Take 500-1,000 mg by mouth every 6 hours as needed for mild pain      clotrimazole (LOTRIMIN) 1 % external cream Apply topically 2 times daily To affected areas 45 g 3    dexamethasone (DECADRON) 4 MG tablet Take 2 tablets (8 mg) by mouth daily Take for 3 days, starting the day after chemo on day 2 and 9. Take with food. If no nausea and vomiting with first cisplatin doses, may stop dexamethasone. 12 tablet 2    docusate sodium (COLACE) 100 MG capsule Take 100 mg by mouth daily      ondansetron (ZOFRAN) 8 MG tablet Take 1 tablet (8 mg) by mouth every 8 hours as needed for nausea (vomiting) Do not take for 3 days after chemo. 30 tablet 2    oxyCODONE (ROXICODONE) 5 MG tablet Take 1 tablet (5 mg)  "by mouth every 6 hours as needed for severe pain 15 tablet 0    phenazopyridine (PYRIDIUM) 200 MG tablet Take 1 tablet (200 mg) by mouth 3 times daily as needed for irritation 21 tablet 1    prochlorperazine (COMPAZINE) 10 MG tablet Take 1 tablet (10 mg) by mouth every 6 hours as needed for nausea or vomiting 30 tablet 2    sulfamethoxazole-trimethoprim (BACTRIM DS) 800-160 MG tablet Take 1 tablet by mouth 2 times daily 14 tablet 0    tolterodine (DETROL) 2 MG tablet Take 1 tablet (2 mg) by mouth 2 times daily 60 tablet 3    triamcinolone (KENALOG) 0.1 % external cream Apply topically 2 times daily 15 g 1         Allergies:        Allergies   Allergen Reactions    Oxybutynin Itching    Seasonal Allergies         Social History:     Social History     Tobacco Use    Smoking status: Never     Passive exposure: Never    Smokeless tobacco: Never   Substance Use Topics    Alcohol use: Yes     Comment: very little       History   Drug Use No         Family History:     The patient's family history is notable for:    Family History   Problem Relation Age of Onset    Lung Cancer Mother 44    Cerebrovascular Disease Father     Hypertension Father     Alcohol/Drug Father     Obesity Niece         niece    Mental Illness Nephew         nephew    Diabetes Other         paternal aunt    Hyperlipidemia Other     Obesity Other         self    Cervical Cancer Maternal Aunt     Ovarian Cancer Maternal Aunt 60    Obesity Other     Diabetes Other         paternal aunt    Hypertension Other         father    Cerebrovascular Disease Other         father    C.A.D. No family hx of     Breast Cancer No family hx of     Cancer - colorectal No family hx of     Prostate Cancer No family hx of     Melanoma No family hx of          Physical Exam:     Ht 1.638 m (5' 4.5\")   LMP 03/08/2008   BMI (P) 36.49 kg/m    Body mass index is 36.49 kg/m  (pended).      General Appearance: healthy and alert, no distress    Eyes:  Eyes grossly normal to " inspection.  No discharge or erythema, or obvious scleral/conjunctival abnormalities.    Respiratory: No audible wheeze, cough, or visible cyanosis.  No visible retractions or increased work of breathing.     Musculoskeletal: extremities non tender and without edema    Skin: no lesions or rashes on visible skin    Neurological: normal gait, no gross defects     Psychiatric: appropriate mood and affect. Mentation appears normal, affect normal/bright, judgement and insight intact, normal speech and appearance well-groomed                            The rest of a comprehensive physical examination is deferred due to video visit restrictions.      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  She presents for follow up and disease management.    30 minutes spent on the date of the encounter doing chart review, history and exam, documentation, and further activities as noted above.           Plan:     1.) Vaginal cancer:  Plan for MD visit ~1 month after completion of chemoradiation therapy for post-therapy visit (virtual or in-person per patient preference), and then 3 months later (4 months post-chemoradiation) with repeat PET/CT and exam to assess diease response (MD, in-person).  Currently scheduled appointments available via My Chart.  Reviewed signs and symptoms for when she should contact the clinic or seek additional care; and contact information of the Gynecologic Oncology Clinic.  Patient to contact the clinic with any questions or concerns in the interim.     Reviewed recent labs and weight trends today.     2.) Health maintenance:  Issues addressed today include following up with PCP for annual health maintenance and non-gynecologic issues.     3.)       Urinary retention: gonsalez catheter placed by urology on 7/27/23. Plan for one month return for exchange.    4.)       Left parotid nodule: message sent to Dr. Stahl to clarify  "plan  -Addendum 8/1/2023: Per Dr. Stahl, \"We did not discuss the parotic nodule specifically. Since it is favored to be benign (pleomorphic adenoma or Warthin's tumor), I plan to just monitor on follow-up imaging, and at the end of treatment if still present we can consider ultrasound for additional evaluation. The more urgent issue is her metastatic vaginal cancer. \"    5.)        Pelvic pain: overall controlled with Oxycodone, APAP and Detrol/pyridium. Improves after un clamping gonsalez catheter.     6.)        Dizziness: Reviewed sitting to standing slower to prevent any orthostatic hypotension and to only take Zofran for nausea. No HA/blurred vision.     7.)         Mild intermittent tinnitus: if continues, consider audiogram. Pt will see NP on 9/1 to review. At this point, resolved.         SANDI Wiggins, NP-BC  Women's Health Nurse Practitioner  Division of Gynecologic Oncology  [facility]            CC  Patient Care Team:  Alba Layton MD as PCP - General  LibertyJefe morejon MD as Assigned OBGYN Provider  Alba Layton MD as Assigned PCP  Lakeisha Mackenzie MD as MD (Urology)  Lawrence Banuelos MD as MD (Dermatology)  Lakeisha Mackenzie MD as Assigned Surgical Provider  Lakeisha Mackenzie MD as MD (Urology)  Reina Stahl MD as MD (Gynecologic Oncology)  Marisa Pacheco MD as MD (Radiation Oncology)  Reina Stahl MD as Assigned Cancer Care Provider  Lily Fabian PA-C as Physician Assistant (Anesthesiology)  SELF, REFERRED      "

## 2023-08-25 NOTE — LETTER
2023         RE: Jessie Tamayo  [address] [facility] [id number]        Dear Colleague,    Thank you for referring your patient, Jessie Tamayo, to the [facility]. Please see a copy of my visit note below.    Virtual Visit Details    Type of service:  Video Visit   Video Start Time: 1331  Video End Time: 1552    Originating Location (pt. 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  She presents for follow up and disease management.    Oncology History:  07, 10/15/08, 09, 11: Pap test NILM.      1/23/15: Pap test NILM, hrHPV16+.  2/27/15: Cervical polyp & cervical biopsy pathology: Negative for dysplasia/malignancy.     16: Pap test NILM, hrHPV16+.   16: Endocervical curettings pathology negative for dysplasia/malignancy.     17: Pap test NILM, hrHPV16+.   -Colposcopy recommended, not performed.      18:   -Pap test ASCUS, hrHPV16+.   -Endocervical curettings & cervical biopsy pathology: negative for dysplasia/malignancy.      19:  Pap test ASC-H, hrHPV16+.   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      9/10/20: Pap test ASC-H, hrHPV+ (NOS).  21: LEEP.   -Pathology: LEEP & endocervical curettings: negative for dysplasia/malignancy.     3/23/22: Endocervical curettings & cervical biopsy pathology: negative for dysplasia/malignancy.     3/28/23:   -Pap test HSIL, hrHPV16+.   -Findings by gynecologist Dr. Koenig: External genitalia with erythematous plaques bilaterally  Urethra- mid position and well supported, suburethral tissue thickened and friable with bleeding  elicited after placement of the speculum  Vagina is stenotic and cervix difficult to see.   5/30/23: Cystourethroscopy, vaginal biopsy by urologist Dr. Mackenzie.   -Findings: Exam revealed lichenified changes to bilateral labia majora and friable vestibular tissue (patient had significant bleeding from prep alone.) The urethra was somewhat stenotic and would not accommodate 19Fr rigid cystoscope, therefore a 16 Fr flexible cystoscope was inserted. Gentle pressure was required to access the bladder. The ureteral orifices were in their orthotopic positions bilaterally. There were no intravesical stones or diverticuli and the bladder was mildly trabeculated. There were no intravesical lesions. See media tab for urethral pictures - there were no obvious lesions but the entire distal urethra was erythematous and stenosed (16Fr.)  -Pathology: Invasive moderately-differentiated squamous cell carcinoma, HPV-associated, with focus suspicious for lymphovascular space invasion; IHC: p16+.    6/8/23: Gynecologic Oncology consultation.  -Findings:   External genitalia notable for erythema and desquamation of the posterior medial labia, c/w lichen sclerosus changes. When the labia are , a friable mass is visible at the distal anterior vagina, just posterior to the urethra. On bimanual exam, the cervix is small and normal in contour. The palpable vaginal tumor is limited to the anterior distal vagina, approximately 4-5 cm laterally x 3 cm cranio-caudal. Tumor is friable. Remainder of the vagina smooth to palpation. Digital anorectal exam is without nodularity. No palpable tumor in the rectovaginal septum.   Enlarged firm, fixed right inguinal lymph node ~3-4 cm in size.     6/16/23: Pelvic MRI  IMPRESSION:   1. Irregular enhancing lesion along the anterior vaginal wall at the  level of the introitus measuring 2.5 x 0.9 cm with irregular  enhancement along the anterior vaginal wall towards the level of the  vaginal cuff  however there is no definitive evidence or abnormal  signal intensity involving the cervix to suggest invasion.  2. Right inguinal lymphadenopathy compatible with metastatic disease.   3. There is some asymmetric enhancement involving the left sacrum,  incompletely evaluated on this study. Further evaluation of this and  further staging of the chest, abdomen and pelvis will be performed on  PET/CT scheduled for 6/20/2023.    6/20/23: PET CT  IMPRESSION:   1. Intense focal hypermetabolic FDG uptake in biopsy-proven vaginal  squamous cell carcinoma.  2. Multiple enlarged, hypermetabolic right inguinal nodes likely  represent regional metastasis. No definite evidence of distant  metastatic disease.  3. Scattered sub-4 mm solid pulmonary nodules are below the size  threshold for characterization on PET. Attention on follow-up with CT.  4. 1.0 cm enhancing left parotid gland nodule with hypermetabolic  uptake could represent a primary parotid neoplasm such as pleomorphic  adenoma or Warthin's tumor; consider ultrasound for further  evaluation.  5. 1.3 cm hypoattenuating right thyroid nodule with mild FDG uptake  warrants ultrasound for characterization.     Plan: Cisplatin radiation      7/27/2023: urinary gonsalez catheter placement. Treat acute cystitis with bactrim per Urology.     8/1/2023: plan C1D1 Cisplatin radiation           Oxycodone helping pain with bladder filling. Continues Detrol and pyridium per Urology. Will have pain prior to a bowel movement. Some constipation and diarrhea. Taking imodium and colace prn.     Urine much improved clear and orange/yellow (on pyridium). Completed Bactrim 8/23. No fevers or chills. Rare intermittent tinnitus (resolved now). Hearing the same. Overall eating and drinking is stable. Reports bottom of toes are numb no pain. No neuropathy in the hands.     Nausea controlled with Zofran. Eating and drinking ok. Rash resolving (see note from 8/14) groin rash improving with Lotrimin  per Dr. Pacheco.               Past Medical History:    Past Medical History:   Diagnosis Date     Actinic keratosis      Hyperlipemia      Lichen sclerosus 2020     Osteopenias      SCC (squamous cell carcinoma) 7/26/2023         Past Surgical History:    Past Surgical History:   Procedure Laterality Date     COLONOSCOPY  2006    normal     COLPOSCOPY, BIOPSY, COMBINED N/A 02/08/2021    Procedure: COLPOSCOPY, WITH BIOPSY, LEEP procedure;  Surgeon: Jefe Koenig MD;  Location: [facility] OR     CYSTOSCOPY N/A 05/30/2023    Procedure: CYSTOSCOPY AND EXCISIONAL BIOPSY OF THE VAGINAL TISSUE AROUND THE URETHRA.  (look in the bladder and sample small area in the vagina near the urethra);  Surgeon: Lakeisha Mackenzie MD;  [place] [facility] [place]     EYE SURGERY      cataracts bilateral     OPEN REDUCTION INTERNAL FIXATION FOREARM  07/31/2012    Procedure: OPEN REDUCTION INTERNAL FIXATION FOREARM;  Open Reduction Internal Fixation, Irrigation & Debridment  of Right Distal Radius, application short arm splint;  Surgeon: Wade Beard MD;  Location: [facility]     TONSILLECTOMY & ADENOIDECTOMY  1960     [facility] TREAT ECTOPIC PREG,RMV TUBE/OVARY  1985    ectopic, right side-ovary and tube removed         Health Maintenance Due   Topic Date Due     MEDICARE ANNUAL WELLNESS VISIT  05/26/2023     COLPOSCOPY  06/28/2023     INFLUENZA VACCINE (1) 09/01/2023       Current Medications:     Current Outpatient Medications   Medication Sig Dispense Refill     acetaminophen (TYLENOL) 500 MG tablet Take 500-1,000 mg by mouth every 6 hours as needed for mild pain       clotrimazole (LOTRIMIN) 1 % external cream Apply topically 2 times daily To affected areas 45 g 3     dexamethasone (DECADRON) 4 MG tablet Take 2 tablets (8 mg) by mouth daily Take for 3 days, starting the day after chemo on day 2 and 9. Take with food. If no nausea and vomiting with first cisplatin doses, may stop dexamethasone. 12 tablet 2     docusate sodium (COLACE)  100 MG capsule Take 100 mg by mouth daily       ondansetron (ZOFRAN) 8 MG tablet Take 1 tablet (8 mg) by mouth every 8 hours as needed for nausea (vomiting) Do not take for 3 days after chemo. 30 tablet 2     oxyCODONE (ROXICODONE) 5 MG tablet Take 1 tablet (5 mg) by mouth every 6 hours as needed for severe pain 15 tablet 0     phenazopyridine (PYRIDIUM) 200 MG tablet Take 1 tablet (200 mg) by mouth 3 times daily as needed for irritation 21 tablet 1     prochlorperazine (COMPAZINE) 10 MG tablet Take 1 tablet (10 mg) by mouth every 6 hours as needed for nausea or vomiting 30 tablet 2     sulfamethoxazole-trimethoprim (BACTRIM DS) 800-160 MG tablet Take 1 tablet by mouth 2 times daily 14 tablet 0     tolterodine (DETROL) 2 MG tablet Take 1 tablet (2 mg) by mouth 2 times daily 60 tablet 3     triamcinolone (KENALOG) 0.1 % external cream Apply topically 2 times daily 15 g 1         Allergies:        Allergies   Allergen Reactions     Oxybutynin Itching     Seasonal Allergies         Social History:     Social History     Tobacco Use     Smoking status: Never     Passive exposure: Never     Smokeless tobacco: Never   Substance Use Topics     Alcohol use: Yes     Comment: very little       History   Drug Use No         Family History:     The patient's family history is notable for:    Family History   Problem Relation Age of Onset     Lung Cancer Mother 44     Cerebrovascular Disease Father      Hypertension Father      Alcohol/Drug Father      Obesity Niece         niece     Mental Illness Nephew         nephew     Diabetes Other         paternal aunt     Hyperlipidemia Other      Obesity Other         self     Cervical Cancer Maternal Aunt      Ovarian Cancer Maternal Aunt 60     Obesity Other      Diabetes Other         paternal aunt     Hypertension Other         father     Cerebrovascular Disease Other         father     C.A.D. No family hx of      Breast Cancer No family hx of      Cancer - colorectal No family hx  "of      Prostate Cancer No family hx of      Melanoma No family hx of          Physical Exam:     Ht 1.638 m (5' 4.5\")   LMP 03/08/2008   BMI (P) 36.49 kg/m    Body mass index is 36.49 kg/m  (pended).      General Appearance: healthy and alert, no distress    Eyes:  Eyes grossly normal to inspection.  No discharge or erythema, or obvious scleral/conjunctival abnormalities.    Respiratory: No audible wheeze, cough, or visible cyanosis.  No visible retractions or increased work of breathing.     Musculoskeletal: extremities non tender and without edema    Skin: no lesions or rashes on visible skin    Neurological: normal gait, no gross defects     Psychiatric: appropriate mood and affect. Mentation appears normal, affect normal/bright, judgement and insight intact, normal speech and appearance well-groomed                            The rest of a comprehensive physical examination is deferred due to video visit restrictions.      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  She presents for follow up and disease management.    30 minutes spent on the date of the encounter doing chart review, history and exam, documentation, and further activities as noted above.           Plan:     1.) Vaginal cancer:  Plan for MD visit ~1 month after completion of chemoradiation therapy for post-therapy visit (virtual or in-person per patient preference), and then 3 months later (4 months post-chemoradiation) with repeat PET/CT and exam to assess diease response (MD, in-person).  Currently scheduled appointments available via My Chart.  Reviewed signs and symptoms for when she should contact the clinic or seek additional care; and contact information of the Gynecologic Oncology Clinic.  Patient to contact the clinic with any questions or concerns in the interim.     Reviewed recent labs and weight trends today.     2.) Health maintenance:  Issues " "addressed today include following up with PCP for annual health maintenance and non-gynecologic issues.     3.)       Urinary retention: gonsalez catheter placed by urology on 7/27/23. Plan for one month return for exchange.    4.)       Left parotid nodule: message sent to Dr. Stahl to clarify plan  -Addendum 8/1/2023: Per Dr. Stahl, \"We did not discuss the parotic nodule specifically. Since it is favored to be benign (pleomorphic adenoma or Warthin's tumor), I plan to just monitor on follow-up imaging, and at the end of treatment if still present we can consider ultrasound for additional evaluation. The more urgent issue is her metastatic vaginal cancer. \"    5.)        Pelvic pain: overall controlled with Oxycodone, APAP and Detrol/pyridium. Improves after un clamping gonsalez catheter.     6.)        Dizziness: Reviewed sitting to standing slower to prevent any orthostatic hypotension and to only take Zofran for nausea. No HA/blurred vision.     7.)         Mild intermittent tinnitus: if continues, consider audiogram. Pt will see NP on 9/1 to review. At this point, resolved.         SANDI Wiggins, NP-BC  Women's Health Nurse Practitioner  Division of Gynecologic Oncology  [facility]            CC  Patient Care Team:  Alba Layton MD as PCP - Jefe Mosley MD as Assigned OBGYN Provider  Alba Layton MD as Assigned PCP  Lakeisha Mackenzie MD as MD (Urology)  Lawrence Banuelos MD as MD (Dermatology)  Lakeisha Mackenzie MD as Assigned Surgical Provider  Lakeisha Mackenzie MD as MD (Urology)  Reina Stahl MD as MD (Gynecologic Oncology)  Marisa Pacheco MD as MD (Radiation Oncology)  Reina Stahl MD as Assigned Cancer Care Provider  Lily Fabian PA-C as Physician Assistant (Anesthesiology)  SELF, REFERRED        Again, thank you for allowing me to participate in the care of your patient.        Sincerely,        SANDI Munoz " CNP

## 2023-08-25 NOTE — LETTER
2023         RE: Jessie Tamayo  [address] [facility], [address] [id number]        Dear Colleague,    Thank you for referring your patient, Jessie Tamayo, to the [facility]. Please see a copy of my visit note below.    Virtual Visit Details    Type of service:  Video Visit   Video Start Time: 1331  Video End Time: 1552    Originating Location (pt. 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  She presents for follow up and disease management.    Oncology History:  07, 10/15/08, 09, 11: Pap test NILM.      1/23/15: Pap test NILM, hrHPV16+.  2/27/15: Cervical polyp & cervical biopsy pathology: Negative for dysplasia/malignancy.     16: Pap test NILM, hrHPV16+.   16: Endocervical curettings pathology negative for dysplasia/malignancy.     17: Pap test NILM, hrHPV16+.   -Colposcopy recommended, not performed.      18:   -Pap test ASCUS, hrHPV16+.   -Endocervical curettings & cervical biopsy pathology: negative for dysplasia/malignancy.      19:  Pap test ASC-H, hrHPV16+.   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      9/10/20: Pap test ASC-H, hrHPV+ (NOS).  21: LEEP.   -Pathology: LEEP & endocervical curettings: negative for dysplasia/malignancy.     3/23/22: Endocervical curettings & cervical biopsy pathology: negative for dysplasia/malignancy.     3/28/23:   -Pap test HSIL, hrHPV16+.   -Findings by gynecologist Dr. Koenig: External genitalia with erythematous plaques bilaterally  Urethra- mid position and well supported, suburethral tissue thickened and friable with bleeding  elicited after placement of the speculum  Vagina is stenotic and cervix difficult to see.   5/30/23: Cystourethroscopy, vaginal biopsy by urologist Dr. Mackenzie.   -Findings: Exam revealed lichenified changes to bilateral labia majora and friable vestibular tissue (patient had significant bleeding from prep alone.) The urethra was somewhat stenotic and would not accommodate 19Fr rigid cystoscope, therefore a 16 Fr flexible cystoscope was inserted. Gentle pressure was required to access the bladder. The ureteral orifices were in their orthotopic positions bilaterally. There were no intravesical stones or diverticuli and the bladder was mildly trabeculated. There were no intravesical lesions. See media tab for urethral pictures - there were no obvious lesions but the entire distal urethra was erythematous and stenosed (16Fr.)  -Pathology: Invasive moderately-differentiated squamous cell carcinoma, HPV-associated, with focus suspicious for lymphovascular space invasion; IHC: p16+.    6/8/23: Gynecologic Oncology consultation.  -Findings:   External genitalia notable for erythema and desquamation of the posterior medial labia, c/w lichen sclerosus changes. When the labia are , a friable mass is visible at the distal anterior vagina, just posterior to the urethra. On bimanual exam, the cervix is small and normal in contour. The palpable vaginal tumor is limited to the anterior distal vagina, approximately 4-5 cm laterally x 3 cm cranio-caudal. Tumor is friable. Remainder of the vagina smooth to palpation. Digital anorectal exam is without nodularity. No palpable tumor in the rectovaginal septum.   Enlarged firm, fixed right inguinal lymph node ~3-4 cm in size.     6/16/23: Pelvic MRI  IMPRESSION:   1. Irregular enhancing lesion along the anterior vaginal wall at the  level of the introitus measuring 2.5 x 0.9 cm with irregular  enhancement along the anterior vaginal wall towards the level of the  vaginal cuff  however there is no definitive evidence or abnormal  signal intensity involving the cervix to suggest invasion.  2. Right inguinal lymphadenopathy compatible with metastatic disease.   3. There is some asymmetric enhancement involving the left sacrum,  incompletely evaluated on this study. Further evaluation of this and  further staging of the chest, abdomen and pelvis will be performed on  PET/CT scheduled for 6/20/2023.    6/20/23: PET CT  IMPRESSION:   1. Intense focal hypermetabolic FDG uptake in biopsy-proven vaginal  squamous cell carcinoma.  2. Multiple enlarged, hypermetabolic right inguinal nodes likely  represent regional metastasis. No definite evidence of distant  metastatic disease.  3. Scattered sub-4 mm solid pulmonary nodules are below the size  threshold for characterization on PET. Attention on follow-up with CT.  4. 1.0 cm enhancing left parotid gland nodule with hypermetabolic  uptake could represent a primary parotid neoplasm such as pleomorphic  adenoma or Warthin's tumor; consider ultrasound for further  evaluation.  5. 1.3 cm hypoattenuating right thyroid nodule with mild FDG uptake  warrants ultrasound for characterization.     Plan: Cisplatin radiation      7/27/2023: urinary gonsalez catheter placement. Treat acute cystitis with bactrim per Urology.     8/1/2023: plan C1D1 Cisplatin radiation           Oxycodone helping pain with bladder filling. Continues Detrol and pyridium per Urology. Will have pain prior to a bowel movement. Some constipation and diarrhea. Taking imodium and colace prn.     Urine much improved clear and orange/yellow (on pyridium). Completed Bactrim 8/23. No fevers or chills. Rare intermittent tinnitus (resolved now). Hearing the same. Overall eating and drinking is stable. Reports bottom of toes are numb no pain. No neuropathy in the hands.     Nausea controlled with Zofran. Eating and drinking ok. Rash resolving (see note from 8/14) groin rash improving with Lotrimin  per Dr. Pacheco.               Past Medical History:    Past Medical History:   Diagnosis Date     Actinic keratosis      Hyperlipemia      Lichen sclerosus 2020     Osteopenias      SCC (squamous cell carcinoma) 7/26/2023         Past Surgical History:    Past Surgical History:   Procedure Laterality Date     COLONOSCOPY  2006    normal     COLPOSCOPY, BIOPSY, COMBINED N/A 02/08/2021    Procedure: COLPOSCOPY, WITH BIOPSY, LEEP procedure;  Surgeon: Jefe Koenig MD;  Location: [facility] OR     CYSTOSCOPY N/A 05/30/2023    Procedure: CYSTOSCOPY AND EXCISIONAL BIOPSY OF THE VAGINAL TISSUE AROUND THE URETHRA.  (look in the bladder and sample small area in the vagina near the urethra);  Surgeon: Lakeisha Mackenzie MD;  Location[place] [facility] [place]     EYE SURGERY      cataracts bilateral     OPEN REDUCTION INTERNAL FIXATION FOREARM  07/31/2012    Procedure: OPEN REDUCTION INTERNAL FIXATION FOREARM;  Open Reduction Internal Fixation, Irrigation & Debridment  of Right Distal Radius, application short arm splint;  Surgeon: Wade Beard MD;  Location: [facility]     TONSILLECTOMY & ADENOIDECTOMY  1960     [facility] TREAT ECTOPIC PREG,RMV TUBE/OVARY  1985    ectopic, right side-ovary and tube removed         Health Maintenance Due   Topic Date Due     MEDICARE ANNUAL WELLNESS VISIT  05/26/2023     COLPOSCOPY  06/28/2023     INFLUENZA VACCINE (1) 09/01/2023       Current Medications:     Current Outpatient Medications   Medication Sig Dispense Refill     acetaminophen (TYLENOL) 500 MG tablet Take 500-1,000 mg by mouth every 6 hours as needed for mild pain       clotrimazole (LOTRIMIN) 1 % external cream Apply topically 2 times daily To affected areas 45 g 3     dexamethasone (DECADRON) 4 MG tablet Take 2 tablets (8 mg) by mouth daily Take for 3 days, starting the day after chemo on day 2 and 9. Take with food. If no nausea and vomiting with first cisplatin doses, may stop dexamethasone. 12 tablet 2     docusate sodium (COLACE)  100 MG capsule Take 100 mg by mouth daily       ondansetron (ZOFRAN) 8 MG tablet Take 1 tablet (8 mg) by mouth every 8 hours as needed for nausea (vomiting) Do not take for 3 days after chemo. 30 tablet 2     oxyCODONE (ROXICODONE) 5 MG tablet Take 1 tablet (5 mg) by mouth every 6 hours as needed for severe pain 15 tablet 0     phenazopyridine (PYRIDIUM) 200 MG tablet Take 1 tablet (200 mg) by mouth 3 times daily as needed for irritation 21 tablet 1     prochlorperazine (COMPAZINE) 10 MG tablet Take 1 tablet (10 mg) by mouth every 6 hours as needed for nausea or vomiting 30 tablet 2     sulfamethoxazole-trimethoprim (BACTRIM DS) 800-160 MG tablet Take 1 tablet by mouth 2 times daily 14 tablet 0     tolterodine (DETROL) 2 MG tablet Take 1 tablet (2 mg) by mouth 2 times daily 60 tablet 3     triamcinolone (KENALOG) 0.1 % external cream Apply topically 2 times daily 15 g 1         Allergies:        Allergies   Allergen Reactions     Oxybutynin Itching     Seasonal Allergies         Social History:     Social History     Tobacco Use     Smoking status: Never     Passive exposure: Never     Smokeless tobacco: Never   Substance Use Topics     Alcohol use: Yes     Comment: very little       History   Drug Use No         Family History:     The patient's family history is notable for:    Family History   Problem Relation Age of Onset     Lung Cancer Mother 44     Cerebrovascular Disease Father      Hypertension Father      Alcohol/Drug Father      Obesity Niece         niece     Mental Illness Nephew         nephew     Diabetes Other         paternal aunt     Hyperlipidemia Other      Obesity Other         self     Cervical Cancer Maternal Aunt      Ovarian Cancer Maternal Aunt 60     Obesity Other      Diabetes Other         paternal aunt     Hypertension Other         father     Cerebrovascular Disease Other         father     C.A.D. No family hx of      Breast Cancer No family hx of      Cancer - colorectal No family hx  "of      Prostate Cancer No family hx of      Melanoma No family hx of          Physical Exam:     Ht 1.638 m (5' 4.5\")   LMP 03/08/2008   BMI (P) 36.49 kg/m    Body mass index is 36.49 kg/m  (pended).      General Appearance: healthy and alert, no distress    Eyes:  Eyes grossly normal to inspection.  No discharge or erythema, or obvious scleral/conjunctival abnormalities.    Respiratory: No audible wheeze, cough, or visible cyanosis.  No visible retractions or increased work of breathing.     Musculoskeletal: extremities non tender and without edema    Skin: no lesions or rashes on visible skin    Neurological: normal gait, no gross defects     Psychiatric: appropriate mood and affect. Mentation appears normal, affect normal/bright, judgement and insight intact, normal speech and appearance well-groomed                            The rest of a comprehensive physical examination is deferred due to video visit restrictions.      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  She presents for follow up and disease management.    30 minutes spent on the date of the encounter doing chart review, history and exam, documentation, and further activities as noted above.           Plan:     1.) Vaginal cancer:  Plan for MD visit ~1 month after completion of chemoradiation therapy for post-therapy visit (virtual or in-person per patient preference), and then 3 months later (4 months post-chemoradiation) with repeat PET/CT and exam to assess diease response (MD, in-person).  Currently scheduled appointments available via My Chart.  Reviewed signs and symptoms for when she should contact the clinic or seek additional care; and contact information of the Gynecologic Oncology Clinic.  Patient to contact the clinic with any questions or concerns in the interim.     Reviewed recent labs and weight trends today.     2.) Health maintenance:  Issues " "addressed today include following up with PCP for annual health maintenance and non-gynecologic issues.     3.)       Urinary retention: gonsalez catheter placed by urology on 7/27/23. Plan for one month return for exchange.    4.)       Left parotid nodule: message sent to Dr. Stahl to clarify plan  -Addendum 8/1/2023: Per Dr. Stahl, \"We did not discuss the parotic nodule specifically. Since it is favored to be benign (pleomorphic adenoma or Warthin's tumor), I plan to just monitor on follow-up imaging, and at the end of treatment if still present we can consider ultrasound for additional evaluation. The more urgent issue is her metastatic vaginal cancer. \"    5.)        Pelvic pain: overall controlled with Oxycodone, APAP and Detrol/pyridium. Improves after un clamping gonsalez catheter.     6.)        Dizziness: Reviewed sitting to standing slower to prevent any orthostatic hypotension and to only take Zofran for nausea. No HA/blurred vision.     7.)         Mild intermittent tinnitus: if continues, consider audiogram. Pt will see NP on 9/1 to review. At this point, resolved.         SANDI Wiggins, NP-BC  Women's Health Nurse Practitioner  Division of Gynecologic Oncology  [facility]            CC  Patient Care Team:  Alba aLyton MD as PCP - Jefe Mosley MD as Assigned OBGYN Provider  Alba Layton MD as Assigned PCP  Lakeisha Mackenzie MD as MD (Urology)  Lawrence Banuelos MD as MD (Dermatology)  Lakeisha Mackenzie MD as Assigned Surgical Provider  Lakeisha Mackenzie MD as MD (Urology)  Reina Stahl MD as MD (Gynecologic Oncology)  Marisa Pacheco MD as MD (Radiation Oncology)  Reina Stahl MD as Assigned Cancer Care Provider  Lily Fabian PA-C as Physician Assistant (Anesthesiology)  SELF, REFERRED        Again, thank you for allowing me to participate in the care of your patient.        Sincerely,        SANDI Munoz " CNP

## 2023-08-28 ENCOUNTER — APPOINTMENT (OUTPATIENT)
Dept: LAB | Facility: CLINIC | Age: 68
End: 2023-08-28
Attending: OBSTETRICS & GYNECOLOGY
Payer: COMMERCIAL

## 2023-08-28 ENCOUNTER — TELEPHONE (OUTPATIENT)
Dept: UROLOGY | Facility: CLINIC | Age: 68
End: 2023-08-28

## 2023-08-28 ENCOUNTER — APPOINTMENT (OUTPATIENT)
Dept: RADIATION ONCOLOGY | Facility: CLINIC | Age: 68
End: 2023-08-28
Attending: RADIOLOGY
Payer: COMMERCIAL

## 2023-08-28 ENCOUNTER — PATIENT OUTREACH (OUTPATIENT)
Dept: CARE COORDINATION | Facility: CLINIC | Age: 68
End: 2023-08-28

## 2023-08-28 ENCOUNTER — INFUSION THERAPY VISIT (OUTPATIENT)
Dept: ONCOLOGY | Facility: CLINIC | Age: 68
End: 2023-08-28
Attending: OBSTETRICS & GYNECOLOGY
Payer: COMMERCIAL

## 2023-08-28 VITALS
OXYGEN SATURATION: 94 % | RESPIRATION RATE: 18 BRPM | SYSTOLIC BLOOD PRESSURE: 148 MMHG | TEMPERATURE: 99.2 F | WEIGHT: 209.9 LBS | HEART RATE: 109 BPM | DIASTOLIC BLOOD PRESSURE: 84 MMHG | BODY MASS INDEX: 35.47 KG/M2

## 2023-08-28 DIAGNOSIS — C52 VAGINAL CANCER (H): Primary | ICD-10-CM

## 2023-08-28 DIAGNOSIS — C44.92 SCC (SQUAMOUS CELL CARCINOMA): ICD-10-CM

## 2023-08-28 DIAGNOSIS — D06.9 CIN III (CERVICAL INTRAEPITHELIAL NEOPLASIA III): ICD-10-CM

## 2023-08-28 DIAGNOSIS — R30.1 PAINFUL BLADDER SPASM: ICD-10-CM

## 2023-08-28 LAB
ALBUMIN SERPL BCG-MCNC: 3.8 G/DL (ref 3.5–5.2)
ALP SERPL-CCNC: 63 U/L (ref 35–104)
ALT SERPL W P-5'-P-CCNC: 22 U/L (ref 0–50)
ANION GAP SERPL CALCULATED.3IONS-SCNC: 10 MMOL/L (ref 7–15)
AST SERPL W P-5'-P-CCNC: 21 U/L (ref 0–45)
BASOPHILS # BLD AUTO: 0 10E3/UL (ref 0–0.2)
BASOPHILS NFR BLD AUTO: 0 %
BILIRUB SERPL-MCNC: 0.3 MG/DL
BUN SERPL-MCNC: 19.7 MG/DL (ref 8–23)
CALCIUM SERPL-MCNC: 8.9 MG/DL (ref 8.8–10.2)
CHLORIDE SERPL-SCNC: 97 MMOL/L (ref 98–107)
CREAT SERPL-MCNC: 0.87 MG/DL (ref 0.51–0.95)
DEPRECATED HCO3 PLAS-SCNC: 25 MMOL/L (ref 22–29)
EOSINOPHIL # BLD AUTO: 0 10E3/UL (ref 0–0.7)
EOSINOPHIL NFR BLD AUTO: 0 %
ERYTHROCYTE [DISTWIDTH] IN BLOOD BY AUTOMATED COUNT: 12.8 % (ref 10–15)
GFR SERPL CREATININE-BSD FRML MDRD: 73 ML/MIN/1.73M2
GLUCOSE SERPL-MCNC: 118 MG/DL (ref 70–99)
HCT VFR BLD AUTO: 32 % (ref 35–47)
HGB BLD-MCNC: 10.7 G/DL (ref 11.7–15.7)
IMM GRANULOCYTES # BLD: 0 10E3/UL
IMM GRANULOCYTES NFR BLD: 1 %
LYMPHOCYTES # BLD AUTO: 0.2 10E3/UL (ref 0.8–5.3)
LYMPHOCYTES NFR BLD AUTO: 6 %
MAGNESIUM SERPL-MCNC: 1.3 MG/DL (ref 1.7–2.3)
MCH RBC QN AUTO: 30.2 PG (ref 26.5–33)
MCHC RBC AUTO-ENTMCNC: 33.4 G/DL (ref 31.5–36.5)
MCV RBC AUTO: 90 FL (ref 78–100)
MONOCYTES # BLD AUTO: 0.4 10E3/UL (ref 0–1.3)
MONOCYTES NFR BLD AUTO: 15 %
NEUTROPHILS # BLD AUTO: 1.9 10E3/UL (ref 1.6–8.3)
NEUTROPHILS NFR BLD AUTO: 78 %
NRBC # BLD AUTO: 0 10E3/UL
NRBC BLD AUTO-RTO: 0 /100
PLATELET # BLD AUTO: 94 10E3/UL (ref 150–450)
POTASSIUM SERPL-SCNC: 3.8 MMOL/L (ref 3.4–5.3)
PROT SERPL-MCNC: 5.9 G/DL (ref 6.4–8.3)
RBC # BLD AUTO: 3.54 10E6/UL (ref 3.8–5.2)
SODIUM SERPL-SCNC: 132 MMOL/L (ref 136–145)
WBC # BLD AUTO: 2.4 10E3/UL (ref 4–11)

## 2023-08-28 PROCEDURE — 85025 COMPLETE CBC W/AUTO DIFF WBC: CPT | Performed by: OBSTETRICS & GYNECOLOGY

## 2023-08-28 PROCEDURE — 36415 COLL VENOUS BLD VENIPUNCTURE: CPT | Performed by: OBSTETRICS & GYNECOLOGY

## 2023-08-28 PROCEDURE — 77386 HC IMRT TREATMENT DELIVERY, COMPLEX: CPT | Performed by: RADIOLOGY

## 2023-08-28 PROCEDURE — 250N000011 HC RX IP 250 OP 636: Performed by: OBSTETRICS & GYNECOLOGY

## 2023-08-28 PROCEDURE — 96375 TX/PRO/DX INJ NEW DRUG ADDON: CPT

## 2023-08-28 PROCEDURE — 96413 CHEMO IV INFUSION 1 HR: CPT

## 2023-08-28 PROCEDURE — 80053 COMPREHEN METABOLIC PANEL: CPT | Performed by: OBSTETRICS & GYNECOLOGY

## 2023-08-28 PROCEDURE — 999N000127 HC STATISTIC PERIPHERAL IV START W US GUIDANCE

## 2023-08-28 PROCEDURE — 96361 HYDRATE IV INFUSION ADD-ON: CPT

## 2023-08-28 PROCEDURE — 83735 ASSAY OF MAGNESIUM: CPT | Performed by: OBSTETRICS & GYNECOLOGY

## 2023-08-28 PROCEDURE — 96367 TX/PROPH/DG ADDL SEQ IV INF: CPT

## 2023-08-28 PROCEDURE — 258N000003 HC RX IP 258 OP 636: Performed by: OBSTETRICS & GYNECOLOGY

## 2023-08-28 PROCEDURE — 250N000011 HC RX IP 250 OP 636: Performed by: NURSE PRACTITIONER

## 2023-08-28 RX ORDER — OXYCODONE HYDROCHLORIDE 5 MG/1
5 TABLET ORAL EVERY 6 HOURS PRN
Qty: 8 TABLET | Refills: 0 | Status: SHIPPED | OUTPATIENT
Start: 2023-08-28 | End: 2023-08-30

## 2023-08-28 RX ORDER — PALONOSETRON 0.05 MG/ML
0.25 INJECTION, SOLUTION INTRAVENOUS ONCE
Status: COMPLETED | OUTPATIENT
Start: 2023-08-28 | End: 2023-08-28

## 2023-08-28 RX ORDER — DEXTROSE, SODIUM CHLORIDE, AND POTASSIUM CHLORIDE 5; .45; .15 G/100ML; G/100ML; G/100ML
2000 INJECTION INTRAVENOUS ONCE
Status: COMPLETED | OUTPATIENT
Start: 2023-08-28 | End: 2023-08-28

## 2023-08-28 RX ORDER — MAGNESIUM SULFATE HEPTAHYDRATE 40 MG/ML
2 INJECTION, SOLUTION INTRAVENOUS ONCE
Status: COMPLETED | OUTPATIENT
Start: 2023-08-28 | End: 2023-08-28

## 2023-08-28 RX ADMIN — MAGNESIUM SULFATE 2 G: 2 INJECTION INTRAVENOUS at 09:07

## 2023-08-28 RX ADMIN — POTASSIUM CHLORIDE, DEXTROSE MONOHYDRATE AND SODIUM CHLORIDE 2000 ML: 150; 5; 450 INJECTION, SOLUTION INTRAVENOUS at 08:08

## 2023-08-28 RX ADMIN — DEXAMETHASONE SODIUM PHOSPHATE: 10 INJECTION, SOLUTION INTRAMUSCULAR; INTRAVENOUS at 08:16

## 2023-08-28 RX ADMIN — SODIUM CHLORIDE 80 MG: 9 INJECTION, SOLUTION INTRAVENOUS at 10:25

## 2023-08-28 RX ADMIN — PALONOSETRON HYDROCHLORIDE 0.25 MG: 0.25 INJECTION INTRAVENOUS at 08:05

## 2023-08-28 NOTE — PROGRESS NOTES
Infusion Nursing Note:  Jessie Tamayo presents today for cycle 1 day 29 Cisplatin.    Patient seen by provider today: No   present during visit today: Not Applicable.    Note: Jessie was assessed during a virtual visit on Friday 8/25/23.  She continues to have bladder spasms and gas pains and she feels the pain has gotten worse. She started using the oxycodone more often. She used it twice on Saturday with tylenol in between and then three times on Sunday with tylenol in between.  She will start to feel spasms and go to the bathroom to have either a bowel movement or to pass gas. When the gas or stool pass she does feel better. She reports more leaking with the spasms. Prior it was just a small amount and now it seems to be a lot. Her gonsalez is scheduled to be exchanged on Wednesday. She denies any symptoms of infection including no fevers or chills. She has been dizzy, especially with taking the oxycodone. She also has been nauseated and threw up this morning. She has not been drinking due to feeling nauseated but is able to still eat. She has been eating soup and ice cream. When she does drink she has been drinking Gatorade and Pedialyte. Platelets are 94,000 today, below parameters. Also states her rash on her inner groin is worse. She is using the lomotil cream on it. Requests refill of oxycodone and pyridium.    TORB: Betina Kramer NP/Marlin Dash, RN  - Ok to go ahead with Cisplatin with platelet count of 94  - Taking the oxy and not eating/drinking much is causing nausea and constipation which is probably the reason for the increase in her pain and spasms.   -Magnesium is low today which is probably related to not drinking, she should increase drinking Gatorade zero and Pedialyte.   -After receiving the fluids today, increasing fluids at home, and getting her gonsalez exchanged on Wednesday, symptoms should improve  -Should ask urology about flushing her gonsalez at home  -will refill small quantity  of oxycodone today but the doing the fluids and using a stool softener should help.  -Pyridium will need to be refilled by urology.     Patient reports feeling much better after receiving the ordered IV aloxi, emend, and dex and getting some of the IVF.    Gonsalez drained over a liter during her infusion before clamping.    Just before clamping her gonsalez (an hour prior to the end of the infusion) the bladder spasms started to return. Offered hot pack to try. Patient had gas with the spasms and one small hard marble sized bowel movement.      Intravenous Access:  Peripheral IV placed.    Treatment Conditions:  Lab Results   Component Value Date    HGB 10.7 (L) 08/28/2023    WBC 2.4 (L) 08/28/2023    ANEU 11.4 (H) 11/07/2009    ANEUTAUTO 1.9 08/28/2023    PLT 94 (L) 08/28/2023        Lab Results   Component Value Date     (L) 08/28/2023    POTASSIUM 3.8 08/28/2023    MAG 1.3 (L) 08/28/2023    CR 0.87 08/28/2023    KIM 8.9 08/28/2023    BILITOTAL 0.3 08/28/2023    ALBUMIN 3.8 08/28/2023    ALT 22 08/28/2023    AST 21 08/28/2023       Results reviewed, labs did NOT meet treatment parameters: Ok to go ahead, see above.      Post Infusion Assessment:  Patient tolerated infusion without incident.  Blood return noted pre and post infusion.  Site patent and intact, free from redness, edema or discomfort.  No evidence of extravasations.  Access discontinued per protocol.       Discharge Plan:   Prescription refills given for Oxycodone.  Discharge instructions reviewed with: Patient.  Patient and/or family verbalized understanding of discharge instructions and all questions answered.  Copy of AVS reviewed with patient and/or family.  Patient will return 9/1/23 to see Betina Kramer NP for next appointment.  Patient discharged in stable condition accompanied by: .  Departure Mode: Wheelchair.      Marlin Dash RN

## 2023-08-28 NOTE — TELEPHONE ENCOUNTER
[facility] Call Center    Phone Message    May a detailed message be left on voicemail: yes     Reason for Call: Pt Chemo nurse was suggesting a medication to take the edge off of chemo administration pain- pt was not told a medication name. Please call the pt  back to discuss if needed- send to [facility] in [place]   The same nurse also recommended a gonsalez flush- pt  disagrees and states the flow seems fine; and there will be a change anyway on the 30th so he does not know if its necessary but would like a call back to discuss   Routing high priority due to change being in 2 days   Action Taken: Message routed to:  [facility] URO    Travel Screening: Not Applicable

## 2023-08-28 NOTE — TELEPHONE ENCOUNTER
[facility] Call Center    Phone Message    May a detailed message be left on voicemail: yes     Reason for Call: Medication Refill Request    Has the patient contacted the pharmacy for the refill? Yes   Name of medication being requested:     phenazopyridine (PYRIDIUM) 200 MG tablet     Provider who prescribed the medication: Dr. Mackenzie  Pharmacy:               Pharmacy: [id number] [address] [facility] (Ph: [phone])       Date medication is needed: ASAP       Action Taken: Message routed to:  [facility] uro    Travel Screening: Not Applicable

## 2023-08-28 NOTE — PROGRESS NOTES
Social Work - Distress Screen Intervention  [facility]    Identified Concern and Score from Distress Screenin. How concerned are you about your ability to eat? 5     2. How concerned are you about unintended weight loss or your current weight? 0     3. How concerned are you about feeling depressed or very sad?  4     4. How concerned are you about feeling anxious or very scared?  6     5. Do you struggle with the loss of meaning and florencio in your life?  Not at all     6. How concerned are you about work and home life issues that may be affected by your cancer?  (!) 9     7. How concerned are you about knowing what resources are available to help you?  (!) 9     8. Do you currently have what you would describe as Shinto or spiritual struggles? Not at all     9. If you want to be contacted by one of our professionals, I can send a message to them right now.  -- (pt declined)       Date of Distress Screen: 23  Data: At time of last visit, patient scored positive on distress screening, SW also received referral from Brandy Oden for support groups.  outreached to patient today to follow up on elevated distress and introduce psychosocial services and support.  Intervention/Education provided:  contacted patient by phone to discuss distress screening results.  reported that Jessie is not able to speak at present time due to current level of pain. SW advised that they share pain concern with radiation RN today when they visit (going to this appointment right now). SW made plan to send MyChart information along with contact information.     Follow-up Required:  to remain available for support and await return call from patient    Shell Tanner, MSW, LICSW, OSW-C  Clinical - Adult Oncology  She/Her/Hers  Phone: [phone]  [facility]: M, Thu  *every other Tue, 8am-4:30pm  [facility]: W, F, *every other Tue, 8am-4:30pm

## 2023-08-28 NOTE — CONFIDENTIAL NOTE
Lakeisha Mackenzie MD Bratsch, Janell CANTRELL RN  Cc: Lucy Helton RN; [facility]  Caller: Unspecified (Today,  8:26 AM)  Can we do the compounded b and o suppositories?    Spoke to pt. Discussed use of B&O suppository and discussed how med may be updated to morphine due to pharmacy supply. She reports that she is ready to try anything for relief. Will let provider know.     Janell Loja, SANTOS MSN

## 2023-08-28 NOTE — PATIENT INSTRUCTIONS
[facility] Triage and after hours / weekends / holidays:  [phone]    Please call the triage or after hours line if you experience a temperature greater than or equal to 100.4, shaking chills, have uncontrolled nausea, vomiting and/or diarrhea, dizziness, shortness of breath, chest pain, bleeding, unexplained bruising, or if you have any other new/concerning symptoms, questions or concerns.      If you are having any concerning symptoms or wish to speak to a provider before your next infusion visit, please call triage to notify them so we can adequately serve you.     If you need a refill on a narcotic prescription or other medication, please call before your infusion appointment.                August 2023 Sunday Monday Tuesday Wednesday Thursday Friday Saturday             1    LAB PERIPHERAL   7:00 AM   (15 min.)   [facility] LAB DRAW   [facility]    ONC INFUSION 6 HR (360 MIN)   7:30 AM   (360 min.)    ONC INFUSION NURSE   [facility]    TREATMENT   9:15 AM   (30 min.)   [facility]    OTV   9:45 AM   (15 min.)   Marisa Pacheco MD   [facility] Radiation Oncology 2    TREATMENT   9:15 AM   (30 min.)   P RAD ONC ISAEL   [facility] Radiation Oncology 3    TREATMENT   9:15 AM   (30 min.)   P RAD ONC ISAEL   [facility] Radiation Oncology 4    TREATMENT   9:15 AM   (30 min.)   [facility] RAD ONC ISAEL   [facility] Radiation Oncology    RETURN CCSL   2:30 PM   (45 min.)   Brandy Oden APRN CNP   [facility] [place] 5       6     7    LAB   6:30 AM   (15 min.)    LAB   [facility] [place]    ONC INFUSION 6 HR (360 MIN)   7:00 AM   (360 min.)    ONC INFUSION NURSE   [facility]    TREATMENT   9:15 AM   (30 min.)   [facility]  8    TREATMENT   9:15 AM   (30 min.)   [facility] RAD ONC ISAEL   [facility] Radiation Oncology    OTV   9:45 AM   (15 min.)   Marisa Pacheco MD   [facility] Radiation Oncology 9    TREATMENT   9:15 AM   (30 min.)   [facility] RAD ONC ISAEL   [facility] Radiation Oncology 10    TREATMENT   9:15 AM   (30 min.)   [facility] RAD ONC ISAEL   [facility] Radiation Oncology    RETURN CCSL  11:00 AM   (45 min.)   Betina Kramer APRN CNP   [facility]    OFFICE VISIT   2:40 PM   (30 min.)   Alba Layton MD   [facility] [place] 11    TREATMENT   9:15 AM   (30 min.)   [facility] RAD ONC ISAEL   [facility] Radiation Oncology 12       13     14    LAB PERIPHERAL   6:30 AM   (15 min.)   [facility] DRAW   [facility]    ONC INFUSION 6 HR (360 MIN)   7:00 AM   (360 min.)   [facility] ONC INFUSION NURSE   [facility]    RETURN CCSL   8:45 AM   (45 min.)   Betina Kramer APRN CNP   [facility]    TREATMENT  12:45 PM   (30 min.)   [facility] RAD ONC ISAEL   [facility] Radiation Oncology 15    TREATMENT   9:15 AM   (30 min.)   [facility] RAD ONC ISAEL   [facility] Radiation Oncology    OTV   9:45 AM   (15 min.)   Marisa Pacheco MD   [facility] Radiation Oncology 16    TREATMENT   9:15 AM   (30 min.)   [facility] RAD ONC ISAEL   [facility] Radiation On[facility] 17    TREATMENT   9:15 AM   (30 min.)   [facility] RAD ONC ISAEL   [facility] Radiation Oncology 18    TREATMENT   9:15 AM   (30 min.)   [facility] RAD ONC ISAEL   [facility] Radiation Oncology    RETURN CCSL   2:30 PM   (45 min.)   Brandy Oden APRN CNP   [facility] [place] 19       20     21    LAB PERIPHERAL   6:30 AM   (15 min.)   [facility] DRAW   [facility]    ONC INFUSION 6 HR (360 MIN)   7:00 AM   (360  min.)   [facility] ONC INFUSION NURSE   [facility]    TREATMENT   2:30 PM   (30 min.)   [facility] RAD ONC ISAEL   [facility] Radiation Oncology 22    TREATMENT   9:15 AM   (30 min.)   [facility] RAD ONC ISAEL   [facility] Rad[facility] On[facility]    OTV   9:45 AM   (15 min.)   Marisa Pacheco MD   [facility] 23    TREATMENT   9:15 AM   (30 min.)   [facility] RAD ONC ISAEL   [facility] Radiation On[facility] 24    TREATMENT   9:15 AM   (30 min.)   [facility] RAD ONC ISAEL   [facility]iation On[facility] 25    TREATMENT   9:15 AM   (30 min.)   [facility] RAD ONC ISAEL   [facility] Radiation On[facility]    RETURN CCSL   1:30 PM   (45 min.)   Brandy Oden APRN CNP   [facility] 26       27     28    LAB PERIPHERAL   6:30 AM   (15 min.)    [facility] DRAW   [facility]    ONC INFUSION 6 HR (360 MIN)   7:00 AM   (360 min.)   [facility] ONC INFUSION NURSE   [facility]    TREATMENT   9:15 AM   (30 min.)   [facility] RAD ONC ISAEL   [facility]iation Oncology 29    TREATMENT   9:15 AM   (30 min.)   [facility] RAD ONC ISAEL   [facility] On[facility]    OTV   9:45 AM   (15 min.)   Marisa Pacheco MD   [facility] Radiation Oncology 30    NURSE ONLY   8:30 AM   (40 min.)   Nurse,  [facility]   [facility] [place]    TREATMENT   9:15 AM   (30 min.)   [facility] RAD ONC ISAEL   [facility] Radiation Oncology 31    TREATMENT   9:15 AM   (30 min.)   [facility] RAD ONC ISAEL   [facility] Radiation Oncology    LAB  10:15 AM   (15 min.)   [facility]                       September 2023 Sunday Monday Tuesday Wednesday Thursday Friday Saturday                            1    TREATMENT   9:15 AM   (30 min.)   [facility] RAD ONC ISAEL   [facility]  Radiation Oncology    RETURN CCSL  11:15 AM   (45 min.)   Betina Kramer APRN CNP   [facility] 2       3     4     5    TREATMENT   9:15 AM   (30 min.)   [facility] RAD ONC ISAEL   [facility] Radiation Oncology 6    TREATMENT   8:45 AM   (30 min.)   [facility] RAD ONC ISAEL   [facility] Radiation Oncology    OTV  10:00 AM   (15 min.)   Marisa Pacheco MD   [facility] Radiation Oncology 7    TREATMENT   8:45 AM   (30 min.)   [facility] RAD ONC ISAEL   [facility] Radiation Oncology 8    TREATMENT   8:45 AM   (30 min.)   [facility] RAD ONC ISAEL   [facility] Radiation Oncology 9       10     11    TREATMENT   8:45 AM   (30 min.)   [facility] RAD ONC ISAEL   [facility] Radiation Oncology    PAC EVAL  10:00 AM   (60 min.)   Lily Fabian PA-C   [facility] PELVIS (GYN) WWO CONTRAST  11:30 AM   (120 min.)   [email or web address]   [facility] Imaging 12     13     14     15     16       17     18    [facility] HDR TREATMENT   5:30 AM   (120 min.)   Marisa Pacheco MD   [facility] Radiation Oncology    INSERTION, NEEDLE, WITH ULTRASOUND GUIDANCE, FOR INTERSTITIAL BRACHYTHERAPY   7:30 AM   Marisa Pacheco MD   [facility] OR    CT SIM  11:00 AM   (60 min.)   Marisa Pacheco MD   [facility] Radiation Oncology    [facility] HDR TREATMENT   3:00 PM   (60 min.)   Marisa Pacheco MD   [facility] Radiation Oncology 19    [facility] HDR TREATMENT   7:30 AM   (60 min.)   Marisa Pacheco MD   [facility] Radiation Oncology    [facility] HDR TREATMENT   2:00 PM   (60 min.)   Marisa Pacheco MD   [facility] Radiation Oncology 20    [facility] HDR TREATMENT   8:00 AM   (60 min.)   Marisa Pacheco MD   [facility] Radiation Oncology    [facility] HDR TREATMENT   2:30 PM   (60 min.)   Marisa Pacheco MD   [facility] Radiation  Oncology    [facility] HDR TREATMENT   3:55 PM   (60 min.)   Marisa Pacheco MD   [facility] Radiation Oncology    REMOVAL, INTERSTITIAL NEEDLE, AFTER TEMPORARY BRACHYTHERAPY   4:00 PM   Marisa Pacheco MD   [facility] OR 21     22     23       24     25     26     27     28     29     30                     Lab Results:  Recent Results (from the past 12 hour(s))   Comprehensive metabolic panel    Collection Time: 08/28/23  6:46 AM   Result Value Ref Range    Sodium 132 (L) 136 - 145 mmol/L    Potassium 3.8 3.4 - 5.3 mmol/L    Chloride 97 (L) 98 - 107 mmol/L    Carbon Dioxide (CO2) 25 22 - 29 mmol/L    Anion Gap 10 7 - 15 mmol/L    Urea Nitrogen 19.7 8.0 - 23.0 mg/dL    Creatinine 0.87 0.51 - 0.95 mg/dL    Calcium 8.9 8.8 - 10.2 mg/dL    Glucose 118 (H) 70 - 99 mg/dL    Alkaline Phosphatase 63 35 - 104 U/L    AST 21 0 - 45 U/L    ALT 22 0 - 50 U/L    Protein Total 5.9 (L) 6.4 - 8.3 g/dL    Albumin 3.8 3.5 - 5.2 g/dL    Bilirubin Total 0.3 <=1.2 mg/dL    GFR Estimate 73 >60 mL/min/1.73m2   Magnesium    Collection Time: 08/28/23  6:46 AM   Result Value Ref Range    Magnesium 1.3 (L) 1.7 - 2.3 mg/dL   CBC with platelets and differential    Collection Time: 08/28/23  6:46 AM   Result Value Ref Range    WBC Count 2.4 (L) 4.0 - 11.0 10e3/uL    RBC Count 3.54 (L) 3.80 - 5.20 10e6/uL    Hemoglobin 10.7 (L) 11.7 - 15.7 g/dL    Hematocrit 32.0 (L) 35.0 - 47.0 %    MCV 90 78 - 100 fL    MCH 30.2 26.5 - 33.0 pg    MCHC 33.4 31.5 - 36.5 g/dL    RDW 12.8 10.0 - 15.0 %    Platelet Count 94 (L) 150 - 450 10e3/uL    % Neutrophils 78 %    % Lymphocytes 6 %    % Monocytes 15 %    % Eosinophils 0 %    % Basophils 0 %    % Immature Granulocytes 1 %    NRBCs per 100 WBC 0 <1 /100    Absolute Neutrophils 1.9 1.6 - 8.3 10e3/uL    Absolute Lymphocytes 0.2 (L) 0.8 - 5.3 10e3/uL    Absolute Monocytes 0.4 0.0 - 1.3 10e3/uL    Absolute Eosinophils 0.0 0.0 - 0.7 10e3/uL    Absolute Basophils 0.0 0.0 - 0.2 10e3/uL    Absolute Immature  Granulocytes 0.0 <=0.4 10e3/uL    Absolute NRBCs 0.0 10e3/uL

## 2023-08-28 NOTE — TELEPHONE ENCOUNTER
"Spoke to pt's spouse Jesse. He states he contacted pharmacy on Saturday. He states that the refill had already been picked up per automated message from pharmacy. Advised him to contact pharmacy today.     He now reports with pt that she continues to have severe bladder spasms and gas pain. Saturday and Sunday she was experiencing having severe bladder spasms and gas pains that is making her \"double over\". She has been using pyridium, tylenol, and oxycodone for relief, but oxy is giving her nausea. She also reports that the oxycodone is not giving enough relief. She does state the chemo medications are helping, but when clamping off the bladder is when the pain is aggravated. She is also relieved this weekend with passing gas and having a BM. She would like further guidance on what she can do about pain with clamping and the intermittent pain when she's not receiving chemo. No fever. Urine is draining well into bag otherwise. Sending update to provider for review.     Janell Loja RN MSN    "

## 2023-08-29 ENCOUNTER — OFFICE VISIT (OUTPATIENT)
Dept: RADIATION ONCOLOGY | Facility: CLINIC | Age: 68
End: 2023-08-29
Attending: RADIOLOGY
Payer: COMMERCIAL

## 2023-08-29 VITALS
WEIGHT: 216.5 LBS | HEART RATE: 82 BPM | DIASTOLIC BLOOD PRESSURE: 74 MMHG | SYSTOLIC BLOOD PRESSURE: 142 MMHG | BODY MASS INDEX: 36.59 KG/M2

## 2023-08-29 DIAGNOSIS — C52 VAGINAL CANCER (H): Primary | ICD-10-CM

## 2023-08-29 PROCEDURE — 77386 HC IMRT TREATMENT DELIVERY, COMPLEX: CPT | Performed by: RADIOLOGY

## 2023-08-29 RX ORDER — LORAZEPAM 0.5 MG/1
TABLET ORAL
Qty: 2 TABLET | Refills: 0 | Status: SHIPPED | OUTPATIENT
Start: 2023-08-29 | End: 2023-09-15

## 2023-08-29 NOTE — LETTER
2023         RE: Jessie Tamayo  [address] [address]        Dear Colleague,    Thank you for referring your patient, Jessie Tamayo, to the [facility] RADIATION ONCOLOGY. Please see a copy of my visit note below.    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 Tamayo presents to clinic today for her weekly on-treatment visit.  Since her simulation, she has had an indwelling Thomas catheter placed for urinary obstruction.  We have asked her to clamp the Thomas catheter and drink fluids prior to each radiation treatment to fill her bladder.  She has been having a lot of difficulty with this due to pain and spasms.  She recently started oxycodone and Pyridium and has had better control of her spasms with this regimen.  She is not having diarrhea.  While she used to take oxycodone 5 mg every 6 hours, this is no longer controlling her spasms.    Nursing ROS:   Nutrition Alteration  Diet Type: Patient's Preference  Skin  Skin Reaction: 2 - Moderate to brisk erythema, patchy moist desquamation, mostly confined to skin folds and creases, moderate edema  Skin Progress: Groin redness prescribed  med        Cardiovascular  Respiratory effort: 1 - Normal - without distress  Gastrointestinal  Nausea: 0 - None  Diarrhea: 2 - Three to five soft or liquid bowel movements per day  Genitourinary  Urinary Status: 1 - Increase in frequency or nocturia up to 2x normal   Note: Bladder spasms improved     Pain Assessment  0-10  Pain Scale: 5  Pain Treatment: Oxycocodne     PEG Tube: No  Electronic Cardiac Implant: No    Objective:   BP (!) 142/74   Pulse 82   Wt 98.2 kg (216 lb 8 oz)   LMP 03/08/2008   BMI 36.59 kg/m    Gen: Appears well, NAD  Skin: Moderate erythema in the inguinal folds with scalloped margins suggestive of yeast infection, perianal skin with minimal erythema  Indwelling Thomas catheter  Extremities: No edema    Laboratory:  Lab Results   Component Value Date    WBC 2.4 (L) 08/28/2023    HGB 10.7 (L) 08/28/2023    HCT 32.0 (L) 08/28/2023    MCV 90 08/28/2023    PLT 94 (L) 08/28/2023     Lab Results   Component Value Date     (L) 08/28/2023    POTASSIUM 3.8 08/28/2023    CHLORIDE 97 (L) 08/28/2023    CO2 25 08/28/2023     (H) 08/28/2023     Magnesium   Date Value Ref Range Status   08/28/2023 1.3 (L) 1.7 - 2.3 mg/dL Final       Treatment-related toxicities (CTCAE v5.0):  Anorexia: Grade 1: Loss of appetite without alteration in eating habits  Fatigue: Grade 1: Fatigue relieved by rest  Nausea: Grade 0: No toxicity  Pain: Grade 2: Moderate pain; limiting instrumental ADL  Diarrhea: Grade 0: No toxicity  Urinary tract pain: Grade 2: Moderate pain; limiting instrumental ADL  Dermatitis: Grade 1: Faint erythema or dry desquamation    ED visits/Hospitalizations: None    Missed Treatments: None    Mosaiq chart and setup information reviewed  IGRT images reviewed    Medication Review  Med list reviewed with patient?: Yes    Assessment:    Ms. 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  Bladder spasms have improved with Pyridium and oxycodone.      Plan:   1.  Continue treatment as planned  2.  Continue oxycodone and Pyridium.  Recommended increasing oxycodone to 10 mg every 6 hours.  Counseled on constipation as a side effect of narcotics.  3.  Discussed sitz baths, barrier cream and low fiber diet.  4.  She is scheduled to have her Thomas catheter changed  in the next few days.  5.  Lorazepam prescribed for treatment related anxiety for pelvic MRI scan.    Marisa Pacheco  Department of Radiation Oncology  [facility]

## 2023-08-29 NOTE — PROGRESS NOTES
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 Tamayo presents to clinic today for her weekly on-treatment visit.  Since her simulation, she has had an indwelling Thomas catheter placed for urinary obstruction.  We have asked her to clamp the Thomas catheter and drink fluids prior to each radiation treatment to fill her bladder.  She has been having a lot of difficulty with this due to pain and spasms.  She recently started oxycodone and Pyridium and has had better control of her spasms with this regimen.  She is not having diarrhea.  While she used to take oxycodone 5 mg every 6 hours, this is no longer controlling her spasms.    Nursing ROS:   Nutrition Alteration  Diet Type: Patient's Preference  Skin  Skin Reaction: 2 - Moderate to brisk erythema, patchy moist desquamation, mostly confined to skin folds and creases, moderate edema  Skin Progress: Groin redness prescribed  med        Cardiovascular  Respiratory effort: 1 - Normal - without distress  Gastrointestinal  Nausea: 0 - None  Diarrhea: 2 - Three to five soft or liquid bowel movements per day  Genitourinary  Urinary Status: 1 - Increase in frequency or nocturia up to 2x normal   Note: Bladder spasms improved     Pain Assessment  0-10 Pain Scale: 5  Pain Treatment: Oxycocodne     PEG Tube: No  Electronic Cardiac Implant: No    Objective:   BP (!) 142/74   Pulse 82   Wt 98.2 kg (216 lb 8 oz)   LMP 2008   BMI 36.59 kg/m    Gen: Appears well, NAD  Skin: Moderate erythema in the  inguinal folds with scalloped margins suggestive of yeast infection, perianal skin with minimal erythema  Indwelling Thomas catheter  Extremities: No edema    Laboratory:  Lab Results   Component Value Date    WBC 2.4 (L) 08/28/2023    HGB 10.7 (L) 08/28/2023    HCT 32.0 (L) 08/28/2023    MCV 90 08/28/2023    PLT 94 (L) 08/28/2023     Lab Results   Component Value Date     (L) 08/28/2023    POTASSIUM 3.8 08/28/2023    CHLORIDE 97 (L) 08/28/2023    CO2 25 08/28/2023     (H) 08/28/2023     Magnesium   Date Value Ref Range Status   08/28/2023 1.3 (L) 1.7 - 2.3 mg/dL Final       Treatment-related toxicities (CTCAE v5.0):  Anorexia: Grade 1: Loss of appetite without alteration in eating habits  Fatigue: Grade 1: Fatigue relieved by rest  Nausea: Grade 0: No toxicity  Pain: Grade 2: Moderate pain; limiting instrumental ADL  Diarrhea: Grade 0: No toxicity  Urinary tract pain: Grade 2: Moderate pain; limiting instrumental ADL  Dermatitis: Grade 1: Faint erythema or dry desquamation    ED visits/Hospitalizations: None    Missed Treatments: None    Mosaiq chart and setup information reviewed  IGRT images reviewed    Medication Review  Med list reviewed with patient?: Yes    Assessment:    Ms. 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  Bladder spasms have improved with Pyridium and oxycodone.      Plan:   1.  Continue treatment as planned  2.  Continue oxycodone and Pyridium.  Recommended increasing oxycodone to 10 mg every 6 hours.  Counseled on constipation as a side effect of narcotics.  3.  Discussed sitz baths, barrier cream and low fiber diet.  4.  She is scheduled to have her Thomas catheter changed in the next few days.  5.  Lorazepam prescribed for treatment related anxiety for pelvic MRI scan.    Marisa Pacheco  Department of Radiation Oncology  [facility]

## 2023-08-30 ENCOUNTER — PATIENT OUTREACH (OUTPATIENT)
Dept: ONCOLOGY | Facility: CLINIC | Age: 68
End: 2023-08-30

## 2023-08-30 ENCOUNTER — APPOINTMENT (OUTPATIENT)
Dept: RADIATION ONCOLOGY | Facility: CLINIC | Age: 68
End: 2023-08-30
Attending: RADIOLOGY
Payer: COMMERCIAL

## 2023-08-30 ENCOUNTER — ALLIED HEALTH/NURSE VISIT (OUTPATIENT)
Dept: UROLOGY | Facility: CLINIC | Age: 68
End: 2023-08-30
Payer: COMMERCIAL

## 2023-08-30 DIAGNOSIS — R30.1 PAINFUL BLADDER SPASM: ICD-10-CM

## 2023-08-30 DIAGNOSIS — N36.8 URETHRAL BLEEDING: Primary | ICD-10-CM

## 2023-08-30 PROCEDURE — 77386 HC IMRT TREATMENT DELIVERY, COMPLEX: CPT | Performed by: RADIOLOGY

## 2023-08-30 PROCEDURE — 51702 INSERT TEMP BLADDER CATH: CPT

## 2023-08-30 PROCEDURE — 77014 PR CT GUIDE FOR PLACEMENT RADIATION THERAPY FIELDS: CPT | Mod: 26 | Performed by: RADIOLOGY

## 2023-08-30 RX ORDER — OXYCODONE HYDROCHLORIDE 5 MG/1
5 TABLET ORAL EVERY 6 HOURS PRN
Qty: 20 TABLET | Refills: 0 | Status: ON HOLD | OUTPATIENT
Start: 2023-08-30 | End: 2023-09-05

## 2023-08-30 NOTE — PROGRESS NOTES
Pt calling requesting refill of oxycodone  Has #7 tabs left  Taking 1 tab every 6 hours, has been doing this consistently instead of prn for the last day or so.  Occassionally takes 2 tabs if pain is severe  Taking tylenol 1000 mg 2-3 times per day inbetween oxycodone  States pain has improved, better than before but not gone  Pain is 2/10 right after taking med and gets closer to needing to take another one  Worse when getting bladder spasms but those are better now  Taking detrol and pyridium for spasms per urology    Please send rx to discharge pharmacy

## 2023-08-30 NOTE — TELEPHONE ENCOUNTER
Patient's spasm pain is improving once she started taking oxycodone 5-10 mg. Patient says the radiation doctor does not want her to take B & O suppositories at this time.   Patient will let us know if she needs further assistance  Lucy Helton, RN, BSN  Care Coordinator Urology  [facility] [place]  Urology [facility]  [phone]

## 2023-08-30 NOTE — PROGRESS NOTES
Jessie Tamayo comes into clinic today at the request of Dr Mackenzie Ordering Provider for Catheter Change.    Patient here to have her gonsalez catheter replaced. Removed gonsalez and replaced with 16 fr coude tipped gonsalez catheter.   Patient tolerated the change well but did note some tenderness with catheter placement and removal.    This service provided today was under the supervising provider of the day Ed Pacheco PA-C, who was available if needed.

## 2023-08-31 ENCOUNTER — APPOINTMENT (OUTPATIENT)
Dept: RADIATION ONCOLOGY | Facility: CLINIC | Age: 68
End: 2023-08-31
Attending: RADIOLOGY
Payer: COMMERCIAL

## 2023-08-31 PROCEDURE — 77386 HC IMRT TREATMENT DELIVERY, COMPLEX: CPT | Performed by: RADIOLOGY

## 2023-08-31 NOTE — PROGRESS NOTES
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  She presents for follow up and disease management.     Oncology History:  07, 10/15/08, 09, 11: Pap test NILM.      1/23/15: Pap test NILM, hrHPV16+.  2/27/15: Cervical polyp & cervical biopsy pathology: Negative for dysplasia/malignancy.     16: Pap test NILM, hrHPV16+.   16: Endocervical curettings pathology negative for dysplasia/malignancy.     17: Pap test NILM, hrHPV16+.   -Colposcopy recommended, not performed.      18:   -Pap test ASCUS, hrHPV16+.   -Endocervical curettings & cervical biopsy pathology: negative for dysplasia/malignancy.      19:  Pap test ASC-H, hrHPV16+.   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      9/10/20: Pap test ASC-H, hrHPV+ (NOS).  21: LEEP.   -Pathology: LEEP & endocervical curettings: negative for dysplasia/malignancy.     3/23/22: Endocervical curettings & cervical biopsy pathology: negative for dysplasia/malignancy.     3/28/23:   -Pap test HSIL, hrHPV16+.   -Findings by gynecologist Dr. Koenig: External genitalia with erythematous plaques bilaterally  Urethra- mid position and well supported, suburethral tissue thickened and friable with bleeding elicited after placement of the speculum  Vagina is stenotic and cervix difficult to see.   23: Cystourethroscopy, vaginal biopsy by urologist Dr. Mackenzie.   -Findings: Exam revealed lichenified changes to bilateral labia majora and friable vestibular tissue (patient had significant bleeding from prep alone.) The urethra was somewhat stenotic and would not accommodate 19Fr rigid cystoscope, therefore a 16 Fr flexible cystoscope was inserted. Gentle pressure was required to access  the bladder. The ureteral orifices were in their orthotopic positions bilaterally. There were no intravesical stones or diverticuli and the bladder was mildly trabeculated. There were no intravesical lesions. See media tab for urethral pictures - there were no obvious lesions but the entire distal urethra was erythematous and stenosed (16Fr.)  -Pathology: Invasive moderately-differentiated squamous cell carcinoma, HPV-associated, with focus suspicious for lymphovascular space invasion; IHC: p16+.    6/8/23: Gynecologic Oncology consultation.  -Findings:   External genitalia notable for erythema and desquamation of the posterior medial labia, c/w lichen sclerosus changes. When the labia are , a friable mass is visible at the distal anterior vagina, just posterior to the urethra. On bimanual exam, the cervix is small and normal in contour. The palpable vaginal tumor is limited to the anterior distal vagina, approximately 4-5 cm laterally x 3 cm cranio-caudal. Tumor is friable. Remainder of the vagina smooth to palpation. Digital anorectal exam is without nodularity. No palpable tumor in the rectovaginal septum.   Enlarged firm, fixed right inguinal lymph node ~3-4 cm in size.      6/16/23: Pelvic MRI  IMPRESSION:   1. Irregular enhancing lesion along the anterior vaginal wall at the  level of the introitus measuring 2.5 x 0.9 cm with irregular  enhancement along the anterior vaginal wall towards the level of the  vaginal cuff however there is no definitive evidence or abnormal  signal intensity involving the cervix to suggest invasion.  2. Right inguinal lymphadenopathy compatible with metastatic disease.   3. There is some asymmetric enhancement involving the left sacrum,  incompletely evaluated on this study. Further evaluation of this and  further staging of the chest, abdomen and pelvis will be performed on  PET/CT scheduled for 6/20/2023.     6/20/23: PET CT  IMPRESSION:   1. Intense focal hypermetabolic  FDG uptake in biopsy-proven vaginal  squamous cell carcinoma.  2. Multiple enlarged, hypermetabolic right inguinal nodes likely  represent regional metastasis. No definite evidence of distant  metastatic disease.  3. Scattered sub-4 mm solid pulmonary nodules are below the size  threshold for characterization on PET. Attention on follow-up with CT.  4. 1.0 cm enhancing left parotid gland nodule with hypermetabolic  uptake could represent a primary parotid neoplasm such as pleomorphic  adenoma or Warthin's tumor; consider ultrasound for further  evaluation.  5. 1.3 cm hypoattenuating right thyroid nodule with mild FDG uptake  warrants ultrasound for characterization.      Plan: Cisplatin radiation      7/27/2023: urinary gonsalez catheter placement. Treat acute cystitis with bactrim per Urology.      8/1/2023: plan C1D1 Cisplatin radiation     8/28/23: Mg 1.3. plt 94, K 3.8  9/1/23: Plt 107, Mg1.3 , K 3.8        Today she comes to clinic and reports the following:       -Water-~1L before radiation; 1L remainder of day  -Gatorade zero 8 oz  -Has been eating more watermelon  -Ringing in ears prn  -Bowels alternate btwn mush and formed; no imodium  -Taking gas-x  -Tums for GERD  - still reacts to certain food smells which make her feels nauseated; does feel better after eating something         Review of Systems:    Systemic           no weight changes; no fever; no chills; no night sweats; no appetite changes  Skin           no rashes, or lesions  Eye           no irritation; no changes in vision  Shantelle-Laryngeal           no dysphagia; no hoarseness   Pulmonary    no cough; no shortness of breath  Cardiovascular    no chest pain; no palpitations  Gastrointestinal    no diarrhea; no constipation; no abdominal pain; no changes in bowel habits; no blood in stool  Genitourinary   no urinary frequency; no urinary urgency; no dysuria; no pain; no abnormal vaginal discharge; no abnormal vaginal bleeding  Breast   no breast  discharge; no breast changes; no breast pain  Musculoskeletal    no myalgias; no arthralgias; no back pain  Psychiatric           no depressed mood; no anxiety    Hematologic              no tender lymph nodes; no noticeable swellings or lumps   Endocrine    no hot flashes; no heat/cold intolerance         Neurological   no tremor; no numbness and tingling; no headaches; no difficulty sleeping      Past Medical History:    Past Medical History:   Diagnosis Date    Actinic keratosis     Hyperlipemia     Lichen sclerosus 2020    Osteopenias     SCC (squamous cell carcinoma) 7/26/2023         Past Surgical History:    Past Surgical History:   Procedure Laterality Date    COLONOSCOPY  2006    normal    COLPOSCOPY, BIOPSY, COMBINED N/A 02/08/2021    Procedure: COLPOSCOPY, WITH BIOPSY, LEEP procedure;  Surgeon: Jefe Koenig MD;  Location: [facility] OR    CYSTOSCOPY N/A 05/30/2023    Procedure: CYSTOSCOPY AND EXCISIONAL BIOPSY OF THE VAGINAL TISSUE AROUND THE URETHRA.  (look in the bladder and sample small area in the vagina near the urethra);  Surgeon: Lakeisha Mackenzie MD;  Location: [facility] OR    EYE SURGERY      cataracts bilateral    OPEN REDUCTION INTERNAL FIXATION FOREARM  07/31/2012    Procedure: OPEN REDUCTION INTERNAL FIXATION FOREARM;  Open Reduction Internal Fixation, Irrigation & Debridment  of Right Distal Radius, application short arm splint;  Surgeon: Wade Beard MD;  Location: [facility] OR    TONSILLECTOMY & ADENOIDECTOMY  1960    [facility] TREAT ECTOPIC PREG,RMV TUBE/OVARY  1985    ectopic, right side-ovary and tube removed         Health Maintenance Due   Topic Date Due    COVID-19 Vaccine (6 - Moderna risk series) 01/06/2023    MEDICARE ANNUAL WELLNESS VISIT  05/26/2023    COLPOSCOPY  06/28/2023    INFLUENZA VACCINE (1) 09/01/2023       Current Medications:     Current Outpatient Medications   Medication Sig Dispense Refill    magnesium oxide (MAG-OX) 400 MG tablet Take 1 tablet (400 mg) by mouth  daily 30 tablet 0    acetaminophen (TYLENOL) 500 MG tablet Take 500-1,000 mg by mouth every 6 hours as needed for mild pain      clotrimazole (LOTRIMIN) 1 % external cream Apply topically 2 times daily To affected areas 45 g 3    dexamethasone (DECADRON) 4 MG tablet Take 2 tablets (8 mg) by mouth daily Take for 3 days, starting the day after chemo on day 2 and 9. Take with food. If no nausea and vomiting with first cisplatin doses, may stop dexamethasone. 12 tablet 2    docusate sodium (COLACE) 100 MG capsule Take 100 mg by mouth daily      LORazepam (ATIVAN) 0.5 MG tablet Take one tablet po 30-45 min before MRI scan, may repeat times one if needed, bring bottle with you to MRI 2 tablet 0    ondansetron (ZOFRAN) 8 MG tablet Take 1 tablet (8 mg) by mouth every 8 hours as needed for nausea (vomiting) Do not take for 3 days after chemo. 30 tablet 2    oxyCODONE (ROXICODONE) 5 MG tablet Take 1 tablet (5 mg) by mouth every 6 hours as needed for severe pain 20 tablet 0    phenazopyridine (PYRIDIUM) 200 MG tablet Take 1 tablet (200 mg) by mouth 3 times daily as needed for irritation 21 tablet 1    prochlorperazine (COMPAZINE) 10 MG tablet Take 1 tablet (10 mg) by mouth every 6 hours as needed for nausea or vomiting 30 tablet 2    sulfamethoxazole-trimethoprim (BACTRIM DS) 800-160 MG tablet Take 1 tablet by mouth 2 times daily 14 tablet 0    tolterodine (DETROL) 2 MG tablet Take 1 tablet (2 mg) by mouth 2 times daily 60 tablet 3    triamcinolone (KENALOG) 0.1 % external cream Apply topically 2 times daily 15 g 1         Allergies:        Allergies   Allergen Reactions    Oxybutynin Itching    Seasonal Allergies         Social History:     Social History     Tobacco Use    Smoking status: Never     Passive exposure: Never    Smokeless tobacco: Never   Substance Use Topics    Alcohol use: Yes     Comment: very little       History   Drug Use No         Family History:       Family History   Problem Relation Age of Onset     Lung Cancer Mother 44    Cerebrovascular Disease Father     Hypertension Father     Alcohol/Drug Father     Obesity Niece         niece    Mental Illness Nephew         nephew    Diabetes Other         paternal aunt    Hyperlipidemia Other     Obesity Other         self    Cervical Cancer Maternal Aunt     Ovarian Cancer Maternal Aunt 60    Obesity Other     Diabetes Other         paternal aunt    Hypertension Other         father    Cerebrovascular Disease Other         father    C.A.D. No family hx of     Breast Cancer No family hx of     Cancer - colorectal No family hx of     Prostate Cancer No family hx of     Melanoma No family hx of          Physical Exam:     /87 (BP Location: Right arm, Patient Position: Sitting, Cuff Size: Adult Large)   Pulse 91   Temp 97.5  F (36.4  C) (Oral)   Resp 18   Wt 95.7 kg (210 lb 14.4 oz)   LMP 03/08/2008   SpO2 99%   BMI 35.64 kg/m    Body mass index is 35.64 kg/m .    General Appearance: healthy and alert, no distress     HEENT: no thyromegaly, no palpable nodules or masses        Cardiovascular: regular rate and rhythm, no gallops, rubs or murmurs     Respiratory: lungs clear, no rales, rhonchi or wheezes, normal diaphragmatic excursion    Musculoskeletal: extremities non tender and without edema    Skin: no lesions or rashes     Neurological: normal gait, no gross defects     Psychiatric: appropriate mood and affect                               Hematological: normal cervical, supraclavicular lymph nodes     Gastrointestinal:       abdomen soft, non-tender, non-distended    Genitourinary: deferred      Assessment:    Jessie Tamayo is a 67 year old woman with a diagnosis of 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  She presents for follow up and disease management..     40 minutes spent on the date of the encounter doing chart review, history and exam, documentation and further activities as noted above      Plan:     1.)       She is scheduled  for her interstitial placement 9/18 with a PAC visit on 9/11. Reviewed signs and symptoms for when she should contact the clinic or seek additional care. Patient to contact the clinic with any questions or concerns in the interim.  Answered all of her questions to the best of my ability.     2.) Hypomagnesemia:  to be replaced today in infusion. Rx Mg to start tomorrow. Has PAC 9/11 and will have labs including Mg redrawn to see current Mg ahead of planned procedure 9/18; message sent to PAC provider who confirms and will have her increase her Mg to BID if needed. She will also work on eating Mg rich foods.    3.) Labs and/or tests ordered include:  Mg, CMP, CBC.     4.) Health maintenance issues addressed today include annual health maintenance and non-gynecologic issues with PCP.    SANDI Gordillo, WHNP-BC, ANP-BC  Women's Health Nurse Practitioner  Adult Nurse Practitioner  Division of Gynecologic Oncology        CC  Patient Care Team:  Alba Layton MD as PCP - General  Jefe Koenig MD as Assigned OBGYN Provider  Alba Layton MD as Assigned PCP  Lakeisha Mackenzie MD as MD (Urology)  Lawrence Banuelos MD as MD (Dermatology)  Lakeisha Mackenzie MD as Assigned Surgical Provider  Lakeisha Mackenzie MD as MD (Urology)  Reina Stahl MD as MD (Gynecologic Oncology)  Marisa Pacheco MD as MD (Radiation Oncology)  Lily Fabian PA-C as Physician Assistant (Anesthesiology)  Marisa Pacheco MD as Assigned Cancer Care Provider  Alba Layton MD as Assigned Pain Medication Provider  SELF, REFERRED

## 2023-09-01 ENCOUNTER — INFUSION THERAPY VISIT (OUTPATIENT)
Dept: ONCOLOGY | Facility: CLINIC | Age: 68
End: 2023-09-01
Attending: OBSTETRICS & GYNECOLOGY
Payer: COMMERCIAL

## 2023-09-01 ENCOUNTER — APPOINTMENT (OUTPATIENT)
Dept: RADIATION ONCOLOGY | Facility: CLINIC | Age: 68
End: 2023-09-01
Attending: RADIOLOGY
Payer: COMMERCIAL

## 2023-09-01 ENCOUNTER — ONCOLOGY VISIT (OUTPATIENT)
Dept: ONCOLOGY | Facility: CLINIC | Age: 68
End: 2023-09-01
Attending: NURSE PRACTITIONER
Payer: COMMERCIAL

## 2023-09-01 ENCOUNTER — APPOINTMENT (OUTPATIENT)
Dept: LAB | Facility: CLINIC | Age: 68
End: 2023-09-01
Attending: OBSTETRICS & GYNECOLOGY
Payer: COMMERCIAL

## 2023-09-01 VITALS
HEART RATE: 91 BPM | WEIGHT: 210.9 LBS | BODY MASS INDEX: 35.64 KG/M2 | SYSTOLIC BLOOD PRESSURE: 137 MMHG | RESPIRATION RATE: 18 BRPM | TEMPERATURE: 97.5 F | OXYGEN SATURATION: 99 % | DIASTOLIC BLOOD PRESSURE: 87 MMHG

## 2023-09-01 DIAGNOSIS — C52 VAGINAL CANCER (H): ICD-10-CM

## 2023-09-01 DIAGNOSIS — C52 VAGINAL CANCER (H): Primary | ICD-10-CM

## 2023-09-01 DIAGNOSIS — C44.92 SCC (SQUAMOUS CELL CARCINOMA): ICD-10-CM

## 2023-09-01 DIAGNOSIS — E83.42 HYPOMAGNESEMIA: ICD-10-CM

## 2023-09-01 DIAGNOSIS — E83.42 HYPOMAGNESEMIA: Primary | ICD-10-CM

## 2023-09-01 LAB
ALBUMIN SERPL BCG-MCNC: 4.2 G/DL (ref 3.5–5.2)
ALP SERPL-CCNC: 57 U/L (ref 35–104)
ALT SERPL W P-5'-P-CCNC: 22 U/L (ref 0–50)
ANION GAP SERPL CALCULATED.3IONS-SCNC: 11 MMOL/L (ref 7–15)
AST SERPL W P-5'-P-CCNC: 18 U/L (ref 0–45)
BASOPHILS # BLD AUTO: 0 10E3/UL (ref 0–0.2)
BASOPHILS NFR BLD AUTO: 0 %
BILIRUB SERPL-MCNC: 0.5 MG/DL
BUN SERPL-MCNC: 25.6 MG/DL (ref 8–23)
CALCIUM SERPL-MCNC: 9.1 MG/DL (ref 8.8–10.2)
CHLORIDE SERPL-SCNC: 92 MMOL/L (ref 98–107)
CREAT SERPL-MCNC: 0.88 MG/DL (ref 0.51–0.95)
DEPRECATED HCO3 PLAS-SCNC: 26 MMOL/L (ref 22–29)
EOSINOPHIL # BLD AUTO: 0 10E3/UL (ref 0–0.7)
EOSINOPHIL NFR BLD AUTO: 1 %
ERYTHROCYTE [DISTWIDTH] IN BLOOD BY AUTOMATED COUNT: 13 % (ref 10–15)
GFR SERPL CREATININE-BSD FRML MDRD: 72 ML/MIN/1.73M2
GLUCOSE SERPL-MCNC: 93 MG/DL (ref 70–99)
HCT VFR BLD AUTO: 30.4 % (ref 35–47)
HGB BLD-MCNC: 10.3 G/DL (ref 11.7–15.7)
IMM GRANULOCYTES # BLD: 0.1 10E3/UL
IMM GRANULOCYTES NFR BLD: 1 %
LYMPHOCYTES # BLD AUTO: 0.2 10E3/UL (ref 0.8–5.3)
LYMPHOCYTES NFR BLD AUTO: 5 %
MAGNESIUM SERPL-MCNC: 1.3 MG/DL (ref 1.7–2.3)
MCH RBC QN AUTO: 29.9 PG (ref 26.5–33)
MCHC RBC AUTO-ENTMCNC: 33.9 G/DL (ref 31.5–36.5)
MCV RBC AUTO: 88 FL (ref 78–100)
MONOCYTES # BLD AUTO: 0.6 10E3/UL (ref 0–1.3)
MONOCYTES NFR BLD AUTO: 13 %
NEUTROPHILS # BLD AUTO: 3.5 10E3/UL (ref 1.6–8.3)
NEUTROPHILS NFR BLD AUTO: 80 %
NRBC # BLD AUTO: 0 10E3/UL
NRBC BLD AUTO-RTO: 0 /100
PLATELET # BLD AUTO: 107 10E3/UL (ref 150–450)
POTASSIUM SERPL-SCNC: 3.8 MMOL/L (ref 3.4–5.3)
PROT SERPL-MCNC: 6.3 G/DL (ref 6.4–8.3)
RBC # BLD AUTO: 3.44 10E6/UL (ref 3.8–5.2)
SODIUM SERPL-SCNC: 129 MMOL/L (ref 136–145)
WBC # BLD AUTO: 4.3 10E3/UL (ref 4–11)

## 2023-09-01 PROCEDURE — 77336 RADIATION PHYSICS CONSULT: CPT | Performed by: RADIOLOGY

## 2023-09-01 PROCEDURE — 77300 RADIATION THERAPY DOSE PLAN: CPT | Performed by: RADIOLOGY

## 2023-09-01 PROCEDURE — 99215 OFFICE O/P EST HI 40 MIN: CPT | Performed by: NURSE PRACTITIONER

## 2023-09-01 PROCEDURE — 77338 DESIGN MLC DEVICE FOR IMRT: CPT | Mod: 26 | Performed by: RADIOLOGY

## 2023-09-01 PROCEDURE — 96360 HYDRATION IV INFUSION INIT: CPT

## 2023-09-01 PROCEDURE — 258N000003 HC RX IP 258 OP 636: Performed by: NURSE PRACTITIONER

## 2023-09-01 PROCEDURE — 77386 HC IMRT TREATMENT DELIVERY, COMPLEX: CPT | Performed by: RADIOLOGY

## 2023-09-01 PROCEDURE — 36415 COLL VENOUS BLD VENIPUNCTURE: CPT | Performed by: OBSTETRICS & GYNECOLOGY

## 2023-09-01 PROCEDURE — 77300 RADIATION THERAPY DOSE PLAN: CPT | Mod: 26 | Performed by: RADIOLOGY

## 2023-09-01 PROCEDURE — 83735 ASSAY OF MAGNESIUM: CPT | Performed by: OBSTETRICS & GYNECOLOGY

## 2023-09-01 PROCEDURE — 77338 DESIGN MLC DEVICE FOR IMRT: CPT | Mod: XU | Performed by: RADIOLOGY

## 2023-09-01 PROCEDURE — G0463 HOSPITAL OUTPT CLINIC VISIT: HCPCS | Mod: 25 | Performed by: NURSE PRACTITIONER

## 2023-09-01 PROCEDURE — 80053 COMPREHEN METABOLIC PANEL: CPT | Performed by: OBSTETRICS & GYNECOLOGY

## 2023-09-01 PROCEDURE — 250N000011 HC RX IP 250 OP 636: Performed by: OBSTETRICS & GYNECOLOGY

## 2023-09-01 PROCEDURE — 85025 COMPLETE CBC W/AUTO DIFF WBC: CPT | Performed by: OBSTETRICS & GYNECOLOGY

## 2023-09-01 RX ORDER — EPINEPHRINE 1 MG/ML
0.3 INJECTION, SOLUTION INTRAMUSCULAR; SUBCUTANEOUS EVERY 5 MIN PRN
Status: CANCELLED | OUTPATIENT
Start: 2023-09-01

## 2023-09-01 RX ORDER — MEPERIDINE HYDROCHLORIDE 25 MG/ML
25 INJECTION INTRAMUSCULAR; INTRAVENOUS; SUBCUTANEOUS EVERY 30 MIN PRN
Status: CANCELLED | OUTPATIENT
Start: 2023-09-01

## 2023-09-01 RX ORDER — ALBUTEROL SULFATE 90 UG/1
1-2 AEROSOL, METERED RESPIRATORY (INHALATION)
Status: CANCELLED
Start: 2023-09-01

## 2023-09-01 RX ORDER — MAGNESIUM OXIDE 400 MG/1
400 TABLET ORAL DAILY
Qty: 30 TABLET | Refills: 0 | Status: SHIPPED | OUTPATIENT
Start: 2023-09-01 | End: 2023-10-07

## 2023-09-01 RX ORDER — HEPARIN SODIUM,PORCINE 10 UNIT/ML
5-20 VIAL (ML) INTRAVENOUS DAILY PRN
Status: CANCELLED | OUTPATIENT
Start: 2023-09-01

## 2023-09-01 RX ORDER — DIPHENHYDRAMINE HYDROCHLORIDE 50 MG/ML
50 INJECTION INTRAMUSCULAR; INTRAVENOUS
Status: CANCELLED
Start: 2023-09-01

## 2023-09-01 RX ORDER — ALBUTEROL SULFATE 0.83 MG/ML
2.5 SOLUTION RESPIRATORY (INHALATION)
Status: CANCELLED | OUTPATIENT
Start: 2023-09-01

## 2023-09-01 RX ORDER — HEPARIN SODIUM (PORCINE) LOCK FLUSH IV SOLN 100 UNIT/ML 100 UNIT/ML
5 SOLUTION INTRAVENOUS
Status: CANCELLED | OUTPATIENT
Start: 2023-09-01

## 2023-09-01 RX ORDER — MAGNESIUM SULFATE HEPTAHYDRATE 40 MG/ML
2 INJECTION, SOLUTION INTRAVENOUS ONCE
Status: COMPLETED | OUTPATIENT
Start: 2023-09-01 | End: 2023-09-01

## 2023-09-01 RX ORDER — METHYLPREDNISOLONE SODIUM SUCCINATE 125 MG/2ML
125 INJECTION, POWDER, LYOPHILIZED, FOR SOLUTION INTRAMUSCULAR; INTRAVENOUS
Status: CANCELLED
Start: 2023-09-01

## 2023-09-01 RX ADMIN — MAGNESIUM SULFATE 2 G: 2 INJECTION INTRAVENOUS at 12:24

## 2023-09-01 RX ADMIN — SODIUM CHLORIDE 1000 ML: 9 INJECTION, SOLUTION INTRAVENOUS at 12:11

## 2023-09-01 ASSESSMENT — PAIN SCALES - GENERAL: PAINLEVEL: MODERATE PAIN (5)

## 2023-09-01 NOTE — PATIENT INSTRUCTIONS
[facility] Triage and after hours / weekends / holidays:  [phone]    Please call the triage or after hours line if you experience a temperature greater than or equal to 100.4, shaking chills, have uncontrolled nausea, vomiting and/or diarrhea, dizziness, shortness of breath, chest pain, bleeding, unexplained bruising, or if you have any other new/concerning symptoms, questions or concerns.      If you are having any concerning symptoms or wish to speak to a provider before your next infusion visit, please call your care coordinator or triage to notify them so we can adequately serve you.     If you need a refill on a narcotic prescription or other medication, please call before your infusion appointment.

## 2023-09-01 NOTE — LETTER
2023         RE: Jessie Tamayo  [address] [id number]        Dear Colleague,    Thank you for referring your patient, Jessie Tamayo, to the [facility]. Please see a copy of my visit note below.                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  She presents for follow up and disease management.     Oncology History:  07, 10/15/08, 09, 11: Pap test NILM.      1/23/15: Pap test NILM, hrHPV16+.  2/27/15: Cervical polyp & cervical biopsy pathology: Negative for dysplasia/malignancy.     16: Pap test NILM, hrHPV16+.   16: Endocervical curettings pathology negative for dysplasia/malignancy.     17: Pap test NILM, hrHPV16+.   -Colposcopy recommended, not performed.      18:   -Pap test ASCUS, hrHPV16+.   -Endocervical curettings & cervical biopsy pathology: negative for dysplasia/malignancy.      19:  Pap test ASC-H, hrHPV16+.   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      9/10/20: Pap test ASC-H, hrHPV+ (NOS).  21: LEEP.   -Pathology: LEEP & endocervical curettings: negative for dysplasia/malignancy.     3/23/22: Endocervical curettings & cervical biopsy pathology: negative for dysplasia/malignancy.     3/28/23:   -Pap test HSIL, hrHPV16+.   -Findings by gynecologist Dr. Koenig: External genitalia with erythematous plaques bilaterally  Urethra- mid position and well supported, suburethral tissue thickened and friable with bleeding elicited after placement of the speculum  Vagina is stenotic and cervix difficult to see.   23: Cystourethroscopy, vaginal biopsy by urologist Dr. Mackenzie.   -Findings: Exam revealed lichenified changes to bilateral labia majora and  friable vestibular tissue (patient had significant bleeding from prep alone.) The urethra was somewhat stenotic and would not accommodate 19Fr rigid cystoscope, therefore a 16 Fr flexible cystoscope was inserted. Gentle pressure was required to access the bladder. The ureteral orifices were in their orthotopic positions bilaterally. There were no intravesical stones or diverticuli and the bladder was mildly trabeculated. There were no intravesical lesions. See media tab for urethral pictures - there were no obvious lesions but the entire distal urethra was erythematous and stenosed (16Fr.)  -Pathology: Invasive moderately-differentiated squamous cell carcinoma, HPV-associated, with focus suspicious for lymphovascular space invasion; IHC: p16+.    6/8/23: Gynecologic Oncology consultation.  -Findings:   External genitalia notable for erythema and desquamation of the posterior medial labia, c/w lichen sclerosus changes. When the labia are , a friable mass is visible at the distal anterior vagina, just posterior to the urethra. On bimanual exam, the cervix is small and normal in contour. The palpable vaginal tumor is limited to the anterior distal vagina, approximately 4-5 cm laterally x 3 cm cranio-caudal. Tumor is friable. Remainder of the vagina smooth to palpation. Digital anorectal exam is without nodularity. No palpable tumor in the rectovaginal septum.   Enlarged firm, fixed right inguinal lymph node ~3-4 cm in size.      6/16/23: Pelvic MRI  IMPRESSION:   1. Irregular enhancing lesion along the anterior vaginal wall at the  level of the introitus measuring 2.5 x 0.9 cm with irregular  enhancement along the anterior vaginal wall towards the level of the  vaginal cuff however there is no definitive evidence or abnormal  signal intensity involving the cervix to suggest invasion.  2. Right inguinal lymphadenopathy compatible with metastatic disease.   3. There is some asymmetric enhancement involving the  left sacrum,  incompletely evaluated on this study. Further evaluation of this and  further staging of the chest, abdomen and pelvis will be performed on  PET/CT scheduled for 6/20/2023.     6/20/23: PET CT  IMPRESSION:   1. Intense focal hypermetabolic FDG uptake in biopsy-proven vaginal  squamous cell carcinoma.  2. Multiple enlarged, hypermetabolic right inguinal nodes likely  represent regional metastasis. No definite evidence of distant  metastatic disease.  3. Scattered sub-4 mm solid pulmonary nodules are below the size  threshold for characterization on PET. Attention on follow-up with CT.  4. 1.0 cm enhancing left parotid gland nodule with hypermetabolic  uptake could represent a primary parotid neoplasm such as pleomorphic  adenoma or Warthin's tumor; consider ultrasound for further  evaluation.  5. 1.3 cm hypoattenuating right thyroid nodule with mild FDG uptake  warrants ultrasound for characterization.      Plan: Cisplatin radiation      7/27/2023: urinary gonsalez catheter placement. Treat acute cystitis with bactrim per Urology.      8/1/2023: plan C1D1 Cisplatin radiation     8/28/23: Mg 1.3. plt 94, K 3.8  9/1/23: Plt 107, Mg1.3 , K 3.8        Today she comes to clinic and reports the following:       -Water-~1L before radiation; 1L remainder of day  -Gatorade zero 8 oz  -Has been eating more watermelon  -Ringing in ears prn  -Bowels alternate btwn mush and formed; no imodium  -Taking gas-x  -Tums for GERD  - still reacts to certain food smells which make her feels nauseated; does feel better after eating something         Review of Systems:    Systemic           no weight changes; no fever; no chills; no night sweats; no appetite changes  Skin           no rashes, or lesions  Eye           no irritation; no changes in vision  Shantelle-Laryngeal           no dysphagia; no hoarseness   Pulmonary    no cough; no shortness of breath  Cardiovascular    no chest pain; no palpitations  Gastrointestinal    no  diarrhea; no constipation; no abdominal pain; no changes in bowel habits; no blood in stool  Genitourinary   no urinary frequency; no urinary urgency; no dysuria; no pain; no abnormal vaginal discharge; no abnormal vaginal bleeding  Breast   no breast discharge; no breast changes; no breast pain  Musculoskeletal    no myalgias; no arthralgias; no back pain  Psychiatric           no depressed mood; no anxiety    Hematologic              no tender lymph nodes; no noticeable swellings or lumps   Endocrine    no hot flashes; no heat/cold intolerance         Neurological   no tremor; no numbness and tingling; no headaches; no difficulty sleeping      Past Medical History:    Past Medical History:   Diagnosis Date    Actinic keratosis     Hyperlipemia     Lichen sclerosus 2020    Osteopenias     SCC (squamous cell carcinoma) 7/26/2023         Past Surgical History:    Past Surgical History:   Procedure Laterality Date    COLONOSCOPY  2006    normal    COLPOSCOPY, BIOPSY, COMBINED N/A 02/08/2021    Procedure: COLPOSCOPY, WITH BIOPSY, LEEP procedure;  Surgeon: Jefe Koenig MD;  Location: [facility] OR    CYSTOSCOPY N/A 05/30/2023    Procedure: CYSTOSCOPY AND EXCISIONAL BIOPSY OF THE VAGINAL TISSUE AROUND THE URETHRA.  (look in the bladder and sample small area in the vagina near the urethra);  Surgeon: Lakeisha Mackenzie MD;  Location: [facility] OR    EYE SURGERY      cataracts bilateral    OPEN REDUCTION INTERNAL FIXATION FOREARM  07/31/2012    Procedure: OPEN REDUCTION INTERNAL FIXATION FOREARM;  Open Reduction Internal Fixation, Irrigation & Debridment  of Right Distal Radius, application short arm splint;  Surgeon: Wade Beard MD;  Location: [facility] OR    TONSILLECTOMY & ADENOIDECTOMY  1960    [facility] TREAT ECTOPIC PREG,RMV TUBE/OVARY  1985    ectopic, right side-ovary and tube removed         Health Maintenance Due   Topic Date Due    COVID-19 Vaccine (6 - Moderna risk series) 01/06/2023    MEDICARE ANNUAL WELLNESS  VISIT  05/26/2023    COLPOSCOPY  06/28/2023    INFLUENZA VACCINE (1) 09/01/2023       Current Medications:     Current Outpatient Medications   Medication Sig Dispense Refill    magnesium oxide (MAG-OX) 400 MG tablet Take 1 tablet (400 mg) by mouth daily 30 tablet 0    acetaminophen (TYLENOL) 500 MG tablet Take 500-1,000 mg by mouth every 6 hours as needed for mild pain      clotrimazole (LOTRIMIN) 1 % external cream Apply topically 2 times daily To affected areas 45 g 3    dexamethasone (DECADRON) 4 MG tablet Take 2 tablets (8 mg) by mouth daily Take for 3 days, starting the day after chemo on day 2 and 9. Take with food. If no nausea and vomiting with first cisplatin doses, may stop dexamethasone. 12 tablet 2    docusate sodium (COLACE) 100 MG capsule Take 100 mg by mouth daily      LORazepam (ATIVAN) 0.5 MG tablet Take one tablet po 30-45 min before MRI scan, may repeat times one if needed, bring bottle with you to MRI 2 tablet 0    ondansetron (ZOFRAN) 8 MG tablet Take 1 tablet (8 mg) by mouth every 8 hours as needed for nausea (vomiting) Do not take for 3 days after chemo. 30 tablet 2    oxyCODONE (ROXICODONE) 5 MG tablet Take 1 tablet (5 mg) by mouth every 6 hours as needed for severe pain 20 tablet 0    phenazopyridine (PYRIDIUM) 200 MG tablet Take 1 tablet (200 mg) by mouth 3 times daily as needed for irritation 21 tablet 1    prochlorperazine (COMPAZINE) 10 MG tablet Take 1 tablet (10 mg) by mouth every 6 hours as needed for nausea or vomiting 30 tablet 2    sulfamethoxazole-trimethoprim (BACTRIM DS) 800-160 MG tablet Take 1 tablet by mouth 2 times daily 14 tablet 0    tolterodine (DETROL) 2 MG tablet Take 1 tablet (2 mg) by mouth 2 times daily 60 tablet 3    triamcinolone (KENALOG) 0.1 % external cream Apply topically 2 times daily 15 g 1         Allergies:        Allergies   Allergen Reactions    Oxybutynin Itching    Seasonal Allergies         Social History:     Social History     Tobacco Use    Smoking  status: Never     Passive exposure: Never    Smokeless tobacco: Never   Substance Use Topics    Alcohol use: Yes     Comment: very little       History   Drug Use No         Family History:       Family History   Problem Relation Age of Onset    Lung Cancer Mother 44    Cerebrovascular Disease Father     Hypertension Father     Alcohol/Drug Father     Obesity Niece         niece    Mental Illness Nephew         nephew    Diabetes Other         paternal aunt    Hyperlipidemia Other     Obesity Other         self    Cervical Cancer Maternal Aunt     Ovarian Cancer Maternal Aunt 60    Obesity Other     Diabetes Other         paternal aunt    Hypertension Other         father    Cerebrovascular Disease Other         father    C.A.D. No family hx of     Breast Cancer No family hx of     Cancer - colorectal No family hx of     Prostate Cancer No family hx of     Melanoma No family hx of          Physical Exam:     /87 (BP Location: Right arm, Patient Position: Sitting, Cuff Size: Adult Large)   Pulse 91   Temp 97.5  F (36.4  C) (Oral)   Resp 18   Wt 95.7 kg (210 lb 14.4 oz)   LMP 03/08/2008   SpO2 99%   BMI 35.64 kg/m    Body mass index is 35.64 kg/m .    General Appearance: healthy and alert, no distress     HEENT: no thyromegaly, no palpable nodules or masses        Cardiovascular: regular rate and rhythm, no gallops, rubs or murmurs     Respiratory: lungs clear, no rales, rhonchi or wheezes, normal diaphragmatic excursion    Musculoskeletal: extremities non tender and without edema    Skin: no lesions or rashes     Neurological: normal gait, no gross defects     Psychiatric: appropriate mood and affect                               Hematological: normal cervical, supraclavicular lymph nodes     Gastrointestinal:       abdomen soft, non-tender, non-distended    Genitourinary: deferred      Assessment:    Jessie Tamayo is a 67 year old woman with a diagnosis of radiographic stage IVB vaginal squamous cell  carcinoma.  She presents for follow up and disease management..     40 minutes spent on the date of the encounter doing chart review, history and exam, documentation and further activities as noted above      Plan:     1.)       She is scheduled for her interstitial placement 9/18 with a PAC visit on 9/11. Reviewed signs and symptoms for when she should contact the clinic or seek additional care. Patient to contact the clinic with any questions or concerns in the interim.  Answered all of her questions to the best of my ability.     2.) Hypomagnesemia:  to be replaced today in infusion. Rx Mg to start tomorrow. Has PAC 9/11 and will have labs including Mg redrawn to see current Mg ahead of planned procedure 9/18; message sent to PAC provider who confirms and will have her increase her Mg to BID if needed. She will also work on eating Mg rich foods.    3.) Labs and/or tests ordered include:  Mg, CMP, CBC.     4.) Health maintenance issues addressed today include annual health maintenance and non-gynecologic issues with PCP.    SANDI Gordillo, WHNP-BC, ANP-BC  Women's Health Nurse Practitioner  Adult Nurse Practitioner  Division of Gynecologic Oncology        CC  Patient Care Team:  Alba Layton MD as PCP - General

## 2023-09-01 NOTE — NURSING NOTE
"Oncology Rooming Note    September 1, 2023 11:15 AM   Jessie Tamayo is a 67 year old female who presents for:    Chief Complaint   Patient presents with    Blood Draw     Labs drawn with PIV start by vascular access. Vitals taken.    Oncology Clinic Visit     Vaginal Cancer     Initial Vitals: /87 (BP Location: Right arm, Patient Position: Sitting, Cuff Size: Adult Large)   Pulse 91   Temp 97.5  F (36.4  C) (Oral)   Resp 18   Wt 95.7 kg (210 lb 14.4 oz)   LMP 03/08/2008   SpO2 99%   BMI 35.64 kg/m   Estimated body mass index is 35.64 kg/m  as calculated from the following:    Height as of 8/25/23: 1.638 m (5' 4.5\").    Weight as of this encounter: 95.7 kg (210 lb 14.4 oz). Body surface area is 2.09 meters squared.  Moderate Pain (5) Comment: Data Unavailable   Patient's last menstrual period was 03/08/2008.  Allergies reviewed: Yes  Medications reviewed: Yes    Medications: Medication refills not needed today.  Pharmacy name entered into EPIC:    [id number] - [place] - [address] [id number] - [place] - [address] [id number] - [place] - [address] [facility] - [place], [place] - [address]    Clinical concerns: none       Tiffany Franco              "

## 2023-09-01 NOTE — PROGRESS NOTES
Infusion Nursing Note:  Jessie Tamayo presents today for IV fluids + magnesium replacement.    Patient seen by provider today: Yes: Betina Kramer, CNP   present during visit today: Not Applicable.    Note: Jessie presents today feeling okay. A little nauseated, provided crackers per request with some relief. Declines need for further intervention at this visit. Offers no concerns since visit with Betina Kramer prior to infusion.      Intravenous Access:  Peripheral IV placed in lab.    Treatment Conditions:     Latest Reference Range & Units 09/01/23 10:18   Sodium 136 - 145 mmol/L 129 (L)   Potassium 3.4 - 5.3 mmol/L 3.8   Chloride 98 - 107 mmol/L 92 (L)   Carbon Dioxide (CO2) 22 - 29 mmol/L 26   Urea Nitrogen 8.0 - 23.0 mg/dL 25.6 (H)   Creatinine 0.51 - 0.95 mg/dL 0.88   GFR Estimate >60 mL/min/1.73m2 72   Calcium 8.8 - 10.2 mg/dL 9.1   Anion Gap 7 - 15 mmol/L 11   Magnesium 1.7 - 2.3 mg/dL 1.3 (L)   Albumin 3.5 - 5.2 g/dL 4.2   Protein Total 6.4 - 8.3 g/dL 6.3 (L)   Alkaline Phosphatase 35 - 104 U/L 57   ALT 0 - 50 U/L 22   AST 0 - 45 U/L 18   Bilirubin Total <=1.2 mg/dL 0.5   Glucose 70 - 99 mg/dL 93   WBC 4.0 - 11.0 10e3/uL 4.3   Hemoglobin 11.7 - 15.7 g/dL 10.3 (L)   Hematocrit 35.0 - 47.0 % 30.4 (L)   Platelet Count 150 - 450 10e3/uL 107 (L)   RBC Count 3.80 - 5.20 10e6/uL 3.44 (L)   MCV 78 - 100 fL 88   MCH 26.5 - 33.0 pg 29.9   MCHC 31.5 - 36.5 g/dL 33.9   RDW 10.0 - 15.0 % 13.0   % Neutrophils % 80   % Lymphocytes % 5   % Monocytes % 13   % Eosinophils % 1   % Basophils % 0   Absolute Basophils 0.0 - 0.2 10e3/uL 0.0   Absolute Eosinophils 0.0 - 0.7 10e3/uL 0.0   Absolute Immature Granulocytes <=0.4 10e3/uL 0.1   Absolute Lymphocytes 0.8 - 5.3 10e3/uL 0.2 (L)   Absolute Monocytes 0.0 - 1.3 10e3/uL 0.6   % Immature Granulocytes % 1   Absolute Neutrophils 1.6 - 8.3 10e3/uL 3.5   Absolute NRBCs 10e3/uL 0.0   NRBCs per 100 WBC <1 /100 0     2 g IV magnesium given per electrolyte replacement  protocol.      Post Infusion Assessment:  Patient tolerated infusion without incident.  Blood return noted pre and post infusion.  Site patent and intact, free from redness, edema or discomfort.  No evidence of extravasations.  Access discontinued per protocol.       Discharge Plan:   Prescription refills given for magnesium.  Discharge instructions reviewed with: Patient.  Patient and/or family verbalized understanding of discharge instructions and all questions answered.  AVS to patient via [employer].  Patient will return as scheduled for next appointments with radiation oncology.   Patient discharged in stable condition accompanied by: .  Departure Mode: Wheelchair.      An Menon RN

## 2023-09-01 NOTE — NURSING NOTE
Chief Complaint   Patient presents with    Blood Draw     Labs drawn with PIV start by vascular access. Vitals taken.     Labs drawn with PIV start by vascular access. Pt tolerated well. Vitals taken. Pt checked into next appointment.    Asha Gonzalez RN

## 2023-09-04 ENCOUNTER — APPOINTMENT (OUTPATIENT)
Dept: CT IMAGING | Facility: CLINIC | Age: 68
End: 2023-09-04
Attending: EMERGENCY MEDICINE
Payer: COMMERCIAL

## 2023-09-04 ENCOUNTER — HOSPITAL ENCOUNTER (OUTPATIENT)
Facility: CLINIC | Age: 68
Setting detail: OBSERVATION
Discharge: HOME | End: 2023-09-06
Attending: EMERGENCY MEDICINE | Admitting: OBSTETRICS & GYNECOLOGY
Payer: COMMERCIAL

## 2023-09-04 ENCOUNTER — NURSE TRIAGE (OUTPATIENT)
Dept: NURSING | Facility: CLINIC | Age: 68
End: 2023-09-04

## 2023-09-04 DIAGNOSIS — R10.30 LOWER ABDOMINAL PAIN: ICD-10-CM

## 2023-09-04 DIAGNOSIS — N30.01 ACUTE CYSTITIS WITH HEMATURIA: ICD-10-CM

## 2023-09-04 DIAGNOSIS — N39.0 URINARY TRACT INFECTION ASSOCIATED WITH INDWELLING URETHRAL CATHETER, INITIAL ENCOUNTER (H): ICD-10-CM

## 2023-09-04 DIAGNOSIS — T83.511A URINARY TRACT INFECTION ASSOCIATED WITH INDWELLING URETHRAL CATHETER, INITIAL ENCOUNTER (H): ICD-10-CM

## 2023-09-04 LAB
ALBUMIN SERPL BCG-MCNC: 4 G/DL (ref 3.5–5.2)
ALBUMIN UR-MCNC: 70 MG/DL
ALP SERPL-CCNC: 58 U/L (ref 35–104)
ALT SERPL W P-5'-P-CCNC: 24 U/L (ref 0–50)
ANION GAP SERPL CALCULATED.3IONS-SCNC: 13 MMOL/L (ref 7–15)
APPEARANCE UR: CLEAR
AST SERPL W P-5'-P-CCNC: 25 U/L (ref 0–45)
BASOPHILS # BLD AUTO: 0 10E3/UL (ref 0–0.2)
BASOPHILS NFR BLD AUTO: 0 %
BILIRUB SERPL-MCNC: 0.5 MG/DL
BILIRUB UR QL STRIP: NEGATIVE
BUN SERPL-MCNC: 17.4 MG/DL (ref 8–23)
CALCIUM SERPL-MCNC: 8.6 MG/DL (ref 8.8–10.2)
CHLORIDE SERPL-SCNC: 91 MMOL/L (ref 98–107)
COLOR UR AUTO: ABNORMAL
CREAT SERPL-MCNC: 1.06 MG/DL (ref 0.51–0.95)
DEPRECATED HCO3 PLAS-SCNC: 25 MMOL/L (ref 22–29)
EOSINOPHIL # BLD AUTO: 0 10E3/UL (ref 0–0.7)
EOSINOPHIL NFR BLD AUTO: 0 %
ERYTHROCYTE [DISTWIDTH] IN BLOOD BY AUTOMATED COUNT: 13.4 % (ref 10–15)
GFR SERPL CREATININE-BSD FRML MDRD: 57 ML/MIN/1.73M2
GLUCOSE SERPL-MCNC: 148 MG/DL (ref 70–99)
GLUCOSE UR STRIP-MCNC: NEGATIVE MG/DL
HCT VFR BLD AUTO: 28.7 % (ref 35–47)
HGB BLD-MCNC: 9.5 G/DL (ref 11.7–15.7)
HGB UR QL STRIP: ABNORMAL
IMM GRANULOCYTES # BLD: 0 10E3/UL
IMM GRANULOCYTES NFR BLD: 0 %
KETONES UR STRIP-MCNC: NEGATIVE MG/DL
LACTATE SERPL-SCNC: 1 MMOL/L (ref 0.7–2)
LEUKOCYTE ESTERASE UR QL STRIP: ABNORMAL
LYMPHOCYTES # BLD AUTO: 0.1 10E3/UL (ref 0.8–5.3)
LYMPHOCYTES NFR BLD AUTO: 5 %
MCH RBC QN AUTO: 30.2 PG (ref 26.5–33)
MCHC RBC AUTO-ENTMCNC: 33.1 G/DL (ref 31.5–36.5)
MCV RBC AUTO: 91 FL (ref 78–100)
MONOCYTES # BLD AUTO: 0.4 10E3/UL (ref 0–1.3)
MONOCYTES NFR BLD AUTO: 17 %
MUCOUS THREADS #/AREA URNS LPF: PRESENT /LPF
NEUTROPHILS # BLD AUTO: 1.8 10E3/UL (ref 1.6–8.3)
NEUTROPHILS NFR BLD AUTO: 78 %
NITRATE UR QL: POSITIVE
NRBC # BLD AUTO: 0 10E3/UL
NRBC BLD AUTO-RTO: 0 /100
PH UR STRIP: 7 [PH] (ref 5–7)
PLATELET # BLD AUTO: 81 10E3/UL (ref 150–450)
POTASSIUM SERPL-SCNC: 3.9 MMOL/L (ref 3.4–5.3)
PROT SERPL-MCNC: 6.1 G/DL (ref 6.4–8.3)
RBC # BLD AUTO: 3.15 10E6/UL (ref 3.8–5.2)
RBC URINE: 22 /HPF
SODIUM SERPL-SCNC: 129 MMOL/L (ref 136–145)
SP GR UR STRIP: 1.01 (ref 1–1.03)
TRANSITIONAL EPI: <1 /HPF
UROBILINOGEN UR STRIP-MCNC: NORMAL MG/DL
WBC # BLD AUTO: 2.3 10E3/UL (ref 4–11)
WBC URINE: 141 /HPF

## 2023-09-04 PROCEDURE — 93010 ELECTROCARDIOGRAM REPORT: CPT | Performed by: EMERGENCY MEDICINE

## 2023-09-04 PROCEDURE — 74177 CT ABD & PELVIS W/CONTRAST: CPT | Mod: 26 | Performed by: RADIOLOGY

## 2023-09-04 PROCEDURE — 96361 HYDRATE IV INFUSION ADD-ON: CPT

## 2023-09-04 PROCEDURE — 74177 CT ABD & PELVIS W/CONTRAST: CPT

## 2023-09-04 PROCEDURE — 83605 ASSAY OF LACTIC ACID: CPT | Performed by: EMERGENCY MEDICINE

## 2023-09-04 PROCEDURE — 87088 URINE BACTERIA CULTURE: CPT | Performed by: EMERGENCY MEDICINE

## 2023-09-04 PROCEDURE — 96376 TX/PRO/DX INJ SAME DRUG ADON: CPT | Mod: 59

## 2023-09-04 PROCEDURE — 99285 EMERGENCY DEPT VISIT HI MDM: CPT | Mod: 25 | Performed by: EMERGENCY MEDICINE

## 2023-09-04 PROCEDURE — 80053 COMPREHEN METABOLIC PANEL: CPT | Performed by: EMERGENCY MEDICINE

## 2023-09-04 PROCEDURE — 96375 TX/PRO/DX INJ NEW DRUG ADDON: CPT | Mod: 59

## 2023-09-04 PROCEDURE — 250N000011 HC RX IP 250 OP 636: Performed by: EMERGENCY MEDICINE

## 2023-09-04 PROCEDURE — 258N000003 HC RX IP 258 OP 636: Performed by: EMERGENCY MEDICINE

## 2023-09-04 PROCEDURE — 96365 THER/PROPH/DIAG IV INF INIT: CPT | Mod: 59

## 2023-09-04 PROCEDURE — 85025 COMPLETE CBC W/AUTO DIFF WBC: CPT | Performed by: EMERGENCY MEDICINE

## 2023-09-04 PROCEDURE — 81001 URINALYSIS AUTO W/SCOPE: CPT | Performed by: EMERGENCY MEDICINE

## 2023-09-04 PROCEDURE — 99285 EMERGENCY DEPT VISIT HI MDM: CPT | Mod: 25

## 2023-09-04 PROCEDURE — 250N000009 HC RX 250: Performed by: EMERGENCY MEDICINE

## 2023-09-04 PROCEDURE — 36415 COLL VENOUS BLD VENIPUNCTURE: CPT | Performed by: EMERGENCY MEDICINE

## 2023-09-04 PROCEDURE — 93005 ELECTROCARDIOGRAM TRACING: CPT

## 2023-09-04 RX ORDER — HYDROMORPHONE HYDROCHLORIDE 1 MG/ML
INJECTION, SOLUTION INTRAMUSCULAR; INTRAVENOUS; SUBCUTANEOUS
Status: DISCONTINUED
Start: 2023-09-04 | End: 2023-09-05 | Stop reason: HOSPADM

## 2023-09-04 RX ORDER — ONDANSETRON 2 MG/ML
4 INJECTION INTRAMUSCULAR; INTRAVENOUS EVERY 30 MIN PRN
Status: DISCONTINUED | OUTPATIENT
Start: 2023-09-04 | End: 2023-09-05

## 2023-09-04 RX ORDER — ONDANSETRON 2 MG/ML
INJECTION INTRAMUSCULAR; INTRAVENOUS
Status: DISCONTINUED
Start: 2023-09-04 | End: 2023-09-05 | Stop reason: HOSPADM

## 2023-09-04 RX ORDER — HYDROMORPHONE HYDROCHLORIDE 1 MG/ML
0.5 INJECTION, SOLUTION INTRAMUSCULAR; INTRAVENOUS; SUBCUTANEOUS EVERY 30 MIN PRN
Status: DISCONTINUED | OUTPATIENT
Start: 2023-09-04 | End: 2023-09-04

## 2023-09-04 RX ORDER — ONDANSETRON 2 MG/ML
4 INJECTION INTRAMUSCULAR; INTRAVENOUS EVERY 30 MIN PRN
Status: DISCONTINUED | OUTPATIENT
Start: 2023-09-04 | End: 2023-09-04

## 2023-09-04 RX ORDER — HYDROMORPHONE HYDROCHLORIDE 1 MG/ML
0.5 INJECTION, SOLUTION INTRAMUSCULAR; INTRAVENOUS; SUBCUTANEOUS EVERY 30 MIN PRN
Status: COMPLETED | OUTPATIENT
Start: 2023-09-04 | End: 2023-09-04

## 2023-09-04 RX ORDER — IOPAMIDOL 755 MG/ML
128 INJECTION, SOLUTION INTRAVASCULAR ONCE
Status: COMPLETED | OUTPATIENT
Start: 2023-09-04 | End: 2023-09-04

## 2023-09-04 RX ADMIN — HYDROMORPHONE HYDROCHLORIDE 0.5 MG: 1 INJECTION, SOLUTION INTRAMUSCULAR; INTRAVENOUS; SUBCUTANEOUS at 23:24

## 2023-09-04 RX ADMIN — HYDROMORPHONE HYDROCHLORIDE 0.5 MG: 1 INJECTION, SOLUTION INTRAMUSCULAR; INTRAVENOUS; SUBCUTANEOUS at 20:30

## 2023-09-04 RX ADMIN — SODIUM CHLORIDE, PRESERVATIVE FREE 82 ML: 5 INJECTION INTRAVENOUS at 22:27

## 2023-09-04 RX ADMIN — IOPAMIDOL 128 ML: 755 INJECTION, SOLUTION INTRAVENOUS at 22:27

## 2023-09-04 RX ADMIN — SODIUM CHLORIDE 1000 ML: 9 INJECTION, SOLUTION INTRAVENOUS at 19:58

## 2023-09-04 RX ADMIN — ONDANSETRON 4 MG: 2 INJECTION INTRAMUSCULAR; INTRAVENOUS at 19:25

## 2023-09-04 RX ADMIN — HYDROMORPHONE HYDROCHLORIDE 0.5 MG: 1 INJECTION, SOLUTION INTRAMUSCULAR; INTRAVENOUS; SUBCUTANEOUS at 19:52

## 2023-09-04 RX ADMIN — SODIUM CHLORIDE 1000 ML: 9 INJECTION, SOLUTION INTRAVENOUS at 19:01

## 2023-09-04 ASSESSMENT — ACTIVITIES OF DAILY LIVING (ADL)
ADLS_ACUITY_SCORE: 35

## 2023-09-04 NOTE — ED PROVIDER NOTES
[place] EMERGENCY DEPARTMENT ([facility])    9/04/23       History     Chief Complaint   Patient presents with    Abdominal Pain     HPI  Jessie Tamayo is a 67 year old female who with PMH of stage IVb vaginal cancer, SCC, hyperlipidemia and hyperlipidemia presents to the ED for abdominal pain.  Pain is suprapubic radiating to the umbilicus and across the lower abdomen.  Associated with some mild nausea.  No chest pain or shortness of breath.  Does have some mild lower back pain but this has been unchanged.  Has had increased bladder spasm, which patient attributes to chemo and radiation therapy for her vaginal cancer.    No hematuria.  Patient has a chronic indwelling urethral Thomas catheter which was exchanged approximately 1 week ago.      Past Medical History  Past Medical History:   Diagnosis Date    Actinic keratosis     Hyperlipemia     Lichen sclerosus 2020    Osteopenias     SCC (squamous cell carcinoma) 7/26/2023     Past Surgical History:   Procedure Laterality Date    COLONOSCOPY  2006    normal    COLPOSCOPY, BIOPSY, COMBINED N/A 02/08/2021    Procedure: COLPOSCOPY, WITH BIOPSY, LEEP procedure;  Surgeon: Jefe Koenig MD;  Location: [place] OR    CYSTOSCOPY N/A 05/30/2023    Procedure: CYSTOSCOPY AND EXCISIONAL BIOPSY OF THE VAGINAL TISSUE AROUND THE URETHRA.  (look in the bladder and sample small area in the vagina near the urethra);  Surgeon: Lakeisha Mackenzie MD;  [place] [facility] [place]    EYE SURGERY      cataracts bilateral    OPEN REDUCTION INTERNAL FIXATION FOREARM  07/31/2012    Procedure: OPEN REDUCTION INTERNAL FIXATION FOREARM;  Open Reduction Internal Fixation, Irrigation & Debridment  of Right Distal Radius, application short arm splint;  Surgeon: Wade Beard MD;  Location:  OR    TONSILLECTOMY & ADENOIDECTOMY  1960    [facility] TREAT ECTOPIC PREG,RMV TUBE/OVARY  1985    ectopic, right side-ovary and tube removed     amoxicillin-clavulanate (AUGMENTIN) 875-125 MG  tablet  oxyCODONE (ROXICODONE) 5 MG tablet  phenazopyridine (PYRIDIUM) 200 MG tablet  acetaminophen (TYLENOL) 500 MG tablet  clotrimazole (LOTRIMIN) 1 % external cream  dexamethasone (DECADRON) 4 MG tablet  docusate sodium (COLACE) 100 MG capsule  LORazepam (ATIVAN) 0.5 MG tablet  magnesium oxide (MAG-OX) 400 MG tablet  ondansetron (ZOFRAN) 8 MG tablet  prochlorperazine (COMPAZINE) 10 MG tablet  tolterodine (DETROL) 2 MG tablet  triamcinolone (KENALOG) 0.1 % external cream      Allergies   Allergen Reactions    Oxybutynin Itching    Seasonal Allergies      Family History  Family History   Problem Relation Age of Onset    Lung Cancer Mother 44    Cerebrovascular Disease Father     Hypertension Father     Alcohol/Drug Father     Obesity Niece         niece    Mental Illness Nephew         nephew    Diabetes Other         paternal aunt    Hyperlipidemia Other     Obesity Other         self    Cervical Cancer Maternal Aunt     Ovarian Cancer Maternal Aunt 60    Obesity Other     Diabetes Other         paternal aunt    Hypertension Other         father    Cerebrovascular Disease Other         father    C.A.D. No family hx of     Breast Cancer No family hx of     Cancer - colorectal No family hx of     Prostate Cancer No family hx of     Melanoma No family hx of      Social History   Social History     Tobacco Use    Smoking status: Never     Passive exposure: Never    Smokeless tobacco: Never   Vaping Use    Vaping Use: Never used   Substance Use Topics    Alcohol use: Yes     Comment: very little    Drug use: No      Past medical history, past surgical history, medications, allergies, family history, and social history were reviewed with the patient. No additional pertinent items.      A medically appropriate review of systems was performed with pertinent positives and negatives noted in the HPI, and all other systems negative.    Physical Exam   BP: (!) 145/78  Pulse: (!) 126  Temp: 98.9  F (37.2  C)  Resp: 15  Weight:  95.9 kg (211 lb 6.7 oz)  SpO2: 92 %  Physical Exam  GEN: Well appearing, non toxic, cooperative and conversant.   HEENT: The head is normocephalic and atraumatic. Pupils are equal round and reactive to light. Extraocular motions are intact. There is no facial swelling. The neck is nontender and supple.   CV: Regular rate and rhythm.   PULM: Clear to auscultation bilaterally.  ABD: Soft, suprapubic and mid abdominal discomfort to palpation, nondistended.  Urethral Thomas in place  EXT: Full range of motion.  No edema.  NEURO: Cranial nerves II through XII are intact and symmetric. Bilateral upper and lower extremities grossly show full range of motion without any focal deficits.   PSYCH: Calm and cooperative, interactive.       ED Course, Procedures, & Data      Procedures                      Results for orders placed or performed during the hospital encounter of 09/04/23   CT Abdomen Pelvis w Contrast     Status: None    Narrative    EXAM: CT ABDOMEN PELVIS W CONTRAST  LOCATION: [facility]  DATE: 9/4/2023    INDICATION: Vaginal cancer for PE, worsening abdominal pain, evaluate for obstruction  COMPARISON: PET 06/20/2023  TECHNIQUE: CT scan of the abdomen and pelvis was performed following injection of IV contrast. Multiplanar reformats were obtained. Dose reduction techniques were used.  CONTRAST: 128 mL Isovue-370    FINDINGS:   LOWER CHEST: No evidence of pulmonary emboli in visualized lung bases.    HEPATOBILIARY: No significant mass or bile duct dilatation. No calcified gallstones.     PANCREAS: Normal.    SPLEEN: 1 cm subcapsular hypodensity posterior spleen.    ADRENAL GLANDS: Normal.    KIDNEYS/BLADDER: Subcentimeter hypodensity upper pole right kidney probable cyst. No mass, stones, or hydronephrosis. Bladder mild distention with Thomas catheter in place.    BOWEL: No obstruction or inflammatory change.    LYMPH NODES: Normal.    VASCULATURE:  Unremarkable.    PELVIC ORGANS: Normal.    MUSCULOSKELETAL: No acute osseous process.      Impression    IMPRESSION:   1.  No acute process.   XR Chest Port 1 View     Status: None    Narrative    EXAM: CHEST SINGLE VIEW PORTABLE  LOCATION: [facility]  DATE: 9/5/2023    INDICATION: Intermittent hypoxia.  COMPARISON: 08/15/2014.    FINDINGS: The lungs are clear. Normal-sized cardiac silhouette. Mild elevation of the right hemidiaphragm.      Impression    IMPRESSION: No evidence of active cardiopulmonary disease.    US Lower Extremity Venous Duplex Bilateral     Status: None    Narrative    EXAMINATION: US LOWER EXTREMITY VENOUS DUPLEX BILATERAL  9/5/2023 2:32  AM      CLINICAL HISTORY: Hypoxia, cancer - eval DVTs    COMPARISON: None        PROCEDURE COMMENTS: Ultrasound was performed of the deep venous system  of the right and left lower extremity using grayscale, color, and  spectral Doppler.    FINDINGS:  The common femoral, greater saphenous origin, femoral, popliteal, and  deep calf veins are visualized and are patent. Venous waveforms are  normal. There is normal response to compression.        Impression    IMPRESSION:  No deep vein thrombosis in the right or left lower extremity.    I have personally reviewed the examination and initial interpretation  and I agree with the findings.    CHARLEEN YODER MD         SYSTEM ID:  [id number]   CT Chest Pulmonary Embolism w Contrast     Status: None    Narrative    EXAMINATION: CTA pulmonary angiogram, 9/5/2023 1:21 PM     COMPARISON: PET/CT 6/20/2023    HISTORY: Hypoxia and tachycardia, HISTORY of vaginal cancer. Rule out  PE    TECHNIQUE: Volumetric helical acquisition of CT images of the chest  from the lung apices to the kidneys were acquired after the  administration of 80 mL of Isovue-370 IV contrast.  Post-processed  multiplanar and/or MIP reformations were obtained, archived to PACS  and used in interpretation of  this study.     FINDINGS:  Contrast bolus is: Adequate..  Exam is negative for acute  pulmonary embolism.    Lungs: The central tracheobronchial tree is patent. No areas of  bronchiectasis or architectural distortion. No pleural effusion,  pulmonary consolidation, or pneumothorax. Dependent and subsegmental  atelectasis. Stable solid 4 mm pulmonary nodule in the right middle  lobe (series 7 image 159). Decreased size of a 5 mm pulmonary nodule  along the right minor fissure (series 7 image 147), favoring benign  intrapulmonary lymph node. No new or enlarging pulmonary nodule.  Calcified granuloma in the left upper lobe. Asymmetric elevation of  the right hemidiaphragm.    Mediastinum: Stable hypoattenuating 1.3 cm nodule within the right  lobe of the thyroid dating back to 6/20/2023. Heart size is within  normal limits. No pericardial effusion. No right heart strain. RV to  LV ratio is within normal limits. No reflux of contrast in the IVC.  The thoracic aorta and main pulmonary artery are within normal limits.  Standard branching pattern of the great vessels. No abnormal thoracic  lymph nodes.    Bones and soft tissues: No suspicious osseous lesion. Mild  degenerative changes of the thoracolumbar spine.    Upper abdomen:  Limited evaluation of the upper abdomen. Excretion of  contrast in the gallbladder. Small hiatal hernia. No acute pathology  of the visualized solid organs.      Impression    IMPRESSION:   1. Exam is negative for acute pulmonary embolism. No right heart  strain. No acute findings in the chest.  2. Stable solid 4 mm pulmonary nodule within the right middle lobe  compared to CT 6/20/2023. Recommend attention on follow-up.  3. Stable hypoattenuating 1.3 cm in the right lobe of thyroid.    In the event of a positive result for acute pulmonary embolism  resulting in right heart strain, please activate the PERT  Multidisciplinary group for consultation by paging [phone]  [phone]).     PERT --  Pulmonary Embolism Response Team (Multidisciplinary team  including cardiology, interventional radiology, critical care,  hematology)    I have personally reviewed the examination and initial interpretation  and I agree with the findings.    DWIGHT ADAMSON TRELL,          SYSTEM ID:  [id number]   Comprehensive metabolic panel     Status: Abnormal   Result Value Ref Range    Sodium 129 (L) 136 - 145 mmol/L    Potassium 3.9 3.4 - 5.3 mmol/L    Chloride 91 (L) 98 - 107 mmol/L    Carbon Dioxide (CO2) 25 22 - 29 mmol/L    Anion Gap 13 7 - 15 mmol/L    Urea Nitrogen 17.4 8.0 - 23.0 mg/dL    Creatinine 1.06 (H) 0.51 - 0.95 mg/dL    Calcium 8.6 (L) 8.8 - 10.2 mg/dL    Glucose 148 (H) 70 - 99 mg/dL    Alkaline Phosphatase 58 35 - 104 U/L    AST 25 0 - 45 U/L    ALT 24 0 - 50 U/L    Protein Total 6.1 (L) 6.4 - 8.3 g/dL    Albumin 4.0 3.5 - 5.2 g/dL    Bilirubin Total 0.5 <=1.2 mg/dL    GFR Estimate 57 (L) >60 mL/min/1.73m2   Lactic acid whole blood     Status: Normal   Result Value Ref Range    Lactic Acid 1.0 0.7 - 2.0 mmol/L   CBC with platelets and differential     Status: Abnormal   Result Value Ref Range    WBC Count 2.3 (L) 4.0 - 11.0 10e3/uL    RBC Count 3.15 (L) 3.80 - 5.20 10e6/uL    Hemoglobin 9.5 (L) 11.7 - 15.7 g/dL    Hematocrit 28.7 (L) 35.0 - 47.0 %    MCV 91 78 - 100 fL    MCH 30.2 26.5 - 33.0 pg    MCHC 33.1 31.5 - 36.5 g/dL    RDW 13.4 10.0 - 15.0 %    Platelet Count 81 (L) 150 - 450 10e3/uL    % Neutrophils 78 %    % Lymphocytes 5 %    % Monocytes 17 %    % Eosinophils 0 %    % Basophils 0 %    % Immature Granulocytes 0 %    NRBCs per 100 WBC 0 <1 /100    Absolute Neutrophils 1.8 1.6 - 8.3 10e3/uL    Absolute Lymphocytes 0.1 (L) 0.8 - 5.3 10e3/uL    Absolute Monocytes 0.4 0.0 - 1.3 10e3/uL    Absolute Eosinophils 0.0 0.0 - 0.7 10e3/uL    Absolute Basophils 0.0 0.0 - 0.2 10e3/uL    Absolute Immature Granulocytes 0.0 <=0.4 10e3/uL    Absolute NRBCs 0.0 10e3/uL   UA with Microscopic reflex to Culture     Status:  Abnormal    Specimen: Urine, Catheter   Result Value Ref Range    Color Urine Dark Yellow (A) Colorless, Straw, Light Yellow, Yellow    Appearance Urine Clear Clear    Glucose Urine Negative Negative mg/dL    Bilirubin Urine Negative Negative    Ketones Urine Negative Negative mg/dL    Specific Gravity Urine 1.010 1.003 - 1.035    Blood Urine Small (A) Negative    pH Urine 7.0 5.0 - 7.0    Protein Albumin Urine 70 (A) Negative mg/dL    Urobilinogen Urine Normal Normal, 2.0 mg/dL    Nitrite Urine Positive (A) Negative    Leukocyte Esterase Urine Large (A) Negative    Mucus Urine Present (A) None Seen /LPF    RBC Urine 22 (H) <=2 /HPF    WBC Urine 141 (H) <=5 /HPF    Transitional Epithelials Urine <1 <=1 /HPF    Narrative    Urine Culture ordered based on laboratory criteria   Symptomatic Influenza A/B, RSV, & SARS-CoV2 PCR (COVID-19) Nasopharyngeal     Status: Normal    Specimen: Nasopharyngeal; Swab   Result Value Ref Range    Influenza A PCR Negative Negative    Influenza B PCR Negative Negative    RSV PCR Negative Negative    SARS CoV2 PCR Negative Negative    Narrative    Testing was performed using the Xpert Xpress CoV2/Flu/RSV Assay on the Cepheid GeneXpert Instrument. This test should be ordered for the detection of SARS-CoV-2, influenza, and RSV viruses in individuals who meet clinical and/or epidemiological criteria. Test performance is unknown in asymptomatic patients. This test is for in vitro diagnostic use under the FDA EUA for laboratories certified under CLIA to perform high or moderate complexity testing. This test has not been FDA cleared or approved. A negative result does not rule out the presence of PCR inhibitors in the specimen or target RNA in concentration below the limit of detection for the assay. If only one viral target is positive but coinfection with multiple targets is suspected, the sample should be re-tested with another FDA cleared, approved, or authorized test, if coinfection would  change clinical management. This test was validated by the [facility]. These laboratories are certified under the Clinical Laboratory Improvement Amendments of 1988 (CLIA-88) as qualified to perform high complexity laboratory testing.   Electrolyte panel     Status: Abnormal   Result Value Ref Range    Sodium 130 (L) 136 - 145 mmol/L    Potassium 4.0 3.4 - 5.3 mmol/L    Chloride 97 (L) 98 - 107 mmol/L    Carbon Dioxide (CO2) 22 22 - 29 mmol/L    Anion Gap 11 7 - 15 mmol/L   Basic metabolic panel     Status: Abnormal   Result Value Ref Range    Sodium 130 (L) 136 - 145 mmol/L    Potassium 4.0 3.4 - 5.3 mmol/L    Chloride 97 (L) 98 - 107 mmol/L    Carbon Dioxide (CO2) 22 22 - 29 mmol/L    Anion Gap 11 7 - 15 mmol/L    Urea Nitrogen 16.3 8.0 - 23.0 mg/dL    Creatinine 0.98 (H) 0.51 - 0.95 mg/dL    Calcium 8.0 (L) 8.8 - 10.2 mg/dL    Glucose 112 (H) 70 - 99 mg/dL    GFR Estimate 63 >60 mL/min/1.73m2   CBC with platelets     Status: Abnormal   Result Value Ref Range    WBC Count 2.2 (L) 4.0 - 11.0 10e3/uL    RBC Count 2.78 (L) 3.80 - 5.20 10e6/uL    Hemoglobin 8.3 (L) 11.7 - 15.7 g/dL    Hematocrit 25.7 (L) 35.0 - 47.0 %    MCV 92 78 - 100 fL    MCH 29.9 26.5 - 33.0 pg    MCHC 32.3 31.5 - 36.5 g/dL    RDW 13.7 10.0 - 15.0 %    Platelet Count 72 (L) 150 - 450 10e3/uL   Magnesium     Status: Abnormal   Result Value Ref Range    Magnesium 1.1 (L) 1.7 - 2.3 mg/dL   Magnesium     Status: Normal   Result Value Ref Range    Magnesium 2.2 1.7 - 2.3 mg/dL   EKG 12-lead, complete     Status: None   Result Value Ref Range    Systolic Blood Pressure  mmHg    Diastolic Blood Pressure  mmHg    Ventricular Rate 94 BPM    Atrial Rate 94 BPM    CO Interval 134 ms    QRS Duration 74 ms     ms    QTc 417 ms    P Axis 45 degrees    R AXIS -1 degrees    T Axis 46 degrees    Interpretation ECG       Sinus rhythm  Inferior infarct (cited on or before 07-NOV-2009)  Abnormal ECG  When compared with ECG of 07-NOV-2009  15:29,  Criteria for Anterior infarct are no longer Present  Confirmed by MD GARETH, PARADISE ([id number]) on 9/5/2023 12:58:55 PM     Urine Culture     Status: Abnormal (Preliminary result)    Specimen: Urine, Catheter   Result Value Ref Range    Culture Culture in progress     Culture (A)      >100,000 CFU/mL Streptococcus agalactiae (Group B Streptococcus)   CBC with platelets differential     Status: Abnormal    Narrative    The following orders were created for panel order CBC with platelets differential.  Procedure                               Abnormality         Status                     ---------                               -----------         ------                     CBC with platelets and d...[[id number]]  Abnormal            Final result                 Please view results for these tests on the individual orders.     Medications   0.9% sodium chloride BOLUS (0 mLs Intravenous Stopped 9/4/23 1958)   0.9% sodium chloride BOLUS (0 mLs Intravenous Stopped 9/4/23 2309)   HYDROmorphone (PF) (DILAUDID) injection 0.5 mg (0.5 mg Intravenous $Given 9/4/23 2324)   CT saline (82 mLs Intravenous $Given 9/4/23 2227)   iopamidol (ISOVUE-370) solution 128 mL (128 mLs Intravenous $Given 9/4/23 2227)   cefTRIAXone (ROCEPHIN) 1 g vial to attach to  mL bag for ADULTS or NS 50 mL bag for PEDS (0 g Intravenous Stopped 9/5/23 0053)   ipratropium - albuterol 0.5 mg/2.5 mg/3 mL (DUONEB) neb solution 3 mL (3 mLs Nebulization $Given 9/5/23 0033)   HYDROmorphone (PF) (DILAUDID) injection 0.5 mg (0.5 mg Intravenous $Given 9/5/23 0104)   magnesium sulfate 4 g in 100 mL sterile water intermittent infusion (4 g Intravenous $New Bag 9/5/23 0815)   iopamidol (ISOVUE-370) solution 70 mL (70 mLs Intravenous $Given 9/5/23 1316)   sodium chloride (PF) 0.9% PF flush 90 mL (90 mLs Intravenous $Given 9/5/23 1315)   oxyCODONE (ROXICODONE) tablet 5 mg (5 mg Oral $Given 9/5/23 6109)     Labs Ordered and Resulted from Time of ED Arrival to Time  of ED Departure   COMPREHENSIVE METABOLIC PANEL - Abnormal       Result Value    Sodium 129 (*)     Potassium 3.9      Chloride 91 (*)     Carbon Dioxide (CO2) 25      Anion Gap 13      Urea Nitrogen 17.4      Creatinine 1.06 (*)     Calcium 8.6 (*)     Glucose 148 (*)     Alkaline Phosphatase 58      AST 25      ALT 24      Protein Total 6.1 (*)     Albumin 4.0      Bilirubin Total 0.5      GFR Estimate 57 (*)    CBC WITH PLATELETS AND DIFFERENTIAL - Abnormal    WBC Count 2.3 (*)     RBC Count 3.15 (*)     Hemoglobin 9.5 (*)     Hematocrit 28.7 (*)     MCV 91      MCH 30.2      MCHC 33.1      RDW 13.4      Platelet Count 81 (*)     % Neutrophils 78      % Lymphocytes 5      % Monocytes 17      % Eosinophils 0      % Basophils 0      % Immature Granulocytes 0      NRBCs per 100 WBC 0      Absolute Neutrophils 1.8      Absolute Lymphocytes 0.1 (*)     Absolute Monocytes 0.4      Absolute Eosinophils 0.0      Absolute Basophils 0.0      Absolute Immature Granulocytes 0.0      Absolute NRBCs 0.0     ROUTINE UA WITH MICROSCOPIC REFLEX TO CULTURE - Abnormal    Color Urine Dark Yellow (*)     Appearance Urine Clear      Glucose Urine Negative      Bilirubin Urine Negative      Ketones Urine Negative      Specific Gravity Urine 1.010      Blood Urine Small (*)     pH Urine 7.0      Protein Albumin Urine 70 (*)     Urobilinogen Urine Normal      Nitrite Urine Positive (*)     Leukocyte Esterase Urine Large (*)     Mucus Urine Present (*)     RBC Urine 22 (*)     WBC Urine 141 (*)     Transitional Epithelials Urine <1     LACTIC ACID WHOLE BLOOD - Normal    Lactic Acid 1.0     INFLUENZA A/B, RSV, & SARS-COV2 PCR - Normal    Influenza A PCR Negative      Influenza B PCR Negative      RSV PCR Negative      SARS CoV2 PCR Negative       CT Chest Pulmonary Embolism w Contrast   Final Result   IMPRESSION:    1. Exam is negative for acute pulmonary embolism. No right heart   strain. No acute findings in the chest.   2. Stable solid  4 mm pulmonary nodule within the right middle lobe   compared to CT 6/20/2023. Recommend attention on follow-up.   3. Stable hypoattenuating 1.3 cm in the right lobe of thyroid.      In the event of a positive result for acute pulmonary embolism   resulting in right heart strain, please activate the PERT   Multidisciplinary group for consultation by paging [phone]).       PERT -- Pulmonary Embolism Response Team (Multidisciplinary team   including cardiology, interventional radiology, critical care,   hematology)      I have personally reviewed the examination and initial interpretation   and I agree with the findings.      DWIGHT CAI DO            SYSTEM ID:  [id number]      US Lower Extremity Venous Duplex Bilateral   Final Result   IMPRESSION:   No deep vein thrombosis in the right or left lower extremity.      I have personally reviewed the examination and initial interpretation   and I agree with the findings.      CHARLEEN YODER MD            SYSTEM ID:  [id number]      XR Chest Port 1 View   Final Result   IMPRESSION: No evidence of active cardiopulmonary disease.       CT Abdomen Pelvis w Contrast   Final Result   IMPRESSION:    1.  No acute process.               Problems Addressed   MDM High: 1 acute or chronic illness or injury that poses a threat to life or bodily function     Data   Considered   Data Extensive: Order of (or considering) each unique test (Cat 1), Review of the results of each unique test (Cat 1), and Independent interpretation of a test performed by another practitioner (Cat 2)     Risk of Patient Management       High Risk: Decision regarding hospitalization or escalation of hospital level of care           Assessment & Plan    67-year-old female with vaginal cancer stage IIb on chemo and radiation presenting with abdominal pain and increased urinary frequency    Differential is broad including bowel obstruction, colitis, cystitis, pyelonephritis, ureteral colic,  appendicitis, abscess, among other causes    Clinically patient is nontoxic and well-appearing.  Labs notable for hyponatremia but near patient's baseline.  Creatinine minimally elevated to 1.06, WBC 2.3 also not far from patient's recent baseline on chemotherapy.  Has had however, steadily decreasing hemoglobin over the last several months today 9.5.  No history suggestive of active bleed  CT scan abdomen pelvis performed showing no acute process  Urinalysis highly suggestive of UTI explaining the patient's pain and symptoms.    Received ceftriaxone IV  On reevaluation the patient exhibited some intermittent hypoxia which corrected with 1 to 2 L of nasal cannula.  Episodes of hypoxia initially thought could be related to mild oversedation with opiates, however after period of opiate free trial continue to have intermittent hypoxia.  Did also resolve with taking deep breaths but otherwise unexplained.  Low suspicion for pulmonary embolism given no symptoms and anticoagulated  Given ongoing hypoxia however, will page gynecology/oncology with recommendation for admission to observation, and consideration of further imaging should her symptoms not resolve.  Would avoid rebolus in contrast dye at this time.  Plan signed out to the overnight attending.    I have reviewed the nursing notes. I have reviewed the findings, diagnosis, plan and need for follow up with the patient.    Discharge Medication List as of 9/6/2023 11:49 AM        START taking these medications    Details   amoxicillin-clavulanate (AUGMENTIN) 875-125 MG tablet Take 1 tablet by mouth 2 times daily, Disp-14 tablet, R-0, E-Prescribe             Final diagnoses:   Urinary tract infection associated with indwelling urethral catheter, initial encounter (H)   Lower abdominal pain       Archie Acosta MD  [facility] EMERGENCY DEPARTMENT  9/4/2023     Archie Acosta MD  09/07/23 0127

## 2023-09-04 NOTE — ED TRIAGE NOTES
Presents with complaints of worsening lower abdominal spasms, despite home medications. Tried oral Oxycodone without relief. Currently undergoing radiation to the groin area.      Triage Assessment       Row Name 09/04/23 9726       Triage Assessment (Adult)    Airway WDL WDL       Respiratory WDL    Respiratory WDL WDL       Skin Circulation/Temperature WDL    Skin Circulation/Temperature WDL WDL       Cardiac WDL    Cardiac WDL X;rhythm    Pulse Rate & Regularity tachycardic       Peripheral/Neurovascular WDL    Peripheral Neurovascular WDL WDL       Cognitive/Neuro/Behavioral WDL    Cognitive/Neuro/Behavioral WDL WDL

## 2023-09-04 NOTE — TELEPHONE ENCOUNTER
Nurse Triage SBAR    Is this a 2nd Level Triage? NO    Situation:  lower abdominal pain    Background:  patient is complaining of lower abdominal pain that she describes as intermittent 10 out of 10 clinching squeezing pain.  This pain began a couple days ago and has gotten worse.  Patient has been unable to sleep and has been having episodes of vomiting. Temperature is 99.2 noncontact thermometer.  Patient has taken prescribed oxycodone for relief it has been ineffective.  Patient's last infusion was 8/28/2023. Patient states that she is very weak and dizzy.    Assessment:  severe lower abdominal pain.     Protocol Recommended Disposition:   No disposition on file.    Recommendation:  patient verbalized understanding of care advice although they did ask about reporting to the emergency room, writer recommended that they call EMS.    Routed to provider    Does the patient meet one of the following criteria for ADS visit consideration? No

## 2023-09-05 ENCOUNTER — APPOINTMENT (OUTPATIENT)
Dept: RADIATION ONCOLOGY | Facility: CLINIC | Age: 68
End: 2023-09-05
Attending: RADIOLOGY
Payer: COMMERCIAL

## 2023-09-05 ENCOUNTER — APPOINTMENT (OUTPATIENT)
Dept: CT IMAGING | Facility: CLINIC | Age: 68
End: 2023-09-05
Attending: OBSTETRICS & GYNECOLOGY
Payer: COMMERCIAL

## 2023-09-05 ENCOUNTER — APPOINTMENT (OUTPATIENT)
Dept: ULTRASOUND IMAGING | Facility: CLINIC | Age: 68
End: 2023-09-05
Attending: EMERGENCY MEDICINE
Payer: COMMERCIAL

## 2023-09-05 ENCOUNTER — APPOINTMENT (OUTPATIENT)
Dept: GENERAL RADIOLOGY | Facility: CLINIC | Age: 68
End: 2023-09-05
Attending: EMERGENCY MEDICINE
Payer: COMMERCIAL

## 2023-09-05 PROBLEM — N39.0 URINARY TRACT INFECTION ASSOCIATED WITH INDWELLING URETHRAL CATHETER, INITIAL ENCOUNTER (H): Status: ACTIVE | Noted: 2023-09-05

## 2023-09-05 PROBLEM — R10.30 LOWER ABDOMINAL PAIN: Status: ACTIVE | Noted: 2023-09-05

## 2023-09-05 PROBLEM — T83.511A URINARY TRACT INFECTION ASSOCIATED WITH INDWELLING URETHRAL CATHETER, INITIAL ENCOUNTER (H): Status: ACTIVE | Noted: 2023-09-05

## 2023-09-05 LAB
ANION GAP SERPL CALCULATED.3IONS-SCNC: 11 MMOL/L (ref 7–15)
ANION GAP SERPL CALCULATED.3IONS-SCNC: 11 MMOL/L (ref 7–15)
ATRIAL RATE - MUSE: 94 BPM
BUN SERPL-MCNC: 16.3 MG/DL (ref 8–23)
CALCIUM SERPL-MCNC: 8 MG/DL (ref 8.8–10.2)
CHLORIDE SERPL-SCNC: 97 MMOL/L (ref 98–107)
CHLORIDE SERPL-SCNC: 97 MMOL/L (ref 98–107)
CREAT SERPL-MCNC: 0.98 MG/DL (ref 0.51–0.95)
DEPRECATED HCO3 PLAS-SCNC: 22 MMOL/L (ref 22–29)
DEPRECATED HCO3 PLAS-SCNC: 22 MMOL/L (ref 22–29)
DIASTOLIC BLOOD PRESSURE - MUSE: NORMAL MMHG
ERYTHROCYTE [DISTWIDTH] IN BLOOD BY AUTOMATED COUNT: 13.7 % (ref 10–15)
FLUAV RNA SPEC QL NAA+PROBE: NEGATIVE
FLUBV RNA RESP QL NAA+PROBE: NEGATIVE
GFR SERPL CREATININE-BSD FRML MDRD: 63 ML/MIN/1.73M2
GLUCOSE SERPL-MCNC: 112 MG/DL (ref 70–99)
HCT VFR BLD AUTO: 25.7 % (ref 35–47)
HGB BLD-MCNC: 8.3 G/DL (ref 11.7–15.7)
INTERPRETATION ECG - MUSE: NORMAL
MAGNESIUM SERPL-MCNC: 1.1 MG/DL (ref 1.7–2.3)
MAGNESIUM SERPL-MCNC: 2.2 MG/DL (ref 1.7–2.3)
MCH RBC QN AUTO: 29.9 PG (ref 26.5–33)
MCHC RBC AUTO-ENTMCNC: 32.3 G/DL (ref 31.5–36.5)
MCV RBC AUTO: 92 FL (ref 78–100)
P AXIS - MUSE: 45 DEGREES
PLATELET # BLD AUTO: 72 10E3/UL (ref 150–450)
POTASSIUM SERPL-SCNC: 4 MMOL/L (ref 3.4–5.3)
POTASSIUM SERPL-SCNC: 4 MMOL/L (ref 3.4–5.3)
PR INTERVAL - MUSE: 134 MS
QRS DURATION - MUSE: 74 MS
QT - MUSE: 334 MS
QTC - MUSE: 417 MS
R AXIS - MUSE: -1 DEGREES
RBC # BLD AUTO: 2.78 10E6/UL (ref 3.8–5.2)
RSV RNA SPEC NAA+PROBE: NEGATIVE
SARS-COV-2 RNA RESP QL NAA+PROBE: NEGATIVE
SODIUM SERPL-SCNC: 130 MMOL/L (ref 136–145)
SODIUM SERPL-SCNC: 130 MMOL/L (ref 136–145)
SYSTOLIC BLOOD PRESSURE - MUSE: NORMAL MMHG
T AXIS - MUSE: 46 DEGREES
VENTRICULAR RATE- MUSE: 94 BPM
WBC # BLD AUTO: 2.2 10E3/UL (ref 4–11)

## 2023-09-05 PROCEDURE — 83735 ASSAY OF MAGNESIUM: CPT | Performed by: NURSE PRACTITIONER

## 2023-09-05 PROCEDURE — 250N000011 HC RX IP 250 OP 636: Mod: JZ | Performed by: STUDENT IN AN ORGANIZED HEALTH CARE EDUCATION/TRAINING PROGRAM

## 2023-09-05 PROCEDURE — 77386 HC IMRT TREATMENT DELIVERY, COMPLEX: CPT | Performed by: RADIOLOGY

## 2023-09-05 PROCEDURE — 250N000011 HC RX IP 250 OP 636: Performed by: EMERGENCY MEDICINE

## 2023-09-05 PROCEDURE — 258N000003 HC RX IP 258 OP 636: Performed by: STUDENT IN AN ORGANIZED HEALTH CARE EDUCATION/TRAINING PROGRAM

## 2023-09-05 PROCEDURE — 250N000011 HC RX IP 250 OP 636: Mod: JZ | Performed by: NURSE PRACTITIONER

## 2023-09-05 PROCEDURE — G0378 HOSPITAL OBSERVATION PER HR: HCPCS

## 2023-09-05 PROCEDURE — 250N000011 HC RX IP 250 OP 636: Performed by: OBSTETRICS & GYNECOLOGY

## 2023-09-05 PROCEDURE — 93005 ELECTROCARDIOGRAM TRACING: CPT

## 2023-09-05 PROCEDURE — 84295 ASSAY OF SERUM SODIUM: CPT | Performed by: STUDENT IN AN ORGANIZED HEALTH CARE EDUCATION/TRAINING PROGRAM

## 2023-09-05 PROCEDURE — 96376 TX/PRO/DX INJ SAME DRUG ADON: CPT

## 2023-09-05 PROCEDURE — 36415 COLL VENOUS BLD VENIPUNCTURE: CPT | Performed by: STUDENT IN AN ORGANIZED HEALTH CARE EDUCATION/TRAINING PROGRAM

## 2023-09-05 PROCEDURE — 93970 EXTREMITY STUDY: CPT

## 2023-09-05 PROCEDURE — 250N000013 HC RX MED GY IP 250 OP 250 PS 637: Performed by: NURSE PRACTITIONER

## 2023-09-05 PROCEDURE — 250N000013 HC RX MED GY IP 250 OP 250 PS 637

## 2023-09-05 PROCEDURE — 71275 CT ANGIOGRAPHY CHEST: CPT | Mod: 26 | Performed by: RADIOLOGY

## 2023-09-05 PROCEDURE — 99222 1ST HOSP IP/OBS MODERATE 55: CPT | Mod: GC | Performed by: OBSTETRICS & GYNECOLOGY

## 2023-09-05 PROCEDURE — 71275 CT ANGIOGRAPHY CHEST: CPT

## 2023-09-05 PROCEDURE — 250N000009 HC RX 250: Performed by: EMERGENCY MEDICINE

## 2023-09-05 PROCEDURE — 250N000013 HC RX MED GY IP 250 OP 250 PS 637: Performed by: STUDENT IN AN ORGANIZED HEALTH CARE EDUCATION/TRAINING PROGRAM

## 2023-09-05 PROCEDURE — 93970 EXTREMITY STUDY: CPT | Mod: 26 | Performed by: RADIOLOGY

## 2023-09-05 PROCEDURE — 71045 X-RAY EXAM CHEST 1 VIEW: CPT

## 2023-09-05 PROCEDURE — 36415 COLL VENOUS BLD VENIPUNCTURE: CPT | Performed by: OBSTETRICS & GYNECOLOGY

## 2023-09-05 PROCEDURE — 96372 THER/PROPH/DIAG INJ SC/IM: CPT | Mod: 59 | Performed by: NURSE PRACTITIONER

## 2023-09-05 PROCEDURE — 93010 ELECTROCARDIOGRAM REPORT: CPT | Performed by: INTERNAL MEDICINE

## 2023-09-05 PROCEDURE — 83735 ASSAY OF MAGNESIUM: CPT | Performed by: OBSTETRICS & GYNECOLOGY

## 2023-09-05 PROCEDURE — 87637 SARSCOV2&INF A&B&RSV AMP PRB: CPT | Performed by: EMERGENCY MEDICINE

## 2023-09-05 PROCEDURE — 96375 TX/PRO/DX INJ NEW DRUG ADDON: CPT | Mod: 59

## 2023-09-05 PROCEDURE — 71045 X-RAY EXAM CHEST 1 VIEW: CPT | Mod: 26 | Performed by: RADIOLOGY

## 2023-09-05 PROCEDURE — 85027 COMPLETE CBC AUTOMATED: CPT | Performed by: STUDENT IN AN ORGANIZED HEALTH CARE EDUCATION/TRAINING PROGRAM

## 2023-09-05 PROCEDURE — 250N000011 HC RX IP 250 OP 636

## 2023-09-05 RX ORDER — NALOXONE HYDROCHLORIDE 0.4 MG/ML
0.4 INJECTION, SOLUTION INTRAMUSCULAR; INTRAVENOUS; SUBCUTANEOUS
Status: DISCONTINUED | OUTPATIENT
Start: 2023-09-05 | End: 2023-09-06 | Stop reason: HOSPADM

## 2023-09-05 RX ORDER — MAGNESIUM OXIDE 400 MG/1
400 TABLET ORAL DAILY
Status: DISCONTINUED | OUTPATIENT
Start: 2023-09-05 | End: 2023-09-06 | Stop reason: HOSPADM

## 2023-09-05 RX ORDER — OXYCODONE HYDROCHLORIDE 5 MG/1
5 TABLET ORAL ONCE
Status: COMPLETED | OUTPATIENT
Start: 2023-09-05 | End: 2023-09-05

## 2023-09-05 RX ORDER — ACETAMINOPHEN 325 MG/1
975 TABLET ORAL EVERY 8 HOURS
Status: DISCONTINUED | OUTPATIENT
Start: 2023-09-05 | End: 2023-09-06 | Stop reason: HOSPADM

## 2023-09-05 RX ORDER — OXYCODONE HYDROCHLORIDE 5 MG/1
5 TABLET ORAL EVERY 6 HOURS PRN
Qty: 15 TABLET | Refills: 0 | Status: SHIPPED | OUTPATIENT
Start: 2023-09-05 | End: 2023-09-06

## 2023-09-05 RX ORDER — POLYETHYLENE GLYCOL 3350 17 G/17G
17 POWDER, FOR SOLUTION ORAL DAILY
Status: DISCONTINUED | OUTPATIENT
Start: 2023-09-05 | End: 2023-09-06 | Stop reason: HOSPADM

## 2023-09-05 RX ORDER — OXYCODONE HYDROCHLORIDE 5 MG/1
5 TABLET ORAL EVERY 6 HOURS PRN
Status: DISCONTINUED | OUTPATIENT
Start: 2023-09-05 | End: 2023-09-05

## 2023-09-05 RX ORDER — SODIUM CHLORIDE 9 MG/ML
INJECTION, SOLUTION INTRAVENOUS CONTINUOUS
Status: DISCONTINUED | OUTPATIENT
Start: 2023-09-05 | End: 2023-09-06

## 2023-09-05 RX ORDER — NALOXONE HYDROCHLORIDE 0.4 MG/ML
0.2 INJECTION, SOLUTION INTRAMUSCULAR; INTRAVENOUS; SUBCUTANEOUS
Status: DISCONTINUED | OUTPATIENT
Start: 2023-09-05 | End: 2023-09-06 | Stop reason: HOSPADM

## 2023-09-05 RX ORDER — DOCUSATE SODIUM 100 MG/1
100 CAPSULE, LIQUID FILLED ORAL DAILY
Status: DISCONTINUED | OUTPATIENT
Start: 2023-09-05 | End: 2023-09-06 | Stop reason: HOSPADM

## 2023-09-05 RX ORDER — OXYCODONE HYDROCHLORIDE 5 MG/1
5 TABLET ORAL EVERY 4 HOURS PRN
Status: DISCONTINUED | OUTPATIENT
Start: 2023-09-05 | End: 2023-09-05

## 2023-09-05 RX ORDER — CEFTRIAXONE 1 G/1
1 INJECTION, POWDER, FOR SOLUTION INTRAMUSCULAR; INTRAVENOUS ONCE
Status: COMPLETED | OUTPATIENT
Start: 2023-09-05 | End: 2023-09-05

## 2023-09-05 RX ORDER — CEFDINIR 300 MG/1
300 CAPSULE ORAL 2 TIMES DAILY
Qty: 28 CAPSULE | Refills: 0 | Status: SHIPPED | OUTPATIENT
Start: 2023-09-05 | End: 2023-09-05

## 2023-09-05 RX ORDER — HYDROMORPHONE HCL IN WATER/PF 6 MG/30 ML
0.4 PATIENT CONTROLLED ANALGESIA SYRINGE INTRAVENOUS
Status: DISCONTINUED | OUTPATIENT
Start: 2023-09-05 | End: 2023-09-05

## 2023-09-05 RX ORDER — IOPAMIDOL 755 MG/ML
70 INJECTION, SOLUTION INTRAVASCULAR ONCE
Status: COMPLETED | OUTPATIENT
Start: 2023-09-05 | End: 2023-09-05

## 2023-09-05 RX ORDER — HYDROMORPHONE HYDROCHLORIDE 1 MG/ML
0.5 INJECTION, SOLUTION INTRAMUSCULAR; INTRAVENOUS; SUBCUTANEOUS ONCE
Status: COMPLETED | OUTPATIENT
Start: 2023-09-05 | End: 2023-09-05

## 2023-09-05 RX ORDER — ONDANSETRON 4 MG/1
4 TABLET, FILM COATED ORAL EVERY 6 HOURS PRN
Status: DISCONTINUED | OUTPATIENT
Start: 2023-09-05 | End: 2023-09-05

## 2023-09-05 RX ORDER — HYDROMORPHONE HYDROCHLORIDE 1 MG/ML
0.5 INJECTION, SOLUTION INTRAMUSCULAR; INTRAVENOUS; SUBCUTANEOUS ONCE
Status: DISCONTINUED | OUTPATIENT
Start: 2023-09-05 | End: 2023-09-05

## 2023-09-05 RX ORDER — TOLTERODINE TARTRATE 2 MG/1
2 TABLET, EXTENDED RELEASE ORAL 2 TIMES DAILY
Status: DISCONTINUED | OUTPATIENT
Start: 2023-09-05 | End: 2023-09-06 | Stop reason: HOSPADM

## 2023-09-05 RX ORDER — ENOXAPARIN SODIUM 100 MG/ML
40 INJECTION SUBCUTANEOUS EVERY 24 HOURS
Status: DISCONTINUED | OUTPATIENT
Start: 2023-09-05 | End: 2023-09-06 | Stop reason: HOSPADM

## 2023-09-05 RX ORDER — PHENAZOPYRIDINE HYDROCHLORIDE 100 MG/1
200 TABLET, FILM COATED ORAL 3 TIMES DAILY PRN
Status: DISCONTINUED | OUTPATIENT
Start: 2023-09-05 | End: 2023-09-06 | Stop reason: HOSPADM

## 2023-09-05 RX ORDER — IPRATROPIUM BROMIDE AND ALBUTEROL SULFATE 2.5; .5 MG/3ML; MG/3ML
3 SOLUTION RESPIRATORY (INHALATION) ONCE
Status: COMPLETED | OUTPATIENT
Start: 2023-09-05 | End: 2023-09-05

## 2023-09-05 RX ORDER — ONDANSETRON 4 MG/1
4 TABLET, FILM COATED ORAL EVERY 6 HOURS
Status: DISCONTINUED | OUTPATIENT
Start: 2023-09-05 | End: 2023-09-06 | Stop reason: HOSPADM

## 2023-09-05 RX ORDER — OXYCODONE HYDROCHLORIDE 5 MG/1
5 TABLET ORAL EVERY 4 HOURS PRN
Status: DISCONTINUED | OUTPATIENT
Start: 2023-09-05 | End: 2023-09-06 | Stop reason: HOSPADM

## 2023-09-05 RX ORDER — PHENAZOPYRIDINE HYDROCHLORIDE 200 MG/1
200 TABLET, FILM COATED ORAL 3 TIMES DAILY PRN
Qty: 21 TABLET | Refills: 0 | Status: ON HOLD | OUTPATIENT
Start: 2023-09-05 | End: 2023-10-17

## 2023-09-05 RX ORDER — SULFAMETHOXAZOLE/TRIMETHOPRIM 800-160 MG
1 TABLET ORAL 2 TIMES DAILY
Qty: 28 TABLET | Refills: 0 | Status: SHIPPED | OUTPATIENT
Start: 2023-09-05 | End: 2023-09-06

## 2023-09-05 RX ORDER — MAGNESIUM SULFATE HEPTAHYDRATE 40 MG/ML
4 INJECTION, SOLUTION INTRAVENOUS ONCE
Status: COMPLETED | OUTPATIENT
Start: 2023-09-05 | End: 2023-09-05

## 2023-09-05 RX ORDER — CEFTRIAXONE 1 G/1
1 INJECTION, POWDER, FOR SOLUTION INTRAMUSCULAR; INTRAVENOUS EVERY 24 HOURS
Status: DISCONTINUED | OUTPATIENT
Start: 2023-09-05 | End: 2023-09-06 | Stop reason: HOSPADM

## 2023-09-05 RX ORDER — PROCHLORPERAZINE MALEATE 10 MG
10 TABLET ORAL EVERY 6 HOURS PRN
Status: DISCONTINUED | OUTPATIENT
Start: 2023-09-05 | End: 2023-09-06 | Stop reason: HOSPADM

## 2023-09-05 RX ADMIN — ONDANSETRON HYDROCHLORIDE 4 MG: 4 TABLET, FILM COATED ORAL at 12:39

## 2023-09-05 RX ADMIN — POLYETHYLENE GLYCOL 3350 17 G: 17 POWDER, FOR SOLUTION ORAL at 08:14

## 2023-09-05 RX ADMIN — IPRATROPIUM BROMIDE AND ALBUTEROL SULFATE 3 ML: .5; 3 SOLUTION RESPIRATORY (INHALATION) at 00:33

## 2023-09-05 RX ADMIN — TOLTERODINE TARTRATE 2 MG: 2 TABLET, FILM COATED ORAL at 08:44

## 2023-09-05 RX ADMIN — IOPAMIDOL 70 ML: 755 INJECTION, SOLUTION INTRAVENOUS at 13:16

## 2023-09-05 RX ADMIN — HYDROMORPHONE HYDROCHLORIDE 0.5 MG: 1 INJECTION, SOLUTION INTRAMUSCULAR; INTRAVENOUS; SUBCUTANEOUS at 01:04

## 2023-09-05 RX ADMIN — ONDANSETRON HYDROCHLORIDE 4 MG: 4 TABLET, FILM COATED ORAL at 18:23

## 2023-09-05 RX ADMIN — PHENAZOPYRIDINE HYDROCHLORIDE 200 MG: 100 TABLET, FILM COATED ORAL at 14:48

## 2023-09-05 RX ADMIN — ONDANSETRON HYDROCHLORIDE 4 MG: 4 TABLET, FILM COATED ORAL at 08:14

## 2023-09-05 RX ADMIN — ENOXAPARIN SODIUM 40 MG: 40 INJECTION SUBCUTANEOUS at 12:39

## 2023-09-05 RX ADMIN — CEFTRIAXONE SODIUM 1 G: 1 INJECTION, POWDER, FOR SOLUTION INTRAMUSCULAR; INTRAVENOUS at 00:22

## 2023-09-05 RX ADMIN — OXYCODONE HYDROCHLORIDE 5 MG: 5 TABLET ORAL at 14:48

## 2023-09-05 RX ADMIN — ACETAMINOPHEN 975 MG: 325 TABLET, FILM COATED ORAL at 12:38

## 2023-09-05 RX ADMIN — MAGNESIUM OXIDE TAB 400 MG (241.3 MG ELEMENTAL MG) 400 MG: 400 (241.3 MG) TAB at 08:14

## 2023-09-05 RX ADMIN — HYDROMORPHONE HYDROCHLORIDE 0.4 MG: 0.2 INJECTION, SOLUTION INTRAMUSCULAR; INTRAVENOUS; SUBCUTANEOUS at 08:14

## 2023-09-05 RX ADMIN — ACETAMINOPHEN 975 MG: 325 TABLET, FILM COATED ORAL at 20:49

## 2023-09-05 RX ADMIN — PHENAZOPYRIDINE HYDROCHLORIDE 200 MG: 100 TABLET, FILM COATED ORAL at 05:29

## 2023-09-05 RX ADMIN — MAGNESIUM SULFATE IN WATER 4 G: 40 INJECTION, SOLUTION INTRAVENOUS at 08:15

## 2023-09-05 RX ADMIN — OXYCODONE HYDROCHLORIDE 5 MG: 5 TABLET ORAL at 16:27

## 2023-09-05 RX ADMIN — OXYCODONE HYDROCHLORIDE 5 MG: 5 TABLET ORAL at 05:29

## 2023-09-05 RX ADMIN — TOLTERODINE TARTRATE 2 MG: 2 TABLET, FILM COATED ORAL at 20:48

## 2023-09-05 RX ADMIN — SODIUM CHLORIDE: 9 INJECTION, SOLUTION INTRAVENOUS at 05:17

## 2023-09-05 RX ADMIN — ACETAMINOPHEN 975 MG: 325 TABLET, FILM COATED ORAL at 05:28

## 2023-09-05 RX ADMIN — DOCUSATE SODIUM 100 MG: 100 CAPSULE, LIQUID FILLED ORAL at 08:14

## 2023-09-05 ASSESSMENT — ACTIVITIES OF DAILY LIVING (ADL)
ADLS_ACUITY_SCORE: 35

## 2023-09-05 NOTE — DISCHARGE SUMMARY
Gynecologic Oncology Discharge Summary    Jessie Tamayo  [id number]    Admit Date: 9/4/2023  Discharge Date: 09/05/23    Admitting Provider: Madhuri Johnson MD  Discharge Provider: Madhuri Johnson MD    Admission Dx:   - Suprapubic pain  - Hypoxia  - Stage IVB SCC of the vagina  - Urinary retention with indwelling gonsalez  - Catheter associated UTI  - Treatment induced pancytopenia  - HLD  - Osteopenia  - Lichen Sclerosus   - Actinic keratosis    Discharge Dx:  - Suprapubic pain  - Hypoxia; resolved  - Stage IVB SCC of the vagina  - Urinary retention with indwelling gonsalez  - Catheter associated UTI  - Treatment induced pancytopenia; stable    - HLD  - Osteopenia  - Lichen Sclerosus   - Actinic keratosis    Patient Active Problem List   Diagnosis    Hyperlipidemia LDL goal <130    Recurrent cold sores    Hearing loss    Pap smear of cervix with ASCUS, cannot exclude HGSIL    Introital dyspareunia    Vaginal atrophy    Cervical high risk HPV (human papillomavirus) test positive    Atrophic vaginitis    Class 1 obesity due to excess calories without serious comorbidity with body mass index (BMI) of 34.0 to 34.9 in adult    Urethral bleeding    Vaginal cancer (H)    Benign neoplasm of colon    Morbid obesity (H)    SCC (squamous cell carcinoma)    Hypomagnesemia    Lower abdominal pain    Urinary tract infection associated with indwelling urethral catheter, initial encounter (H)       Prior to Admission Medications:  Medications Prior to Admission   Medication Sig Dispense Refill Last Dose    acetaminophen (TYLENOL) 500 MG tablet Take 500-1,000 mg by mouth every 6 hours as needed for mild pain       clotrimazole (LOTRIMIN) 1 % external cream Apply topically 2 times daily To affected areas 45 g 3     dexamethasone (DECADRON) 4 MG tablet Take 2 tablets (8 mg) by mouth daily Take for 3 days, starting the day after chemo on day 2 and 9. Take with food. If no nausea and vomiting with first cisplatin doses, may stop  dexamethasone. 12 tablet 2     docusate sodium (COLACE) 100 MG capsule Take 100 mg by mouth daily       LORazepam (ATIVAN) 0.5 MG tablet Take one tablet po 30-45 min before MRI scan, may repeat times one if needed, bring bottle with you to MRI 2 tablet 0     magnesium oxide (MAG-OX) 400 MG tablet Take 1 tablet (400 mg) by mouth daily 30 tablet 0     ondansetron (ZOFRAN) 8 MG tablet Take 1 tablet (8 mg) by mouth every 8 hours as needed for nausea (vomiting) Do not take for 3 days after chemo. 30 tablet 2     prochlorperazine (COMPAZINE) 10 MG tablet Take 1 tablet (10 mg) by mouth every 6 hours as needed for nausea or vomiting 30 tablet 2     tolterodine (DETROL) 2 MG tablet Take 1 tablet (2 mg) by mouth 2 times daily 60 tablet 3     triamcinolone (KENALOG) 0.1 % external cream Apply topically 2 times daily 15 g 1     [DISCONTINUED] oxyCODONE (ROXICODONE) 5 MG tablet Take 1 tablet (5 mg) by mouth every 6 hours as needed for severe pain 20 tablet 0     [DISCONTINUED] phenazopyridine (PYRIDIUM) 200 MG tablet Take 1 tablet (200 mg) by mouth 3 times daily as needed for irritation 21 tablet 1     [DISCONTINUED] sulfamethoxazole-trimethoprim (BACTRIM DS) 800-160 MG tablet Take 1 tablet by mouth 2 times daily 14 tablet 0        Discharge Medications:     Review of your medicines        START taking        Dose / Directions   amoxicillin-clavulanate 875-125 MG tablet  Commonly known as: AUGMENTIN      Dose: 1 tablet  Take 1 tablet by mouth 2 times daily  Quantity: 14 tablet  Refills: 0            CONTINUE these medicines which may have CHANGED, or have new prescriptions. If we are uncertain of the size of tablets/capsules you have at home, strength may be listed as something that might have changed.        Dose / Directions   oxyCODONE 5 MG tablet  Commonly known as: ROXICODONE  This may have changed: reasons to take this      Dose: 5 mg  Take 1 tablet (5 mg) by mouth every 6 hours as needed for pain  Quantity: 15  tablet  Refills: 0            CONTINUE these medicines which have NOT CHANGED        Dose / Directions   acetaminophen 500 MG tablet  Commonly known as: TYLENOL      Dose: 500-1,000 mg  Take 500-1,000 mg by mouth every 6 hours as needed for mild pain  Refills: 0     clotrimazole 1 % external cream  Commonly known as: LOTRIMIN  Used for: Vaginal cancer (H)      Apply topically 2 times daily To affected areas  Quantity: 45 g  Refills: 3     dexAMETHasone 4 MG tablet  Commonly known as: DECADRON  Used for: Vaginal cancer (H), SCC (squamous cell carcinoma)      Dose: 8 mg  Take 2 tablets (8 mg) by mouth daily Take for 3 days, starting the day after chemo on day 2 and 9. Take with food. If no nausea and vomiting with first cisplatin doses, may stop dexamethasone.  Quantity: 12 tablet  Refills: 2     docusate sodium 100 MG capsule  Commonly known as: COLACE      Dose: 100 mg  Take 100 mg by mouth daily  Refills: 0     LORazepam 0.5 MG tablet  Commonly known as: ATIVAN  Used for: Vaginal cancer (H)      Take one tablet po 30-45 min before MRI scan, may repeat times one if needed, bring bottle with you to MRI  Quantity: 2 tablet  Refills: 0     magnesium oxide 400 MG tablet  Commonly known as: MAG-OX  Used for: Hypomagnesemia      Dose: 400 mg  Take 1 tablet (400 mg) by mouth daily  Quantity: 30 tablet  Refills: 0     ondansetron 8 MG tablet  Commonly known as: ZOFRAN  Used for: Vaginal cancer (H), SCC (squamous cell carcinoma)      Dose: 8 mg  Take 1 tablet (8 mg) by mouth every 8 hours as needed for nausea (vomiting) Do not take for 3 days after chemo.  Quantity: 30 tablet  Refills: 2     phenazopyridine 200 MG tablet  Commonly known as: PYRIDIUM  Used for: Acute cystitis with hematuria      Dose: 200 mg  Take 1 tablet (200 mg) by mouth 3 times daily as needed for irritation  Quantity: 21 tablet  Refills: 0     prochlorperazine 10 MG tablet  Commonly known as: COMPAZINE  Used for: Vaginal cancer (H), SCC (squamous cell  carcinoma)      Dose: 10 mg  Take 1 tablet (10 mg) by mouth every 6 hours as needed for nausea or vomiting  Quantity: 30 tablet  Refills: 2     tolterodine 2 MG tablet  Commonly known as: DETROL  Used for: Bladder spasm      Dose: 2 mg  Take 1 tablet (2 mg) by mouth 2 times daily  Quantity: 60 tablet  Refills: 3     triamcinolone 0.1 % external cream  Commonly known as: KENALOG  Used for: Itching, Dermatitis      Apply topically 2 times daily  Quantity: 15 g  Refills: 1            STOP taking      sulfamethoxazole-trimethoprim 800-160 MG tablet  Commonly known as: BACTRIM DS                  Where to get your medicines        These medications were sent to [facility] - [place], [place] - [address]  [address] [address]      Phone: [phone]   amoxicillin-clavulanate 875-125 MG tablet  oxyCODONE 5 MG tablet  phenazopyridine 200 MG tablet         Consultations:  - Radiation oncology    Brief History of Illness:  From H&P: 67 year old female with stage IVB SCC Of the vagina who presents to the ED with worsening suprapubic abdominal pain. She notes throughout the day yesterday pain was worse than usual, which improves at home with oxycodone and pyridium. However oxycodone was not touching it. When this was persistent, she decided to come to the ED. She denies fevers/chills. Pain medicines in the ED have been helpful. However at this time, stimulation from leg ultrasound is causing her more severe pain in the form of bladder spasms.     Incidentally while in the ED, she was found to have low oxygen saturations. Patient denies any chest pain, shortness of breath, cough, or other URI symptoms. She notes she thinks her oxygen is low because she is not taking full deep breaths due to pain. She denies sick contacts. Endorses racing heart, only when she is pain. She endorses worse nausea/vomiting yesterday than usual. Hardly anything she ate or drank stayed down; normally  "zofran will completely resolve her nausea but not yesterday. Hasn't vomited in several hours at this point. Endorses \"marble poops\" with diarrhea.    Hospital Course:  Dz:   - Stage IVB SCC of the vagina, continued on treatment. She will continue to follow up in clinic as scheduled for ongoing treatment. Went to radiation appointment on 09/05/23.    FEN:   - She was initially given an IVF bolus and continued on mIVF. Electrolytes were replaced as needed. By discharge, she was tolerating a regular diet without nausea and vomiting and able to maintain her hydration without IVF supplementation.  Pain:   - Her pain was initially controlled on IV Dilaudid. On discharge was transitioned to a PO pain regimen of alternating oxycodone and tylenol.  She was encouraged to continue with her PO regimen for pain and bladder spasms. She was sent home with PO oxycodone.   CV:   - She has a history of HLD, not on home meds. She was noted to have hypertension; at baseline, she appeared to have borderline elevated blood pressures and additional elevations were likely due to the increased fluids. She was asymptomatic.  PULM:   - She was found to be hypoxic in the ED and subsequently admitted for observation. CTA chest: negative. EKG negative. B/I LE dopplers neg for DVT. COVID/flu negative. She was initially given O2 supplementation in to maintain her O2 sats >90%. She was encouraged to use IS and eventually weaned O2. By discharge, her O2 sats were greater than 92% on RA.    HEME:   - Treatment induced pancytopenia present on admission and remained stable. Her Hgb was 9.5.  Her hgb dropped to 8.3 thought to be dilutional due to fluids. She had no other acute heme issues while in house.   GI:   - She was tolerating a regular diet without nausea and vomiting. She will be discharged with a bowel regimen to prevent constipation and will continue with her PRN antiemetics. She has gas pains that improve when passing pas. She had no acute GI " issues while in house.  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 Follows with urology. Gonsalez catheter was replaced in the ED on 09/04. See ID. She will follow up with urology in clinic, Urology was contacted to schedule.    - Borderline JACQUIE: Labs in the ED notable for bump in Cr to 1.06, suspect multifactorial including due to poor PO intake preceding presentation and contrast given in ED, Cr decreased to 0.98 on discharge.   - She was diagnosed with a UTI. Please see ID for additional details.   ID:   - The patient was AF during her hospitalization.  Patient presented with UTI symptoms. UA concerning for UTI and was started on ceftriaxone. Gonsalez cath was exchanged in the ED. She received ceftriaxone in house. Urine culture consistent with Group B streptococcus, and she was discharged on Augmentin for 7 days. Discharged with pyridium for bladder spasms.   ENDO:   - no acute issues  PSYCH/NEURO:   - no acute issues  PPX:    -  She was given SCDs, enoxaparin, and IS during her hospital course.  She tolerated these prophylactic interventions without incident. They were discontinued at the time of her discharge.      Discharge Instructions and Follow up:  Ms. Jessie Tamayo was discharged from the hospital with follow up with us in 2 weeks.  She will follow up with radiation oncology today 9/6 as scheduled.     Discharge Diet: Regular  Discharge Activity: Activity as tolerated  Discharge Follow up: as scheduled for treatment. Urology to schedule follow up in their clinic.    Discharge Disposition:  Discharged to home    Discharge Staff: Dr. Johnson.    Cony Winter- MS4  Medical Student - Gynecologic Oncology     I was present with the student who participated in the service and in the documentation of the note. I have verified the history and personally performed the physical exam and medical decision-making. I agree with the assessment and plan of care as  documented in the note.     Caridad Paris DO  [facility]  Gynecology Oncology Resident, PGY-1  09/06/2023 6:55 AM    Provider Disclosure:   I agree with above History, Review of Systems, Physical exam and Plan. I have reviewed the content of the documentation and have edited it as needed. I have personally performed the services documented here and the documentation accurately represents those services and the decisions I have made.     Electronically signed by:   Madhuri Johnson MD   Gynecologic Oncology   [facility]

## 2023-09-05 NOTE — PROGRESS NOTES
Observation goals  Diagnostic tests and consults completed and resulted: Met  Vital signs normal or at patient baseline: met  Tolerating oral intake to maintain hydration: Met  Adequate pain control on oral analgesics: Not met  Infection is improving: Met  Dyspnea improved and O2 sats greater than 88% on room air or prior home oxygen levels: met

## 2023-09-05 NOTE — PROGRESS NOTES
[id number] Aox4. Slight HTN. O2 sats high 80's on RA. On 2L NC, tolerating. Pain in the abdomen and groin managed with prn oxycodone, pyridium and fatou tylenol. C/o of slight nausea, zofran requested from from pharmacy. L PIV infusing with NS at 100ml/hr. Passing gas, LBM yesterday. Thomas with orange urine output. Up with SBA. Possible radiation today. EKG done.    Admitted/transferred from: ED  2 RN full   skin assessment completed by Jovana Emery, SANTOS and Anais GRAHAM.  Skin assessment finding: issues found groin/perineum redness and irritation. PIV. Thomas.  Interventions/actions: skin interventions Cleaned perineum and applied fresh pull ups.      Will continue to monitor.

## 2023-09-05 NOTE — DISCHARGE INSTRUCTIONS
Please call your oncologist tomorrow to arrange or appropriate follow up for a visit and review of your urine cultures     Take antibiotics as prescribed, as well as your other regular medications.     Return to the emergency department for any worsening back pain, abdominal pain, fevers or chills, burning with urination, nausea or vomiting, weakness or any other concerns as given or discussed.

## 2023-09-05 NOTE — CONSULTS
"[facility]  Gynecology Oncology History and Physical    Jessie Tamayo MRN# [id number]   Age: 67 year old YOB: 1955     Date of Admission:  9/4/2023    Primary care provider: Alba Layton             Chief Complaint:   Suprapubic pain, low SpO2         History of Present Illness:   Jessie Tamayo is a 67 year old female with stage IVB SCC Of the vagina who presents to the ED with worsening suprapubic abdominal pain. She notes throughout the day yesterday pain was worse than usual, which improves at home with oxycodone and pyridium. However oxycodone was not touching it. When this was persistent, she decided to come to the ED. She denies fevers/chills. Pain medicines in the ED have been helpful. However at this time, stimulation from leg ultrasound is causing her more severe pain in the form of bladder spasms.    Incidentally while in the ED, she was found to have low oxygen saturations. Patient denies any chest pain, shortness of breath, cough, or other URI symptoms. She notes she thinks her oxygen is low because she is not taking full deep breaths due to pain. She denies sick contacts. Endorses racing heart, only when she is pain. She endorses worse nausea/vomiting yesterday than usual. Hardly anything she ate or drank stayed down; normally zofran will completely resolve her nausea but not yesterday. Hasn't vomited in several hours at this point. Endorses \"marble poops\" with diarrhea.         Cancer Treatment History:   12/26/07, 10/15/08, 12/22/09, 11/1/11: Pap test NILM.      1/23/15: Pap test NILM, hrHPV16+.  2/27/15: Cervical polyp & cervical biopsy pathology: Negative for dysplasia/malignancy.     4/7/16: Pap test NILM, hrHPV16+.   5/19/16: Endocervical curettings pathology negative for dysplasia/malignancy.     7/11/17: Pap test NILM, hrHPV16+.   -Colposcopy recommended, not performed.      9/25/18:   -Pap test ASCUS, hrHPV16+.   -Endocervical curettings & " cervical biopsy pathology: negative for dysplasia/malignancy.      9/20/19:  Pap test ASC-H, hrHPV16+.   12/16/19: Endocervical curettings pathology benign; cervical 1:00, 7:00 biopsies suggestive of LSIL (CIN1).      9/10/20: Pap test ASC-H, hrHPV+ (NOS).  2/8/21: LEEP.   -Pathology: LEEP & endocervical curettings: negative for dysplasia/malignancy.     3/23/22: Endocervical curettings & cervical biopsy pathology: negative for dysplasia/malignancy.     3/28/23:   -Pap test HSIL, hrHPV16+.   -Findings by gynecologist Dr. Koenig: External genitalia with erythematous plaques bilaterally  Urethra- mid position and well supported, suburethral tissue thickened and friable with bleeding elicited after placement of the speculum  Vagina is stenotic and cervix difficult to see.   5/30/23: Cystourethroscopy, vaginal biopsy by urologist Dr. Mackenzie.   -Findings: Exam revealed lichenified changes to bilateral labia majora and friable vestibular tissue (patient had significant bleeding from prep alone.) The urethra was somewhat stenotic and would not accommodate 19Fr rigid cystoscope, therefore a 16 Fr flexible cystoscope was inserted. Gentle pressure was required to access the bladder. The ureteral orifices were in their orthotopic positions bilaterally. There were no intravesical stones or diverticuli and the bladder was mildly trabeculated. There were no intravesical lesions. See media tab for urethral pictures - there were no obvious lesions but the entire distal urethra was erythematous and stenosed (16Fr.)  -Pathology: Invasive moderately-differentiated squamous cell carcinoma, HPV-associated, with focus suspicious for lymphovascular space invasion; IHC: p16+.    6/8/23: Gynecologic Oncology consultation.  -Findings:   External genitalia notable for erythema and desquamation of the posterior medial labia, c/w lichen sclerosus changes. When the labia are , a friable mass is visible at the distal anterior vagina,  just posterior to the urethra. On bimanual exam, the cervix is small and normal in contour. The palpable vaginal tumor is limited to the anterior distal vagina, approximately 4-5 cm laterally x 3 cm cranio-caudal. Tumor is friable. Remainder of the vagina smooth to palpation. Digital anorectal exam is without nodularity. No palpable tumor in the rectovaginal septum.   Enlarged firm, fixed right inguinal lymph node ~3-4 cm in size.      6/16/23: Pelvic MRI  IMPRESSION:   1. Irregular enhancing lesion along the anterior vaginal wall at the  level of the introitus measuring 2.5 x 0.9 cm with irregular  enhancement along the anterior vaginal wall towards the level of the  vaginal cuff however there is no definitive evidence or abnormal  signal intensity involving the cervix to suggest invasion.  2. Right inguinal lymphadenopathy compatible with metastatic disease.   3. There is some asymmetric enhancement involving the left sacrum,  incompletely evaluated on this study. Further evaluation of this and  further staging of the chest, abdomen and pelvis will be performed on  PET/CT scheduled for 6/20/2023.     6/20/23: PET CT  IMPRESSION:   1. Intense focal hypermetabolic FDG uptake in biopsy-proven vaginal  squamous cell carcinoma.  2. Multiple enlarged, hypermetabolic right inguinal nodes likely  represent regional metastasis. No definite evidence of distant  metastatic disease.  3. Scattered sub-4 mm solid pulmonary nodules are below the size  threshold for characterization on PET. Attention on follow-up with CT.  4. 1.0 cm enhancing left parotid gland nodule with hypermetabolic  uptake could represent a primary parotid neoplasm such as pleomorphic  adenoma or Warthin's tumor; consider ultrasound for further  evaluation.  5. 1.3 cm hypoattenuating right thyroid nodule with mild FDG uptake  warrants ultrasound for characterization.      Plan: Cisplatin radiation      7/27/2023: urinary gonsalez catheter placement. Treat  acute cystitis with bactrim per Urology.      8/1/2023: plan C1D1 Cisplatin radiation           Past Medical History:     Past Medical History:   Diagnosis Date    Actinic keratosis     Hyperlipemia     Lichen sclerosus 2020    Osteopenias     SCC (squamous cell carcinoma) 7/26/2023            Past Surgical History:      Past Surgical History:   Procedure Laterality Date    COLONOSCOPY  2006    normal    COLPOSCOPY, BIOPSY, COMBINED N/A 02/08/2021    Procedure: COLPOSCOPY, WITH BIOPSY, LEEP procedure;  Surgeon: Jefe Koenig MD;  Location: [facility] OR    CYSTOSCOPY N/A 05/30/2023    Procedure: CYSTOSCOPY AND EXCISIONAL BIOPSY OF THE VAGINAL TISSUE AROUND THE URETHRA.  (look in the bladder and sample small area in the vagina near the urethra);  Surgeon: Lakeisha Mackenzie MD;  [place] [facility] [place]    EYE SURGERY      cataracts bilateral    OPEN REDUCTION INTERNAL FIXATION FOREARM  07/31/2012    Procedure: OPEN REDUCTION INTERNAL FIXATION FOREARM;  Open Reduction Internal Fixation, Irrigation & Debridment  of Right Distal Radius, application short arm splint;  Surgeon: Wade Beard MD;  Location: [facility] OR    TONSILLECTOMY & ADENOIDECTOMY  1960    [facility] TREAT ECTOPIC PREG,RMV TUBE/OVARY  1985    ectopic, right side-ovary and tube removed            Social History:     Social History     Tobacco Use    Smoking status: Never     Passive exposure: Never    Smokeless tobacco: Never   Substance Use Topics    Alcohol use: Yes     Comment: very little            Family History:     Family History   Problem Relation Age of Onset    Lung Cancer Mother 44    Cerebrovascular Disease Father     Hypertension Father     Alcohol/Drug Father     Obesity Niece         niece    Mental Illness Nephew         nephew    Diabetes Other         paternal aunt    Hyperlipidemia Other     Obesity Other         self    Cervical Cancer Maternal Aunt     Ovarian Cancer Maternal Aunt 60    Obesity Other     Diabetes Other         paternal  aunt    Hypertension Other         father    Cerebrovascular Disease Other         father    C.A.D. No family hx of     Breast Cancer No family hx of     Cancer - colorectal No family hx of     Prostate Cancer No family hx of     Melanoma No family hx of             Immunizations:     Immunization History   Administered Date(s) Administered    COVID-19 Bivalent 18+ (Moderna) 11/11/2022    COVID-19 Monovalent 18+ (Moderna) 02/12/2021, 03/12/2021, 10/28/2021, 06/10/2022    Hepatitis A (ADULT 19+) 02/27/2019, 09/20/2019    Influenza (IIV3) PF 10/16/2008, 11/01/2010, 10/02/2014    Influenza Vaccine 18-64 (Flublok) 09/20/2019    Influenza Vaccine 65+ (Fluzone HD) 10/22/2021    Influenza Vaccine >6 months (Alfuria,Fluzone) 09/30/2015, 10/18/2016, 10/08/2017, 09/24/2018    MMR 02/27/2019    Pneumococcal 20 valent Conjugate (Prevnar 20) 03/11/2022    TD,PF 7+ (Tenivac) 10/04/2004    TDAP Vaccine (Adacel) 07/31/2012    Typhoid IM 02/27/2019    Zoster recombinant adjuvanted (SHINGRIX) 09/20/2019, 12/04/2019            Allergies:     Allergies   Allergen Reactions    Oxybutynin Itching    Seasonal Allergies             Medications:     Current Facility-Administered Medications   Medication    HYDROmorphone (PF) (DILAUDID) 0.5 MG/0.5 ML injection    HYDROmorphone (PF) (DILAUDID) 0.5 MG/0.5 ML injection    HYDROmorphone (PF) (DILAUDID) injection 0.5 mg    ondansetron (ZOFRAN) 2 MG/ML injection    ondansetron (ZOFRAN) injection 4 mg     Current Outpatient Medications   Medication Sig    cefdinir (OMNICEF) 300 MG capsule Take 1 capsule (300 mg) by mouth 2 times daily for 14 days    acetaminophen (TYLENOL) 500 MG tablet Take 500-1,000 mg by mouth every 6 hours as needed for mild pain    clotrimazole (LOTRIMIN) 1 % external cream Apply topically 2 times daily To affected areas    dexamethasone (DECADRON) 4 MG tablet Take 2 tablets (8 mg) by mouth daily Take for 3 days, starting the day after chemo on day 2 and 9. Take with food. If  no nausea and vomiting with first cisplatin doses, may stop dexamethasone.    docusate sodium (COLACE) 100 MG capsule Take 100 mg by mouth daily    LORazepam (ATIVAN) 0.5 MG tablet Take one tablet po 30-45 min before MRI scan, may repeat times one if needed, bring bottle with you to MRI    magnesium oxide (MAG-OX) 400 MG tablet Take 1 tablet (400 mg) by mouth daily    ondansetron (ZOFRAN) 8 MG tablet Take 1 tablet (8 mg) by mouth every 8 hours as needed for nausea (vomiting) Do not take for 3 days after chemo.    oxyCODONE (ROXICODONE) 5 MG tablet Take 1 tablet (5 mg) by mouth every 6 hours as needed for severe pain    phenazopyridine (PYRIDIUM) 200 MG tablet Take 1 tablet (200 mg) by mouth 3 times daily as needed for irritation    prochlorperazine (COMPAZINE) 10 MG tablet Take 1 tablet (10 mg) by mouth every 6 hours as needed for nausea or vomiting    sulfamethoxazole-trimethoprim (BACTRIM DS) 800-160 MG tablet Take 1 tablet by mouth 2 times daily    tolterodine (DETROL) 2 MG tablet Take 1 tablet (2 mg) by mouth 2 times daily    triamcinolone (KENALOG) 0.1 % external cream Apply topically 2 times daily            Review of Systems:   CONSTITUTIONAL:  positive for  fatigue  RESPIRATORY:  negative  CARDIOVASCULAR:  negative except per HPI  GASTROINTESTINAL:  positive for nausea, vomiting, diarrhea, and constipation  GENITOURINARY:  per HPI  HEMATOLOGIC/LYMPHATIC:  negative  ENDOCRINE:  negative         Physical Exam:     Vitals:    09/05/23 0003 09/05/23 0155 09/05/23 0210 09/05/23 0211   BP:  (!) 176/94 (!) 144/79    Pulse: 105 111 110 106   Resp:       Temp:       TempSrc:       SpO2: 91% 94%  94%     General: NAD  CV: Tachycardic, regular rhythm, normal S1, S2  Resp: CTAB, relatively shallow inspiratory breaths, per pt due to bladder pain at the time of exam  Abdomen: soft, moderately tender in central abdomen and suprapubic region, otherwise nontender, no CVA tenderness, nondistended  : gonsalez in  place  Extremities: WWP, no edema, warmth or erythema, no calf tenderness         Data:     Results for orders placed or performed during the hospital encounter of 09/04/23 (from the past 24 hour(s))   CBC with platelets differential    Narrative    The following orders were created for panel order CBC with platelets differential.  Procedure                               Abnormality         Status                     ---------                               -----------         ------                     CBC with platelets and d...[[id number]]  Abnormal            Final result                 Please view results for these tests on the individual orders.   Comprehensive metabolic panel   Result Value Ref Range    Sodium 129 (L) 136 - 145 mmol/L    Potassium 3.9 3.4 - 5.3 mmol/L    Chloride 91 (L) 98 - 107 mmol/L    Carbon Dioxide (CO2) 25 22 - 29 mmol/L    Anion Gap 13 7 - 15 mmol/L    Urea Nitrogen 17.4 8.0 - 23.0 mg/dL    Creatinine 1.06 (H) 0.51 - 0.95 mg/dL    Calcium 8.6 (L) 8.8 - 10.2 mg/dL    Glucose 148 (H) 70 - 99 mg/dL    Alkaline Phosphatase 58 35 - 104 U/L    AST 25 0 - 45 U/L    ALT 24 0 - 50 U/L    Protein Total 6.1 (L) 6.4 - 8.3 g/dL    Albumin 4.0 3.5 - 5.2 g/dL    Bilirubin Total 0.5 <=1.2 mg/dL    GFR Estimate 57 (L) >60 mL/min/1.73m2   Lactic acid whole blood   Result Value Ref Range    Lactic Acid 1.0 0.7 - 2.0 mmol/L   CBC with platelets and differential   Result Value Ref Range    WBC Count 2.3 (L) 4.0 - 11.0 10e3/uL    RBC Count 3.15 (L) 3.80 - 5.20 10e6/uL    Hemoglobin 9.5 (L) 11.7 - 15.7 g/dL    Hematocrit 28.7 (L) 35.0 - 47.0 %    MCV 91 78 - 100 fL    MCH 30.2 26.5 - 33.0 pg    MCHC 33.1 31.5 - 36.5 g/dL    RDW 13.4 10.0 - 15.0 %    Platelet Count 81 (L) 150 - 450 10e3/uL    % Neutrophils 78 %    % Lymphocytes 5 %    % Monocytes 17 %    % Eosinophils 0 %    % Basophils 0 %    % Immature Granulocytes 0 %    NRBCs per 100 WBC 0 <1 /100    Absolute Neutrophils 1.8 1.6 - 8.3 10e3/uL    Absolute  Lymphocytes 0.1 (L) 0.8 - 5.3 10e3/uL    Absolute Monocytes 0.4 0.0 - 1.3 10e3/uL    Absolute Eosinophils 0.0 0.0 - 0.7 10e3/uL    Absolute Basophils 0.0 0.0 - 0.2 10e3/uL    Absolute Immature Granulocytes 0.0 <=0.4 10e3/uL    Absolute NRBCs 0.0 10e3/uL   CT Abdomen Pelvis w Contrast    Narrative    EXAM: CT ABDOMEN PELVIS W CONTRAST  LOCATION: [facility]  DATE: 9/4/2023    INDICATION: Vaginal cancer for PE, worsening abdominal pain, evaluate for obstruction  COMPARISON: PET 06/20/2023  TECHNIQUE: CT scan of the abdomen and pelvis was performed following injection of IV contrast. Multiplanar reformats were obtained. Dose reduction techniques were used.  CONTRAST: 128 mL Isovue-370    FINDINGS:   LOWER CHEST: No evidence of pulmonary emboli in visualized lung bases.    HEPATOBILIARY: No significant mass or bile duct dilatation. No calcified gallstones.     PANCREAS: Normal.    SPLEEN: 1 cm subcapsular hypodensity posterior spleen.    ADRENAL GLANDS: Normal.    KIDNEYS/BLADDER: Subcentimeter hypodensity upper pole right kidney probable cyst. No mass, stones, or hydronephrosis. Bladder mild distention with Thomas catheter in place.    BOWEL: No obstruction or inflammatory change.    LYMPH NODES: Normal.    VASCULATURE: Unremarkable.    PELVIC ORGANS: Normal.    MUSCULOSKELETAL: No acute osseous process.      Impression    IMPRESSION:   1.  No acute process.   UA with Microscopic reflex to Culture    Specimen: Urine, Catheter   Result Value Ref Range    Color Urine Dark Yellow (A) Colorless, Straw, Light Yellow, Yellow    Appearance Urine Clear Clear    Glucose Urine Negative Negative mg/dL    Bilirubin Urine Negative Negative    Ketones Urine Negative Negative mg/dL    Specific Gravity Urine 1.010 1.003 - 1.035    Blood Urine Small (A) Negative    pH Urine 7.0 5.0 - 7.0    Protein Albumin Urine 70 (A) Negative mg/dL    Urobilinogen Urine Normal Normal, 2.0 mg/dL    Nitrite  Urine Positive (A) Negative    Leukocyte Esterase Urine Large (A) Negative    Mucus Urine Present (A) None Seen /LPF    RBC Urine 22 (H) <=2 /HPF    WBC Urine 141 (H) <=5 /HPF    Transitional Epithelials Urine <1 <=1 /HPF    Narrative    Urine Culture ordered based on laboratory criteria   XR Chest Port 1 View    Narrative    EXAM: CHEST SINGLE VIEW PORTABLE  LOCATION: [facility]  DATE: 9/5/2023    INDICATION: Intermittent hypoxia.  COMPARISON: 08/15/2014.    FINDINGS: The lungs are clear. Normal-sized cardiac silhouette. Mild elevation of the right hemidiaphragm.      Impression    IMPRESSION: No evidence of active cardiopulmonary disease.    Symptomatic Influenza A/B, RSV, & SARS-CoV2 PCR (COVID-19) Nasopharyngeal    Specimen: Nasopharyngeal; Swab   Result Value Ref Range    Influenza A PCR Negative Negative    Influenza B PCR Negative Negative    RSV PCR Negative Negative    SARS CoV2 PCR Negative Negative    Narrative    Testing was performed using the Xpert Xpress CoV2/Flu/RSV Assay on the [employer] GeneXpert Instrument. This test should be ordered for the detection of SARS-CoV-2, influenza, and RSV viruses in individuals who meet clinical and/or epidemiological criteria. Test performance is unknown in asymptomatic patients. This test is for in vitro diagnostic use under the FDA EUA for laboratories certified under CLIA to perform high or moderate complexity testing. This test has not been FDA cleared or approved. A negative result does not rule out the presence of PCR inhibitors in the specimen or target RNA in concentration below the limit of detection for the assay. If only one viral target is positive but coinfection with multiple targets is suspected, the sample should be re-tested with another FDA cleared, approved, or authorized test, if coinfection would change clinical management. This test was validated by the [facility]. These  laboratories are certified under the Clinical Laboratory Improvement Amendments of 1988 (CLIA-88) as qualified to perform high complexity laboratory testing.   US Lower Extremity Venous Duplex Bilateral    Impression    RESIDENT PRELIMINARY INTERPRETATION  IMPRESSION:.  No deep vein thrombosis in the right or left lower extremity.             Assessment and Plan:   Assessment: 67 year old female with stage IVB SCC of the vagina here with suprapubic pain found to have UTI, and incidentally noted to be mildly hypoxic while undergoing evaluation in the ED. Requiring 2L O2 by NC to maintain saturations above 90%. Patient asymptomatic at this time. Unclear etiology however given this is a new finding for her and high risk status for PE or more systemic infection, will admit to obs for further monitoring.    Problem List:  Patient Active Problem List   Diagnosis    Hyperlipidemia LDL goal <130    Recurrent cold sores    Hearing loss    Pap smear of cervix with ASCUS, cannot exclude HGSIL    Introital dyspareunia    Vaginal atrophy    Cervical high risk HPV (human papillomavirus) test positive    Atrophic vaginitis    Class 1 obesity due to excess calories without serious comorbidity with body mass index (BMI) of 34.0 to 34.9 in adult    Urethral bleeding    Vaginal cancer (H)    Benign neoplasm of colon    Morbid obesity (H)    SCC (squamous cell carcinoma)    Hypomagnesemia    Lower abdominal pain    Urinary tract infection associated with indwelling urethral catheter, initial encounter (H)        Plan:    # Hypoxia  - Asymptomatic with unknown etiology at this time. Differential is wide, of higher concern would be DVT/PE or systemic infection. CXR negative for acute findings.  - COVID/flu pending, bilateral LE dopplers preliminarily negative for DVT. Given overall high risk for PE, have low threshold to scan her with CTPE protocol, however hold off for now given bump in Cr - will give time and flush with IVF prior to  further IV contrast and consider later today vs tomorrow if hypoxia persistent.  - Continuous pulse ox on while inpatient    # UTI  - S/p ceftriaxone x1 and gonsalez catheter change in the ED  - Await urine culture results  - Further antibiotics pending clinical course if concerns for pyelo or sepsis    # Pancytopenia  - Expected treatment related side effect  - Leukopenia may be worsened by active infection  - Trend counts, repeat CBC in AM    # Nausea and vomiting  - 1L bolus IVF now + continue maintenance IVF with /hr  - Scheduled antiemetics - takes zofran PRN to good effect normally at home so will start with zofran, can add compazine as needed  - Regular diet as tolerated  - Repeat electrolytes in AM    # Borderline JACQUIE  - Labs in the ED notable for bump in Cr to 1.06, suspect multifactorial including due to poor PO intake preceding presentation   - May worsen temporarily given IV contrast administered for CTAP in ED  - IV fluids as above, repeat BMP in AM    # Stave IVB SCC of the vagina  - Currently on treatment with cisplatin radiation, last on 8/29/23  - Radiation today at 0915 as scheduled    Code status: full  Dispo:  obs    Discussed with Dr. Luu, Gynecologic Oncology Fellow    Amarilys Scruggs MD  Gynecologic Oncology PGY-4  Gyn Onc Pager [phone]  9/5/2023 2:35 AM       Provider Disclosure:   I agree with above History, Review of Systems, Physical exam and Plan. I have reviewed the content of the documentation and have edited it as needed. I have personally performed the services documented here and the documentation accurately represents those services and the decisions I have made.     Electronically signed by:   Madhuri Johnson MD   Gynecologic Oncology   [facility]

## 2023-09-05 NOTE — PROGRESS NOTES
Oxygen was weaned to RA. On RA, patient sats were mostly 92-94 %. Occasionally with episodes of pain she dropped to 88 % but with a few deep breaths recovered back to 92 %.

## 2023-09-05 NOTE — ED NOTES
The patient was accepted at shift change signout pending evaluation by gynecology/oncology.  They did ask for bilateral lower extremity ultrasounds, to start with, she is already had a dye bolus today.  They do think she is at risk for PE.  COVID was also ordered, given no other clear cause for her low sats.  They will plan to admit for observation, regardless.  The above tests are still pending.    Dictation Disclaimer: Some of this Note has been completed with voice-recognition dictation software. Although errors are generally corrected real-time, there is the potential for a rare error to be present in the completed chart.       Darcy Silvestre MD  09/05/23 [id number]

## 2023-09-05 NOTE — PROGRESS NOTES
BP (!) 160/65 (BP Location: Right arm)   Pulse 95   Temp 98.1  F (36.7  C) (Oral)   Resp 15   Wt 95.9 kg (211 lb 6.7 oz)   LMP 2008   SpO2 94%   BMI 35.73 kg/m      Reason for admission:  stage IVB SCC Of the vagina who presents to the ED with worsening suprapubic abdominal pain.   Activity: ind   Pain: oxy 5 mg and IV dilaudid with no relief. Most relief found with ice packs and antispasmodics that were scheduled.   Neuro: a/o x 4.   Cardiac: tachycardiac at times with pain   Respiratory: 2 L NC, shallow breathing. Dips to high 80s on RA.   GI/: gonsalez in place, leaking d/t tumor and radiation, team is aware. BM + today. Up to the bathroom frequently with bladder spasms. Denies nausea. Gave scheduled zofran as ordered.   Diet:  reg diet - good appetite today which is an improvement   Lines: PIV infusing LR at 100  Wounds: perineum is red and raw in some areas from radiation. Cleansed and catheter cares done.   Labs/imaging: PE study performed and negative. Radiation today at 9:15 went well. M.1 today, replaced x 1. Recheck 1400.  Plan: pain management. IV abx.       Continue to monitor and follow POC

## 2023-09-06 ENCOUNTER — OFFICE VISIT (OUTPATIENT)
Dept: RADIATION ONCOLOGY | Facility: CLINIC | Age: 68
End: 2023-09-06
Attending: RADIOLOGY
Payer: COMMERCIAL

## 2023-09-06 VITALS
RESPIRATION RATE: 16 BRPM | DIASTOLIC BLOOD PRESSURE: 71 MMHG | HEART RATE: 108 BPM | BODY MASS INDEX: 35.73 KG/M2 | SYSTOLIC BLOOD PRESSURE: 137 MMHG | TEMPERATURE: 98.4 F | OXYGEN SATURATION: 92 % | WEIGHT: 211.42 LBS

## 2023-09-06 DIAGNOSIS — C52 VAGINAL CANCER (H): Primary | ICD-10-CM

## 2023-09-06 PROCEDURE — 77386 HC IMRT TREATMENT DELIVERY, COMPLEX: CPT | Performed by: RADIOLOGY

## 2023-09-06 PROCEDURE — 250N000013 HC RX MED GY IP 250 OP 250 PS 637

## 2023-09-06 PROCEDURE — G0378 HOSPITAL OBSERVATION PER HR: HCPCS

## 2023-09-06 PROCEDURE — 250N000011 HC RX IP 250 OP 636

## 2023-09-06 PROCEDURE — 99238 HOSP IP/OBS DSCHRG MGMT 30/<: CPT | Mod: GC | Performed by: OBSTETRICS & GYNECOLOGY

## 2023-09-06 PROCEDURE — 250N000013 HC RX MED GY IP 250 OP 250 PS 637: Performed by: NURSE PRACTITIONER

## 2023-09-06 PROCEDURE — 96376 TX/PRO/DX INJ SAME DRUG ADON: CPT

## 2023-09-06 PROCEDURE — 96375 TX/PRO/DX INJ NEW DRUG ADDON: CPT

## 2023-09-06 PROCEDURE — 250N000013 HC RX MED GY IP 250 OP 250 PS 637: Performed by: STUDENT IN AN ORGANIZED HEALTH CARE EDUCATION/TRAINING PROGRAM

## 2023-09-06 PROCEDURE — 96372 THER/PROPH/DIAG INJ SC/IM: CPT | Performed by: NURSE PRACTITIONER

## 2023-09-06 PROCEDURE — 250N000011 HC RX IP 250 OP 636: Mod: JZ | Performed by: NURSE PRACTITIONER

## 2023-09-06 RX ORDER — PROCHLORPERAZINE MALEATE 10 MG
10 TABLET ORAL EVERY 6 HOURS PRN
Status: DISCONTINUED | OUTPATIENT
Start: 2023-09-06 | End: 2023-09-06

## 2023-09-06 RX ORDER — OXYCODONE HYDROCHLORIDE 5 MG/1
5 TABLET ORAL EVERY 6 HOURS PRN
Qty: 15 TABLET | Refills: 0 | Status: ON HOLD | OUTPATIENT
Start: 2023-09-06 | End: 2023-09-13

## 2023-09-06 RX ADMIN — OXYCODONE HYDROCHLORIDE 5 MG: 5 TABLET ORAL at 06:50

## 2023-09-06 RX ADMIN — ACETAMINOPHEN 975 MG: 325 TABLET, FILM COATED ORAL at 03:34

## 2023-09-06 RX ADMIN — ONDANSETRON HYDROCHLORIDE 4 MG: 4 TABLET, FILM COATED ORAL at 06:47

## 2023-09-06 RX ADMIN — CEFTRIAXONE SODIUM 1 G: 1 INJECTION, POWDER, FOR SOLUTION INTRAMUSCULAR; INTRAVENOUS at 00:50

## 2023-09-06 RX ADMIN — TOLTERODINE TARTRATE 2 MG: 2 TABLET, FILM COATED ORAL at 08:16

## 2023-09-06 RX ADMIN — PHENAZOPYRIDINE HYDROCHLORIDE 200 MG: 100 TABLET, FILM COATED ORAL at 08:15

## 2023-09-06 RX ADMIN — ACETAMINOPHEN 975 MG: 325 TABLET, FILM COATED ORAL at 11:42

## 2023-09-06 RX ADMIN — OXYCODONE HYDROCHLORIDE 5 MG: 5 TABLET ORAL at 10:58

## 2023-09-06 RX ADMIN — ENOXAPARIN SODIUM 40 MG: 40 INJECTION SUBCUTANEOUS at 11:42

## 2023-09-06 RX ADMIN — ONDANSETRON HYDROCHLORIDE 4 MG: 4 TABLET, FILM COATED ORAL at 00:49

## 2023-09-06 RX ADMIN — PROCHLORPERAZINE MALEATE 10 MG: 10 TABLET ORAL at 10:39

## 2023-09-06 ASSESSMENT — ACTIVITIES OF DAILY LIVING (ADL)
ADLS_ACUITY_SCORE: 31
ADLS_ACUITY_SCORE: 35
ADLS_ACUITY_SCORE: 31

## 2023-09-06 NOTE — PLAN OF CARE
Goal Outcome Evaluation:    -diagnostic tests and consults completed and resulted: Met.   -vital signs normal or at patient baseline: Met.  -tolerating oral intake to maintain hydration: Met.  -adequate pain control on oral analgesics: Met.  -infection is improving: Met.  -dyspnea improved and O2 sats greater than 88% on room air or prior home oxygen levels: Met.    Nurse to notify provider when observation goals have been met and patient is ready for discharge.

## 2023-09-06 NOTE — PLAN OF CARE
Status [id number]    A&Ox4. Independent in room. Thomas patent with adequate output. Oxy given x1 overnight for suprapubic pain with relief. Continues on scheduled zofrn and tylenol. Remains on IV abx. Pt feeling much more confident about discharging home today.    -diagnostic tests and consults completed and resulted: Met.   -vital signs normal or at patient baseline: Met.  -tolerating oral intake to maintain hydration: Met.  -adequate pain control on oral analgesics: Met.  -infection is improving: Met.  -dyspnea improved and O2 sats greater than 88% on room air or prior home oxygen levels: Met.

## 2023-09-06 NOTE — LETTER
2023         RE: Jessie Tamayo  [address] [id number]        Dear Colleague,    Thank you for referring your patient, Jessie Tamayo, to the [facility] RADIATION ONCOLOGY. Please see a copy of my visit note below.    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 Tamayo presents to clinic today for her weekly on-treatment visit.  Since her simulation, she has had an indwelling Thomas catheter placed for urinary obstruction.  We have asked her to clamp the Thomas catheter and drink fluids prior to each radiation treatment to fill her bladder.  She has been having a lot of difficulty with this due to pain and spasms.  She presented to the ED on 2023 with increased pain secondary to bladder spasms.  She was found to have a UTI.  Her indwelling Thomas catheter was changed.  She has been started on antibiotics and pain medication which has alleviated some of her bladder spasms.  She plans to be discharged later today.    Nursing ROS:   Nutrition Alteration  Diet Type: Patient's Preference  Skin  Skin Reaction: 2 - Moderate to brisk erythema, patchy moist desquamation, mostly confined to skin folds and creases, moderate edema  Skin Progress: Groin redness prescribed  med     Cardiovascular  Respiratory effort: 1 - Normal - without distress  Gastrointestinal  Nausea: 0 - None  Diarrhea: 2 - Three to five soft or liquid bowel movements per day  Genitourinary  Urinary Status: 1 - Increase in frequency or  nocturia up to 2x normal   Note: Bladder spasms improved     Pain Assessment  0-10 Pain Scale: 5  Pain Treatment: Oxycocodne     PEG Tube: No  Electronic Cardiac Implant: No    Objective:   LMP 03/08/2008   Gen: Appears fatigued, sitting in wheelchair  Skin: Moderate erythema in the inguinal folds with scalloped margins suggestive of yeast infection, perianal skin with moderate erythema and desquamation  Indwelling Thomas catheter  Extremities: No edema    Laboratory:  Lab Results   Component Value Date    WBC 2.2 (L) 09/05/2023    HGB 8.3 (L) 09/05/2023    HCT 25.7 (L) 09/05/2023    MCV 92 09/05/2023    PLT 72 (L) 09/05/2023     Lab Results   Component Value Date     (L) 09/05/2023     (L) 09/05/2023    POTASSIUM 4.0 09/05/2023    POTASSIUM 4.0 09/05/2023    CHLORIDE 97 (L) 09/05/2023    CHLORIDE 97 (L) 09/05/2023    CO2 22 09/05/2023    CO2 22 09/05/2023     (H) 09/05/2023     Magnesium   Date Value Ref Range Status   09/05/2023 2.2 1.7 - 2.3 mg/dL Final       Treatment-related toxicities (CTCAE v5.0):  Anorexia: Grade 1: Loss of appetite without alteration in eating habits  Fatigue: Grade 2: Fatigue not relieved by rest; limiting instrumental ADL  Nausea: Grade 0: No toxicity  Pain: Grade 3: Severe pain; limiting self-care ADL  Diarrhea: Grade 1: Increase of <4 stools per day over baseline; mild increase in ostomy output compared to baseline  Urinary tract pain: Grade 3: Severe pain; limiting self-care ADL  Dermatitis: Grade 2: Moderate to brisk erythema; patchy moist desquamation, mostly confined to skin folds and creases; moderate erythema    ED visits/Hospitalizations: 9/4/2023: Admitted for pain control and UTI, currently hospitalized    Missed Treatments: None    Mosaiq chart and setup information reviewed  IGRT images reviewed    Medication Review  Med list reviewed with patient?: Yes    Assessment:    Ms. Tamayo is a 67 year old female with HPV associated squamous cell carcinoma of the  vagina, T1b N1 M0.  She is currently hospitalized with UTI and bladder spasms that were initially not responsive to oxycodone.    Plan:   1.  Continue treatment as planned  2.  Continue pain medication and antibiotics per inpatient team.  Counseled on constipation as a side effect of narcotics.  3.  Discussed sitz baths, barrier cream and low fiber diet.  4.  Lorazepam prescribed for treatment related anxiety for pelvic MRI scan scheduled for 9/11/2023 in preparation for interstitial brachytherapy.    Marisa Pacheco  Department of Radiation Oncology  [facility]                 Again, thank you for allowing me to participate in the care of your patient.        Sincerely,        Marisa Pacheco MD

## 2023-09-06 NOTE — PLAN OF CARE
Pt is A&Ox4. VSS. Tolerating regular diet and denies nausea; scheduled zofran given. Independent in room. Reporting pain in bladder that did not resolve with pain medications; pt reports concerns with having to discharge with unresolved pain, MD notified; discharge order discontinued. Will cont. POC.

## 2023-09-06 NOTE — PLAN OF CARE
Goal Outcome Evaluation:    0700 - 1200:   /71 (BP Location: Right arm)   Pulse 108   Temp 98.4  F (36.9  C) (Oral)   Resp 16   Wt 95.9 kg (211 lb 6.7 oz)   LMP 03/08/2008   SpO2 92%   BMI 35.73 kg/m        Observation Goals:   Diagnostic tests and consults completed and resulted: Met.   -vital signs normal or at patient baseline: Met.  -tolerating oral intake to maintain hydration: Met.  -adequate pain control on oral analgesics: Met.  -infection is improving: Met.       A&O x 4, AVSS, gave oxycodone 5mg x 1, prn pyridium x 1 & scheduled tylenol with effect per pt.  Pt had 30 minute of radiation this morning, had emesis x 1 once pt came back from radiation, managed with po compazine x 1.  Bigger Gonsalez drainage bag changed per pt request.  Up sba, gonsalez bag with adequate output, had a loose stool overnight.  Continue with poc...       Discharge  D: Orders for discharge and outpatient medications written.  I: Home medications and return to clinic schedule reviewed with patient. Discharge instructions and parameters for calling Health Care Provider reviewed. Patient left at 1200 accompanied by her , picked up 3 discharge med's, PIV removed before discharge. Discharge instruction given & educated.   A: Patient/family verbalized understanding and was ready for discharge.   P: Patient instructed to  medications in Pharmacy. Follow up as scheduled 9/7 for radiation.

## 2023-09-06 NOTE — PROGRESS NOTES
GYN ONC PROGRESS NOTE  9/6/2023    HD#:  2  Disease:  stage IVB SCC Of the vagina     24 hour events:   - admission for UTI, bladder pain  - incidental hypoxia resolved, workup negative    Subjective: Feeling significantly better this morning. Apologizes for refusing to go home yesterday, was worried about pain control. Bladder spasms still occurring but much more tolerable now. Slept well intermittently overnight. Tolerating decent PO intake yesterday, had rice cripsy bar, popcicle,     Objective:   Temp:  [97.8  F (36.6  C)-98.1  F (36.7  C)] 98  F (36.7  C)  Pulse:  [] 81  Resp:  [14] 14  BP: (123-160)/(54-81) 123/54  SpO2:  [89 %-94 %] 93 %     Gen: Well-appearing, NAD  CV: Regular rate  Pulm: Normal WOB   Abd: Soft, mildly tender across lower abdomen, unchanged from yesterday  Ext: No LE edema b/l, warm, well-perfused    I&Os  24 Hrs // Since MN   mL // 0mL  UOP 1600mL // 1000mL  Stool x2 // 0    Labs:  None new    Assessment: 67 year old female with stage IVB SCC of the vagina here with suprapubic pain found to have UTI, and incidentally noted to be mildly hypoxic while undergoing evaluation in the ED thus admitted for observation. Hypoxia resolved, workup negative. Stayed overnight for pain control. Meeting all goals for discharge now.     Problem List:      Patient Active Problem List   Diagnosis    Hyperlipidemia LDL goal <130    Recurrent cold sores    Hearing loss    Pap smear of cervix with ASCUS, cannot exclude HGSIL    Introital dyspareunia    Vaginal atrophy    Cervical high risk HPV (human papillomavirus) test positive    Atrophic vaginitis    Class 1 obesity due to excess calories without serious comorbidity with body mass index (BMI) of 34.0 to 34.9 in adult    Urethral bleeding    Vaginal cancer (H)    Benign neoplasm of colon    Morbid obesity (H)    SCC (squamous cell carcinoma)    Hypomagnesemia    Lower abdominal pain    Urinary tract infection associated with indwelling urethral  catheter, initial encounter (H)         Plan:     # Hypoxia, resolved  - Asymptomatic with unknown etiology.  - COVID/flu negative, bilateral LE dopplers negative for DVT, CTPE negative.     # UTI  - S/p ceftriaxone x2, gonsalez catheter change in the ED  - Await urine culture results  - Discharge with bactrim x14d, follow up in clinic after     # Pancytopenia  - Expected treatment related side effect  - Leukopenia may be worsened by active infection  - Overall counts stable     # Nausea and vomiting, improving  - Improved with scheduled antiemetics - takes zofran PRN to good effect normally at home, encouraged scheduled dosing to prevent getting behind and totally losing ability to tolerate PO  - Regular diet as tolerated     # Borderline JACQUIE, resolved  - Labs in the ED notable for bump in Cr to 1.06, suspect multifactorial including due to poor PO intake preceding presentation, may worsen temporarily given IV contrast administered for CTAP in ED  - S/p IV fluids as above, repeat Cr improved     # Stave IVB SCC of the vagina  - Currently on treatment with cisplatin radiation, last on 8/29/23  - S/p scheduled radiation tx yesterday     Code status: full  Dispo:  obs    Amarilys Ty MD  ObGyn, PGY-4  09/06/2023 5:07 AM  Gyn Onc Pager: [phone]    Provider Disclosure:   I agree with above History, Review of Systems, Physical exam and Plan. I have reviewed the content of the documentation and have edited it as needed. I have personally performed the services documented here and the documentation accurately represents those services and the decisions I have made.     Electronically signed by:   Madhuri Johnson MD   Gynecologic Oncology   [facility]

## 2023-09-06 NOTE — PROGRESS NOTES
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 Tamayo presents to clinic today for her weekly on-treatment visit.  Since her simulation, she has had an indwelling Thomas catheter placed for urinary obstruction.  We have asked her to clamp the Thomas catheter and drink fluids prior to each radiation treatment to fill her bladder.  She has been having a lot of difficulty with this due to pain and spasms.  She presented to the ED on 2023 with increased pain secondary to bladder spasms.  She was found to have a UTI.  Her indwelling Thomas catheter was changed.  She has been started on antibiotics and pain medication which has alleviated some of her bladder spasms.  She plans to be discharged later today.    Nursing ROS:   Nutrition Alteration  Diet Type: Patient's Preference  Skin  Skin Reaction: 2 - Moderate to brisk erythema, patchy moist desquamation, mostly confined to skin folds and creases, moderate edema  Skin Progress: Groin redness prescribed  med     Cardiovascular  Respiratory effort: 1 - Normal - without distress  Gastrointestinal  Nausea: 0 - None  Diarrhea: 2 - Three to five soft or liquid bowel movements per day  Genitourinary  Urinary Status: 1 - Increase in frequency or nocturia up to 2x normal   Note: Bladder spasms improved     Pain Assessment  0-10 Pain Scale: 5  Pain Treatment: Oxycocodne     PEG Tube: No  Electronic Cardiac Implant: No    Objective:   LMP 2008   Gen: Appears fatigued, sitting in  wheelchair  Skin: Moderate erythema in the inguinal folds with scalloped margins suggestive of yeast infection, perianal skin with moderate erythema and desquamation  Indwelling Thomas catheter  Extremities: No edema    Laboratory:  Lab Results   Component Value Date    WBC 2.2 (L) 09/05/2023    HGB 8.3 (L) 09/05/2023    HCT 25.7 (L) 09/05/2023    MCV 92 09/05/2023    PLT 72 (L) 09/05/2023     Lab Results   Component Value Date     (L) 09/05/2023     (L) 09/05/2023    POTASSIUM 4.0 09/05/2023    POTASSIUM 4.0 09/05/2023    CHLORIDE 97 (L) 09/05/2023    CHLORIDE 97 (L) 09/05/2023    CO2 22 09/05/2023    CO2 22 09/05/2023     (H) 09/05/2023     Magnesium   Date Value Ref Range Status   09/05/2023 2.2 1.7 - 2.3 mg/dL Final       Treatment-related toxicities (CTCAE v5.0):  Anorexia: Grade 1: Loss of appetite without alteration in eating habits  Fatigue: Grade 2: Fatigue not relieved by rest; limiting instrumental ADL  Nausea: Grade 0: No toxicity  Pain: Grade 3: Severe pain; limiting self-care ADL  Diarrhea: Grade 1: Increase of <4 stools per day over baseline; mild increase in ostomy output compared to baseline  Urinary tract pain: Grade 3: Severe pain; limiting self-care ADL  Dermatitis: Grade 2: Moderate to brisk erythema; patchy moist desquamation, mostly confined to skin folds and creases; moderate erythema    ED visits/Hospitalizations: 9/4/2023: Admitted for pain control and UTI, currently hospitalized    Missed Treatments: None    Mosaiq chart and setup information reviewed  IGRT images reviewed    Medication Review  Med list reviewed with patient?: Yes    Assessment:    Ms. 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  She is currently hospitalized with UTI and bladder spasms that were initially not responsive to oxycodone.    Plan:   1.  Continue treatment as planned  2.  Continue pain medication and antibiotics per inpatient team.   Counseled on constipation as a side effect of narcotics.  3.  Discussed sitz baths, barrier cream and low fiber diet.  4.  Lorazepam prescribed for treatment related anxiety for pelvic MRI scan scheduled for 9/11/2023 in preparation for interstitial brachytherapy.    Marisa Pacheco  Department of Radiation Oncology  [facility]

## 2023-09-07 ENCOUNTER — APPOINTMENT (OUTPATIENT)
Dept: RADIATION ONCOLOGY | Facility: CLINIC | Age: 68
End: 2023-09-07
Attending: RADIOLOGY
Payer: COMMERCIAL

## 2023-09-07 ENCOUNTER — NURSE TRIAGE (OUTPATIENT)
Dept: NURSING | Facility: CLINIC | Age: 68
End: 2023-09-07
Payer: COMMERCIAL

## 2023-09-07 LAB
BACTERIA UR CULT: ABNORMAL
BACTERIA UR CULT: ABNORMAL

## 2023-09-07 PROCEDURE — 77014 PR CT GUIDE FOR PLACEMENT RADIATION THERAPY FIELDS: CPT | Mod: 26 | Performed by: STUDENT IN AN ORGANIZED HEALTH CARE EDUCATION/TRAINING PROGRAM

## 2023-09-07 PROCEDURE — 77386 HC IMRT TREATMENT DELIVERY, COMPLEX: CPT | Performed by: RADIOLOGY

## 2023-09-07 NOTE — TELEPHONE ENCOUNTER
Patient's spouse Baljeet calling with the patient present answering questions. Reports the patient has a fever of 101 with bladder spasms. Patient reports having severe weakness where she is unsure if she would be able to stand. Patent is neutropenic. Advised per protocol to call 911 for EMS transport to the hospital. Patient is hesitant with patient advised to have EMS evaluate and determine on ambulance ride to the hospital vs family transport to the ER. Baljeet to call 911 for EMS now.     Emma Russell RN 09/07/23 3:23 AM   [facility] Triage Nurse Advisor    Reason for Disposition   Shock suspected (e.g., cold/pale/clammy skin, too weak to stand, low BP, rapid pulse)    Protocols used: Cancer - Fever-A-AH

## 2023-09-08 ENCOUNTER — APPOINTMENT (OUTPATIENT)
Dept: GENERAL RADIOLOGY | Facility: CLINIC | Age: 68
DRG: 371 | End: 2023-09-08
Attending: PHYSICIAN ASSISTANT
Payer: COMMERCIAL

## 2023-09-08 ENCOUNTER — NURSE TRIAGE (OUTPATIENT)
Dept: ONCOLOGY | Facility: CLINIC | Age: 68
End: 2023-09-08
Payer: COMMERCIAL

## 2023-09-08 ENCOUNTER — APPOINTMENT (OUTPATIENT)
Dept: RADIATION ONCOLOGY | Facility: CLINIC | Age: 68
End: 2023-09-08
Attending: RADIOLOGY
Payer: COMMERCIAL

## 2023-09-08 ENCOUNTER — HOSPITAL ENCOUNTER (INPATIENT)
Facility: CLINIC | Age: 68
LOS: 4 days | Discharge: HOME | DRG: 371 | End: 2023-09-13
Attending: EMERGENCY MEDICINE | Admitting: OBSTETRICS & GYNECOLOGY
Payer: COMMERCIAL

## 2023-09-08 ENCOUNTER — NURSE TRIAGE (OUTPATIENT)
Dept: NURSING | Facility: CLINIC | Age: 68
End: 2023-09-08
Payer: COMMERCIAL

## 2023-09-08 DIAGNOSIS — C51.9 VULVAR CANCER (H): ICD-10-CM

## 2023-09-08 DIAGNOSIS — K12.30 MUCOSITIS: ICD-10-CM

## 2023-09-08 DIAGNOSIS — I10 ESSENTIAL HYPERTENSION: ICD-10-CM

## 2023-09-08 DIAGNOSIS — D61.818 PANCYTOPENIA (H): ICD-10-CM

## 2023-09-08 DIAGNOSIS — C52 VAGINAL CANCER (H): ICD-10-CM

## 2023-09-08 DIAGNOSIS — A41.9 SEPSIS, DUE TO UNSPECIFIED ORGANISM, UNSPECIFIED WHETHER ACUTE ORGAN DYSFUNCTION PRESENT (H): ICD-10-CM

## 2023-09-08 DIAGNOSIS — Z20.822 LAB TEST NEGATIVE FOR COVID-19 VIRUS: ICD-10-CM

## 2023-09-08 DIAGNOSIS — A04.72 C. DIFFICILE DIARRHEA: Primary | ICD-10-CM

## 2023-09-08 LAB
ABO/RH(D): NORMAL
ALBUMIN UR-MCNC: 200 MG/DL
ANION GAP SERPL CALCULATED.3IONS-SCNC: 9 MMOL/L (ref 7–15)
ANTIBODY SCREEN: NEGATIVE
APPEARANCE UR: CLEAR
BACTERIA #/AREA URNS HPF: ABNORMAL /HPF
BASOPHILS # BLD AUTO: 0 10E3/UL (ref 0–0.2)
BASOPHILS # BLD MANUAL: 0 10E3/UL (ref 0–0.2)
BASOPHILS NFR BLD AUTO: 0 %
BASOPHILS NFR BLD MANUAL: 0 %
BILIRUB UR QL STRIP: ABNORMAL
BLD PROD TYP BPU: NORMAL
BLOOD COMPONENT TYPE: NORMAL
BUN SERPL-MCNC: 17.2 MG/DL (ref 8–23)
C DIFF GDH STL QL IA: POSITIVE
C DIFF TOX A+B STL QL IA: POSITIVE
C DIFF TOX B STL QL: POSITIVE
CALCIUM SERPL-MCNC: 7.7 MG/DL (ref 8.8–10.2)
CHLORIDE SERPL-SCNC: 98 MMOL/L (ref 98–107)
CODING SYSTEM: NORMAL
COLOR UR AUTO: ABNORMAL
CREAT SERPL-MCNC: 1.06 MG/DL (ref 0.51–0.95)
CROSSMATCH: NORMAL
DEPRECATED HCO3 PLAS-SCNC: 23 MMOL/L (ref 22–29)
EGFRCR SERPLBLD CKD-EPI 2021: 57 ML/MIN/1.73M2
EOSINOPHIL # BLD AUTO: 0 10E3/UL (ref 0–0.7)
EOSINOPHIL # BLD MANUAL: 0 10E3/UL (ref 0–0.7)
EOSINOPHIL NFR BLD AUTO: 0 %
EOSINOPHIL NFR BLD MANUAL: 0 %
ERYTHROCYTE [DISTWIDTH] IN BLOOD BY AUTOMATED COUNT: 15.5 % (ref 10–15)
FLUAV RNA SPEC QL NAA+PROBE: NEGATIVE
FLUBV RNA RESP QL NAA+PROBE: NEGATIVE
GLUCOSE SERPL-MCNC: 139 MG/DL (ref 70–99)
GLUCOSE UR STRIP-MCNC: 50 MG/DL
HCT VFR BLD AUTO: 19.7 % (ref 35–47)
HGB BLD-MCNC: 6.3 G/DL (ref 11.7–15.7)
HGB UR QL STRIP: ABNORMAL
HOLD SPECIMEN: NORMAL
IMM GRANULOCYTES # BLD: 0 10E3/UL
IMM GRANULOCYTES NFR BLD: 0 %
ISSUE DATE AND TIME: NORMAL
KETONES UR STRIP-MCNC: NEGATIVE MG/DL
LACTATE SERPL-SCNC: 0.7 MMOL/L (ref 0.7–2)
LEUKOCYTE ESTERASE UR QL STRIP: ABNORMAL
LYMPHOCYTES # BLD AUTO: 0.1 10E3/UL (ref 0.8–5.3)
LYMPHOCYTES # BLD MANUAL: 0.1 10E3/UL (ref 0.8–5.3)
LYMPHOCYTES NFR BLD AUTO: 15 %
LYMPHOCYTES NFR BLD MANUAL: 7 %
MCH RBC QN AUTO: 30.1 PG (ref 26.5–33)
MCHC RBC AUTO-ENTMCNC: 32 G/DL (ref 31.5–36.5)
MCV RBC AUTO: 94 FL (ref 78–100)
MONOCYTES # BLD AUTO: 0.1 10E3/UL (ref 0–1.3)
MONOCYTES # BLD MANUAL: 0 10E3/UL (ref 0–1.3)
MONOCYTES NFR BLD AUTO: 13 %
MONOCYTES NFR BLD MANUAL: 3 %
MUCOUS THREADS #/AREA URNS LPF: PRESENT /LPF
MYELOCYTES # BLD MANUAL: 0 10E3/UL
MYELOCYTES NFR BLD MANUAL: 1 %
NEUTROPHILS # BLD AUTO: 0.6 10E3/UL (ref 1.6–8.3)
NEUTROPHILS # BLD MANUAL: 1 10E3/UL (ref 1.6–8.3)
NEUTROPHILS NFR BLD AUTO: 72 %
NEUTROPHILS NFR BLD MANUAL: 89 %
NITRATE UR QL: POSITIVE
NRBC # BLD AUTO: 0 10E3/UL
NRBC BLD AUTO-RTO: 0 /100
PH UR STRIP: 6 [PH] (ref 5–7)
PLAT MORPH BLD: ABNORMAL
PLATELET # BLD AUTO: 64 10E3/UL (ref 150–450)
POLYCHROMASIA BLD QL SMEAR: SLIGHT
POTASSIUM SERPL-SCNC: 3.6 MMOL/L (ref 3.4–5.3)
RBC # BLD AUTO: 2.09 10E6/UL (ref 3.8–5.2)
RBC MORPH BLD: ABNORMAL
RBC URINE: 140 /HPF
RETICS # AUTO: 0.08 10E6/UL (ref 0.03–0.1)
RETICS/RBC NFR AUTO: 3.7 % (ref 0.5–2)
RSV RNA SPEC NAA+PROBE: NEGATIVE
SARS-COV-2 RNA RESP QL NAA+PROBE: NEGATIVE
SODIUM SERPL-SCNC: 130 MMOL/L (ref 136–145)
SP GR UR STRIP: 1.02 (ref 1–1.03)
SPECIMEN EXPIRATION DATE: NORMAL
TOXIC GRANULES BLD QL SMEAR: PRESENT
TRANSITIONAL EPI: <1 /HPF
UNIT ABO/RH: NORMAL
UNIT NUMBER: NORMAL
UNIT STATUS: NORMAL
UNIT TYPE ISBT: 5100
UROBILINOGEN UR STRIP-MCNC: 3 MG/DL
WBC # BLD AUTO: 0.8 10E3/UL (ref 4–11)
WBC # BLD AUTO: 1.1 10E3/UL (ref 4–11)
WBC URINE: 44 /HPF

## 2023-09-08 PROCEDURE — 87507 IADNA-DNA/RNA PROBE TQ 12-25: CPT | Performed by: PHYSICIAN ASSISTANT

## 2023-09-08 PROCEDURE — 82040 ASSAY OF SERUM ALBUMIN: CPT

## 2023-09-08 PROCEDURE — 258N000003 HC RX IP 258 OP 636: Performed by: PHYSICIAN ASSISTANT

## 2023-09-08 PROCEDURE — 36415 COLL VENOUS BLD VENIPUNCTURE: CPT

## 2023-09-08 PROCEDURE — 71046 X-RAY EXAM CHEST 2 VIEWS: CPT

## 2023-09-08 PROCEDURE — 86850 RBC ANTIBODY SCREEN: CPT | Performed by: PHYSICIAN ASSISTANT

## 2023-09-08 PROCEDURE — 77386 HC IMRT TREATMENT DELIVERY, COMPLEX: CPT | Performed by: RADIOLOGY

## 2023-09-08 PROCEDURE — 86922 COMPATIBILITY TEST ANTIGLOB: CPT

## 2023-09-08 PROCEDURE — 250N000013 HC RX MED GY IP 250 OP 250 PS 637: Performed by: PHYSICIAN ASSISTANT

## 2023-09-08 PROCEDURE — 87040 BLOOD CULTURE FOR BACTERIA: CPT | Performed by: PHYSICIAN ASSISTANT

## 2023-09-08 PROCEDURE — P9016 RBC LEUKOCYTES REDUCED: HCPCS | Performed by: PHYSICIAN ASSISTANT

## 2023-09-08 PROCEDURE — 96360 HYDRATION IV INFUSION INIT: CPT | Performed by: EMERGENCY MEDICINE

## 2023-09-08 PROCEDURE — 87633 RESP VIRUS 12-25 TARGETS: CPT | Performed by: EMERGENCY MEDICINE

## 2023-09-08 PROCEDURE — 87324 CLOSTRIDIUM AG IA: CPT | Performed by: PHYSICIAN ASSISTANT

## 2023-09-08 PROCEDURE — 86922 COMPATIBILITY TEST ANTIGLOB: CPT | Performed by: PHYSICIAN ASSISTANT

## 2023-09-08 PROCEDURE — 86901 BLOOD TYPING SEROLOGIC RH(D): CPT | Performed by: PHYSICIAN ASSISTANT

## 2023-09-08 PROCEDURE — 250N000013 HC RX MED GY IP 250 OP 250 PS 637: Performed by: EMERGENCY MEDICINE

## 2023-09-08 PROCEDURE — 96361 HYDRATE IV INFUSION ADD-ON: CPT | Performed by: EMERGENCY MEDICINE

## 2023-09-08 PROCEDURE — 99222 1ST HOSP IP/OBS MODERATE 55: CPT | Mod: GC | Performed by: OBSTETRICS & GYNECOLOGY

## 2023-09-08 PROCEDURE — 85007 BL SMEAR W/DIFF WBC COUNT: CPT | Performed by: PHYSICIAN ASSISTANT

## 2023-09-08 PROCEDURE — 87493 C DIFF AMPLIFIED PROBE: CPT | Performed by: PHYSICIAN ASSISTANT

## 2023-09-08 PROCEDURE — 85048 AUTOMATED LEUKOCYTE COUNT: CPT

## 2023-09-08 PROCEDURE — 81001 URINALYSIS AUTO W/SCOPE: CPT | Performed by: PHYSICIAN ASSISTANT

## 2023-09-08 PROCEDURE — 87637 SARSCOV2&INF A&B&RSV AMP PRB: CPT | Performed by: EMERGENCY MEDICINE

## 2023-09-08 PROCEDURE — 99285 EMERGENCY DEPT VISIT HI MDM: CPT | Mod: 25 | Performed by: EMERGENCY MEDICINE

## 2023-09-08 PROCEDURE — 85045 AUTOMATED RETICULOCYTE COUNT: CPT | Performed by: PHYSICIAN ASSISTANT

## 2023-09-08 PROCEDURE — 85027 COMPLETE CBC AUTOMATED: CPT | Performed by: PHYSICIAN ASSISTANT

## 2023-09-08 PROCEDURE — 71046 X-RAY EXAM CHEST 2 VIEWS: CPT | Mod: 26 | Performed by: RADIOLOGY

## 2023-09-08 PROCEDURE — 87086 URINE CULTURE/COLONY COUNT: CPT | Performed by: PHYSICIAN ASSISTANT

## 2023-09-08 PROCEDURE — 83605 ASSAY OF LACTIC ACID: CPT | Performed by: PHYSICIAN ASSISTANT

## 2023-09-08 PROCEDURE — 36415 COLL VENOUS BLD VENIPUNCTURE: CPT | Performed by: PHYSICIAN ASSISTANT

## 2023-09-08 PROCEDURE — 80048 BASIC METABOLIC PNL TOTAL CA: CPT | Performed by: PHYSICIAN ASSISTANT

## 2023-09-08 RX ORDER — OXYCODONE HYDROCHLORIDE 5 MG/1
5 TABLET ORAL ONCE
Status: COMPLETED | OUTPATIENT
Start: 2023-09-08 | End: 2023-09-08

## 2023-09-08 RX ORDER — ACETAMINOPHEN 500 MG
1000 TABLET ORAL ONCE
Status: COMPLETED | OUTPATIENT
Start: 2023-09-08 | End: 2023-09-08

## 2023-09-08 RX ORDER — VANCOMYCIN HYDROCHLORIDE 125 MG/1
125 CAPSULE ORAL ONCE
Status: COMPLETED | OUTPATIENT
Start: 2023-09-08 | End: 2023-09-08

## 2023-09-08 RX ADMIN — VANCOMYCIN HYDROCHLORIDE 125 MG: 125 CAPSULE ORAL at 22:53

## 2023-09-08 RX ADMIN — OXYCODONE HYDROCHLORIDE 5 MG: 5 TABLET ORAL at 20:37

## 2023-09-08 RX ADMIN — SODIUM CHLORIDE 1000 ML: 9 INJECTION, SOLUTION INTRAVENOUS at 20:37

## 2023-09-08 RX ADMIN — ACETAMINOPHEN 1000 MG: 500 TABLET ORAL at 20:37

## 2023-09-08 ASSESSMENT — ACTIVITIES OF DAILY LIVING (ADL)
ADLS_ACUITY_SCORE: 35
ADLS_ACUITY_SCORE: 35

## 2023-09-08 NOTE — TELEPHONE ENCOUNTER
"Oncology Nurse Triage - Reporting Symptoms  Situation:   Call to Baljeet, to follow up on call over night regarding fever and bladder spasms.    Background:   Treating Provider:   Dr. Stahl    Date of last office visit: 9/1/23 w/ Betina Kramer    Recent treatments: Yes: 9/1/23 IV fluids + magnesium replacement.       Assessment:  Baljeet states they are currently at pt's radiation appointment. Pt in background. He said night before last was when pt was in severe pain, they had given medications and EMS came to \"interview\", ended up leaving as symptoms improved. Temperature this AM was 99.3.   He reports having a better handle on medications. They are setting an alarm during the night to wake to give pain medication. Pain this morning was less and pt is feeling more comfortable.   -Diarrhea- took 1 tab Imodium this AM.     He states they are out to Pyridium, which could have been causing bladder spasms recently, but looking at the pharmacy now and will plan to purchase OTC.     Recommendations:  Writer encouraged pt to call if temperature goes above 100.4. Baljeet voiced understanding. Writer advised pt can repeat 1 tab after each loose stool with max dose of 8 tabs/day, encouraged hydration. Baljeet voiced understanding.   Pt/Baljeet deny any other questions/concerns and voice understanding to call triage if questions/symptoms arise.     Encounter routed to Betina Kramer and JOHN Chavarria  "

## 2023-09-08 NOTE — TELEPHONE ENCOUNTER
23-Feb-2018 16:14 Nurse Triage SBAR    Situation: Fever    Background: Significant Other, benny Delong. Consent: on file in chart. Pt has radiation today. Last Chemo was on 8/28/2023    Assessment: Abdominal pain off and on for 3-4 weeks - she was seen in the hospital for this. Pain is the same. Fever and chills started today. Oral temp 100.9 - but she just drank water. Temporal temp: 102.1 - 102.7.  She just has tylenol. No sore throat.     Protocol Recommended Disposition: Emergency Department    Recommendation: According to the protocol, Patient should go to the ED now. Advised Patient that the patient needs to go to the ED now. Care advice given. Patient verbalizes understanding and agrees with plan of care. Reviewed concerning symptoms and when to call 911.    Mary Alan RN Nursing Advisor 9/8/2023 6:08 PM     Reason for Disposition   [1] Neutropenia known or suspected (e.g., recent cancer chemotherapy) AND [2] fever > 100.4 F (38.0 C)    Additional Information   Negative: Shock suspected (e.g., cold/pale/clammy skin, too weak to stand, low BP, rapid pulse)   Negative: Bluish (or gray) lips or face now   Negative: Difficult to awaken or acting confused (e.g., disoriented, slurred speech)   Negative: Fever > 103 F (39.4 C)   Negative: New-onset rash with many purple (or blood-colored) spots or dots   Negative: Sounds like a life-threatening emergency to the triager   Negative: Other symptom is present, see that guideline (e.g., symptoms of cough, runny nose, sore throat, earache, abdominal pain, diarrhea, vomiting)    Protocols used: Cancer - Fever-A-

## 2023-09-09 PROBLEM — D61.818 PANCYTOPENIA (H): Status: ACTIVE | Noted: 2023-09-09

## 2023-09-09 PROBLEM — C51.9 VULVAR CANCER (H): Status: ACTIVE | Noted: 2023-09-09

## 2023-09-09 PROBLEM — A41.9 SEPSIS, DUE TO UNSPECIFIED ORGANISM, UNSPECIFIED WHETHER ACUTE ORGAN DYSFUNCTION PRESENT (H): Status: ACTIVE | Noted: 2023-09-09

## 2023-09-09 LAB
ADV 40+41 DNA STL QL NAA+NON-PROBE: NEGATIVE
ALBUMIN SERPL BCG-MCNC: 2.9 G/DL (ref 3.5–5.2)
ALBUMIN SERPL BCG-MCNC: 3 G/DL (ref 3.5–5.2)
ALP SERPL-CCNC: 50 U/L (ref 35–104)
ALT SERPL W P-5'-P-CCNC: 28 U/L (ref 0–50)
ANION GAP SERPL CALCULATED.3IONS-SCNC: 10 MMOL/L (ref 7–15)
AST SERPL W P-5'-P-CCNC: 27 U/L (ref 0–45)
ASTRO TYP 1-8 RNA STL QL NAA+NON-PROBE: NEGATIVE
BASOPHILS # BLD MANUAL: 0 10E3/UL (ref 0–0.2)
BASOPHILS NFR BLD MANUAL: 0 %
BILIRUB SERPL-MCNC: 0.7 MG/DL
BUN SERPL-MCNC: 12.9 MG/DL (ref 8–23)
C CAYETANENSIS DNA STL QL NAA+NON-PROBE: NEGATIVE
C PNEUM DNA SPEC QL NAA+PROBE: NOT DETECTED
CALCIUM SERPL-MCNC: 7.9 MG/DL (ref 8.8–10.2)
CAMPYLOBACTER DNA SPEC NAA+PROBE: NEGATIVE
CHLORIDE SERPL-SCNC: 99 MMOL/L (ref 98–107)
CREAT SERPL-MCNC: 0.97 MG/DL (ref 0.51–0.95)
CRYPTOSP DNA STL QL NAA+NON-PROBE: NEGATIVE
DEPRECATED HCO3 PLAS-SCNC: 22 MMOL/L (ref 22–29)
E COLI O157 DNA STL QL NAA+NON-PROBE: NORMAL
E HISTOLYT DNA STL QL NAA+NON-PROBE: NEGATIVE
EAEC ASTA GENE ISLT QL NAA+PROBE: NEGATIVE
EC STX1+STX2 GENES STL QL NAA+NON-PROBE: NEGATIVE
EGFRCR SERPLBLD CKD-EPI 2021: 64 ML/MIN/1.73M2
EOSINOPHIL # BLD MANUAL: 0 10E3/UL (ref 0–0.7)
EOSINOPHIL NFR BLD MANUAL: 0 %
EPEC EAE GENE STL QL NAA+NON-PROBE: NEGATIVE
ERYTHROCYTE [DISTWIDTH] IN BLOOD BY AUTOMATED COUNT: 15.9 % (ref 10–15)
ETEC LTA+ST1A+ST1B TOX ST NAA+NON-PROBE: NEGATIVE
FLUAV H1 2009 PAND RNA SPEC QL NAA+PROBE: NOT DETECTED
FLUAV H1 RNA SPEC QL NAA+PROBE: NOT DETECTED
FLUAV H3 RNA SPEC QL NAA+PROBE: NOT DETECTED
FLUAV RNA SPEC QL NAA+PROBE: NOT DETECTED
FLUBV RNA SPEC QL NAA+PROBE: NOT DETECTED
G LAMBLIA DNA STL QL NAA+NON-PROBE: NEGATIVE
GLUCOSE SERPL-MCNC: 106 MG/DL (ref 70–99)
HADV DNA SPEC QL NAA+PROBE: NOT DETECTED
HCOV PNL SPEC NAA+PROBE: NOT DETECTED
HCT VFR BLD AUTO: 24.7 % (ref 35–47)
HGB BLD-MCNC: 7.9 G/DL (ref 11.7–15.7)
HMPV RNA SPEC QL NAA+PROBE: NOT DETECTED
HPIV1 RNA SPEC QL NAA+PROBE: NOT DETECTED
HPIV2 RNA SPEC QL NAA+PROBE: NOT DETECTED
HPIV3 RNA SPEC QL NAA+PROBE: NOT DETECTED
HPIV4 RNA SPEC QL NAA+PROBE: NOT DETECTED
LACTATE SERPL-SCNC: 0.5 MMOL/L (ref 0.7–2)
LYMPHOCYTES # BLD MANUAL: 0.2 10E3/UL (ref 0.8–5.3)
LYMPHOCYTES NFR BLD MANUAL: 14 %
M PNEUMO DNA SPEC QL NAA+PROBE: NOT DETECTED
MCH RBC QN AUTO: 30.5 PG (ref 26.5–33)
MCHC RBC AUTO-ENTMCNC: 32 G/DL (ref 31.5–36.5)
MCV RBC AUTO: 95 FL (ref 78–100)
MONOCYTES # BLD MANUAL: 0.2 10E3/UL (ref 0–1.3)
MONOCYTES NFR BLD MANUAL: 18 %
MYELOCYTES # BLD MANUAL: 0 10E3/UL
MYELOCYTES NFR BLD MANUAL: 1 %
NEUTROPHILS # BLD MANUAL: 0.9 10E3/UL (ref 1.6–8.3)
NEUTROPHILS NFR BLD MANUAL: 67 %
NOROVIRUS GI+II RNA STL QL NAA+NON-PROBE: NEGATIVE
P SHIGELLOIDES DNA STL QL NAA+NON-PROBE: NEGATIVE
PLAT MORPH BLD: ABNORMAL
PLATELET # BLD AUTO: 67 10E3/UL (ref 150–450)
POTASSIUM SERPL-SCNC: 4 MMOL/L (ref 3.4–5.3)
PROT SERPL-MCNC: 5.3 G/DL (ref 6.4–8.3)
RBC # BLD AUTO: 2.59 10E6/UL (ref 3.8–5.2)
RBC MORPH BLD: ABNORMAL
RSV RNA SPEC QL NAA+PROBE: NOT DETECTED
RSV RNA SPEC QL NAA+PROBE: NOT DETECTED
RV+EV RNA SPEC QL NAA+PROBE: NOT DETECTED
RVA RNA STL QL NAA+NON-PROBE: NEGATIVE
SALMONELLA SP RPOD STL QL NAA+PROBE: NEGATIVE
SAPO I+II+IV+V RNA STL QL NAA+NON-PROBE: NEGATIVE
SHIGELLA SP+EIEC IPAH ST NAA+NON-PROBE: NEGATIVE
SODIUM SERPL-SCNC: 131 MMOL/L (ref 136–145)
TOXIC GRANULES BLD QL SMEAR: PRESENT
V CHOLERAE DNA SPEC QL NAA+PROBE: NEGATIVE
VIBRIO DNA SPEC NAA+PROBE: NEGATIVE
WBC # BLD AUTO: 1.3 10E3/UL (ref 4–11)
Y ENTEROCOL DNA STL QL NAA+PROBE: NEGATIVE

## 2023-09-09 PROCEDURE — 85007 BL SMEAR W/DIFF WBC COUNT: CPT

## 2023-09-09 PROCEDURE — 36415 COLL VENOUS BLD VENIPUNCTURE: CPT

## 2023-09-09 PROCEDURE — 250N000013 HC RX MED GY IP 250 OP 250 PS 637

## 2023-09-09 PROCEDURE — 120N000002 HC R&B MED SURG/OB UMMC

## 2023-09-09 PROCEDURE — 99232 SBSQ HOSP IP/OBS MODERATE 35: CPT | Mod: GC | Performed by: OBSTETRICS & GYNECOLOGY

## 2023-09-09 PROCEDURE — 258N000003 HC RX IP 258 OP 636

## 2023-09-09 PROCEDURE — 83605 ASSAY OF LACTIC ACID: CPT | Performed by: INTERNAL MEDICINE

## 2023-09-09 PROCEDURE — 85027 COMPLETE CBC AUTOMATED: CPT

## 2023-09-09 PROCEDURE — 250N000011 HC RX IP 250 OP 636

## 2023-09-09 PROCEDURE — 36415 COLL VENOUS BLD VENIPUNCTURE: CPT | Performed by: INTERNAL MEDICINE

## 2023-09-09 PROCEDURE — 80053 COMPREHEN METABOLIC PANEL: CPT

## 2023-09-09 RX ORDER — NALOXONE HYDROCHLORIDE 0.4 MG/ML
0.2 INJECTION, SOLUTION INTRAMUSCULAR; INTRAVENOUS; SUBCUTANEOUS
Status: DISCONTINUED | OUTPATIENT
Start: 2023-09-09 | End: 2023-09-13 | Stop reason: HOSPADM

## 2023-09-09 RX ORDER — CEFEPIME HYDROCHLORIDE 2 G/1
2 INJECTION, POWDER, FOR SOLUTION INTRAVENOUS EVERY 12 HOURS
Status: COMPLETED | OUTPATIENT
Start: 2023-09-10 | End: 2023-09-10

## 2023-09-09 RX ORDER — PROCHLORPERAZINE 25 MG
12.5 SUPPOSITORY, RECTAL RECTAL EVERY 12 HOURS PRN
Status: DISCONTINUED | OUTPATIENT
Start: 2023-09-09 | End: 2023-09-13 | Stop reason: HOSPADM

## 2023-09-09 RX ORDER — VANCOMYCIN HYDROCHLORIDE 125 MG/1
125 CAPSULE ORAL 4 TIMES DAILY
Status: DISCONTINUED | OUTPATIENT
Start: 2023-09-09 | End: 2023-09-13 | Stop reason: HOSPADM

## 2023-09-09 RX ORDER — NALOXONE HYDROCHLORIDE 0.4 MG/ML
0.4 INJECTION, SOLUTION INTRAMUSCULAR; INTRAVENOUS; SUBCUTANEOUS
Status: DISCONTINUED | OUTPATIENT
Start: 2023-09-09 | End: 2023-09-13 | Stop reason: HOSPADM

## 2023-09-09 RX ORDER — OXYCODONE HYDROCHLORIDE 5 MG/1
5 TABLET ORAL EVERY 6 HOURS PRN
Status: DISCONTINUED | OUTPATIENT
Start: 2023-09-09 | End: 2023-09-09

## 2023-09-09 RX ORDER — TOLTERODINE TARTRATE 2 MG/1
2 TABLET, EXTENDED RELEASE ORAL 2 TIMES DAILY
Status: DISCONTINUED | OUTPATIENT
Start: 2023-09-09 | End: 2023-09-13 | Stop reason: HOSPADM

## 2023-09-09 RX ORDER — SODIUM CHLORIDE 9 MG/ML
INJECTION, SOLUTION INTRAVENOUS CONTINUOUS
Status: DISCONTINUED | OUTPATIENT
Start: 2023-09-09 | End: 2023-09-11

## 2023-09-09 RX ORDER — LIDOCAINE 40 MG/G
CREAM TOPICAL
Status: DISCONTINUED | OUTPATIENT
Start: 2023-09-09 | End: 2023-09-13 | Stop reason: HOSPADM

## 2023-09-09 RX ORDER — LOPERAMIDE HCL 2 MG
2 CAPSULE ORAL 3 TIMES DAILY
Status: DISCONTINUED | OUTPATIENT
Start: 2023-09-10 | End: 2023-09-12

## 2023-09-09 RX ORDER — CALCIUM CARBONATE 500 MG/1
500 TABLET, CHEWABLE ORAL DAILY
Status: DISCONTINUED | OUTPATIENT
Start: 2023-09-09 | End: 2023-09-13 | Stop reason: HOSPADM

## 2023-09-09 RX ORDER — OXYCODONE HYDROCHLORIDE 5 MG/1
5-10 TABLET ORAL EVERY 6 HOURS PRN
Status: DISCONTINUED | OUTPATIENT
Start: 2023-09-09 | End: 2023-09-13 | Stop reason: HOSPADM

## 2023-09-09 RX ORDER — CEFEPIME HYDROCHLORIDE 2 G/1
2 INJECTION, POWDER, FOR SOLUTION INTRAVENOUS EVERY 12 HOURS
Status: DISCONTINUED | OUTPATIENT
Start: 2023-09-09 | End: 2023-09-09

## 2023-09-09 RX ORDER — VANCOMYCIN HYDROCHLORIDE 125 MG/1
125 CAPSULE ORAL 4 TIMES DAILY
Status: DISCONTINUED | OUTPATIENT
Start: 2023-09-09 | End: 2023-09-09

## 2023-09-09 RX ORDER — LOPERAMIDE HCL 2 MG
2 CAPSULE ORAL 4 TIMES DAILY PRN
Status: DISCONTINUED | OUTPATIENT
Start: 2023-09-09 | End: 2023-09-09

## 2023-09-09 RX ORDER — ACETAMINOPHEN 500 MG
500-1000 TABLET ORAL EVERY 6 HOURS
Status: DISCONTINUED | OUTPATIENT
Start: 2023-09-09 | End: 2023-09-12

## 2023-09-09 RX ORDER — PROCHLORPERAZINE MALEATE 5 MG
5 TABLET ORAL EVERY 6 HOURS PRN
Status: DISCONTINUED | OUTPATIENT
Start: 2023-09-09 | End: 2023-09-13 | Stop reason: HOSPADM

## 2023-09-09 RX ADMIN — SODIUM CHLORIDE: 9 INJECTION, SOLUTION INTRAVENOUS at 01:10

## 2023-09-09 RX ADMIN — OXYCODONE HYDROCHLORIDE 10 MG: 5 TABLET ORAL at 23:57

## 2023-09-09 RX ADMIN — ACETAMINOPHEN 500 MG: 500 TABLET ORAL at 19:45

## 2023-09-09 RX ADMIN — OXYCODONE HYDROCHLORIDE 5 MG: 5 TABLET ORAL at 03:30

## 2023-09-09 RX ADMIN — TOLTERODINE TARTRATE 2 MG: 2 TABLET, FILM COATED ORAL at 19:45

## 2023-09-09 RX ADMIN — LOPERAMIDE HYDROCHLORIDE 2 MG: 2 CAPSULE ORAL at 21:07

## 2023-09-09 RX ADMIN — VANCOMYCIN HYDROCHLORIDE 125 MG: 125 CAPSULE ORAL at 23:58

## 2023-09-09 RX ADMIN — LOPERAMIDE HYDROCHLORIDE 2 MG: 2 CAPSULE ORAL at 02:07

## 2023-09-09 RX ADMIN — VANCOMYCIN HYDROCHLORIDE 125 MG: 125 CAPSULE ORAL at 17:13

## 2023-09-09 RX ADMIN — SODIUM CHLORIDE: 9 INJECTION, SOLUTION INTRAVENOUS at 11:31

## 2023-09-09 RX ADMIN — ACETAMINOPHEN 1000 MG: 500 TABLET ORAL at 13:51

## 2023-09-09 RX ADMIN — OXYCODONE HYDROCHLORIDE 10 MG: 5 TABLET ORAL at 15:53

## 2023-09-09 RX ADMIN — VANCOMYCIN HYDROCHLORIDE 125 MG: 125 CAPSULE ORAL at 11:31

## 2023-09-09 RX ADMIN — TOLTERODINE TARTRATE 2 MG: 2 TABLET, FILM COATED ORAL at 08:27

## 2023-09-09 RX ADMIN — CEFEPIME HYDROCHLORIDE 2 G: 2 INJECTION, POWDER, FOR SOLUTION INTRAVENOUS at 01:53

## 2023-09-09 RX ADMIN — VANCOMYCIN HYDROCHLORIDE 125 MG: 125 CAPSULE ORAL at 04:17

## 2023-09-09 RX ADMIN — CALCIUM CARBONATE (ANTACID) CHEW TAB 500 MG 500 MG: 500 CHEW TAB at 08:27

## 2023-09-09 RX ADMIN — CEFEPIME HYDROCHLORIDE 2 G: 2 INJECTION, POWDER, FOR SOLUTION INTRAVENOUS at 13:51

## 2023-09-09 RX ADMIN — PROCHLORPERAZINE EDISYLATE 5 MG: 5 INJECTION INTRAMUSCULAR; INTRAVENOUS at 03:09

## 2023-09-09 RX ADMIN — ACETAMINOPHEN 1000 MG: 500 TABLET ORAL at 08:25

## 2023-09-09 RX ADMIN — OXYCODONE HYDROCHLORIDE 10 MG: 5 TABLET ORAL at 09:30

## 2023-09-09 RX ADMIN — ACETAMINOPHEN 500 MG: 500 TABLET ORAL at 02:07

## 2023-09-09 ASSESSMENT — ACTIVITIES OF DAILY LIVING (ADL)
ADLS_ACUITY_SCORE: 44
ADLS_ACUITY_SCORE: 39
ADLS_ACUITY_SCORE: 35
ADLS_ACUITY_SCORE: 44
ADLS_ACUITY_SCORE: 35
ADLS_ACUITY_SCORE: 44
ADLS_ACUITY_SCORE: 39
ADLS_ACUITY_SCORE: 39

## 2023-09-09 NOTE — ED PROVIDER NOTES
ED Provider Note  [facility]      History     Chief Complaint   Patient presents with    Fever    Diarrhea     Reports of diarrhea and fever that started yesterday.     HPI  68yo F pmhx stage IVb SCC vaginal CA (completed chemo, currently on RT), HLD, indwelling Thomas catheter 2/2 tumor obstruction biba for fever and diarrhea. Pt reports since yesterday has had multiple (10-20) episodes of watery, non-bloody diarrhea daily. Associated with fever today of tmax 104 (forehead). Denies acute abd pain (has baseline, unchanged abd pain), n/v, URI symptoms, myalgias, HA. Has had slight cough. Took APAP for fever, and imodium x1 early in the day. Of note, pt began Augmentin 2 days ago for a UTI (urine taken after Thomas changed).      Past Medical History:   Diagnosis Date    Actinic keratosis     Hyperlipemia     Lichen sclerosus 2020    Osteopenias     SCC (squamous cell carcinoma) 7/26/2023       Past Surgical History:   Procedure Laterality Date    COLONOSCOPY  2006    normal    COLPOSCOPY, BIOPSY, COMBINED N/A 02/08/2021    Procedure: COLPOSCOPY, WITH BIOPSY, LEEP procedure;  Surgeon: Jefe Koenig MD;  Location: [place] OR    CYSTOSCOPY N/A 05/30/2023    Procedure: CYSTOSCOPY AND EXCISIONAL BIOPSY OF THE VAGINAL TISSUE AROUND THE URETHRA.  (look in the bladder and sample small area in the vagina near the urethra);  Surgeon: Lakeisha Mackenzie MD;  [place] [facility] [place]    EYE SURGERY      cataracts bilateral    OPEN REDUCTION INTERNAL FIXATION FOREARM  07/31/2012    Procedure: OPEN REDUCTION INTERNAL FIXATION FOREARM;  Open Reduction Internal Fixation, Irrigation & Debridment  of Right Distal Radius, application short arm splint;  Surgeon: Wade Beard MD;  Location:  OR    TONSILLECTOMY & ADENOIDECTOMY  1960    Z TREAT ECTOPIC PREG,RMV TUBE/OVARY  1985    ectopic, right side-ovary and tube removed       Family History   Problem Relation Age of Onset    Lung Cancer Mother  "44    Cerebrovascular Disease Father     Hypertension Father     Alcohol/Drug Father     Obesity Niece         niece    Mental Illness Nephew         nephew    Diabetes Other         paternal aunt    Hyperlipidemia Other     Obesity Other         self    Cervical Cancer Maternal Aunt     Ovarian Cancer Maternal Aunt 60    Obesity Other     Diabetes Other         paternal aunt    Hypertension Other         father    Cerebrovascular Disease Other         father    C.A.D. No family hx of     Breast Cancer No family hx of     Cancer - colorectal No family hx of     Prostate Cancer No family hx of     Melanoma No family hx of        Social History     Tobacco Use    Smoking status: Never     Passive exposure: Never    Smokeless tobacco: Never   Substance Use Topics    Alcohol use: Yes     Comment: very little           A medically appropriate review of systems was performed with pertinent positives and negatives noted in the HPI, and all other systems negative.    Physical Exam   BP: (!) 155/58  Pulse: (!) 123  Temp: (!) 101.2  F (38.4  C)  Resp: 17  Height: 162.6 cm (5' 4\")  Weight: 95.7 kg (211 lb)  SpO2: 92 %  Physical Exam  Constitutional:       General: She is not in acute distress.     Appearance: Normal appearance. She is well-developed. She is ill-appearing. She is not toxic-appearing.   HENT:      Head: Normocephalic and atraumatic.   Eyes:      General: No scleral icterus.     Conjunctiva/sclera: Conjunctivae normal.   Cardiovascular:      Rate and Rhythm: Regular rhythm. Tachycardia present.   Pulmonary:      Effort: Pulmonary effort is normal. No respiratory distress.      Breath sounds: Normal breath sounds.   Abdominal:      General: Abdomen is flat.      Palpations: Abdomen is soft.      Tenderness: There is no abdominal tenderness.   Musculoskeletal:      Cervical back: Normal range of motion and neck supple.   Skin:     General: Skin is warm and moist.      Findings: No rash.   Neurological:      Mental " Status: She is alert and oriented to person, place, and time.           ED Course, Procedures, & Data              Results for orders placed or performed during the hospital encounter of 09/08/23   XR Chest 2 Views     Status: None    Narrative    EXAM: XR CHEST 2 VIEWS  LOCATION: [facility]  DATE: 9/8/2023    INDICATION: Fever, r o PNA  COMPARISON: 9/5/2023      Impression    IMPRESSION: Heart is normal in size. Small patchy airspace opacity within the right medial lung base. Left lung clear. No effusion.   Basic metabolic panel     Status: Abnormal   Result Value Ref Range    Sodium 130 (L) 136 - 145 mmol/L    Potassium 3.6 3.4 - 5.3 mmol/L    Chloride 98 98 - 107 mmol/L    Carbon Dioxide (CO2) 23 22 - 29 mmol/L    Anion Gap 9 7 - 15 mmol/L    Urea Nitrogen 17.2 8.0 - 23.0 mg/dL    Creatinine 1.06 (H) 0.51 - 0.95 mg/dL    Calcium 7.7 (L) 8.8 - 10.2 mg/dL    Glucose 139 (H) 70 - 99 mg/dL    GFR Estimate 57 (L) >60 mL/min/1.73m2   Lactic acid whole blood     Status: Normal   Result Value Ref Range    Lactic Acid 0.7 0.7 - 2.0 mmol/L   UA with Microscopic reflex to Culture     Status: Abnormal    Specimen: Urine, Catheter   Result Value Ref Range    Color Urine Dark Yellow (A) Colorless, Straw, Light Yellow, Yellow    Appearance Urine Clear Clear    Glucose Urine 50 (A) Negative mg/dL    Bilirubin Urine Small (A) Negative    Ketones Urine Negative Negative mg/dL    Specific Gravity Urine 1.022 1.003 - 1.035    Blood Urine Large (A) Negative    pH Urine 6.0 5.0 - 7.0    Protein Albumin Urine 200 (A) Negative mg/dL    Urobilinogen Urine 3.0 (A) Normal, 2.0 mg/dL    Nitrite Urine Positive (A) Negative    Leukocyte Esterase Urine Moderate (A) Negative    Bacteria Urine Few (A) None Seen /HPF    Mucus Urine Present (A) None Seen /LPF    RBC Urine 140 (H) <=2 /HPF    WBC Urine 44 (H) <=5 /HPF    Transitional Epithelials Urine <1 <=1 /HPF    Narrative    Urine Culture ordered  "based on laboratory criteria   CBC with platelets and differential     Status: Abnormal   Result Value Ref Range    WBC Count 1.1 (L) 4.0 - 11.0 10e3/uL    RBC Count 2.09 (L) 3.80 - 5.20 10e6/uL    Hemoglobin 6.3 (LL) 11.7 - 15.7 g/dL    Hematocrit 19.7 (L) 35.0 - 47.0 %    MCV 94 78 - 100 fL    MCH 30.1 26.5 - 33.0 pg    MCHC 32.0 31.5 - 36.5 g/dL    RDW 15.5 (H) 10.0 - 15.0 %    Platelet Count 64 (L) 150 - 450 10e3/uL    Narrative    Previously reported component [ NRBCs ] is no longer reported.\"  Previously reported component [ NRBCs Absolute ] is no longer reported.\"   Extra Tube     Status: None    Narrative    The following orders were created for panel order Extra Tube.  Procedure                               Abnormality         Status                     ---------                               -----------         ------                     Extra Blue Top Tube[[id number]]                              Final result               Extra Red Top Tube[[id number]]                               Final result               Extra Purple Top Tube[[id number]]                            Final result                 Please view results for these tests on the individual orders.   Extra Blue Top Tube     Status: None   Result Value Ref Range    Hold Specimen JIC    Extra Red Top Tube     Status: None   Result Value Ref Range    Hold Specimen JIC    Extra Purple Top Tube     Status: None   Result Value Ref Range    Hold Specimen JIC    Reticulocyte count     Status: Abnormal   Result Value Ref Range    % Reticulocyte 3.7 (H) 0.5 - 2.0 %    Absolute Reticulocyte 0.077 0.025 - 0.095 10e6/uL   Manual Differential     Status: Abnormal   Result Value Ref Range    % Neutrophils 89 %    % Lymphocytes 7 %    % Monocytes 3 %    % Eosinophils 0 %    % Basophils 0 %    % Myelocytes 1 %    Absolute Neutrophils 1.0 (L) 1.6 - 8.3 10e3/uL    Absolute Lymphocytes 0.1 (L) 0.8 - 5.3 10e3/uL    Absolute Monocytes 0.0 0.0 - 1.3 10e3/uL    Absolute " Eosinophils 0.0 0.0 - 0.7 10e3/uL    Absolute Basophils 0.0 0.0 - 0.2 10e3/uL    Absolute Myelocytes 0.0 <=0.0 10e3/uL    RBC Morphology Confirmed RBC Indices     Platelet Assessment  Automated Count Confirmed. Platelet morphology is normal.     Automated Count Confirmed. Platelet morphology is normal.    Polychromasia Slight (A) None Seen    Toxic Neutrophils Present (A) None Seen   CBC with platelets differential     Status: Abnormal    Narrative    The following orders were created for panel order CBC with platelets differential.  Procedure                               Abnormality         Status                     ---------                               -----------         ------                     CBC with platelets and d...[[id number]]  Abnormal            Final result               Manual Differential[[id number]]          Abnormal            Final result                 Please view results for these tests on the individual orders.   ABO/Rh type and screen *Canceled*     Status: None ()    Narrative    The following orders were created for panel order ABO/Rh type and screen.  Procedure                               Abnormality         Status                     ---------                               -----------         ------                       Please view results for these tests on the individual orders.   ABO/Rh type and screen *Canceled*     Status: None ()    Narrative    The following orders were created for panel order ABO/Rh type and screen.  Procedure                               Abnormality         Status                     ---------                               -----------         ------                     Adult Type and Screen[[id number]]                                                         Please view results for these tests on the individual orders.     Medications   0.9% sodium chloride BOLUS (has no administration in time range)   0.9% sodium chloride BOLUS (1,000 mLs Intravenous $New  Bag 9/8/23 2037)   acetaminophen (TYLENOL) tablet 1,000 mg (1,000 mg Oral $Given 9/8/23 2037)   oxyCODONE (ROXICODONE) tablet 5 mg (5 mg Oral $Given 9/8/23 2037)     Labs Ordered and Resulted from Time of ED Arrival to Time of ED Departure   BASIC METABOLIC PANEL - Abnormal       Result Value    Sodium 130 (*)     Potassium 3.6      Chloride 98      Carbon Dioxide (CO2) 23      Anion Gap 9      Urea Nitrogen 17.2      Creatinine 1.06 (*)     Calcium 7.7 (*)     Glucose 139 (*)     GFR Estimate 57 (*)    ROUTINE UA WITH MICROSCOPIC REFLEX TO CULTURE - Abnormal    Color Urine Dark Yellow (*)     Appearance Urine Clear      Glucose Urine 50 (*)     Bilirubin Urine Small (*)     Ketones Urine Negative      Specific Gravity Urine 1.022      Blood Urine Large (*)     pH Urine 6.0      Protein Albumin Urine 200 (*)     Urobilinogen Urine 3.0 (*)     Nitrite Urine Positive (*)     Leukocyte Esterase Urine Moderate (*)     Bacteria Urine Few (*)     Mucus Urine Present (*)     RBC Urine 140 (*)     WBC Urine 44 (*)     Transitional Epithelials Urine <1     CBC WITH PLATELETS AND DIFFERENTIAL - Abnormal    WBC Count 1.1 (*)     RBC Count 2.09 (*)     Hemoglobin 6.3 (*)     Hematocrit 19.7 (*)     MCV 94      MCH 30.1      MCHC 32.0      RDW 15.5 (*)     Platelet Count 64 (*)    RETICULOCYTE COUNT - Abnormal    % Reticulocyte 3.7 (*)     Absolute Reticulocyte 0.077     DIFFERENTIAL - Abnormal    % Neutrophils 89      % Lymphocytes 7      % Monocytes 3      % Eosinophils 0      % Basophils 0      % Myelocytes 1      Absolute Neutrophils 1.0 (*)     Absolute Lymphocytes 0.1 (*)     Absolute Monocytes 0.0      Absolute Eosinophils 0.0      Absolute Basophils 0.0      Absolute Myelocytes 0.0      RBC Morphology Confirmed RBC Indices      Platelet Assessment        Value: Automated Count Confirmed. Platelet morphology is normal.    Polychromasia Slight (*)     Toxic Neutrophils Present (*)    LACTIC ACID WHOLE BLOOD - Normal     Lactic Acid 0.7     PREPARE RED BLOOD CELLS (UNIT)   C. DIFFICILE TOXIN B PCR WITH REFLEX TO C. DIFFICILE ANTIGEN AND TOXINS A/B EIA   ENTERIC BACTERIA AND VIRUS PANEL BY PCR   BLOOD CULTURE   BLOOD CULTURE   URINE CULTURE     XR Chest 2 Views   Final Result   IMPRESSION: Heart is normal in size. Small patchy airspace opacity within the right medial lung base. Left lung clear. No effusion.             Critical care was not performed.     Medical Decision Making  The patient's presentation was of high complexity (a chronic illness severe exacerbation, progression, or side effect of treatment).    The patient's evaluation involved:  review of external note(s) from 2 sources (ED notes and discharge summary)  review of 1 test result(s) ordered prior to this encounter (Urine studies/labs)  ordering and/or review of 3+ test(s) in this encounter (imaging, labs, transufsion)  discussion of management or test interpretation with another health professional (Gyn Onc)    The patient's management necessitated moderate risk (prescription drug management including medications given in the ED) and high risk (a decision regarding hospitalization).    Assessment & Plan    68yo F pmhx stage IVb SCC vaginal CA (completed chemo, currently on RT), HLD, indwelling Thomas catheter 2/2 tumor obstruction biba for fever (tmax 104 forehead) and diarrhea I/s/o recent Augmentin initiation for UTI (started 2 days ago). 10-20 episodes of watery, non-bloody diarrhea daily. No acute change in baseline abd pain, n/v, URI symptoms, myalgias, HA. Has had slight cough.  Of note, pt began Augmentin 2 days ago for a UTI (urine taken after Thomas changed).      In ED, pt appears ill, but not toxic. Noted febrile (101.2), tachycardic (123), but slightly hypertensive. Lungs CTA. Abd benign.     Concern for C diff given recent Augmentin initiation, fever, and diarrhea. Lesser concern pyelo or urosepsis. Abd benign on exam making intraabdominal pathology less  likely. Will obtain CXR r/o PNA. Will send CBC, BMP lactate, blood cultures, C diff, stool panel, and UA for infectious work up. Will give IVF and antipyretics. Will hold on antibiotics at this time, pending lactate, as normotensive. Anticipate admission.     Additionally, pt noted borderline hypoxemic (92-93% on RA). Pt had recent obs admission for same, without clear etiology (CTPE negative), and was ultimetly discharged with sats in same range as tonight. Acutely, pt denies SOB or CP.     ED Course as of 09/08/23 2151   Fri Sep 08, 2023   2106 Lactic Acid: 0.7   2111 WBC(!): 1.1   2112 Hemoglobin(!!): 6.3  Down from 8.3, 3 days ago. Per RN, pt's BM in ED non-bloody, non-melonic. No signs of bleeding. Did receive Cisplatin ON 8/28/23 which could be to blame.  Will consent to transfuse.    2113 Platelet Count(!): 64  72, 3 days ago, overall downtrending   2114 Sodium(!): 130   2114 Creatinine(!): 1.06   2115 Urine appears grossly infected, but known recent UTI   2140 Gyn Onc aware of pt and plan to see.   2151 Pt signed out to evening ED attending.          I have reviewed the nursing notes. I have reviewed the findings, diagnosis, plan and need for follow up with the patient.    New Prescriptions    No medications on file       Final diagnoses:   Sepsis, due to unspecified organism, unspecified whether acute organ dysfunction present (H)   Diarrhea of presumed infectious origin   Pancytopenia (H)   Vulvar cancer (H)         Jose Antonio Jang PA-C  [facility] EMERGENCY DEPARTMENT  9/8/2023     Jose Antonio Jang PA-C  09/08/23 2152  ---------------------------------------------------------------------------------------------------------  --    ED Attending Physician Attestation    I Zainab Chung MD, cared for this patient with the Advanced Practice Provider (TONY). I have performed a history and physical examination of the patient independent of the TONY. I reviewed the TONY's documentation above  and agree with the documented findings and plan of care. I personally provided a substantive portion of the care for this patient, including the complete Medical Decision Making. Please see the TONY's documentation for full details.    Summary of HPI, PE, ED Course   Patient is a 67 year old female evaluated in the emergency department for fever, diarrhea in the context of recent diagnosis of UTI now on Augmentin. Exam and ED course notable for fever up to 101.2, tachycardia with HR of 123. Patient does appear to be septic. Suspect source is likely due to c difficile given profuse diarrhea since starting Augmentin for UTI (chronic indwelling gonsalez catheter which was changed 4 days ago prior to UA collection where her UTI was diagnosed). Patient received tylenol, oxycodone, and IV fluids here in the ED. CBC remarkable for pancytopenia with WBC of 1.1, hgb of 6.3, platelets of 64. She did receive her last dose of cisplatin on 8/28/23, so this degree of myelosupression could be due to last chemo. C diff was positive. PO vanco ordered. Discussed with GYN/ONC, who requested that we add on COVID/flu and respiratory viral panel, which we have ordered. They have ordered additional IV antibiotics to cover for neutropenic fever. Expect that the patient will be admitted to the GYN/ONC service after they have had a chance to evaluate her.  After the completion of care in the emergency department, the patient was admitted to inpatient.        Medical Decision Making  I agree with the MDM as documented above by the TONY.          Zainab Chung MD  Emergency Medicine        Zainab Chung MD  09/08/23 [id number]

## 2023-09-09 NOTE — MEDICATION SCRIBE - ADMISSION MEDICATION HISTORY
Medication Scribe Admission Medication History    Admission medication history is complete. The information provided in this note is only as accurate as the sources available at the time of the update.    Medication reconciliation/reorder completed by provider prior to medication history? Yes    Information Source(s): Patient via in-person    Pertinent Information: Reviewed medication history with patient. Patient states Tylenol is scheduled. Augmentin was stopped on Friday AM, that was her last dose. Oxycodone is also scheduled. Dexamethasone is discontinued as patient is no long receiving chemo.     Changes made to PTA medication list:  Added: None  Deleted: Augmentin, Dexamethasone  Changed: Tylenol    Medication Affordability:       Allergies reviewed with patient and updates made in EHR: yes    Medication History Completed By: Zacedgardo CORTES Her 9/9/2023 9:10 AM    Prior to Admission medications    Medication Sig Last Dose Taking? Auth Provider Long Term End Date   acetaminophen (TYLENOL) 500 MG tablet Take 500-1,000 mg by mouth every 4 hours as needed for mild pain 9/8/2023 at pm Yes Reported, Patient     calcium carbonate 750 MG CHEW Take 750 mg by mouth 3 times daily as needed Past Week at prn Yes Unknown, Entered By History     clotrimazole (LOTRIMIN) 1 % external cream Apply topically 2 times daily To affected areas  Patient taking differently: Apply topically 2 times daily as needed (rash) To affected areas Past Month at prn Yes Marisa Pacheco MD     docusate sodium (COLACE) 100 MG capsule Take 100 mg by mouth daily as needed for constipation Past Week at prn Yes Reported, Patient     loperamide (IMODIUM A-D) 2 MG tablet Take 2-4 mg by mouth 4 times daily as needed for diarrhea 9/8/2023 Yes Unknown, Entered By History     magnesium oxide (MAG-OX) 400 MG tablet Take 1 tablet (400 mg) by mouth daily 9/8/2023 Yes Betina Kramer APRN CNP     NONFORMULARY [place] cream 9/8/2023 Yes Unknown, Entered By  History     ondansetron (ZOFRAN) 8 MG tablet Take 1 tablet (8 mg) by mouth every 8 hours as needed for nausea (vomiting) Do not take for 3 days after chemo. Past Week at prn Yes Andie Rios APRN CNP     oxyCODONE (ROXICODONE) 5 MG tablet Take 1 tablet (5 mg) by mouth every 6 hours as needed for pain  Patient taking differently: Take 5 mg by mouth every 6 hours 9/8/2023 at pm Yes Keiko Nava APRN CNP No    phenazopyridine (PYRIDIUM) 200 MG tablet Take 1 tablet (200 mg) by mouth 3 times daily as needed for irritation  Patient taking differently: Take 200 mg by mouth 3 times daily 9/8/2023 at pm Yes Madhuri Johnson MD     prochlorperazine (COMPAZINE) 10 MG tablet Take 1 tablet (10 mg) by mouth every 6 hours as needed for nausea or vomiting Past Month at prn Yes Reina Stahl MD     simethicone (MYLICON) 125 MG chewable tablet Take 125 mg by mouth 4 times daily as needed for intestinal gas Past Month at prn Yes Unknown, Entered By History     tolterodine (DETROL) 2 MG tablet Take 1 tablet (2 mg) by mouth 2 times daily 9/8/2023 at pm Yes Alba Layton MD     triamcinolone (KENALOG) 0.1 % external cream Apply topically 2 times daily Past Month at prn Yes Alba Layton MD No    LORazepam (ATIVAN) 0.5 MG tablet Take one tablet po 30-45 min before MRI scan, may repeat times one if needed, bring bottle with you to MRI   Marisa Pacheco MD

## 2023-09-09 NOTE — ED TRIAGE NOTES
Triage Assessment       Row Name 09/08/23 1952       Triage Assessment (Adult)    Airway WDL WDL       Respiratory WDL    Respiratory WDL WDL       Skin Circulation/Temperature WDL    Skin Circulation/Temperature WDL WDL       Cardiac WDL    Cardiac WDL WDL       Peripheral/Neurovascular WDL    Peripheral Neurovascular WDL WDL       Cognitive/Neuro/Behavioral WDL    Cognitive/Neuro/Behavioral WDL WDL

## 2023-09-09 NOTE — H&P
[facility] History and Physical    Jessie Tamayo MRN# [id number]   Age: 67 year old YOB: 1955     Date of Admission:  9/8/2023    Primary care provider: Alba Layton             Chief Complaint:   Watery diarrhea, fever         History of Present Illness:   Jessie Tamayo is a 67 year old female with stage IVB SCC Of the vagina who presents to the ED with 2 days of worsening watery diarrhea, fevers, and chills. Her watery diarrhea started yesterday and was manageable, however today increased significantly. Today she began to feel very weak, lightheaded, dizzy, could not ambulate on her own to the bathroom due to weakness. She endorses nausea, denies vomiting. She denies shortness of breath, cough, chest pain. Denies leg pain, swelling, erythema. Denies known sick contacts.     She continues to have some bladder discomfort, with bladder spasms increasing when she has the urge to stool. She has had her gonsalez in continuously since discharge from her recent hospital stay 9/5/23. If she stays consistent on scheduled tylenol and oxycodone 5mg, bladder spasms remain minimal. She has not noticed much change in her bladder/urinary symptoms over the last few days. She was prescribed augmentin at discharge and has been taking this for about 2 days.     She denies shortness of breath or wheezing, however states that sometimes she requires allergy medication for seasonal allergies and related breathing symptoms. She is not on supplemental oxygen at home, and is not sure why she is requiring supplemental oxygen here; attributes it to possible seasonal allergies.     ED course:   - Febrile on presentation, LA wnl  - CXR: Heart is normal in size. Small patchy airspace opacity within the right medial lung base. Left lung clear. No effusion.   - Hgb 6.3, 1u pRBC ordered  - 1L IV fluid (NS) bolus  - Stool studies collected, positive for C. Diff  - PO vancomycin x1 dose given in ED  - Found to be hypoxic in ED, 2L  supplemental O2 via NC initiated  - Antipyretic treatment initiated w/tylenol            Cancer Treatment History:   12/26/07, 10/15/08, 12/22/09, 11/1/11: Pap test NILM.      1/23/15: Pap test NILM, hrHPV16+.  2/27/15: Cervical polyp & cervical biopsy pathology: Negative for dysplasia/malignancy.     4/7/16: Pap test NILM, hrHPV16+.   5/19/16: Endocervical curettings pathology negative for dysplasia/malignancy.     7/11/17: Pap test NILM, hrHPV16+.   -Colposcopy recommended, not performed.      9/25/18:   -Pap test ASCUS, hrHPV16+.   -Endocervical curettings & cervical biopsy pathology: negative for dysplasia/malignancy.      9/20/19:  Pap test ASC-H, hrHPV16+.   12/16/19: Endocervical curettings pathology benign; cervical 1:00, 7:00 biopsies suggestive of LSIL (CIN1).      9/10/20: Pap test ASC-H, hrHPV+ (NOS).  2/8/21: LEEP.   -Pathology: LEEP & endocervical curettings: negative for dysplasia/malignancy.     3/23/22: Endocervical curettings & cervical biopsy pathology: negative for dysplasia/malignancy.     3/28/23:   -Pap test HSIL, hrHPV16+.   -Findings by gynecologist Dr. Koenig: External genitalia with erythematous plaques bilaterally  Urethra- mid position and well supported, suburethral tissue thickened and friable with bleeding elicited after placement of the speculum  Vagina is stenotic and cervix difficult to see.   5/30/23: Cystourethroscopy, vaginal biopsy by urologist Dr. Mackenzie.   -Findings: Exam revealed lichenified changes to bilateral labia majora and friable vestibular tissue (patient had significant bleeding from prep alone.) The urethra was somewhat stenotic and would not accommodate 19Fr rigid cystoscope, therefore a 16 Fr flexible cystoscope was inserted. Gentle pressure was required to access the bladder. The ureteral orifices were in their orthotopic positions bilaterally. There were no intravesical stones or diverticuli and the bladder was mildly trabeculated. There were no intravesical  lesions. See media tab for urethral pictures - there were no obvious lesions but the entire distal urethra was erythematous and stenosed (16Fr.)  -Pathology: Invasive moderately-differentiated squamous cell carcinoma, HPV-associated, with focus suspicious for lymphovascular space invasion; IHC: p16+.    6/8/23: Gynecologic Oncology consultation.  -Findings:   External genitalia notable for erythema and desquamation of the posterior medial labia, c/w lichen sclerosus changes. When the labia are , a friable mass is visible at the distal anterior vagina, just posterior to the urethra. On bimanual exam, the cervix is small and normal in contour. The palpable vaginal tumor is limited to the anterior distal vagina, approximately 4-5 cm laterally x 3 cm cranio-caudal. Tumor is friable. Remainder of the vagina smooth to palpation. Digital anorectal exam is without nodularity. No palpable tumor in the rectovaginal septum.   Enlarged firm, fixed right inguinal lymph node ~3-4 cm in size.      6/16/23: Pelvic MRI  IMPRESSION:   1. Irregular enhancing lesion along the anterior vaginal wall at the  level of the introitus measuring 2.5 x 0.9 cm with irregular  enhancement along the anterior vaginal wall towards the level of the  vaginal cuff however there is no definitive evidence or abnormal  signal intensity involving the cervix to suggest invasion.  2. Right inguinal lymphadenopathy compatible with metastatic disease.   3. There is some asymmetric enhancement involving the left sacrum,  incompletely evaluated on this study. Further evaluation of this and  further staging of the chest, abdomen and pelvis will be performed on  PET/CT scheduled for 6/20/2023.     6/20/23: PET CT  IMPRESSION:   1. Intense focal hypermetabolic FDG uptake in biopsy-proven vaginal  squamous cell carcinoma.  2. Multiple enlarged, hypermetabolic right inguinal nodes likely  represent regional metastasis. No definite evidence of  distant  metastatic disease.  3. Scattered sub-4 mm solid pulmonary nodules are below the size  threshold for characterization on PET. Attention on follow-up with CT.  4. 1.0 cm enhancing left parotid gland nodule with hypermetabolic  uptake could represent a primary parotid neoplasm such as pleomorphic  adenoma or Warthin's tumor; consider ultrasound for further  evaluation.  5. 1.3 cm hypoattenuating right thyroid nodule with mild FDG uptake  warrants ultrasound for characterization.      Plan: Cisplatin radiation      7/27/2023: urinary gonsalez catheter placement. Treat acute cystitis with bactrim per Urology.      8/1 - 8/28/2023: C1D1 - C1D29 Cisplatin radiation         Past Medical History:     Past Medical History:   Diagnosis Date    Actinic keratosis     Hyperlipemia     Lichen sclerosus 2020    Osteopenias     SCC (squamous cell carcinoma) 7/26/2023            Past Surgical History:      Past Surgical History:   Procedure Laterality Date    COLONOSCOPY  2006    normal    COLPOSCOPY, BIOPSY, COMBINED N/A 02/08/2021    Procedure: COLPOSCOPY, WITH BIOPSY, LEEP procedure;  Surgeon: Jefe Koenig MD;  Location: [facility] OR    CYSTOSCOPY N/A 05/30/2023    Procedure: CYSTOSCOPY AND EXCISIONAL BIOPSY OF THE VAGINAL TISSUE AROUND THE URETHRA.  (look in the bladder and sample small area in the vagina near the urethra);  Surgeon: Lakeisha Mackenzie MD;  Location: [facility] OR    EYE SURGERY      cataracts bilateral    OPEN REDUCTION INTERNAL FIXATION FOREARM  07/31/2012    Procedure: OPEN REDUCTION INTERNAL FIXATION FOREARM;  Open Reduction Internal Fixation, Irrigation & Debridment  of Right Distal Radius, application short arm splint;  Surgeon: Wade Beard MD;  Location: [facility] OR    TONSILLECTOMY & ADENOIDECTOMY  1960    [facility] TREAT ECTOPIC PREG,RMV TUBE/OVARY  1985    ectopic, right side-ovary and tube removed            Social History:     Social History     Tobacco Use    Smoking status: Never     Passive  exposure: Never    Smokeless tobacco: Never   Substance Use Topics    Alcohol use: Yes     Comment: very little            Family History:     Family History   Problem Relation Age of Onset    Lung Cancer Mother 44    Cerebrovascular Disease Father     Hypertension Father     Alcohol/Drug Father     Obesity Niece         niece    Mental Illness Nephew         nephew    Diabetes Other         paternal aunt    Hyperlipidemia Other     Obesity Other         self    Cervical Cancer Maternal Aunt     Ovarian Cancer Maternal Aunt 60    Obesity Other     Diabetes Other         paternal aunt    Hypertension Other         father    Cerebrovascular Disease Other         father    C.A.D. No family hx of     Breast Cancer No family hx of     Cancer - colorectal No family hx of     Prostate Cancer No family hx of     Melanoma No family hx of             Allergies:     Allergies   Allergen Reactions    Oxybutynin Itching    Seasonal Allergies             Medications:   No current facility-administered medications on file prior to encounter.  acetaminophen (TYLENOL) 500 MG tablet, Take 500-1,000 mg by mouth every 6 hours as needed for mild pain  amoxicillin-clavulanate (AUGMENTIN) 875-125 MG tablet, Take 1 tablet by mouth 2 times daily  clotrimazole (LOTRIMIN) 1 % external cream, Apply topically 2 times daily To affected areas (Patient taking differently: Apply topically 2 times daily as needed (rash) To affected areas)  dexamethasone (DECADRON) 4 MG tablet, Take 2 tablets (8 mg) by mouth daily Take for 3 days, starting the day after chemo on day 2 and 9. Take with food. If no nausea and vomiting with first cisplatin doses, may stop dexamethasone. (Patient not taking: Reported on 9/8/2023)  docusate sodium (COLACE) 100 MG capsule, Take 100 mg by mouth daily as needed for constipation (Patient not taking: Reported on 9/8/2023)  LORazepam (ATIVAN) 0.5 MG tablet, Take one tablet po 30-45 min before MRI scan, may repeat times one if  needed, bring bottle with you to MRI (Patient not taking: Reported on 9/8/2023)  magnesium oxide (MAG-OX) 400 MG tablet, Take 1 tablet (400 mg) by mouth daily  ondansetron (ZOFRAN) 8 MG tablet, Take 1 tablet (8 mg) by mouth every 8 hours as needed for nausea (vomiting) Do not take for 3 days after chemo.  oxyCODONE (ROXICODONE) 5 MG tablet, Take 1 tablet (5 mg) by mouth every 6 hours as needed for pain (Patient taking differently: Take 5 mg by mouth every 6 hours)  phenazopyridine (PYRIDIUM) 200 MG tablet, Take 1 tablet (200 mg) by mouth 3 times daily as needed for irritation (Patient taking differently: Take 200 mg by mouth 3 times daily)  prochlorperazine (COMPAZINE) 10 MG tablet, Take 1 tablet (10 mg) by mouth every 6 hours as needed for nausea or vomiting  tolterodine (DETROL) 2 MG tablet, Take 1 tablet (2 mg) by mouth 2 times daily  triamcinolone (KENALOG) 0.1 % external cream, Apply topically 2 times daily (Patient not taking: Reported on 9/8/2023)             Review of Systems:     CONSTITUTIONAL: Positive for fevers, chills, fatigue. Negative for anorexia and weight loss  SKIN: Negative for rashes, lumps, or bumps  HEENT: Negative for vision changes, changes in hearing, sinus drainage, sore throat  RESPIRATORY: Negative for  cough, shortness of breath, dyspnea and wheezing, hemoptysis  CARDIOVASCULAR: Negative for  chest pain, dyspnea, palpitations, edema  GASTROINTESTINAL: Positive for nausea, diarrhea, abdominal pain. Negative for vomiting, constipation, abdominal distention, reflux, hematemesis and hemtochezia  GENITOURINARY: Positive for bladder spasms. Negative for frequency, dysuria, nocturia, urinary incontinence and hematuria  MUSCULOSKELETAL: Positive for generalized weakness.   NEUROLOGIC: Positive for weakness. Negative for headaches, syncope, numbness, tingling, memory problems, or incoordination  HEMATOLOGIC/LYMPHATIC:  Negative for easy bruising, bleeding and lymphadenopathy  ENDOCRINE:   Negative for heat intolerance and cold intolerance         Physical Exam:     Vitals:    09/08/23 2210 09/08/23 2357 09/09/23 0014 09/09/23 0055   BP:  (!) 155/74 (!) 140/68    Pulse:  120 118    Resp:  18 19    Temp:  (!) 101.3  F (38.5  C) (!) 101.3  F (38.5  C) (!) 102  F (38.9  C)   TempSrc:       SpO2: 92%   94%   Weight:       Height:         Constitutional: Fatigued, chronically ill appearing female, no acute distress  Cardiovascular: Tachycardic. Regular rhythm without murmurs appreciated  Respiratory: Mildly tachypneic, no increased work of breathing appreciated. Faint diffuse expiratory and inspiratory wheezes.   Gastrointestinal: Abdomen soft, non-tender. BS normal. No masses, organomegaly.  Pelvic Exam:     -deferred due to active diarrhea  Neuro: CN grossly intact  Extremities: trace bilateral lower extremity peripheral edema  Skin: No suspicious lesions or rashes  Psychiatric: mentation appears normal and affect appropriate  Lines: peripheral IV; gonsalez catheter         Data:     Results for orders placed or performed during the hospital encounter of 09/08/23 (from the past 24 hour(s))   CBC with platelets differential    Narrative    The following orders were created for panel order CBC with platelets differential.  Procedure                               Abnormality         Status                     ---------                               -----------         ------                     CBC with platelets and d...[[id number]]  Abnormal            Final result               Manual Differential[[id number]]          Abnormal            Final result                 Please view results for these tests on the individual orders.   Basic metabolic panel   Result Value Ref Range    Sodium 130 (L) 136 - 145 mmol/L    Potassium 3.6 3.4 - 5.3 mmol/L    Chloride 98 98 - 107 mmol/L    Carbon Dioxide (CO2) 23 22 - 29 mmol/L    Anion Gap 9 7 - 15 mmol/L    Urea Nitrogen 17.2 8.0 - 23.0 mg/dL    Creatinine 1.06 (H) 0.51  "- 0.95 mg/dL    Calcium 7.7 (L) 8.8 - 10.2 mg/dL    Glucose 139 (H) 70 - 99 mg/dL    GFR Estimate 57 (L) >60 mL/min/1.73m2   Lactic acid whole blood   Result Value Ref Range    Lactic Acid 0.7 0.7 - 2.0 mmol/L   CBC with platelets and differential   Result Value Ref Range    WBC Count 1.1 (L) 4.0 - 11.0 10e3/uL    RBC Count 2.09 (L) 3.80 - 5.20 10e6/uL    Hemoglobin 6.3 (LL) 11.7 - 15.7 g/dL    Hematocrit 19.7 (L) 35.0 - 47.0 %    MCV 94 78 - 100 fL    MCH 30.1 26.5 - 33.0 pg    MCHC 32.0 31.5 - 36.5 g/dL    RDW 15.5 (H) 10.0 - 15.0 %    Platelet Count 64 (L) 150 - 450 10e3/uL    Narrative    Previously reported component [ NRBCs ] is no longer reported.\"  Previously reported component [ NRBCs Absolute ] is no longer reported.\"   Manual Differential   Result Value Ref Range    % Neutrophils 89 %    % Lymphocytes 7 %    % Monocytes 3 %    % Eosinophils 0 %    % Basophils 0 %    % Myelocytes 1 %    Absolute Neutrophils 1.0 (L) 1.6 - 8.3 10e3/uL    Absolute Lymphocytes 0.1 (L) 0.8 - 5.3 10e3/uL    Absolute Monocytes 0.0 0.0 - 1.3 10e3/uL    Absolute Eosinophils 0.0 0.0 - 0.7 10e3/uL    Absolute Basophils 0.0 0.0 - 0.2 10e3/uL    Absolute Myelocytes 0.0 <=0.0 10e3/uL    RBC Morphology Confirmed RBC Indices     Platelet Assessment  Automated Count Confirmed. Platelet morphology is normal.     Automated Count Confirmed. Platelet morphology is normal.    Polychromasia Slight (A) None Seen    Toxic Neutrophils Present (A) None Seen   ABO/Rh type and screen    Narrative    The following orders were created for panel order ABO/Rh type and screen.  Procedure                               Abnormality         Status                     ---------                               -----------         ------                     Adult Type and Screen[[id number]]                            Final result                 Please view results for these tests on the individual orders.   Adult Type and Screen   Result Value Ref Range    " ABO/RH(D) O POS     Antibody Screen Negative Negative    SPECIMEN EXPIRATION DATE [id number]    Albumin level   Result Value Ref Range    Albumin 2.9 (L) 3.5 - 5.2 g/dL   UA with Microscopic reflex to Culture    Specimen: Urine, Catheter   Result Value Ref Range    Color Urine Dark Yellow (A) Colorless, Straw, Light Yellow, Yellow    Appearance Urine Clear Clear    Glucose Urine 50 (A) Negative mg/dL    Bilirubin Urine Small (A) Negative    Ketones Urine Negative Negative mg/dL    Specific Gravity Urine 1.022 1.003 - 1.035    Blood Urine Large (A) Negative    pH Urine 6.0 5.0 - 7.0    Protein Albumin Urine 200 (A) Negative mg/dL    Urobilinogen Urine 3.0 (A) Normal, 2.0 mg/dL    Nitrite Urine Positive (A) Negative    Leukocyte Esterase Urine Moderate (A) Negative    Bacteria Urine Few (A) None Seen /HPF    Mucus Urine Present (A) None Seen /LPF    RBC Urine 140 (H) <=2 /HPF    WBC Urine 44 (H) <=5 /HPF    Transitional Epithelials Urine <1 <=1 /HPF    Narrative    Urine Culture ordered based on laboratory criteria   Extra Tube    Narrative    The following orders were created for panel order Extra Tube.  Procedure                               Abnormality         Status                     ---------                               -----------         ------                     Extra Blue Top Tube[[id number]]                              Final result               Extra Red Top Tube[[id number]]                               Final result               Extra Purple Top Tube[[id number]]                            Final result                 Please view results for these tests on the individual orders.   Extra Blue Top Tube   Result Value Ref Range    Hold Specimen JIC    Extra Red Top Tube   Result Value Ref Range    Hold Specimen JIC    Extra Purple Top Tube   Result Value Ref Range    Hold Specimen JIC    Reticulocyte count   Result Value Ref Range    % Reticulocyte 3.7 (H) 0.5 - 2.0 %    Absolute Reticulocyte 0.077  0.025 - 0.095 10e6/uL   ABO/Rh type and screen *Canceled*    Narrative    The following orders were created for panel order ABO/Rh type and screen.  Procedure                               Abnormality         Status                     ---------                               -----------         ------                     Adult Type and Screen[[id number]]                                                         Please view results for these tests on the individual orders.   C. difficile Toxin B PCR with reflex to C. difficile Antigen and Toxins A/B EIA    Specimen: Per Rectum; Stool   Result Value Ref Range    C Difficile Toxin B by PCR Positive (A) Negative    Narrative    The [employer] Xpert C. difficile Assay, performed on the [employer]  Instrument Systems, is a qualitative in vitro diagnostic test for rapid detection of toxin B gene sequences from unformed (liquid or soft) stool specimens collected from patients suspected of having Clostridioides difficile infection (CDI). The test utilizes automated real-time polymerase chain reaction (PCR) to detect toxin gene sequences associated with toxin producing C. difficile. The Xpert C. difficile Assay is intended as an aid in the diagnosis of CDI.   Enteric Bacteria and Virus Panel PCR    Specimen: Per Rectum; Stool   Result Value Ref Range    Campylobacter species Negative Negative    Salmonella species Negative Negative    Vibrio species Negative Negative    Vibrio cholerae Negative Negative    Yersinia enterocolitica Negative Negative    Enteropathogenic E. coli (EPEC) Negative Negative, NA    Shiga-like toxin-producing E. coli (STEC) Negative Negative    Shigella/Enteroinvasive E. coli (EIEC) Negative Negative    Cryptosporidium species Negative Negative    Giardia lamblia Negative Negative    Norovirus Gl/Gll Negative Negative    Rotavirus A Negative Negative    Plesiomonas shigelloides Negative Negative    Enteroaggregative E. coli (EAEC) Negative Negative     Enterotoxigenic E. coli (ETEC) Negative Negative    E. coli O157 NA Negative, NA    Cyclospora cayetanensis Negative Negative    Entamoeba histolytica Negative Negative    Adenovirus F40/41 Negative Negative    Astrovirus Negative Negative    Sapovirus Negative Negative    Narrative    Assay performed using the FDA-cleared FilmArray GI Panel from [employer]  A negative result should not rule out infection in patients with a probability for gastrointestinal infection. The assay does not test for all potential infectious agents of diarrheal disease.  Positive results do not distinguish between a viable or replicating organism and the presence of a nonviable organism or nucleic acid, nor do they exclude the possibility of coinfection by organisms not in the panel.  Results are intended to aid in the diagnosis of illness and are meant to be used in conjunction with other clinical findings.  This test has been verified and is performed by the [facility] at [facility]. This laboratory is certified under the Clinical Laboratory Improvement Amendments of 1988 (CLIA-88) as qualified to perform high complexity clinical laboratory testing.   C. difficile Antigen and Toxins A/B by Enzyme Immunoassay    Specimen: Per Rectum; Stool   Result Value Ref Range    C. difficile GDH Antigen Positive (A) Negative    C. difficile Toxin Positive (A) Negative    Narrative    C. difficile GDH antigen and C. difficile toxin were detected by enzyme immunoassay. Results must be interpreted based on clinical findings and are supportive of C. difficile infection.   ABO/Rh type and screen *Canceled*    Narrative    The following orders were created for panel order ABO/Rh type and screen.  Procedure                               Abnormality         Status                     ---------                               -----------         ------                       Please view results for these  tests on the individual orders.   Prepare red blood cells (unit)   Result Value Ref Range    Blood Component Type Red Blood Cells     Product Code [id number]     Unit Status Transfused     Unit Number [id number]     CROSSMATCH COMPATIBLE     CODING SYSTEM [id number]     ISSUE DATE AND TIME [id number]     UNIT ABO/RH O+     UNIT TYPE ISBT 5100    XR Chest 2 Views    Narrative    EXAM: XR CHEST 2 VIEWS  LOCATION: [facility]  DATE: 9/8/2023    INDICATION: Fever, r o PNA  COMPARISON: 9/5/2023      Impression    IMPRESSION: Heart is normal in size. Small patchy airspace opacity within the right medial lung base. Left lung clear. No effusion.   ABO/Rh type and screen *Canceled*    Narrative    The following orders were created for panel order ABO/Rh type and screen.  Procedure                               Abnormality         Status                     ---------                               -----------         ------                       Please view results for these tests on the individual orders.   WBC and differential    Narrative    The following orders were created for panel order WBC and differential.  Procedure                               Abnormality         Status                     ---------                               -----------         ------                     WBC and Differential[[id number]]         Abnormal            Final result                 Please view results for these tests on the individual orders.   WBC and Differential   Result Value Ref Range    WBC Count 0.8 (LL) 4.0 - 11.0 10e3/uL    % Neutrophils 72 %    % Lymphocytes 15 %    % Monocytes 13 %    % Eosinophils 0 %    % Basophils 0 %    % Immature Granulocytes 0 %    NRBCs per 100 WBC 0 <1 /100    Absolute Neutrophils 0.6 (L) 1.6 - 8.3 10e3/uL    Absolute Lymphocytes 0.1 (L) 0.8 - 5.3 10e3/uL    Absolute Monocytes 0.1 0.0 - 1.3 10e3/uL    Absolute Eosinophils 0.0 0.0 - 0.7 10e3/uL     Absolute Basophils 0.0 0.0 - 0.2 10e3/uL    Absolute Immature Granulocytes 0.0 <=0.4 10e3/uL    Absolute NRBCs 0.0 10e3/uL   Symptomatic Influenza A/B, RSV, & SARS-CoV2 PCR (COVID-19) Nasopharyngeal    Specimen: Nasopharyngeal; Swab   Result Value Ref Range    Influenza A PCR Negative Negative    Influenza B PCR Negative Negative    RSV PCR Negative Negative    SARS CoV2 PCR Negative Negative    Narrative    Testing was performed using the Xpert Xpress CoV2/Flu/RSV Assay on the [employer] Instrument. This test should be ordered for the detection of SARS-CoV-2, influenza, and RSV viruses in individuals who meet clinical and/or epidemiological criteria. Test performance is unknown in asymptomatic patients. This test is for in vitro diagnostic use under the FDA EUA for laboratories certified under CLIA to perform high or moderate complexity testing. This test has not been FDA cleared or approved. A negative result does not rule out the presence of PCR inhibitors in the specimen or target RNA in concentration below the limit of detection for the assay. If only one viral target is positive but coinfection with multiple targets is suspected, the sample should be re-tested with another FDA cleared, approved, or authorized test, if coinfection would change clinical management. This test was validated by the [facility]. These laboratories are certified under the Clinical Laboratory Improvement Amendments of 1988 (CLIA-88) as qualified to perform high complexity laboratory testing.       Imaging  CXR 9/8 - Heart is normal in size. Small patchy airspace opacity within the right medial lung base. Left lung clear. No effusion.           Assessment and Plan:   Assessment: 67 year old female with stage IVB SCC of the vagina admitted for fever, chills, and acute diarrhea in setting of positive C. Difficile, concerning for neutropenic fever.     Problem List  - C. Difficile  - Acute anemia  - Hypoxia of  unknown etiology  - Stage IVB SCC of the vagina  - Urinary retention with indwelling gonsalez  - Catheter associated UTI  - Treatment induced pancytopenia  - HLD  - Osteopenia  - Lichen Sclerosus   - Actinic keratosis    Plan:  Dz: - Stage IVB SCC of the vagina, continued on treatment. She will continue to follow up in clinic as scheduled for ongoing treatment. Currently undergoing C1 cisplatin/radiation.    FEN: She was initially given an IVF bolus in the ED and continued on mIVF. Will monitor electrolytes and replace as needed.   Pain: Home regimen is scheduled tylenol and prn oxycodone 6mg q6h prn. Will continue this pain regimen while inpatient with additional therapy prn.   Heme: Hgb 8.3>6.3> 1u pRBC> 7.9 ; WBC 4.3 (9/1)> 2.3> 2.2> 1.1> 0.8  CV: She has a history of HLD and HTN, not on home meds.  Pulm: Hypoxic in the ED, desats to 96% while talking on 2L NC. CXR consistent with mild atelectasis or mild edema, per Radiology not consistent with infection.  GI: Symptomatic C. Difficile, started on PO vancomycin, will continue x10 days.   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 Follows with urology. Gonsalez catheter was replaced in the ED on 09/04. Was started on ceftriaxone for UTI during prior hospital admission, which was then transitioned to augmentin x7 days at discharge. IV cefepime while inpatient in this admission (see ID) will cover for augmentin coverage while inpatient. Corynebacterim in prior Ucx from last admission, if symptoms not improving on IV cefepime will consider urine sensitivities.   ID: Presentation consistent with neutropenic fever, therefore treating with IV cefepime until 48 hrs afebrile.  Endocrine: no acute issues   Psych/Neuro: no acute issues   PPX: SCDs, IS; will consider lovenox pending labs  Dispo:  once > 48h afebrile    Patient discussed with Dr. Kat.     Hilda Goodwin MD  Obstetrics & Gynecology, PGY-2  09/09/2023 1:44  AM    Team Pager: [phone]       Provider Disclosure:   I agree with above History, Review of Systems, Physical exam and Plan. I have reviewed the content of the documentation and have edited it as needed. I have personally performed the services documented here and the documentation accurately represents those services and the decisions I have made.     Electronically signed by:   Madhuri Johnson MD   Gynecologic Oncology   [facility]

## 2023-09-09 NOTE — PROGRESS NOTES
Gynecology Oncology Progress Note  09/09/2023    HD#2 C. Diff infection, neutropenic fever    Disease: Stage IVB SCC of vagina    24 hour events:   - Admitted  - Diagnosed with C. difficile  - transfusion 1u pRBC  - Started PO vanc, IV cefepime    Subjective: Patient is doing well this morning. Frustrated she is back in the hospital. She has pain that is not particularly well controlled on her home regimen of Tylenol and oxy 5 q6h.  She is having bladder spasms, which are not new for her but are bothersome. Voiding via gonsalez, placed during last admission. Otherwise feeling somewhat better since getting blood transfusion.     Objective:   Vitals:    09/09/23 0526 09/09/23 0545 09/09/23 0645 09/09/23 0829   BP:   (!) 155/81 (!) 172/84   BP Location:    Left arm   Pulse:   116 113   Resp:   19 16   Temp:   99.5  F (37.5  C) 99.2  F (37.3  C)   TempSrc:   Oral Oral   SpO2: 94% 94% 98% 96%   Weight:       Height:           General: NAD, appears comfortable in bed  CV: Well-perfused  Resp: Breathing comfortably on room air  Abdomen: Soft, mildly tender, mildly distended  Extremities: warm, well-perfused, nontender, trace edema  Pelvic: gonsalez catheter in place    I/O last 3 completed shifts:  In: 300   Out: 925 [Urine:925]    Intake/Output (24 Hrs // Since MN)  Blood transfusion: 300 mL // 0    Urine: 925 mL // not recorded  Stool: not recorded // 1x    Recent Labs   Lab 09/09/23  0710 09/08/23  2257 09/08/23 2024 09/05/23  0607 09/04/23  1859   WBC 1.3* 0.8* 1.1* 2.2* 2.3*   RBC 2.59*  --  2.09* 2.78* 3.15*   HGB 7.9*  --  6.3* 8.3* 9.5*   HCT 24.7*  --  19.7* 25.7* 28.7*   MCV 95  --  94 92 91   MCH 30.5  --  30.1 29.9 30.2   MCHC 32.0  --  32.0 32.3 33.1   RDW 15.9*  --  15.5* 13.7 13.4   PLT 67*  --  64* 72* 81*     Recent Labs   Lab 09/09/23  0710 09/08/23 2024 09/05/23  1413 09/05/23  0607 09/04/23  1859   * 130*  --  130*  130* 129*   POTASSIUM 4.0 3.6  --  4.0  4.0 3.9   CHLORIDE 99 98  --  97*  97* 91*    CO2 22 23  --  22  22 25   ANIONGAP 10 9  --  11  11 13   * 139*  --  112* 148*   BUN 12.9 17.2  --  16.3 17.4   CR 0.97* 1.06*  --  0.98* 1.06*   GFRESTIMATED 64 57*  --  63 57*   KIM 7.9* 7.7*  --  8.0* 8.6*   MAG  --   --  2.2 1.1*  --    PROTTOTAL 5.3*  --   --   --  6.1*   ALBUMIN 3.0* 2.9*  --   --  4.0   BILITOTAL 0.7  --   --   --  0.5   ALKPHOS 50  --   --   --  58   AST 27  --   --   --  25   ALT 28  --   --   --  24     Assessment and Plan: Jessie Tamayo is a 67 year old female with stage IVB SCC of the vagina admitted for fever, chills, and acute diarrhea in the setting of positive C. Difficile, concerning for neutropenic fever.    Neutropenic Fever  Pancytopenia  - WBC 1.1> 1.3; ANC 1.0> 0.9; currently stable overall  - Tm/l 102F on 9/9 0055  - D#1 IV cefepime - plan to continue until 48h afebrile  - Likely source is symptomatic c. Difficile, as below; consider urinary source as below.    Anemia  - Hgb 8.3> 6.3> 1u pRBC> 7.9  - Transfuse for hgb <7 or symptomatic hgb <8  - Currently asymptomatic    C. Difficile Infection  - Diagnosed in ED on admission, toxin and antigen positive  - Continue PO vancomycin QID for total of 10 days, on D#2/10 today (will finish afternoon of 9/18)    UTI  - Ucx 9/4 with GBS and corynebacterium   - s/p augmentin BID x2d  - Gonsalez in placed; placed during 9/4 admission  - Corynebacterium likely a contaminant from gonsalez, but will follow up UCx on this admission; consider sensitivities if positive for corynebacterium    Borderline JACQUIE, improving  - Bump in creatinine during prior admission, improved on this admission  - Cr 0.88>> 1.06> 0.97 this AM    Hyponatremia  - Na 130> 131  -  ml/h    Hypoxia  - On 2L NC in ED, desats to 96% when talking on NC  - CXR consistent with mild atelectasis or mild edema; not consistent with infection  - S/p CT PE on 9/5 with no evidence of PE or R heart strain     Stage IVB Carcinoma of the Vagina  - Receiving cis-RT therapy;  [id number] on 8/1-8/29  - Plan for upcoming brachytherapy with Dr. Pacheco  - Will follow up as outpatient with teams as appropriate    #Chronic problems  - HLD - no PTA meds  - HTN - no PTA meds    Larry Doshi MD  OB/GYN PGY-2  09/09/2023 11:37 AM    To reach the GYNECOLOGIC ONCOLOGY resident pager: [phone].    Provider Disclosure:   I agree with above History, Review of Systems, Physical exam and Plan. I have reviewed the content of the documentation and have edited it as needed. I have personally performed the services documented here and the documentation accurately represents those services and the decisions I have made.     Electronically signed by:   Madhuri Johnson MD   Gynecologic Oncology   [facility]

## 2023-09-10 LAB
ALBUMIN SERPL BCG-MCNC: 2.5 G/DL (ref 3.5–5.2)
ALP SERPL-CCNC: 49 U/L (ref 35–104)
ALT SERPL W P-5'-P-CCNC: 22 U/L (ref 0–50)
ANION GAP SERPL CALCULATED.3IONS-SCNC: 14 MMOL/L (ref 7–15)
AST SERPL W P-5'-P-CCNC: 25 U/L (ref 0–45)
BACTERIA UR CULT: NO GROWTH
BASOPHILS # BLD MANUAL: 0 10E3/UL (ref 0–0.2)
BASOPHILS NFR BLD MANUAL: 0 %
BILIRUB SERPL-MCNC: 0.4 MG/DL
BLD PROD TYP BPU: NORMAL
BLOOD COMPONENT TYPE: NORMAL
BUN SERPL-MCNC: 15.6 MG/DL (ref 8–23)
CALCIUM SERPL-MCNC: 7.8 MG/DL (ref 8.8–10.2)
CHLORIDE SERPL-SCNC: 102 MMOL/L (ref 98–107)
CODING SYSTEM: NORMAL
CREAT SERPL-MCNC: 0.81 MG/DL (ref 0.51–0.95)
CROSSMATCH: NORMAL
DEPRECATED HCO3 PLAS-SCNC: 17 MMOL/L (ref 22–29)
EGFRCR SERPLBLD CKD-EPI 2021: 79 ML/MIN/1.73M2
EOSINOPHIL # BLD MANUAL: 0 10E3/UL (ref 0–0.7)
EOSINOPHIL NFR BLD MANUAL: 0 %
ERYTHROCYTE [DISTWIDTH] IN BLOOD BY AUTOMATED COUNT: 16.1 % (ref 10–15)
GLUCOSE SERPL-MCNC: 83 MG/DL (ref 70–99)
HCT VFR BLD AUTO: 20.6 % (ref 35–47)
HGB BLD-MCNC: 6.8 G/DL (ref 11.7–15.7)
ISSUE DATE AND TIME: NORMAL
LYMPHOCYTES # BLD MANUAL: 0.2 10E3/UL (ref 0.8–5.3)
LYMPHOCYTES NFR BLD MANUAL: 17 %
MAGNESIUM SERPL-MCNC: 1.5 MG/DL (ref 1.7–2.3)
MCH RBC QN AUTO: 30.4 PG (ref 26.5–33)
MCHC RBC AUTO-ENTMCNC: 33 G/DL (ref 31.5–36.5)
MCV RBC AUTO: 92 FL (ref 78–100)
MONOCYTES # BLD MANUAL: 0.1 10E3/UL (ref 0–1.3)
MONOCYTES NFR BLD MANUAL: 11 %
NEUTROPHILS # BLD MANUAL: 0.7 10E3/UL (ref 1.6–8.3)
NEUTROPHILS NFR BLD MANUAL: 69 %
PLAT MORPH BLD: ABNORMAL
PLATELET # BLD AUTO: 59 10E3/UL (ref 150–450)
POTASSIUM SERPL-SCNC: 3.9 MMOL/L (ref 3.4–5.3)
PROMYELOCYTES # BLD MANUAL: 0 10E3/UL
PROMYELOCYTES NFR BLD MANUAL: 3 %
PROT SERPL-MCNC: 4.8 G/DL (ref 6.4–8.3)
RBC # BLD AUTO: 2.24 10E6/UL (ref 3.8–5.2)
RBC MORPH BLD: ABNORMAL
SODIUM SERPL-SCNC: 133 MMOL/L (ref 136–145)
UNIT ABO/RH: NORMAL
UNIT NUMBER: NORMAL
UNIT STATUS: NORMAL
UNIT TYPE ISBT: 9500
WBC # BLD AUTO: 1 10E3/UL (ref 4–11)

## 2023-09-10 PROCEDURE — 120N000002 HC R&B MED SURG/OB UMMC

## 2023-09-10 PROCEDURE — 250N000011 HC RX IP 250 OP 636: Performed by: STUDENT IN AN ORGANIZED HEALTH CARE EDUCATION/TRAINING PROGRAM

## 2023-09-10 PROCEDURE — 85027 COMPLETE CBC AUTOMATED: CPT

## 2023-09-10 PROCEDURE — 80053 COMPREHEN METABOLIC PANEL: CPT

## 2023-09-10 PROCEDURE — 258N000003 HC RX IP 258 OP 636

## 2023-09-10 PROCEDURE — 83735 ASSAY OF MAGNESIUM: CPT

## 2023-09-10 PROCEDURE — P9016 RBC LEUKOCYTES REDUCED: HCPCS

## 2023-09-10 PROCEDURE — 250N000013 HC RX MED GY IP 250 OP 250 PS 637

## 2023-09-10 PROCEDURE — 250N000011 HC RX IP 250 OP 636

## 2023-09-10 PROCEDURE — 85007 BL SMEAR W/DIFF WBC COUNT: CPT

## 2023-09-10 PROCEDURE — 36415 COLL VENOUS BLD VENIPUNCTURE: CPT

## 2023-09-10 RX ORDER — CEFEPIME HYDROCHLORIDE 2 G/1
2 INJECTION, POWDER, FOR SOLUTION INTRAVENOUS EVERY 8 HOURS SCHEDULED
Status: DISCONTINUED | OUTPATIENT
Start: 2023-09-10 | End: 2023-09-11

## 2023-09-10 RX ADMIN — ACETAMINOPHEN 1000 MG: 500 TABLET ORAL at 08:30

## 2023-09-10 RX ADMIN — SODIUM CHLORIDE: 9 INJECTION, SOLUTION INTRAVENOUS at 06:04

## 2023-09-10 RX ADMIN — OXYCODONE HYDROCHLORIDE 10 MG: 5 TABLET ORAL at 17:59

## 2023-09-10 RX ADMIN — VANCOMYCIN HYDROCHLORIDE 125 MG: 125 CAPSULE ORAL at 05:49

## 2023-09-10 RX ADMIN — LOPERAMIDE HYDROCHLORIDE 2 MG: 2 CAPSULE ORAL at 08:30

## 2023-09-10 RX ADMIN — CEFEPIME HYDROCHLORIDE 2 G: 2 INJECTION, POWDER, FOR SOLUTION INTRAVENOUS at 13:49

## 2023-09-10 RX ADMIN — CEFEPIME HYDROCHLORIDE 2 G: 2 INJECTION, POWDER, FOR SOLUTION INTRAVENOUS at 02:40

## 2023-09-10 RX ADMIN — VANCOMYCIN HYDROCHLORIDE 125 MG: 125 CAPSULE ORAL at 17:15

## 2023-09-10 RX ADMIN — TOLTERODINE TARTRATE 2 MG: 2 TABLET, FILM COATED ORAL at 20:05

## 2023-09-10 RX ADMIN — SODIUM CHLORIDE: 9 INJECTION, SOLUTION INTRAVENOUS at 17:15

## 2023-09-10 RX ADMIN — OXYCODONE HYDROCHLORIDE 10 MG: 5 TABLET ORAL at 06:09

## 2023-09-10 RX ADMIN — ACETAMINOPHEN 1000 MG: 500 TABLET ORAL at 20:05

## 2023-09-10 RX ADMIN — ACETAMINOPHEN 1000 MG: 500 TABLET ORAL at 02:40

## 2023-09-10 RX ADMIN — VANCOMYCIN HYDROCHLORIDE 125 MG: 125 CAPSULE ORAL at 10:36

## 2023-09-10 RX ADMIN — OXYCODONE HYDROCHLORIDE 10 MG: 5 TABLET ORAL at 11:52

## 2023-09-10 RX ADMIN — ACETAMINOPHEN 1000 MG: 500 TABLET ORAL at 13:49

## 2023-09-10 RX ADMIN — VANCOMYCIN HYDROCHLORIDE 125 MG: 125 CAPSULE ORAL at 22:07

## 2023-09-10 RX ADMIN — LOPERAMIDE HYDROCHLORIDE 2 MG: 2 CAPSULE ORAL at 13:49

## 2023-09-10 RX ADMIN — LOPERAMIDE HYDROCHLORIDE 2 MG: 2 CAPSULE ORAL at 20:05

## 2023-09-10 RX ADMIN — CEFEPIME HYDROCHLORIDE 2 G: 2 INJECTION, POWDER, FOR SOLUTION INTRAVENOUS at 22:07

## 2023-09-10 RX ADMIN — TOLTERODINE TARTRATE 2 MG: 2 TABLET, FILM COATED ORAL at 08:30

## 2023-09-10 RX ADMIN — CALCIUM CARBONATE (ANTACID) CHEW TAB 500 MG 500 MG: 500 CHEW TAB at 08:30

## 2023-09-10 ASSESSMENT — ACTIVITIES OF DAILY LIVING (ADL)
ADLS_ACUITY_SCORE: 44
ADLS_ACUITY_SCORE: 35
ADLS_ACUITY_SCORE: 32
ADLS_ACUITY_SCORE: 35
ADLS_ACUITY_SCORE: 44
ADLS_ACUITY_SCORE: 35
ADLS_ACUITY_SCORE: 32
ADLS_ACUITY_SCORE: 35

## 2023-09-10 NOTE — PLAN OF CARE
Pt is alert and oriented x4, AVSS, afebrile on RA. She has dyspnea on exertion.Pt denies nausea and vomiting, but has numbness and tingling per baseline. She also has tinnitus r/t to her last chemo. She c/o of abd pain oxycodone given. 2 doses of vanco given overnight, as well as cefepime. Enteric precaution maintain r/t to cdf. Thomas is patent and draining adequate output. Tiggered sepsis overnight, lactic was 0.5 NS infusing at 100ml/hr. Skin check completed by 2 RN. 2nd RN Remberto AYALA Finding include bruise on right side of abd as well as  lump on the upper right arm. was Call light within reach, no unmet need at this time.

## 2023-09-10 NOTE — PROVIDER NOTIFICATION
Resident    FYI Hgb level for this morning is 6.8,   there is no conditional RBC transfuse order.   Pt may need blood today.   Thank you   RN. [phone]

## 2023-09-10 NOTE — PROGRESS NOTES
Gynecology Oncology Progress Note  09/10/2023    HD#3 C. Diff infection, neutropenic fever    Disease: Stage IVB SCC of vagina    Subjective: Patient is doing well this morning. Happy to be out of the ED and in a room now. Feels pain has been much better controlled recently. She also reports imodium seems to have helped with the frequency of her diarrheal episodes. Now has more warning of episodes -- first has bladder spasm, then notes diarrhea some time afterwards. Continues to void via gonsalez. No fever or chills.    Objective:   Vitals:    09/09/23 2315 09/09/23 2330 09/10/23 0242 09/10/23 0550   BP: (!) 150/87 (!) 145/72 (!) 148/61 (!) 140/61   BP Location:  Right arm Right arm Right arm   Pulse: 98 94 105 94   Resp: 16  16 16   Temp: 97.8  F (36.6  C)  98.6  F (37  C) 97.7  F (36.5  C)   TempSrc: Oral  Oral Oral   SpO2: 100% 94% 96% 94%   Weight:       Height:           General: NAD, appears comfortable in bed  CV: Well-perfused  Resp: Breathing comfortably on room air  Abdomen: Soft, mildly tender, mildly distended  Extremities: warm, well-perfused, nontender, trace edema  Pelvic: gonsalez catheter in place    I/O last 3 completed shifts:  In: -   Out: 2300 [Urine:2300]    Intake/Output (24 Hrs // Since MN)  Blood transfusion: 300 mL // 0  IV fluids: not recorded // 800    Urine: 2225 mL // 1000  Stool: 5x // 1x    Recent Labs   Lab 09/10/23  0622 09/09/23  0710 09/08/23  2257 09/08/23 2024 09/05/23  0607   WBC 1.0* 1.3* 0.8* 1.1* 2.2*   RBC 2.24* 2.59*  --  2.09* 2.78*   HGB 6.8* 7.9*  --  6.3* 8.3*   HCT 20.6* 24.7*  --  19.7* 25.7*   MCV 92 95  --  94 92   MCH 30.4 30.5  --  30.1 29.9   MCHC 33.0 32.0  --  32.0 32.3   RDW 16.1* 15.9*  --  15.5* 13.7   PLT 59* 67*  --  64* 72*       Recent Labs   Lab 09/10/23  0622 09/09/23  0710 09/08/23 2024 09/05/23  1413 09/05/23  0607 09/04/23  1859   * 131* 130*  --  130*  130* 129*   POTASSIUM 3.9 4.0 3.6  --  4.0  4.0 3.9   CHLORIDE 102 99 98  --  97*  97* 91*    CO2 17* 22 23  --  22  22 25   ANIONGAP 14 10 9  --  11  11 13   GLC 83 106* 139*  --  112* 148*   BUN 15.6 12.9 17.2  --  16.3 17.4   CR 0.81 0.97* 1.06*  --  0.98* 1.06*   GFRESTIMATED 79 64 57*  --  63 57*   KIM 7.8* 7.9* 7.7*  --  8.0* 8.6*   MAG 1.5*  --   --  2.2 1.1*  --    PROTTOTAL 4.8* 5.3*  --   --   --  6.1*   ALBUMIN 2.5* 3.0* 2.9*  --   --  4.0   BILITOTAL 0.4 0.7  --   --   --  0.5   ALKPHOS 49 50  --   --   --  58   AST 25 27  --   --   --  25   ALT 22 28  --   --   --  24       Assessment and Plan: Jessie Tamayo is a 67 year old female with stage IVB SCC of the vagina admitted for fever, chills, and acute diarrhea in the setting of positive C. Difficile, concerning for neutropenic fever.    Neutropenic Fever  Pancytopenia  - WBC 1.1> 1.3> 1.0; ANC 1.0>> 0.7; downtrending slightly  - Tm/l 102F on 9/9 0055  - BCx x2 ordered and pending  - D#2 IV cefepime - plan to continue until 48h afebrile  - Likely source is symptomatic c. Difficile, as below; consider urinary source as below.    Anemia  - Hgb 8.3> 6.3> 1u pRBC> 7.9> 6.8. Will plan to transfuse 1u pRBC  - Transfuse for hgb <7 or symptomatic hgb <8  - Currently asymptomatic    C. Difficile Infection  - Diagnosed in ED on admission, toxin and antigen positive  - Continue PO vancomycin QID for total of 10 days, on D#3/10 today (will finish afternoon of 9/18)    UTI  - Ucx 9/4 with GBS and corynebacterium   - s/p augmentin BID x2d  - Gonsalez in placed; placed during 9/4 admission  - Corynebacterium likely a contaminant from gonsalez; UCx no growth    Borderline JACQUIE, resolved  - Bump in creatinine during prior admission, improved on this admission  - Cr 0.88>> 1.06>> 0.81 this AM - returned to baseline    Hyponatremia  - Na 130> 131  -  ml/h    Hypoxia  - On 2L NC in ED, desats to 96% when talking on NC  - CXR consistent with mild atelectasis or mild edema; not consistent with infection  - S/p CT PE on 9/5 with no evidence of PE or R heart strain      Stage IVB Carcinoma of the Vagina  - Receiving cis-RT therapy; [id number] on 8/1-8/29  - Plan for upcoming brachytherapy with Dr. Pacheco  - Will follow up as outpatient with teams as appropriate    #Chronic problems  - HLD - no PTA meds  - HTN - no PTA meds    Larry Doshi MD  OB/GYN PGY-2  09/10/2023 9:27 AM    To reach the GYNECOLOGIC ONCOLOGY resident pager: [phone].

## 2023-09-10 NOTE — PLAN OF CARE
"[id number]  BP (!) 169/64   Pulse 98   Temp 98.3  F (36.8  C) (Oral)   Resp 18   Ht 1.626 m (5' 4\")   Wt 95.7 kg (211 lb)   LMP 03/08/2008   SpO2 96%   BMI 36.22 kg/m      Afebrile, VSS.   No acute event.   Pt reported doing well overall.   RBC transfused without reaction.   10 mg oxycodone x3 given.   NEURO: Alert and oriented x4.      RESPIRATORY: Room Air, denies dizziness, and SOB. Lungs sound, clear, equal bilateral.     CARDIO: VSS, denies dizziness, and extremity pain.      GI/: Denies N/V, BS active, BM x 1, AUOP via gonsalez.      SKIN: Intact.      ACTIVITY: Stand by assist.      PAIN: Yes, (vaginal spasm)    DLA: gonsalez catheter in place, PIV x2.     BG: No      PLAN:   C. Difficile Infection  - Diagnosed in ED on admission, toxin and antigen positive  - Continue PO vancomycin QID for total of 10 days, on D#3/10 today (will finish afternoon of 9/18)    Continue monitoring.     * Italic, from provider's note.        Goal Outcome Evaluation:        "

## 2023-09-11 ENCOUNTER — ANESTHESIA EVENT (OUTPATIENT)
Dept: SURGERY | Facility: CLINIC | Age: 68
DRG: 754 | End: 2023-09-11
Payer: COMMERCIAL

## 2023-09-11 ENCOUNTER — PRE VISIT (OUTPATIENT)
Dept: SURGERY | Facility: CLINIC | Age: 68
End: 2023-09-11

## 2023-09-11 ENCOUNTER — APPOINTMENT (OUTPATIENT)
Dept: RADIATION ONCOLOGY | Facility: CLINIC | Age: 68
End: 2023-09-11
Attending: RADIOLOGY
Payer: COMMERCIAL

## 2023-09-11 LAB
ANION GAP SERPL CALCULATED.3IONS-SCNC: 8 MMOL/L (ref 7–15)
BASOPHILS # BLD AUTO: 0 10E3/UL (ref 0–0.2)
BASOPHILS NFR BLD AUTO: 0 %
BUN SERPL-MCNC: 14 MG/DL (ref 8–23)
CALCIUM SERPL-MCNC: 8 MG/DL (ref 8.8–10.2)
CHLORIDE SERPL-SCNC: 103 MMOL/L (ref 98–107)
CREAT SERPL-MCNC: 0.79 MG/DL (ref 0.51–0.95)
DEPRECATED HCO3 PLAS-SCNC: 22 MMOL/L (ref 22–29)
EGFRCR SERPLBLD CKD-EPI 2021: 82 ML/MIN/1.73M2
EOSINOPHIL # BLD AUTO: 0 10E3/UL (ref 0–0.7)
EOSINOPHIL NFR BLD AUTO: 0 %
ERYTHROCYTE [DISTWIDTH] IN BLOOD BY AUTOMATED COUNT: 16 % (ref 10–15)
GLUCOSE SERPL-MCNC: 94 MG/DL (ref 70–99)
HCT VFR BLD AUTO: 24.6 % (ref 35–47)
HGB BLD-MCNC: 7.8 G/DL (ref 11.7–15.7)
IMM GRANULOCYTES # BLD: 0.1 10E3/UL
IMM GRANULOCYTES NFR BLD: 4 %
LYMPHOCYTES # BLD AUTO: 0.1 10E3/UL (ref 0.8–5.3)
LYMPHOCYTES NFR BLD AUTO: 11 %
MAGNESIUM SERPL-MCNC: 1.3 MG/DL (ref 1.7–2.3)
MAGNESIUM SERPL-MCNC: 2.1 MG/DL (ref 1.7–2.3)
MCH RBC QN AUTO: 30.2 PG (ref 26.5–33)
MCHC RBC AUTO-ENTMCNC: 31.7 G/DL (ref 31.5–36.5)
MCV RBC AUTO: 95 FL (ref 78–100)
MONOCYTES # BLD AUTO: 0.2 10E3/UL (ref 0–1.3)
MONOCYTES NFR BLD AUTO: 13 %
NEUTROPHILS # BLD AUTO: 0.9 10E3/UL (ref 1.6–8.3)
NEUTROPHILS NFR BLD AUTO: 72 %
NRBC # BLD AUTO: 0 10E3/UL
NRBC BLD AUTO-RTO: 0 /100
PLATELET # BLD AUTO: 52 10E3/UL (ref 150–450)
POTASSIUM SERPL-SCNC: 3.6 MMOL/L (ref 3.4–5.3)
RBC # BLD AUTO: 2.58 10E6/UL (ref 3.8–5.2)
SODIUM SERPL-SCNC: 133 MMOL/L (ref 136–145)
WBC # BLD AUTO: 1.2 10E3/UL (ref 4–11)

## 2023-09-11 PROCEDURE — 250N000013 HC RX MED GY IP 250 OP 250 PS 637

## 2023-09-11 PROCEDURE — 99232 SBSQ HOSP IP/OBS MODERATE 35: CPT | Mod: GC | Performed by: OBSTETRICS & GYNECOLOGY

## 2023-09-11 PROCEDURE — 36415 COLL VENOUS BLD VENIPUNCTURE: CPT | Performed by: STUDENT IN AN ORGANIZED HEALTH CARE EDUCATION/TRAINING PROGRAM

## 2023-09-11 PROCEDURE — 250N000011 HC RX IP 250 OP 636

## 2023-09-11 PROCEDURE — 83735 ASSAY OF MAGNESIUM: CPT | Performed by: STUDENT IN AN ORGANIZED HEALTH CARE EDUCATION/TRAINING PROGRAM

## 2023-09-11 PROCEDURE — 250N000011 HC RX IP 250 OP 636: Performed by: OBSTETRICS & GYNECOLOGY

## 2023-09-11 PROCEDURE — 85025 COMPLETE CBC W/AUTO DIFF WBC: CPT | Performed by: STUDENT IN AN ORGANIZED HEALTH CARE EDUCATION/TRAINING PROGRAM

## 2023-09-11 PROCEDURE — 77386 HC IMRT TREATMENT DELIVERY, COMPLEX: CPT | Performed by: RADIOLOGY

## 2023-09-11 PROCEDURE — 120N000002 HC R&B MED SURG/OB UMMC

## 2023-09-11 PROCEDURE — 250N000011 HC RX IP 250 OP 636: Performed by: STUDENT IN AN ORGANIZED HEALTH CARE EDUCATION/TRAINING PROGRAM

## 2023-09-11 PROCEDURE — 80048 BASIC METABOLIC PNL TOTAL CA: CPT | Performed by: STUDENT IN AN ORGANIZED HEALTH CARE EDUCATION/TRAINING PROGRAM

## 2023-09-11 PROCEDURE — 83735 ASSAY OF MAGNESIUM: CPT | Performed by: OBSTETRICS & GYNECOLOGY

## 2023-09-11 PROCEDURE — 77427 RADIATION TX MANAGEMENT X5: CPT | Performed by: RADIOLOGY

## 2023-09-11 PROCEDURE — 258N000003 HC RX IP 258 OP 636

## 2023-09-11 PROCEDURE — 77336 RADIATION PHYSICS CONSULT: CPT | Performed by: RADIOLOGY

## 2023-09-11 PROCEDURE — 36415 COLL VENOUS BLD VENIPUNCTURE: CPT | Performed by: OBSTETRICS & GYNECOLOGY

## 2023-09-11 RX ORDER — MAGNESIUM SULFATE HEPTAHYDRATE 40 MG/ML
4 INJECTION, SOLUTION INTRAVENOUS ONCE
Status: COMPLETED | OUTPATIENT
Start: 2023-09-11 | End: 2023-09-11

## 2023-09-11 RX ORDER — LORAZEPAM 0.5 MG/1
0.5 TABLET ORAL ONCE
Status: COMPLETED | OUTPATIENT
Start: 2023-09-11 | End: 2023-09-12

## 2023-09-11 RX ADMIN — CALCIUM CARBONATE (ANTACID) CHEW TAB 500 MG 500 MG: 500 CHEW TAB at 08:01

## 2023-09-11 RX ADMIN — VANCOMYCIN HYDROCHLORIDE 125 MG: 125 CAPSULE ORAL at 11:27

## 2023-09-11 RX ADMIN — PROCHLORPERAZINE EDISYLATE 5 MG: 5 INJECTION INTRAMUSCULAR; INTRAVENOUS at 20:14

## 2023-09-11 RX ADMIN — TOLTERODINE TARTRATE 2 MG: 2 TABLET, FILM COATED ORAL at 20:15

## 2023-09-11 RX ADMIN — CEFEPIME HYDROCHLORIDE 2 G: 2 INJECTION, POWDER, FOR SOLUTION INTRAVENOUS at 05:47

## 2023-09-11 RX ADMIN — ACETAMINOPHEN 1000 MG: 500 TABLET ORAL at 01:47

## 2023-09-11 RX ADMIN — ACETAMINOPHEN 1000 MG: 500 TABLET ORAL at 08:01

## 2023-09-11 RX ADMIN — OXYCODONE HYDROCHLORIDE 10 MG: 5 TABLET ORAL at 17:13

## 2023-09-11 RX ADMIN — VANCOMYCIN HYDROCHLORIDE 125 MG: 125 CAPSULE ORAL at 22:46

## 2023-09-11 RX ADMIN — OXYCODONE HYDROCHLORIDE 10 MG: 5 TABLET ORAL at 11:27

## 2023-09-11 RX ADMIN — SODIUM CHLORIDE: 9 INJECTION, SOLUTION INTRAVENOUS at 01:48

## 2023-09-11 RX ADMIN — ACETAMINOPHEN 1000 MG: 500 TABLET ORAL at 15:11

## 2023-09-11 RX ADMIN — OXYCODONE HYDROCHLORIDE 10 MG: 5 TABLET ORAL at 05:47

## 2023-09-11 RX ADMIN — MAGNESIUM SULFATE IN WATER 4 G: 40 INJECTION, SOLUTION INTRAVENOUS at 16:17

## 2023-09-11 RX ADMIN — LOPERAMIDE HYDROCHLORIDE 2 MG: 2 CAPSULE ORAL at 08:01

## 2023-09-11 RX ADMIN — PROCHLORPERAZINE EDISYLATE 5 MG: 5 INJECTION INTRAMUSCULAR; INTRAVENOUS at 07:59

## 2023-09-11 RX ADMIN — OXYCODONE HYDROCHLORIDE 10 MG: 5 TABLET ORAL at 00:04

## 2023-09-11 RX ADMIN — VANCOMYCIN HYDROCHLORIDE 125 MG: 125 CAPSULE ORAL at 16:21

## 2023-09-11 RX ADMIN — OXYCODONE HYDROCHLORIDE 10 MG: 5 TABLET ORAL at 23:35

## 2023-09-11 RX ADMIN — TOLTERODINE TARTRATE 2 MG: 2 TABLET, FILM COATED ORAL at 08:01

## 2023-09-11 RX ADMIN — VANCOMYCIN HYDROCHLORIDE 125 MG: 125 CAPSULE ORAL at 04:33

## 2023-09-11 ASSESSMENT — ACTIVITIES OF DAILY LIVING (ADL)
ADLS_ACUITY_SCORE: 33
ADLS_ACUITY_SCORE: 32
ADLS_ACUITY_SCORE: 32
ADLS_ACUITY_SCORE: 33
ADLS_ACUITY_SCORE: 33
ADLS_ACUITY_SCORE: 32
ADLS_ACUITY_SCORE: 35
ADLS_ACUITY_SCORE: 33
ADLS_ACUITY_SCORE: 32

## 2023-09-11 NOTE — PROGRESS NOTES
Gynecology Oncology Progress Note  09/12/2023    HD#5 C. Diff infection, neutropenic fever    Disease: Stage IVB SCC of vagina    Subjective: Patient is doing ok this morning. Had a less good night, was awake more due to abdominal cramping/pain. Got up a number of times but just passed gas, no stool. Felt very warm at one point but no fever. Tolerating liquids fine but reports still having vomiting when eating solids - tried hamburger for dinner but threw it up. Taking compazine which doesn't work as well for her as zofran.    Objective:   Vitals:    09/11/23 1443 09/11/23 2236 09/11/23 2248 09/12/23 0210   BP: (!) 161/72 (!) 186/88 (!) 167/81 (!) 165/78   BP Location: Left arm Right arm  Left arm   Pulse: 96 98  101   Resp: 18 18  18   Temp: 98.2  F (36.8  C) 99.4  F (37.4  C)  98.7  F (37.1  C)   TempSrc: Oral Oral  Oral   SpO2: 96% 96%  95%   Weight:       Height:         General: NAD, appears comfortable  CV: Well-perfused  Resp: Breathing comfortably on room air  Abdomen: Soft, mildly tender across lower quadrants, mildly distended  Extremities: warm, well-perfused, nontender, trace edema  Pelvic: gonsalez catheter in place, draining pyridium-colored urine    I/O last 3 completed shifts:  In: 1050 [P.O.:250; I.V.:800]  Out: 4600 [Urine:4450; Emesis/NG output:150]    Intake/Output (24 Hrs // Since MN)  IV fluids: 800 mL // 20  Urine: 4450 mL // 1275 mL  Emesis: 150 mL // 0  Stool: x0 // x1    Labs/Imaging:  None new    Assessment and Plan: Jessie Tamayo is a 67 year old female with stage IVB SCC of the vagina admitted for fever, chills, and acute diarrhea found to have positive C. Difficile and neutropenic fever. Clinically improving, now meeting goals for discharge.    Neutropenic Fever  Pancytopenia  - Continue to trend WBC, ANC  - Last febrile on 9/9 at 0055  - Blood and urine cultures no growth to date  - S/p 36h empiric IV cefepime.   - Likely source is symptomatic c. Difficile, as below  - Start Lovenox ppx  if Plts >100k (currently 50s-60s)    Anemia  - Has received 2u pRBC since admission, Hgb with appropriate rise  - Transfuse for hgb <7 or symptomatic hgb <8  - Currently asymptomatic    C. Difficile Infection  - Diagnosed in ED on admission, toxin and antigen positive  - Continue PO vancomycin QID for total of 10 days, on D#5/10 today (will finish afternoon of 9/18)  - Continue imodium for symptomatic relief    Recent UTI  - Ucx 9/4 with GBS and corynebacterium > s/p augmentin BID x2d  - Gonsalez in place; exchanged last during 9/4 admission when UTI diagnosed  - Corynebacterium likely a contaminant from gonsalez - repeat UCx this admission no growth    Hyponatremia  - Na 130> 131> 133  -  ml/h    Borderline JACQUIE, resolved  - Cr now at baseline    Hypoxia, resolved  - Initially on 2L NC in ED, workup negative  - Now on room air    Stage IVB Carcinoma of the Vagina  - Receiving cis-RT therapy; [id number] on 8/1-8/29.  - S/p radiation tx yesterday as scheduled  - Re-staging MRI w/ vaginal gel completed was supposed to be completed last night, MRI unable to accommodate - plan to try for this morning as to not hold up anticipated discharge  - Plan for upcoming brachytherapy with Dr. Pacheco  - Will follow up as outpatient with teams as appropriate    Chronic problems  - HLD - no PTA meds  - HTN - no PTA meds    Amarilys Ty MD  Gynecologic Oncology, PGY-4  09/12/23 6:00 AM   Team pager: [phone]      I have seen and examined the patient.  I have reviewed and edited Dr. Ty's note above.  Plan was for discharge to home today, but patient developed new persistent tachycardia.   -Lactate normal, CBC and BMP pending.   -Will get EKG.   -If tachycardia persistent, will start telemetry.     Reina Stahl MD, MS, FACOG, FACS  9/12/2023  4:15 PM

## 2023-09-11 NOTE — PLAN OF CARE
"BP (!) 187/83 (BP Location: Right arm)   Pulse 81   Temp 98.6  F (37  C) (Oral)   Resp 18   Ht 1.626 m (5' 4\")   Wt 95.7 kg (211 lb)   LMP 03/08/2008   SpO2 94%   BMI 36.22 kg/m     Time: 9600-8813  Reason for admission: C-difficile.   Activity: SBA  Pain: C/o ABD area pain, received scheduled Tylenol, and it was effective.   Neuro: alert and oriented.   Cardiac: WDL  Resp: denied SOB, lung sounds clear bilaterally.   GI/: Regular diet, Thomas, bowel sounds present x four quadrants.   Lines: R and L PIV, L PIV, NS infusing at 100ml/hr.   Skin: WDL X  Labs/Imaging: no lab or imaging this shift.   New changes to shift: no change.   Plan: continue with current plan of cares.                         "

## 2023-09-11 NOTE — PLAN OF CARE
Goal Outcome Evaluation:  Pt afebrile, vitals stable. Pt sleeping between cares. Abd, dist, soft, +bs, +gas. Pt rated her pain an 8 stating it felt like spasms. Pain managed with oxycodone 10 mg q 6hr and scheduled tylenol. At one point pt stated she had no pain and rated it a 0. Pt up to BR with SBA, gas only no bm. IVF at 100/hr via piv. Thomas patent with large uo.Antibiotics administered as scheduled. Cont to maintain pain control. Monitor for s/s of infection.    Addendum: Pt scheduled for MRI today. Requests that ativan 0.5mg po be given 45 in advance of the test due to anxiety r/ to the MRI noise.

## 2023-09-11 NOTE — PROGRESS NOTES
Gynecology Oncology Progress Note  09/11/2023    HD#4 C. Diff infection, neutropenic fever    Disease: Stage IVB SCC of vagina    Subjective: Patient is doing well this morning.  No bothersome pain.  Only got up once overnight and passed gas rather than having a diarrheal episode.  Gonsalez in place.  No fever or chills.    Objective:   Vitals:    09/10/23 2156 09/11/23 0144 09/11/23 0146 09/11/23 0542   BP: (!) 158/73 (!) 173/72 (!) 162/71 (!) 162/72   BP Location: Right arm Left arm Right arm Left arm   Pulse: 99 100  91   Resp:    16   Temp: 98.7  F (37.1  C) 99  F (37.2  C)  98.2  F (36.8  C)   TempSrc: Oral Oral  Oral   SpO2: 94% 92%  94%   Weight:       Height:           General: NAD, appears comfortable in bed  CV: Well-perfused  Resp: Breathing comfortably on room air  Abdomen: Soft, mildly tender across lower belly, mildly distended  Extremities: warm, well-perfused, nontender, trace edema  Pelvic: gonsalez catheter in place, draining pyridium-colored urine with streaks of mucous    I/O last 3 completed shifts:  In: 2888.33 [P.O.:490; I.V.:2398.33]  Out: 2275 [Urine:2275]    Intake/Output (24 Hrs // Since MN)  Blood transfusion: 600 mL // 0  IV fluids: 2400 mL // NR    Urine: 2275 mL // 1175 mL  Stool: 2x // 0x    Recent Labs   Lab 09/10/23  0622 09/09/23  0710 09/08/23  2257 09/08/23 2024 09/05/23  0607   WBC 1.0* 1.3* 0.8* 1.1* 2.2*   RBC 2.24* 2.59*  --  2.09* 2.78*   HGB 6.8* 7.9*  --  6.3* 8.3*   HCT 20.6* 24.7*  --  19.7* 25.7*   MCV 92 95  --  94 92   MCH 30.4 30.5  --  30.1 29.9   MCHC 33.0 32.0  --  32.0 32.3   RDW 16.1* 15.9*  --  15.5* 13.7   PLT 59* 67*  --  64* 72*     Recent Labs   Lab 09/10/23  0622 09/09/23  0710 09/08/23 2024 09/05/23  1413 09/05/23  0607 09/04/23  1859   * 131* 130*  --  130*  130* 129*   POTASSIUM 3.9 4.0 3.6  --  4.0  4.0 3.9   CHLORIDE 102 99 98  --  97*  97* 91*   CO2 17* 22 23  --  22  22 25   ANIONGAP 14 10 9  --  11  11 13   GLC 83 106* 139*  --  112* 148*    BUN 15.6 12.9 17.2  --  16.3 17.4   CR 0.81 0.97* 1.06*  --  0.98* 1.06*   GFRESTIMATED 79 64 57*  --  63 57*   KIM 7.8* 7.9* 7.7*  --  8.0* 8.6*   MAG 1.5*  --   --  2.2 1.1*  --    PROTTOTAL 4.8* 5.3*  --   --   --  6.1*   ALBUMIN 2.5* 3.0* 2.9*  --   --  4.0   BILITOTAL 0.4 0.7  --   --   --  0.5   ALKPHOS 49 50  --   --   --  58   AST 25 27  --   --   --  25   ALT 22 28  --   --   --  24     Assessment and Plan: Jessie Tamayo is a 67 year old female with stage IVB SCC of the vagina admitted for fever, chills, and acute diarrhea found to have positive C. Difficile and neutropenic fever.    # C diff: she is on D3 vancomycin PO x10d, symptoms managed with imodium.       # Neutropenic fevers with pancytopenia: UCx and BCx are NGTD, we are trending her ANC, this morning 700.  She received 2u pRBC since admission, and thrombocytopenia is stable at 59.  Currently on cefepime and last febrile on 9/9 at 12:55am.      # Hypoxia on admission: previously on NC, now resolved.  Admission CXR with atelectasis, CT/PE from prior admission on 9/5 no evidence of PE.  Low concern for clots.       Neutropenic Fever  Pancytopenia  - Continue to trend WBC, ANC  - Last febrile on 9/9 at 0055  - Blood and urine cultures no growth to date  - Continue empiric IV cefepime. Planned to discontinue after 48h afebrile however ordered for additional 24h given ANC was continuing to downtrend. Anticipate last dose tonight.  - Likely source is symptomatic c. Difficile, as below  - Start Lovenox ppx if Plts >100k (currently 50s-60s)    Anemia  - Has received 2u pRBC since admission, Hgb this AM as above  - Transfuse for hgb <7 or symptomatic hgb <8  - Currently asymptomatic    C. Difficile Infection  - Diagnosed in ED on admission, toxin and antigen positive  - Continue PO vancomycin QID for total of 10 days, on D#4/10 today (will finish afternoon of 9/18)  - Continue imodium for symptomatic relief    Recent UTI  - Ucx 9/4 with GBS and  corynebacterium > s/p augmentin BID x2d  - Gonsalez in place; exchanged last during 9/4 admission when UTI diagnosed  - Corynebacterium likely a contaminant from gonsalez - repeat UCx this admission no growth    Borderline JACQUIE, resolved  - Bump in creatinine during prior admission, improved on this admission  - Cr 0.88>> 1.06>> 0.81 this AM - returned to baseline    Hyponatremia  - Na 130> 131> 133  -  ml/h    Hypoxia, resolved  - Initially on 2L NC in ED for SpO2 in low 90s  - CXR consistent with mild atelectasis or mild edema; not consistent with infection  - S/p CT PE on 9/5 with no evidence of PE or R heart strain   - Now on room air    Stage IVB Carcinoma of the Vagina  - Receiving cis-RT therapy; [id number] on 8/1-8/29  - Plan for upcoming brachytherapy with Dr. Pacheco  - Will follow up as outpatient with teams as appropriate    #Chronic problems  - HLD - no PTA meds  - HTN - no PTA meds      Sun Levin MD, PGY-3  5:51 AM  [facility]    Team pager: [phone]      I have seen and examined the patient.  I have reviewed and edited Dr. Levin's note above.  Clinically stable.   Labs nadired and starting to trend up.  Will get re-staging pelvic MRI with vaginal gel during hospitalization.  Will plan for interstitial brachytherapy next week as scheduled as long and Jessie remains clinically stable.    Reina Stahl MD, MS, FACOG, FACS  9/11/2023  2:25 PM

## 2023-09-11 NOTE — DISCHARGE SUMMARY
Gynecologic Oncology Discharge Summary    Jessie Tamayo  [id number]    Admit Date: 9/8/2023  Discharge Date: 9/13/2023  Admitting Provider: Madhuri Johnson MD  Discharge Provider: Reina Stahl MD    Admission Dx:   - Stage IVB SCC of the vagina  - Urinary retention with indwelling gonsalez  - Recent catheter associated UTI  - Treatment induced pancytopenia  - HLD  - Osteopenia  - Lichen Sclerosus   - Actinic keratosis  - Neutropenic fever  - Abdominal pain  - Diarrhea    Discharge Dx:  - Suprapubic pain  - Stage IVB SCC of the vagina  - Urinary retention with indwelling gonsalez  - Recent catheter associated UTI  - Treatment induced pancytopenia  - HLD  - Osteopenia  - Lichen Sclerosus   - Actinic keratosis  - Hypoxia, resolved  - Neutropenic fever, resolved  - Abdominal pain, resolved  - Diarrhea, resolved  - C Diff colitis  - HTN    Patient Active Problem List   Diagnosis    Hyperlipidemia LDL goal <130    Recurrent cold sores    Hearing loss    Pap smear of cervix with ASCUS, cannot exclude HGSIL    Introital dyspareunia    Vaginal atrophy    Cervical high risk HPV (human papillomavirus) test positive    Atrophic vaginitis    Class 1 obesity due to excess calories without serious comorbidity with body mass index (BMI) of 34.0 to 34.9 in adult    Urethral bleeding    Vaginal cancer (H)    Benign neoplasm of colon    Morbid obesity (H)    SCC (squamous cell carcinoma)    Hypomagnesemia    Lower abdominal pain    Urinary tract infection associated with indwelling urethral catheter, initial encounter (H)    Vulvar cancer (H)    C. difficile diarrhea    Pancytopenia (H)    Sepsis, due to unspecified organism, unspecified whether acute organ dysfunction present (H)       Procedures:   - None    Prior to Admission Medications:  Medications Prior to Admission   Medication Sig Dispense Refill Last Dose    acetaminophen (TYLENOL) 500 MG tablet Take 500-1,000 mg by mouth every 4 hours as needed for mild pain   9/8/2023 at pm     calcium carbonate 750 MG CHEW Take 750 mg by mouth 3 times daily as needed   Past Week at prn    clotrimazole (LOTRIMIN) 1 % external cream Apply topically 2 times daily To affected areas 45 g 3 Past Month at prn    docusate sodium (COLACE) 100 MG capsule Take 100 mg by mouth daily as needed for constipation   Past Week at prn    magnesium oxide (MAG-OX) 400 MG tablet Take 1 tablet (400 mg) by mouth daily 30 tablet 0 9/8/2023    NONFORMULARY Cavalon cream   9/8/2023    ondansetron (ZOFRAN) 8 MG tablet Take 1 tablet (8 mg) by mouth every 8 hours as needed for nausea (vomiting) Do not take for 3 days after chemo. 30 tablet 2 Past Week at prn    oxyCODONE (ROXICODONE) 5 MG tablet Take 1 tablet (5 mg) by mouth every 6 hours as needed for pain 15 tablet 0 9/8/2023 at pm    phenazopyridine (PYRIDIUM) 200 MG tablet Take 1 tablet (200 mg) by mouth 3 times daily as needed for irritation 21 tablet 0 9/8/2023 at pm    prochlorperazine (COMPAZINE) 10 MG tablet Take 1 tablet (10 mg) by mouth every 6 hours as needed for nausea or vomiting 30 tablet 2 Past Month at prn    simethicone (MYLICON) 125 MG chewable tablet Take 125 mg by mouth 4 times daily as needed for intestinal gas   Past Month at prn    tolterodine (DETROL) 2 MG tablet Take 1 tablet (2 mg) by mouth 2 times daily 60 tablet 3 9/8/2023 at pm    triamcinolone (KENALOG) 0.1 % external cream Apply topically 2 times daily 15 g 1 Past Month at prn    LORazepam (ATIVAN) 0.5 MG tablet Take one tablet po 30-45 min before MRI scan, may repeat times one if needed, bring bottle with you to MRI 2 tablet 0     [DISCONTINUED] loperamide (IMODIUM A-D) 2 MG tablet Take 2-4 mg by mouth 4 times daily as needed for diarrhea   9/8/2023       Discharge Medications:     Review of your medicines        START taking        Dose / Directions   amLODIPine 5 MG tablet  Commonly known as: NORVASC  Used for: Essential hypertension      Dose: 5 mg  Start taking on: September 14, 2023  Take 1 tablet  (5 mg) by mouth daily  Quantity: 30 tablet  Refills: 3     magic mouthwash suspension (diphenhydrAMINE, lidocaine, aluminum-magnesium & simethicone) compounding kit  Used for: Mucositis      Dose: 10 mL  Swish and swallow 10 mLs in mouth 4 times daily (before meals and nightly)  Quantity: 237 mL  Refills: 3     vancomycin 125 MG capsule  Commonly known as: VANCOCIN  Indication: Clostridium difficile Bacteria      Dose: 125 mg  Take 1 capsule (125 mg) by mouth 4 times daily for 38 doses  Quantity: 38 capsule  Refills: 0            CONTINUE these medicines which may have CHANGED, or have new prescriptions. If we are uncertain of the size of tablets/capsules you have at home, strength may be listed as something that might have changed.        Dose / Directions   clotrimazole 1 % external cream  Commonly known as: LOTRIMIN  This may have changed:   when to take this  reasons to take this  Used for: Vaginal cancer (H)      Apply topically 2 times daily To affected areas  Quantity: 45 g  Refills: 3     oxyCODONE 5 MG tablet  Commonly known as: ROXICODONE  This may have changed: when to take this  Used for: Lower abdominal pain      Dose: 5 mg  Take 1 tablet (5 mg) by mouth every 6 hours as needed for pain  Quantity: 15 tablet  Refills: 0     phenazopyridine 200 MG tablet  Commonly known as: PYRIDIUM  This may have changed: when to take this  Used for: Acute cystitis with hematuria      Dose: 200 mg  Take 1 tablet (200 mg) by mouth 3 times daily as needed for irritation  Quantity: 21 tablet  Refills: 0            CONTINUE these medicines which have NOT CHANGED        Dose / Directions   acetaminophen 500 MG tablet  Commonly known as: TYLENOL      Dose: 500-1,000 mg  Take 500-1,000 mg by mouth every 4 hours as needed for mild pain  Refills: 0     calcium carbonate 750 MG Chew      Dose: 750 mg  Take 750 mg by mouth 3 times daily as needed  Refills: 0     docusate sodium 100 MG capsule  Commonly known as: COLACE      Dose:  100 mg  Take 100 mg by mouth daily as needed for constipation  Refills: 0     LORazepam 0.5 MG tablet  Commonly known as: ATIVAN  Used for: Vaginal cancer (H)      Take one tablet po 30-45 min before MRI scan, may repeat times one if needed, bring bottle with you to MRI  Quantity: 2 tablet  Refills: 0     magnesium oxide 400 MG tablet  Commonly known as: MAG-OX  Used for: Hypomagnesemia      Dose: 400 mg  Take 1 tablet (400 mg) by mouth daily  Quantity: 30 tablet  Refills: 0     NONFORMULARY      Cavalon cream  Refills: 0     ondansetron 8 MG tablet  Commonly known as: ZOFRAN  Used for: Vaginal cancer (H), SCC (squamous cell carcinoma)      Dose: 8 mg  Take 1 tablet (8 mg) by mouth every 8 hours as needed for nausea (vomiting) Do not take for 3 days after chemo.  Quantity: 30 tablet  Refills: 2     prochlorperazine 10 MG tablet  Commonly known as: COMPAZINE  Used for: Vaginal cancer (H), SCC (squamous cell carcinoma)      Dose: 10 mg  Take 1 tablet (10 mg) by mouth every 6 hours as needed for nausea or vomiting  Quantity: 30 tablet  Refills: 2     simethicone 125 MG chewable tablet  Commonly known as: MYLICON      Dose: 125 mg  Take 125 mg by mouth 4 times daily as needed for intestinal gas  Refills: 0     tolterodine 2 MG tablet  Commonly known as: DETROL  Used for: Bladder spasm      Dose: 2 mg  Take 1 tablet (2 mg) by mouth 2 times daily  Quantity: 60 tablet  Refills: 3     triamcinolone 0.1 % external cream  Commonly known as: KENALOG  Used for: Itching, Dermatitis      Apply topically 2 times daily  Quantity: 15 g  Refills: 1            STOP taking      loperamide 2 MG tablet  Commonly known as: IMODIUM A-D                  Where to get your medicines        These medications were sent to [facility] [place], [place] - [address]  [address] [address]      Phone: [phone]   amLODIPine 5 MG tablet  magic mouthwash suspension (diphenhydrAMINE, lidocaine,  aluminum-magnesium & simethicone) compounding kit  vancomycin 125 MG capsule         Consultations:  - None    Brief History of Illness:  From H&P: Jessie Tamayo is a 67 year old female with stage IVB SCC Of the vagina who presented to the ED with 2 days of worsening watery diarrhea, fevers, and chills prior to admission. She sought care because her watery diarrhea increased significantly, began to feel very weak, lightheaded, dizzy, could not ambulate on her own to the bathroom due to weakness. She experienced nausea. Denied vomiting, shortness of breath, cough, chest pain, leg pain, swelling, erythema, known sick contacts.      She has had her gonsalez in continuously since discharge from her recent hospital stay 9/5/23. She was prescribed augmentin at discharge and has been taking this for about 2 days.     Her stool studies were positive for C. Diff.     Hospital Course:  Dz:   - Stage IVB SCC of vagina (dx 5/30/23, urol bx), c/b urethral obstruction (gonsalez initially placed 7/27). Weekly cis/radiation since 8/1, last tx 9/6/23.  FEN:   - She was maintained on IVF until resolution of sepsis and when tolerating adequate PO.  Her labs showed hyponatremia that remained stable during admission. Electrolyte abnormalities were corrected and stable at the time of discharge.  Pain:   - Her pain from bladder spasms was controlled with PTA medications, which were made available throughout her admission (see ).  CV:   - She has a chronic history of HTN and HLD, no PTA meds. Due to persistent HTN, she was started on amlodipine 5 mg daily, with good improvement in BP control (plan to follow up with PCP for management of this concern). Her vital signs were otherwise stable while in house and she had no acute CV issues.  PULM:   - She was initially maintained on 2L NC in the ED for SpO2 in low 90's. A CXR and CT PE ruled out any acute pathology. On most recent admission she experienced hypoxia as well. This resolved and she was  stable and without dyspnea on room air prior to discharge.   HEME:   - In the ED her Hgb was found to be 6.3, so 1u pRBC ordered. She received another unit while admitted (total transfusion of 2u pRBCs), and Hgb was stable on discharge  - She has pancytopenia from her cancer treatment. Her platelets and ANC were low but stable throughout this admission (Platelet rodger of 38, ANC rodger of 0.7>0.9 on discharge).  GI:   - See HPI for discussion of C. Diff diagnosis. Diarrhea improved with imodium therapy, which was able to be discontinued after a few days of PO Vancomycin treatment. She was discharged with a total of a 14-day course of PO Vancomycin for treatment of C. Diff.  :    - She has a chronic indwelling gonsalez for urethral obstruction. She had a recent UTI, and Ucx (9/4) grew GBS & corynebacterium, now is s/p augmentin BID x2d. Corynebacterium was likely a gonsalez contaminant as repeat urine culture showed no growth.   - History of bladder spasms - she received PTA pyridium and tolterodine.  ID:   - Admitted for neutropenic fever, she was last febrile on 09/09/23. Her blood and urine cultures showed no growth. She was started on empiric IV cefepime for 36 hours. She remained stable after discontinuation of this medication. WBC 4.8 on admission, rodger of 0.8, 1.2 on discharge; LA normal throughout admission. She was afebrile and hemodynamically stable at the time of discharge.  ENDO:   - No issues.  PSYCH/NEURO:   - No issues.  PPX:    - She was given SCDs during her hospital course. Lovenox was not started due to thrombocytopenia.  She tolerated these prophylactic interventions without incident.  They were discontinued at the time of her discharge.      Discharge Instructions and Follow up:  Ms. Jessie Tamayo was discharged from the hospital with follow up in PAC clinic     Discharge Diet: Regular  Discharge Activity: Resume as tolerated  Discharge Follow up:  - PAC Clinic appointment at 09:15 AM on Friday,  9/15/2023  - Outpatient Labs (CBC, BMP, Mg) ordered for Friday, 9/15/2023  - Interstitial needle placement planned with Radiation Oncology for Monday, 9/18/2023 pending platelet count  - Patient to schedule follow-up appointment with PCP for long-term management of HTN    Discharge Disposition:  Discharged to home    Discharge Staff: Dr. Favio Levin MD, PGY-3  12:54 PM  [facility] Obstetrics & Gynecology Residency      I have reviewed and edited Dr. Levin's Discharge Summary above.  Agree with summary of hospital course.       Reina Stahl MD, MS, FACOG, FACS  9/15/2023  3:30 PM

## 2023-09-12 ENCOUNTER — APPOINTMENT (OUTPATIENT)
Dept: MRI IMAGING | Facility: CLINIC | Age: 68
DRG: 371 | End: 2023-09-12
Attending: NURSE PRACTITIONER
Payer: COMMERCIAL

## 2023-09-12 LAB
ANION GAP SERPL CALCULATED.3IONS-SCNC: 11 MMOL/L (ref 7–15)
ANION GAP SERPL CALCULATED.3IONS-SCNC: 8 MMOL/L (ref 7–15)
BASOPHILS # BLD MANUAL: 0 10E3/UL (ref 0–0.2)
BASOPHILS # BLD MANUAL: 0 10E3/UL (ref 0–0.2)
BASOPHILS NFR BLD MANUAL: 0 %
BASOPHILS NFR BLD MANUAL: 0 %
BUN SERPL-MCNC: 11.6 MG/DL (ref 8–23)
BUN SERPL-MCNC: 12.4 MG/DL (ref 8–23)
CALCIUM SERPL-MCNC: 8.4 MG/DL (ref 8.8–10.2)
CALCIUM SERPL-MCNC: 8.4 MG/DL (ref 8.8–10.2)
CHLORIDE SERPL-SCNC: 96 MMOL/L (ref 98–107)
CHLORIDE SERPL-SCNC: 99 MMOL/L (ref 98–107)
CREAT SERPL-MCNC: 0.78 MG/DL (ref 0.51–0.95)
CREAT SERPL-MCNC: 0.78 MG/DL (ref 0.51–0.95)
DEPRECATED HCO3 PLAS-SCNC: 25 MMOL/L (ref 22–29)
DEPRECATED HCO3 PLAS-SCNC: 27 MMOL/L (ref 22–29)
EGFRCR SERPLBLD CKD-EPI 2021: 83 ML/MIN/1.73M2
EGFRCR SERPLBLD CKD-EPI 2021: 83 ML/MIN/1.73M2
EOSINOPHIL # BLD MANUAL: 0 10E3/UL (ref 0–0.7)
EOSINOPHIL # BLD MANUAL: 0 10E3/UL (ref 0–0.7)
EOSINOPHIL NFR BLD MANUAL: 0 %
EOSINOPHIL NFR BLD MANUAL: 0 %
ERYTHROCYTE [DISTWIDTH] IN BLOOD BY AUTOMATED COUNT: 15.9 % (ref 10–15)
ERYTHROCYTE [DISTWIDTH] IN BLOOD BY AUTOMATED COUNT: 16 % (ref 10–15)
GLUCOSE SERPL-MCNC: 113 MG/DL (ref 70–99)
GLUCOSE SERPL-MCNC: 97 MG/DL (ref 70–99)
HCT VFR BLD AUTO: 24.1 % (ref 35–47)
HCT VFR BLD AUTO: 25 % (ref 35–47)
HGB BLD-MCNC: 8 G/DL (ref 11.7–15.7)
HGB BLD-MCNC: 8.3 G/DL (ref 11.7–15.7)
LACTATE SERPL-SCNC: 1.3 MMOL/L (ref 0.7–2)
LYMPHOCYTES # BLD MANUAL: 0.1 10E3/UL (ref 0.8–5.3)
LYMPHOCYTES # BLD MANUAL: 0.3 10E3/UL (ref 0.8–5.3)
LYMPHOCYTES NFR BLD MANUAL: 10 %
LYMPHOCYTES NFR BLD MANUAL: 21 %
MAGNESIUM SERPL-MCNC: 1.6 MG/DL (ref 1.7–2.3)
MCH RBC QN AUTO: 30.1 PG (ref 26.5–33)
MCH RBC QN AUTO: 30.7 PG (ref 26.5–33)
MCHC RBC AUTO-ENTMCNC: 33.2 G/DL (ref 31.5–36.5)
MCHC RBC AUTO-ENTMCNC: 33.2 G/DL (ref 31.5–36.5)
MCV RBC AUTO: 91 FL (ref 78–100)
MCV RBC AUTO: 92 FL (ref 78–100)
METAMYELOCYTES # BLD MANUAL: 0 10E3/UL
METAMYELOCYTES # BLD MANUAL: 0 10E3/UL
METAMYELOCYTES NFR BLD MANUAL: 2 %
METAMYELOCYTES NFR BLD MANUAL: 4 %
MONOCYTES # BLD MANUAL: 0 10E3/UL (ref 0–1.3)
MONOCYTES # BLD MANUAL: 0.1 10E3/UL (ref 0–1.3)
MONOCYTES NFR BLD MANUAL: 3 %
MONOCYTES NFR BLD MANUAL: 6 %
MYELOCYTES # BLD MANUAL: 0 10E3/UL
MYELOCYTES NFR BLD MANUAL: 2 %
NEUTROPHILS # BLD MANUAL: 0.8 10E3/UL (ref 1.6–8.3)
NEUTROPHILS # BLD MANUAL: 1.1 10E3/UL (ref 1.6–8.3)
NEUTROPHILS NFR BLD MANUAL: 69 %
NEUTROPHILS NFR BLD MANUAL: 83 %
PHOSPHATE SERPL-MCNC: 3.3 MG/DL (ref 2.5–4.5)
PLAT MORPH BLD: ABNORMAL
PLAT MORPH BLD: ABNORMAL
PLATELET # BLD AUTO: 41 10E3/UL (ref 150–450)
PLATELET # BLD AUTO: 45 10E3/UL (ref 150–450)
POTASSIUM SERPL-SCNC: 3.3 MMOL/L (ref 3.4–5.3)
POTASSIUM SERPL-SCNC: 3.6 MMOL/L (ref 3.4–5.3)
POTASSIUM SERPL-SCNC: 3.6 MMOL/L (ref 3.4–5.3)
RBC # BLD AUTO: 2.61 10E6/UL (ref 3.8–5.2)
RBC # BLD AUTO: 2.76 10E6/UL (ref 3.8–5.2)
RBC MORPH BLD: ABNORMAL
RBC MORPH BLD: ABNORMAL
SODIUM SERPL-SCNC: 132 MMOL/L (ref 136–145)
SODIUM SERPL-SCNC: 134 MMOL/L (ref 136–145)
WBC # BLD AUTO: 1.2 10E3/UL (ref 4–11)
WBC # BLD AUTO: 1.3 10E3/UL (ref 4–11)

## 2023-09-12 PROCEDURE — 83605 ASSAY OF LACTIC ACID: CPT

## 2023-09-12 PROCEDURE — 93010 ELECTROCARDIOGRAM REPORT: CPT | Performed by: INTERNAL MEDICINE

## 2023-09-12 PROCEDURE — 250N000013 HC RX MED GY IP 250 OP 250 PS 637: Performed by: STUDENT IN AN ORGANIZED HEALTH CARE EDUCATION/TRAINING PROGRAM

## 2023-09-12 PROCEDURE — 250N000013 HC RX MED GY IP 250 OP 250 PS 637: Performed by: OBSTETRICS & GYNECOLOGY

## 2023-09-12 PROCEDURE — 250N000013 HC RX MED GY IP 250 OP 250 PS 637: Performed by: NURSE PRACTITIONER

## 2023-09-12 PROCEDURE — 99232 SBSQ HOSP IP/OBS MODERATE 35: CPT | Mod: GC | Performed by: OBSTETRICS & GYNECOLOGY

## 2023-09-12 PROCEDURE — A9585 GADOBUTROL INJECTION: HCPCS | Mod: JZ

## 2023-09-12 PROCEDURE — 93005 ELECTROCARDIOGRAM TRACING: CPT

## 2023-09-12 PROCEDURE — 85027 COMPLETE CBC AUTOMATED: CPT

## 2023-09-12 PROCEDURE — 85007 BL SMEAR W/DIFF WBC COUNT: CPT | Performed by: STUDENT IN AN ORGANIZED HEALTH CARE EDUCATION/TRAINING PROGRAM

## 2023-09-12 PROCEDURE — 250N000011 HC RX IP 250 OP 636

## 2023-09-12 PROCEDURE — 36415 COLL VENOUS BLD VENIPUNCTURE: CPT | Performed by: STUDENT IN AN ORGANIZED HEALTH CARE EDUCATION/TRAINING PROGRAM

## 2023-09-12 PROCEDURE — 84132 ASSAY OF SERUM POTASSIUM: CPT | Performed by: OBSTETRICS & GYNECOLOGY

## 2023-09-12 PROCEDURE — 72197 MRI PELVIS W/O & W/DYE: CPT | Mod: 26 | Performed by: RADIOLOGY

## 2023-09-12 PROCEDURE — 250N000013 HC RX MED GY IP 250 OP 250 PS 637

## 2023-09-12 PROCEDURE — 85007 BL SMEAR W/DIFF WBC COUNT: CPT

## 2023-09-12 PROCEDURE — 120N000002 HC R&B MED SURG/OB UMMC

## 2023-09-12 PROCEDURE — 84100 ASSAY OF PHOSPHORUS: CPT | Performed by: OBSTETRICS & GYNECOLOGY

## 2023-09-12 PROCEDURE — 36415 COLL VENOUS BLD VENIPUNCTURE: CPT | Performed by: OBSTETRICS & GYNECOLOGY

## 2023-09-12 PROCEDURE — 255N000002 HC RX 255 OP 636: Mod: JZ

## 2023-09-12 PROCEDURE — 36415 COLL VENOUS BLD VENIPUNCTURE: CPT

## 2023-09-12 PROCEDURE — 85018 HEMOGLOBIN: CPT | Performed by: STUDENT IN AN ORGANIZED HEALTH CARE EDUCATION/TRAINING PROGRAM

## 2023-09-12 PROCEDURE — 83735 ASSAY OF MAGNESIUM: CPT | Performed by: STUDENT IN AN ORGANIZED HEALTH CARE EDUCATION/TRAINING PROGRAM

## 2023-09-12 PROCEDURE — 72197 MRI PELVIS W/O & W/DYE: CPT

## 2023-09-12 PROCEDURE — 80048 BASIC METABOLIC PNL TOTAL CA: CPT

## 2023-09-12 PROCEDURE — 80048 BASIC METABOLIC PNL TOTAL CA: CPT | Performed by: STUDENT IN AN ORGANIZED HEALTH CARE EDUCATION/TRAINING PROGRAM

## 2023-09-12 RX ORDER — DIPHENHYDRAMINE HYDROCHLORIDE AND LIDOCAINE HYDROCHLORIDE AND ALUMINUM HYDROXIDE AND MAGNESIUM HYDRO
10 KIT
Status: DISCONTINUED | OUTPATIENT
Start: 2023-09-12 | End: 2023-09-13 | Stop reason: HOSPADM

## 2023-09-12 RX ORDER — ACETAMINOPHEN 500 MG
500-1000 TABLET ORAL EVERY 6 HOURS PRN
Status: DISCONTINUED | OUTPATIENT
Start: 2023-09-12 | End: 2023-09-13 | Stop reason: HOSPADM

## 2023-09-12 RX ORDER — SIMETHICONE 80 MG
80 TABLET,CHEWABLE ORAL EVERY 6 HOURS PRN
Status: DISCONTINUED | OUTPATIENT
Start: 2023-09-12 | End: 2023-09-13 | Stop reason: HOSPADM

## 2023-09-12 RX ORDER — POTASSIUM CHLORIDE 750 MG/1
40 TABLET, EXTENDED RELEASE ORAL ONCE
Status: COMPLETED | OUTPATIENT
Start: 2023-09-12 | End: 2023-09-12

## 2023-09-12 RX ORDER — ONDANSETRON 4 MG/1
4 TABLET, ORALLY DISINTEGRATING ORAL EVERY 6 HOURS PRN
Status: DISCONTINUED | OUTPATIENT
Start: 2023-09-12 | End: 2023-09-13 | Stop reason: HOSPADM

## 2023-09-12 RX ORDER — GADOBUTROL 604.72 MG/ML
10 INJECTION INTRAVENOUS ONCE
Status: COMPLETED | OUTPATIENT
Start: 2023-09-12 | End: 2023-09-12

## 2023-09-12 RX ORDER — AMLODIPINE BESYLATE 5 MG/1
5 TABLET ORAL DAILY
Status: DISCONTINUED | OUTPATIENT
Start: 2023-09-12 | End: 2023-09-13 | Stop reason: HOSPADM

## 2023-09-12 RX ADMIN — DIPHENHYDRAMINE HYDROCHLORIDE AND LIDOCAINE HYDROCHLORIDE AND ALUMINUM HYDROXIDE AND MAGNESIUM HYDRO 10 ML: KIT at 22:27

## 2023-09-12 RX ADMIN — VANCOMYCIN HYDROCHLORIDE 125 MG: 125 CAPSULE ORAL at 17:00

## 2023-09-12 RX ADMIN — POTASSIUM CHLORIDE 40 MEQ: 750 TABLET, EXTENDED RELEASE ORAL at 14:13

## 2023-09-12 RX ADMIN — LORAZEPAM 0.5 MG: 0.5 TABLET ORAL at 10:41

## 2023-09-12 RX ADMIN — ACETAMINOPHEN 1000 MG: 500 TABLET ORAL at 02:15

## 2023-09-12 RX ADMIN — OXYCODONE HYDROCHLORIDE 10 MG: 5 TABLET ORAL at 18:59

## 2023-09-12 RX ADMIN — OXYCODONE HYDROCHLORIDE 10 MG: 5 TABLET ORAL at 05:50

## 2023-09-12 RX ADMIN — VANCOMYCIN HYDROCHLORIDE 125 MG: 125 CAPSULE ORAL at 22:27

## 2023-09-12 RX ADMIN — CALCIUM CARBONATE (ANTACID) CHEW TAB 500 MG 500 MG: 500 CHEW TAB at 09:01

## 2023-09-12 RX ADMIN — PROCHLORPERAZINE MALEATE 5 MG: 5 TABLET ORAL at 14:27

## 2023-09-12 RX ADMIN — VANCOMYCIN HYDROCHLORIDE 125 MG: 125 CAPSULE ORAL at 05:43

## 2023-09-12 RX ADMIN — TOLTERODINE TARTRATE 2 MG: 2 TABLET, FILM COATED ORAL at 09:01

## 2023-09-12 RX ADMIN — GADOBUTROL 10 ML: 604.72 INJECTION INTRAVENOUS at 12:43

## 2023-09-12 RX ADMIN — OXYCODONE HYDROCHLORIDE 10 MG: 5 TABLET ORAL at 13:05

## 2023-09-12 RX ADMIN — ONDANSETRON 4 MG: 4 TABLET, ORALLY DISINTEGRATING ORAL at 09:01

## 2023-09-12 RX ADMIN — PROCHLORPERAZINE EDISYLATE 5 MG: 5 INJECTION INTRAMUSCULAR; INTRAVENOUS at 03:39

## 2023-09-12 RX ADMIN — TOLTERODINE TARTRATE 2 MG: 2 TABLET, FILM COATED ORAL at 20:32

## 2023-09-12 RX ADMIN — ONDANSETRON 4 MG: 4 TABLET, ORALLY DISINTEGRATING ORAL at 17:14

## 2023-09-12 RX ADMIN — VANCOMYCIN HYDROCHLORIDE 125 MG: 125 CAPSULE ORAL at 13:05

## 2023-09-12 RX ADMIN — AMLODIPINE BESYLATE 5 MG: 5 TABLET ORAL at 10:10

## 2023-09-12 RX ADMIN — DIPHENHYDRAMINE HYDROCHLORIDE AND LIDOCAINE HYDROCHLORIDE AND ALUMINUM HYDROXIDE AND MAGNESIUM HYDRO 10 ML: KIT at 20:31

## 2023-09-12 ASSESSMENT — ACTIVITIES OF DAILY LIVING (ADL)
ADLS_ACUITY_SCORE: 35
ADLS_ACUITY_SCORE: 36
ADLS_ACUITY_SCORE: 36
ADLS_ACUITY_SCORE: 33
ADLS_ACUITY_SCORE: 35
ADLS_ACUITY_SCORE: 33
ADLS_ACUITY_SCORE: 35
ADLS_ACUITY_SCORE: 35

## 2023-09-12 NOTE — PLAN OF CARE
Goal Outcome Evaluation:      Plan of Care Reviewed With: patient, parent    Overall Patient Progress: no changeOverall Patient Progress: no change    Outcome Evaluation: No change in current condition. However, diarrhe is controlled. Pt continues to experience nausea and vomiting with meal. Last vomiting episode was at 1908 according to ZAIN who answered her call light. She is currently scheduled for MRI tonight. Please pre MRI medication (Ativan .5mg) prior to leaving the floor to MRI.

## 2023-09-12 NOTE — PROGRESS NOTES
"Brief Progress Note     S: Went to room to assess patient after developing new onset tachycardia.     Patient states that she is feeling well, overall improved compared to yesterday. She notes feeling some ongoing bladder spasms but doesn't feel that these have significantly changed today. She also complains of ongoing bowel spasms that she attributes to her C-diff infection and also feels that they are stable to improved. Denies recent fevers, chills, shortness of breath, abdominal pain, worsening suprapubic pain, or worsening lower extremity swelling.     O:   BP (!) 176/91 (BP Location: Right arm)   Pulse 119   Temp 98.2  F (36.8  C) (Temporal)   Resp 16   Ht 1.626 m (5' 4\")   Wt 97.3 kg (214 lb 9.6 oz)   LMP 03/08/2008   SpO2 96%   BMI 36.84 kg/m      Gen: Laying comfortably in bed, NAD   CV: Tachycardic. Regular rhythm, no m/r/g  Pulm: Lungs CTAB.  Abd: Soft. Nondistended. Mild suprapubic tenderness. No other abdominal tenderness. No guarding.   Ext: 2+ edema bilaterally. No calf tenderness bilaterally. Negative Krys's sign bilaterally.      A/P: Jessie Tamayo is a 67 year old female with stage IVB SCC of the vagina admitted for fever, chills, and acute diarrhea found to have positive C. Difficile and neutropenic fever. Clinically overall improved though with new onset tachycardia without clear etiology.     # Tachycardia   # Neutropenia   - No obvious localizing signs of infection and patient appears clinically well; exam is overall reassuring  - Repeat CBC, lactic acid, BMP pending; will f/up results     Caridad Paris DO  [facility]  Gynecology Oncology Resident, PGY-1  09/12/2023 3:44 PM    To reach the GYNECOLOGY ONCOLOGY team for this patient, please page [phone]      "

## 2023-09-12 NOTE — PROVIDER NOTIFICATION
Paged gyn onc resident re: [phone] [phone] MM Critical lab result; platelet count 41. Please advise. Thank you. Sarahy BROOKE [phone]

## 2023-09-12 NOTE — PLAN OF CARE
"Goal Outcome Evaluation:    BP (!) 167/81   Pulse 98   Temp 99.4  F (37.4  C) (Oral)   Resp 18   Ht 1.626 m (5' 4\")   Wt 95.7 kg (211 lb)   LMP 03/08/2008   SpO2 96%   BMI 36.22 kg/m      [id number]    Admitted for fever, chills and acute diarrhea found to have positive C-Diff and neutropenic fever. No bm today, passing gas, declined scheduled Imodium. Chronic Thomas cath 625 ml output. Denied pain, refused scheduled Tylenol, but wants Oxycodone before asleep. Hypertensive. MRI postponed for tomorrow. GYN/ONC wants to be done in the morning then patient could be discharged home.   "

## 2023-09-12 NOTE — PLAN OF CARE
Assumed nursing care from [id number]. Patient alert, oriented, and up with stand-by assistance. Hypertensive; all other vital signs stable. Pain managed with PRN oxycodone x 2. Intermittent nausea with no emesis; compazine effective. Thomas catheter patent and draining orange urine. Passing flatus; small smear BM overnight. R and L PIV saline locked. Anticipating MRI of pelvis this morning and potential discharge today.

## 2023-09-12 NOTE — PLAN OF CARE
A&O x4. Htn and tachy 110's. On room air. Pain is managed with prn oxycodone. Reguar diet, Mild nausea with eating- zofran and compazine given. Up with SBA. Thomas catheter in place. No BM this shift, passing gas. PIV is saline locked. Potassium replaced- magnesium, phosphorus, and potassium recheck ordered.

## 2023-09-13 VITALS
RESPIRATION RATE: 16 BRPM | TEMPERATURE: 97.7 F | HEIGHT: 64 IN | BODY MASS INDEX: 36.64 KG/M2 | SYSTOLIC BLOOD PRESSURE: 147 MMHG | HEART RATE: 91 BPM | WEIGHT: 214.6 LBS | DIASTOLIC BLOOD PRESSURE: 75 MMHG | OXYGEN SATURATION: 95 %

## 2023-09-13 LAB
ALBUMIN SERPL BCG-MCNC: 2.8 G/DL (ref 3.5–5.2)
ALP SERPL-CCNC: 49 U/L (ref 35–104)
ALT SERPL W P-5'-P-CCNC: 22 U/L (ref 0–50)
ANION GAP SERPL CALCULATED.3IONS-SCNC: 7 MMOL/L (ref 7–15)
AST SERPL W P-5'-P-CCNC: 30 U/L (ref 0–45)
ATRIAL RATE - MUSE: 98 BPM
BACTERIA BLD CULT: NO GROWTH
BACTERIA BLD CULT: NO GROWTH
BASOPHILS # BLD AUTO: 0 10E3/UL (ref 0–0.2)
BASOPHILS NFR BLD AUTO: 0 %
BILIRUB SERPL-MCNC: 0.4 MG/DL
BUN SERPL-MCNC: 13 MG/DL (ref 8–23)
CALCIUM SERPL-MCNC: 8.3 MG/DL (ref 8.8–10.2)
CHLORIDE SERPL-SCNC: 99 MMOL/L (ref 98–107)
CREAT SERPL-MCNC: 0.9 MG/DL (ref 0.51–0.95)
DEPRECATED HCO3 PLAS-SCNC: 27 MMOL/L (ref 22–29)
DIASTOLIC BLOOD PRESSURE - MUSE: NORMAL MMHG
EGFRCR SERPLBLD CKD-EPI 2021: 70 ML/MIN/1.73M2
EOSINOPHIL # BLD AUTO: 0 10E3/UL (ref 0–0.7)
EOSINOPHIL NFR BLD AUTO: 0 %
ERYTHROCYTE [DISTWIDTH] IN BLOOD BY AUTOMATED COUNT: 16.1 % (ref 10–15)
GLUCOSE SERPL-MCNC: 101 MG/DL (ref 70–99)
HCT VFR BLD AUTO: 23.4 % (ref 35–47)
HGB BLD-MCNC: 7.8 G/DL (ref 11.7–15.7)
IMM GRANULOCYTES # BLD: 0 10E3/UL
IMM GRANULOCYTES NFR BLD: 2 %
INTERPRETATION ECG - MUSE: NORMAL
LYMPHOCYTES # BLD AUTO: 0.3 10E3/UL (ref 0.8–5.3)
LYMPHOCYTES NFR BLD AUTO: 25 %
MAGNESIUM SERPL-MCNC: 1.4 MG/DL (ref 1.7–2.3)
MAGNESIUM SERPL-MCNC: 2.5 MG/DL (ref 1.7–2.3)
MCH RBC QN AUTO: 30.1 PG (ref 26.5–33)
MCHC RBC AUTO-ENTMCNC: 33.3 G/DL (ref 31.5–36.5)
MCV RBC AUTO: 90 FL (ref 78–100)
MONOCYTES # BLD AUTO: 0.1 10E3/UL (ref 0–1.3)
MONOCYTES NFR BLD AUTO: 10 %
NEUTROPHILS # BLD AUTO: 0.8 10E3/UL (ref 1.6–8.3)
NEUTROPHILS NFR BLD AUTO: 63 %
NRBC # BLD AUTO: 0 10E3/UL
NRBC BLD AUTO-RTO: 0 /100
P AXIS - MUSE: 39 DEGREES
PHOSPHATE SERPL-MCNC: 2.9 MG/DL (ref 2.5–4.5)
PLATELET # BLD AUTO: 38 10E3/UL (ref 150–450)
POTASSIUM SERPL-SCNC: 4.1 MMOL/L (ref 3.4–5.3)
PR INTERVAL - MUSE: 138 MS
PROT SERPL-MCNC: 5 G/DL (ref 6.4–8.3)
QRS DURATION - MUSE: 74 MS
QT - MUSE: 320 MS
QTC - MUSE: 408 MS
R AXIS - MUSE: -3 DEGREES
RBC # BLD AUTO: 2.59 10E6/UL (ref 3.8–5.2)
SODIUM SERPL-SCNC: 133 MMOL/L (ref 136–145)
SYSTOLIC BLOOD PRESSURE - MUSE: NORMAL MMHG
T AXIS - MUSE: 53 DEGREES
VENTRICULAR RATE- MUSE: 98 BPM
WBC # BLD AUTO: 1.3 10E3/UL (ref 4–11)

## 2023-09-13 PROCEDURE — 250N000011 HC RX IP 250 OP 636

## 2023-09-13 PROCEDURE — 85025 COMPLETE CBC W/AUTO DIFF WBC: CPT

## 2023-09-13 PROCEDURE — 999N000128 HC STATISTIC PERIPHERAL IV START W/O US GUIDANCE

## 2023-09-13 PROCEDURE — 250N000013 HC RX MED GY IP 250 OP 250 PS 637: Performed by: NURSE PRACTITIONER

## 2023-09-13 PROCEDURE — 250N000013 HC RX MED GY IP 250 OP 250 PS 637

## 2023-09-13 PROCEDURE — 36415 COLL VENOUS BLD VENIPUNCTURE: CPT

## 2023-09-13 PROCEDURE — 84100 ASSAY OF PHOSPHORUS: CPT | Performed by: OBSTETRICS & GYNECOLOGY

## 2023-09-13 PROCEDURE — 36415 COLL VENOUS BLD VENIPUNCTURE: CPT | Performed by: OBSTETRICS & GYNECOLOGY

## 2023-09-13 PROCEDURE — 80053 COMPREHEN METABOLIC PANEL: CPT

## 2023-09-13 PROCEDURE — 250N000011 HC RX IP 250 OP 636: Performed by: OBSTETRICS & GYNECOLOGY

## 2023-09-13 PROCEDURE — 83735 ASSAY OF MAGNESIUM: CPT | Performed by: OBSTETRICS & GYNECOLOGY

## 2023-09-13 PROCEDURE — 99232 SBSQ HOSP IP/OBS MODERATE 35: CPT | Mod: GC | Performed by: OBSTETRICS & GYNECOLOGY

## 2023-09-13 RX ORDER — DIPHENHYDRAMINE HYDROCHLORIDE AND LIDOCAINE HYDROCHLORIDE AND ALUMINUM HYDROXIDE AND MAGNESIUM HYDRO
10 KIT
Qty: 237 ML | Refills: 3 | Status: SHIPPED | OUTPATIENT
Start: 2023-09-13 | End: 2023-09-26

## 2023-09-13 RX ORDER — VANCOMYCIN HYDROCHLORIDE 125 MG/1
125 CAPSULE ORAL 4 TIMES DAILY
Qty: 38 CAPSULE | Refills: 0 | Status: SHIPPED | OUTPATIENT
Start: 2023-09-13 | End: 2023-09-23

## 2023-09-13 RX ORDER — AMLODIPINE BESYLATE 5 MG/1
5 TABLET ORAL DAILY
Qty: 30 TABLET | Refills: 3 | Status: SHIPPED | OUTPATIENT
Start: 2023-09-14 | End: 2023-10-07

## 2023-09-13 RX ORDER — MAGNESIUM SULFATE HEPTAHYDRATE 40 MG/ML
4 INJECTION, SOLUTION INTRAVENOUS ONCE
Status: COMPLETED | OUTPATIENT
Start: 2023-09-13 | End: 2023-09-13

## 2023-09-13 RX ORDER — OXYCODONE HYDROCHLORIDE 10 MG/1
5-10 TABLET ORAL EVERY 6 HOURS PRN
Qty: 20 TABLET | Refills: 0 | Status: ON HOLD | OUTPATIENT
Start: 2023-09-13 | End: 2023-09-21

## 2023-09-13 RX ADMIN — ONDANSETRON 4 MG: 4 TABLET, ORALLY DISINTEGRATING ORAL at 14:04

## 2023-09-13 RX ADMIN — DIPHENHYDRAMINE HYDROCHLORIDE AND LIDOCAINE HYDROCHLORIDE AND ALUMINUM HYDROXIDE AND MAGNESIUM HYDRO 10 ML: KIT at 09:26

## 2023-09-13 RX ADMIN — VANCOMYCIN HYDROCHLORIDE 125 MG: 125 CAPSULE ORAL at 12:15

## 2023-09-13 RX ADMIN — DIPHENHYDRAMINE HYDROCHLORIDE AND LIDOCAINE HYDROCHLORIDE AND ALUMINUM HYDROXIDE AND MAGNESIUM HYDRO 10 ML: KIT at 12:15

## 2023-09-13 RX ADMIN — OXYCODONE HYDROCHLORIDE 10 MG: 5 TABLET ORAL at 07:45

## 2023-09-13 RX ADMIN — AMLODIPINE BESYLATE 5 MG: 5 TABLET ORAL at 07:46

## 2023-09-13 RX ADMIN — ACETAMINOPHEN 1000 MG: 500 TABLET ORAL at 02:55

## 2023-09-13 RX ADMIN — TOLTERODINE TARTRATE 2 MG: 2 TABLET, FILM COATED ORAL at 07:46

## 2023-09-13 RX ADMIN — ONDANSETRON 4 MG: 4 TABLET, ORALLY DISINTEGRATING ORAL at 07:02

## 2023-09-13 RX ADMIN — CALCIUM CARBONATE (ANTACID) CHEW TAB 500 MG 500 MG: 500 CHEW TAB at 07:46

## 2023-09-13 RX ADMIN — OXYCODONE HYDROCHLORIDE 10 MG: 5 TABLET ORAL at 14:03

## 2023-09-13 RX ADMIN — OXYCODONE HYDROCHLORIDE 5 MG: 5 TABLET ORAL at 00:59

## 2023-09-13 RX ADMIN — VANCOMYCIN HYDROCHLORIDE 125 MG: 125 CAPSULE ORAL at 05:34

## 2023-09-13 RX ADMIN — MAGNESIUM SULFATE IN WATER 4 G: 40 INJECTION, SOLUTION INTRAVENOUS at 09:26

## 2023-09-13 ASSESSMENT — ACTIVITIES OF DAILY LIVING (ADL)
ADLS_ACUITY_SCORE: 33
ADLS_ACUITY_SCORE: 34
ADLS_ACUITY_SCORE: 33

## 2023-09-13 NOTE — PROGRESS NOTES
Gynecology Oncology Progress Note  09/13/2023    HD#6 C. Diff infection, neutropenic fever    Disease: Stage IVB SCC of vagina    Subjective: Patient feeling well this morning. Just had bowel movement - was closer to normal than in awhile. Continues to have abdominal cramping but tolerable. No other concerns. Denies chest pain, dizziness, SOB.     Objective:   Vitals:    09/1955 09/12/23 2000 09/12/23 2229 09/13/23 0327   BP: (!) 146/74  139/66    BP Location: Right arm  Right arm    Pulse: 107 103 105    Resp: 16  16    Temp: 98  F (36.7  C)  98.8  F (37.1  C) 100.1  F (37.8  C)   TempSrc: Oral  Oral Oral   SpO2: 93% 93% 95%    Weight:       Height:         General: NAD, ambulating comfortably  CV: Well-perfused  Resp: Breathing comfortably on room air  Extremities: warm, well-perfused, nontender, trace edema  Pelvic: gonsalez catheter in place, draining pyridium-colored urine    I/O last 3 completed shifts:  In: 300 [P.O.:300]  Out: 2025 [Urine:2025]    Intake/Output (24 Hrs // Since [place])  IV fluids: 20 mL // 0  Urine: 2875 mL // 425 mL  Stool: x1 // x1    Labs/Imaging:  Component      Latest Ref Rng 9/13/2023  5:29 AM   WBC      4.0 - 11.0 10e3/uL 1.3 (L)    RBC Count      3.80 - 5.20 10e6/uL 2.59 (L)    Hemoglobin      11.7 - 15.7 g/dL 7.8 (L)    Hematocrit      35.0 - 47.0 % 23.4 (L)    MCV      78 - 100 fL 90    MCH      26.5 - 33.0 pg 30.1    MCHC      31.5 - 36.5 g/dL 33.3    RDW      10.0 - 15.0 % 16.1 (H)    Platelet Count      150 - 450 10e3/uL 38 (LL)    % Neutrophils      % 63    % Lymphocytes      % 25    % Monocytes      % 10    % Eosinophils      % 0    % Basophils      % 0    % Immature Granulocytes      % 2    NRBCs per 100 WBC      <1 /100 0    Absolute Neutrophils      1.6 - 8.3 10e3/uL 0.8 (L)    Absolute Lymphocytes      0.8 - 5.3 10e3/uL 0.3 (L)    Absolute Monocytes      0.0 - 1.3 10e3/uL 0.1    Absolute Eosinophils      0.0 - 0.7 10e3/uL 0.0    Absolute Basophils      0.0 - 0.2 10e3/uL 0.0     Absolute Immature Granulocytes      <=0.4 10e3/uL 0.0    Absolute NRBCs      10e3/uL 0.0    Sodium      136 - 145 mmol/L 133 (L)    Potassium      3.4 - 5.3 mmol/L 4.1    Chloride      98 - 107 mmol/L 99    Carbon Dioxide (CO2)      22 - 29 mmol/L 27    Anion Gap      7 - 15 mmol/L 7    Urea Nitrogen      8.0 - 23.0 mg/dL 13.0    Creatinine      0.51 - 0.95 mg/dL 0.90    Calcium      8.8 - 10.2 mg/dL 8.3 (L)    Glucose      70 - 99 mg/dL 101 (H)    Alkaline Phosphatase      35 - 104 U/L 49    AST      0 - 45 U/L 30    ALT      0 - 50 U/L 22    Protein Total      6.4 - 8.3 g/dL 5.0 (L)    Albumin      3.5 - 5.2 g/dL 2.8 (L)    Bilirubin Total      <=1.2 mg/dL 0.4    GFR Estimate      >60 mL/min/1.73m2 70    Magnesium      1.7 - 2.3 mg/dL 1.4 (L)    Phosphorus      2.5 - 4.5 mg/dL 2.9       Legend:  (L) Low  (H) High  (LL) Low Panic    Assessment and Plan: Jessie Taamyo is a 67 year old female with stage IVB SCC of the vagina admitted for fever, chills, and acute diarrhea found to have positive C. Difficile and neutropenic fever. Clinically improving, was planning discharge yesterday however had tachycardia so stayed overnight for further observation.    Neutropenic Fever  Pancytopenia  - Continue to trend WBC, ANC  - Last febrile on 9/9 at 0055  - Blood and urine cultures no growth to date  - S/p 36h empiric IV cefepime.   - Likely source is symptomatic c. Difficile, as below  - Start Lovenox ppx if Plts >100k    C. Difficile Infection  - Diagnosed in ED on admission, toxin and antigen positive  - Continue PO vancomycin QID for total of 10 days, on D#6/10 today (will finish afternoon of 9/18)  - Continue imodium for symptomatic relief    Anemia  - Has received 2u pRBC since admission, Hgb with appropriate rise  - Transfuse for hgb <7 or symptomatic hgb <8  - Currently asymptomatic    Tachycardia, improving  - EKG sinus tachycardia, other findings unchanged from prior EKG  - lactate normal, CBC, BMP stable  - May  be secondary to active infection, anemia    Hyponatremia  - Na stable in low 130s    Recent UTI, resolved  - Ucx 9/4 with GBS and corynebacterium > s/p augmentin BID x2d  - Gonsalez in place; exchanged last during 9/4 admission when UTI diagnosed  - Corynebacterium likely a contaminant from gonsalez - repeat UCx this admission no growth    Borderline JACQUIE, resolved  - Cr now at baseline    Hypoxia, resolved  - Initially on 2L NC in ED, workup negative  - Now on room air    Stage IVB Carcinoma of the Vagina  - Receiving cis-RT therapy; [id number] on 8/1-8/29.  - S/p radiation tx yesterday as scheduled  - Re-staging MRI w/ vaginal gel completed was supposed to be completed last night, MRI unable to accommodate - plan to try for this morning as to not hold up anticipated discharge  - Plan for upcoming brachytherapy with Dr. Pacheco  - Will follow up as outpatient with teams as appropriate    Chronic problems  - HLD - no PTA meds  - HTN - no PTA meds; started on amlodipine yesterday    Amarilys Ty MD  Gynecologic Oncology, PGY-4  09/13/23 7:04 AM   Team pager: [phone]      I have seen and examined the patient.  I have reviewed and edited Dr. Ty's note above.      Reina Stahl MD, MS, FACOG, FACS  9/13/2023  9:03 AM

## 2023-09-13 NOTE — PROGRESS NOTES
"/66 (BP Location: Right arm)   Pulse 105   Temp 98.8  F (37.1  C) (Oral)   Resp 16   Ht 1.626 m (5' 4\")   Wt 97.3 kg (214 lb 9.6 oz)   LMP 03/08/2008   SpO2 95%   BMI 36.84 kg/m          A/O x4. , on RA. Denies pain or nausea.  PIV SL. Thomas in place. No BM, passing a lot gas, per patient report. Pt requesting to have PRN oxycodone at 0100.   Possible discharge tomorrow.   "

## 2023-09-13 NOTE — PROGRESS NOTES
Shift: [id number]      Neuro: A&O x4. Able to make needs known. Slept between cares.   Cardiac: SR. Afebrile. Denies chest pain.  Resp: VSS on RA overnight. Denies SOB.   GI/: Voiding adequately, chronic gonsalez. Soft BM this morning.   Skin: No new deficits noted.   LDA's: PIV in place SL.   Pain: Prefers around-the-clock pain medication, including being awakened in the night. Rates pain 1-7/10. PRN oxycodone given 1x, tylenol 1x.      Plan: Continue to monitor and follow POC. Notify with changes.

## 2023-09-14 LAB
ABO/RH(D): NORMAL
ANTIBODY SCREEN: NEGATIVE
SPECIMEN EXPIRATION DATE: NORMAL

## 2023-09-15 ENCOUNTER — PATIENT OUTREACH (OUTPATIENT)
Dept: CARE COORDINATION | Facility: CLINIC | Age: 68
End: 2023-09-15

## 2023-09-15 ENCOUNTER — PRE VISIT (OUTPATIENT)
Dept: SURGERY | Facility: CLINIC | Age: 68
End: 2023-09-15

## 2023-09-15 ENCOUNTER — OFFICE VISIT (OUTPATIENT)
Dept: SURGERY | Facility: CLINIC | Age: 68
End: 2023-09-15
Payer: COMMERCIAL

## 2023-09-15 ENCOUNTER — LAB (OUTPATIENT)
Dept: LAB | Facility: CLINIC | Age: 68
End: 2023-09-15
Attending: OBSTETRICS & GYNECOLOGY
Payer: COMMERCIAL

## 2023-09-15 VITALS
HEIGHT: 64 IN | BODY MASS INDEX: 35.85 KG/M2 | SYSTOLIC BLOOD PRESSURE: 118 MMHG | TEMPERATURE: 98.5 F | DIASTOLIC BLOOD PRESSURE: 74 MMHG | HEART RATE: 114 BPM | WEIGHT: 210 LBS | OXYGEN SATURATION: 95 %

## 2023-09-15 DIAGNOSIS — Z01.818 PREOP EXAMINATION: ICD-10-CM

## 2023-09-15 DIAGNOSIS — C52 VAGINAL CANCER (H): ICD-10-CM

## 2023-09-15 DIAGNOSIS — D61.818 PANCYTOPENIA (H): ICD-10-CM

## 2023-09-15 DIAGNOSIS — Z01.818 PREOP EXAMINATION: Primary | ICD-10-CM

## 2023-09-15 LAB
ANION GAP SERPL CALCULATED.3IONS-SCNC: 8 MMOL/L (ref 7–15)
BASOPHILS # BLD AUTO: 0 10E3/UL (ref 0–0.2)
BASOPHILS NFR BLD AUTO: 1 %
BUN SERPL-MCNC: 13 MG/DL (ref 8–23)
CALCIUM SERPL-MCNC: 9 MG/DL (ref 8.8–10.2)
CHLORIDE SERPL-SCNC: 96 MMOL/L (ref 98–107)
CREAT SERPL-MCNC: 0.89 MG/DL (ref 0.51–0.95)
DEPRECATED HCO3 PLAS-SCNC: 28 MMOL/L (ref 22–29)
EGFRCR SERPLBLD CKD-EPI 2021: 71 ML/MIN/1.73M2
EOSINOPHIL # BLD AUTO: 0 10E3/UL (ref 0–0.7)
EOSINOPHIL NFR BLD AUTO: 0 %
ERYTHROCYTE [DISTWIDTH] IN BLOOD BY AUTOMATED COUNT: 16.2 % (ref 10–15)
GLUCOSE SERPL-MCNC: 120 MG/DL (ref 70–99)
HCT VFR BLD AUTO: 27.7 % (ref 35–47)
HGB BLD-MCNC: 9.1 G/DL (ref 11.7–15.7)
IMM GRANULOCYTES # BLD: 0 10E3/UL
IMM GRANULOCYTES NFR BLD: 2 %
LYMPHOCYTES # BLD AUTO: 0.3 10E3/UL (ref 0.8–5.3)
LYMPHOCYTES NFR BLD AUTO: 17 %
MAGNESIUM SERPL-MCNC: 1.6 MG/DL (ref 1.7–2.3)
MCH RBC QN AUTO: 30.4 PG (ref 26.5–33)
MCHC RBC AUTO-ENTMCNC: 32.9 G/DL (ref 31.5–36.5)
MCV RBC AUTO: 93 FL (ref 78–100)
MONOCYTES # BLD AUTO: 0.1 10E3/UL (ref 0–1.3)
MONOCYTES NFR BLD AUTO: 8 %
NEUTROPHILS # BLD AUTO: 1.4 10E3/UL (ref 1.6–8.3)
NEUTROPHILS NFR BLD AUTO: 72 %
NRBC # BLD AUTO: 0 10E3/UL
NRBC BLD AUTO-RTO: 0 /100
PLATELET # BLD AUTO: 54 10E3/UL (ref 150–450)
POTASSIUM SERPL-SCNC: 4.2 MMOL/L (ref 3.4–5.3)
RBC # BLD AUTO: 2.99 10E6/UL (ref 3.8–5.2)
SODIUM SERPL-SCNC: 132 MMOL/L (ref 136–145)
WBC # BLD AUTO: 1.9 10E3/UL (ref 4–11)

## 2023-09-15 PROCEDURE — 86850 RBC ANTIBODY SCREEN: CPT

## 2023-09-15 PROCEDURE — 82310 ASSAY OF CALCIUM: CPT

## 2023-09-15 PROCEDURE — 86901 BLOOD TYPING SEROLOGIC RH(D): CPT

## 2023-09-15 PROCEDURE — 99203 OFFICE O/P NEW LOW 30 MIN: CPT | Performed by: PHYSICIAN ASSISTANT

## 2023-09-15 PROCEDURE — 85025 COMPLETE CBC W/AUTO DIFF WBC: CPT

## 2023-09-15 PROCEDURE — 36415 COLL VENOUS BLD VENIPUNCTURE: CPT

## 2023-09-15 PROCEDURE — 83735 ASSAY OF MAGNESIUM: CPT

## 2023-09-15 RX ORDER — CALCIUM CARBONATE 500 MG/1
1 TABLET, CHEWABLE ORAL 2 TIMES DAILY
Status: ON HOLD | COMMUNITY
End: 2023-09-21

## 2023-09-15 RX ORDER — LORATADINE 10 MG/1
10 TABLET ORAL DAILY
COMMUNITY
End: 2023-11-01

## 2023-09-15 ASSESSMENT — ENCOUNTER SYMPTOMS: SEIZURES: 0

## 2023-09-15 ASSESSMENT — LIFESTYLE VARIABLES: TOBACCO_USE: 0

## 2023-09-15 ASSESSMENT — PAIN SCALES - GENERAL: PAINLEVEL: MILD PAIN (2)

## 2023-09-15 NOTE — NURSING NOTE
Chief Complaint   Patient presents with    Labs Only     Labs drawn via  by RN in lab.       Labs collected from venipuncture by RN.     Sarahy Diaz RN

## 2023-09-15 NOTE — PROGRESS NOTES
Clinic Care Coordination Contact  [facility]: Post-Discharge Note  SITUATION                                                      Admission:    Admission Date: 09/09/23   Reason for Admission: - Stage IVB SCC of the vagina  - Urinary retention with indwelling gonsalez  - Recent catheter associated UTI  - Treatment induced pancytopenia  - HLD  - Osteopenia  - Lichen Sclerosus   - Actinic keratosis  - Neutropenic fever  - Abdominal pain  - Diarrhea  Discharge:   Discharge Date: 09/13/23  Discharge Diagnosis: - Suprapubic pain  - Stage IVB SCC of the vagina  - Urinary retention with indwelling gonsalez  - Recent catheter associated UTI  - Treatment induced pancytopenia  - HLD  - Osteopenia  - Lichen Sclerosus   - Actinic keratosis  - Hypoxia, resolved  - Neutropenic fever, resolved  - Abdominal pain, resolved  - Diarrhea, resolved  - C Diff colitis  - HTN    BACKGROUND                                                      Per hospital discharge summary and inpatient provider notes:  From H&P: Jessie Tamayo is a 67 year old female with stage IVB SCC Of the vagina who presented to the ED with 2 days of worsening watery diarrhea, fevers, and chills prior to admission. She sought care because her watery diarrhea increased significantly, began to feel very weak, lightheaded, dizzy, could not ambulate on her own to the bathroom due to weakness. She experienced nausea. Denied vomiting, shortness of breath, cough, chest pain, leg pain, swelling, erythema, known sick contacts.      She has had her gonsalez in continuously since discharge from her recent hospital stay 9/5/23. She was prescribed augmentin at discharge and has been taking this for about 2 days.      Her stool studies were positive for C. Diff.     ASSESSMENT           Discharge Assessment  How are you doing now that you are home?: a little better; the c diff is gone but still having bladder problems when the medication wears off. eating ok when not taking  medication, no fever  How are your symptoms? (Red Flag symptoms escalate to triage hotline per guidelines): Improved  Do you feel your condition is stable enough to be safe at home until your provider visit?: Yes  Does the patient have their discharge instructions? : No - Review discharge instructions  Does the patient have questions regarding their discharge instructions? : No  Were you started on any new medications or were there changes to any of your previous medications? : Yes  Does the patient have all of their medications?: Yes  Do you have questions regarding any of your medications? : No  Do you have all of your needed medical supplies or equipment (DME)?  (i.e. oxygen tank, CPAP, cane, etc.): Yes  Discharge follow-up appointment scheduled within 14 calendar days? : Yes  Discharge Follow Up Appointment Date: 09/15/23  Discharge Follow Up Appointment Scheduled with?: Specialty Care Provider         Post-op (Clinicians Only)  Fever: No  Chills: No  Eating & Drinking:  (able to eat well when not taking medications)        PLAN                                                      Outpatient Plan:  [facility] spoke with Pt and Pt's . Pt noted she is waiting for a Dr appointment now and is planning on speaking about some medical concerns - continued bladder spasms when not taking medication. Other than that; c diff is cleared, no fever, eating well when not taking medication, drinking well, able to rest. Pt noted they do not feel a need for care coordination and denied program enrollment at this time. Pt agreed they will reach out to PCP and clinic with any questions or concerns.     Future Appointments   Date Time Provider Department Center   9/15/2023  9:15 AM Lily Fabian PA-C [facility]   9/15/2023 10:45 AM  MASONIC LAB DRAW [facility]   9/17/2023  9:00 AM  [facility]   9/18/2023  7:25 AM Marisa Pacheco MD UURON [facility]   9/18/2023  8:00 AM [id number] JOSHUAS  [facility]   9/18/2023 11:00 AM Marisa Pacheco MD UURON [facility]   9/18/2023  3:00 PM Marisa Pacheco MD [facility]   9/19/2023  7:30 AM Marisa Pacheco MD [facility]   9/19/2023  2:00 PM Marisa Pacheco MD [facility]   9/20/2023  8:00 AM Marisa Pacheco MD [facility]   9/20/2023  2:30 PM Marisa Pacheco MD [facility]   9/20/2023  3:55 PM Marisa Pacheco MD [facility]ON [facility]   9/20/2023  5:00 PM Betina Kramer APRN [facility]   10/16/2023 10:30 AM Lakeisha Mackenzie MD [facility] [place]         For any urgent concerns, please contact our 24 hour nurse triage line: [phone] ([phone])         KELLI Butler

## 2023-09-15 NOTE — PATIENT INSTRUCTIONS
Preparing for Your Surgery      Name:  Jessie Tamayo   MRN:  [id number]   :  1955   Today's Date:  9/15/2023       Arriving for surgery:  Surgery date:  23  Arrival time:  05:30 am    Please come to:     Please come to:      [facility] [address]  [address] [place]  [address]      The [facility] Patient /Visitor Ramp is located at [address]. Patients and visitors who self-park will receive the reduced hospital parking rate. If the Patient /Visitor Ramp is full, please follow the signs to the  parking located at the main hospital entrance.     parking is available ( 24 hours/ 7 days a week)    Discounted parking pass options are available for patients and visitors. They can be purchased at the Replenish desk at the main hospital entrance.    -    Stop at the security desk and they will direct surgery patients to the 3rd floor Surgery Waiting Room. [phone]     -  If you are in need of directions, wheelchair or escort please stop at the Information/security desk in the lobby.       What can I eat or drink?  -  You may eat and drink normally up to 8 hours prior to arrival time.   -  You may have clear liquids until 2 hours prior to arrival time.     Examples of clear liquids:  Water  Clear broth  Juices (apple, white grape, white cranberry  and cider) without pulp  Noncarbonated, powder based beverages  (lemonade and Sekou-Aid)  Sodas (Sprite, 7-Up, ginger ale and seltzer)  Coffee or tea (without milk or cream)  Gatorade    -  No Alcohol or cannabis products for at least 24 hours before surgery.     Which medicines can I take?    Hold Aspirin for 7 days before surgery.   Hold Multivitamins for 7 days before surgery.  Hold Supplements for 7 days before surgery.  Hold Ibuprofen (Advil, Motrin) for 1 day(s) before surgery--unless otherwise directed by surgeon.  Hold Naproxen (Aleve) for 4 days before  surgery.    -  DO NOT take these medications the day of surgery:  TUMS.    -  PLEASE TAKE these medications the day of surgery:  Tylenol or oxycodone if needed; take other morning medications.    How do I prepare myself?  - Please take 2 showers (one the night prior to surgery and one the morning of surgery) using Scrubcare or Hibiclens soap.    Use this soap only from the neck to your toes.     Leave the soap on your skin for one minute--then rinse thoroughly.      You may use your own shampoo and conditioner. No other hair products.   - Please remove all jewelry and body piercings.  - No lotions, deodorants or fragrance.  - No makeup or fingernail polish.   - Bring your ID and insurance card.    -If you have a Deep Brain Stimulator, Spinal Cord Stimulator, or any Neuro Stimulator device---you must bring the remote control to the hospital.      ALL PATIENTS GOING HOME THE SAME DAY OF SURGERY ARE REQUIRED TO HAVE A RESPONSIBLE ADULT TO DRIVE AND BE IN ATTENDANCE WITH THEM FOR 24 HOURS FOLLOWING SURGERY.    Covid testing policy as of 12/06/2022  Your surgeon will notify and schedule you for a COVID test if one is needed before surgery--please direct any questions or COVID symptoms to your surgeon      Questions or Concerns:    - For any questions regarding the day of surgery or your hospital stay, please contact the Pre Admission Nursing Office at [phone].       - If you have health changes between today and your surgery, please call your surgeon.       - For questions after surgery, please call your surgeons office.           Current Visitor Guidelines    You may have 2 visitors in the pre op area.    Visiting hours: 8 a.m. to 8:30 p.m.    You may have four visitors during your inpatient hospital stay.    Patients confirmed or suspected to have symptoms of COVID 19 or flu:     No visitors allowed for adult patients.   Children (under age 18) can have 1 named visitor.     People who are sick or showing symptoms  of COVID 19 or flu:    Are not allowed to visit patients--we can only make exceptions in special situations.       Please follow these guidelines for your visit:          Please maintain social distance          Masking is optional--however at times you may be asked to wear a mask for the safety of yourself and others     Clean your hands with alcohol hand . Do this when you arrive at and leave the building and patient room,    And again after you touch your mask or anything in the room.     Go directly to and from the room you are visiting.     Stay in the patient s room during your visit. Limit going to other places in the hospital as much as possible     Leave bags and jackets at home or in the car.     For everyone s health, please don t come and go during your visit. That includes for smoking   during your visit.

## 2023-09-15 NOTE — H&P
Pre-Operative H & P     CC:  Preoperative exam to assess for increased cardiopulmonary risk while undergoing surgery and anesthesia.    Date of Encounter: 9/15/2023  Primary Care Physician:  Alba Layton     Reason for visit:   Encounter Diagnoses   Name Primary?    Preop examination Yes    Vaginal cancer (H)        HPI  Jessie Tamayo is a 67 year old female who presents for pre-operative H & P in preparation for  Procedure Information       Case: [id number] Date/Time: 09/18/23 0730    Procedures:       INSERTION, NEEDLE, WITH ULTRASOUND GUIDANCE, FOR INTERSTITIAL BRACHYTHERAPY (Vagina)      possible INSERTION, FIDUCIAL MARKER SEED, CERVIX, FOR RADIATION THERAPY (Vagina)    Anesthesia type: General with Block    Diagnosis: Vaginal cancer (H) [C52]    Pre-op diagnosis: Vaginal cancer (H) [C52]    [facility]  [facility]    Providers: Marisa Pacheco MD            Ms. 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 She is currently undergoing cisplatin radiation with plans for brachytherapy as above.      She was admitted 9/8 - 9/13 for fevers and acute diarrhea in the setting of positive C. Diff and neutropenic fever. She received 2U PRBC during admission. She is thrombocytopenic. She was hypoxic on admission with CXR showing atelectasis, CT from 9/5 w/o evidence of PE. Not on supplemental O2 at this time.  She is hypomagnesemic on oral replacement.    She has a history of HTN and HLD, no PTA meds. Due to persistent HTN, she was started on amlodipine 5 mg daily during admission, with good improvement in BP control (plan to follow up with PCP for management of this concern).        History is obtained from the patient and chart review    Hx of abnormal bleeding or anti-platelet use: denies    Menstrual history: Patient's last menstrual period was 03/08/2008.:      Past Medical History  Past Medical History:    Diagnosis Date    Actinic keratosis     Hyperlipemia     Lichen sclerosus 2020    Osteopenias     SCC (squamous cell carcinoma) 7/26/2023       Past Surgical History  Past Surgical History:   Procedure Laterality Date    COLONOSCOPY  2006    normal    COLPOSCOPY, BIOPSY, COMBINED N/A 02/08/2021    Procedure: COLPOSCOPY, WITH BIOPSY, LEEP procedure;  Surgeon: Jefe Koenig MD;  Location: [facility] OR    CYSTOSCOPY N/A 05/30/2023    Procedure: CYSTOSCOPY AND EXCISIONAL BIOPSY OF THE VAGINAL TISSUE AROUND THE URETHRA.  (look in the bladder and sample small area in the vagina near the urethra);  Surgeon: Lakeisha Mackenzie MD;  [place] [facility] [place]    EYE SURGERY      cataracts bilateral    OPEN REDUCTION INTERNAL FIXATION FOREARM  07/31/2012    Procedure: OPEN REDUCTION INTERNAL FIXATION FOREARM;  Open Reduction Internal Fixation, Irrigation & Debridment  of Right Distal Radius, application short arm splint;  Surgeon: Wade Beard MD;  Location: [facility]    TONSILLECTOMY & ADENOIDECTOMY  1960    [facility] TREAT ECTOPIC PREG,RMV TUBE/OVARY  1985    ectopic, right side-ovary and tube removed       Prior to Admission Medications  Current Outpatient Medications   Medication Sig Dispense Refill    acetaminophen (TYLENOL) 500 MG tablet Take 500-1,000 mg by mouth every 4 hours as needed for mild pain      amLODIPine (NORVASC) 5 MG tablet Take 1 tablet (5 mg) by mouth daily (Patient taking differently: Take 5 mg by mouth every morning) 30 tablet 3    calcium carbonate 750 MG CHEW Take 750 mg by mouth 3 times daily as needed      clotrimazole (LOTRIMIN) 1 % external cream Apply topically 2 times daily To affected areas 45 g 3    docusate sodium (COLACE) 100 MG capsule Take 100 mg by mouth daily as needed for constipation      magnesium oxide (MAG-OX) 400 MG tablet Take 1 tablet (400 mg) by mouth daily (Patient taking differently: Take 400 mg by mouth every morning) 30 tablet 0    NONFORMULARY Apply topically daily  Cavalon cream      ondansetron (ZOFRAN) 8 MG tablet Take 1 tablet (8 mg) by mouth every 8 hours as needed for nausea (vomiting) Do not take for 3 days after chemo. 30 tablet 2    oxyCODONE (ROXICODONE) 10 MG tablet Take 0.5-1 tablets (5-10 mg) by mouth every 6 hours as needed for severe pain (IF pain not managed with non-pharmacological and non-opioid interventions) 20 tablet 0    phenazopyridine (PYRIDIUM) 200 MG tablet Take 1 tablet (200 mg) by mouth 3 times daily as needed for irritation 21 tablet 0    prochlorperazine (COMPAZINE) 10 MG tablet Take 1 tablet (10 mg) by mouth every 6 hours as needed for nausea or vomiting 30 tablet 2    simethicone (MYLICON) 125 MG chewable tablet Take 125 mg by mouth 4 times daily as needed for intestinal gas      tolterodine (DETROL) 2 MG tablet Take 1 tablet (2 mg) by mouth 2 times daily 60 tablet 3    triamcinolone (KENALOG) 0.1 % external cream Apply topically 2 times daily 15 g 1    vancomycin (VANCOCIN) 125 MG capsule Take 1 capsule (125 mg) by mouth 4 times daily for 38 doses 38 capsule 0    magic mouthwash suspension, diphenhydrAMINE, lidocaine, aluminum-magnesium & simethicone, (FIRST-MOUTHWASH BLM) compounding kit Swish and swallow 10 mLs in mouth 4 times daily (before meals and nightly) (Patient not taking: Reported on 9/15/2023) 237 mL 3       Allergies  Allergies   Allergen Reactions    Oxybutynin Itching    Seasonal Allergies        Social History  Social History     Socioeconomic History    Marital status:      Spouse name: Jesse    Number of children: 0    Years of education: Not on file    Highest education level: Not on file   Occupational History     Employer: [employer]   Tobacco Use    Smoking status: Never     Passive exposure: Never    Smokeless tobacco: Never   Vaping Use    Vaping Use: Never used   Substance and Sexual Activity    Alcohol use: Not Currently    Drug use: No    Sexual activity: Not Currently     Partners: Male     Birth  control/protection: Post-menopausal   Other Topics Concern    Parent/sibling w/ CABG, MI or angioplasty before 65F 55M? No   Social History Narrative    Not on file     Social Determinants of Health     Financial Resource Strain: Not on file   Food Insecurity: Not on file   Transportation Needs: Not on file   Physical Activity: Not on file   Stress: Not on file   Social Connections: Not on file   Intimate Partner Violence: Not on file   Housing Stability: Not on file       Family History  Family History   Problem Relation Age of Onset    Lung Cancer Mother 44    Cerebrovascular Disease Father     Hypertension Father     Alcohol/Drug Father     Cervical Cancer Maternal Aunt     Ovarian Cancer Maternal Aunt 60    Obesity Niece         niece    Mental Illness Nephew         nephew    Diabetes Other         paternal aunt    Hyperlipidemia Other     Obesity Other         self    Obesity Other     Diabetes Other         paternal aunt    Hypertension Other         father    Cerebrovascular Disease Other         father    C.A.D. No family hx of     Breast Cancer No family hx of     Cancer - colorectal No family hx of     Prostate Cancer No family hx of     Melanoma No family hx of     Anesthesia Reaction No family hx of     Venous thrombosis No family hx of        Review of Systems  The complete review of systems is negative other than noted in the HPI or here.     Anesthesia Evaluation   Pt has had prior anesthetic.     No history of anesthetic complications       ROS/MED HX  ENT/Pulmonary:     (+)     FLORECITA risk factors, snores loudly, hypertension, obese,                            (-) tobacco use and asthma   Neurologic:  - neg neurologic ROS  (-) no seizures and no CVA   Cardiovascular:     (+)  hypertension- -   -  - -                                 Previous cardiac testing   Echo: Date: Results:    Stress Test:  Date: Results:    ECG Reviewed:  Date: 9/12/23 Results:    Sinus rhythm  Inferior infarct (cited on or  "before 07-NOV-2009)  Abnormal ECG  When compared with ECG of 05-SEP-2023 06:42,  No significant change was found          Cath:  Date: Results:      METS/Exercise Tolerance:  Comment: <4  Was very active before diagnosis in July   Hematologic: Comments: pancytopenia    (+)      anemia, history of blood transfusion, no previous transfusion reaction,        Musculoskeletal:  - neg musculoskeletal ROS     GI/Hepatic:  - neg GI/hepatic ROS   (+) GERD, Symptomatic,                  Renal/Genitourinary:  - neg Renal ROS     Endo:  - neg endo ROS   (+)               Obesity,    (-) Type I DM, Type II DM and thyroid disease   Psychiatric/Substance Use:  - neg psychiatric ROS     Infectious Disease: Comment: C. Diff 9/8, on Vanco      Malignancy:   (+) Malignancy, History of Other.Other CA vaginal cancer Active status post Chemo and Radiation.    Other:  - neg other ROS          /74 (BP Location: Right arm, Patient Position: Sitting, Cuff Size: Adult Large)   Pulse 114   Temp 98.5  F (36.9  C) (Oral)   Ht 1.626 m (5' 4\")   Wt 95.3 kg (210 lb)   LMP 03/08/2008   SpO2 95%   Breastfeeding No   BMI 36.05 kg/m      Physical Exam   Constitutional: Awake, alert, cooperative, mild distress due to pain, and appears stated age.  Eyes: Pupils equal, round and reactive to light, extra ocular muscles intact, sclera clear, conjunctiva normal.  HENT: Normocephalic, oral pharynx with moist mucus membranes, good dentition. No goiter appreciated.   Respiratory: Clear to auscultation bilaterally, no crackles or wheezing.  Cardiovascular: tachycardic (114) and regular rhythm, normal S1 and S2, and no murmur noted.  Carotids +2, no bruits. No edema. Palpable pulses to radial and PT arteries.   GI: Not assessed  Lymph/Hematologic: No cervical lymphadenopathy and no supraclavicular lymphadenopathy.  Genitourinary:  deferred  Skin: Warm and dry.     Musculoskeletal: Full ROM of neck. There is no redness, warmth, or swelling of the " joints. Gross motor strength is normal.    Neurologic: Awake, alert, oriented to name, place and time. Cranial nerves II-XII are grossly intact.   Neuropsychiatric: Calm, cooperative. Normal affect.     Prior Labs/Diagnostic Studies   All labs and imaging personally reviewed     Component      Latest Ref Rng 9/15/2023  10:22 AM   WBC      4.0 - 11.0 10e3/uL 1.9 (L)    RBC Count      3.80 - 5.20 10e6/uL 2.99 (L)    Hemoglobin      11.7 - 15.7 g/dL 9.1 (L)    Hematocrit      35.0 - 47.0 % 27.7 (L)    MCV      78 - 100 fL 93    MCH      26.5 - 33.0 pg 30.4    MCHC      31.5 - 36.5 g/dL 32.9    RDW      10.0 - 15.0 % 16.2 (H)    Platelet Count      150 - 450 10e3/uL 54 (L)    % Neutrophils      % 72    % Lymphocytes      % 17    % Monocytes      % 8    % Eosinophils      % 0    % Basophils      % 1    % Immature Granulocytes      % 2    NRBCs per 100 WBC      <1 /100 0    Absolute Neutrophils      1.6 - 8.3 10e3/uL 1.4 (L)    Absolute Lymphocytes      0.8 - 5.3 10e3/uL 0.3 (L)    Absolute Monocytes      0.0 - 1.3 10e3/uL 0.1    Absolute Eosinophils      0.0 - 0.7 10e3/uL 0.0    Absolute Basophils      0.0 - 0.2 10e3/uL 0.0    Absolute Immature Granulocytes      <=0.4 10e3/uL 0.0    Absolute NRBCs      10e3/uL 0.0       Legend:  (L) Low  (H) High    Component      Latest Ref Rng 9/15/2023  10:22 AM   Sodium      136 - 145 mmol/L 132 (L)    Potassium      3.4 - 5.3 mmol/L 4.2    Chloride      98 - 107 mmol/L 96 (L)    Carbon Dioxide (CO2)      22 - 29 mmol/L 28    Anion Gap      7 - 15 mmol/L 8    Urea Nitrogen      8.0 - 23.0 mg/dL 13.0    Creatinine      0.51 - 0.95 mg/dL 0.89    Calcium      8.8 - 10.2 mg/dL 9.0    Glucose      70 - 99 mg/dL 120 (H)    GFR Estimate      >60 mL/min/1.73m2 71       Legend:  (L) Low  (H) High    Component      Latest Ref Rng 9/15/2023  10:22 AM   Magnesium      1.7 - 2.3 mg/dL 1.6 (L)       Legend:  (L) Low      EKG/ stress test - if available please see in ROS above         The patient's  "records and results personally reviewed by this provider.     Outside records reviewed from: Care Everywhere    LAB/DIAGNOSTIC STUDIES TODAY:  Mg, CBC, BMP, type and screen    Assessment    Jessie Tamayo is a 67 year old female seen as a PAC referral for risk assessment and optimization for anesthesia.    Plan/Recommendations  Pt will be optimized for the proposed procedure.  See below for details on the assessment, risk, and preoperative recommendations    NEUROLOGY  - No history of TIA, CVA or seizure  - Chronic Pain  On chronic opiates, morphine equivilant = 30-60 MME/Day   -Post Op delirium risk factors:  No risk identified    ENT  - No current airway concerns.  Will need to be reassessed day of surgery.  Mallampati: III  TM: > 3    CARDIAC  - HTN, continue amlodipine  - Denies chest pain, SOB, MOLINA, palpitations    - METS (Metabolic Equivalents)  Patient CANNOT perform 4 METS exercise without symptoms            Total Score: 1    Functional Capacity: Unable to complete 4 METS      RCRI-Very low risk: Class 1 0.4% complication rate            Total Score: 0        PULMONARY    FLORECITA Medium Risk            Total Score: 4    FLORECITA: Snores loudly    FLORECITA: Hypertension    FLORECITA: BMI over 35 kg/m2    FLORECITA: Over 50 ys old      - Denies asthma or inhaler use  - Tobacco History    History   Smoking Status    Never   Smokeless Tobacco    Never       GI  - denies GERD  PONV High Risk  Total Score: 3           1 AN PONV: Pt is Female    1 AN PONV: Patient is not a current smoker    1 AN PONV: Intended Post Op Opioids        /RENAL  - Baseline Creatinine  0.90    ENDOCRINE    - BMI: Estimated body mass index is 36.05 kg/m  as calculated from the following:    Height as of this encounter: 1.626 m (5' 4\").    Weight as of this encounter: 95.3 kg (210 lb).  Obesity (BMI >30)  - No history of Diabetes Mellitus    HEME  VTE Medium Risk 1.8%            Total Score: 6    VTE: Greater than 59 yrs old    VTE: Current cancer      - No " history of abnormal bleeding or antiplatelet use.  - anemia  Recommend perioperative use of blood conservation techniques intraoperatively and close monitoring for postoperative bleeding.    - recent PRBC transfusion while admitted (2U)    - platelets low at 38 on 9/13/23, will check type and screen today for possible platelet transfusion with procedure on 9/18/23. Discussed with Dr. Juarez of anesthesia and as plt count <100, epidural for procedure unlikely. Dr. Juarez updated Dr. Pacheco and TONY Betina Kramer via staff message.  - platelets today: 54, Dr. Pacheco notified.    - Mg low today at 1.6, was 2.5 on discharge two days ago. Will have patient increase her po Mg to bid dosing per Betina Kramer      Different anesthesia methods/types have been discussed with the patient, but they are aware that the final plan will be decided by the assigned anesthesia provider on the date of service.    Patient was discussed with Dr Juarez    The patient is optimized for their procedure. AVS with information on surgery time/arrival time, meds and NPO status given by nursing staff. No further diagnostic testing indicated.      On the day of service:     Prep time: 13 minutes  Visit time: 11 minutes  Documentation time: 12 minutes  ------------------------------------------  Total time: 36 minutes      Lily Fabian PA-C  Preoperative Assessment Center  [facility]  Phone: [phone]  Fax: [phone]

## 2023-09-17 ENCOUNTER — INFUSION THERAPY VISIT (OUTPATIENT)
Dept: INFUSION THERAPY | Facility: CLINIC | Age: 68
End: 2023-09-17
Attending: NURSE PRACTITIONER
Payer: COMMERCIAL

## 2023-09-17 VITALS
SYSTOLIC BLOOD PRESSURE: 145 MMHG | OXYGEN SATURATION: 92 % | DIASTOLIC BLOOD PRESSURE: 75 MMHG | RESPIRATION RATE: 18 BRPM | TEMPERATURE: 97.7 F | HEART RATE: 107 BPM

## 2023-09-17 DIAGNOSIS — C52 VAGINAL CANCER (H): ICD-10-CM

## 2023-09-17 DIAGNOSIS — E83.42 HYPOMAGNESEMIA: Primary | ICD-10-CM

## 2023-09-17 LAB
ANION GAP SERPL CALCULATED.3IONS-SCNC: 9 MMOL/L (ref 7–15)
BASOPHILS # BLD MANUAL: 0 10E3/UL (ref 0–0.2)
BASOPHILS NFR BLD MANUAL: 0 %
BUN SERPL-MCNC: 10.2 MG/DL (ref 8–23)
CALCIUM SERPL-MCNC: 8.7 MG/DL (ref 8.8–10.2)
CHLORIDE SERPL-SCNC: 100 MMOL/L (ref 98–107)
CREAT SERPL-MCNC: 0.85 MG/DL (ref 0.51–0.95)
DEPRECATED HCO3 PLAS-SCNC: 27 MMOL/L (ref 22–29)
EGFRCR SERPLBLD CKD-EPI 2021: 75 ML/MIN/1.73M2
EOSINOPHIL # BLD MANUAL: 0 10E3/UL (ref 0–0.7)
EOSINOPHIL NFR BLD MANUAL: 0 %
ERYTHROCYTE [DISTWIDTH] IN BLOOD BY AUTOMATED COUNT: 16.6 % (ref 10–15)
GLUCOSE SERPL-MCNC: 127 MG/DL (ref 70–99)
HCT VFR BLD AUTO: 24.6 % (ref 35–47)
HGB BLD-MCNC: 7.9 G/DL (ref 11.7–15.7)
LYMPHOCYTES # BLD MANUAL: 0.2 10E3/UL (ref 0.8–5.3)
LYMPHOCYTES NFR BLD MANUAL: 15 %
MAGNESIUM SERPL-MCNC: 1.7 MG/DL (ref 1.7–2.3)
MCH RBC QN AUTO: 30.3 PG (ref 26.5–33)
MCHC RBC AUTO-ENTMCNC: 32.1 G/DL (ref 31.5–36.5)
MCV RBC AUTO: 94 FL (ref 78–100)
METAMYELOCYTES # BLD MANUAL: 0 10E3/UL
METAMYELOCYTES NFR BLD MANUAL: 2 %
MONOCYTES # BLD MANUAL: 0.1 10E3/UL (ref 0–1.3)
MONOCYTES NFR BLD MANUAL: 9 %
MYELOCYTES # BLD MANUAL: 0 10E3/UL
MYELOCYTES NFR BLD MANUAL: 2 %
NEUTROPHILS # BLD MANUAL: 1.1 10E3/UL (ref 1.6–8.3)
NEUTROPHILS NFR BLD MANUAL: 72 %
NRBC # BLD AUTO: 0 10E3/UL
NRBC BLD MANUAL-RTO: 1 %
PLAT MORPH BLD: ABNORMAL
PLATELET # BLD AUTO: 106 10E3/UL (ref 150–450)
POTASSIUM SERPL-SCNC: 4.3 MMOL/L (ref 3.4–5.3)
RBC # BLD AUTO: 2.61 10E6/UL (ref 3.8–5.2)
RBC MORPH BLD: ABNORMAL
SODIUM SERPL-SCNC: 136 MMOL/L (ref 136–145)
WBC # BLD AUTO: 1.5 10E3/UL (ref 4–11)

## 2023-09-17 PROCEDURE — 85014 HEMATOCRIT: CPT | Performed by: OBSTETRICS & GYNECOLOGY

## 2023-09-17 PROCEDURE — 80048 BASIC METABOLIC PNL TOTAL CA: CPT | Performed by: OBSTETRICS & GYNECOLOGY

## 2023-09-17 PROCEDURE — 96360 HYDRATION IV INFUSION INIT: CPT

## 2023-09-17 PROCEDURE — 83735 ASSAY OF MAGNESIUM: CPT | Performed by: OBSTETRICS & GYNECOLOGY

## 2023-09-17 PROCEDURE — 36415 COLL VENOUS BLD VENIPUNCTURE: CPT | Performed by: OBSTETRICS & GYNECOLOGY

## 2023-09-17 PROCEDURE — 258N000003 HC RX IP 258 OP 636: Performed by: NURSE PRACTITIONER

## 2023-09-17 PROCEDURE — 85007 BL SMEAR W/DIFF WBC COUNT: CPT | Performed by: OBSTETRICS & GYNECOLOGY

## 2023-09-17 RX ORDER — HEPARIN SODIUM,PORCINE 10 UNIT/ML
5-20 VIAL (ML) INTRAVENOUS DAILY PRN
Status: CANCELLED | OUTPATIENT
Start: 2023-09-17

## 2023-09-17 RX ORDER — ALBUTEROL SULFATE 90 UG/1
1-2 AEROSOL, METERED RESPIRATORY (INHALATION)
Status: CANCELLED
Start: 2023-09-17

## 2023-09-17 RX ORDER — HEPARIN SODIUM (PORCINE) LOCK FLUSH IV SOLN 100 UNIT/ML 100 UNIT/ML
5 SOLUTION INTRAVENOUS
Status: CANCELLED | OUTPATIENT
Start: 2023-09-17

## 2023-09-17 RX ORDER — ALBUTEROL SULFATE 0.83 MG/ML
2.5 SOLUTION RESPIRATORY (INHALATION)
Status: CANCELLED | OUTPATIENT
Start: 2023-09-17

## 2023-09-17 RX ORDER — METHYLPREDNISOLONE SODIUM SUCCINATE 125 MG/2ML
125 INJECTION, POWDER, LYOPHILIZED, FOR SOLUTION INTRAMUSCULAR; INTRAVENOUS
Status: CANCELLED
Start: 2023-09-17

## 2023-09-17 RX ORDER — MEPERIDINE HYDROCHLORIDE 25 MG/ML
25 INJECTION INTRAMUSCULAR; INTRAVENOUS; SUBCUTANEOUS EVERY 30 MIN PRN
Status: CANCELLED | OUTPATIENT
Start: 2023-09-17

## 2023-09-17 RX ORDER — DIPHENHYDRAMINE HYDROCHLORIDE 50 MG/ML
50 INJECTION INTRAMUSCULAR; INTRAVENOUS
Status: CANCELLED
Start: 2023-09-17

## 2023-09-17 RX ORDER — EPINEPHRINE 1 MG/ML
0.3 INJECTION, SOLUTION INTRAMUSCULAR; SUBCUTANEOUS EVERY 5 MIN PRN
Status: CANCELLED | OUTPATIENT
Start: 2023-09-17

## 2023-09-17 RX ADMIN — SODIUM CHLORIDE 1000 ML: 9 INJECTION, SOLUTION INTRAVENOUS at 09:04

## 2023-09-17 NOTE — PROGRESS NOTES
Infusion Nursing Note:  Jessie Tamayo presents today for IV fluids and peripheral labs.    Patient seen by provider today: No   present during visit today: Not Applicable.    Note: Paged Dr. Stahl per patient's request regarding electrolyte replacement, labs, and possible transfusion. Per Dr. Stahl, we will take labs and replace electrolytes but will not transfuse unless platelets are less than 10.      Intravenous Access:  Labs drawn without difficulty.  Peripheral IV placed.    Treatment Conditions:  Lab Results   Component Value Date    HGB 7.9 (L) 09/17/2023    WBC 1.5 (L) 09/17/2023    ANEU 1.1 (L) 09/12/2023    ANEUTAUTO 1.4 (L) 09/15/2023     (L) 09/17/2023        Lab Results   Component Value Date     09/17/2023    POTASSIUM 4.3 09/17/2023    MAG 1.7 09/17/2023    CR 0.85 09/17/2023    KIM 8.7 (L) 09/17/2023    BILITOTAL 0.4 09/13/2023    ALBUMIN 2.8 (L) 09/13/2023    ALT 22 09/13/2023    AST 30 09/13/2023         Post Infusion Assessment:  Patient tolerated infusion without incident.  Blood return noted pre and post infusion.  Site patent and intact, free from redness, edema or discomfort.  No evidence of extravasations.  Access discontinued per protocol.       Discharge Plan:   Patient declined prescription refills.  Discharge instructions reviewed with: Patient.  Patient and/or family verbalized understanding of discharge instructions and all questions answered.  AVS to patient via [employer].  Patient will return when scheduled for next appointment.   Patient discharged in stable condition accompanied by: self and .  Departure Mode: Ambulatory.      Rajat Paige RN

## 2023-09-18 ENCOUNTER — OFFICE VISIT (OUTPATIENT)
Dept: RADIATION ONCOLOGY | Facility: CLINIC | Age: 68
End: 2023-09-18
Attending: RADIOLOGY
Payer: COMMERCIAL

## 2023-09-18 ENCOUNTER — APPOINTMENT (OUTPATIENT)
Dept: ULTRASOUND IMAGING | Facility: CLINIC | Age: 68
DRG: 754 | End: 2023-09-18
Attending: RADIOLOGY
Payer: COMMERCIAL

## 2023-09-18 ENCOUNTER — HOSPITAL ENCOUNTER (INPATIENT)
Facility: CLINIC | Age: 68
LOS: 3 days | Discharge: HOME | DRG: 754 | End: 2023-09-21
Attending: RADIOLOGY | Admitting: RADIOLOGY
Payer: COMMERCIAL

## 2023-09-18 ENCOUNTER — ANESTHESIA (OUTPATIENT)
Dept: SURGERY | Facility: CLINIC | Age: 68
DRG: 754 | End: 2023-09-18
Payer: COMMERCIAL

## 2023-09-18 DIAGNOSIS — C44.92 SCC (SQUAMOUS CELL CARCINOMA): Primary | ICD-10-CM

## 2023-09-18 DIAGNOSIS — C52 VAGINAL CANCER (H): Primary | ICD-10-CM

## 2023-09-18 DIAGNOSIS — C51.9 VULVAR CANCER (H): ICD-10-CM

## 2023-09-18 LAB
CREAT SERPL-MCNC: 0.77 MG/DL (ref 0.51–0.95)
EGFRCR SERPLBLD CKD-EPI 2021: 84 ML/MIN/1.73M2
MAGNESIUM SERPL-MCNC: 1.6 MG/DL (ref 1.7–2.3)
PLATELET # BLD AUTO: 102 10E3/UL (ref 150–450)
POTASSIUM SERPL-SCNC: 4.9 MMOL/L (ref 3.4–5.3)

## 2023-09-18 PROCEDURE — 999N000141 HC STATISTIC PRE-PROCEDURE NURSING ASSESSMENT: Performed by: RADIOLOGY

## 2023-09-18 PROCEDURE — 77332 RADIATION TREATMENT AID(S): CPT | Performed by: RADIOLOGY

## 2023-09-18 PROCEDURE — 82565 ASSAY OF CREATININE: CPT | Performed by: NURSE PRACTITIONER

## 2023-09-18 PROCEDURE — 258N000003 HC RX IP 258 OP 636: Performed by: STUDENT IN AN ORGANIZED HEALTH CARE EDUCATION/TRAINING PROGRAM

## 2023-09-18 PROCEDURE — 258N000003 HC RX IP 258 OP 636

## 2023-09-18 PROCEDURE — 272N000001 HC OR GENERAL SUPPLY STERILE: Performed by: RADIOLOGY

## 2023-09-18 PROCEDURE — 0UHD7YZ INSERTION OF OTHER DEVICE INTO UTERUS AND CERVIX, VIA NATURAL OR ARTIFICIAL OPENING: ICD-10-PCS | Performed by: RADIOLOGY

## 2023-09-18 PROCEDURE — 250N000011 HC RX IP 250 OP 636: Mod: JZ

## 2023-09-18 PROCEDURE — 55920 PLACE NEEDLES PELVIC FOR RT: CPT | Performed by: RADIOLOGY

## 2023-09-18 PROCEDURE — 250N000013 HC RX MED GY IP 250 OP 250 PS 637: Performed by: NURSE PRACTITIONER

## 2023-09-18 PROCEDURE — 250N000011 HC RX IP 250 OP 636: Performed by: STUDENT IN AN ORGANIZED HEALTH CARE EDUCATION/TRAINING PROGRAM

## 2023-09-18 PROCEDURE — 250N000011 HC RX IP 250 OP 636: Performed by: RADIOLOGY

## 2023-09-18 PROCEDURE — 370N000017 HC ANESTHESIA TECHNICAL FEE, PER MIN: Performed by: RADIOLOGY

## 2023-09-18 PROCEDURE — 84132 ASSAY OF SERUM POTASSIUM: CPT | Performed by: RADIOLOGY

## 2023-09-18 PROCEDURE — 36415 COLL VENOUS BLD VENIPUNCTURE: CPT | Performed by: NURSE PRACTITIONER

## 2023-09-18 PROCEDURE — 710N000010 HC RECOVERY PHASE 1, LEVEL 2, PER MIN: Performed by: RADIOLOGY

## 2023-09-18 PROCEDURE — 83735 ASSAY OF MAGNESIUM: CPT | Performed by: RADIOLOGY

## 2023-09-18 PROCEDURE — 77772 HDR RDNCL NTRSTL/ICAV BRCHTX: CPT | Mod: 26 | Performed by: RADIOLOGY

## 2023-09-18 PROCEDURE — 360N000076 HC SURGERY LEVEL 3, PER MIN: Performed by: RADIOLOGY

## 2023-09-18 PROCEDURE — 77772 HDR RDNCL NTRSTL/ICAV BRCHTX: CPT | Performed by: RADIOLOGY

## 2023-09-18 PROCEDURE — 120N000002 HC R&B MED SURG/OB UMMC

## 2023-09-18 PROCEDURE — 77332 RADIATION TREATMENT AID(S): CPT | Mod: 26 | Performed by: RADIOLOGY

## 2023-09-18 PROCEDURE — DU1198Z HIGH DOSE RATE (HDR) BRACHYTHERAPY OF CERVIX USING IRIDIUM 192 (IR-192): ICD-10-PCS | Performed by: RADIOLOGY

## 2023-09-18 PROCEDURE — 250N000025 HC SEVOFLURANE, PER MIN: Performed by: RADIOLOGY

## 2023-09-18 PROCEDURE — 77295 3-D RADIOTHERAPY PLAN: CPT | Mod: 26 | Performed by: RADIOLOGY

## 2023-09-18 PROCEDURE — 250N000011 HC RX IP 250 OP 636: Mod: JZ | Performed by: ANESTHESIOLOGY

## 2023-09-18 PROCEDURE — 76857 US EXAM PELVIC LIMITED: CPT

## 2023-09-18 PROCEDURE — C1717 BRACHYTX, NON-STR,HDR IR-192: HCPCS | Performed by: RADIOLOGY

## 2023-09-18 PROCEDURE — C1713 ANCHOR/SCREW BN/BN,TIS/BN: HCPCS | Performed by: RADIOLOGY

## 2023-09-18 PROCEDURE — 85049 AUTOMATED PLATELET COUNT: CPT

## 2023-09-18 PROCEDURE — 36415 COLL VENOUS BLD VENIPUNCTURE: CPT | Performed by: RADIOLOGY

## 2023-09-18 PROCEDURE — 250N000009 HC RX 250

## 2023-09-18 PROCEDURE — 77295 3-D RADIOTHERAPY PLAN: CPT | Performed by: RADIOLOGY

## 2023-09-18 RX ORDER — SODIUM CHLORIDE, SODIUM LACTATE, POTASSIUM CHLORIDE, CALCIUM CHLORIDE 600; 310; 30; 20 MG/100ML; MG/100ML; MG/100ML; MG/100ML
INJECTION, SOLUTION INTRAVENOUS CONTINUOUS PRN
Status: DISCONTINUED | OUTPATIENT
Start: 2023-09-18 | End: 2023-09-18

## 2023-09-18 RX ORDER — CALCIUM CARBONATE 500 MG/1
500 TABLET, CHEWABLE ORAL 4 TIMES DAILY PRN
Status: DISCONTINUED | OUTPATIENT
Start: 2023-09-18 | End: 2023-09-21 | Stop reason: HOSPADM

## 2023-09-18 RX ORDER — NALOXONE HYDROCHLORIDE 0.4 MG/ML
0.2 INJECTION, SOLUTION INTRAMUSCULAR; INTRAVENOUS; SUBCUTANEOUS
Status: DISCONTINUED | OUTPATIENT
Start: 2023-09-18 | End: 2023-09-21 | Stop reason: HOSPADM

## 2023-09-18 RX ORDER — ONDANSETRON 4 MG/1
4 TABLET, ORALLY DISINTEGRATING ORAL EVERY 30 MIN PRN
Status: DISCONTINUED | OUTPATIENT
Start: 2023-09-18 | End: 2023-09-18 | Stop reason: HOSPADM

## 2023-09-18 RX ORDER — LABETALOL 20 MG/4 ML (5 MG/ML) INTRAVENOUS SYRINGE
PRN
Status: DISCONTINUED | OUTPATIENT
Start: 2023-09-18 | End: 2023-09-18

## 2023-09-18 RX ORDER — HYDROMORPHONE HCL IN WATER/PF 6 MG/30 ML
0.4 PATIENT CONTROLLED ANALGESIA SYRINGE INTRAVENOUS EVERY 5 MIN PRN
Status: DISCONTINUED | OUTPATIENT
Start: 2023-09-18 | End: 2023-09-18 | Stop reason: HOSPADM

## 2023-09-18 RX ORDER — VANCOMYCIN HYDROCHLORIDE 125 MG/1
125 CAPSULE ORAL 4 TIMES DAILY
Status: DISCONTINUED | OUTPATIENT
Start: 2023-09-18 | End: 2023-09-21 | Stop reason: HOSPADM

## 2023-09-18 RX ORDER — DEXAMETHASONE SODIUM PHOSPHATE 4 MG/ML
INJECTION, SOLUTION INTRA-ARTICULAR; INTRALESIONAL; INTRAMUSCULAR; INTRAVENOUS; SOFT TISSUE PRN
Status: DISCONTINUED | OUTPATIENT
Start: 2023-09-18 | End: 2023-09-18

## 2023-09-18 RX ORDER — ACETAMINOPHEN 500 MG
500-1000 TABLET ORAL EVERY 6 HOURS PRN
Status: DISCONTINUED | OUTPATIENT
Start: 2023-09-18 | End: 2023-09-21 | Stop reason: HOSPADM

## 2023-09-18 RX ORDER — SODIUM CHLORIDE, SODIUM LACTATE, POTASSIUM CHLORIDE, CALCIUM CHLORIDE 600; 310; 30; 20 MG/100ML; MG/100ML; MG/100ML; MG/100ML
INJECTION, SOLUTION INTRAVENOUS CONTINUOUS
Status: DISCONTINUED | OUTPATIENT
Start: 2023-09-18 | End: 2023-09-18 | Stop reason: HOSPADM

## 2023-09-18 RX ORDER — FENTANYL CITRATE 50 UG/ML
INJECTION, SOLUTION INTRAMUSCULAR; INTRAVENOUS PRN
Status: DISCONTINUED | OUTPATIENT
Start: 2023-09-18 | End: 2023-09-18

## 2023-09-18 RX ORDER — TOLTERODINE TARTRATE 2 MG/1
2 TABLET, EXTENDED RELEASE ORAL 2 TIMES DAILY
Status: DISCONTINUED | OUTPATIENT
Start: 2023-09-18 | End: 2023-09-21 | Stop reason: HOSPADM

## 2023-09-18 RX ORDER — NALOXONE HYDROCHLORIDE 0.4 MG/ML
0.4 INJECTION, SOLUTION INTRAMUSCULAR; INTRAVENOUS; SUBCUTANEOUS
Status: DISCONTINUED | OUTPATIENT
Start: 2023-09-18 | End: 2023-09-18 | Stop reason: HOSPADM

## 2023-09-18 RX ORDER — NALOXONE HYDROCHLORIDE 0.4 MG/ML
0.4 INJECTION, SOLUTION INTRAMUSCULAR; INTRAVENOUS; SUBCUTANEOUS
Status: DISCONTINUED | OUTPATIENT
Start: 2023-09-18 | End: 2023-09-21 | Stop reason: HOSPADM

## 2023-09-18 RX ORDER — ONDANSETRON 2 MG/ML
4 INJECTION INTRAMUSCULAR; INTRAVENOUS EVERY 30 MIN PRN
Status: DISCONTINUED | OUTPATIENT
Start: 2023-09-18 | End: 2023-09-18 | Stop reason: HOSPADM

## 2023-09-18 RX ORDER — OXYCODONE HYDROCHLORIDE 5 MG/1
5-10 TABLET ORAL EVERY 6 HOURS PRN
Status: DISCONTINUED | OUTPATIENT
Start: 2023-09-18 | End: 2023-09-18

## 2023-09-18 RX ORDER — AMLODIPINE BESYLATE 5 MG/1
5 TABLET ORAL DAILY
Status: DISCONTINUED | OUTPATIENT
Start: 2023-09-19 | End: 2023-09-21 | Stop reason: HOSPADM

## 2023-09-18 RX ORDER — SIMETHICONE 80 MG
80 TABLET,CHEWABLE ORAL EVERY 4 HOURS PRN
Status: DISCONTINUED | OUTPATIENT
Start: 2023-09-18 | End: 2023-09-21 | Stop reason: HOSPADM

## 2023-09-18 RX ORDER — ENOXAPARIN SODIUM 100 MG/ML
40 INJECTION SUBCUTANEOUS EVERY 24 HOURS
Status: COMPLETED | OUTPATIENT
Start: 2023-09-19 | End: 2023-09-19

## 2023-09-18 RX ORDER — NALBUPHINE HYDROCHLORIDE 10 MG/ML
2.5-5 INJECTION, SOLUTION INTRAMUSCULAR; INTRAVENOUS; SUBCUTANEOUS EVERY 6 HOURS PRN
Status: DISCONTINUED | OUTPATIENT
Start: 2023-09-18 | End: 2023-09-21 | Stop reason: HOSPADM

## 2023-09-18 RX ORDER — PROCHLORPERAZINE MALEATE 5 MG
5 TABLET ORAL EVERY 6 HOURS PRN
Status: DISCONTINUED | OUTPATIENT
Start: 2023-09-18 | End: 2023-09-21 | Stop reason: HOSPADM

## 2023-09-18 RX ORDER — ONDANSETRON 8 MG/1
8 TABLET, FILM COATED ORAL EVERY 8 HOURS PRN
Status: DISCONTINUED | OUTPATIENT
Start: 2023-09-18 | End: 2023-09-21 | Stop reason: HOSPADM

## 2023-09-18 RX ORDER — LIDOCAINE HYDROCHLORIDE 20 MG/ML
INJECTION, SOLUTION INFILTRATION; PERINEURAL PRN
Status: DISCONTINUED | OUTPATIENT
Start: 2023-09-18 | End: 2023-09-18

## 2023-09-18 RX ORDER — HYDROMORPHONE HCL IN WATER/PF 6 MG/30 ML
0.2 PATIENT CONTROLLED ANALGESIA SYRINGE INTRAVENOUS EVERY 5 MIN PRN
Status: DISCONTINUED | OUTPATIENT
Start: 2023-09-18 | End: 2023-09-18 | Stop reason: HOSPADM

## 2023-09-18 RX ORDER — PROPOFOL 10 MG/ML
INJECTION, EMULSION INTRAVENOUS PRN
Status: DISCONTINUED | OUTPATIENT
Start: 2023-09-18 | End: 2023-09-18

## 2023-09-18 RX ORDER — IBUPROFEN 200 MG
400 TABLET ORAL EVERY 6 HOURS PRN
Status: DISCONTINUED | OUTPATIENT
Start: 2023-09-18 | End: 2023-09-21 | Stop reason: HOSPADM

## 2023-09-18 RX ORDER — NALBUPHINE HYDROCHLORIDE 10 MG/ML
2.5-5 INJECTION, SOLUTION INTRAMUSCULAR; INTRAVENOUS; SUBCUTANEOUS EVERY 6 HOURS PRN
Status: DISCONTINUED | OUTPATIENT
Start: 2023-09-18 | End: 2023-09-18

## 2023-09-18 RX ORDER — CEFAZOLIN SODIUM/WATER 2 G/20 ML
2 SYRINGE (ML) INTRAVENOUS
Status: COMPLETED | OUTPATIENT
Start: 2023-09-18 | End: 2023-09-18

## 2023-09-18 RX ORDER — ONDANSETRON 2 MG/ML
INJECTION INTRAMUSCULAR; INTRAVENOUS PRN
Status: DISCONTINUED | OUTPATIENT
Start: 2023-09-18 | End: 2023-09-18

## 2023-09-18 RX ORDER — LORATADINE 10 MG/1
10 TABLET ORAL DAILY PRN
Status: DISCONTINUED | OUTPATIENT
Start: 2023-09-18 | End: 2023-09-21 | Stop reason: HOSPADM

## 2023-09-18 RX ORDER — PHENAZOPYRIDINE HYDROCHLORIDE 100 MG/1
200 TABLET, FILM COATED ORAL 3 TIMES DAILY PRN
Status: DISCONTINUED | OUTPATIENT
Start: 2023-09-18 | End: 2023-09-21 | Stop reason: HOSPADM

## 2023-09-18 RX ORDER — NALOXONE HYDROCHLORIDE 0.4 MG/ML
0.2 INJECTION, SOLUTION INTRAMUSCULAR; INTRAVENOUS; SUBCUTANEOUS
Status: DISCONTINUED | OUTPATIENT
Start: 2023-09-18 | End: 2023-09-18 | Stop reason: HOSPADM

## 2023-09-18 RX ORDER — FENTANYL CITRATE 50 UG/ML
50 INJECTION, SOLUTION INTRAMUSCULAR; INTRAVENOUS EVERY 5 MIN PRN
Status: DISCONTINUED | OUTPATIENT
Start: 2023-09-18 | End: 2023-09-18 | Stop reason: HOSPADM

## 2023-09-18 RX ORDER — FENTANYL CITRATE 50 UG/ML
25 INJECTION, SOLUTION INTRAMUSCULAR; INTRAVENOUS EVERY 5 MIN PRN
Status: DISCONTINUED | OUTPATIENT
Start: 2023-09-18 | End: 2023-09-18 | Stop reason: HOSPADM

## 2023-09-18 RX ORDER — FLUMAZENIL 0.1 MG/ML
0.2 INJECTION, SOLUTION INTRAVENOUS
Status: DISCONTINUED | OUTPATIENT
Start: 2023-09-18 | End: 2023-09-18 | Stop reason: HOSPADM

## 2023-09-18 RX ORDER — CALCIUM CARBONATE 500 MG/1
500 TABLET, CHEWABLE ORAL 2 TIMES DAILY
Status: DISCONTINUED | OUTPATIENT
Start: 2023-09-18 | End: 2023-09-21 | Stop reason: HOSPADM

## 2023-09-18 RX ORDER — CEFAZOLIN SODIUM/WATER 2 G/20 ML
2 SYRINGE (ML) INTRAVENOUS SEE ADMIN INSTRUCTIONS
Status: DISCONTINUED | OUTPATIENT
Start: 2023-09-18 | End: 2023-09-18 | Stop reason: HOSPADM

## 2023-09-18 RX ORDER — FENTANYL CITRATE 50 UG/ML
25-50 INJECTION, SOLUTION INTRAMUSCULAR; INTRAVENOUS
Status: DISCONTINUED | OUTPATIENT
Start: 2023-09-18 | End: 2023-09-18 | Stop reason: HOSPADM

## 2023-09-18 RX ADMIN — FENTANYL CITRATE 25 MCG: 50 INJECTION, SOLUTION INTRAMUSCULAR; INTRAVENOUS at 10:42

## 2023-09-18 RX ADMIN — CALCIUM CARBONATE (ANTACID) CHEW TAB 500 MG 500 MG: 500 CHEW TAB at 20:42

## 2023-09-18 RX ADMIN — PROPOFOL 40 MG: 10 INJECTION, EMULSION INTRAVENOUS at 08:33

## 2023-09-18 RX ADMIN — PROPOFOL 50 MG: 10 INJECTION, EMULSION INTRAVENOUS at 08:00

## 2023-09-18 RX ADMIN — PROPOFOL 30 MG: 10 INJECTION, EMULSION INTRAVENOUS at 08:52

## 2023-09-18 RX ADMIN — LIDOCAINE HYDROCHLORIDE 100 MG: 20 INJECTION, SOLUTION INFILTRATION; PERINEURAL at 07:56

## 2023-09-18 RX ADMIN — Medication 20 MG: at 08:24

## 2023-09-18 RX ADMIN — HYDROMORPHONE HYDROCHLORIDE 0.2 MG: 0.2 INJECTION, SOLUTION INTRAMUSCULAR; INTRAVENOUS; SUBCUTANEOUS at 11:24

## 2023-09-18 RX ADMIN — VANCOMYCIN HYDROCHLORIDE 125 MG: 125 CAPSULE ORAL at 16:43

## 2023-09-18 RX ADMIN — LABETALOL 20 MG/4 ML (5 MG/ML) INTRAVENOUS SYRINGE 10 MG: at 09:37

## 2023-09-18 RX ADMIN — HYDROMORPHONE HYDROCHLORIDE 0.5 MG: 1 INJECTION, SOLUTION INTRAMUSCULAR; INTRAVENOUS; SUBCUTANEOUS at 09:11

## 2023-09-18 RX ADMIN — PROPOFOL 40 MG: 10 INJECTION, EMULSION INTRAVENOUS at 08:24

## 2023-09-18 RX ADMIN — SODIUM CHLORIDE, POTASSIUM CHLORIDE, SODIUM LACTATE AND CALCIUM CHLORIDE: 600; 310; 30; 20 INJECTION, SOLUTION INTRAVENOUS at 09:25

## 2023-09-18 RX ADMIN — Medication 2 G: at 08:02

## 2023-09-18 RX ADMIN — FENTANYL CITRATE 50 MCG: 50 INJECTION, SOLUTION INTRAMUSCULAR; INTRAVENOUS at 07:27

## 2023-09-18 RX ADMIN — FENTANYL CITRATE 50 MCG: 50 INJECTION, SOLUTION INTRAMUSCULAR; INTRAVENOUS at 08:42

## 2023-09-18 RX ADMIN — HYDROMORPHONE HYDROCHLORIDE 10 ML/HR: 1 INJECTION, SOLUTION INTRAMUSCULAR; INTRAVENOUS; SUBCUTANEOUS at 09:53

## 2023-09-18 RX ADMIN — Medication 20 MG: at 08:42

## 2023-09-18 RX ADMIN — MIDAZOLAM 2 MG: 1 INJECTION INTRAMUSCULAR; INTRAVENOUS at 07:55

## 2023-09-18 RX ADMIN — DEXMEDETOMIDINE HYDROCHLORIDE 12 MCG: 100 INJECTION, SOLUTION INTRAVENOUS at 09:22

## 2023-09-18 RX ADMIN — FENTANYL CITRATE 100 MCG: 50 INJECTION, SOLUTION INTRAMUSCULAR; INTRAVENOUS at 07:56

## 2023-09-18 RX ADMIN — Medication 10 MG: at 09:23

## 2023-09-18 RX ADMIN — SODIUM CHLORIDE, POTASSIUM CHLORIDE, SODIUM LACTATE AND CALCIUM CHLORIDE: 600; 310; 30; 20 INJECTION, SOLUTION INTRAVENOUS at 07:27

## 2023-09-18 RX ADMIN — DEXAMETHASONE SODIUM PHOSPHATE 4 MG: 4 INJECTION, SOLUTION INTRA-ARTICULAR; INTRALESIONAL; INTRAMUSCULAR; INTRAVENOUS; SOFT TISSUE at 08:02

## 2023-09-18 RX ADMIN — Medication 50 MG: at 07:57

## 2023-09-18 RX ADMIN — TOLTERODINE TARTRATE 2 MG: 2 TABLET, FILM COATED ORAL at 20:42

## 2023-09-18 RX ADMIN — ONDANSETRON 4 MG: 2 INJECTION INTRAMUSCULAR; INTRAVENOUS at 09:47

## 2023-09-18 RX ADMIN — PROPOFOL 150 MG: 10 INJECTION, EMULSION INTRAVENOUS at 07:56

## 2023-09-18 RX ADMIN — FENTANYL CITRATE 25 MCG: 50 INJECTION, SOLUTION INTRAMUSCULAR; INTRAVENOUS at 11:00

## 2023-09-18 RX ADMIN — FENTANYL CITRATE 50 MCG: 50 INJECTION, SOLUTION INTRAMUSCULAR; INTRAVENOUS at 08:33

## 2023-09-18 RX ADMIN — SUGAMMADEX 200 MG: 100 INJECTION, SOLUTION INTRAVENOUS at 09:51

## 2023-09-18 RX ADMIN — FENTANYL CITRATE 50 MCG: 50 INJECTION, SOLUTION INTRAMUSCULAR; INTRAVENOUS at 09:29

## 2023-09-18 RX ADMIN — VANCOMYCIN HYDROCHLORIDE 125 MG: 125 CAPSULE ORAL at 20:42

## 2023-09-18 ASSESSMENT — ACTIVITIES OF DAILY LIVING (ADL)
ADLS_ACUITY_SCORE: 30
ADLS_ACUITY_SCORE: 35
ADLS_ACUITY_SCORE: 30
ADLS_ACUITY_SCORE: 35
ADLS_ACUITY_SCORE: 35
ADLS_ACUITY_SCORE: 30

## 2023-09-18 ASSESSMENT — LIFESTYLE VARIABLES: TOBACCO_USE: 0

## 2023-09-18 ASSESSMENT — ENCOUNTER SYMPTOMS: SEIZURES: 0

## 2023-09-18 NOTE — ANESTHESIA PROCEDURE NOTES
"Epidural catheter Procedure Note    Pre-Procedure   Staff -        Anesthesiologist:  Jazmin Bowie MD       Resident/Fellow: Ap Leon Jr., MD       Performed By: resident       Location: pre-op       Procedure Start/Stop Times: 9/18/2023 7:09 AM and 9/18/2023 7:27 AM       Pre-Anesthestic Checklist: patient identified, IV checked, risks and benefits discussed, informed consent, monitors and equipment checked, pre-op evaluation, at physician/surgeon's request and post-op pain management  Timeout:       Correct Patient: Yes        Correct Procedure: Yes        Correct Site: Yes        Correct Position: Yes   Procedure Documentation  Procedure: epidural catheter       Patient Position: sitting       Patient Prep/Sterile Barriers: sterile gloves, mask, patient draped       Skin prep: Chloraprep       Local skin infiltrated with 3 mL of 1% lidocaine.        Insertion Site: L2-3. (midline approach).       Technique: LORT saline        Needle Type: ToBLOVESy needle       Needle Gauge: 17.        Needle Length (Inches): 3.5        Catheter: 18 G.          Catheter threaded easily.         4 cm epidural space.         Threaded 11 cm at skin.         # of attempts: 1 and  # of redirects:  0    Assessment/Narrative         Paresthesias: No.       Test dose of 3 mL lidocaine 1.5% w/ 1:200,000 epinephrine at 07:26 CDT.         Test dose negative, 3 minutes after injection, for signs of intravascular, subdural, or intrathecal injection.       Insertion/Infusion Method: LORT saline       Aspiration negative for Heme or CSF via Epidural Catheter.       Sensory Level Left: L3.       Sensory Level Right: L3.    Medication(s) Administered   Medication Administration Time: 9/18/2023 7:09 AM      FOR [facility] ([facility] ONLY:   Pain Team Contact information: please page the Pain Team Via [email or web address]. Search \"Pain\". During daytime hours, please page the attending first. At night please page the resident first.      "

## 2023-09-18 NOTE — ANESTHESIA PREPROCEDURE EVALUATION
Pre-Operative H & P     CC:  Preoperative exam to assess for increased cardiopulmonary risk while undergoing surgery and anesthesia.    Date of Encounter: 9/15/2023  Primary Care Physician:  Alba Layton     Reason for visit:   No diagnosis found.      HPI  Jsesie Tamayo is a 67 year old female who presents for pre-operative H & P in preparation for  Procedure Information       Case: [id number] Date/Time: 09/18/23 0730    Procedures:       INSERTION, NEEDLE, WITH ULTRASOUND GUIDANCE, FOR INTERSTITIAL BRACHYTHERAPY (Vagina)      possible INSERTION, FIDUCIAL MARKER SEED, CERVIX, FOR RADIATION THERAPY (Vagina)    Anesthesia type: General with Block    Diagnosis: Vaginal cancer (H) [C52]    Pre-op diagnosis: Vaginal cancer (H) [C52]    Location: [facility] OR  /  OR    Providers: Marisa Pacheco MD            Ms. 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 She is currently undergoing cisplatin radiation with plans for brachytherapy as above.      She was admitted 9/8 - 9/13 for fevers and acute diarrhea in the setting of positive C. Diff and neutropenic fever. She received 2U PRBC during admission. She is thrombocytopenic. She was hypoxic on admission with CXR showing atelectasis, CT from 9/5 w/o evidence of PE. Not on supplemental O2 at this time.  She is hypomagnesemic on oral replacement.    She has a history of HTN and HLD, no PTA meds. Due to persistent HTN, she was started on amlodipine 5 mg daily during admission, with good improvement in BP control (plan to follow up with PCP for management of this concern).        History is obtained from the patient and chart review    Hx of abnormal bleeding or anti-platelet use: denies    Menstrual history: Patient's last menstrual period was 03/08/2008.:      Past Medical History  Past Medical History:   Diagnosis Date    Actinic keratosis     Hyperlipemia     Lichen  sclerosus 2020    Osteopenias     SCC (squamous cell carcinoma) 7/26/2023       Past Surgical History  Past Surgical History:   Procedure Laterality Date    COLONOSCOPY  2006    normal    COLPOSCOPY, BIOPSY, COMBINED N/A 02/08/2021    Procedure: COLPOSCOPY, WITH BIOPSY, LEEP procedure;  Surgeon: Jefe Koenig MD;  Location: [facility] OR    CYSTOSCOPY N/A 05/30/2023    Procedure: CYSTOSCOPY AND EXCISIONAL BIOPSY OF THE VAGINAL TISSUE AROUND THE URETHRA.  (look in the bladder and sample small area in the vagina near the urethra);  Surgeon: Lakeisha Mackenzie MD;  [place] [facility] [place]    EYE SURGERY      cataracts bilateral    OPEN REDUCTION INTERNAL FIXATION FOREARM  07/31/2012    Procedure: OPEN REDUCTION INTERNAL FIXATION FOREARM;  Open Reduction Internal Fixation, Irrigation & Debridment  of Right Distal Radius, application short arm splint;  Surgeon: Wade Beard MD;  Location:  OR    TONSILLECTOMY & ADENOIDECTOMY  1960    [facility] TREAT ECTOPIC PREG,RMV TUBE/OVARY  1985    ectopic, right side-ovary and tube removed       Prior to Admission Medications  No current outpatient medications on file.       Allergies  Allergies   Allergen Reactions    Blood Transfusion Related (Informational Only) Other (See Comments)     Patient has a history of a clinically significant antibody against RBC antigens.  A delay in compatible RBCs may occur.    Oxybutynin Itching    Seasonal Allergies        Social History  Social History     Socioeconomic History    Marital status:      Spouse name: Jesse    Number of children: 0    Years of education: Not on file    Highest education level: Not on file   Occupational History     Employer: [employer]   Tobacco Use    Smoking status: Never     Passive exposure: Never    Smokeless tobacco: Never   Vaping Use    Vaping Use: Never used   Substance and Sexual Activity    Alcohol use: Not Currently    Drug use: No    Sexual activity: Not Currently     Partners:  Male     Birth control/protection: Post-menopausal   Other Topics Concern    Parent/sibling w/ CABG, MI or angioplasty before 65F 55M? No   Social History Narrative    Not on file     Social Determinants of Health     Financial Resource Strain: Not on file   Food Insecurity: Not on file   Transportation Needs: Not on file   Physical Activity: Not on file   Stress: Not on file   Social Connections: Not on file   Intimate Partner Violence: Not on file   Housing Stability: Not on file       Family History  Family History   Problem Relation Age of Onset    Lung Cancer Mother 44    Cerebrovascular Disease Father     Hypertension Father     Alcohol/Drug Father     Cervical Cancer Maternal Aunt     Ovarian Cancer Maternal Aunt 60    Obesity Niece         niece    Mental Illness Nephew         nephew    Diabetes Other         paternal aunt    Hyperlipidemia Other     Obesity Other         self    Obesity Other     Diabetes Other         paternal aunt    Hypertension Other         father    Cerebrovascular Disease Other         father    C.A.D. No family hx of     Breast Cancer No family hx of     Cancer - colorectal No family hx of     Prostate Cancer No family hx of     Melanoma No family hx of     Anesthesia Reaction No family hx of     Venous thrombosis No family hx of        Review of Systems  The complete review of systems is negative other than noted in the HPI or here.     Anesthesia Evaluation   Pt has had prior anesthetic.     No history of anesthetic complications       ROS/MED HX  ENT/Pulmonary:     (+)     FLORECITA risk factors, snores loudly, hypertension, obese,                            (-) tobacco use and asthma   Neurologic:  - neg neurologic ROS  (-) no seizures and no CVA   Cardiovascular:     (+)  hypertension- -   -  - -                                 Previous cardiac testing   Echo: Date: Results:    Stress Test:  Date: Results:    ECG Reviewed:  Date: 9/12/23 Results:    Sinus rhythm  Inferior infarct  "(cited on or before 07-NOV-2009)  Abnormal ECG  When compared with ECG of 05-SEP-2023 06:42,  No significant change was found          Cath:  Date: Results:      METS/Exercise Tolerance:  Comment: <4  Was very active before diagnosis in July   Hematologic: Comments: pancytopenia    (+)      anemia, history of blood transfusion, no previous transfusion reaction,        Musculoskeletal:  - neg musculoskeletal ROS     GI/Hepatic:  - neg GI/hepatic ROS   (+) GERD, Symptomatic,                  Renal/Genitourinary:  - neg Renal ROS     Endo:  - neg endo ROS   (+)               Obesity,    (-) Type I DM, Type II DM and thyroid disease   Psychiatric/Substance Use:  - neg psychiatric ROS     Infectious Disease: Comment: C. Diff 9/8, on Vanco      Malignancy:   (+) Malignancy, History of Other.Other CA vaginal cancer Active status post Chemo and Radiation.    Other:  - neg other ROS          BP (!) 144/67   Pulse 113   Temp 37.1  C (98.8  F) (Oral)   Resp 18   Ht 1.651 m (5' 5\")   Wt 93.3 kg (205 lb 11 oz)   LMP 03/08/2008   SpO2 92%   BMI 34.23 kg/m      Physical Exam   Constitutional: Awake, alert, cooperative, mild distress due to pain, and appears stated age.  Eyes: Pupils equal, round and reactive to light, extra ocular muscles intact, sclera clear, conjunctiva normal.  HENT: Normocephalic, oral pharynx with moist mucus membranes, good dentition. No goiter appreciated.   Respiratory: Clear to auscultation bilaterally, no crackles or wheezing.  Cardiovascular: tachycardic (114) and regular rhythm, normal S1 and S2, and no murmur noted.  Carotids +2, no bruits. No edema. Palpable pulses to radial and PT arteries.   GI: Not assessed  Lymph/Hematologic: No cervical lymphadenopathy and no supraclavicular lymphadenopathy.  Genitourinary:  deferred  Skin: Warm and dry.     Musculoskeletal: Full ROM of neck. There is no redness, warmth, or swelling of the joints. Gross motor strength is normal.    Neurologic: Awake, " alert, oriented to name, place and time. Cranial nerves II-XII are grossly intact.   Neuropsychiatric: Calm, cooperative. Normal affect.     Prior Labs/Diagnostic Studies   All labs and imaging personally reviewed     Component      Latest Ref [facility] 9/15/2023  10:22 AM   WBC      4.0 - 11.0 10e3/uL 1.9 (L)    RBC Count      3.80 - 5.20 10e6/uL 2.99 (L)    Hemoglobin      11.7 - 15.7 g/dL 9.1 (L)    Hematocrit      35.0 - 47.0 % 27.7 (L)    MCV      78 - 100 fL 93    MCH      26.5 - 33.0 pg 30.4    MCHC      31.5 - 36.5 g/dL 32.9    RDW      10.0 - 15.0 % 16.2 (H)    Platelet Count      150 - 450 10e3/uL 54 (L)    % Neutrophils      % 72    % Lymphocytes      % 17    % Monocytes      % 8    % Eosinophils      % 0    % Basophils      % 1    % Immature Granulocytes      % 2    NRBCs per 100 WBC      <1 /100 0    Absolute Neutrophils      1.6 - 8.3 10e3/uL 1.4 (L)    Absolute Lymphocytes      0.8 - 5.3 10e3/uL 0.3 (L)    Absolute Monocytes      0.0 - 1.3 10e3/uL 0.1    Absolute Eosinophils      0.0 - 0.7 10e3/uL 0.0    Absolute Basophils      0.0 - 0.2 10e3/uL 0.0    Absolute Immature Granulocytes      <=0.4 10e3/uL 0.0    Absolute NRBCs      10e3/uL 0.0       Legend:  (L) Low  (H) High    Component      Latest Ref [facility] 9/15/2023  10:22 AM   Sodium      136 - 145 mmol/L 132 (L)    Potassium      3.4 - 5.3 mmol/L 4.2    Chloride      98 - 107 mmol/L 96 (L)    Carbon Dioxide (CO2)      22 - 29 mmol/L 28    Anion Gap      7 - 15 mmol/L 8    Urea Nitrogen      8.0 - 23.0 mg/dL 13.0    Creatinine      0.51 - 0.95 mg/dL 0.89    Calcium      8.8 - 10.2 mg/dL 9.0    Glucose      70 - 99 mg/dL 120 (H)    GFR Estimate      >60 mL/min/1.73m2 71       Legend:  (L) Low  (H) High    Component      Latest Ref [facility] 9/15/2023  10:22 AM   Magnesium      1.7 - 2.3 mg/dL 1.6 (L)       Legend:  (L) Low      EKG/ stress test - if available please see in ROS above         The patient's records and results personally reviewed by this provider.  "    Outside records reviewed from: Care Everywhere    LAB/DIAGNOSTIC STUDIES TODAY:  Mg, CBC, BMP, type and screen    Assessment    Jessie Tamayo is a 67 year old female seen as a PAC referral for risk assessment and optimization for anesthesia.    Plan/Recommendations  Pt will be optimized for the proposed procedure.  See below for details on the assessment, risk, and preoperative recommendations    NEUROLOGY  - No history of TIA, CVA or seizure  - Chronic Pain  On chronic opiates, morphine equivilant = 30-60 MME/Day   -Post Op delirium risk factors:  No risk identified    ENT  - No current airway concerns.  Will need to be reassessed day of surgery.  Mallampati: III  TM: > 3    CARDIAC  - HTN, continue amlodipine  - Denies chest pain, SOB, MOLINA, palpitations    - METS (Metabolic Equivalents)  Patient CANNOT perform 4 METS exercise without symptoms            Total Score: 1    Functional Capacity: Unable to complete 4 METS      RCRI-Very low risk: Class 1 0.4% complication rate            Total Score: 0        PULMONARY    FLORECITA Medium Risk            Total Score: 4    FLORECITA: Snores loudly    FLORECITA: Hypertension    FLORECITA: BMI over 35 kg/m2    FLORECITA: Over 50 ys old      - Denies asthma or inhaler use  - Tobacco History    History   Smoking Status    Never   Smokeless Tobacco    Never       GI  - denies GERD  PONV High Risk  Total Score: 3           1 AN PONV: Pt is Female    1 AN PONV: Patient is not a current smoker    1 AN PONV: Intended Post Op Opioids        /RENAL  - Baseline Creatinine  0.90    ENDOCRINE    - BMI: Estimated body mass index is 34.23 kg/m  as calculated from the following:    Height as of this encounter: 1.651 m (5' 5\").    Weight as of this encounter: 93.3 kg (205 lb 11 oz).  Obesity (BMI >30)  - No history of Diabetes Mellitus    HEME  VTE Medium Risk 1.8%            Total Score: 6    VTE: Greater than 59 yrs old    VTE: Current cancer      - No history of abnormal bleeding or antiplatelet use.  - " anemia  Recommend perioperative use of blood conservation techniques intraoperatively and close monitoring for postoperative bleeding.    - recent PRBC transfusion while admitted (2U)    - platelets low at 38 on 9/13/23, will check type and screen today for possible platelet transfusion with procedure on 9/18/23. Discussed with Dr. Juarez of anesthesia and as plt count <100, epidural for procedure unlikely. Dr. Juarez updated Dr. Pacheco and TONY Betina Kramer via staff message.  - platelets today: 54, Dr. Pacheco notified.    - Mg low today at 1.6, was 2.5 on discharge two days ago. Will have patient increase her po Mg to bid dosing per Betina Kramer      Different anesthesia methods/types have been discussed with the patient, but they are aware that the final plan will be decided by the assigned anesthesia provider on the date of service.    Patient was discussed with Dr Juarez    The patient is optimized for their procedure. AVS with information on surgery time/arrival time, meds and NPO status given by nursing staff. No further diagnostic testing indicated.      On the day of service:     Prep time: 13 minutes  Visit time: 11 minutes  Documentation time: 12 minutes  ------------------------------------------  Total time: 36 minutes      Lily Fabian PA-C  Preoperative Assessment Center  [facility]  Phone: [phone]  Fax: [phone]    Physical Exam    Airway  airway exam normal           Respiratory Devices and Support         Dental       (+) Minor Abnormalities - some fillings, tiny chips      Cardiovascular   cardiovascular exam normal          Pulmonary   pulmonary exam normal                Anesthesia Plan    ASA Status:  3       Anesthesia Type: General.   Induction: Intravenous.   Maintenance: Inhalation.        Consents    Anesthesia Plan(s) and associated risks, benefits, and realistic alternatives discussed. Questions answered and  patient/representative(s) expressed understanding.     - Discussed:     - Discussed with:  Patient            Postoperative Care    Pain management: IV analgesics.   PONV prophylaxis: Ondansetron (or other 5HT-3), Dexamethasone or Solumedrol     Comments:                  normal...

## 2023-09-18 NOTE — LETTER
9/18/2023         RE: Jessie Tamayo  [address] [place] [id number]        Dear Colleague,    Thank you for referring your patient, Jessie Tamayo, to the [facility] RADIATION ONCOLOGY. Please see a copy of my visit note below.    A radiation therapy treatment planning simulation was performed.  Please see the [facility] record for documentation.    Marisa Pacheco MD  Radiation Oncology       Again, thank you for allowing me to participate in the care of your patient.        Sincerely,        Marisa Pacheco MD

## 2023-09-18 NOTE — PROGRESS NOTES
Admitted/transferred from: Radiation from PACU  2 RN full   skin assessment completed by Renetta Paulino, RN and Rigo Menon RN.  Skin assessment finding: Redness under panus, R>L, scratches on right hip/thigh area, brachytherapy needles in place vaginally.    Interventions/actions: skin interventions No ointments, alcohol solutions, soap or lotions to be applied from mid abdomen to mid thigh   per orders. Keep skin clean and dry.    Will continue to monitor.

## 2023-09-18 NOTE — PHARMACY-ADMISSION MEDICATION HISTORY
Pharmacist Admission Medication History    Admission medication history is complete. The information provided in this note is only as accurate as the sources available at the time of the update.    Medication reconciliation/reorder completed by provider prior to medication history? Yes    Information Source(s): Pre-op pharmacist med hx, pre-op RN assessment.    Pertinent Information: Amlodipine and vancomycin started last week      Medication History Completed By: Angelita Collazo [facility] 9/18/2023 3:00 PM    Prior to Admission medications    Medication Sig Last Dose Taking? Auth Provider Long Term End Date   acetaminophen (TYLENOL) 500 MG tablet Take 500-1,000 mg by mouth every 4 hours as needed for mild pain 9/17/2023 at 2200 Yes Reported, Patient     amLODIPine (NORVASC) 5 MG tablet Take 1 tablet (5 mg) by mouth daily  Patient taking differently: Take 5 mg by mouth every morning 9/18/2023 at 0405 Yes Reina Stahl MD Yes    calcium carbonate (TUMS) 500 MG chewable tablet Take 1 chew tab by mouth 2 times daily Past Week Yes Reported, Patient     calcium carbonate 750 MG CHEW Take 750 mg by mouth 3 times daily as needed Past Week Yes Unknown, Entered By History     clotrimazole (LOTRIMIN) 1 % external cream Apply topically 2 times daily To affected areas Past Week Yes Marisa Pacheco MD     docusate sodium (COLACE) 100 MG capsule Take 100 mg by mouth daily as needed for constipation Past Month Yes Reported, Patient     loratadine (CLARITIN) 10 MG tablet Take 10 mg by mouth daily More than a month Yes Reported, Patient     magic mouthwash suspension, diphenhydrAMINE, lidocaine, aluminum-magnesium & simethicone, (FIRST-MOUTHWASH BLM) compounding kit Swish and swallow 10 mLs in mouth 4 times daily (before meals and nightly) Past Week Yes Reina Stahl MD     magnesium oxide (MAG-OX) 400 MG tablet Take 1 tablet (400 mg) by mouth daily  Patient taking differently: Take 400 mg by mouth every morning  9/17/2023 at 2000 Yes Betina Kramer APRN CNP     NONFORMULARY Apply topically daily Cavalon cream Past Week Yes Unknown, Entered By History     ondansetron (ZOFRAN) 8 MG tablet Take 1 tablet (8 mg) by mouth every 8 hours as needed for nausea (vomiting) Do not take for 3 days after chemo. 9/17/2023 at 0700 Yes Andie Rios APRN CNP     oxyCODONE (ROXICODONE) 10 MG tablet Take 0.5-1 tablets (5-10 mg) by mouth every 6 hours as needed for severe pain (IF pain not managed with non-pharmacological and non-opioid interventions) 9/18/2023 at 0100 Yes Keiko Nava APRN CNP No    phenazopyridine (PYRIDIUM) 200 MG tablet Take 1 tablet (200 mg) by mouth 3 times daily as needed for irritation 9/18/2023 at 0405 Yes Madhuri Johnson MD     prochlorperazine (COMPAZINE) 10 MG tablet Take 1 tablet (10 mg) by mouth every 6 hours as needed for nausea or vomiting Past Week Yes Reina Stahl MD     simethicone (MYLICON) 125 MG chewable tablet Take 125 mg by mouth 4 times daily as needed for intestinal gas Past Week Yes Unknown, Entered By History     tolterodine (DETROL) 2 MG tablet Take 1 tablet (2 mg) by mouth 2 times daily 9/18/2023 at 0405 Yes Alba Layton MD     triamcinolone (KENALOG) 0.1 % external cream Apply topically 2 times daily Past Month Yes Alba Layton MD No    vancomycin (VANCOCIN) 125 MG capsule Take 1 capsule (125 mg) by mouth 4 times daily for 38 doses 9/18/2023 at 0405 Yes Reina Stahl MD  9/23/23

## 2023-09-18 NOTE — PROGRESS NOTES
A radiation therapy treatment planning simulation was performed.  HDR brachytherapy Michelet interstitial fraction 1 of 5 was delivered.  Please see the [facility] record for documentation.    Marisa Pacheco MD  Radiation Oncology

## 2023-09-18 NOTE — PROGRESS NOTES
Gynecologic Oncology Daily Progress Note  9/19/23    S: Feeling ok. Pain well controlled with epidural. Noticed a occasional cough but denies fever/chills or other associated symptoms. No HA/Dizziness, CP, SOB, or N/V. Chronic indwelling gonsalez cath with bladder spasms improved with epidural.     O:  Vitals:    09/18/23 1115 09/18/23 1130 09/18/23 1148 09/18/23 1437   BP: (!) 144/79 (!) 147/81 (!) 143/72 (!) 145/75   BP Location:    Right arm   Pulse: 94 96 96 94   Resp:  16 16 16   Temp:  97.4  F (36.3  C)  98.6  F (37  C)   TempSrc:  Axillary     SpO2: 93% 93% 93% 93%   Weight:       Height:       O2 1L    Gen:Alert and oriented. Pleasant  Cardio: HR regular  Resp: LS clear anteriorly. No resp distress  Abdomen: Soft, nontender  Extremities: SCDs in place  Skin: red rash to groin     I/O last 3 completed shifts:  In: 1200 [I.V.:1200]  Out: 825 [Urine:825]    ROUTINE IP LABS (Last four results)  BMP  Recent Labs   Lab 09/17/23  0922 09/15/23  1022 09/13/23  0529 09/12/23  1913 09/12/23  1536    132* 133*  --  132*   POTASSIUM 4.3 4.2 4.1 3.6 3.6   CHLORIDE 100 96* 99  --  96*   KIM 8.7* 9.0 8.3*  --  8.4*   CO2 27 28 27  --  25   BUN 10.2 13.0 13.0  --  12.4   CR 0.85 0.89 0.90  --  0.78   * 120* 101*  --  113*     CBC  Recent Labs   Lab 09/18/23  0709 09/17/23  0922 09/15/23  1022 09/13/23  0529 09/12/23  1536   WBC  --  1.5* 1.9* 1.3* 1.3*   RBC  --  2.61* 2.99* 2.59* 2.76*   HGB  --  7.9* 9.1* 7.8* 8.3*   HCT  --  24.6* 27.7* 23.4* 25.0*   MCV  --  94 93 90 91   MCH  --  30.3 30.4 30.1 30.1   MCHC  --  32.1 32.9 33.3 33.2   RDW  --  16.6* 16.2* 16.1* 16.0*   * 106* 54* 38* 45*       A/P: 67 year old female with stage IVB SCC of the vagina, c.diff positive, HTN, HLD, treatment induced pancytopenia, chronic indwelling gonsalez, lichen sclerosus, and actinic keratosis admitted for interstitial needle placement for HDR. Patient completed EBRT with concomitant weekly cisplatin and here for HDR.  Patient doing well post operatively.      Dz: - Stage IVB SCC of the vagina, completed weekly cisplatin with EBRT radiation. Her for HDR.    FEN: Regular diet. ERP.   Pain: Anesthesia to manage epidural with PCEA. PRN tylenol.  Heme: Treatment induced anemia/thrombocytopenia. Hgb 9.1/. CBC in am.  CV: She has a history of HLD and HTN, continue amlodipine, no home meds for HLD.  Pulm: Encourage IS and wean O2 as tolerated to keep sats >90%  GI: Recent C. Difficile infection, continued on PO vancomycin to complete 10 day course.   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 Follows with urology. Gonsalez catheter was replaced in the ED on 09/04. Experiences bladder spasms but controlled with epidural. Continued on tolterodine and pyridium.   ID: Treatment induced leukopenia/neutropenia with improving counts. Afebrile. Received preop antibiotics. Continue vanco for cdiff see GI   Endocrine: no acute issues   Psych/Neuro: no acute issues   PPX: SCDs, IS; lovenox ppx x 1 dose 9/19, ok with anesthesia service.  Dispo:  Home Thursday am     Keiko Nava, SANDI DNP, CNP  9/18/2023 3:02 PM

## 2023-09-18 NOTE — DISCHARGE SUMMARY
[facility]  Gynecologic Oncology Discharge Summary    Patient: Jessie Tamayo  MRN: [id number]    Admit Date: 9/18/2023  Discharge Date: 9/21/2023  Admitting Provider: Dr. Rose  Discharge Provider: Dr. Rose    Admission Dx:   - Stage IVB SCC of the vagina  - Urinary obstruction with indwelling gonsalez  - Pancytopenia 2/2 chemo and radiation  - HLD  - Osteopenia  - Lichen Sclerosus   - Actinic keratosis  - Recent C. Diff colitis, on antibiotics   - HTN    Discharge Dx:  - Stage IVB SCC of the vagina  - Urinary obstruction with indwelling gonsalez; gonsalez replaced 09/20  - Pancytopenia 2/2 chemo and radiation, stable  - HLD; stable  - Osteopenia, chronic  - Lichen Sclerosus, chronic   - Actinic keratosis, chronic  - Recent C. Diff colitis, on antibiotics   - HTN; stable     Patient Active Problem List   Diagnosis     Hyperlipidemia LDL goal <130     Recurrent cold sores     Hearing loss     Pap smear of cervix with ASCUS, cannot exclude HGSIL     Introital dyspareunia     Vaginal atrophy     Cervical high risk HPV (human papillomavirus) test positive     Atrophic vaginitis     Class 1 obesity due to excess calories without serious comorbidity with body mass index (BMI) of 34.0 to 34.9 in adult     Urethral bleeding     Vaginal cancer (H)     Benign neoplasm of colon     Morbid obesity (H)     SCC (squamous cell carcinoma)     Hypomagnesemia     Lower abdominal pain     Urinary tract infection associated with indwelling urethral catheter, initial encounter (H)     Vulvar cancer (H)     C. difficile diarrhea     Pancytopenia (H)     Sepsis, due to unspecified organism, unspecified whether acute organ dysfunction present (H)       Procedures:   - EUA with interstitial needle placement/removal.   - Epidural placement/removal  - Gonsalez replacement    Prior to Admission Medications:  Medications Prior to Admission   Medication Sig Dispense Refill Last Dose     acetaminophen (TYLENOL) 500 MG tablet  Take 500-1,000 mg by mouth every 4 hours as needed for mild pain   9/17/2023 at 2200     amLODIPine (NORVASC) 5 MG tablet Take 1 tablet (5 mg) by mouth daily (Patient taking differently: Take 5 mg by mouth every morning) 30 tablet 3 9/18/2023 at 0405     calcium carbonate 750 MG CHEW Take 750 mg by mouth 3 times daily as needed   Past Week     clotrimazole (LOTRIMIN) 1 % external cream Apply topically 2 times daily To affected areas 45 g 3 Past Week     docusate sodium (COLACE) 100 MG capsule Take 100 mg by mouth daily as needed for constipation   Past Month     loratadine (CLARITIN) 10 MG tablet Take 10 mg by mouth daily   More than a month     magic mouthwash suspension, diphenhydrAMINE, lidocaine, aluminum-magnesium & simethicone, (FIRST-MOUTHWASH BLM) compounding kit Swish and swallow 10 mLs in mouth 4 times daily (before meals and nightly) 237 mL 3 Past Week     magnesium oxide (MAG-OX) 400 MG tablet Take 1 tablet (400 mg) by mouth daily (Patient taking differently: Take 400 mg by mouth every morning) 30 tablet 0 9/17/2023 at 2000     NONFORMULARY Apply topically daily Cavalon cream   Past Week     ondansetron (ZOFRAN) 8 MG tablet Take 1 tablet (8 mg) by mouth every 8 hours as needed for nausea (vomiting) Do not take for 3 days after chemo. 30 tablet 2 9/17/2023 at 0700     phenazopyridine (PYRIDIUM) 200 MG tablet Take 1 tablet (200 mg) by mouth 3 times daily as needed for irritation 21 tablet 0 9/18/2023 at 0405     prochlorperazine (COMPAZINE) 10 MG tablet Take 1 tablet (10 mg) by mouth every 6 hours as needed for nausea or vomiting 30 tablet 2 Past Week     simethicone (MYLICON) 125 MG chewable tablet Take 125 mg by mouth 4 times daily as needed for intestinal gas   Past Week     tolterodine (DETROL) 2 MG tablet Take 1 tablet (2 mg) by mouth 2 times daily 60 tablet 3 9/18/2023 at 0405     triamcinolone (KENALOG) 0.1 % external cream Apply topically 2 times daily 15 g 1 Past Month     vancomycin (VANCOCIN)  125 MG capsule Take 1 capsule (125 mg) by mouth 4 times daily for 38 doses 38 capsule 0 9/18/2023 at 0405     [DISCONTINUED] calcium carbonate (TUMS) 500 MG chewable tablet Take 1 chew tab by mouth 2 times daily   Past Week     [DISCONTINUED] oxyCODONE (ROXICODONE) 10 MG tablet Take 0.5-1 tablets (5-10 mg) by mouth every 6 hours as needed for severe pain (IF pain not managed with non-pharmacological and non-opioid interventions) 20 tablet 0 9/18/2023 at 0100       Discharge Medications:     Review of your medicines        CONTINUE these medicines which may have CHANGED, or have new prescriptions. If we are uncertain of the size of tablets/capsules you have at home, strength may be listed as something that might have changed.        Dose / Directions   amLODIPine 5 MG tablet  Commonly known as: NORVASC  This may have changed: when to take this  Used for: Essential hypertension      Dose: 5 mg  Take 1 tablet (5 mg) by mouth daily  Quantity: 30 tablet  Refills: 3     calcium carbonate 750 MG Chew  This may have changed: Another medication with the same name was removed. Continue taking this medication, and follow the directions you see here.      Dose: 750 mg  Take 750 mg by mouth 3 times daily as needed  Refills: 0     magnesium oxide 400 MG tablet  Commonly known as: MAG-OX  This may have changed: when to take this  Used for: Hypomagnesemia      Dose: 400 mg  Take 1 tablet (400 mg) by mouth daily  Quantity: 30 tablet  Refills: 0     oxyCODONE 5 MG tablet  Commonly known as: ROXICODONE  This may have changed:     medication strength    how much to take    when to take this    reasons to take this  Used for: SCC (squamous cell carcinoma)      Dose: 5 mg  Take 1 tablet (5 mg) by mouth every 4 minutes as needed for pain  Quantity: 15 tablet  Refills: 0            CONTINUE these medicines which have NOT CHANGED        Dose / Directions   acetaminophen 500 MG tablet  Commonly known as: TYLENOL      Dose: 500-1,000 mg  Take  500-1,000 mg by mouth every 4 hours as needed for mild pain  Refills: 0     clotrimazole 1 % external cream  Commonly known as: LOTRIMIN  Used for: Vaginal cancer (H)      Apply topically 2 times daily To affected areas  Quantity: 45 g  Refills: 3     docusate sodium 100 MG capsule  Commonly known as: COLACE      Dose: 100 mg  Take 100 mg by mouth daily as needed for constipation  Refills: 0     loratadine 10 MG tablet  Commonly known as: CLARITIN      Dose: 10 mg  Take 10 mg by mouth daily  Refills: 0     magic mouthwash suspension (diphenhydrAMINE, lidocaine, aluminum-magnesium & simethicone) compounding kit  Used for: Mucositis      Dose: 10 mL  Swish and swallow 10 mLs in mouth 4 times daily (before meals and nightly)  Quantity: 237 mL  Refills: 3     NONFORMULARY      Apply topically daily Cavalon cream  Refills: 0     ondansetron 8 MG tablet  Commonly known as: ZOFRAN  Used for: Vaginal cancer (H), SCC (squamous cell carcinoma)      Dose: 8 mg  Take 1 tablet (8 mg) by mouth every 8 hours as needed for nausea (vomiting) Do not take for 3 days after chemo.  Quantity: 30 tablet  Refills: 2     phenazopyridine 200 MG tablet  Commonly known as: PYRIDIUM  Used for: Acute cystitis with hematuria      Dose: 200 mg  Take 1 tablet (200 mg) by mouth 3 times daily as needed for irritation  Quantity: 21 tablet  Refills: 0     prochlorperazine 10 MG tablet  Commonly known as: COMPAZINE  Used for: Vaginal cancer (H), SCC (squamous cell carcinoma)      Dose: 10 mg  Take 1 tablet (10 mg) by mouth every 6 hours as needed for nausea or vomiting  Quantity: 30 tablet  Refills: 2     simethicone 125 MG chewable tablet  Commonly known as: MYLICON      Dose: 125 mg  Take 125 mg by mouth 4 times daily as needed for intestinal gas  Refills: 0     tolterodine 2 MG tablet  Commonly known as: DETROL  Used for: Bladder spasm      Dose: 2 mg  Take 1 tablet (2 mg) by mouth 2 times daily  Quantity: 60 tablet  Refills: 3     triamcinolone 0.1  % external cream  Commonly known as: KENALOG  Used for: Itching, Dermatitis      Apply topically 2 times daily  Quantity: 15 g  Refills: 1     vancomycin 125 MG capsule  Commonly known as: VANCOCIN  Indication: Clostridium difficile Bacteria  Used for: C. difficile diarrhea      Dose: 125 mg  Take 1 capsule (125 mg) by mouth 4 times daily for 38 doses  Quantity: 38 capsule  Refills: 0               Where to get your medicines        These medications were sent to [facility] - [place], [place] - [address] [place] [address]  [address] [facility] [address]      Phone: [phone]     oxyCODONE 5 MG tablet         Consultations:  - Radiation Oncology, Pain Service    Brief History of Illness:  From H&P: 67 year old female with stage IVB SCC of the vagina, c.diff positive on antibiotics, HTN, HLD, treatment induced pancytopenia, chronic indwelling gonsalez, lichen sclerosus, and actinic keratosis admitted for interstitial needle placement for HDR. Patient completed EBRT with concomitant weekly cisplatin and here for HDR.     Hospital Course:  Dz:   - Stage IVB SCC of the vagina, completed weekly cisplatin with EBRT radiation. Presented for HDR with interstitial needles, and completed without difficulty.  She will follow-up in clinic for a care plan.  FEN:   - She was maintained on a regular diet throughout her admission. At the time of her discharge, she was tolerating PO without nausea or vomiting.   Pain:   - Her pain was initially controlled with an epidural. Once interstitial needles were removed her epidural was removed. She was transitioned to her home PO pain regimen for chronic pelvic/bladder spasm pain.  Her pain was well controlled on this and she was discharged home with these medications. Plan for outpatient follow-up with Palliative Care for long-term management of this and other cancer and treatment-related sources of discomfort   CV:   - She has a history of HTN and HLD. She was continued  on her home amlodipine and does not take meds for hx of HLD.  Her vital signs were stable while in house with the exception of asymptomatic HTN and she had no acute CV issues.  PULM:   - She has no history of pulmonary issues. She had no acute pulmonary issues while in house. She was maintaining her oxygen saturation on room air at the time of discharge.   HEME:   - She had an appropriate drop in her hemoglobin after surgery and it was stable at the time of discharge. Her platelets were monitored for history of treatment- induced thrombocytopenia and were up-trending and within normal range - plt 185 at time of discharge. She had no acute heme issues while in house.  GI:   - She was made NPO prior to the procedure.  On POD#0, her diet was advanced slowly as tolerated.  At the time of discharge, she was tolerating a regular diet without nausea and vomiting.  She was passing flatus prior to discharge. She will be discharged with a bowel regimen to prevent constipation. She continued her antibiotic course for her history of recent C. Diff colitis. She had no acute GI issues while in house.   :    - Patient has a chronic indwelling gonsalez cath that was last exchanged on 9/4/23 in the ED. It was exchanged on 9/20/23 while inpatient . She follows with urology and will see them outpatient for management.  ID:   - History of treatment induced leukopenia/neutropenia that remained stable. The patient was AF during her hospitalization. Recently diagnosed with cdiff and continued on PO Vanco.   ENDO:   - No issues  PSYCH/NEURO:   - No issues   PPX:    - She was given SCDs, IS, and lovenox during her hospital course.  She tolerated these prophylactic interventions without incident.  They were discontinued at the time of her discharge.    Discharge Instructions and Follow up:  Ms. Jessie Tamayo was discharged from the hospital with follow up for further disease management.     Discharge Diet: Regular  Discharge Activity: as  tolerated  Discharge Follow up: Urology 09/27/23    Discharge Disposition:  Discharged to home    Discharge Staff: Dr. Rose Ty MD  Gynecologic Oncology, PGY-4  09/21/23 3:31 PM   Team pager: [phone]    I saw and evaluated the patient with the resident.  I edited and reviewed the above note. I have reviewed all pertinent imaging and labs on this patient.    Darcy Rose MD  Professor  Department of Ob/Gyn and Women's Health  Division of Gynecologic Oncology  [facility]  [phone]

## 2023-09-18 NOTE — LETTER
9/18/2023         RE: Jessie Tamayo  [address] [address]        Dear Colleague,    Thank you for referring your patient, Jessie Tamayo, to the [facility] RADIATION ONCOLOGY. Please see a copy of my visit note below.    A radiation therapy treatment planning simulation was performed.  HDR brachytherapy Michelet interstitial fraction 1 of  5 was delivered.  Please see the [facility] record for documentation.    Marisa Pacheco MD  Radiation Oncology

## 2023-09-18 NOTE — ANESTHESIA CARE TRANSFER NOTE
Patient: Jessie Tamayo    Procedure: Procedure(s):  INSERTION, NEEDLE, WITH ULTRASOUND GUIDANCE, FOR INTERSTITIAL BRACHYTHERAPY       Diagnosis: Vaginal cancer (H) [C52]  Diagnosis Additional Information: No value filed.    Anesthesia Type:   General     Note:    Oropharynx: oropharynx clear of all foreign objects and spontaneously breathing  Level of Consciousness: drowsy  Oxygen Supplementation: face mask  Level of Supplemental Oxygen (L/min / FiO2): 8  Independent Airway: airway patency satisfactory and stable  Dentition: dentition unchanged  Vital Signs Stable: post-procedure vital signs reviewed and stable  Report to RN Given: handoff report given  Patient transferred to: PACU    Handoff Report: Identifed the Patient, Identified the Reponsible Provider, Reviewed the pertinent medical history, Discussed the surgical course, Reviewed Intra-OP anesthesia mangement and issues during anesthesia, Set expectations for post-procedure period and Allowed opportunity for questions and acknowledgement of understanding      Vitals:  Vitals Value Taken Time   /86 09/18/23 1017   Temp     Pulse 93 09/18/23 1023   Resp     SpO2 95 % 09/18/23 1023   Vitals shown include unvalidated device data.    Electronically Signed By: SANDI Garzon CRNA  September 18, 2023  10:25 AM

## 2023-09-18 NOTE — OP NOTE
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  Exam under anesthesia.    2.  Placement of Michelet applicator with insertion of interstitial needles and  tandem for HDR brachytherapy under ultrasound guidance.      SURGEONS: Marisa Pacheco MD      ASSISTANTS:  Valerio Ham MD    ANESTHESIA:  General, endotracheal tube.        COMPLICATIONS:  None.        ESTIMATED BLOOD LOSS:  10 cc      INTRAVENOUS FLUIDS: per Anesthesia record       OPERATIVE FINDINGS:  Residual palpable tumor along the anterior inferior vaginal wall, multiple scar bands in the vagina.      DESCRIPTION OF OPERATIVE PROCEDURE: Ms. Tamayo was brought back to the operating room wearing pneumoboots and identified to be herself.  The patient was placed under general anesthesia via endotracheal tube.  She was placed on the dorsal lithotomy position.  An exam under anesthesia was performed with the above findings. She was prepped and draped in the usual sterile fashion. A time out was performed. A Thomas catheter was previously inserted.    A tenaculum  was placed into the anterior cervix and the malleable sound was used to measure the depth of the uterine canal to 5 cm with ultrasound guidance. The cervix was serially dilated. The tandem was then placed in the uterine canal, threaded through the vaginal cylinder and secured with the stitch and a collar. 4 sutures were placed in the perineum to hold the Michelet template in place. The Michelet applicator was placed and 19 interstitial needles were inserted around the cervix, vagina and parametrium under ultrasound guidance.     Coloplast padding was placed to protect the skin from the edges of the template. A rectal tube was inserted and the balloon was inflated with 10 cc of sterile fluid for administration of rectal contrast during treatment  simulation.    Anesthesia was then reversed and the patient taken to the PACU in stable condition.       Marisa Pacheco MD  Radiation Oncology

## 2023-09-18 NOTE — PROGRESS NOTES
A radiation therapy treatment planning simulation was performed.  Please see the [facility] record for documentation.    Marisa Pacheco MD  Radiation Oncology

## 2023-09-18 NOTE — ANESTHESIA PROCEDURE NOTES
Airway       Patient location during procedure: OR       Procedure Start/Stop Times: 9/18/2023 8:01 AM  Staff -        CRNA: Kennedy Henderson APRN CRNA       Performed By: CRNA  Consent for Airway        Urgency: elective  Indications and Patient Condition       Indications for airway management: elizabeth-procedural       Induction type:intravenous       Mask difficulty assessment: 1 - vent by mask    Final Airway Details       Final airway type: endotracheal airway       Successful airway: ETT - single  Endotracheal Airway Details        ETT size (mm): 7.0       Cuffed: yes       Successful intubation technique: direct laryngoscopy       DL Blade Type: Pagan 2       Grade View of Cords: 1       Adjucts: stylet       Position: Right       Measured from: gums/teeth       Secured at (cm): 21       Bite block used: None    Post intubation assessment        Placement verified by: capnometry, equal breath sounds and chest rise        Number of attempts at approach: 1       Number of other approaches attempted: 0       Secured with: pink tape       Ease of procedure: easy       Dentition: Intact and Unchanged    Medication(s) Administered   Medication Administration Time: 9/18/2023 8:01 AM

## 2023-09-18 NOTE — LETTER
9/18/2023         RE: Jessie Tamayo  [address] [facility] [address]        Dear Colleague,    Thank you for referring your patient, Jessie Tamayo, to the [facility] RADIATION ONCOLOGY. Please see a copy of my visit note below.    A radiation therapy treatment planning simulation was performed.  HDR brachytherapy Michelet interstitial fraction 1 of 5 was delivered.  Please see the [facility] record for documentation.    Marisa Pacheco MD  Radiation Oncology       Again, thank you for allowing me to participate in the care of your patient.        Sincerely,        Marisa Pacheco MD

## 2023-09-18 NOTE — PLAN OF CARE
Patient had interstitial needles placed today, appliance is in place. Pain is well managed with epidural. On bedrest, bed flat, gonsalez with good output, spouse at bedside. Should go down to radiation this afternoon.

## 2023-09-18 NOTE — ANESTHESIA POSTPROCEDURE EVALUATION
Patient: Jessie Tamayo    Procedure: Procedure(s):  INSERTION, NEEDLE, WITH ULTRASOUND GUIDANCE, FOR INTERSTITIAL BRACHYTHERAPY       Anesthesia Type:  General    Note:  Disposition: Inpatient   Postop Pain Control: Uneventful            Sign Out: Well controlled pain   PONV: No   Neuro/Psych: Uneventful            Sign Out: Acceptable/Baseline neuro status   Airway/Respiratory: Uneventful            Sign Out: Acceptable/Baseline resp. status   CV/Hemodynamics: Uneventful            Sign Out: Acceptable CV status; No obvious hypovolemia; No obvious fluid overload   Other NRE: NONE   DID A NON-ROUTINE EVENT OCCUR?            Last vitals:  Vitals Value Taken Time   /86 09/18/23 1100   Temp 36.6  C (97.8  F) 09/18/23 1016   Pulse 94 09/18/23 1114   Resp 14 09/18/23 1100   SpO2 93 % 09/18/23 1114   Vitals shown include unvalidated device data.    Electronically Signed By: Rick Mott MD  September 18, 2023  11:16 AM

## 2023-09-18 NOTE — OR NURSING
Pt tolerated spinal   block without incident.  PT without signs / symptoms of lidocaine toxicity at this time.  See Regional BLOCK Timeout Tab.  Awaiting transfer to OR.  PT reminded to not attempt getting out of bed without assistants.   See flowsheet and EMAR for additional information.

## 2023-09-19 ENCOUNTER — ANESTHESIA EVENT (OUTPATIENT)
Dept: SURGERY | Facility: CLINIC | Age: 68
DRG: 754 | End: 2023-09-19
Payer: COMMERCIAL

## 2023-09-19 ENCOUNTER — OFFICE VISIT (OUTPATIENT)
Dept: RADIATION ONCOLOGY | Facility: CLINIC | Age: 68
End: 2023-09-19
Attending: RADIOLOGY
Payer: COMMERCIAL

## 2023-09-19 DIAGNOSIS — C52 VAGINAL CANCER (H): Primary | ICD-10-CM

## 2023-09-19 LAB
ANION GAP SERPL CALCULATED.3IONS-SCNC: 8 MMOL/L (ref 7–15)
BASOPHILS # BLD AUTO: 0 10E3/UL (ref 0–0.2)
BASOPHILS NFR BLD AUTO: 1 %
BUN SERPL-MCNC: 11.3 MG/DL (ref 8–23)
CALCIUM SERPL-MCNC: 9 MG/DL (ref 8.8–10.2)
CHLORIDE SERPL-SCNC: 98 MMOL/L (ref 98–107)
CREAT SERPL-MCNC: 0.77 MG/DL (ref 0.51–0.95)
DEPRECATED HCO3 PLAS-SCNC: 29 MMOL/L (ref 22–29)
EGFRCR SERPLBLD CKD-EPI 2021: 84 ML/MIN/1.73M2
EOSINOPHIL # BLD AUTO: 0 10E3/UL (ref 0–0.7)
EOSINOPHIL NFR BLD AUTO: 0 %
ERYTHROCYTE [DISTWIDTH] IN BLOOD BY AUTOMATED COUNT: 17.4 % (ref 10–15)
GLUCOSE SERPL-MCNC: 109 MG/DL (ref 70–99)
HCT VFR BLD AUTO: 24 % (ref 35–47)
HGB BLD-MCNC: 7.6 G/DL (ref 11.7–15.7)
IMM GRANULOCYTES # BLD: 0.1 10E3/UL
IMM GRANULOCYTES NFR BLD: 4 %
LACTATE SERPL-SCNC: 1.4 MMOL/L (ref 0.7–2)
LYMPHOCYTES # BLD AUTO: 0.5 10E3/UL (ref 0.8–5.3)
LYMPHOCYTES NFR BLD AUTO: 26 %
MAGNESIUM SERPL-MCNC: 1.8 MG/DL (ref 1.7–2.3)
MCH RBC QN AUTO: 30.9 PG (ref 26.5–33)
MCHC RBC AUTO-ENTMCNC: 31.7 G/DL (ref 31.5–36.5)
MCV RBC AUTO: 98 FL (ref 78–100)
MONOCYTES # BLD AUTO: 0.3 10E3/UL (ref 0–1.3)
MONOCYTES NFR BLD AUTO: 16 %
NEUTROPHILS # BLD AUTO: 1.1 10E3/UL (ref 1.6–8.3)
NEUTROPHILS NFR BLD AUTO: 53 %
NRBC # BLD AUTO: 0 10E3/UL
NRBC BLD AUTO-RTO: 0 /100
PLATELET # BLD AUTO: 141 10E3/UL (ref 150–450)
POTASSIUM SERPL-SCNC: 4.5 MMOL/L (ref 3.4–5.3)
RBC # BLD AUTO: 2.46 10E6/UL (ref 3.8–5.2)
SODIUM SERPL-SCNC: 135 MMOL/L (ref 136–145)
WBC # BLD AUTO: 2 10E3/UL (ref 4–11)

## 2023-09-19 PROCEDURE — 250N000011 HC RX IP 250 OP 636: Mod: JZ | Performed by: NURSE PRACTITIONER

## 2023-09-19 PROCEDURE — 258N000003 HC RX IP 258 OP 636: Performed by: RADIOLOGY

## 2023-09-19 PROCEDURE — 99232 SBSQ HOSP IP/OBS MODERATE 35: CPT | Mod: GC | Performed by: OBSTETRICS & GYNECOLOGY

## 2023-09-19 PROCEDURE — 77772 HDR RDNCL NTRSTL/ICAV BRCHTX: CPT | Performed by: RADIOLOGY

## 2023-09-19 PROCEDURE — 36415 COLL VENOUS BLD VENIPUNCTURE: CPT | Performed by: RADIOLOGY

## 2023-09-19 PROCEDURE — C1717 BRACHYTX, NON-STR,HDR IR-192: HCPCS | Performed by: RADIOLOGY

## 2023-09-19 PROCEDURE — 82310 ASSAY OF CALCIUM: CPT

## 2023-09-19 PROCEDURE — 77772 HDR RDNCL NTRSTL/ICAV BRCHTX: CPT | Mod: XE | Performed by: RADIOLOGY

## 2023-09-19 PROCEDURE — 77280 THER RAD SIMULAJ FIELD SMPL: CPT | Mod: 26 | Performed by: RADIOLOGY

## 2023-09-19 PROCEDURE — 77280 THER RAD SIMULAJ FIELD SMPL: CPT | Performed by: RADIOLOGY

## 2023-09-19 PROCEDURE — 250N000013 HC RX MED GY IP 250 OP 250 PS 637: Performed by: NURSE PRACTITIONER

## 2023-09-19 PROCEDURE — 250N000011 HC RX IP 250 OP 636: Performed by: RADIOLOGY

## 2023-09-19 PROCEDURE — 77772 HDR RDNCL NTRSTL/ICAV BRCHTX: CPT | Mod: 26 | Performed by: RADIOLOGY

## 2023-09-19 PROCEDURE — 85025 COMPLETE CBC W/AUTO DIFF WBC: CPT

## 2023-09-19 PROCEDURE — 83735 ASSAY OF MAGNESIUM: CPT

## 2023-09-19 PROCEDURE — 120N000002 HC R&B MED SURG/OB UMMC

## 2023-09-19 PROCEDURE — 36415 COLL VENOUS BLD VENIPUNCTURE: CPT

## 2023-09-19 PROCEDURE — 83605 ASSAY OF LACTIC ACID: CPT | Performed by: RADIOLOGY

## 2023-09-19 RX ORDER — OXYCODONE HYDROCHLORIDE 10 MG/1
10 TABLET ORAL
Status: DISCONTINUED | OUTPATIENT
Start: 2023-09-19 | End: 2023-09-19

## 2023-09-19 RX ORDER — OXYCODONE HYDROCHLORIDE 5 MG/1
5 TABLET ORAL
Status: DISCONTINUED | OUTPATIENT
Start: 2023-09-19 | End: 2023-09-19

## 2023-09-19 RX ORDER — ONDANSETRON 2 MG/ML
4 INJECTION INTRAMUSCULAR; INTRAVENOUS EVERY 30 MIN PRN
Status: DISCONTINUED | OUTPATIENT
Start: 2023-09-19 | End: 2023-09-19

## 2023-09-19 RX ORDER — ONDANSETRON 4 MG/1
4 TABLET, ORALLY DISINTEGRATING ORAL EVERY 30 MIN PRN
Status: DISCONTINUED | OUTPATIENT
Start: 2023-09-19 | End: 2023-09-19

## 2023-09-19 RX ADMIN — VANCOMYCIN HYDROCHLORIDE 125 MG: 125 CAPSULE ORAL at 13:00

## 2023-09-19 RX ADMIN — ENOXAPARIN SODIUM 40 MG: 40 INJECTION SUBCUTANEOUS at 13:00

## 2023-09-19 RX ADMIN — CALCIUM CARBONATE (ANTACID) CHEW TAB 500 MG 500 MG: 500 CHEW TAB at 11:18

## 2023-09-19 RX ADMIN — VANCOMYCIN HYDROCHLORIDE 125 MG: 125 CAPSULE ORAL at 16:04

## 2023-09-19 RX ADMIN — VANCOMYCIN HYDROCHLORIDE 125 MG: 125 CAPSULE ORAL at 20:52

## 2023-09-19 RX ADMIN — ONDANSETRON HYDROCHLORIDE 8 MG: 8 TABLET, FILM COATED ORAL at 13:49

## 2023-09-19 RX ADMIN — TOLTERODINE TARTRATE 2 MG: 2 TABLET, FILM COATED ORAL at 09:23

## 2023-09-19 RX ADMIN — CALCIUM CARBONATE (ANTACID) CHEW TAB 500 MG 500 MG: 500 CHEW TAB at 20:53

## 2023-09-19 RX ADMIN — AMLODIPINE BESYLATE 5 MG: 5 TABLET ORAL at 09:23

## 2023-09-19 RX ADMIN — CALCIUM CARBONATE (ANTACID) CHEW TAB 500 MG 500 MG: 500 CHEW TAB at 09:23

## 2023-09-19 RX ADMIN — HYDROMORPHONE HYDROCHLORIDE: 1 INJECTION, SOLUTION INTRAMUSCULAR; INTRAVENOUS; SUBCUTANEOUS at 09:58

## 2023-09-19 RX ADMIN — TOLTERODINE TARTRATE 2 MG: 2 TABLET, FILM COATED ORAL at 20:52

## 2023-09-19 RX ADMIN — VANCOMYCIN HYDROCHLORIDE 125 MG: 125 CAPSULE ORAL at 09:23

## 2023-09-19 ASSESSMENT — ACTIVITIES OF DAILY LIVING (ADL)
ADLS_ACUITY_SCORE: 35
ADLS_ACUITY_SCORE: 35
ADLS_ACUITY_SCORE: 32
ADLS_ACUITY_SCORE: 35
ADLS_ACUITY_SCORE: 34
ADLS_ACUITY_SCORE: 32
ADLS_ACUITY_SCORE: 32
ADLS_ACUITY_SCORE: 35
ADLS_ACUITY_SCORE: 35
ADLS_ACUITY_SCORE: 34
ADLS_ACUITY_SCORE: 35
ADLS_ACUITY_SCORE: 32

## 2023-09-19 NOTE — LETTER
9/19/2023         RE: Jessie Tamayo  [address] [facility] [address]        Dear Colleague,    Thank you for referring your patient, Jessie Tamayo, to the [facility] RADIATION ONCOLOGY. Please see a copy of my visit note below.    A radiation therapy treatment planning simulation was performed.  HDR brachytherapy Michelet interstitial fraction 2 of 5 was delivered.  Please see the [facility] record for documentation.    Marisa Pacheco MD  Radiation Oncology       Again, thank you for allowing me to participate in the care of your patient.        Sincerely,        Marisa Pacheco MD

## 2023-09-19 NOTE — PLAN OF CARE
"POD0 Interstitial needle placement and HDR brachytherapy  VS: /56 (BP Location: Right arm)   Pulse 91   Temp 98.5  F (36.9  C) (Oral)   Resp 18   Ht 1.651 m (5' 5\")   Wt 93.3 kg (205 lb 11 oz)   LMP 03/08/2008   SpO2 95%   BMI 34.23 kg/m     Neuro: A&O x4, able to make needs know. Patient denied LAST symptoms  Respiratory: On 2L via NC. Patient denied SOB  GI/: Thomas in place with adequate and orange color output. BS active, passing gas, no BM this shift. Daniela care completed this shift  Diet/appetite: Regular diet with good appetite, no report of nausea  Activity: Bedrest, HOB @15 degrees or less  Pain: Patient denied pain this shift. Pain managed with epidural and dilaudid  Skin/drains: Interstitial needles in place, no drainage noted.   Lines: L piv saline locked. Epidural infusing at the back, site CDI  No change to patient's status this shift. Continue POC.         "

## 2023-09-19 NOTE — PROGRESS NOTES
A radiation therapy treatment planning simulation was performed.  HDR brachytherapy Michelet interstitial fraction 3 of 5 was delivered.  Please see the [facility] record for documentation.    Marisa Pacheco MD  Radiation Oncology

## 2023-09-19 NOTE — LETTER
9/19/2023         RE: Jessie Tamayo  [address] [facility] [address]        Dear Colleague,    Thank you for referring your patient, Jessie Tamayo, to the [facility] RADIATION ONCOLOGY. Please see a copy of my visit note below.    A radiation therapy treatment planning simulation was performed.  HDR brachytherapy Michelet interstitial fraction 3 of 5 was delivered.  Please see the [facility] record for documentation.    Marisa Pacheco MD  Radiation Oncology       Again, thank you for allowing me to participate in the care of your patient.        Sincerely,        Marisa Pacheco MD

## 2023-09-19 NOTE — PROGRESS NOTES
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 States that she slept very well. Pain very well controlled this morning with epidural.  Eating small amount and drinking well without N/V. Chronic indwelling gonsalez in place, along with rectal tube placed this admission in the setting of recent C Diff.  Discussed some lozenges for small, somewhat bothersome cough.    OBJECTIVE:   Patient Vitals for the past 24 hrs:   BP Temp Temp src Pulse Resp SpO2   09/18/23 2200 131/56 98.5  F (36.9  C) Oral 91 18 95 %   09/18/23 1800 134/59 -- -- 95 16 93 %   09/18/23 1700 139/67 -- -- 103 18 90 %   09/18/23 1630 (!) 149/73 -- -- 97 16 90 %   09/18/23 1530 136/70 -- -- 102 18 90 %   09/18/23 1500 -- -- -- 94 19 91 %   09/18/23 1437 (!) 145/75 98.6  F (37  C) -- 94 16 93 %   09/18/23 1148 (!) 143/72 -- -- 96 16 93 %   09/18/23 1130 (!) 147/81 97.4  F (36.3  C) Axillary 96 16 93 %   09/18/23 1115 (!) 144/79 -- -- 94 -- 93 %   09/18/23 1100 131/86 -- -- 94 14 95 %   09/18/23 1045 (!) 150/85 -- -- 94 10 96 %   09/18/23 1036 (!) 147/83 -- -- 93 12 94 %   09/18/23 1016 138/86 97.8  F (36.6  C) Oral 93 12 96 %   09/18/23 0745 137/73 -- -- 112 16 95 %   09/18/23 0740 -- -- -- 115 18 94 %   09/18/23 0735 124/81 -- -- (!) 121 16 94 %   09/18/23 0730 -- -- -- 117 15 95 %   09/18/23 0727 (!) 150/93 -- -- (!) 122 18 95 %   09/18/23 0701 -- -- -- 113 18 94 %   09/18/23 0615 (!) 144/67 98.8  F (37.1  C) Oral 110 18 92 %     Gen- Laying in bed, NAD  CV- Regular rate, well perfused  Pulm- NWOB on room air  Abd- soft, non-distended  Extr- trace edema, SCDs  Lines/drains- epidural PCEA, PIV, gonsalez, rectal tube    I/Os  (Yesterday // Since Midnight)   mL // NR  IVF 1200 mL // NR  Urine  2100 mL // 250 mL    New Labs/Imaging-   Results for orders placed or performed during the hospital encounter of 09/18/23 (from the past 24 hour(s))   Platelet count   Result Value Ref Range    Platelet Count 102 (L) 150 - 450 10e3/uL   US Pelvic Limited    Narrative    This exam was marked as non-reportable because it will not be read by a   radiologist or a [place] non-radiologist provider.         Creatinine   Result Value Ref Range    Creatinine 0.77 0.51 - 0.95 mg/dL    GFR Estimate 84 >60 mL/min/1.73m2   Magnesium   Result Value Ref Range    Magnesium 1.6 (L) 1.7 - 2.3 mg/dL   Potassium   Result Value Ref Range    Potassium 4.9 3.4 - 5.3 mmol/L         ASSESSMENT:   67 year old female with stage IVB SCC of the vagina, c.diff positive, HTN, HLD, treatment induced pancytopenia, chronic indwelling gonsalez, lichen sclerosus, and actinic keratosis admitted for interstitial needle placement for HDR. Patient completed EBRT with concomitant weekly cisplatin and here for HDR. Patient stable and pain controlled. Plan to proceed with radiation treatments today.    Problem list:    - Stage IVB SCC of the vagina  - Treatment induced pancytopenia  - Urinary retention with indwelling gonsalez  - Recent catheter associated UTI  - Recent C Diff colitis  - HTN  - HLD  - Osteopenia  - Lichen Sclerosus   - Actinic keratosis    PLAN:    # Stage IVB SCC of the vagina  # Treatment induced pancytopenia  - Interstitial needles in place, plan to complete total of 5 radiation treatments prior to removal and discharge  - FEN: Regular diet  - Pain: Prn Tylenol, Ibuprofen. Epidural PCEA (dilaudid + bupivacaine), plan no additional narcotics.  - Heme: Hgb 9.1> EBL 10> AM CBC pending. Hx of thrombocytopenia, Plts 102> AM CBC pending.  ID: S/p Ancef pre-op.    # Urinary retention with indwelling gonsalez  - Will discuss with Radiation Oncology and/or Urology teams possibility of exchange versus removal of gonsalez catheter prior to discharge  - PTA  pyridium TID prn for associated bladder spasms    # Recent C Diff colitis  - Continue 14-day course of PO Vancomycin TID (last day 9/23)    # HTN  - PTA amlodipine    # Inpatient status  - Ppx: SCDs, IS, Lovenox ppx x1 9/19    Consults:   - Anesthesia / Pain Team  - Radiation Oncology    Disposition: discharge after radiation treatments complete, Insterstitial needles and epidural removed 9/20    Discussed with Dr. Kat and Dr. Rose.    Sun Levin MD, PGY-3  5:54 AM  [facility] Obstetrics & Gynecology Residency  Gyn Onc Pager: [phone]      I saw and evaluated the patient with the resident.  I edited and reviewed the above note. I have reviewed all pertinent imaging and labs on this patient.    Darcy Rose MD  Professor  Department of Ob/Gyn and Women's Health  Division of Gynecologic Oncology  [facility]  [phone]

## 2023-09-19 NOTE — PROGRESS NOTES
A radiation therapy treatment planning simulation was performed.  HDR brachytherapy Michelet interstitial fraction 2 of 5 was delivered.  Please see the [employer] record for documentation.    Marisa Pacheco MD  Radiation Oncology

## 2023-09-19 NOTE — PROGRESS NOTES
Pain Service Progress Note  [facility]  Date: 09/19/2023       Patient Name: Jessie Tamayo  MRN: [id number]  Age: 67 year old  Sex: female      Assessment:  67 year old female with stage IVB SCC of the vagina, c.diff positive, HTN, HLD, treatment induced pancytopenia, chronic indwelling gonsalez, lichen sclerosus, and actinic keratosis admitted for interstitial needle placement for HDR. Patient completed EBRT with concomitant weekly cisplatin and here for HDR. Patient stable and pain controlled with lumbar epidural. Initial concern for low platelets, level is increasing and appropriate.     Procedure: interstitial needle placement for HDR    Date of Surgery: 9/18/23    Date of Catheter Placement: 9/18/23    Plan/Recommendations:  1. Regional Anesthesia/Analgesia  -Continuous Catheter Type/Site:   L2-3 Lumbar epidural   Infusate: Dilaudid 6mcg/ml, bupivicaine 0.0625% epidiural PCEA  Continuous Infusion at 10 mL/hr  PCEA dose 7 mL     Plan to maintain catheter, max of 7 days    2. Anticoagulation  -Please contact Inpatient Pain Service before ordering or making any anticoagulation changes    3. Multimodal Analgesia  - per primary team     Pain Service will continue to follow.    Discussed with attending anesthesiologist    Karena Savage MD  09/19/2023     Overnight Events: No acute events overnight.     Tubes/Drains: No    Subjective:  I have no pain, zero.   Nausea: No  Vomiting: No  Pruritus: No  Symptoms of LAST: No    Pain Location:  None    Pain Intensity:    Pain at Rest: 0/10   Pain with Activity: 0/10  Comfort Goal: 0/10   Baseline Pain: 0/10   Satisfied with your level of pain control: Yes    Diet: Advance Diet as Tolerated: Regular Diet Adult  NPO for Medical/Clinical Reasons Except for: Meds    Relevant Labs:  Recent Labs   Lab Test 09/19/23  0700   *   BUN 11.3       Physical Exam:  Vitals: /57 (BP Location: Right arm)   Pulse 84   Temp 36.6  C (97.9  F) (Oral)    "Resp 14   Ht 1.651 m (5' 5\")   Wt 93.3 kg (205 lb 11 oz)   LMP 03/08/2008   SpO2 97%   BMI 34.23 kg/m      Physical Exam:   Orientation:  Alert, oriented, and in no acute distress: Yes  Sedation: No    Motor Examination:  5/5 Strength in lower extremities: Yes    Sensory Level:   Decrease in sensation: No    Catheter Site:   Catheter entry site is clean/dry/intact: Yes    Tender: No      Relevant Medications:  Current Pain Medications:  Medications related to Pain Management (From now, onward)      Start     Dose/Rate Route Frequency Ordered Stop    09/18/23 1712  ibuprofen (ADVIL/MOTRIN) tablet 400 mg         400 mg Oral EVERY 6 HOURS PRN 09/18/23 1712      09/18/23 1530  HYDROmorphone (DILAUDID) 6 mcg/mL, BUPivacaine (MARCAINE) 0.0625 % in sodium chloride 0.9 % 250 mL EPIDURAL PCEA (patient controlled epidural)          EPIDURAL PCEA 09/18/23 1515      09/18/23 1452  simethicone (MYLICON) chewable tablet 80 mg        Note to Pharmacy: PTA Sig:Take 125 mg by mouth 4 times daily as needed for intestinal gas      80 mg Oral EVERY 4 HOURS PRN 09/18/23 1428      09/18/23 1451  loratadine (CLARITIN) tablet 10 mg        Note to Pharmacy: PTA Sig:Take 10 mg by mouth daily      10 mg Oral DAILY PRN 09/18/23 1428      09/18/23 1427  acetaminophen (TYLENOL) tablet 500-1,000 mg        Note to Pharmacy: PTA Sig:Take 500-1,000 mg by mouth every 4 hours as needed for mild pain      500-1,000 mg Oral EVERY 6 HOURS PRN 09/18/23 1428      09/18/23 0744  nalbuphine (NUBAIN) injection 2.5-5 mg         2.5-5 mg Intravenous EVERY 6 HOURS PRN 09/18/23 0749              Primary Service Contacted with Recommendations? Yes            Acute Inpatient Pain Service [facility]  Hours of pain coverage 24/7   Page via Amcom- Please Page the Pain Team Via Amcom: \"PAIN MANAGEMENT ACUTE INPATIENT/ [facility]\"             "

## 2023-09-19 NOTE — PLAN OF CARE
Goal Outcome Evaluation:    Care plan reviewed with: Patient    POD#1, AVSS on room air. Enteric isolation for C-dif. Bedrest with HOB 15 degrees or less. Pain managed with epidural PCEA running at 10 mL/hr. Interstitial needles intact and WDL. Gonsalez cares completed during shift, gonsalez patent. Daniela cares completed. PIV saline locked. Radiation completed once this morning, at radiation now for second treatment. Potentially discharge tomorrow (9/20) or Thursday (9/21).  at bedside. Continue with POC.

## 2023-09-19 NOTE — PLAN OF CARE
Goal Outcome Evaluation:      Plan of Care Reviewed With: patient    POD#1 Interstitial needle placement and HDR brachytherapy    A/Ox4. VSS. On 2L NC. Bedrest with HOB 15 degrees or less. Reg-diet. Denies nausea. Pain managed with Epidural that is running at 6Ml/hr and site is CDI. Thomas patent. Passing gas not BM on this shift.  Interstitial needles intact. PIV SL. Will be going down to get radiation 2x today. Cont POC.

## 2023-09-20 ENCOUNTER — ANESTHESIA (OUTPATIENT)
Dept: SURGERY | Facility: CLINIC | Age: 68
DRG: 754 | End: 2023-09-20
Payer: COMMERCIAL

## 2023-09-20 ENCOUNTER — APPOINTMENT (OUTPATIENT)
Dept: RADIATION ONCOLOGY | Facility: CLINIC | Age: 68
End: 2023-09-20
Attending: RADIOLOGY
Payer: COMMERCIAL

## 2023-09-20 DIAGNOSIS — C52 VAGINAL CANCER (H): Primary | ICD-10-CM

## 2023-09-20 LAB
ERYTHROCYTE [DISTWIDTH] IN BLOOD BY AUTOMATED COUNT: 17.6 % (ref 10–15)
GLUCOSE BLDC GLUCOMTR-MCNC: 95 MG/DL (ref 70–99)
HCT VFR BLD AUTO: 24.4 % (ref 35–47)
HGB BLD-MCNC: 7.8 G/DL (ref 11.7–15.7)
HOLD SPECIMEN: NORMAL
HOLD SPECIMEN: NORMAL
MAGNESIUM SERPL-MCNC: 1.6 MG/DL (ref 1.7–2.3)
MCH RBC QN AUTO: 31 PG (ref 26.5–33)
MCHC RBC AUTO-ENTMCNC: 32 G/DL (ref 31.5–36.5)
MCV RBC AUTO: 97 FL (ref 78–100)
PLATELET # BLD AUTO: 165 10E3/UL (ref 150–450)
POTASSIUM SERPL-SCNC: 4.4 MMOL/L (ref 3.4–5.3)
RBC # BLD AUTO: 2.52 10E6/UL (ref 3.8–5.2)
WBC # BLD AUTO: 1.7 10E3/UL (ref 4–11)

## 2023-09-20 PROCEDURE — 85027 COMPLETE CBC AUTOMATED: CPT

## 2023-09-20 PROCEDURE — 36415 COLL VENOUS BLD VENIPUNCTURE: CPT | Performed by: RADIOLOGY

## 2023-09-20 PROCEDURE — 77772 HDR RDNCL NTRSTL/ICAV BRCHTX: CPT | Mod: 26 | Performed by: RADIOLOGY

## 2023-09-20 PROCEDURE — 0UPD7YZ REMOVAL OF OTHER DEVICE FROM UTERUS AND CERVIX, VIA NATURAL OR ARTIFICIAL OPENING: ICD-10-PCS | Performed by: RADIOLOGY

## 2023-09-20 PROCEDURE — 84132 ASSAY OF SERUM POTASSIUM: CPT | Performed by: RADIOLOGY

## 2023-09-20 PROCEDURE — 77280 THER RAD SIMULAJ FIELD SMPL: CPT | Performed by: RADIOLOGY

## 2023-09-20 PROCEDURE — 258N000003 HC RX IP 258 OP 636: Performed by: RADIOLOGY

## 2023-09-20 PROCEDURE — 250N000009 HC RX 250: Performed by: NURSE ANESTHETIST, CERTIFIED REGISTERED

## 2023-09-20 PROCEDURE — 250N000013 HC RX MED GY IP 250 OP 250 PS 637: Performed by: NURSE PRACTITIONER

## 2023-09-20 PROCEDURE — 83735 ASSAY OF MAGNESIUM: CPT | Performed by: RADIOLOGY

## 2023-09-20 PROCEDURE — 999N000141 HC STATISTIC PRE-PROCEDURE NURSING ASSESSMENT: Performed by: RADIOLOGY

## 2023-09-20 PROCEDURE — 370N000017 HC ANESTHESIA TECHNICAL FEE, PER MIN: Performed by: RADIOLOGY

## 2023-09-20 PROCEDURE — 36415 COLL VENOUS BLD VENIPUNCTURE: CPT

## 2023-09-20 PROCEDURE — 99232 SBSQ HOSP IP/OBS MODERATE 35: CPT | Mod: GC | Performed by: OBSTETRICS & GYNECOLOGY

## 2023-09-20 PROCEDURE — 120N000002 HC R&B MED SURG/OB UMMC

## 2023-09-20 PROCEDURE — 77336 RADIATION PHYSICS CONSULT: CPT | Performed by: RADIOLOGY

## 2023-09-20 PROCEDURE — 258N000003 HC RX IP 258 OP 636: Performed by: NURSE ANESTHETIST, CERTIFIED REGISTERED

## 2023-09-20 PROCEDURE — 360N000076 HC SURGERY LEVEL 3, PER MIN: Performed by: RADIOLOGY

## 2023-09-20 PROCEDURE — 77280 THER RAD SIMULAJ FIELD SMPL: CPT | Mod: 26 | Performed by: RADIOLOGY

## 2023-09-20 PROCEDURE — C1717 BRACHYTX, NON-STR,HDR IR-192: HCPCS | Performed by: RADIOLOGY

## 2023-09-20 PROCEDURE — 250N000011 HC RX IP 250 OP 636: Mod: JZ | Performed by: RADIOLOGY

## 2023-09-20 PROCEDURE — 272N000001 HC OR GENERAL SUPPLY STERILE: Performed by: RADIOLOGY

## 2023-09-20 PROCEDURE — 710N000011 HC RECOVERY PHASE 1, LEVEL 3, PER MIN: Performed by: RADIOLOGY

## 2023-09-20 PROCEDURE — 250N000013 HC RX MED GY IP 250 OP 250 PS 637

## 2023-09-20 PROCEDURE — 250N000011 HC RX IP 250 OP 636: Performed by: NURSE ANESTHETIST, CERTIFIED REGISTERED

## 2023-09-20 PROCEDURE — 77772 HDR RDNCL NTRSTL/ICAV BRCHTX: CPT | Mod: XE | Performed by: RADIOLOGY

## 2023-09-20 PROCEDURE — 77772 HDR RDNCL NTRSTL/ICAV BRCHTX: CPT | Performed by: RADIOLOGY

## 2023-09-20 RX ORDER — OXYCODONE HYDROCHLORIDE 10 MG/1
10 TABLET ORAL EVERY 4 HOURS PRN
Status: DISCONTINUED | OUTPATIENT
Start: 2023-09-20 | End: 2023-09-20

## 2023-09-20 RX ORDER — OXYCODONE HYDROCHLORIDE 5 MG/1
5 TABLET ORAL EVERY 4 HOURS PRN
Status: DISCONTINUED | OUTPATIENT
Start: 2023-09-20 | End: 2023-09-21 | Stop reason: HOSPADM

## 2023-09-20 RX ORDER — OXYCODONE HYDROCHLORIDE 5 MG/1
5 TABLET ORAL EVERY 4 HOURS PRN
Status: DISCONTINUED | OUTPATIENT
Start: 2023-09-20 | End: 2023-09-20

## 2023-09-20 RX ORDER — LIDOCAINE HYDROCHLORIDE 20 MG/ML
INJECTION, SOLUTION INFILTRATION; PERINEURAL PRN
Status: DISCONTINUED | OUTPATIENT
Start: 2023-09-20 | End: 2023-09-20

## 2023-09-20 RX ORDER — OXYCODONE HYDROCHLORIDE 10 MG/1
10 TABLET ORAL EVERY 4 HOURS PRN
Status: DISCONTINUED | OUTPATIENT
Start: 2023-09-20 | End: 2023-09-21 | Stop reason: HOSPADM

## 2023-09-20 RX ORDER — SODIUM CHLORIDE, SODIUM LACTATE, POTASSIUM CHLORIDE, CALCIUM CHLORIDE 600; 310; 30; 20 MG/100ML; MG/100ML; MG/100ML; MG/100ML
INJECTION, SOLUTION INTRAVENOUS CONTINUOUS PRN
Status: DISCONTINUED | OUTPATIENT
Start: 2023-09-20 | End: 2023-09-20

## 2023-09-20 RX ORDER — PROPOFOL 10 MG/ML
INJECTION, EMULSION INTRAVENOUS PRN
Status: DISCONTINUED | OUTPATIENT
Start: 2023-09-20 | End: 2023-09-20

## 2023-09-20 RX ADMIN — ACETAMINOPHEN 1000 MG: 500 TABLET ORAL at 18:21

## 2023-09-20 RX ADMIN — AMLODIPINE BESYLATE 5 MG: 5 TABLET ORAL at 07:41

## 2023-09-20 RX ADMIN — PROPOFOL 50 MG: 10 INJECTION, EMULSION INTRAVENOUS at 16:24

## 2023-09-20 RX ADMIN — PROPOFOL 50 MG: 10 INJECTION, EMULSION INTRAVENOUS at 16:08

## 2023-09-20 RX ADMIN — TOLTERODINE TARTRATE 2 MG: 2 TABLET, FILM COATED ORAL at 19:34

## 2023-09-20 RX ADMIN — VANCOMYCIN HYDROCHLORIDE 125 MG: 125 CAPSULE ORAL at 19:34

## 2023-09-20 RX ADMIN — HYDROMORPHONE HYDROCHLORIDE: 1 INJECTION, SOLUTION INTRAMUSCULAR; INTRAVENOUS; SUBCUTANEOUS at 10:23

## 2023-09-20 RX ADMIN — IBUPROFEN 400 MG: 200 TABLET, FILM COATED ORAL at 17:11

## 2023-09-20 RX ADMIN — PHENAZOPYRIDINE HYDROCHLORIDE 200 MG: 100 TABLET, FILM COATED ORAL at 19:34

## 2023-09-20 RX ADMIN — CALCIUM CARBONATE (ANTACID) CHEW TAB 500 MG 500 MG: 500 CHEW TAB at 19:35

## 2023-09-20 RX ADMIN — OXYCODONE HYDROCHLORIDE 10 MG: 10 TABLET ORAL at 18:24

## 2023-09-20 RX ADMIN — CALCIUM CARBONATE (ANTACID) CHEW TAB 500 MG 500 MG: 500 CHEW TAB at 07:41

## 2023-09-20 RX ADMIN — PROPOFOL 50 MG: 10 INJECTION, EMULSION INTRAVENOUS at 16:11

## 2023-09-20 RX ADMIN — LIDOCAINE HYDROCHLORIDE 60 MG: 20 INJECTION, SOLUTION INFILTRATION; PERINEURAL at 16:05

## 2023-09-20 RX ADMIN — VANCOMYCIN HYDROCHLORIDE 125 MG: 125 CAPSULE ORAL at 12:43

## 2023-09-20 RX ADMIN — OXYCODONE HYDROCHLORIDE 10 MG: 10 TABLET ORAL at 22:22

## 2023-09-20 RX ADMIN — PROPOFOL 50 MG: 10 INJECTION, EMULSION INTRAVENOUS at 16:05

## 2023-09-20 RX ADMIN — SODIUM CHLORIDE, POTASSIUM CHLORIDE, SODIUM LACTATE AND CALCIUM CHLORIDE: 600; 310; 30; 20 INJECTION, SOLUTION INTRAVENOUS at 15:56

## 2023-09-20 RX ADMIN — VANCOMYCIN HYDROCHLORIDE 125 MG: 125 CAPSULE ORAL at 07:41

## 2023-09-20 RX ADMIN — TOLTERODINE TARTRATE 2 MG: 2 TABLET, FILM COATED ORAL at 07:41

## 2023-09-20 ASSESSMENT — ACTIVITIES OF DAILY LIVING (ADL)
ADLS_ACUITY_SCORE: 31
ADLS_ACUITY_SCORE: 32
ADLS_ACUITY_SCORE: 32
ADLS_ACUITY_SCORE: 33
ADLS_ACUITY_SCORE: 32
ADLS_ACUITY_SCORE: 35
ADLS_ACUITY_SCORE: 32
ADLS_ACUITY_SCORE: 31
ADLS_ACUITY_SCORE: 32
ADLS_ACUITY_SCORE: 35

## 2023-09-20 ASSESSMENT — LIFESTYLE VARIABLES: TOBACCO_USE: 0

## 2023-09-20 ASSESSMENT — ENCOUNTER SYMPTOMS: SEIZURES: 0

## 2023-09-20 NOTE — ANESTHESIA CARE TRANSFER NOTE
Patient: Jessie Tamayo    Procedure: Procedure(s):  REMOVAL, INTERSTITIAL NEEDLE, AFTER TEMPORARY BRACHYTHERAPY       Diagnosis: Vaginal cancer (H) [C52]  Diagnosis Additional Information: No value filed.    Anesthesia Type:   MAC     Note:    Oropharynx: oropharynx clear of all foreign objects and spontaneously breathing  Level of Consciousness: awake  Oxygen Supplementation: room air    Independent Airway: airway patency satisfactory and stable  Dentition: dentition unchanged  Vital Signs Stable: post-procedure vital signs reviewed and stable  Report to RN Given: handoff report given  Patient transferred to: PACU  Comments: Report called to RN on Unit 5A, all questions answered. To be transported back to room with orderly.  Handoff Report: Identifed the Patient, Identified the Reponsible Provider, Reviewed the pertinent medical history, Discussed the surgical course, Reviewed Intra-OP anesthesia mangement and issues during anesthesia, Set expectations for post-procedure period and Allowed opportunity for questions and acknowledgement of understanding      Vitals:  Vitals Value Taken Time   /72    Temp     Pulse 99    Resp 14    SpO2 94 % 09/20/23 1638   Vitals shown include unvalidated device data.    Electronically Signed By: ASNDI Costello CRNA  September 20, 2023  4:39 PM

## 2023-09-20 NOTE — ANESTHESIA PREPROCEDURE EVALUATION
Pre-Operative H & P     CC:  Preoperative exam to assess for increased cardiopulmonary risk while undergoing surgery and anesthesia.    Date of Encounter: 9/15/2023  Primary Care Physician:  Alba Layton     Reason for visit:   No diagnosis found.      HPI  Jessie Tamayo is a 67 year old female who presents for pre-operative H & P in preparation for  Procedure Information       Case: [id number] Date/Time: 09/20/23 1600    Procedure: REMOVAL, INTERSTITIAL NEEDLE, AFTER TEMPORARY BRACHYTHERAPY (Vagina)    Anesthesia type: MAC    Diagnosis: Vaginal cancer (H) [C52]    Pre-op diagnosis: Vaginal cancer (H) [C52]    [facility]: [facility]    Providers: Marisa Pacheco MD            Ms. 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 She is currently undergoing cisplatin radiation with plans for brachytherapy as above.      She was admitted 9/8 - 9/13 for fevers and acute diarrhea in the setting of positive C. Diff and neutropenic fever. She received 2U PRBC during admission. She is thrombocytopenic. She was hypoxic on admission with CXR showing atelectasis, CT from 9/5 w/o evidence of PE. Not on supplemental O2 at this time.  She is hypomagnesemic on oral replacement.    She has a history of HTN and HLD, no PTA meds. Due to persistent HTN, she was started on amlodipine 5 mg daily during admission, with good improvement in BP control (plan to follow up with PCP for management of this concern).        History is obtained from the patient and chart review    Hx of abnormal bleeding or anti-platelet use: denies    Menstrual history: Patient's last menstrual period was 03/08/2008.:      Past Medical History  Past Medical History:   Diagnosis Date    Actinic keratosis     C. difficile diarrhea 09/08/2023    Hyperlipemia     Lichen sclerosus 2020    Osteopenias     SCC (squamous cell carcinoma) 07/26/2023       Past Surgical  History  Past Surgical History:   Procedure Laterality Date    COLONOSCOPY  2006    normal    COLPOSCOPY, BIOPSY, COMBINED N/A 02/08/2021    Procedure: COLPOSCOPY, WITH BIOPSY, LEEP procedure;  Surgeon: Jefe Koenig MD;  Location: [facility]    CYSTOSCOPY N/A 05/30/2023    Procedure: CYSTOSCOPY AND EXCISIONAL BIOPSY OF THE VAGINAL TISSUE AROUND THE URETHRA.  (look in the bladder and sample small area in the vagina near the urethra);  Surgeon: Lakeisha Mackenzie MD;  Location: [facility] OR    EYE SURGERY      cataracts bilateral    INSERT INTERSTITIAL NEEDLE, ULTRASOUND GUIDED N/A 9/18/2023    Procedure: INSERTION, NEEDLE, WITH ULTRASOUND GUIDANCE, FOR INTERSTITIAL BRACHYTHERAPY;  Surgeon: Marisa Pacheco MD;  [facility]    OPEN REDUCTION INTERNAL FIXATION FOREARM  07/31/2012    Procedure: OPEN REDUCTION INTERNAL FIXATION FOREARM;  Open Reduction Internal Fixation, Irrigation & Debridment  of Right Distal Radius, application short arm splint;  Surgeon: Wade Beard MD;  [facility]    TONSILLECTOMY & ADENOIDECTOMY  [address] [place] [address] TREAT ECTOPIC PREG,RMV TUBE/OVARY  1985    ectopic, right side-ovary and tube removed       Prior to Admission Medications  No current outpatient medications on file.       Allergies  Allergies   Allergen Reactions    Blood Transfusion Related (Informational Only) Other (See Comments)     Patient has a history of a clinically significant antibody against RBC antigens.  A delay in compatible RBCs may occur.    Oxybutynin Itching    Seasonal Allergies        Social History  Social History     Socioeconomic History    Marital status:      Spouse name: Jesse    Number of children: 0    Years of education: Not on file    Highest education level: Not on file   Occupational History     Employer: [employer]   Tobacco Use    Smoking status: Never     Passive exposure: Never    Smokeless tobacco: Never   Vaping Use    Vaping Use: Never used   Substance  and Sexual Activity    Alcohol use: Not Currently    Drug use: No    Sexual activity: Not Currently     Partners: Male     Birth control/protection: Post-menopausal   Other Topics Concern    Parent/sibling w/ CABG, MI or angioplasty before 65F 55M? No   Social History Narrative    Not on file     Social Determinants of Health     Financial Resource Strain: Not on file   Food Insecurity: Not on file   Transportation Needs: Not on file   Physical Activity: Not on file   Stress: Not on file   Social Connections: Not on file   Interpersonal Safety: Not on file   Housing Stability: Not on file       Family History  Family History   Problem Relation Age of Onset    Lung Cancer Mother 44    Cerebrovascular Disease Father     Hypertension Father     Alcohol/Drug Father     Cervical Cancer Maternal Aunt     Ovarian Cancer Maternal Aunt 60    Obesity Niece         niece    Mental Illness Nephew         nephew    Diabetes Other         paternal aunt    Hyperlipidemia Other     Obesity Other         self    Obesity Other     Diabetes Other         paternal aunt    Hypertension Other         father    Cerebrovascular Disease Other         father    C.A.D. No family hx of     Breast Cancer No family hx of     Cancer - colorectal No family hx of     Prostate Cancer No family hx of     Melanoma No family hx of     Anesthesia Reaction No family hx of     Venous thrombosis No family hx of        Review of Systems  The complete review of systems is negative other than noted in the HPI or here.     Anesthesia Evaluation            ROS/MED HX  ENT/Pulmonary:     (+)     FLORECITA risk factors, snores loudly, hypertension, obese,                            (-) tobacco use and asthma   Neurologic:  - neg neurologic ROS  (-) no seizures and no CVA   Cardiovascular:     (+)  hypertension- -   -  - -                                 Previous cardiac testing   Echo: Date: Results:    Stress Test:  Date: Results:    ECG Reviewed:  Date: 9/12/23  "Results:    Sinus rhythm  Inferior infarct (cited on or before 07-NOV-2009)  Abnormal ECG  When compared with ECG of 05-SEP-2023 06:42,  No significant change was found          Cath:  Date: Results:      METS/Exercise Tolerance:  Comment: <4  Was very active before diagnosis in July   Hematologic: Comments: pancytopenia    (+)      anemia, history of blood transfusion, no previous transfusion reaction,        Musculoskeletal:  - neg musculoskeletal ROS     GI/Hepatic:  - neg GI/hepatic ROS   (+) GERD, Symptomatic,                  Renal/Genitourinary:  - neg Renal ROS     Endo:  - neg endo ROS   (+)               Obesity,    (-) Type I DM, Type II DM and thyroid disease   Psychiatric/Substance Use:  - neg psychiatric ROS     Infectious Disease: Comment: C. Diff 9/8, on Vanco      Malignancy:   (+) Malignancy, History of Other.Other CA vaginal cancer Active status post Chemo and Radiation.    Other:  - neg other ROS          BP (!) 140/60 (BP Location: Right arm)   Pulse 94   Temp 36.9  C (98.4  F) (Oral)   Resp 17   Ht 1.651 m (5' 5\")   Wt 93.3 kg (205 lb 11 oz)   LMP 03/08/2008   SpO2 93%   BMI 34.23 kg/m      Physical Exam   Constitutional: Awake, alert, cooperative, mild distress due to pain, and appears stated age.  Eyes: Pupils equal, round and reactive to light, extra ocular muscles intact, sclera clear, conjunctiva normal.  HENT: Normocephalic, oral pharynx with moist mucus membranes, good dentition. No goiter appreciated.   Respiratory: Clear to auscultation bilaterally, no crackles or wheezing.  Cardiovascular: tachycardic (114) and regular rhythm, normal S1 and S2, and no murmur noted.  Carotids +2, no bruits. No edema. Palpable pulses to radial and PT arteries.   GI: Not assessed  Lymph/Hematologic: No cervical lymphadenopathy and no supraclavicular lymphadenopathy.  Genitourinary:  deferred  Skin: Warm and dry.     Musculoskeletal: Full ROM of neck. There is no redness, warmth, or swelling of " the joints. Gross motor strength is normal.    Neurologic: Awake, alert, oriented to name, place and time. Cranial nerves II-XII are grossly intact.   Neuropsychiatric: Calm, cooperative. Normal affect.     Prior Labs/Diagnostic Studies   All labs and imaging personally reviewed     Component      Latest Ref Rn 9/15/2023  10:22 AM   WBC      4.0 - 11.0 10e3/uL 1.9 (L)    RBC Count      3.80 - 5.20 10e6/uL 2.99 (L)    Hemoglobin      11.7 - 15.7 g/dL 9.1 (L)    Hematocrit      35.0 - 47.0 % 27.7 (L)    MCV      78 - 100 fL 93    MCH      26.5 - 33.0 pg 30.4    MCHC      31.5 - 36.5 g/dL 32.9    RDW      10.0 - 15.0 % 16.2 (H)    Platelet Count      150 - 450 10e3/uL 54 (L)    % Neutrophils      % 72    % Lymphocytes      % 17    % Monocytes      % 8    % Eosinophils      % 0    % Basophils      % 1    % Immature Granulocytes      % 2    NRBCs per 100 WBC      <1 /100 0    Absolute Neutrophils      1.6 - 8.3 10e3/uL 1.4 (L)    Absolute Lymphocytes      0.8 - 5.3 10e3/uL 0.3 (L)    Absolute Monocytes      0.0 - 1.3 10e3/uL 0.1    Absolute Eosinophils      0.0 - 0.7 10e3/uL 0.0    Absolute Basophils      0.0 - 0.2 10e3/uL 0.0    Absolute Immature Granulocytes      <=0.4 10e3/uL 0.0    Absolute NRBCs      10e3/uL 0.0       Legend:  (L) Low  (H) High    Component      Latest Ref Rn 9/15/2023  10:22 AM   Sodium      136 - 145 mmol/L 132 (L)    Potassium      3.4 - 5.3 mmol/L 4.2    Chloride      98 - 107 mmol/L 96 (L)    Carbon Dioxide (CO2)      22 - 29 mmol/L 28    Anion Gap      7 - 15 mmol/L 8    Urea Nitrogen      8.0 - 23.0 mg/dL 13.0    Creatinine      0.51 - 0.95 mg/dL 0.89    Calcium      8.8 - 10.2 mg/dL 9.0    Glucose      70 - 99 mg/dL 120 (H)    GFR Estimate      >60 mL/min/1.73m2 71       Legend:  (L) Low  (H) High    Component      Latest Ref Rng 9/15/2023  10:22 AM   Magnesium      1.7 - 2.3 mg/dL 1.6 (L)       Legend:  (L) Low      EKG/ stress test - if available please see in ROS above         The  "patient's records and results personally reviewed by this provider.     Outside records reviewed from: Care Everywhere    LAB/DIAGNOSTIC STUDIES TODAY:  Mg, CBC, BMP, type and screen    Assessment    Jessie Tamayo is a 67 year old female seen as a PAC referral for risk assessment and optimization for anesthesia.    Plan/Recommendations  Pt will be optimized for the proposed procedure.  See below for details on the assessment, risk, and preoperative recommendations    NEUROLOGY  - No history of TIA, CVA or seizure  - Chronic Pain  On chronic opiates, morphine equivilant = 30-60 MME/Day   -Post Op delirium risk factors:  No risk identified    ENT  - No current airway concerns.  Will need to be reassessed day of surgery.  Mallampati: III  TM: > 3    CARDIAC  - HTN, continue amlodipine  - Denies chest pain, SOB, MOLINA, palpitations    - METS (Metabolic Equivalents)  Patient CANNOT perform 4 METS exercise without symptoms            Total Score: 1    Functional Capacity: Unable to complete 4 METS      RCRI-Very low risk: Class 1 0.4% complication rate            Total Score: 0        PULMONARY    FLORECITA Medium Risk            Total Score: 4    FLORECITA: Snores loudly    FLORECITA: Hypertension    FLORECITA: BMI over 35 kg/m2    FLORECITA: Over 50 ys old      - Denies asthma or inhaler use  - Tobacco History    History   Smoking Status    Never   Smokeless Tobacco    Never       GI  - denies GERD  PONV High Risk  Total Score: 3           1 AN PONV: Pt is Female    1 AN PONV: Patient is not a current smoker    1 AN PONV: Intended Post Op Opioids        /RENAL  - Baseline Creatinine  0.90    ENDOCRINE    - BMI: Estimated body mass index is 34.23 kg/m  as calculated from the following:    Height as of this encounter: 1.651 m (5' 5\").    Weight as of this encounter: 93.3 kg (205 lb 11 oz).  Obesity (BMI >30)  - No history of Diabetes Mellitus    HEME  VTE Medium Risk 1.8%            Total Score: 6    VTE: Greater than 59 yrs old    VTE: Current " cancer      - No history of abnormal bleeding or antiplatelet use.  - anemia  Recommend perioperative use of blood conservation techniques intraoperatively and close monitoring for postoperative bleeding.    - recent PRBC transfusion while admitted (2U)    - platelets low at 38 on 9/13/23, will check type and screen today for possible platelet transfusion with procedure on 9/18/23. Discussed with Dr. Juarez of anesthesia and as plt count <100, epidural for procedure unlikely. Dr. Juarez updated Dr. Pacheco and TONY Betina Kramer via staff message.  - platelets today: 54, Dr. Pacheco notified.    - Mg low today at 1.6, was 2.5 on discharge two days ago. Will have patient increase her po Mg to bid dosing per Betina Kramer      Different anesthesia methods/types have been discussed with the patient, but they are aware that the final plan will be decided by the assigned anesthesia provider on the date of service.    Patient was discussed with Dr Juarez    The patient is optimized for their procedure. AVS with information on surgery time/arrival time, meds and NPO status given by nursing staff. No further diagnostic testing indicated.      On the day of service:     Prep time: 13 minutes  Visit time: 11 minutes  Documentation time: 12 minutes  ------------------------------------------  Total time: 36 minutes      Lily Fabian PA-C  Preoperative Assessment Center  [facility]  Phone: [phone]  Fax: [phone]       Anesthesia Plan    ASA Status:  3    NPO Status:  NPO Appropriate    Anesthesia Type: MAC.   Induction: N/a.   Maintenance: TIVA.   Techniques and Equipment:     - Lines/Monitors: BIS     Consents            Postoperative Care    Pain management: Multi-modal analgesia.   PONV prophylaxis: Background Propofol Infusion (None needed.  Patient will have propofol infusion.)     Comments:

## 2023-09-20 NOTE — PROGRESS NOTES
Pain Service Progress Note  [facility]  Date: 09/20/2023       Patient Name: Jessie Tamayo  MRN: [id number]  Age: 67 year old  Sex: female      Assessment:  67 year old female with stage IVB SCC of the vagina, c.diff positive, HTN, HLD, treatment induced pancytopenia, chronic indwelling gonsalez, lichen sclerosus, and actinic keratosis admitted for interstitial needle placement for HDR. Patient completed EBRT with concomitant weekly cisplatin and here for HDR. Patient stable and pain controlled with lumbar epidural. Initial concern for low platelets, level is increasing and appropriate.      Procedure: interstitial needle placement for HDR     Date of Surgery: 9/18/23     Date of Catheter Placement: 9/18/23     Plan/Recommendations:  1. Regional Anesthesia/Analgesia  -Continuous Catheter Type/Site:L2-3 Lumbar epidural   Infusate: Dilaudid 6mcg/ml, bupivicaine 0.0625% epidiural PCEA  Continuous Infusion at 10 mL/hr  PCEA dose 7 mL     Plan for catheter removal 9/20 after removal of brachytherapy needles.     Plan to maintain catheter, max of 7 days     2. Anticoagulation  -Please contact Inpatient Pain Service before ordering or making any anticoagulation changes.    Please obtain platelet level 9/20, in preparation for removal of epidural catheter.       3. Multimodal Analgesia  - per primary team   - Patient requested referral to chronic pain clinic, please place referral at the time of discharge.     Pain Service will continue to follow. - until catheter removal.     Discussed with attending anesthesiologist    Karena Savage MD  09/20/2023     Overnight Events: No acute events overnight.     Tubes/Drains: No    Subjective:  Needed one little bump, but otherwise my pain is gone.    Nausea: No  Vomiting: No  Pruritus: No  Symptoms of LAST: No     Pain Location:  None     Pain Intensity:    Pain at Rest: 0/10   Pain with Activity: 0/10  Comfort Goal: 0/10   Baseline Pain: 0/10   Satisfied  "with your level of pain control: Yes    Diet: NPO for Medical/Clinical Reasons Except for: Meds    Relevant Labs:  Recent Labs   Lab Test 09/19/23  0700   *   BUN 11.3       Physical Exam:  Vitals: BP (!) 140/60 (BP Location: Right arm)   Pulse 94   Temp 36.9  C (98.4  F) (Oral)   Resp 17   Ht 1.651 m (5' 5\")   Wt 93.3 kg (205 lb 11 oz)   LMP 03/08/2008   SpO2 93%   BMI 34.23 kg/m      Physical Exam:   Orientation:  Alert, oriented, and in no acute distress: Yes  Sedation: No    Motor Examination:  5/5 Strength in lower extremities: Yes, sensation in tact in bilateral lower extremities, moves BLE equally and symmetrically.    Sensory Level:   Decrease in sensation: No    Catheter Site:   Catheter entry site is clean/dry/intact: Yes    Tender: No      Relevant Medications:  Current Pain Medications:  Medications related to Pain Management (From now, onward)      Start     Dose/Rate Route Frequency Ordered Stop    09/18/23 1712  ibuprofen (ADVIL/MOTRIN) tablet 400 mg         400 mg Oral EVERY 6 HOURS PRN 09/18/23 1712      09/18/23 1530  HYDROmorphone (DILAUDID) 6 mcg/mL, BUPivacaine (MARCAINE) 0.0625 % in sodium chloride 0.9 % 250 mL EPIDURAL PCEA (patient controlled epidural)          EPIDURAL PCEA 09/18/23 1515      09/18/23 1452  simethicone (MYLICON) chewable tablet 80 mg        Note to Pharmacy: PTA Sig:Take 125 mg by mouth 4 times daily as needed for intestinal gas      80 mg Oral EVERY 4 HOURS PRN 09/18/23 1428      09/18/23 1451  loratadine (CLARITIN) tablet 10 mg        Note to Pharmacy: PTA Sig:Take 10 mg by mouth daily      10 mg Oral DAILY PRN 09/18/23 1428      09/18/23 1427  acetaminophen (TYLENOL) tablet 500-1,000 mg        Note to Pharmacy: PTA Sig:Take 500-1,000 mg by mouth every 4 hours as needed for mild pain      500-1,000 mg Oral EVERY 6 HOURS PRN 09/18/23 1428      09/18/23 0744  nalbuphine (NUBAIN) injection 2.5-5 mg         2.5-5 mg Intravenous EVERY 6 HOURS PRN 09/18/23 0749   " "           Primary Service Contacted with Recommendations? Yes            Acute Inpatient Pain Service [facility]  Hours of pain coverage 24/7   Page via Amcom- Please Page the Pain Team [facility]: \"PAIN MANAGEMENT ACUTE INPATIENT/ [facility]\"    Darcy Rose MD  Professor  Department of Ob/Gyn and Women's Health  Division of Gynecologic Oncology  [facility]  [phone]    "

## 2023-09-20 NOTE — PLAN OF CARE
Pt A/Ox4. VSS on 1LNC, desats while sleeping. On capno. Regular diet, tolerating well. Pain controlled with PCEA epidural (dilaudid/bupivacaine). Bedrest, HOB no greater than 15 degrees. Interstitial needles in place. Thomas with adequate output, scant wisps of blood, provider notified and stated this is chronic and notify if worsens. Small BM, elizabeth area cleaned per orders. Anesthesia toxicity assessment Q4H, no concerns. Baseline tinnitis, numbness, and tingling. Enteric precautions maintained for C.diff. Triggered sepsis this afternoon, lactic back at 1.4. Pt aware of NPO beginning tomorrow morning at 8am.

## 2023-09-20 NOTE — PROGRESS NOTES
Pain Service Progress Note  [facility]  Date: 09/20/2023       Patient Name: Jessie Tamayo  MRN: [id number]  Age: 67 year old  Sex: female      Assessment:  67 year old female with stage IVB SCC of the vagina, c.diff positive, HTN, HLD, treatment induced pancytopenia, chronic indwelling gonsalez, lichen sclerosus, and actinic keratosis admitted for interstitial needle placement for HDR. Patient completed EBRT with concomitant weekly cisplatin and here for HDR. Patient stable and pain controlled with lumbar epidural. Initial concern for low platelets, level is increasing and appropriate.      Procedure: interstitial needle placement for HDR     Date of Surgery: 9/18/23     Date of Catheter Placement: 9/18/23     Plan/Recommendations:  1. Regional Anesthesia/Analgesia  -Continuous Catheter Type/Site:L2-3 Lumbar epidural   Infusate: Dilaudid 6mcg/ml, bupivicaine 0.0625% epidiural PCEA  Continuous Infusion at 10 mL/hr  PCEA dose 7 mL     Plan for catheter removal 9/20 after removal of brachytherapy needles.     Plan to maintain catheter, max of 7 days     2. Anticoagulation  -Please contact Inpatient Pain Service before ordering or making any anticoagulation changes.    Please obtain platelet level 9/20, in preparation for removal of epidural catheter.       3. Multimodal Analgesia  - per primary team   - Patient requested referral to chronic pain clinic, please place referral at the time of discharge.     Pain Service will continue to follow. - until catheter removal.     Discussed with attending anesthesiologist    Karena Savage MD  09/20/2023     Overnight Events: No acute events overnight.     Tubes/Drains: No    Subjective:  Needed one little bump, but otherwise my pain is gone.    Nausea: No  Vomiting: No  Pruritus: No  Symptoms of LAST: No     Pain Location:  None     Pain Intensity:    Pain at Rest: 0/10   Pain with Activity: 0/10  Comfort Goal: 0/10   Baseline Pain: 0/10   Satisfied  "with your level of pain control: Yes    Diet: NPO per Anesthesia Guidelines for Procedure/Surgery Except for: Meds    Relevant Labs:  Recent Labs   Lab Test 09/19/23  0700   *   BUN 11.3       Physical Exam:  Vitals: BP (!) 140/60 (BP Location: Right arm)   Pulse 94   Temp 36.9  C (98.4  F) (Oral)   Resp 17   Ht 1.651 m (5' 5\")   Wt 93.3 kg (205 lb 11 oz)   LMP 03/08/2008   SpO2 93%   BMI 34.23 kg/m      Physical Exam:   Orientation:  Alert, oriented, and in no acute distress: Yes  Sedation: No    Motor Examination:  5/5 Strength in lower extremities: Yes, sensation in tact in bilateral lower extremities, moves BLE equally and symmetrically.    Sensory Level:   Decrease in sensation: No    Catheter Site:   Catheter entry site is clean/dry/intact: Yes    Tender: No      Relevant Medications:  Current Pain Medications:  Medications related to Pain Management (From now, onward)      Start     Dose/Rate Route Frequency Ordered Stop    09/18/23 1712  ibuprofen (ADVIL/MOTRIN) tablet 400 mg         400 mg Oral EVERY 6 HOURS PRN 09/18/23 1712      09/18/23 1530  HYDROmorphone (DILAUDID) 6 mcg/mL, BUPivacaine (MARCAINE) 0.0625 % in sodium chloride 0.9 % 250 mL EPIDURAL PCEA (patient controlled epidural)          EPIDURAL PCEA 09/18/23 1515      09/18/23 1452  simethicone (MYLICON) chewable tablet 80 mg        Note to Pharmacy: PTA Sig:Take 125 mg by mouth 4 times daily as needed for intestinal gas      80 mg Oral EVERY 4 HOURS PRN 09/18/23 1428      09/18/23 1451  loratadine (CLARITIN) tablet 10 mg        Note to Pharmacy: PTA Sig:Take 10 mg by mouth daily      10 mg Oral DAILY PRN 09/18/23 1428      09/18/23 1427  acetaminophen (TYLENOL) tablet 500-1,000 mg        Note to Pharmacy: PTA Sig:Take 500-1,000 mg by mouth every 4 hours as needed for mild pain      500-1,000 mg Oral EVERY 6 HOURS PRN 09/18/23 1428      09/18/23 0744  nalbuphine (NUBAIN) injection 2.5-5 mg         2.5-5 mg Intravenous EVERY 6 HOURS " "PRN 09/18/23 0749              Primary Service Contacted with Recommendations? Yes            Acute Inpatient Pain Service [facility]  Hours of pain coverage 24/7   Page via Amcom- Please Page the Pain Team [facility]: \"PAIN MANAGEMENT ACUTE INPATIENT/ [facility]\"  "

## 2023-09-20 NOTE — PLAN OF CARE
Goal Outcome Evaluation:    POD#2 Interstitial needle placement and HDR brachytherapy    A/Ox4. VSS. On 2L NC. Bedrest with HOB 15 degrees or less. Reg-diet and will be NPO starting 8AM.  Denies nausea. Pain managed with Epidural that is running at 6Ml/hr and site is CDI. Thomas patent. Passing gas not BM on this shift. Interstitial needles intact. PIV SL. Will be going down to get radiation today. Cont POC.

## 2023-09-20 NOTE — PLAN OF CARE
Goal Outcome Evaluation:    Plan of care reviewed with: Patient    AVSS on room air. On bedrest with HOB 15 degrees or less. No nausea. Pain controlled with epidural running at 10 mL an hour, WDL. Thomas patent. BM in morning this shift, perineal and catheter cares completed. Interstitial needles intact. NPO starting at 8 AM this morning. Radiation completed once this morning, at radiation now for second treatment. Will possibly discharge today after removal of interstitial needles scheduled at 1600.  at bedside. Continue with plan of care.

## 2023-09-20 NOTE — OP NOTE
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   OPERATIVE PROCEDURE: The patient was brought to the operating room wearing pneumoboots and identified to be herself. The patient was placed under MAC. Coloplast was removed from her inner thighs.  A total of 6 sutures were removed which held the HDR template and the tandem in place.  The template was removed with the interstitial needles and the vaginal cylinder from her perineal area.  Approximately 10 ml of bleeding occurred.  Compression was applied and bleeding stopped within a minute.  The gonsalez catheter was removed and, using sterile procedure, was replaced with a 16 F Coude catheter. Epidural catheter was then removed by anesthesia.  The patient was taken to PACU in stable condition.  PLAN:   The patient will be discharged per Gyn Onc team.    Follow with Radiation Oncology in 6 weeks.    Marisa Pacheco MD  Radiation Oncology

## 2023-09-20 NOTE — PROGRESS NOTES
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 States that she slept alright. Pain is controlled this morning with epidural.  Eating small amount and drinking well without N/V. Chronic indwelling gonsalez in place (hopeful this can be replaced under anesthesia today), along with rectal tube placed this admission in the setting of recent C Diff.  Cough is stable.    OBJECTIVE:   Patient Vitals for the past 24 hrs:   BP Temp Temp src Pulse Resp SpO2   09/19/23 2221 131/59 98.7  F (37.1  C) Oral 106 17 93 %   09/19/23 1637 133/55 97.8  F (36.6  C) Temporal 101 26 98 %   09/19/23 1609 124/56 -- -- 99 22 92 %   09/19/23 1500 138/57 98.3  F (36.8  C) Oral 99 22 91 %   09/19/23 0643 131/57 97.9  F (36.6  C) Oral 84 14 97 %     Gen- Laying in bed, NAD  CV- Regular rate, well perfused  Pulm- NWOB on room air  Abd- soft, non-distended  Extr- trace edema, SCDs  Lines/drains- epidural PCEA, PIV, gonsalez, rectal tube    I/Os  (Yesterday // Since Midnight)  PO: 160 mL // NR  Urine: 1675 mL // 250 mL  Stool:  1x  //  NR    New Labs/Imaging-   Results for orders placed or performed during the hospital encounter of 09/18/23 (from the past 24 hour(s))   CBC with Platelets & Differential    Narrative    The following orders were created for panel order CBC with Platelets & Differential.  Procedure                               Abnormality         Status                     ---------                               -----------         ------                     CBC with platelets and d...[[id number]]  Abnormal            Final result                 Please view results for these tests on the individual orders.   Basic  metabolic panel   Result Value Ref Range    Sodium 135 (L) 136 - 145 mmol/L    Potassium 4.5 3.4 - 5.3 mmol/L    Chloride 98 98 - 107 mmol/L    Carbon Dioxide (CO2) 29 22 - 29 mmol/L    Anion Gap 8 7 - 15 mmol/L    Urea Nitrogen 11.3 8.0 - 23.0 mg/dL    Creatinine 0.77 0.51 - 0.95 mg/dL    Calcium 9.0 8.8 - 10.2 mg/dL    Glucose 109 (H) 70 - 99 mg/dL    GFR Estimate 84 >60 mL/min/1.73m2   Magnesium   Result Value Ref Range    Magnesium 1.8 1.7 - 2.3 mg/dL   CBC with platelets and differential   Result Value Ref Range    WBC Count 2.0 (L) 4.0 - 11.0 10e3/uL    RBC Count 2.46 (L) 3.80 - 5.20 10e6/uL    Hemoglobin 7.6 (L) 11.7 - 15.7 g/dL    Hematocrit 24.0 (L) 35.0 - 47.0 %    MCV 98 78 - 100 fL    MCH 30.9 26.5 - 33.0 pg    MCHC 31.7 31.5 - 36.5 g/dL    RDW 17.4 (H) 10.0 - 15.0 %    Platelet Count 141 (L) 150 - 450 10e3/uL    % Neutrophils 53 %    % Lymphocytes 26 %    % Monocytes 16 %    % Eosinophils 0 %    % Basophils 1 %    % Immature Granulocytes 4 %    NRBCs per 100 WBC 0 <1 /100    Absolute Neutrophils 1.1 (L) 1.6 - 8.3 10e3/uL    Absolute Lymphocytes 0.5 (L) 0.8 - 5.3 10e3/uL    Absolute Monocytes 0.3 0.0 - 1.3 10e3/uL    Absolute Eosinophils 0.0 0.0 - 0.7 10e3/uL    Absolute Basophils 0.0 0.0 - 0.2 10e3/uL    Absolute Immature Granulocytes 0.1 <=0.4 10e3/uL    Absolute NRBCs 0.0 10e3/uL   Lactic Acid STAT   Result Value Ref Range    Lactic Acid 1.4 0.7 - 2.0 mmol/L         ASSESSMENT:   67 year old female with stage IVB SCC of the vagina, c.diff positive, HTN, HLD, treatment induced pancytopenia, chronic indwelling gonsalez, lichen sclerosus, and actinic keratosis admitted for interstitial needle placement for HDR. Patient completed EBRT with concomitant weekly cisplatin and here for HDR. Patient stable and pain controlled. Plan to proceed with final radiation treatments today, followed by needle and epidural removal.    Problem list:    - Stage IVB SCC of the vagina  - Treatment induced pancytopenia  - Urinary  retention with indwelling gonsalez  - Recent catheter associated UTI  - Recent C Diff colitis  - HTN  - HLD  - Osteopenia  - Lichen Sclerosus   - Actinic keratosis    PLAN:    # Stage IVB SCC of the vagina  # Treatment induced pancytopenia  - Interstitial needles in place, plan to complete total of 5 radiation treatments prior to removal and discharge  - FEN: Regular diet  - Pain: Prn Tylenol, Ibuprofen. Epidural PCEA (dilaudid + bupivacaine), plan no additional narcotics.  - Heme: Hgb 9.1> EBL 10> AM CBC pending. Hx of thrombocytopenia, Plts 102> AM CBC pending.  - ID: S/p Ancef pre-op.    # Urinary retention with indwelling gonsalez  - Discussed with Radiation Oncology and/or Urology teams possibility of exchange versus removal of gonsalez catheter prior to discharge   - Plan to exchange inpatient and follow-up in Urology clinic for removal and TOV  - PTA pyridium TID prn for associated bladder spasms    # Recent C Diff colitis  - Continue 14-day course of PO Vancomycin TID (last day 9/23)    # HTN  - PTA amlodipine    # Inpatient status  - Ppx: SCDs, IS, Lovenox ppx x1 9/19    Consults:   - Anesthesia / Pain Team  - Radiation Oncology    Disposition: discharge after radiation treatments complete, Insterstitial needles and epidural removed 9/20    Discussed with Dr. Kat and Dr. Rose.    Sun Levin MD, PGY-3  5:49 AM  [facility] Obstetrics & Gynecology Residency  Gyn Onc Pager: [phone]        I saw and evaluated the patient with the resident.  I edited and reviewed the above note. I have reviewed all pertinent imaging and labs on this patient.  Plan home tonight or tomorrow or tomorrow following needle removal.    Darcy Rose MD  Professor  Department of Ob/Gyn and Women's Health  Division of Gynecologic Oncology  [facility]  [phone]

## 2023-09-20 NOTE — LETTER
9/20/2023         RE: Jessie Tamayo  [address] [address]        Dear Colleague,    Thank you for referring your patient, Jessie Tamayo, to the [facility] RADIATION ONCOLOGY. Please see a copy of my visit note below.    A radiation therapy treatment planning simulation was performed.  HDR brachytherapy Michelet interstitial fraction 5 of 5 was delivered.  Please see the [facility] record for documentation.    Marisa Pacheco MD  Radiation Oncology       Again, thank you for allowing me to participate in the care of your patient.        Sincerely,        Marisa Pacheco MD

## 2023-09-20 NOTE — PROGRESS NOTES
A radiation therapy treatment planning simulation was performed.  HDR brachytherapy Michelet interstitial fraction 5 of 5 was delivered.  Please see the [facility] record for documentation.    Marisa Pacheco MD  Radiation Oncology

## 2023-09-20 NOTE — ANESTHESIA POSTPROCEDURE EVALUATION
Patient: Jessie Tamayo    Procedure: Procedure(s):  REMOVAL, INTERSTITIAL NEEDLE, AFTER TEMPORARY BRACHYTHERAPY       Anesthesia Type:  MAC    Note:  Disposition: Inpatient   Postop Pain Control: Uneventful            Sign Out: Well controlled pain   PONV: No   Neuro/Psych: Uneventful            Sign Out: Acceptable/Baseline neuro status   Airway/Respiratory: Uneventful            Sign Out: Acceptable/Baseline resp. status   CV/Hemodynamics: Uneventful            Sign Out: Acceptable CV status; No obvious hypovolemia; No obvious fluid overload   Other NRE: NONE   DID A NON-ROUTINE EVENT OCCUR? No           Last vitals:  Vitals Value Taken Time   /72 09/20/23 1639   Temp     Pulse 102 09/20/23 1637   Resp 16 09/20/23 1639   SpO2 93 % 09/20/23 1640   Vitals shown include unvalidated device data.    Electronically Signed By: Jag Gonzalez MD  September 20, 2023  6:10 PM

## 2023-09-20 NOTE — LETTER
9/20/2023         RE: Jessie Tamayo  [address] [facility] [address]        Dear Colleague,    Thank you for referring your patient, Jessie Tamayo, to the [facility] RADIATION ONCOLOGY. Please see a copy of my visit note below.    A radiation therapy treatment planning simulation was performed.  HDR brachytherapy Michelet interstitial fraction 4 of 5 was delivered.  Please see the [facility] record for documentation.    Marisa Pacheco MD  Radiation Oncology       Again, thank you for allowing me to participate in the care of your patient.        Sincerely,        Marisa Pacheco MD

## 2023-09-21 VITALS
TEMPERATURE: 97.9 F | BODY MASS INDEX: 34.27 KG/M2 | OXYGEN SATURATION: 96 % | SYSTOLIC BLOOD PRESSURE: 148 MMHG | WEIGHT: 205.69 LBS | HEIGHT: 65 IN | HEART RATE: 108 BPM | DIASTOLIC BLOOD PRESSURE: 80 MMHG | RESPIRATION RATE: 16 BRPM

## 2023-09-21 LAB
ANION GAP SERPL CALCULATED.3IONS-SCNC: 3 MMOL/L (ref 7–15)
BASOPHILS # BLD MANUAL: 0 10E3/UL (ref 0–0.2)
BASOPHILS NFR BLD MANUAL: 1 %
BUN SERPL-MCNC: 15.4 MG/DL (ref 8–23)
CALCIUM SERPL-MCNC: 9.4 MG/DL (ref 8.8–10.2)
CHLORIDE SERPL-SCNC: 105 MMOL/L (ref 98–107)
CREAT SERPL-MCNC: 1 MG/DL (ref 0.51–0.95)
DEPRECATED HCO3 PLAS-SCNC: 27 MMOL/L (ref 22–29)
EGFRCR SERPLBLD CKD-EPI 2021: 61 ML/MIN/1.73M2
EOSINOPHIL # BLD MANUAL: 0 10E3/UL (ref 0–0.7)
EOSINOPHIL NFR BLD MANUAL: 1 %
ERYTHROCYTE [DISTWIDTH] IN BLOOD BY AUTOMATED COUNT: 18 % (ref 10–15)
GLUCOSE SERPL-MCNC: 109 MG/DL (ref 70–99)
HCT VFR BLD AUTO: 26.3 % (ref 35–47)
HGB BLD-MCNC: 8.4 G/DL (ref 11.7–15.7)
LACTATE SERPL-SCNC: 1.4 MMOL/L (ref 0.7–2)
LYMPHOCYTES # BLD MANUAL: 0.6 10E3/UL (ref 0.8–5.3)
LYMPHOCYTES NFR BLD MANUAL: 30 %
MAGNESIUM SERPL-MCNC: 1.7 MG/DL (ref 1.7–2.3)
MCH RBC QN AUTO: 30.5 PG (ref 26.5–33)
MCHC RBC AUTO-ENTMCNC: 31.9 G/DL (ref 31.5–36.5)
MCV RBC AUTO: 96 FL (ref 78–100)
METAMYELOCYTES # BLD MANUAL: 0.1 10E3/UL
METAMYELOCYTES NFR BLD MANUAL: 3 %
MONOCYTES # BLD MANUAL: 0.3 10E3/UL (ref 0–1.3)
MONOCYTES NFR BLD MANUAL: 13 %
NEUTROPHILS # BLD MANUAL: 1.1 10E3/UL (ref 1.6–8.3)
NEUTROPHILS NFR BLD MANUAL: 52 %
PLAT MORPH BLD: ABNORMAL
PLATELET # BLD AUTO: 185 10E3/UL (ref 150–450)
POTASSIUM SERPL-SCNC: 4 MMOL/L (ref 3.4–5.3)
RBC # BLD AUTO: 2.75 10E6/UL (ref 3.8–5.2)
RBC MORPH BLD: ABNORMAL
SODIUM SERPL-SCNC: 135 MMOL/L (ref 136–145)
WBC # BLD AUTO: 2.1 10E3/UL (ref 4–11)

## 2023-09-21 PROCEDURE — 85014 HEMATOCRIT: CPT

## 2023-09-21 PROCEDURE — 83735 ASSAY OF MAGNESIUM: CPT | Performed by: RADIOLOGY

## 2023-09-21 PROCEDURE — 85007 BL SMEAR W/DIFF WBC COUNT: CPT

## 2023-09-21 PROCEDURE — 36415 COLL VENOUS BLD VENIPUNCTURE: CPT

## 2023-09-21 PROCEDURE — 36415 COLL VENOUS BLD VENIPUNCTURE: CPT | Performed by: RADIOLOGY

## 2023-09-21 PROCEDURE — 250N000013 HC RX MED GY IP 250 OP 250 PS 637

## 2023-09-21 PROCEDURE — 83605 ASSAY OF LACTIC ACID: CPT | Performed by: RADIOLOGY

## 2023-09-21 PROCEDURE — 250N000013 HC RX MED GY IP 250 OP 250 PS 637: Performed by: NURSE PRACTITIONER

## 2023-09-21 PROCEDURE — 80048 BASIC METABOLIC PNL TOTAL CA: CPT

## 2023-09-21 RX ORDER — PHENOBARBITAL, HYOSCYAMINE SULFATE, ATROPINE SULFATE, SCOPOLAMINE HYDROBROMIDE .0194; .1037; 16.2; .0065 MG/5ML; MG/5ML; MG/5ML; MG/5ML
2 ELIXIR ORAL EVERY 6 HOURS
Status: CANCELLED | OUTPATIENT
Start: 2023-09-21

## 2023-09-21 RX ORDER — OXYCODONE HYDROCHLORIDE 5 MG/1
5 TABLET ORAL EVERY 6 HOURS PRN
Qty: 15 TABLET | Refills: 0 | Status: SHIPPED | OUTPATIENT
Start: 2023-09-21 | End: 2023-09-21

## 2023-09-21 RX ORDER — OXYCODONE HYDROCHLORIDE 5 MG/1
5 TABLET ORAL
Qty: 15 TABLET | Refills: 0 | Status: SHIPPED | OUTPATIENT
Start: 2023-09-21 | End: 2023-09-25

## 2023-09-21 RX ADMIN — OXYCODONE HYDROCHLORIDE 10 MG: 10 TABLET ORAL at 12:40

## 2023-09-21 RX ADMIN — IBUPROFEN 400 MG: 200 TABLET, FILM COATED ORAL at 00:24

## 2023-09-21 RX ADMIN — IBUPROFEN 400 MG: 200 TABLET, FILM COATED ORAL at 12:40

## 2023-09-21 RX ADMIN — ACETAMINOPHEN 1000 MG: 500 TABLET ORAL at 06:32

## 2023-09-21 RX ADMIN — OXYCODONE HYDROCHLORIDE 10 MG: 10 TABLET ORAL at 03:53

## 2023-09-21 RX ADMIN — TOLTERODINE TARTRATE 2 MG: 2 TABLET, FILM COATED ORAL at 08:29

## 2023-09-21 RX ADMIN — VANCOMYCIN HYDROCHLORIDE 125 MG: 125 CAPSULE ORAL at 12:40

## 2023-09-21 RX ADMIN — VANCOMYCIN HYDROCHLORIDE 125 MG: 125 CAPSULE ORAL at 08:29

## 2023-09-21 RX ADMIN — CALCIUM CARBONATE (ANTACID) CHEW TAB 500 MG 500 MG: 500 CHEW TAB at 08:30

## 2023-09-21 RX ADMIN — IBUPROFEN 400 MG: 200 TABLET, FILM COATED ORAL at 06:32

## 2023-09-21 RX ADMIN — OXYCODONE HYDROCHLORIDE 10 MG: 10 TABLET ORAL at 08:29

## 2023-09-21 RX ADMIN — PHENAZOPYRIDINE HYDROCHLORIDE 200 MG: 100 TABLET, FILM COATED ORAL at 10:13

## 2023-09-21 RX ADMIN — ACETAMINOPHEN 1000 MG: 500 TABLET ORAL at 00:24

## 2023-09-21 RX ADMIN — AMLODIPINE BESYLATE 5 MG: 5 TABLET ORAL at 08:29

## 2023-09-21 RX ADMIN — SIMETHICONE 80 MG: 80 TABLET, CHEWABLE ORAL at 08:29

## 2023-09-21 ASSESSMENT — ACTIVITIES OF DAILY LIVING (ADL)
ADLS_ACUITY_SCORE: 31

## 2023-09-21 NOTE — PLAN OF CARE
"Pt A/Ox4. VSS on RA. Enteric precautions for C.diff. Up SBA. Regular diet, tolerating well. Interstitial needles removed today after final radiation. Pain from procedure well controlled. Pt had bladder spasms prior to admission which have returned with epidural removal; reports 10/10 \"contraction-like\" pain which is controlled with PRN oxycodone Q4H. Also received PRN tylenol, PRN ibuprofen, and PRN pyridium. Thomas in place due to retention issues. Previous epidural site covered in bandaid. Several bowel movements today. L PIV SL.  "

## 2023-09-21 NOTE — PROGRESS NOTES
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 States that she slept quite well. Pain is controlled this morning, experiencing bladder spasms per baseline with chronic indwelling gonsalez in place. Glad she stayed in the hospital last night, but feeling ready for discharge today.    OBJECTIVE:   Patient Vitals for the past 24 hrs:   BP Temp Temp src Pulse Resp SpO2   09/20/23 2302 (!) 142/64 97.7  F (36.5  C) Oral 88 18 93 %   09/20/23 1700 (!) 144/54 98.8  F (37.1  C) Axillary 101 16 92 %   09/20/23 1639 133/72 -- -- -- 16 94 %   09/20/23 1550 137/71 -- -- 107 -- 92 %   09/20/23 1530 -- 99  F (37.2  C) Oral -- -- --     Gen- Laying in bed, NAD  CV- Regular rate, well perfused  Pulm- NWOB on room air  Abd- soft, non-distended  Extr- trace edema, SCDs  Lines/drains- PIV, gonsalez    I/Os  (Yesterday // Since Midnight)  PO: 50 mL // NR  IV: 400 mL // NR  Urine: 1655 mL // 500 mL  Stool:  3x  //  NR    New Labs/Imaging-   Results for orders placed or performed during the hospital encounter of 09/18/23 (from the past 24 hour(s))   Magnesium   Result Value Ref Range    Magnesium 1.6 (L) 1.7 - 2.3 mg/dL   Potassium   Result Value Ref Range    Potassium 4.4 3.4 - 5.3 mmol/L   Extra Tube ([place] Draw)    Narrative    The following orders were created for panel order Extra Tube ([place] Draw).  Procedure                               Abnormality         Status                     ---------                               -----------         ------                     Extra Purple Top Tube[[id number]]                            Final result                 Please view results for these tests on the individual orders.   Extra  Purple Top Tube   Result Value Ref Range    Hold Specimen JIC    CBC with platelets   Result Value Ref Range    WBC Count 1.7 (L) 4.0 - 11.0 10e3/uL    RBC Count 2.52 (L) 3.80 - 5.20 10e6/uL    Hemoglobin 7.8 (L) 11.7 - 15.7 g/dL    Hematocrit 24.4 (L) 35.0 - 47.0 %    MCV 97 78 - 100 fL    MCH 31.0 26.5 - 33.0 pg    MCHC 32.0 31.5 - 36.5 g/dL    RDW 17.6 (H) 10.0 - 15.0 %    Platelet Count 165 150 - 450 10e3/uL   Extra Tube    Narrative    The following orders were created for panel order Extra Tube.  Procedure                               Abnormality         Status                     ---------                               -----------         ------                     Extra Green Top (Lithium...[[id number]]                      Final result                 Please view results for these tests on the individual orders.   Extra Green Top (Lithium Heparin) Tube   Result Value Ref Range    Hold Specimen JIC    Glucose by meter   Result Value Ref Range    GLUCOSE BY METER POCT 95 70 - 99 mg/dL         ASSESSMENT:   67 year old female with stage IVB SCC of the vagina, c.diff positive, HTN, HLD, treatment induced pancytopenia, chronic indwelling gonsalez, lichen sclerosus, and actinic keratosis admitted for interstitial needle placement for HDR. Patient completed EBRT with concomitant weekly cisplatin and here for HDR. Patient stable and pain controlled. Inpatient treatment completed, plan discharge today.    Problem list:    - Stage IVB SCC of the vagina  - Pancytopenia 2/2 chemo and radiation  - Urinary retention with indwelling gonsalez  - Bladder spasms  - Recent catheter associated UTI  - Recent C Diff colitis  - HTN  - HLD  - Osteopenia  - Lichen Sclerosus   - Actinic keratosis    PLAN:    # Stage IVB SCC of the vagina  # Pancytopenia 2/2 chemo and radiation  - Interstitial needles now removed after total of 5 radiation treatments  - FEN: Regular diet  - Pain: Prn Tylenol, Ibuprofen, Oxycodone. S/p Epidural PCEA.  -  Heme: Hgb 9.1> EBL 10> 7.9> 7.6> 7.8> AM CBC pending. Hx of thrombocytopenia, Plts 102> 141> AM CBC pending.  - ID: S/p Ancef pre-op.    # Urinary retention with indwelling gonsalez  # Bladder spasms  - Discussed with Radiation Oncology and/or Urology teams, exchanged inpatient and patient will follow-up in Urology clinic for removal and TOV  - PTA detrol, pyridium TID prn for associated bladder spasms  - Plan outpatient follow-up with Palliative Care for long-term management of this and other cancer and treatment-related sources of discomfort    # Recent C Diff colitis  - Continue 14-day course of PO Vancomycin TID (last day 9/23)    # HTN  - PTA amlodipine    # Inpatient status  - Ppx: SCDs, IS, Lovenox ppx x1 9/19    Consults:   - Anesthesia / Pain Team  - Radiation Oncology    Disposition: discharge home today    Discussed with Dr. Kat and Dr. Rose.    Sun Levin MD, PGY-3  6:14 AM  [facility] Obstetrics & Gynecology Residency  Gyn Onc Pager: [phone]      I saw and evaluated the patient with the resident.  I edited and reviewed the above note. I have reviewed all pertinent imaging and labs on this patient.    Darcy Rose MD  Professor  Department of Ob/Gyn and Women's Health  Division of Gynecologic Oncology  [facility]  [phone]

## 2023-09-21 NOTE — PROVIDER NOTIFICATION
"Pgd Gyn/Onc at 1306    \"5A 5-202 M.M.    Pt ready for discharge & meds ready in pharmacy. Are we able to discharge pt? Thanks    Lily [phone]\"    Outcome: Orders placed    " No changes or discrepancies in medication or quantity.     Pharmacy set up and verified.

## 2023-09-21 NOTE — PLAN OF CARE
Assumed nursing care from [id number]. Patient alert, oriented, and up with stand-by assistance. Hypertensive and tachycardic this morning (see provider notification). Pain marginally managed with PRN Tylenol, ibuprofen, and oxycodone. Tolerating regular diet; denies nausea. Thomas catheter patent with adequate urine output; +BM today. Epidural catheter site covered with adhesive bandage; no drainage noted. L PIV saline locked. Likely discharge today pending adequate pain management.

## 2023-09-21 NOTE — PROGRESS NOTES
"Brief Anesthesia Progress Note:    Platelets stable, anticoagulation appropriate for epidural removal. Patient's epidural was removed at the end of the scheduled OR time. Tip intact. Site c/d/I. All questions answered. Seen on floor as well without any new concerns.     Pain Service will sign-off. Please contact us if there is any concerns.      Discussed with attending anesthesiologist     Acute Inpatient Pain Service [facility]  Hours of pain coverage 24/7   Page via [employer]- Please Page the Pain Team Via Am[employer]: \"PAIN MANAGEMENT ACUTE INPATIENT/ [facility]\"    Ander Collado MD  09/20/2023   "

## 2023-09-21 NOTE — PLAN OF CARE
Goal Outcome Evaluation:    Hypertensive, recheck within parameters. Intermittently tachycardic, OVSS, afebrile and on RA. A&Ox4. Denies nausea & SOB. Rated max 9/10 vaginal pain & bladders spasms. Gave 10 mg Oxy x2, Ibuprofen x1, pyridium x1 with some effectiveness. Spasms happen in bursts, therefore difficult to manage pain. Passing gas, no stools during shift. Adequate UOP via gonsalez catheter. Pt adequate for discharge.     Nursing Focus: Discharge    D: Patient discharged to home at 1500. Patient left with personal belongings and accompanied by spouse.    I: Discharge prescriptions sent to discharge pharmacy to be filled. All discharge medications and instructions reviewed with patient and spouse. Patient instructed to call clinic triage nurse if she experiences a fever 101.0, uncontrolled nausea, vomiting, diarrhea, or pain; or experiences any signs or symptoms of bleeding. Other phone numbers to call with questions or concerns after discharge reviewed. PIV removed. Education completed.    A: Pt verbalized understanding of discharge medications and instructions. Patient's spouse picked up discharge meds in discharge pharmacy.    P: Patient to follow-up with virtual visit on Sep. 27th with Dr. Mackenzie.

## 2023-09-22 ENCOUNTER — NURSE TRIAGE (OUTPATIENT)
Dept: ONCOLOGY | Facility: CLINIC | Age: 68
End: 2023-09-22
Payer: COMMERCIAL

## 2023-09-22 ENCOUNTER — PATIENT OUTREACH (OUTPATIENT)
Dept: CARE COORDINATION | Facility: CLINIC | Age: 68
End: 2023-09-22
Payer: COMMERCIAL

## 2023-09-22 NOTE — PROGRESS NOTES
Reviewed note and plan below. Agree with recommendations.    Reina Stahl MD, MS, FACOG, FACS  9/22/2023  1:11 PM

## 2023-09-22 NOTE — LETTER
[facility] CARE COORDINATION  [address] [facility] [address]   September 22, 2023    Jessie Tamayo  [address] [place] [id number]      Dear Jessie,        I am a clinic care coordinator who works with Alba Layton MD with the [facility]. I have been trying to reach you recently to introduce Clinic Care Coordination. Below is a description of clinic care coordination and how I can further assist you.       The clinic care coordination team is made up of a registered nurse, , financial resource worker and community health worker who understand the health care system. The goal of clinic care coordination is to help you manage your health and improve access to the health care system. Our team works alongside your provider to assist you in determining your health and social needs. We can help you obtain health care and community resources, providing you with necessary information and education. We can work with you through any barriers and develop a care plan that helps coordinate and strengthen the communication between you and your care team.  Our services are voluntary and are offered without charge to you personally.    Please feel free to contact me with any questions or concerns regarding care coordination and what we can offer.      We are focused on providing you with the highest-quality healthcare experience possible.    Sincerely,     Safia Bolden, [facility] Clinical Care Coordinator  [facility], [facility][note 3], [facility], and [facility]   [phone]

## 2023-09-22 NOTE — PROGRESS NOTES
Clinic Care Coordination Contact  [facility]/Voicemail       Clinical Data: Care Coordinator Outreach  Outreach attempted x 1.  Left message on patient's voicemail with call back information and requested return call.  Plan: Care Coordinator sent care coordination introduction letter on 9/22/23 via "[employer]. Care Coordinator will try to reach patient again in 1-2 business days.

## 2023-09-22 NOTE — TELEPHONE ENCOUNTER
Pt calling regarding prescription for vancomycin. Pt was prescribed vancomycin for C.Diff. Pt states she lost track of how many days she was suppose to take medication since she has been admitted to the hospital a couple of times since the dx. She said it is also confusing because the hospital would provide the abx so now she has 16 tablets left at home. Reviewed with pt that she started her abx on 09/13 and it was prescribed for 4 times a day for a total of 38 doses. Informed pt she would be done with abx tomorrow. Pt verbalized understanding and grateful for help.

## 2023-09-25 ENCOUNTER — PATIENT OUTREACH (OUTPATIENT)
Dept: CARE COORDINATION | Facility: CLINIC | Age: 68
End: 2023-09-25
Payer: COMMERCIAL

## 2023-09-25 DIAGNOSIS — C44.92 SCC (SQUAMOUS CELL CARCINOMA): ICD-10-CM

## 2023-09-25 RX ORDER — OXYCODONE HYDROCHLORIDE 5 MG/1
5-10 TABLET ORAL EVERY 6 HOURS PRN
Qty: 24 TABLET | Refills: 0 | Status: SHIPPED | OUTPATIENT
Start: 2023-09-25 | End: 2023-09-26

## 2023-09-25 NOTE — PROGRESS NOTES
Clinic Care Coordination Contact  [facility]: Post-Discharge Note  SITUATION                                                      Admission:    Admission Date: 09/18/23   Reason for Admission: Stage IVB SCC of the vagina  - Urinary obstruction with indwelling gonsalez  - Pancytopenia 2/2 chemo and radiation  - HLD  - Osteopenia  - Lichen Sclerosus   - Actinic keratosis  - Recent C. Diff colitis, on antibiotics   - HTN  Discharge:   Discharge Date: 09/21/23  Discharge Diagnosis: Stage IVB SCC of the vagina  - Urinary obstruction with indwelling gonsalez; gonsalez replaced 09/20  - Pancytopenia 2/2 chemo and radiation, stable  - HLD; stable  - Osteopenia, chronic  - Lichen Sclerosus, chronic   - Actinic keratosis, chronic  - Recent C. Diff colitis, on antibiotics   - HTN; stable    BACKGROUND                                                      Per hospital discharge summary and inpatient provider notes:  From H&P: 67 year old female with stage IVB SCC of the vagina, c.diff positive on antibiotics, HTN, HLD, treatment induced pancytopenia, chronic indwelling gonsalez, lichen sclerosus, and actinic keratosis admitted for interstitial needle placement for HDR. Patient completed EBRT with concomitant weekly cisplatin and here for HDR.      Hospital Course:  Dz:   - Stage IVB SCC of the vagina, completed weekly cisplatin with EBRT radiation. Presented for HDR with interstitial needles, and completed without difficulty.  She will follow-up in clinic for a care plan.  FEN:   - She was maintained on a regular diet throughout her admission. At the time of her discharge, she was tolerating PO without nausea or vomiting.   Pain:   - Her pain was initially controlled with an epidural. Once interstitial needles were removed her epidural was removed. She was transitioned to her home PO pain regimen for chronic pelvic/bladder spasm pain.  Her pain was well controlled on this and she was discharged home with these medications. Plan for  outpatient follow-up with Palliative Care for long-term management of this and other cancer and treatment-related sources of discomfort   CV:   - She has a history of HTN and HLD. She was continued on her home amlodipine and does not take meds for hx of HLD.  Her vital signs were stable while in house with the exception of asymptomatic HTN and she had no acute CV issues.  PULM:   - She has no history of pulmonary issues. She had no acute pulmonary issues while in house. She was maintaining her oxygen saturation on room air at the time of discharge.   HEME:   - She had an appropriate drop in her hemoglobin after surgery and it was stable at the time of discharge. Her platelets were monitored for history of treatment- induced thrombocytopenia and were up-trending and within normal range - plt 185 at time of discharge. She had no acute heme issues while in house.  GI:   - She was made NPO prior to the procedure.  On POD#0, her diet was advanced slowly as tolerated.  At the time of discharge, she was tolerating a regular diet without nausea and vomiting.  She was passing flatus prior to discharge. She will be discharged with a bowel regimen to prevent constipation. She continued her antibiotic course for her history of recent C. Diff colitis. She had no acute GI issues while in house.   :    - Patient has a chronic indwelling gonsalez cath that was last exchanged on 9/4/23 in the ED. It was exchanged on 9/20/23 while inpatient . She follows with urology and will see them outpatient for management.  ID:   - History of treatment induced leukopenia/neutropenia that remained stable. The patient was AF during her hospitalization. Recently diagnosed with cdiff and continued on PO Vanco.   ENDO:   - No issues  PSYCH/NEURO:   - No issues   PPX:    - She was given SCDs, IS, and lovenox during her hospital course.  She tolerated these prophylactic interventions without incident.  They were discontinued at the time of her  discharge.    ASSESSMENT           Discharge Assessment  How are you doing now that you are home?: Spoke with both Jessie and spouse.  She stated that she is feeling miserable.  In horrible pain.  Did not rate 1/10.  Stating her diarrhea is gone.  However the cancer pain is horrendous.  Offered for patient to return to the hospital/ED and she declined stating that the hospital does not understand how to manage pain correctly.  She has an appointment tomorrow with Palliative Care.  Currently has Oxycodone 5 mg tabs 3 pills left and Oxycodone 10 mg tab 1 pill left.  They have been adding Tylenol 1000 mg and Ibuprofen unknown dose as well.   has a call to Gyn/Onc today.  Writer will forward high priority note to providers to assist with refills if able.  How are your symptoms? (Red Flag symptoms escalate to triage hotline per guidelines): Other (see note above.)  Do you feel your condition is stable enough to be safe at home until your provider visit?: Yes (declining to return to ED/Hospital.  Not sleeping and not eating well.)  Does the patient have their discharge instructions? : Yes  Does the patient have questions regarding their discharge instructions? : No  Were you started on any new medications or were there changes to any of your previous medications? : Yes  Does the patient have all of their medications?: Yes  Do you have questions regarding any of your medications? : No  Do you have all of your needed medical supplies or equipment (DME)?  (i.e. oxygen tank, CPAP, cane, etc.): Yes  Discharge follow-up appointment scheduled within 14 calendar days? : Yes  Discharge Follow Up Appointment Date: 09/26/23  Discharge Follow Up Appointment Scheduled with?: Specialty Care Provider    Post-op ([facility]A Only)  If the patient had a surgery or procedure, do they have any questions for a nurse?: No    Post-op (Clinicians Only)  Did the patient have surgery or a procedure: Yes  Drainage: No  Fever: No  Chills:  No  Incision site pain: No  PO Intake: regular diet  Additional Symptoms: decreased appetite  Bowel Function: normal  Date of last BM: 09/25/23  Urinary Status: indwelling urinary catheter    Spoke with Jessie and spouse.  See note above regarding significant pain.  Patient declining to return to ED/Hospital.  She has an appointment tomorrow with Palliative Care.  Currently has Oxycodone 5 mg tabs 3 pills left and Oxycodone 10 mg tab 1 pill left.  They have been adding Tylenol 1000 mg and Ibuprofen unknown dose as well.   has a call to Gyn/Onc today.  They are looking for a refill of Oxycodone 5 mg if able today until appointment tomorrow when pain can be better managed.    PLAN                                                      Outpatient Plan:  Will send high priority message to gyn/onc.  Patient to attend 9/26 appointment tomorrow.  Instructed to return to ED/Hospital if pain worsens.    Future Appointments   Date Time Provider Department Center   9/26/2023  8:00 AM Ulysses Gusman MD [facility]   9/27/2023 10:00 AM Lakeisha Mackenzie MD [facility]         For any urgent concerns, please contact our 24 hour nurse triage line: [phone] ([phone])         Julia Smith RN

## 2023-09-26 ENCOUNTER — VIRTUAL VISIT (OUTPATIENT)
Dept: PALLIATIVE CARE | Facility: CLINIC | Age: 68
End: 2023-09-26
Attending: OBSTETRICS & GYNECOLOGY
Payer: COMMERCIAL

## 2023-09-26 DIAGNOSIS — G89.3 NEOPLASM RELATED PAIN: ICD-10-CM

## 2023-09-26 DIAGNOSIS — C51.9 VULVAR CANCER (H): Primary | ICD-10-CM

## 2023-09-26 PROCEDURE — 99205 OFFICE O/P NEW HI 60 MIN: CPT | Mod: VID | Performed by: INTERNAL MEDICINE

## 2023-09-26 RX ORDER — OXYCODONE HYDROCHLORIDE 5 MG/1
5-10 TABLET ORAL EVERY 6 HOURS PRN
Qty: 60 TABLET | Refills: 0 | Status: SHIPPED | OUTPATIENT
Start: 2023-09-28 | End: 2023-10-03

## 2023-09-26 ASSESSMENT — PAIN SCALES - GENERAL: PAINLEVEL: MODERATE PAIN (4)

## 2023-09-26 NOTE — LETTER
"9/26/2023       RE: Jessie Tamayo  [address] [place] [address]     Dear Colleague,    Thank you for referring your patient, Jessie Tamayo, to the [facility] at [facility]. Please see a copy of my visit note below.    Palliative Care Outpatient Clinic      Patient ID:  Medical - She has stage IVB vaginal SCC HPV-associated dx 5/2023, R inguinal LAD at time of dx.   7-9/2023 chemoradiotherapy with cis; HDR brachytherapy; course complicated by UTIs, sepsis, neutropenic fevers, urinary retention/Thomas in July, C diff    Social -  Jesse is her main caregiver. Son & his family in [place].     Care Planning -     Opioid Safety -   No personal MANN hx  Father had AUD  +Naloxone  Discussed opioid safety & intention of opioid tapering off as she recovers from her cancer tx    History:  History gathered today from: patient, family/loved ones, medical chart    She is done with her initial therapy  She sees Dr Mackenzie tomorrow  Reviwed the roles of our program    Referred largely for pain mgt.  Pain--  Mostly intense, \"10/10\" sudden bladder spasms  Associated with BMs/flatulence--with spasms--> goes to bathroom and stools or passes gas.  Diarrhea is resolved; but has frequent soft stools. Done with vanco.  Has vaginal skin rash--radiation dermatitis; no anal  Intense pain lasts until she passes gas/stool then quickly resolves.  This is occurring q20 min to q2 hours  Pain is excruciating    OxyIR 5 mg q4h right now including overnight--seems to help modestly; maybe causing some dizziness   Tylenol prn  Ibuprofen prn  Pyridium --not clear if helping  Tolterodine 2 mg--not clear if helping  Having nausea with pain spasms too--zofran seems to help      PE: LMP 03/08/2008    Wt Readings from Last 3 Encounters:   09/18/23 93.3 kg (205 lb 11 oz)   09/15/23 95.3 kg (210 lb)   09/12/23 97.3 kg (214 lb 9.6 oz)     Alert NAD  Vomiting at times  At times " moaning in pain    Data reviewed:  I reviewed recent labs and imaging, my comments:  Na 135  Cr 1  Plt 185; 54 9/15  WBC 2.1    MRI 9/12  Impression:   1. Significantly decreased size of the vaginal mass compared to  6/16/2023, without convincing residual tumor signal.  2. Presumed sequelae of radiation therapy with myositis/fasciitis  throughout the pelvis, as well as mild cystitis, vaginitis, and  colitis.  3. Unchanged size of the mildly enlarged right inguinal lymph nodes  since 9/4/2023.  4. Unchanged nonspecific focus of enhancement in the left sacrum.     database reviewed: oxyIR 5 mg #15 9/21; 10 mg #20 9/13.      Impression & Recommendations:  66 yo with IVB (regional mets) vaginal SCC just completed chemoRT including brachytherapy    Hopefully this will attenuate in the coming weeks as her treatment is done    Continue oxyIR but at 10mg (or 5-10 if she wants)  Schedule 400 mg ibuprofen tid; her platelets are normal now and she's off chemo now  We'll see what Dr Mackenzie recommends also tomorrow    Continue zofran    Generalized weakness  Offered PT anytime she feels ready for it    Over 60 minutes spent on the date of the encounter doing chart review, history and exam, patient education & counseling, documentation and other activities as noted above.    Thank you for involving us in the patient's care.   Ulysses Gusman MD / Palliative Medicine / Text me via Elecar  This note may have been composed with voice recognition software and there may be mistranscriptions.        Again, thank you for allowing me to participate in the care of your patient.      Sincerely,    Ulysses Gusman MD

## 2023-09-26 NOTE — NURSING NOTE
Is the patient currently in the state of [place]? YES    Visit mode:VIDEO    If the visit is dropped, the patient can be reconnected by: VIDEO VISIT: Send to e-mail at: [email or web address]    Will anyone else be joining the visit? NO  (If patient encounters technical issues they should call [phone]    How would you like to obtain your AVS? MyChart    Are changes needed to the allergy or medication list? Pt declined allergy review and Pt declined med review    Reason for visit: Consult    Sarah KWON

## 2023-09-26 NOTE — PATIENT INSTRUCTIONS
Thank you for meeting with us in the [facility].    How to get a hold of us:  For non-urgent matters, MyChart works best.    For more urgent matters, or if you prefer not to use MyChart, call our clinic nurse Brittany Quick at [phone].     We have an on-call number for evenings and weekends. Please call this only if you are having uncontrolled symptoms or serious side effects from your medicines: [phone].     For refills, please give us a week (5 working days) notice. We don't always have providers available everyday to do refills. If you call the day you run out of your medicine, we may not be able to refill it in time, so call 5 days in advance!

## 2023-09-26 NOTE — PROGRESS NOTES
"Palliative Care Outpatient Clinic      Patient ID:  Medical - She has stage IVB vaginal SCC HPV-associated dx 5/2023, R inguinal LAD at time of dx.   7-9/2023 chemoradiotherapy with cis; HDR brachytherapy; course complicated by UTIs, sepsis, neutropenic fevers, urinary retention/Thomas in July, C diff    Social -  Jesse is her main caregiver. Son & his family in [place].     Care Planning -     Opioid Safety -   No personal MANN hx  Father had AUD  +Naloxone  Discussed opioid safety & intention of opioid tapering off as she recovers from her cancer tx    History:  History gathered today from: patient, family/loved ones, medical chart    She is done with her initial therapy  She sees Dr Mackenzie tomorrow  Reviwed the roles of our program    Referred largely for pain mgt.  Pain--  Mostly intense, \"10/10\" sudden bladder spasms  Associated with BMs/flatulence--with spasms--> goes to bathroom and stools or passes gas.  Diarrhea is resolved; but has frequent soft stools. Done with vanco.  Has vaginal skin rash--radiation dermatitis; no anal  Intense pain lasts until she passes gas/stool then quickly resolves.  This is occurring q20 min to q2 hours  Pain is excruciating    OxyIR 5 mg q4h right now including overnight--seems to help modestly; maybe causing some dizziness   Tylenol prn  Ibuprofen prn  Pyridium --not clear if helping  Tolterodine 2 mg--not clear if helping  Having nausea with pain spasms too--zofran seems to help      PE: LMP 03/08/2008    Wt Readings from Last 3 Encounters:   09/18/23 93.3 kg (205 lb 11 oz)   09/15/23 95.3 kg (210 lb)   09/12/23 97.3 kg (214 lb 9.6 oz)     Alert NAD  Vomiting at times  At times moaning in pain    Data reviewed:  I reviewed recent labs and imaging, my comments:  Na 135  Cr 1  Plt 185; 54 9/15  WBC 2.1    MRI 9/12  Impression:   1. Significantly decreased size of the vaginal mass compared to  6/16/2023, without convincing residual tumor signal.  2. Presumed sequelae of " radiation therapy with myositis/fasciitis  throughout the pelvis, as well as mild cystitis, vaginitis, and  colitis.  3. Unchanged size of the mildly enlarged right inguinal lymph nodes  since 9/4/2023.  4. Unchanged nonspecific focus of enhancement in the left sacrum.    [facility] database reviewed: oxyIR 5 mg #15 9/21; 10 mg #20 9/13.      Impression & Recommendations:  66 yo with IVB (regional mets) vaginal SCC just completed chemoRT including brachytherapy    Hopefully this will attenuate in the coming weeks as her treatment is done    Continue oxyIR but at 10mg (or 5-10 if she wants)  Schedule 400 mg ibuprofen tid; her platelets are normal now and she's off chemo now  We'll see what Dr Mackenzie recommends also tomorrow    Continue zofran    Generalized weakness  Offered PT anytime she feels ready for it    Over 60 minutes spent on the date of the encounter doing chart review, history and exam, patient education & counseling, documentation and other activities as noted above.    Thank you for involving us in the patient's care.   Ulysses Gusman MD / Palliative Medicine / Text me via [employer]  This note may have been composed with voice recognition software and there may be mistranscriptions.    Video-Visit Details  Video Start Time: 8:04 AM  Video End Time:8:38 AM  Originating Location (pt. Location): Home  Distant Location (provider location):  Off-site  Platform used for Video Visit: Daly

## 2023-09-27 ENCOUNTER — VIRTUAL VISIT (OUTPATIENT)
Dept: UROLOGY | Facility: CLINIC | Age: 68
End: 2023-09-27
Payer: COMMERCIAL

## 2023-09-27 DIAGNOSIS — R33.9 URINARY RETENTION: Primary | ICD-10-CM

## 2023-09-27 PROCEDURE — 99213 OFFICE O/P EST LOW 20 MIN: CPT | Mod: VID | Performed by: UROLOGY

## 2023-09-27 ASSESSMENT — PAIN SCALES - GENERAL: PAINLEVEL: MODERATE PAIN (5)

## 2023-09-27 NOTE — PROGRESS NOTES
Virtual Visit Details    Type of service:  Video Visit   Video Start Time: 10:01 AM  Video End Time:10:12 AM    Originating Location (pt. Location): Home    Distant Location (provider location):  Off-site  Platform used for Video Visit: Daly

## 2023-09-27 NOTE — PROGRESS NOTES
Reason for Visit:  F/u on catheter    Clinical Data:  Ms. Tamayo is a 67 year old female with vaginal bleeding and thickening around the urethra.  She has had chemo and brachytherapy for invasive squamous cell ca or the vagina.  She had been having difficulty voiding due to swelling.  She is now has an indwelling catheter.  She reports a significant amount of swelling in the urethra and was wondering about long term plans for the catheter.    5/30/23  Final Diagnosis   Periurethral tissue, biopsy:  -Invasive moderately differentiated squamous cell carcinoma, HPV-associated  -Focus suspicious for lymphovascular invasion         Assessment & Plan   68 y/o female with SCC of periurethral tissue with HPV association and some suspicion for lympatic invasion.  She has periurethral edema and a cathter in place.  We discussed waiting for the swelling to go down before attempting a trial of void, but as soon as it does we could.    -pt. To contact our clinic for a nursing visit and trial of void when her periruthral edema has decreased.      Lakeisha Mackenzie MD  [facility] [place]    23 minutes spent by me on the date of the encounter doing chart review, history and exam, documentation and further activities per the note

## 2023-09-27 NOTE — PATIENT INSTRUCTIONS
-pt. To contact our clinic for a nursing visit and trial of void when her periruthral edema has decreased.

## 2023-09-27 NOTE — NURSING NOTE
Is the patient currently in the state of [place]? YES    Visit mode:VIDEO    If the visit is dropped, the patient can be reconnected by: VIDEO VISIT: Text to cell phone:   Telephone Information:   Mobile [phone]       Will anyone else be joining the visit? NO  (If patient encounters technical issues they should call [phone] :[phone])    How would you like to obtain your AVS? MyChart    Are changes needed to the allergy or medication list? Pt stated no med changes    Reason for visit: RECHECK (Discuss long term plan for Thomas )      Pt had pelvic pain and bladder spasms earlier this morning, pt has no pain now.     Karen KWON

## 2023-09-27 NOTE — LETTER
9/27/2023       RE: Jessie Tamayo  [address] [address]     Dear Colleague,    Thank you for referring your patient, Jessie Tamayo, to the [facility] UROLOGY CLINIC [place] at [facility]. Please see a copy of my visit note below.    Virtual Visit Details    Type of service:  Video Visit   Video Start Time: 10:01 AM  Video End Time:10:12 AM    Originating Location (pt. Location): Home    Distant Location (provider location):  Off-site  Platform used for Video Visit: Daly    Reason for Visit:  F/u on catheter    Clinical Data:  Ms. Tamayo is a 67 year old female with vaginal bleeding and thickening around the urethra.  She has had chemo and brachytherapy for invasive squamous cell ca or the vagina.  She had been having difficulty voiding due to swelling.  She is now has an indwelling catheter.  She reports a significant amount of swelling in the urethra and was wondering about long term plans for the catheter.    5/30/23  Final Diagnosis   Periurethral tissue, biopsy:  -Invasive moderately differentiated squamous cell carcinoma, HPV-associated  -Focus suspicious for lymphovascular invasion         Assessment & Plan  68 y/o female with SCC of periurethral tissue with HPV association and some suspicion for lympatic invasion.  She has periurethral edema and a cathter in place.  We discussed waiting for the swelling to go down before attempting a trial of void, but as soon as it does we could.    -pt. To contact our clinic for a nursing visit and trial of void when her periruthral edema has decreased.      Lakeisha Mackenzie MD  [facility] [place]    23 minutes spent by me on the date of the encounter doing chart review, history and exam, documentation and further activities per the note

## 2023-10-02 NOTE — PROGRESS NOTES
A radiation therapy treatment planning simulation was performed.  HDR brachytherapy Michelet interstitial fraction 1 of  5 was delivered.  Please see the [facility] record for documentation.    Marisa Pacheco MD  Radiation Oncology

## 2023-10-03 ENCOUNTER — MYC REFILL (OUTPATIENT)
Dept: PALLIATIVE CARE | Facility: CLINIC | Age: 68
End: 2023-10-03
Payer: COMMERCIAL

## 2023-10-03 DIAGNOSIS — C51.9 VULVAR CANCER (H): ICD-10-CM

## 2023-10-03 DIAGNOSIS — G89.3 NEOPLASM RELATED PAIN: ICD-10-CM

## 2023-10-04 DIAGNOSIS — C51.9 VULVAR CANCER (H): ICD-10-CM

## 2023-10-04 DIAGNOSIS — G89.3 NEOPLASM RELATED PAIN: ICD-10-CM

## 2023-10-04 RX ORDER — OXYCODONE HYDROCHLORIDE 5 MG/1
5-10 TABLET ORAL EVERY 6 HOURS PRN
Qty: 60 TABLET | Refills: 0 | OUTPATIENT
Start: 2023-10-04

## 2023-10-04 RX ORDER — OXYCODONE HYDROCHLORIDE 5 MG/1
5-10 TABLET ORAL EVERY 6 HOURS PRN
Qty: 120 TABLET | Refills: 0 | Status: ON HOLD | OUTPATIENT
Start: 2023-10-04 | End: 2023-10-17

## 2023-10-04 NOTE — TELEPHONE ENCOUNTER
Received [facility] [employer] message from patient requesting refill of oxycodone.     Last refill: 9/28/2023  Last office visit: 9/26/2023  Scheduled for follow up 10/10/2023     Will route request to MD for review.     Reviewed [place]  Report.

## 2023-10-05 ENCOUNTER — PATIENT OUTREACH (OUTPATIENT)
Dept: ONCOLOGY | Facility: CLINIC | Age: 68
End: 2023-10-05

## 2023-10-06 ENCOUNTER — TELEPHONE (OUTPATIENT)
Dept: PALLIATIVE CARE | Facility: CLINIC | Age: 68
End: 2023-10-06
Payer: COMMERCIAL

## 2023-10-06 DIAGNOSIS — R11.0 NAUSEA: ICD-10-CM

## 2023-10-06 DIAGNOSIS — C51.9 VULVAR CANCER (H): Primary | ICD-10-CM

## 2023-10-06 RX ORDER — OLANZAPINE 5 MG/1
5 TABLET ORAL 2 TIMES DAILY PRN
Qty: 30 TABLET | Refills: 1 | Status: SHIPPED | OUTPATIENT
Start: 2023-10-06 | End: 2023-10-07

## 2023-10-06 NOTE — TELEPHONE ENCOUNTER
"Patient and patient's  Baljeet report patient is having severe nausea.  She ate a little bit yesterday and vomited x1 last evening.  This morning she has been able to sip on ginger ale and keep it down.  She had loose stool x1 this morning.  Reports she is \"very weak\" and her \"feet and hands are really shaky.\"  Temperature at 0700 this morning was 99.7 forehead and currently 100.0 forehead.  She has taken both Compazine and Zofran with no relief.  Reports the nausea came on fast yesterday.    Patient is really requesting something \"stronger\" for nausea.    Will update Dr. Gusman and oncology team.    Patient and  Baljeet verbalized understanding and had no further questions.    Brittany Quick RN  Palliative Care Nurse Clinician    [phone] (Direct)  [phone] (Main)  [phone] (Appointment Scheduling)    " Additional Anesthesia Volume In Cc: 6

## 2023-10-06 NOTE — TELEPHONE ENCOUNTER
Relayed MD message to pt spouse Jesse  Pt has been sleeping most of am  Explained why MD suggesting ER  Jesse asked if she was feeling better is ER needed.  Explained if she has no fever, keeping food/fluids down, nausea is improved then no but if things are still the same then ER is recommended

## 2023-10-07 ENCOUNTER — HOSPITAL ENCOUNTER (INPATIENT)
Facility: CLINIC | Age: 68
LOS: 10 days | Discharge: HOME | DRG: 372 | End: 2023-10-17
Attending: EMERGENCY MEDICINE | Admitting: OBSTETRICS & GYNECOLOGY
Payer: COMMERCIAL

## 2023-10-07 ENCOUNTER — APPOINTMENT (OUTPATIENT)
Dept: CT IMAGING | Facility: CLINIC | Age: 68
DRG: 372 | End: 2023-10-07
Attending: EMERGENCY MEDICINE
Payer: COMMERCIAL

## 2023-10-07 DIAGNOSIS — C51.9 VULVAR CANCER (H): ICD-10-CM

## 2023-10-07 DIAGNOSIS — K52.9 COLITIS: ICD-10-CM

## 2023-10-07 DIAGNOSIS — A04.72 C. DIFFICILE DIARRHEA: Primary | ICD-10-CM

## 2023-10-07 DIAGNOSIS — N30.01 ACUTE CYSTITIS WITH HEMATURIA: ICD-10-CM

## 2023-10-07 DIAGNOSIS — R11.2 NAUSEA AND VOMITING, UNSPECIFIED VOMITING TYPE: ICD-10-CM

## 2023-10-07 LAB
ALBUMIN SERPL BCG-MCNC: 3.5 G/DL (ref 3.5–5.2)
ALBUMIN UR-MCNC: 70 MG/DL
ALP SERPL-CCNC: 83 U/L (ref 35–104)
ALT SERPL W P-5'-P-CCNC: 10 U/L (ref 0–50)
ANION GAP SERPL CALCULATED.3IONS-SCNC: 12 MMOL/L (ref 7–15)
APPEARANCE UR: ABNORMAL
AST SERPL W P-5'-P-CCNC: 23 U/L (ref 0–45)
BASO+EOS+MONOS # BLD AUTO: ABNORMAL 10*3/UL
BASO+EOS+MONOS NFR BLD AUTO: ABNORMAL %
BASOPHILS # BLD AUTO: 0 10E3/UL (ref 0–0.2)
BASOPHILS NFR BLD AUTO: 0 %
BILIRUB SERPL-MCNC: 0.5 MG/DL
BILIRUB UR QL STRIP: NEGATIVE
BUN SERPL-MCNC: 9.7 MG/DL (ref 8–23)
CALCIUM SERPL-MCNC: 8.9 MG/DL (ref 8.8–10.2)
CHLORIDE SERPL-SCNC: 95 MMOL/L (ref 98–107)
COLOR UR AUTO: ABNORMAL
CREAT SERPL-MCNC: 1.11 MG/DL (ref 0.51–0.95)
DEPRECATED HCO3 PLAS-SCNC: 27 MMOL/L (ref 22–29)
EGFRCR SERPLBLD CKD-EPI 2021: 54 ML/MIN/1.73M2
EOSINOPHIL # BLD AUTO: 0 10E3/UL (ref 0–0.7)
EOSINOPHIL NFR BLD AUTO: 0 %
ERYTHROCYTE [DISTWIDTH] IN BLOOD BY AUTOMATED COUNT: 17.8 % (ref 10–15)
GLUCOSE SERPL-MCNC: 122 MG/DL (ref 70–99)
GLUCOSE UR STRIP-MCNC: NEGATIVE MG/DL
HCT VFR BLD AUTO: 25.9 % (ref 35–47)
HGB BLD-MCNC: 8.1 G/DL (ref 11.7–15.7)
HGB UR QL STRIP: ABNORMAL
IMM GRANULOCYTES # BLD: 0.3 10E3/UL
IMM GRANULOCYTES NFR BLD: 3 %
KETONES UR STRIP-MCNC: NEGATIVE MG/DL
LEUKOCYTE ESTERASE UR QL STRIP: ABNORMAL
LIPASE SERPL-CCNC: 11 U/L (ref 13–60)
LYMPHOCYTES # BLD AUTO: 1.1 10E3/UL (ref 0.8–5.3)
LYMPHOCYTES NFR BLD AUTO: 11 %
MAGNESIUM SERPL-MCNC: 1.7 MG/DL (ref 1.7–2.3)
MCH RBC QN AUTO: 34 PG (ref 26.5–33)
MCHC RBC AUTO-ENTMCNC: 31.3 G/DL (ref 31.5–36.5)
MCV RBC AUTO: 109 FL (ref 78–100)
MONOCYTES # BLD AUTO: 0.9 10E3/UL (ref 0–1.3)
MONOCYTES NFR BLD AUTO: 9 %
MUCOUS THREADS #/AREA URNS LPF: PRESENT /LPF
NEUTROPHILS # BLD AUTO: 7.7 10E3/UL (ref 1.6–8.3)
NEUTROPHILS NFR BLD AUTO: 77 %
NITRATE UR QL: POSITIVE
NRBC # BLD AUTO: 0 10E3/UL
NRBC BLD AUTO-RTO: 0 /100
PH UR STRIP: 6.5 [PH] (ref 5–7)
PHOSPHATE SERPL-MCNC: 3.8 MG/DL (ref 2.5–4.5)
PLATELET # BLD AUTO: 233 10E3/UL (ref 150–450)
POTASSIUM SERPL-SCNC: 3.7 MMOL/L (ref 3.4–5.3)
PROT SERPL-MCNC: 6.2 G/DL (ref 6.4–8.3)
RBC # BLD AUTO: 2.38 10E6/UL (ref 3.8–5.2)
RBC URINE: 12 /HPF
SODIUM SERPL-SCNC: 134 MMOL/L (ref 135–145)
SP GR UR STRIP: 1 (ref 1–1.03)
SQUAMOUS EPITHELIAL: 1 /HPF
UROBILINOGEN UR STRIP-MCNC: NORMAL MG/DL
WBC # BLD AUTO: 10 10E3/UL (ref 4–11)
WBC CLUMPS #/AREA URNS HPF: PRESENT /HPF
WBC URINE: 178 /HPF

## 2023-10-07 PROCEDURE — 258N000003 HC RX IP 258 OP 636: Performed by: EMERGENCY MEDICINE

## 2023-10-07 PROCEDURE — 85025 COMPLETE CBC W/AUTO DIFF WBC: CPT | Performed by: EMERGENCY MEDICINE

## 2023-10-07 PROCEDURE — 87324 CLOSTRIDIUM AG IA: CPT | Performed by: EMERGENCY MEDICINE

## 2023-10-07 PROCEDURE — 87088 URINE BACTERIA CULTURE: CPT | Performed by: EMERGENCY MEDICINE

## 2023-10-07 PROCEDURE — 83690 ASSAY OF LIPASE: CPT | Performed by: EMERGENCY MEDICINE

## 2023-10-07 PROCEDURE — 87493 C DIFF AMPLIFIED PROBE: CPT | Performed by: EMERGENCY MEDICINE

## 2023-10-07 PROCEDURE — 96374 THER/PROPH/DIAG INJ IV PUSH: CPT

## 2023-10-07 PROCEDURE — 81001 URINALYSIS AUTO W/SCOPE: CPT | Performed by: EMERGENCY MEDICINE

## 2023-10-07 PROCEDURE — 87040 BLOOD CULTURE FOR BACTERIA: CPT | Performed by: EMERGENCY MEDICINE

## 2023-10-07 PROCEDURE — 84100 ASSAY OF PHOSPHORUS: CPT | Performed by: PHYSICIAN ASSISTANT

## 2023-10-07 PROCEDURE — 99285 EMERGENCY DEPT VISIT HI MDM: CPT | Performed by: EMERGENCY MEDICINE

## 2023-10-07 PROCEDURE — 81001 URINALYSIS AUTO W/SCOPE: CPT | Performed by: OBSTETRICS & GYNECOLOGY

## 2023-10-07 PROCEDURE — 83735 ASSAY OF MAGNESIUM: CPT | Performed by: PHYSICIAN ASSISTANT

## 2023-10-07 PROCEDURE — 74177 CT ABD & PELVIS W/CONTRAST: CPT | Mod: 26 | Performed by: RADIOLOGY

## 2023-10-07 PROCEDURE — 250N000013 HC RX MED GY IP 250 OP 250 PS 637: Performed by: PHYSICIAN ASSISTANT

## 2023-10-07 PROCEDURE — 120N000002 HC R&B MED SURG/OB UMMC

## 2023-10-07 PROCEDURE — 36430 TRANSFUSION BLD/BLD COMPNT: CPT

## 2023-10-07 PROCEDURE — 99285 EMERGENCY DEPT VISIT HI MDM: CPT | Mod: 25

## 2023-10-07 PROCEDURE — 36415 COLL VENOUS BLD VENIPUNCTURE: CPT | Performed by: EMERGENCY MEDICINE

## 2023-10-07 PROCEDURE — 74177 CT ABD & PELVIS W/CONTRAST: CPT

## 2023-10-07 PROCEDURE — 250N000011 HC RX IP 250 OP 636: Mod: JZ | Performed by: EMERGENCY MEDICINE

## 2023-10-07 PROCEDURE — 250N000011 HC RX IP 250 OP 636: Performed by: EMERGENCY MEDICINE

## 2023-10-07 PROCEDURE — 96361 HYDRATE IV INFUSION ADD-ON: CPT

## 2023-10-07 PROCEDURE — 250N000011 HC RX IP 250 OP 636: Performed by: PHYSICIAN ASSISTANT

## 2023-10-07 PROCEDURE — 80053 COMPREHEN METABOLIC PANEL: CPT | Performed by: EMERGENCY MEDICINE

## 2023-10-07 RX ORDER — MAGNESIUM OXIDE 400 MG/1
400 TABLET ORAL EVERY MORNING
Status: DISCONTINUED | OUTPATIENT
Start: 2023-10-08 | End: 2023-10-12

## 2023-10-07 RX ORDER — ENOXAPARIN SODIUM 100 MG/ML
40 INJECTION SUBCUTANEOUS EVERY 24 HOURS
Status: DISCONTINUED | OUTPATIENT
Start: 2023-10-07 | End: 2023-10-08

## 2023-10-07 RX ORDER — ONDANSETRON 2 MG/ML
4 INJECTION INTRAMUSCULAR; INTRAVENOUS EVERY 6 HOURS PRN
Status: DISCONTINUED | OUTPATIENT
Start: 2023-10-07 | End: 2023-10-17 | Stop reason: HOSPADM

## 2023-10-07 RX ORDER — ONDANSETRON 2 MG/ML
4 INJECTION INTRAMUSCULAR; INTRAVENOUS EVERY 6 HOURS PRN
Status: DISCONTINUED | OUTPATIENT
Start: 2023-10-07 | End: 2023-10-08

## 2023-10-07 RX ORDER — MAGNESIUM OXIDE 400 MG/1
400 TABLET ORAL EVERY MORNING
COMMUNITY
End: 2023-11-01

## 2023-10-07 RX ORDER — SODIUM CHLORIDE 9 MG/ML
INJECTION, SOLUTION INTRAVENOUS CONTINUOUS
Status: DISCONTINUED | OUTPATIENT
Start: 2023-10-08 | End: 2023-10-09

## 2023-10-07 RX ORDER — IOPAMIDOL 755 MG/ML
126 INJECTION, SOLUTION INTRAVASCULAR ONCE
Status: COMPLETED | OUTPATIENT
Start: 2023-10-07 | End: 2023-10-07

## 2023-10-07 RX ORDER — PROCHLORPERAZINE 25 MG
12.5 SUPPOSITORY, RECTAL RECTAL EVERY 12 HOURS PRN
Status: DISCONTINUED | OUTPATIENT
Start: 2023-10-07 | End: 2023-10-17 | Stop reason: HOSPADM

## 2023-10-07 RX ORDER — HYDROMORPHONE HYDROCHLORIDE 1 MG/ML
0.3 INJECTION, SOLUTION INTRAMUSCULAR; INTRAVENOUS; SUBCUTANEOUS
Status: COMPLETED | OUTPATIENT
Start: 2023-10-07 | End: 2023-10-07

## 2023-10-07 RX ORDER — METOCLOPRAMIDE HYDROCHLORIDE 5 MG/ML
5 INJECTION INTRAMUSCULAR; INTRAVENOUS ONCE
Status: COMPLETED | OUTPATIENT
Start: 2023-10-07 | End: 2023-10-07

## 2023-10-07 RX ORDER — ACETAMINOPHEN 500 MG
500-1000 TABLET ORAL EVERY 4 HOURS PRN
Status: DISCONTINUED | OUTPATIENT
Start: 2023-10-07 | End: 2023-10-17 | Stop reason: HOSPADM

## 2023-10-07 RX ORDER — PHENAZOPYRIDINE HYDROCHLORIDE 200 MG/1
200 TABLET, FILM COATED ORAL 3 TIMES DAILY PRN
Status: DISCONTINUED | OUTPATIENT
Start: 2023-10-07 | End: 2023-10-17 | Stop reason: HOSPADM

## 2023-10-07 RX ORDER — PROCHLORPERAZINE MALEATE 10 MG
10 TABLET ORAL EVERY 6 HOURS PRN
Status: DISCONTINUED | OUTPATIENT
Start: 2023-10-07 | End: 2023-10-08 | Stop reason: DRUGHIGH

## 2023-10-07 RX ORDER — OXYCODONE HYDROCHLORIDE 5 MG/1
5-10 TABLET ORAL EVERY 6 HOURS PRN
Status: DISCONTINUED | OUTPATIENT
Start: 2023-10-07 | End: 2023-10-08

## 2023-10-07 RX ORDER — ONDANSETRON 4 MG/1
4 TABLET, ORALLY DISINTEGRATING ORAL EVERY 6 HOURS PRN
Status: DISCONTINUED | OUTPATIENT
Start: 2023-10-07 | End: 2023-10-17 | Stop reason: HOSPADM

## 2023-10-07 RX ORDER — TOLTERODINE TARTRATE 2 MG/1
2 TABLET, EXTENDED RELEASE ORAL 2 TIMES DAILY
Status: DISCONTINUED | OUTPATIENT
Start: 2023-10-08 | End: 2023-10-17 | Stop reason: HOSPADM

## 2023-10-07 RX ORDER — AMLODIPINE BESYLATE 5 MG/1
5 TABLET ORAL EVERY MORNING
Status: DISCONTINUED | OUTPATIENT
Start: 2023-10-08 | End: 2023-10-17 | Stop reason: HOSPADM

## 2023-10-07 RX ORDER — PROCHLORPERAZINE MALEATE 5 MG
5 TABLET ORAL EVERY 6 HOURS PRN
Status: DISCONTINUED | OUTPATIENT
Start: 2023-10-07 | End: 2023-10-17 | Stop reason: HOSPADM

## 2023-10-07 RX ORDER — ONDANSETRON 4 MG/1
8 TABLET, FILM COATED ORAL EVERY 8 HOURS PRN
Status: DISCONTINUED | OUTPATIENT
Start: 2023-10-07 | End: 2023-10-08 | Stop reason: DRUGHIGH

## 2023-10-07 RX ORDER — AMLODIPINE BESYLATE 5 MG/1
5 TABLET ORAL EVERY MORNING
COMMUNITY
End: 2024-02-22

## 2023-10-07 RX ORDER — IBUPROFEN 200 MG
400 TABLET ORAL EVERY 6 HOURS PRN
Status: DISCONTINUED | OUTPATIENT
Start: 2023-10-07 | End: 2023-10-17 | Stop reason: HOSPADM

## 2023-10-07 RX ADMIN — SODIUM CHLORIDE 1000 ML: 9 INJECTION, SOLUTION INTRAVENOUS at 15:10

## 2023-10-07 RX ADMIN — OXYCODONE HYDROCHLORIDE 5 MG: 5 TABLET ORAL at 22:09

## 2023-10-07 RX ADMIN — IOPAMIDOL 126 ML: 755 INJECTION, SOLUTION INTRAVENOUS at 16:03

## 2023-10-07 RX ADMIN — ONDANSETRON 4 MG: 2 INJECTION INTRAMUSCULAR; INTRAVENOUS at 20:25

## 2023-10-07 RX ADMIN — METOCLOPRAMIDE 5 MG: 5 INJECTION, SOLUTION INTRAMUSCULAR; INTRAVENOUS at 15:10

## 2023-10-07 RX ADMIN — HYDROMORPHONE HYDROCHLORIDE 0.3 MG: 1 INJECTION, SOLUTION INTRAMUSCULAR; INTRAVENOUS; SUBCUTANEOUS at 20:26

## 2023-10-07 ASSESSMENT — ACTIVITIES OF DAILY LIVING (ADL)
ADLS_ACUITY_SCORE: 35
ADLS_ACUITY_SCORE: 33
ADLS_ACUITY_SCORE: 35

## 2023-10-07 NOTE — ED PROVIDER NOTES
"    [place] EMERGENCY DEPARTMENT ([facility])    10/07/23       ED PROVIDER NOTE  Triage A      History     Chief Complaint   Patient presents with    Abdominal Pain    Nausea, Vomiting, & Diarrhea     The history is provided by the patient and medical records.     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  Patient and her  reported severe nausea beginning on 10/6/2023.  She had loose stool in the morning, and reported feeling \"very weak\" with her \"feet and hands really shaky\".  She also had temperature of 100.0  F as of 10/6/2023.  Given that she has not had chemo for few weeks, her oncology team expressed concerns over slightly elevated temperatures and nausea.  Encouraged patient to go to ED to rule out more serious illness if symptoms worsened.    She presented to [facility] on 9/27/2023 with vaginal bleeding and thickening around the urethra.  She had been having difficulty voiding due to swelling.  She is now has an indwelling catheter.  She reported a significant amount of swelling in the urethra and was wondering about long term plans for the catheter.       Past Medical History  Past Medical History:   Diagnosis Date    Actinic keratosis     C. difficile diarrhea 09/08/2023    Hyperlipemia     Lichen sclerosus 2020    Osteopenias     SCC (squamous cell carcinoma) 07/26/2023     Past Surgical History:   Procedure Laterality Date    COLONOSCOPY  2006    normal    COLPOSCOPY, BIOPSY, COMBINED N/A 02/08/2021    Procedure: COLPOSCOPY, WITH BIOPSY, LEEP procedure;  Surgeon: Jefe Koenig MD;  Location: [facility] OR    CYSTOSCOPY N/A 05/30/2023    Procedure: CYSTOSCOPY AND EXCISIONAL BIOPSY OF THE VAGINAL TISSUE AROUND THE URETHRA.  (look in the bladder " and sample small area in the vagina near the urethra);  Surgeon: Lakeisha Mackenzie MD;  Location: [facility] OR    EYE SURGERY      cataracts bilateral    INSERT INTERSTITIAL NEEDLE, ULTRASOUND GUIDED N/A 9/18/2023    Procedure: INSERTION, NEEDLE, WITH ULTRASOUND GUIDANCE, FOR INTERSTITIAL BRACHYTHERAPY;  Surgeon: Marisa Pacheco MD;  Location: [facility] OR    OPEN REDUCTION INTERNAL FIXATION FOREARM  07/31/2012    Procedure: OPEN REDUCTION INTERNAL FIXATION FOREARM;  Open Reduction Internal Fixation, Irrigation & Debridment  of Right Distal Radius, application short arm splint;  Surgeon: Wade Beard MD;  Location: [facility] OR    REMOVE INTERSTITIAL NEEDLE N/A 9/20/2023    Procedure: REMOVAL, INTERSTITIAL NEEDLE, AFTER TEMPORARY BRACHYTHERAPY;  Surgeon: Marisa Pacheco MD;  Location:  OR    TONSILLECTOMY & ADENOIDECTOMY  [address] [place] [address] TREAT ECTOPIC PREG,RMV TUBE/OVARY  1985    ectopic, right side-ovary and tube removed     No current outpatient medications on file.    Allergies   Allergen Reactions    Blood Transfusion Related (Informational Only) Other (See Comments)     Patient has a history of a clinically significant antibody against RBC antigens.  A delay in compatible RBCs may occur.    Oxybutynin Itching    Seasonal Allergies      Family History  Family History   Problem Relation Age of Onset    Lung Cancer Mother 44    Cerebrovascular Disease Father     Hypertension Father     Alcohol/Drug Father     Cervical Cancer Maternal Aunt     Ovarian Cancer Maternal Aunt 60    Obesity Niece         niece    Mental Illness Nephew         nephew    Diabetes Other         paternal aunt    Hyperlipidemia Other     Obesity Other         self    Obesity Other     Diabetes Other         paternal aunt    Hypertension Other         father    Cerebrovascular Disease Other         father    C.A.D. No family hx of     Breast Cancer No family hx of     Cancer - colorectal No family hx of     Prostate Cancer No family  "hx of     Melanoma No family hx of     Anesthesia Reaction No family hx of     Venous thrombosis No family hx of      Social History   Social History     Tobacco Use    Smoking status: Never     Passive exposure: Never    Smokeless tobacco: Never   Vaping Use    Vaping Use: Never used   Substance Use Topics    Alcohol use: Not Currently    Drug use: No      Past medical history, past surgical history, medications, allergies, family history, and social history were reviewed with the patient. No additional pertinent items.      A medically appropriate review of systems was performed with pertinent positives and negatives noted in the HPI, and all other systems negative.    Physical Exam   BP: 131/79  Pulse: 109  Temp: 98.4  F (36.9  C)  Resp: 16  Height: 165.1 cm (5' 5\")  Weight: 93 kg (205 lb)  SpO2: 94 %  Physical Exam  Vitals reviewed.   Constitutional:       General: She is not in acute distress.  HENT:      Head: Normocephalic and atraumatic.      Mouth/Throat:      Pharynx: Oropharynx is clear. No pharyngeal swelling.   Eyes:      Extraocular Movements: Extraocular movements intact.      Pupils: Pupils are equal, round, and reactive to light.   Cardiovascular:      Rate and Rhythm: Regular rhythm. Tachycardia present.   Pulmonary:      Effort: Pulmonary effort is normal.      Breath sounds: Normal breath sounds.   Abdominal:      General: Bowel sounds are normal. There is no distension.      Palpations: Abdomen is soft.      Tenderness: There is no abdominal tenderness.      Hernia: No hernia is present.   Skin:     General: Skin is warm and dry.      Capillary Refill: Capillary refill takes more than 3 seconds.   Neurological:      General: No focal deficit present.      Mental Status: She is alert and oriented to person, place, and time.   Psychiatric:         Mood and Affect: Mood normal.         Behavior: Behavior normal.           ED Course, Procedures, & Data      Procedures                     Results for " orders placed or performed during the hospital encounter of 10/07/23   FLEXIBLE SIGMOIDOSCOPY     Status: None   Result Value Ref Range    Flex Sig       [facility]  [address] [place] [address] [phone]     Endoscopy Department  _______________________________________________________________________________  Patient Name: Jessie Tamayo            Procedure Date: 10/13/2023 7:40 AM  MRN: [id number]                       Account Number: [id number]  YOB: 1955              Admit Type: Inpatient  Age: 67                               Room: [place] [address]  Gender: Female                        Note Status: Finalized  Attending MD: CHRISS KINSEY MD,    Pause for the Cause: performed.  Total Sedation Time:                    _______________________________________________________________________________     Procedure:             Flexible Sigmoidoscopy  Indications:           Chronic diarrhea, positive for C diff. Assess severity                          and rule out other causes.  Providers:             CHRISS KINSEY MD, Javi Carlos, RN, MERRY PALACIO MD   Patient Profile:       This is a 67 year old female with history of vaginal                          cancer last chemotherapy 8/2023 who had c.diff                          infection treated with oral vancomycin completed                          treatment with resolution of diarrhea, now readmitted                          with profused diarrhea and nausea vomiting found to                          have C.diff PCR and toxin positive.  Referring MD:            Medicines:             Fentanyl 75 micrograms IV, Midazolam 3 mg IV  Complications:         No immediate complications. Estimated blood loss:                          Minimal.  _______________________________________________________________________________  Procedure:             Pre-Anesthesia Assessment:                          - Prior to the procedure, a History and Physical was                          performed, and patient medications and allergies were                          reviewed. The patient is competent. The risks  and                          benefits of the procedure and the sedation options and                          risks were discussed with the patient. All questions                          were answered and informed consent was obtained.                          Patient identification and proposed procedure were                          verified by the physician in the procedure room.                          Mental Status Examination: alert and oriented. Airway                          Examination: normal oropharyngeal airway and neck                          mobility. Respiratory Examination: clear to                          auscultation. CV Examination: normal. Prophylactic                          Antibiotics: The patient does not require prophylactic                          antibiotics. Prior Anticoagulants: The patient has                          taken no anticoagulant or antiplatelet agents. ASA                          Grade Assessment: II - A patient with mild systemic                           disease. After reviewing the risks and benefits, the                          patient was deemed in satisfactory condition to                          undergo the procedure. The anesthesia plan was to use                          moderate sedation / analgesia (conscious sedation).                          Immediately prior to administration of medications,                          the patient was re-assessed for adequacy to receive                          sedatives. The heart rate, respiratory rate, oxygen                          saturations, blood pressure, adequacy of pulmonary                          ventilation, and response to care were monitored                          throughout the procedure. The  physical status of the                          patient was re-assessed after the procedure.                         After obtaining informed consent, the scope was passed                          under direct vision. The Endoscope was introduced                           through the anus and advanced to 30 cm from the anal                          verge. The flexible sigmoidoscopy was accomplished                          without difficulty. The patient tolerated the                          procedure well.                                                                                   Findings:       Pseudomembranes were found in the recto-sigmoid colon between 20 and 30        cm (extent of exam) in a diffuse distribution. Small patches of        pseudomembranes were seen with normal intervening mucosa in the rectum        (up to 20 cm from the anal verge).       No bleeding seen. Extensive biopsies were taken with a cold forceps for        histology and tissue culture (stat).       The perianal and digital rectal examinations were normal.                                                                                   Moderate Sedation:       Moderate (conscious) sedation was administered by the nurse and        supervised by the endosco pist. The patient's oxygen saturation, heart        rate, blood pressure and response to care were monitored. Total        physician intraservice time was 23 minutes.  Impression:            - Pseudomembranous enterocolitis seen. Most likely due                          to known C.diff infection. Stat biopsies obtained to                          rule out other infectious and non-infectious causes  Recommendation:        - Resume regular diet.                         - Await pathology results.                         - Work with ID to effectively treat C.diff.                         - GI inpatient team will continue to follow.                                                                                      Electronically signed by: Chriss Sarmiento MD  __________________  CHRISS SARMIENTO MD  10/13/2023 11:51:12 AM  I was physically present for the entire viewing portion of the exam.  __________________________  Signature of teaching physician  Galilea/Kira SARMIENTO MD    _________ _____________  MERRY PALACIO MD  Number of Addenda: 0    Note Initiated On: 10/13/2023 7:40 AM  Scope In:  Scope Out:     CT Abdomen Pelvis w Contrast     Status: None    Narrative    EXAMINATION: CT ABDOMEN PELVIS W CONTRAST, 10/7/2023 4:10 PM    INDICATION: abd distention and pain, vomiting 2 days and unable to  keep anything down, completed treatment for c diff recently, on chemo,  concern for obstruction / ileus / colitis    COMPARISON STUDY: CT abdomen and pelvis 9/4/2023    TECHNIQUE: CT scan of the abdomen and pelvis was performed on  multidetector CT scanner using volumetric acquisition technique and  images were reconstructed in multiple planes with variable thickness  and reviewed on dedicated workstations.     CONTRAST: iopamidol (ISOVUE-370) solution 126 mL injected IV without  oral contrast    CT scan radiation dose is optimized to minimum requisite dose using  automated dose modulation techniques.    FINDINGS:    Lower thorax: Stable 3 mm right middle lobe pulmonary nodule (series  4, image 18). Right lower lobe calcified granuloma.  Clear lung bases.  No pericardial effusion. Normal heart size.    Liver: No mass. No intrahepatic biliary ductal dilation.  Focal  hypoattenuation along the falciform ligament without mass effect,  likely focal fatty deposition.    Biliary System: Normal gallbladder. No extrahepatic biliary ductal  dilation.    Pancreas: No mass or pancreatic ductal dilation. Fatty infiltration.    Adrenal glands: No mass or nodules    Spleen: Unchanged 1.2 x 0.9 cm hypodensity within the spleen.    Kidneys: No suspicious mass, obstructing calculus or hydronephrosis.    Bilateral subcentimeter cortical hypodensities, too small to  accurately characterize, but likely representing simple renal cysts.    Gastrointestinal tract: Normal caliber small and large bowel.   Concentric bowel wall thickening, predominantly involving the rectum  and sigmoid colon with associated fat stranding. Prominent appendix,  measuring up to 9 mm in diameter without wall thickening or  periappendiceal fat stranding.    Mesentery/peritoneum/retroperitoneum: No mass. No free fluid or air.    Lymph nodes: Decreased size of the prominent right inguinal lymph  nodes; for example a 1.1 cm short axis lymph node (series 3, image  78), previously 1.3 cm.    Vasculature: Patent major abdominal vasculature.    Pelvis: Partially decompressed urinary bladder with Thomas in place.  Small volume air within the urinary bladder, likely related to prior  instrumentation. Small amount of surrounding fat stranding.    Osseous structures: No aggressive or acute osseous lesion.  Multilevel  degenerative changes of the spine.      Soft tissues: Within normal limits.      Impression    IMPRESSION:   1. Bowel wall thickening and surrounding inflammation involving the  rectum and sigmoid colon, concerning for colitis.  2. Stable 3mm pulmonary nodule.  2. Wall thickening and surrounding fat stranding with small amount of  air within the urinary bladder (favored to be related to  catheterization). Findings are nonspecific and may be seen in setting  of cystitis.    I have personally reviewed the examination and initial interpretation  and I agree with the findings.    MARIO HERNANDEZ MD         SYSTEM ID:  [id number]    Lower Extremity Venous Duplex Bilateral     Status: None    Narrative    ULTRASOUND LOWER EXTREMITY VENOUS DUPLEX BILATERAL 10/9/2023 4:54 PM    CLINICAL HISTORY: Fever.  Evaluate for deep venous thrombosis.    COMPARISONS: Ultrasound lower extremity venous duplex bilateral  9/5/2023    REFERRING PROVIDER: MICHAEL  "CALIXTO SAUER    TECHNIQUE: Grayscale, color Doppler, Doppler waveform ultrasound  evaluation was performed through the bilateral common femoral,  femoral, popliteal, and posterior tibial veins. Peroneal veins were  not evaluated.    FINDINGS: Right common femoral, femoral, popliteal and posterior  tibial veins are fully compressible with phasic waveforms.    Subcutaneous edema in the right ankle.    Left common femoral, femoral, popliteal and posterior tibial veins are  fully compressible with phasic waveforms.      Impression    IMPRESSION: No deep venous thrombosis demonstrated in either leg.  Subcutaneous edema in the right ankle.    Reference: \"Duplex Ultrasound in the Diagnosis of Lower-Extremity Deep  Venous Thrombosis\"- Yaz De La Rosa MD, S; Vijay Kulkarni MD  (Circulation. 2014;129:917-921. http://circ.ahajournals.org)    JERRY PICKETT MD         SYSTEM ID:  [id number]   XR Chest Port 1 View     Status: None    Narrative    Exam: XR CHEST PORT 1 VIEW, 10/11/2023 12:13 AM    Comparison: Chest radiograph 9/8/2023    History: fever inpatient    Findings:  Portable AP view of the chest.      No acute focal air space opacities. No pneumothorax or pleural  effusion. Cardiac silhouette is within normal limits. Partially  visualized upper abdomen is unremarkable. Elevated right  hemidiaphragm.      Impression    Impression: No acute radiographic abnormality suggestive of pneumonia.      I have personally reviewed the examination and initial interpretation  and I agree with the findings.    DONITA ONEAL MD         SYSTEM ID:  [id number]   XR Abdomen Port 1 View     Status: None    Narrative    EXAMINATION:  XR ABDOMEN PORT 1 VIEW 10/11/2023     COMPARISON: CT abdomen and pelvis 10/7/2023.    HISTORY: concerns for toxic megacolon.    TECHNIQUE: Two frontal supine views of the abdomen, supine.    FINDINGS:  Multiple mildly dilated air-filled loops of small and large  bowel and colon. Small bowel measuring up to " approximately 3.1 cm and  transverse colon measuring up to 6.1 cm. Moderate colonic stool  burden. The lung bases are unremarkable.       Impression    IMPRESSION:   1. No radiographic evidence for toxic megacolon.  2. Multiple mildly dilated air-filled loops of small and large bowel  without evidence of obstruction. Follow-up recommended.  3. Moderate colonic stool burden.    I have personally reviewed the examination and initial interpretation  and I agree with the findings.    DONITA ONEAL MD         SYSTEM ID:  [id number]   Comprehensive metabolic panel     Status: Abnormal   Result Value Ref Range    Sodium 134 (L) 135 - 145 mmol/L    Potassium 3.7 3.4 - 5.3 mmol/L    Carbon Dioxide (CO2) 27 22 - 29 mmol/L    Anion Gap 12 7 - 15 mmol/L    Urea Nitrogen 9.7 8.0 - 23.0 mg/dL    Creatinine 1.11 (H) 0.51 - 0.95 mg/dL    GFR Estimate 54 (L) >60 mL/min/1.73m2    Calcium 8.9 8.8 - 10.2 mg/dL    Chloride 95 (L) 98 - 107 mmol/L    Glucose 122 (H) 70 - 99 mg/dL    Alkaline Phosphatase 83 35 - 104 U/L    AST 23 0 - 45 U/L    ALT 10 0 - 50 U/L    Protein Total 6.2 (L) 6.4 - 8.3 g/dL    Albumin 3.5 3.5 - 5.2 g/dL    Bilirubin Total 0.5 <=1.2 mg/dL   Lipase     Status: Abnormal   Result Value Ref Range    Lipase 11 (L) 13 - 60 U/L   CBC with platelets and differential     Status: Abnormal   Result Value Ref Range    WBC Count 10.0 4.0 - 11.0 10e3/uL    RBC Count 2.38 (L) 3.80 - 5.20 10e6/uL    Hemoglobin 8.1 (L) 11.7 - 15.7 g/dL    Hematocrit 25.9 (L) 35.0 - 47.0 %     (H) 78 - 100 fL    MCH 34.0 (H) 26.5 - 33.0 pg    MCHC 31.3 (L) 31.5 - 36.5 g/dL    RDW 17.8 (H) 10.0 - 15.0 %    Platelet Count 233 150 - 450 10e3/uL    % Neutrophils 77 %    % Lymphocytes 11 %    % Monocytes 9 %    Mids % (Monos, Eos, Basos)      % Eosinophils 0 %    % Basophils 0 %    % Immature Granulocytes 3 %    NRBCs per 100 WBC 0 <1 /100    Absolute Neutrophils 7.7 1.6 - 8.3 10e3/uL    Absolute Lymphocytes 1.1 0.8 - 5.3 10e3/uL    Absolute  Monocytes 0.9 0.0 - 1.3 10e3/uL    Mids Abs (Monos, Eos, Basos)      Absolute Eosinophils 0.0 0.0 - 0.7 10e3/uL    Absolute Basophils 0.0 0.0 - 0.2 10e3/uL    Absolute Immature Granulocytes 0.3 <=0.4 10e3/uL    Absolute NRBCs 0.0 10e3/uL   UA with Microscopic reflex to Culture     Status: Abnormal    Specimen: Urine, Thomas Catheter   Result Value Ref Range    Color Urine Dark Yellow (A) Colorless, Straw, Light Yellow, Yellow    Appearance Urine Slightly Cloudy (A) Clear    Glucose Urine Negative Negative mg/dL    Bilirubin Urine Negative Negative    Ketones Urine Negative Negative mg/dL    Specific Gravity Urine 1.005 1.003 - 1.035    Blood Urine Small (A) Negative    pH Urine 6.5 5.0 - 7.0    Protein Albumin Urine 70 (A) Negative mg/dL    Urobilinogen Urine Normal Normal, 2.0 mg/dL    Nitrite Urine Positive (A) Negative    Leukocyte Esterase Urine Large (A) Negative    WBC Clumps Urine Present (A) None Seen /HPF    Mucus Urine Present (A) None Seen /LPF    RBC Urine 12 (H) <=2 /HPF    WBC Urine 178 (H) <=5 /HPF    Squamous Epithelials Urine 1 <=1 /HPF    Narrative    Urine Culture ordered based on laboratory criteria   Magnesium     Status: Normal   Result Value Ref Range    Magnesium 1.7 1.7 - 2.3 mg/dL   Phosphorus     Status: Normal   Result Value Ref Range    Phosphorus 3.8 2.5 - 4.5 mg/dL   Comprehensive metabolic panel     Status: Abnormal   Result Value Ref Range    Sodium 134 (L) 135 - 145 mmol/L    Potassium 3.8 3.4 - 5.3 mmol/L    Carbon Dioxide (CO2) 25 22 - 29 mmol/L    Anion Gap 9 7 - 15 mmol/L    Urea Nitrogen 8.3 8.0 - 23.0 mg/dL    Creatinine 1.03 (H) 0.51 - 0.95 mg/dL    GFR Estimate 59 (L) >60 mL/min/1.73m2    Calcium 8.0 (L) 8.8 - 10.2 mg/dL    Chloride 100 98 - 107 mmol/L    Glucose 96 70 - 99 mg/dL    Alkaline Phosphatase 64 35 - 104 U/L    AST 16 0 - 45 U/L    ALT 6 0 - 50 U/L    Protein Total 4.9 (L) 6.4 - 8.3 g/dL    Albumin 2.7 (L) 3.5 - 5.2 g/dL    Bilirubin Total 0.3 <=1.2 mg/dL    Electrolyte panel     Status: Abnormal   Result Value Ref Range    Sodium 134 (L) 135 - 145 mmol/L    Potassium 3.8 3.4 - 5.3 mmol/L    Chloride 100 98 - 107 mmol/L    Carbon Dioxide (CO2) 25 22 - 29 mmol/L    Anion Gap 9 7 - 15 mmol/L   Magnesium     Status: Normal   Result Value Ref Range    Magnesium 1.7 1.7 - 2.3 mg/dL   Phosphorus     Status: Normal   Result Value Ref Range    Phosphorus 3.9 2.5 - 4.5 mg/dL   CBC with platelets and differential     Status: Abnormal   Result Value Ref Range    WBC Count 8.1 4.0 - 11.0 10e3/uL    RBC Count 1.94 (L) 3.80 - 5.20 10e6/uL    Hemoglobin 6.5 (LL) 11.7 - 15.7 g/dL    Hematocrit 20.8 (L) 35.0 - 47.0 %     (H) 78 - 100 fL    MCH 33.5 (H) 26.5 - 33.0 pg    MCHC 31.3 (L) 31.5 - 36.5 g/dL    RDW 17.4 (H) 10.0 - 15.0 %    Platelet Count 160 150 - 450 10e3/uL    % Neutrophils 69 %    % Lymphocytes 16 %    % Monocytes 12 %    Mids % (Monos, Eos, Basos)      % Eosinophils 0 %    % Basophils 0 %    % Immature Granulocytes 3 %    NRBCs per 100 WBC 0 <1 /100    Absolute Neutrophils 5.7 1.6 - 8.3 10e3/uL    Absolute Lymphocytes 1.3 0.8 - 5.3 10e3/uL    Absolute Monocytes 0.9 0.0 - 1.3 10e3/uL    Mids Abs (Monos, Eos, Basos)      Absolute Eosinophils 0.0 0.0 - 0.7 10e3/uL    Absolute Basophils 0.0 0.0 - 0.2 10e3/uL    Absolute Immature Granulocytes 0.2 <=0.4 10e3/uL    Absolute NRBCs 0.0 10e3/uL   CBC with platelets     Status: Abnormal   Result Value Ref Range    WBC Count 8.0 4.0 - 11.0 10e3/uL    RBC Count 1.83 (L) 3.80 - 5.20 10e6/uL    Hemoglobin 6.4 (LL) 11.7 - 15.7 g/dL    Hematocrit 19.7 (L) 35.0 - 47.0 %     (H) 78 - 100 fL    MCH 35.0 (H) 26.5 - 33.0 pg    MCHC 32.5 31.5 - 36.5 g/dL    RDW 17.4 (H) 10.0 - 15.0 %    Platelet Count 159 150 - 450 10e3/uL   Hemoglobin     Status: Abnormal   Result Value Ref Range    Hemoglobin 6.1 (LL) 11.7 - 15.7 g/dL   [place] Draw *Canceled*     Status: None ()    Narrative    The following orders were created for panel order  [place] Draw.  Procedure                               Abnormality         Status                     ---------                               -----------         ------                       Please view results for these tests on the individual orders.   UA with Microscopic     Status: Abnormal   Result Value Ref Range    Color Urine Dark Yellow (A) Colorless, Straw, Light Yellow, Yellow    Appearance Urine Slightly Cloudy (A) Clear    Glucose Urine Negative Negative mg/dL    Bilirubin Urine Negative Negative    Ketones Urine Negative Negative mg/dL    Specific Gravity Urine 1.005 1.003 - 1.035    Blood Urine Small (A) Negative    pH Urine 6.5 5.0 - 7.0    Protein Albumin Urine 70 (A) Negative mg/dL    Urobilinogen Urine Normal Normal, 2.0 mg/dL    Nitrite Urine Positive (A) Negative    Leukocyte Esterase Urine Large (A) Negative    WBC Clumps Urine Present (A) None Seen /HPF    Mucus Urine Present (A) None Seen /LPF    RBC Urine 12 (H) <=2 /HPF    WBC Urine 178 (H) <=5 /HPF    Squamous Epithelials Urine 1 <=1 /HPF   UA with Microscopic     Status: Abnormal   Result Value Ref Range    Color Urine Dark Yellow (A) Colorless, Straw, Light Yellow, Yellow    Appearance Urine Cloudy (A) Clear    Glucose Urine Negative Negative mg/dL    Bilirubin Urine Negative Negative    Ketones Urine Negative Negative mg/dL    Specific Gravity Urine 1.016 1.003 - 1.035    Blood Urine Large (A) Negative    pH Urine 6.0 5.0 - 7.0    Protein Albumin Urine 100 (A) Negative mg/dL    Urobilinogen Urine Normal Normal, 2.0 mg/dL    Nitrite Urine Positive (A) Negative    Leukocyte Esterase Urine Large (A) Negative    Bacteria Urine Few (A) None Seen /HPF    WBC Clumps Urine Present (A) None Seen /HPF    Mucus Urine Present (A) None Seen /LPF    RBC Urine 68 (H) <=2 /HPF    WBC Urine >182 (H) <=5 /HPF   CBC with platelets     Status: Abnormal   Result Value Ref Range    WBC Count 7.9 4.0 - 11.0 10e3/uL    RBC Count 2.21 (L) 3.80 - 5.20 10e6/uL     Hemoglobin 7.2 (L) 11.7 - 15.7 g/dL    Hematocrit 23.0 (L) 35.0 - 47.0 %     (H) 78 - 100 fL    MCH 32.6 26.5 - 33.0 pg    MCHC 31.3 (L) 31.5 - 36.5 g/dL    RDW 17.7 (H) 10.0 - 15.0 %    Platelet Count 172 150 - 450 10e3/uL   Comprehensive metabolic panel     Status: Abnormal   Result Value Ref Range    Sodium 136 135 - 145 mmol/L    Potassium 3.6 3.4 - 5.3 mmol/L    Carbon Dioxide (CO2) 24 22 - 29 mmol/L    Anion Gap 8 7 - 15 mmol/L    Urea Nitrogen 5.3 (L) 8.0 - 23.0 mg/dL    Creatinine 0.90 0.51 - 0.95 mg/dL    GFR Estimate 70 >60 mL/min/1.73m2    Calcium 8.2 (L) 8.8 - 10.2 mg/dL    Chloride 104 98 - 107 mmol/L    Glucose 90 70 - 99 mg/dL    Alkaline Phosphatase 64 35 - 104 U/L    AST 15 0 - 45 U/L    ALT <5 0 - 50 U/L    Protein Total 5.1 (L) 6.4 - 8.3 g/dL    Albumin 2.7 (L) 3.5 - 5.2 g/dL    Bilirubin Total 0.2 <=1.2 mg/dL   Magnesium     Status: Abnormal   Result Value Ref Range    Magnesium 1.6 (L) 1.7 - 2.3 mg/dL   Phosphorus     Status: Normal   Result Value Ref Range    Phosphorus 3.4 2.5 - 4.5 mg/dL   Lactic acid whole blood     Status: Abnormal   Result Value Ref Range    Lactic Acid 0.4 (L) 0.7 - 2.0 mmol/L   CBC with platelets and differential     Status: Abnormal   Result Value Ref Range    WBC Count 7.4 4.0 - 11.0 10e3/uL    RBC Count 2.12 (L) 3.80 - 5.20 10e6/uL    Hemoglobin 7.0 (L) 11.7 - 15.7 g/dL    Hematocrit 22.3 (L) 35.0 - 47.0 %     (H) 78 - 100 fL    MCH 33.0 26.5 - 33.0 pg    MCHC 31.4 (L) 31.5 - 36.5 g/dL    RDW 18.0 (H) 10.0 - 15.0 %    Platelet Count 148 (L) 150 - 450 10e3/uL    % Neutrophils 72 %    % Lymphocytes 13 %    % Monocytes 11 %    Mids % (Monos, Eos, Basos)      % Eosinophils 0 %    % Basophils 0 %    % Immature Granulocytes 4 %    NRBCs per 100 WBC 0 <1 /100    Absolute Neutrophils 5.3 1.6 - 8.3 10e3/uL    Absolute Lymphocytes 1.0 0.8 - 5.3 10e3/uL    Absolute Monocytes 0.8 0.0 - 1.3 10e3/uL    Mids Abs (Monos, Eos, Basos)      Absolute Eosinophils 0.0 0.0 -  0.7 10e3/uL    Absolute Basophils 0.0 0.0 - 0.2 10e3/uL    Absolute Immature Granulocytes 0.3 <=0.4 10e3/uL    Absolute NRBCs 0.0 10e3/uL   Comprehensive metabolic panel     Status: Abnormal   Result Value Ref Range    Sodium 137 135 - 145 mmol/L    Potassium 3.3 (L) 3.4 - 5.3 mmol/L    Carbon Dioxide (CO2) 23 22 - 29 mmol/L    Anion Gap 9 7 - 15 mmol/L    Urea Nitrogen 4.5 (L) 8.0 - 23.0 mg/dL    Creatinine 0.81 0.51 - 0.95 mg/dL    GFR Estimate 79 >60 mL/min/1.73m2    Calcium 8.1 (L) 8.8 - 10.2 mg/dL    Chloride 105 98 - 107 mmol/L    Glucose 87 70 - 99 mg/dL    Alkaline Phosphatase 59 35 - 104 U/L    AST 14 0 - 45 U/L    ALT 5 0 - 50 U/L    Protein Total 4.7 (L) 6.4 - 8.3 g/dL    Albumin 2.5 (L) 3.5 - 5.2 g/dL    Bilirubin Total 0.2 <=1.2 mg/dL   CBC with platelets     Status: Abnormal   Result Value Ref Range    WBC Count 6.1 4.0 - 11.0 10e3/uL    RBC Count 2.13 (L) 3.80 - 5.20 10e6/uL    Hemoglobin 6.9 (LL) 11.7 - 15.7 g/dL    Hematocrit 22.3 (L) 35.0 - 47.0 %     (H) 78 - 100 fL    MCH 32.4 26.5 - 33.0 pg    MCHC 30.9 (L) 31.5 - 36.5 g/dL    RDW 17.3 (H) 10.0 - 15.0 %    Platelet Count 141 (L) 150 - 450 10e3/uL   Magnesium     Status: Abnormal   Result Value Ref Range    Magnesium 1.5 (L) 1.7 - 2.3 mg/dL   Phosphorus     Status: Normal   Result Value Ref Range    Phosphorus 3.3 2.5 - 4.5 mg/dL   Potassium     Status: Normal   Result Value Ref Range    Potassium 3.5 3.4 - 5.3 mmol/L   Magnesium     Status: Abnormal   Result Value Ref Range    Magnesium 1.5 (L) 1.7 - 2.3 mg/dL   Potassium     Status: Normal   Result Value Ref Range    Potassium 3.4 3.4 - 5.3 mmol/L   Magnesium     Status: Normal   Result Value Ref Range    Magnesium 2.3 1.7 - 2.3 mg/dL   CBC with platelets     Status: Abnormal   Result Value Ref Range    WBC Count 10.5 4.0 - 11.0 10e3/uL    RBC Count 2.61 (L) 3.80 - 5.20 10e6/uL    Hemoglobin 8.7 (L) 11.7 - 15.7 g/dL    Hematocrit 26.5 (L) 35.0 - 47.0 %     (H) 78 - 100 fL    MCH  33.3 (H) 26.5 - 33.0 pg    MCHC 32.8 31.5 - 36.5 g/dL    RDW 16.5 (H) 10.0 - 15.0 %    Platelet Count 169 150 - 450 10e3/uL   Lactic acid whole blood     Status: Abnormal   Result Value Ref Range    Lactic Acid 0.5 (L) 0.7 - 2.0 mmol/L   Lactate Dehydrogenase     Status: Normal   Result Value Ref Range    Lactate Dehydrogenase 228 0 - 250 U/L   Haptoglobin     Status: Normal   Result Value Ref Range    Haptoglobin 116 32 - 197 mg/dL   Basic metabolic panel     Status: Abnormal   Result Value Ref Range    Sodium 135 135 - 145 mmol/L    Potassium 4.0 3.4 - 5.3 mmol/L    Chloride 102 98 - 107 mmol/L    Carbon Dioxide (CO2) 23 22 - 29 mmol/L    Anion Gap 10 7 - 15 mmol/L    Urea Nitrogen 3.3 (L) 8.0 - 23.0 mg/dL    Creatinine 0.73 0.51 - 0.95 mg/dL    GFR Estimate 90 >60 mL/min/1.73m2    Calcium 7.9 (L) 8.8 - 10.2 mg/dL    Glucose 88 70 - 99 mg/dL   Magnesium     Status: Normal   Result Value Ref Range    Magnesium 2.0 1.7 - 2.3 mg/dL   CBC with platelets and differential     Status: Abnormal   Result Value Ref Range    WBC Count 10.5 4.0 - 11.0 10e3/uL    RBC Count 2.61 (L) 3.80 - 5.20 10e6/uL    Hemoglobin 8.7 (L) 11.7 - 15.7 g/dL    Hematocrit 26.5 (L) 35.0 - 47.0 %     (H) 78 - 100 fL    MCH 33.3 (H) 26.5 - 33.0 pg    MCHC 32.8 31.5 - 36.5 g/dL    RDW 16.5 (H) 10.0 - 15.0 %    Platelet Count 169 150 - 450 10e3/uL    % Neutrophils 76 %    % Lymphocytes 10 %    % Monocytes 11 %    Mids % (Monos, Eos, Basos)      % Eosinophils 0 %    % Basophils 0 %    % Immature Granulocytes 3 %    NRBCs per 100 WBC 0 <1 /100    Absolute Neutrophils 8.1 1.6 - 8.3 10e3/uL    Absolute Lymphocytes 1.0 0.8 - 5.3 10e3/uL    Absolute Monocytes 1.2 0.0 - 1.3 10e3/uL    Mids Abs (Monos, Eos, Basos)      Absolute Eosinophils 0.0 0.0 - 0.7 10e3/uL    Absolute Basophils 0.0 0.0 - 0.2 10e3/uL    Absolute Immature Granulocytes 0.3 <=0.4 10e3/uL    Absolute NRBCs 0.0 10e3/uL   Symptomatic COVID-19 Virus (Coronavirus) by PCR Nasopharyngeal      Status: Normal    Specimen: Nasopharyngeal; Swab   Result Value Ref Range    SARS CoV2 PCR Negative Negative    Narrative    Testing was performed using the Xpert Xpress SARS-CoV-2 Assay on the [employer] Gene[employer]Xpert Instrument Systems. Additional information about this Emergency Use Authorization (EUA) assay can be found via the Lab Guide. This test should be ordered for the detection of SARS-CoV-2 in individuals who meet SARS-CoV-2 clinical and/or epidemiological criteria as well as from individuals without symptoms or other reasons to suspect COVID-19. Test performance for asymptomatic patients has only been established in anterior nasal swab specimens. This test is for in vitro diagnostic use under the FDA EUA for laboratories certified under CLIA to perform high complexity testing. This test has not been FDA cleared or approved. A negative result does not rule out the presence of PCR inhibitors in the specimen or target RNA concentration below the limit of detection for the assay. The possibility of a false negative should be considered if the patient's recent exposure or clinical presentation suggests COVID-19. This test was validated by [facility]. These Laboratories are certified under the Clinical Laboratory Improvement Amendments (CLIA) as qualified to perform high complexity testing.     Potassium     Status: Normal   Result Value Ref Range    Potassium 3.6 3.4 - 5.3 mmol/L   CBC with platelets and differential     Status: Abnormal   Result Value Ref Range    WBC Count 12.3 (H) 4.0 - 11.0 10e3/uL    RBC Count 2.99 (L) 3.80 - 5.20 10e6/uL    Hemoglobin 9.9 (L) 11.7 - 15.7 g/dL    Hematocrit 30.2 (L) 35.0 - 47.0 %     (H) 78 - 100 fL    MCH 33.1 (H) 26.5 - 33.0 pg    MCHC 32.8 31.5 - 36.5 g/dL    RDW 17.0 (H) 10.0 - 15.0 %    Platelet Count 228 150 - 450 10e3/uL    % Neutrophils 78 %    % Lymphocytes 9 %    % Monocytes 10 %    Mids % (Monos, Eos, Basos)      % Eosinophils 0 %    %  Basophils 0 %    % Immature Granulocytes 3 %    NRBCs per 100 WBC 0 <1 /100    Absolute Neutrophils 9.6 (H) 1.6 - 8.3 10e3/uL    Absolute Lymphocytes 1.1 0.8 - 5.3 10e3/uL    Absolute Monocytes 1.2 0.0 - 1.3 10e3/uL    Mids Abs (Monos, Eos, Basos)      Absolute Eosinophils 0.0 0.0 - 0.7 10e3/uL    Absolute Basophils 0.0 0.0 - 0.2 10e3/uL    Absolute Immature Granulocytes 0.4 <=0.4 10e3/uL    Absolute NRBCs 0.0 10e3/uL   Lactic Acid STAT     Status: Normal   Result Value Ref Range    Lactic Acid 1.3 0.7 - 2.0 mmol/L   Magnesium     Status: Abnormal   Result Value Ref Range    Magnesium 1.6 (L) 1.7 - 2.3 mg/dL   CBC with platelets     Status: Abnormal   Result Value Ref Range    WBC Count 12.8 (H) 4.0 - 11.0 10e3/uL    RBC Count 2.93 (L) 3.80 - 5.20 10e6/uL    Hemoglobin 9.6 (L) 11.7 - 15.7 g/dL    Hematocrit 29.0 (L) 35.0 - 47.0 %    MCV 99 78 - 100 fL    MCH 32.8 26.5 - 33.0 pg    MCHC 33.1 31.5 - 36.5 g/dL    RDW 16.2 (H) 10.0 - 15.0 %    Platelet Count 205 150 - 450 10e3/uL   Basic metabolic panel     Status: Abnormal   Result Value Ref Range    Sodium 132 (L) 135 - 145 mmol/L    Potassium 3.5 3.4 - 5.3 mmol/L    Chloride 99 98 - 107 mmol/L    Carbon Dioxide (CO2) 21 (L) 22 - 29 mmol/L    Anion Gap 12 7 - 15 mmol/L    Urea Nitrogen 6.1 (L) 8.0 - 23.0 mg/dL    Creatinine 0.85 0.51 - 0.95 mg/dL    GFR Estimate 75 >60 mL/min/1.73m2    Calcium 7.9 (L) 8.8 - 10.2 mg/dL    Glucose 87 70 - 99 mg/dL   CRP inflammation     Status: Abnormal   Result Value Ref Range    CRP Inflammation 181.00 (H) <5.00 mg/L   Phosphorus     Status: Normal   Result Value Ref Range    Phosphorus 3.4 2.5 - 4.5 mg/dL   Magnesium     Status: Normal   Result Value Ref Range    Magnesium 1.7 1.7 - 2.3 mg/dL   Basic metabolic panel     Status: Abnormal   Result Value Ref Range    Sodium 132 (L) 135 - 145 mmol/L    Potassium 3.5 3.4 - 5.3 mmol/L    Chloride 98 98 - 107 mmol/L    Carbon Dioxide (CO2) 22 22 - 29 mmol/L    Anion Gap 12 7 - 15 mmol/L     Urea Nitrogen 7.3 (L) 8.0 - 23.0 mg/dL    Creatinine 0.78 0.51 - 0.95 mg/dL    GFR Estimate 83 >60 mL/min/1.73m2    Calcium 7.8 (L) 8.8 - 10.2 mg/dL    Glucose 85 70 - 99 mg/dL   CBC with platelets     Status: Abnormal   Result Value Ref Range    WBC Count 13.9 (H) 4.0 - 11.0 10e3/uL    RBC Count 2.69 (L) 3.80 - 5.20 10e6/uL    Hemoglobin 8.8 (L) 11.7 - 15.7 g/dL    Hematocrit 27.1 (L) 35.0 - 47.0 %     (H) 78 - 100 fL    MCH 32.7 26.5 - 33.0 pg    MCHC 32.5 31.5 - 36.5 g/dL    RDW 15.9 (H) 10.0 - 15.0 %    Platelet Count 201 150 - 450 10e3/uL   INR     Status: Abnormal   Result Value Ref Range    INR 1.38 (H) 0.85 - 1.15   CRP inflammation     Status: Abnormal   Result Value Ref Range    CRP Inflammation 226.00 (H) <5.00 mg/L   Adult Type and Screen     Status: None   Result Value Ref Range    ABO/RH(D) O POS     Antibody Screen Negative Negative    SPECIMEN EXPIRATION DATE [id number]    Prepare red blood cells (unit)     Status: None   Result Value Ref Range    Blood Component Type Red Blood Cells     Product Code A9193U00     Unit Status Transfused     Unit Number [id number]     CROSSMATCH COMPATIBLE     CODING SYSTEM [id number]     ISSUE DATE AND TIME [id number]     UNIT ABO/RH O+     UNIT TYPE ISBT 5100    Prepare red blood cells (unit)     Status: None   Result Value Ref Range    Blood Component Type Red Blood Cells     Product Code Q8367P52     Unit Status Transfused     Unit Number [id number]     CROSSMATCH COMPATIBLE     CODING SYSTEM [id number]     ISSUE DATE AND TIME [id number]     UNIT ABO/RH O+     UNIT TYPE ISBT 5100    Blood Culture Peripheral Blood     Status: Normal    Specimen: Peripheral Blood   Result Value Ref Range    Culture No Growth    Blood Culture Peripheral Blood     Status: Normal    Specimen: Peripheral Blood   Result Value Ref Range    Culture No Growth    Enteric Bacteria and Virus Panel PCR     Status: Normal    Specimen: Per Rectum; Stool   Result Value Ref Range     Campylobacter species Negative Negative    Salmonella species Negative Negative    Vibrio species Negative Negative    Vibrio cholerae Negative Negative    Yersinia enterocolitica Negative Negative    Enteropathogenic E. coli (EPEC) Negative Negative, NA    Shiga-like toxin-producing E. coli (STEC) Negative Negative    Shigella/Enteroinvasive E. coli (EIEC) Negative Negative    Cryptosporidium species Negative Negative    Giardia lamblia Negative Negative    Norovirus Gl/Gll Negative Negative    Rotavirus A Negative Negative    Plesiomonas shigelloides Negative Negative    Enteroaggregative E. coli (EAEC) Negative Negative    Enterotoxigenic E. coli (ETEC) Negative Negative    E. coli O157 NA Negative, NA    Cyclospora cayetanensis Negative Negative    Entamoeba histolytica Negative Negative    Adenovirus F40/41 Negative Negative    Astrovirus Negative Negative    Sapovirus Negative Negative    Narrative    Assay performed using the FDA-cleared FilmArray GI Panel from [employer]  A negative result should not rule out infection in patients with a probability for gastrointestinal infection. The assay does not test for all potential infectious agents of diarrheal disease.  Positive results do not distinguish between a viable or replicating organism and the presence of a nonviable organism or nucleic acid, nor do they exclude the possibility of coinfection by organisms not in the panel.  Results are intended to aid in the diagnosis of illness and are meant to be used in conjunction with other clinical findings.  This test has been verified and is performed by the [facility] at [facility]. This laboratory is certified under the Clinical Laboratory Improvement Amendments of 1988 (CLIA-88) as qualified to perform high complexity clinical laboratory testing.   C. difficile Toxin B PCR with reflex to C. difficile Antigen and Toxins A/B EIA     Status: Abnormal     Specimen: Per Rectum; Stool   Result Value Ref Range    C Difficile Toxin B by PCR Positive (A) Negative    Narrative    The [employer] Xpert C. difficile Assay, performed on the [employer]  Instrument Systems, is a qualitative in vitro diagnostic test for rapid detection of toxin B gene sequences from unformed (liquid or soft) stool specimens collected from patients suspected of having Clostridioides difficile infection (CDI). The test utilizes automated real-time polymerase chain reaction (PCR) to detect toxin gene sequences associated with toxin producing C. difficile. The Xpert C. difficile Assay is intended as an aid in the diagnosis of CDI.   Urine Culture     Status: Abnormal    Specimen: Urine, Thomas Catheter   Result Value Ref Range    Culture >100,000 CFU/mL Serratia marcescens (A)        Susceptibility    Serratia marcescens - HUA*     Ampicillin*  Resistant       * Intrinsically Resistant     Ampicillin/ Sulbactam*  Resistant       * Intrinsically Resistant     Piperacillin/Tazobactam  Susceptible ug/mL     Cefazolin*  Resistant       * Cefazolin HUA breakpoints are for the treatment of uncomplicated urinary tract infections. For the treatment of systemic infections, please contact the laboratory for additional testing.  Intrinsically Resistant     Cefoxitin*  Resistant       * Intrinsically Resistant     Ceftazidime  Susceptible ug/mL     Ceftriaxone  Susceptible ug/mL     Cefepime  Susceptible ug/mL     Gentamicin  Susceptible ug/mL     Tobramycin  Susceptible ug/mL     Ciprofloxacin  Susceptible ug/mL     Levofloxacin  Susceptible ug/mL     Nitrofurantoin*  Resistant ug/mL      * Intrinsically Resistant     Trimethoprim/Sulfamethoxazole  Susceptible ug/mL     *      C. difficile Antigen and Toxins A/B by Enzyme Immunoassay     Status: Abnormal    Specimen: Per Rectum; Stool   Result Value Ref Range    C. difficile GDH Antigen Positive (A) Negative    C. difficile Toxin Positive (A) Negative     Narrative    C. difficile GDH antigen and C. difficile toxin were detected by enzyme immunoassay. Results must be interpreted based on clinical findings and are supportive of C. difficile infection.   Urine Culture     Status: Abnormal    Specimen: Urine, Thomas Catheter   Result Value Ref Range    Culture >100,000 CFU/mL Serratia marcescens (A)    Respiratory Panel PCR     Status: Normal    Specimen: Nasopharyngeal; Swab   Result Value Ref Range    Adenovirus Not Detected Not Detected    Coronavirus Not Detected Not Detected    Human Metapneumovirus Not Detected Not Detected    Human Rhin/Enterovirus Not Detected Not Detected    Influenza A Not Detected Not Detected    Influenza A, H1 Not Detected Not Detected    Influenza A 2009 H1N1 Not Detected Not Detected    Influenza A, H3 Not Detected Not Detected    Influenza B Not Detected Not Detected    Parainfluenza Virus 1 Not Detected Not Detected    Parainfluenza Virus 2 Not Detected Not Detected    Parainfluenza Virus 3 Not Detected Not Detected    Parainfluenza Virus 4 Not Detected Not Detected    Respiratory Syncytial Virus A Not Detected Not Detected    Respiratory Syncytial Virus B Not Detected Not Detected    Chlamydia Pneumoniae Not Detected Not Detected    Mycoplasma Pneumoniae Not Detected Not Detected    Narrative    The [employer] Respiratory Panel is a qualitative nucleic acid, multiplex, in vitro diagnostic test for the simultaneous detection and identification of multiple respiratory viral and bacterial nucleic acids in nasopharyngeal swabs collected in viral transport media from individual exhibiting signs and symptoms of respiratory infection. The assay has received FDA approval for the testing of nasopharyngeal (NP) swabs only. The [facility] at [facility] has validated the performance characteristics for bronchial alveolar lavage specimens. This test is used for clinical purposes and should not be regarded as  investigational or for research. This laboratory is certified under the Clinical Laboratory Improvement Amendments of 1988 (CLIA-88) as qualified to perform high complexity clinical laboratory testing.    Blood Culture Hand, Right     Status: Normal (Preliminary result)    Specimen: Hand, Right; Blood   Result Value Ref Range    Culture No growth after 2 days    Blood Culture Arm, Right     Status: Normal (Preliminary result)    Specimen: Arm, Right; Blood   Result Value Ref Range    Culture No growth after 2 days    CBC with platelets differential     Status: Abnormal    Narrative    The following orders were created for panel order CBC with platelets differential.  Procedure                               Abnormality         Status                     ---------                               -----------         ------                     CBC with platelets and d...[[id number]]  Abnormal            Final result                 Please view results for these tests on the individual orders.   CBC with Platelets & Differential     Status: Abnormal    Narrative    The following orders were created for panel order CBC with Platelets & Differential.  Procedure                               Abnormality         Status                     ---------                               -----------         ------                     CBC with platelets and d...[[id number]]  Abnormal            Final result                 Please view results for these tests on the individual orders.   ABO/Rh type and screen     Status: None    Narrative    The following orders were created for panel order ABO/Rh type and screen.  Procedure                               Abnormality         Status                     ---------                               -----------         ------                     Adult Type and Screen[[id number]]                            Final result                 Please view results for these tests on the individual orders.   CBC  with Platelets & Differential     Status: Abnormal    Narrative    The following orders were created for panel order CBC with Platelets & Differential.  Procedure                               Abnormality         Status                     ---------                               -----------         ------                     CBC with platelets and d...[[id number]]  Abnormal            Final result                 Please view results for these tests on the individual orders.   CBC with Platelets & Differential     Status: Abnormal    Narrative    The following orders were created for panel order CBC with Platelets & Differential.  Procedure                               Abnormality         Status                     ---------                               -----------         ------                     CBC with platelets and d...[[id number]]  Abnormal            Final result                 Please view results for these tests on the individual orders.   CBC with Platelets & Differential     Status: Abnormal    Narrative    The following orders were created for panel order CBC with Platelets & Differential.  Procedure                               Abnormality         Status                     ---------                               -----------         ------                     CBC with platelets and d...[[id number]]  Abnormal            Final result                 Please view results for these tests on the individual orders.     Medications   melatonin tablet 1 mg ( Oral Unhold 10/13/23 0939)   ondansetron (ZOFRAN ODT) ODT tab 4 mg ( Oral Unhold 10/13/23 0939)     Or   ondansetron (ZOFRAN) injection 4 mg ( Intravenous Unhold 10/13/23 0939)   prochlorperazine (COMPAZINE) injection 5 mg ( Intravenous Unhold 10/13/23 0939)     Or   prochlorperazine (COMPAZINE) tablet 5 mg ( Oral Unhold 10/13/23 0939)     Or   prochlorperazine (COMPAZINE) suppository 12.5 mg ( Rectal Unhold 10/13/23 0939)   acetaminophen (TYLENOL) tablet  500-1,000 mg ( Oral Unhold 10/13/23 0939)   amLODIPine (NORVASC) tablet 5 mg ( Oral Unhold 10/13/23 0939)   phenazopyridine (PYRIDIUM) tablet 200 mg ( Oral Unhold 10/13/23 0939)   tolterodine (DETROL) tablet 2 mg ( Oral Unhold 10/13/23 0939)   ibuprofen (ADVIL/MOTRIN) tablet 400 mg ( Oral Unhold 10/13/23 0939)   enoxaparin ANTICOAGULANT (LOVENOX) injection 40 mg (40 mg Subcutaneous $Given 10/13/23 1155)   oxyCODONE (ROXICODONE) tablet 5 mg (5 mg Oral $Given 10/13/23 1540)   oxyCODONE (ROXICODONE) tablet 5 mg ( Oral Unhold 10/13/23 0939)   simethicone (MYLICON) chewable tablet 80 mg ( Oral Unhold 10/13/23 0939)   sulfamethoxazole-trimethoprim (BACTRIM DS) 800-160 MG per tablet 1 tablet ( Oral Unhold 10/13/23 0939)   vancomycin (VANCOCIN) capsule 500 mg (500 mg Oral $Given 10/13/23 1154)   sodium chloride 0.9 % infusion ( Intravenous $New Bag 10/13/23 1504)   fentaNYL (PF) (SUBLIMAZE) injection (25 mcg Intravenous $Given 10/13/23 0943)   midazolam (VERSED) injection (1 mg Intravenous $Given 10/13/23 0943)   magnesium chloride CR tablet 535 mg (535 mg Oral Not Given 10/13/23 1335)   metoclopramide (REGLAN) injection 5 mg (5 mg Intravenous $Given 10/7/23 1510)   sodium chloride 0.9% BOLUS 1,000 mL (0 mLs Intravenous Stopped 10/7/23 1825)   iopamidol (ISOVUE-370) solution 126 mL (126 mLs Intravenous $Given 10/7/23 1603)   sodium chloride (PF) 0.9% PF flush 81 mL (81 mLs Intravenous $Given 10/7/23 1603)   HYDROmorphone (PF) (DILAUDID) injection 0.3 mg (0.3 mg Intravenous $Given 10/7/23 2026)   sodium chloride 0.9% BOLUS 500 mL (0 mLs Intravenous Stopped 10/8/23 0236)   lidocaine (XYLOCAINE) 2 % external gel ( Urethral $Given 10/8/23 1217)   potassium chloride 10 mEq in 100 mL sterile water infusion ( Intravenous Canceled Entry 10/10/23 1620)   magnesium sulfate 4 g in 100 mL sterile water intermittent infusion (4 g Intravenous $New Bag 10/10/23 1704)   lactated ringers BOLUS 500 mL (500 mLs Intravenous $New Bag 10/10/23  2047)   potassium chloride 10 mEq in 100 mL sterile water infusion (10 mEq Intravenous $New Bag 10/11/23 9081)   lactated ringers BOLUS 500 mL (500 mLs Intravenous $New Bag 10/10/23 0759)     Labs Ordered and Resulted from Time of ED Arrival to Time of ED Departure   COMPREHENSIVE METABOLIC PANEL - Abnormal       Result Value    Sodium 134 (*)     Potassium 3.7      Carbon Dioxide (CO2) 27      Anion Gap 12      Urea Nitrogen 9.7      Creatinine 1.11 (*)     GFR Estimate 54 (*)     Calcium 8.9      Chloride 95 (*)     Glucose 122 (*)     Alkaline Phosphatase 83      AST 23      ALT 10      Protein Total 6.2 (*)     Albumin 3.5      Bilirubin Total 0.5     LIPASE - Abnormal    Lipase 11 (*)    CBC WITH PLATELETS AND DIFFERENTIAL - Abnormal    WBC Count 10.0      RBC Count 2.38 (*)     Hemoglobin 8.1 (*)     Hematocrit 25.9 (*)      (*)     MCH 34.0 (*)     MCHC 31.3 (*)     RDW 17.8 (*)     Platelet Count 233      % Neutrophils 77      % Lymphocytes 11      % Monocytes 9      Mids % (Monos, Eos, Basos)        % Eosinophils 0      % Basophils 0      % Immature Granulocytes 3      NRBCs per 100 WBC 0      Absolute Neutrophils 7.7      Absolute Lymphocytes 1.1      Absolute Monocytes 0.9      Mids Abs (Monos, Eos, Basos)        Absolute Eosinophils 0.0      Absolute Basophils 0.0      Absolute Immature Granulocytes 0.3      Absolute NRBCs 0.0     ROUTINE UA WITH MICROSCOPIC REFLEX TO CULTURE - Abnormal    Color Urine Dark Yellow (*)     Appearance Urine Slightly Cloudy (*)     Glucose Urine Negative      Bilirubin Urine Negative      Ketones Urine Negative      Specific Gravity Urine 1.005      Blood Urine Small (*)     pH Urine 6.5      Protein Albumin Urine 70 (*)     Urobilinogen Urine Normal      Nitrite Urine Positive (*)     Leukocyte Esterase Urine Large (*)     WBC Clumps Urine Present (*)     Mucus Urine Present (*)     RBC Urine 12 (*)     WBC Urine 178 (*)     Squamous Epithelials Urine 1      COMPREHENSIVE METABOLIC PANEL - Abnormal    Sodium 134 (*)     Potassium 3.8      Carbon Dioxide (CO2) 25      Anion Gap 9      Urea Nitrogen 8.3      Creatinine 1.03 (*)     GFR Estimate 59 (*)     Calcium 8.0 (*)     Chloride 100      Glucose 96      Alkaline Phosphatase 64      AST 16      ALT 6      Protein Total 4.9 (*)     Albumin 2.7 (*)     Bilirubin Total 0.3     ELECTROLYTE PANEL - Abnormal    Sodium 134 (*)     Potassium 3.8      Chloride 100      Carbon Dioxide (CO2) 25      Anion Gap 9     CBC WITH PLATELETS AND DIFFERENTIAL - Abnormal    WBC Count 8.1      RBC Count 1.94 (*)     Hemoglobin 6.5 (*)     Hematocrit 20.8 (*)      (*)     MCH 33.5 (*)     MCHC 31.3 (*)     RDW 17.4 (*)     Platelet Count 160      % Neutrophils 69      % Lymphocytes 16      % Monocytes 12      Mids % (Monos, Eos, Basos)        % Eosinophils 0      % Basophils 0      % Immature Granulocytes 3      NRBCs per 100 WBC 0      Absolute Neutrophils 5.7      Absolute Lymphocytes 1.3      Absolute Monocytes 0.9      Mids Abs (Monos, Eos, Basos)        Absolute Eosinophils 0.0      Absolute Basophils 0.0      Absolute Immature Granulocytes 0.2      Absolute NRBCs 0.0     CBC WITH PLATELETS - Abnormal    WBC Count 8.0      RBC Count 1.83 (*)     Hemoglobin 6.4 (*)     Hematocrit 19.7 (*)      (*)     MCH 35.0 (*)     MCHC 32.5      RDW 17.4 (*)     Platelet Count 159     HEMOGLOBIN - Abnormal    Hemoglobin 6.1 (*)    ROUTINE UA WITH MICROSCOPIC - Abnormal    Color Urine Dark Yellow (*)     Appearance Urine Slightly Cloudy (*)     Glucose Urine Negative      Bilirubin Urine Negative      Ketones Urine Negative      Specific Gravity Urine 1.005      Blood Urine Small (*)     pH Urine 6.5      Protein Albumin Urine 70 (*)     Urobilinogen Urine Normal      Nitrite Urine Positive (*)     Leukocyte Esterase Urine Large (*)     WBC Clumps Urine Present (*)     Mucus Urine Present (*)     RBC Urine 12 (*)     WBC Urine 178 (*)      Squamous Epithelials Urine 1     C. DIFFICILE TOXIN B PCR WITH REFLEX TO C. DIFFICILE ANTIGEN AND TOXINS A/B EIA - Abnormal    C Difficile Toxin B by PCR Positive (*)    C. DIFFICILE ANTIGEN AND TOXINS A/B BY ENZYME IMMUNOASSAY - Abnormal    C. difficile GDH Antigen Positive (*)     C. difficile Toxin Positive (*)    MAGNESIUM - Normal    Magnesium 1.7     PHOSPHORUS - Normal    Phosphorus 3.8     MAGNESIUM - Normal    Magnesium 1.7     PHOSPHORUS - Normal    Phosphorus 3.9     ENTERIC BACTERIA AND VIRUS PANEL BY PCR - Normal    Campylobacter species Negative      Salmonella species Negative      Vibrio species Negative      Vibrio cholerae Negative      Yersinia enterocolitica Negative      Enteropathogenic E. coli (EPEC) Negative      Shiga-like toxin-producing E. coli (STEC) Negative      Shigella/Enteroinvasive E. coli (EIEC) Negative      Cryptosporidium species Negative      Giardia lamblia Negative      Norovirus Gl/Gll Negative      Rotavirus A Negative      Plesiomonas shigelloides Negative      Enteroaggregative E. coli (EAEC) Negative      Enterotoxigenic E. coli (ETEC) Negative      E. coli O157 NA      Cyclospora cayetanensis Negative      Entamoeba histolytica Negative      Adenovirus F40/41 Negative      Astrovirus Negative      Sapovirus Negative     TYPE AND SCREEN, ADULT    ABO/RH(D) O POS      Antibody Screen Negative      SPECIMEN EXPIRATION DATE [id number]     PREPARE RED BLOOD CELLS (UNIT)    Blood Component Type Red Blood Cells      Product Code G4039U65      Unit Status Transfused      Unit Number [id number]      CROSSMATCH COMPATIBLE      CODING SYSTEM FQFS080      ISSUE DATE AND TIME 20231008113800      UNIT ABO/RH O+      UNIT TYPE ISBT 5100     PREPARE RED BLOOD CELLS (UNIT)   ABO/RH TYPE AND SCREEN     XR Abdomen Port 1 View   Final Result   IMPRESSION:    1. No radiographic evidence for toxic megacolon.   2. Multiple mildly dilated air-filled loops of small and large bowel  "  without evidence of obstruction. Follow-up recommended.   3. Moderate colonic stool burden.      I have personally reviewed the examination and initial interpretation   and I agree with the findings.      DONITA ONEAL MD            SYSTEM ID:  [id number]      XR Chest Port 1 View   Final Result   Impression: No acute radiographic abnormality suggestive of pneumonia.         I have personally reviewed the examination and initial interpretation   and I agree with the findings.      DONITA ONEAL MD            SYSTEM ID:  [id number]      US Lower Extremity Venous Duplex Bilateral   Final Result   IMPRESSION: No deep venous thrombosis demonstrated in either leg.   Subcutaneous edema in the right ankle.      Reference: \"Duplex Ultrasound in the Diagnosis of Lower-Extremity Deep   Venous Thrombosis\"- Yaz De La Rosa MD, S; Vijay Kulkarni MD   (Circulation. 2014;129:917-921. http://circ.ahajournals.org)      JERRY PICKETT MD            SYSTEM ID:  [id number]      CT Abdomen Pelvis w Contrast   Final Result   IMPRESSION:    1. Bowel wall thickening and surrounding inflammation involving the   rectum and sigmoid colon, concerning for colitis.   2. Stable 3mm pulmonary nodule.   2. Wall thickening and surrounding fat stranding with small amount of   air within the urinary bladder (favored to be related to   catheterization). Findings are nonspecific and may be seen in setting   of cystitis.      I have personally reviewed the examination and initial interpretation   and I agree with the findings.      MARIO HERNANDEZ MD            SYSTEM ID:  [id number]             Critical care was not performed.     Medical Decision Making  The patient's presentation was of high complexity (a chronic illness severe exacerbation, progression, or side effect of treatment).    The patient's evaluation involved:  review of 1 test result(s) ordered prior to this encounter (see separate area of note for details)  ordering and/or review of 3+ " test(s) in this encounter (see separate area of note for details)  discussion of management or test interpretation with another health professional (see separate area of note for details)    The patient's management necessitated high risk (a decision regarding hospitalization).    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  Patient is tachycardic on arrival but otherwise nontoxic appearing and has other vital signs within normal limits. With recent history of vomiting and diarrhea, suspect dehydration as contributing component and IV fluids were started. Patient has indwelling gonsalez catheter; difficult to ascertain whether evidence of bacteria in the urine from the catheter represents colonization vs acute infection, however in the setting of abdominal pain we will obtain a urine sample and culture for medicine and/or infectious disease to further assess as needed. Workup with labs including CBC, CMP, lipase, blood cultures, urinalysis + cultures. Creatinine today mildly increased from prior labs obtained here. CT abd/pelv obtained and stool studies to evaluate for c diff recurrence. CT showed evidence of colitis. In light of patient's comorbidities and severely worsening diarrhea and pain with dehydration secondary to vomiting, diarrhea, and poor PO intake, we will admit the patient for further care. Inpatient team to follow up c diff results and treat as needed, which was discussed with admission team.     I have reviewed the nursing notes. I have reviewed the findings, diagnosis, plan and need for follow up with the patient.    Current Discharge Medication List          Final diagnoses:   Colitis   Nausea and vomiting, unspecified vomiting type       Andrea  MD Praveen  [facility] EMERGENCY DEPARTMENT  10/7/2023     Andrea Morton MD  10/13/23 0200

## 2023-10-07 NOTE — ED NOTES
Bed: [facility]-F  Expected date:   Expected time:   Means of arrival:   Comments:  MICHAELA APPROPRIATE

## 2023-10-07 NOTE — LETTER
Transition Communication Hand-off for Care Transitions to Next Level of Care Provider    Name: Jessie Tamayo  : 1955  MRN #: [id number]  Primary Care Provider: Alba Layton     Primary Clinic: [facility] [address]     Reason for Hospitalization:  Colitis [K52.9]  Nausea and vomiting, unspecified vomiting type [R11.2]  Admit Date/Time: 10/7/2023  2:08 PM  Discharge Date: 10/10/23  Payor Source: Payor: MEDICA / Plan: [employer] / Product Type: HMO /          Reason for Communication Hand-off Referral: Continuity of Care    Discharge Plan: Home       Concern for non-adherence with plan of care:   No  Discharge Needs Assessment:  Needs      Flowsheet Row Most Recent Value   Equipment Currently Used at Home shower chair            Follow-up plan:    Future Appointments   Date Time Provider Department Center   10/10/2023  1:45 PM Kay Karimi, PT [facility]   2023 10:00 AM Marisa Pacheco MD [facility]       Any outstanding tests or procedures:              Key Recommendations:      Asher Kat RN    AVS/Discharge Summary is the source of truth; this is a helpful guide for improved communication of patient story

## 2023-10-07 NOTE — ED TRIAGE NOTES
Coming in with a three day history of N/V and now diarrhea. Today unable to keep anything down. Abdominal pain is generalized.     Triage Assessment       Row Name 10/07/23 1400       Triage Assessment (Adult)    Airway WDL WDL       Respiratory WDL    Respiratory WDL WDL       Skin Circulation/Temperature WDL    Skin Circulation/Temperature WDL WDL

## 2023-10-08 LAB
ABO/RH(D): NORMAL
ADV 40+41 DNA STL QL NAA+NON-PROBE: NEGATIVE
ALBUMIN SERPL BCG-MCNC: 2.7 G/DL (ref 3.5–5.2)
ALBUMIN UR-MCNC: 100 MG/DL
ALBUMIN UR-MCNC: 70 MG/DL
ALP SERPL-CCNC: 64 U/L (ref 35–104)
ALT SERPL W P-5'-P-CCNC: 6 U/L (ref 0–50)
ANION GAP SERPL CALCULATED.3IONS-SCNC: 9 MMOL/L (ref 7–15)
ANION GAP SERPL CALCULATED.3IONS-SCNC: 9 MMOL/L (ref 7–15)
ANTIBODY SCREEN: NEGATIVE
APPEARANCE UR: ABNORMAL
APPEARANCE UR: ABNORMAL
AST SERPL W P-5'-P-CCNC: 16 U/L (ref 0–45)
ASTRO TYP 1-8 RNA STL QL NAA+NON-PROBE: NEGATIVE
BACTERIA #/AREA URNS HPF: ABNORMAL /HPF
BASO+EOS+MONOS # BLD AUTO: ABNORMAL 10*3/UL
BASO+EOS+MONOS NFR BLD AUTO: ABNORMAL %
BASOPHILS # BLD AUTO: 0 10E3/UL (ref 0–0.2)
BASOPHILS NFR BLD AUTO: 0 %
BILIRUB SERPL-MCNC: 0.3 MG/DL
BILIRUB UR QL STRIP: NEGATIVE
BILIRUB UR QL STRIP: NEGATIVE
BLD PROD TYP BPU: NORMAL
BLOOD COMPONENT TYPE: NORMAL
BUN SERPL-MCNC: 8.3 MG/DL (ref 8–23)
C CAYETANENSIS DNA STL QL NAA+NON-PROBE: NEGATIVE
C DIFF GDH STL QL IA: POSITIVE
C DIFF TOX A+B STL QL IA: POSITIVE
C DIFF TOX B STL QL: POSITIVE
CALCIUM SERPL-MCNC: 8 MG/DL (ref 8.8–10.2)
CAMPYLOBACTER DNA SPEC NAA+PROBE: NEGATIVE
CHLORIDE SERPL-SCNC: 100 MMOL/L (ref 98–107)
CHLORIDE SERPL-SCNC: 100 MMOL/L (ref 98–107)
CODING SYSTEM: NORMAL
COLOR UR AUTO: ABNORMAL
COLOR UR AUTO: ABNORMAL
CREAT SERPL-MCNC: 1.03 MG/DL (ref 0.51–0.95)
CROSSMATCH: NORMAL
CRYPTOSP DNA STL QL NAA+NON-PROBE: NEGATIVE
DEPRECATED HCO3 PLAS-SCNC: 25 MMOL/L (ref 22–29)
DEPRECATED HCO3 PLAS-SCNC: 25 MMOL/L (ref 22–29)
E COLI O157 DNA STL QL NAA+NON-PROBE: NORMAL
E HISTOLYT DNA STL QL NAA+NON-PROBE: NEGATIVE
EAEC ASTA GENE ISLT QL NAA+PROBE: NEGATIVE
EC STX1+STX2 GENES STL QL NAA+NON-PROBE: NEGATIVE
EGFRCR SERPLBLD CKD-EPI 2021: 59 ML/MIN/1.73M2
EOSINOPHIL # BLD AUTO: 0 10E3/UL (ref 0–0.7)
EOSINOPHIL NFR BLD AUTO: 0 %
EPEC EAE GENE STL QL NAA+NON-PROBE: NEGATIVE
ERYTHROCYTE [DISTWIDTH] IN BLOOD BY AUTOMATED COUNT: 17.4 % (ref 10–15)
ERYTHROCYTE [DISTWIDTH] IN BLOOD BY AUTOMATED COUNT: 17.4 % (ref 10–15)
ETEC LTA+ST1A+ST1B TOX ST NAA+NON-PROBE: NEGATIVE
G LAMBLIA DNA STL QL NAA+NON-PROBE: NEGATIVE
GLUCOSE SERPL-MCNC: 96 MG/DL (ref 70–99)
GLUCOSE UR STRIP-MCNC: NEGATIVE MG/DL
GLUCOSE UR STRIP-MCNC: NEGATIVE MG/DL
HCT VFR BLD AUTO: 19.7 % (ref 35–47)
HCT VFR BLD AUTO: 20.8 % (ref 35–47)
HGB BLD-MCNC: 6.1 G/DL (ref 11.7–15.7)
HGB BLD-MCNC: 6.4 G/DL (ref 11.7–15.7)
HGB BLD-MCNC: 6.5 G/DL (ref 11.7–15.7)
HGB UR QL STRIP: ABNORMAL
HGB UR QL STRIP: ABNORMAL
IMM GRANULOCYTES # BLD: 0.2 10E3/UL
IMM GRANULOCYTES NFR BLD: 3 %
ISSUE DATE AND TIME: NORMAL
KETONES UR STRIP-MCNC: NEGATIVE MG/DL
KETONES UR STRIP-MCNC: NEGATIVE MG/DL
LEUKOCYTE ESTERASE UR QL STRIP: ABNORMAL
LEUKOCYTE ESTERASE UR QL STRIP: ABNORMAL
LYMPHOCYTES # BLD AUTO: 1.3 10E3/UL (ref 0.8–5.3)
LYMPHOCYTES NFR BLD AUTO: 16 %
MAGNESIUM SERPL-MCNC: 1.7 MG/DL (ref 1.7–2.3)
MCH RBC QN AUTO: 33.5 PG (ref 26.5–33)
MCH RBC QN AUTO: 35 PG (ref 26.5–33)
MCHC RBC AUTO-ENTMCNC: 31.3 G/DL (ref 31.5–36.5)
MCHC RBC AUTO-ENTMCNC: 32.5 G/DL (ref 31.5–36.5)
MCV RBC AUTO: 107 FL (ref 78–100)
MCV RBC AUTO: 108 FL (ref 78–100)
MONOCYTES # BLD AUTO: 0.9 10E3/UL (ref 0–1.3)
MONOCYTES NFR BLD AUTO: 12 %
MUCOUS THREADS #/AREA URNS LPF: PRESENT /LPF
MUCOUS THREADS #/AREA URNS LPF: PRESENT /LPF
NEUTROPHILS # BLD AUTO: 5.7 10E3/UL (ref 1.6–8.3)
NEUTROPHILS NFR BLD AUTO: 69 %
NITRATE UR QL: POSITIVE
NITRATE UR QL: POSITIVE
NOROVIRUS GI+II RNA STL QL NAA+NON-PROBE: NEGATIVE
NRBC # BLD AUTO: 0 10E3/UL
NRBC BLD AUTO-RTO: 0 /100
P SHIGELLOIDES DNA STL QL NAA+NON-PROBE: NEGATIVE
PH UR STRIP: 6 [PH] (ref 5–7)
PH UR STRIP: 6.5 [PH] (ref 5–7)
PHOSPHATE SERPL-MCNC: 3.9 MG/DL (ref 2.5–4.5)
PLATELET # BLD AUTO: 159 10E3/UL (ref 150–450)
PLATELET # BLD AUTO: 160 10E3/UL (ref 150–450)
POTASSIUM SERPL-SCNC: 3.8 MMOL/L (ref 3.4–5.3)
POTASSIUM SERPL-SCNC: 3.8 MMOL/L (ref 3.4–5.3)
PROT SERPL-MCNC: 4.9 G/DL (ref 6.4–8.3)
RBC # BLD AUTO: 1.83 10E6/UL (ref 3.8–5.2)
RBC # BLD AUTO: 1.94 10E6/UL (ref 3.8–5.2)
RBC URINE: 12 /HPF
RBC URINE: 68 /HPF
RVA RNA STL QL NAA+NON-PROBE: NEGATIVE
SALMONELLA SP RPOD STL QL NAA+PROBE: NEGATIVE
SAPO I+II+IV+V RNA STL QL NAA+NON-PROBE: NEGATIVE
SHIGELLA SP+EIEC IPAH ST NAA+NON-PROBE: NEGATIVE
SODIUM SERPL-SCNC: 134 MMOL/L (ref 135–145)
SODIUM SERPL-SCNC: 134 MMOL/L (ref 135–145)
SP GR UR STRIP: 1 (ref 1–1.03)
SP GR UR STRIP: 1.02 (ref 1–1.03)
SPECIMEN EXPIRATION DATE: NORMAL
SQUAMOUS EPITHELIAL: 1 /HPF
UNIT ABO/RH: NORMAL
UNIT NUMBER: NORMAL
UNIT STATUS: NORMAL
UNIT TYPE ISBT: 5100
UROBILINOGEN UR STRIP-MCNC: NORMAL MG/DL
UROBILINOGEN UR STRIP-MCNC: NORMAL MG/DL
V CHOLERAE DNA SPEC QL NAA+PROBE: NEGATIVE
VIBRIO DNA SPEC NAA+PROBE: NEGATIVE
WBC # BLD AUTO: 8 10E3/UL (ref 4–11)
WBC # BLD AUTO: 8.1 10E3/UL (ref 4–11)
WBC CLUMPS #/AREA URNS HPF: PRESENT /HPF
WBC CLUMPS #/AREA URNS HPF: PRESENT /HPF
WBC URINE: 178 /HPF
WBC URINE: >182 /HPF
Y ENTEROCOL DNA STL QL NAA+PROBE: NEGATIVE

## 2023-10-08 PROCEDURE — 250N000013 HC RX MED GY IP 250 OP 250 PS 637: Performed by: FAMILY MEDICINE

## 2023-10-08 PROCEDURE — 250N000011 HC RX IP 250 OP 636: Mod: JZ

## 2023-10-08 PROCEDURE — 84100 ASSAY OF PHOSPHORUS: CPT

## 2023-10-08 PROCEDURE — 36415 COLL VENOUS BLD VENIPUNCTURE: CPT

## 2023-10-08 PROCEDURE — 250N000011 HC RX IP 250 OP 636: Mod: JZ | Performed by: OBSTETRICS & GYNECOLOGY

## 2023-10-08 PROCEDURE — 120N000002 HC R&B MED SURG/OB UMMC

## 2023-10-08 PROCEDURE — 258N000003 HC RX IP 258 OP 636

## 2023-10-08 PROCEDURE — 250N000013 HC RX MED GY IP 250 OP 250 PS 637

## 2023-10-08 PROCEDURE — 36415 COLL VENOUS BLD VENIPUNCTURE: CPT | Performed by: PHYSICIAN ASSISTANT

## 2023-10-08 PROCEDURE — 85027 COMPLETE CBC AUTOMATED: CPT

## 2023-10-08 PROCEDURE — 80053 COMPREHEN METABOLIC PANEL: CPT

## 2023-10-08 PROCEDURE — 85018 HEMOGLOBIN: CPT | Performed by: PHYSICIAN ASSISTANT

## 2023-10-08 PROCEDURE — 86922 COMPATIBILITY TEST ANTIGLOB: CPT

## 2023-10-08 PROCEDURE — 87086 URINE CULTURE/COLONY COUNT: CPT | Performed by: OBSTETRICS & GYNECOLOGY

## 2023-10-08 PROCEDURE — 250N000009 HC RX 250: Performed by: STUDENT IN AN ORGANIZED HEALTH CARE EDUCATION/TRAINING PROGRAM

## 2023-10-08 PROCEDURE — 99222 1ST HOSP IP/OBS MODERATE 55: CPT | Performed by: PHYSICIAN ASSISTANT

## 2023-10-08 PROCEDURE — 84295 ASSAY OF SERUM SODIUM: CPT

## 2023-10-08 PROCEDURE — 83735 ASSAY OF MAGNESIUM: CPT

## 2023-10-08 PROCEDURE — 86901 BLOOD TYPING SEROLOGIC RH(D): CPT | Performed by: OBSTETRICS & GYNECOLOGY

## 2023-10-08 PROCEDURE — 81001 URINALYSIS AUTO W/SCOPE: CPT | Performed by: PHYSICIAN ASSISTANT

## 2023-10-08 PROCEDURE — P9016 RBC LEUKOCYTES REDUCED: HCPCS

## 2023-10-08 PROCEDURE — 85025 COMPLETE CBC W/AUTO DIFF WBC: CPT

## 2023-10-08 PROCEDURE — 99222 1ST HOSP IP/OBS MODERATE 55: CPT | Performed by: FAMILY MEDICINE

## 2023-10-08 RX ORDER — OXYCODONE HYDROCHLORIDE 5 MG/1
5 TABLET ORAL EVERY 4 HOURS
Status: DISCONTINUED | OUTPATIENT
Start: 2023-10-08 | End: 2023-10-17 | Stop reason: HOSPADM

## 2023-10-08 RX ORDER — METOCLOPRAMIDE HYDROCHLORIDE 5 MG/ML
5 INJECTION INTRAMUSCULAR; INTRAVENOUS EVERY 6 HOURS PRN
Status: DISCONTINUED | OUTPATIENT
Start: 2023-10-08 | End: 2023-10-09

## 2023-10-08 RX ORDER — ENOXAPARIN SODIUM 100 MG/ML
40 INJECTION SUBCUTANEOUS EVERY 24 HOURS
Status: DISCONTINUED | OUTPATIENT
Start: 2023-10-08 | End: 2023-10-17 | Stop reason: HOSPADM

## 2023-10-08 RX ORDER — VANCOMYCIN HYDROCHLORIDE 125 MG/1
125 CAPSULE ORAL 4 TIMES DAILY
Status: DISCONTINUED | OUTPATIENT
Start: 2023-10-08 | End: 2023-10-11

## 2023-10-08 RX ORDER — OXYCODONE HYDROCHLORIDE 5 MG/1
5 TABLET ORAL EVERY 4 HOURS PRN
Status: DISCONTINUED | OUTPATIENT
Start: 2023-10-08 | End: 2023-10-17 | Stop reason: HOSPADM

## 2023-10-08 RX ORDER — LIDOCAINE HYDROCHLORIDE 20 MG/ML
JELLY TOPICAL ONCE
Status: COMPLETED | OUTPATIENT
Start: 2023-10-08 | End: 2023-10-08

## 2023-10-08 RX ORDER — DIPHENOXYLATE HCL/ATROPINE 2.5-.025MG
1 TABLET ORAL 3 TIMES DAILY
Status: DISCONTINUED | OUTPATIENT
Start: 2023-10-08 | End: 2023-10-08

## 2023-10-08 RX ADMIN — OXYCODONE HYDROCHLORIDE 5 MG: 5 TABLET ORAL at 11:50

## 2023-10-08 RX ADMIN — SODIUM CHLORIDE 500 ML: 9 INJECTION, SOLUTION INTRAVENOUS at 00:21

## 2023-10-08 RX ADMIN — VANCOMYCIN HYDROCHLORIDE 125 MG: 125 CAPSULE ORAL at 18:10

## 2023-10-08 RX ADMIN — VANCOMYCIN HYDROCHLORIDE 125 MG: 125 CAPSULE ORAL at 06:31

## 2023-10-08 RX ADMIN — SODIUM CHLORIDE: 9 INJECTION, SOLUTION INTRAVENOUS at 08:00

## 2023-10-08 RX ADMIN — SODIUM CHLORIDE: 9 INJECTION, SOLUTION INTRAVENOUS at 15:28

## 2023-10-08 RX ADMIN — VANCOMYCIN HYDROCHLORIDE 125 MG: 125 CAPSULE ORAL at 23:59

## 2023-10-08 RX ADMIN — SODIUM CHLORIDE: 9 INJECTION, SOLUTION INTRAVENOUS at 00:21

## 2023-10-08 RX ADMIN — OXYCODONE HYDROCHLORIDE 5 MG: 5 TABLET ORAL at 22:00

## 2023-10-08 RX ADMIN — MAGNESIUM OXIDE TAB 400 MG (241.3 MG ELEMENTAL MG) 400 MG: 400 (241.3 MG) TAB at 09:30

## 2023-10-08 RX ADMIN — ACETAMINOPHEN 650 MG: 325 TABLET, FILM COATED ORAL at 11:50

## 2023-10-08 RX ADMIN — OXYCODONE HYDROCHLORIDE 5 MG: 5 TABLET ORAL at 04:36

## 2023-10-08 RX ADMIN — OXYCODONE HYDROCHLORIDE 5 MG: 5 TABLET ORAL at 18:10

## 2023-10-08 RX ADMIN — ONDANSETRON 4 MG: 2 INJECTION INTRAMUSCULAR; INTRAVENOUS at 09:30

## 2023-10-08 RX ADMIN — TOLTERODINE TARTRATE 2 MG: 2 TABLET, FILM COATED ORAL at 19:52

## 2023-10-08 RX ADMIN — PHENAZOPYRIDINE 200 MG: 200 TABLET ORAL at 19:52

## 2023-10-08 RX ADMIN — TOLTERODINE TARTRATE 2 MG: 2 TABLET, FILM COATED ORAL at 01:09

## 2023-10-08 RX ADMIN — OXYCODONE HYDROCHLORIDE 5 MG: 5 TABLET ORAL at 09:29

## 2023-10-08 RX ADMIN — DIPHENOXYLATE HYDROCHLORIDE AND ATROPINE SULFATE 1 TABLET: 2.5; .025 TABLET ORAL at 09:31

## 2023-10-08 RX ADMIN — ENOXAPARIN SODIUM 40 MG: 40 INJECTION SUBCUTANEOUS at 09:30

## 2023-10-08 RX ADMIN — OXYCODONE HYDROCHLORIDE 5 MG: 5 TABLET ORAL at 14:43

## 2023-10-08 RX ADMIN — AMLODIPINE BESYLATE 5 MG: 5 TABLET ORAL at 09:31

## 2023-10-08 RX ADMIN — TOLTERODINE TARTRATE 2 MG: 2 TABLET, FILM COATED ORAL at 09:31

## 2023-10-08 RX ADMIN — METOCLOPRAMIDE 5 MG: 5 INJECTION, SOLUTION INTRAMUSCULAR; INTRAVENOUS at 06:32

## 2023-10-08 RX ADMIN — PHENAZOPYRIDINE 200 MG: 200 TABLET ORAL at 11:50

## 2023-10-08 RX ADMIN — SODIUM CHLORIDE: 9 INJECTION, SOLUTION INTRAVENOUS at 22:02

## 2023-10-08 RX ADMIN — VANCOMYCIN HYDROCHLORIDE 125 MG: 125 CAPSULE ORAL at 11:58

## 2023-10-08 RX ADMIN — LIDOCAINE HYDROCHLORIDE: 20 JELLY TOPICAL at 12:17

## 2023-10-08 ASSESSMENT — ACTIVITIES OF DAILY LIVING (ADL)
ADLS_ACUITY_SCORE: 37
CONCENTRATING,_REMEMBERING_OR_MAKING_DECISIONS_DIFFICULTY: YES
TOILETING_ISSUES: NO
ADLS_ACUITY_SCORE: 35
ADLS_ACUITY_SCORE: 35
WEAR_GLASSES_OR_BLIND: YES
ADLS_ACUITY_SCORE: 35
DRESSING/BATHING_DIFFICULTY: NO
EQUIPMENT_CURRENTLY_USED_AT_HOME: SHOWER CHAIR
ADLS_ACUITY_SCORE: 39
DOING_ERRANDS_INDEPENDENTLY_DIFFICULTY: YES
ADLS_ACUITY_SCORE: 38
FALL_HISTORY_WITHIN_LAST_SIX_MONTHS: NO
ADLS_ACUITY_SCORE: 35
WALKING_OR_CLIMBING_STAIRS_DIFFICULTY: YES
ADLS_ACUITY_SCORE: 31
ADLS_ACUITY_SCORE: 35
DIFFICULTY_EATING/SWALLOWING: NO
WALKING_OR_CLIMBING_STAIRS: STAIR CLIMBING DIFFICULTY, ASSISTANCE 1 PERSON
ADLS_ACUITY_SCORE: 31
ADLS_ACUITY_SCORE: 37
VISION_MANAGEMENT: GLASSES
ADLS_ACUITY_SCORE: 37

## 2023-10-08 NOTE — PROGRESS NOTES
Pt was positive for C-diff and started on vanco oral this am.Hgb was 6.3 and redraw from lab was drawn.Has had several loose stools during the night but they have looked brown.Urine had no blood noted.Will continue to monitor.

## 2023-10-08 NOTE — PROGRESS NOTES
UA UC sent to lab - labels did not print - did apply pt sticker and signed and dated.    Pt in with Palliative team.    Up to commode frequently with SBA     at bedside.    Thomas replaced with coude 16F today - did experience bladder spasms after insertion - Urojet used prior to insertion.  Oxycodone given for spasms.    1 unit PRBC infusing - tolerating well. Hgb 6.1    AVSS     Report given to 5a RN    Waiting for transport.

## 2023-10-08 NOTE — CONSULTS
GENERAL INFECTIOUS DISEASES CONSULT NOTE     Patient:  Jessie Tamayo   Date of birth 1955, Medical record number [id number]  Date of Visit:  10/08/2023  Date of Admission: 10/7/2023  Consult Requester:Gerald Antonio MD          Assessment and Recommendations:   ID Problem List   1. 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 Abdominal cramping/pain, diarrhea, nausea/vomiting, decreased PO intake likely 2/2 #1  3. Chronic indwelling gonsalez catheter with abnormal UA   -requested exchange of gonsalez with repeat UA/UCx 10/8    -Interpret UCx from 10/7 with caution given collection from chronic gonsalez  4. Stage IVB vaginal cancer s/p chemo/rads (completed within last 2-4 weeks per pt report)    RECOMMENDATION:  First line treatment for primary recurrence of C diff infection would be Fidaxomicin 200 mg orally twice daily for 10 days per IDSA guidelines and has shown clinical benefit for less episodes of disease recurrence/increased cost effectiveness with use for treating first recurrence (see citation below). Understand usage can be cost prohibitive for patients, so please evaluate whether this will be covered by patient's insurance to an affordable level for patient prior to starting.   If unable to pursue Fidaxomicin, then continue PO vancomycin for the duration of treatment. For first recurrence we would do a 14 day course of QID dosing followed by PO vancomycin taper as follows: Vancomycin 125 mg twice daily x 1 week f/b 125 mg once daily x 1 week f/b 125 mg every other day x 1 week f/b 125 mg every three days x 2 weeks then discontinue.   Discontinue use of any antidiarrheals as taking these while having an acute C diff infection can lead to complications such as toxic megacolon.   Interpret UA/UCx from 10/7 with caution as this was collected from a chronic indwelling  gonsalez. Recommend exchange of gonsalez catheter and recollection of UA with reflex to UCx. Will use clinical data and symptoms to determine whether patient will need treatment for cystitis.     ASSESSMENT:  Jessie Tamayo is a 67 year old female with 2 days of nausea, vomiting, abdominal pain and watery diarrhea. C diff PCR/toxin returned + on testing. ID consulted for aid in treatment.     She is afebrile with Tmax of 100 since admission. She has some mild tachycardia to 1teens and normotensive. WBCs wnl. UA collected with +nitrites,lg LE, 178 WBC with WBC clumping (collected from chronic gonsalez cath), UCx pending. Will request a repeat collection after gonsalez exchange. BCx NGTD. Enteric panel negative. C diff PCR+/toxin+. Started on PO vancomycin 125 mg QID. CT AP w/ contrast with e/o bowel wall thickening and surrounding inflammation of rectum and sigmoid colon c/w colitis. There is some wall thickening and fat stranding with sm amount of air in bladder which is attributable to chronic gonsalez but could represent cystitis.     IDSA guidelines recommend Fidaxomicin as first line treatment for 1st recurrence of CDI and this is supported by cost effectiveness study (Jacob R, Gustavo EA, Elsa A, Twin BP. Cost-effectiveness of Treatment Regimens for Clostridioides difficile Infection: An Evaluation of the 2018 Infectious Diseases Society of [place] Guidelines. Clin Infect Dis. 2020 Feb 14;70(5):754-762. doi: 10.1093/isacc/qzg397. PMID: 53206379; PMCID: [id number]) demonstrating the use of Fidaxomicin for recurrent CDI leads to less CDI recurrence, less repeat hospitalizations and less mortality for patients. However, we do understand that use of Fidaxomicin may be cost prohibitive for patients. If unable to pursue this treatment due to lack of insurance coverage/aid, would continue with PO vancomycin treatment x 14 days with prolonged taper (outlined above). Avoid antidiarrheals with acute CDI.     Thank you for this  "consult. ID will continue to follow.     65 MINUTES SPENT BY ME on the date of service doing chart review, history, exam, documentation & further activities per the note.  .     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     The patient developed \"severe nausea\" on 10/6 as well as weakness, shakiness, abdominal pain, and had few episodes of emesis. Additionally notes she began having multiple episodes of watery stools. She measured her temperature at home and it was 100 degrees. PO intake has been poor. Jessie endorses cramping abdominal pain with stooling, and generally throughout her abdomen over the past few days. Denies noticeable blood in stool. Endorsed chills x 24 hrs that were pretty significant/consistent with rigors. Denies sweats, or URI symptoms. Has indwelling gonsalez catheter. Last chemotherapy received about 1 month ago, last radiation treatment about 2 wks ago per .     Of note, patient was admitted to the hospital 9/8/23 with fever (to 104) and diarrhea (reportely 10-20 episodes watery daily) and tested + for C diff toxin. Was pancytopenic at the time with neutropenic fever. Treated with Vancomycin  mg QID x 10 days (through 9/18). She completed full course and symptoms fully resolved.          Review of Systems:   CONSTITUTIONAL:  No fevers or sweats. +chills.   EYES: negative for icterus, redness, or purulent drainage.   ENT:  negative for nasal congestion, rhinorrhea, or sore throat.  RESPIRATORY:  negative for cough with sputum and dyspnea.  CARDIOVASCULAR:  negative for chest pain or palpitations.  GASTROINTESTINAL:  +nausea, vomiting, decreased PO intake, abdominal " pain/cramping and diarrhea   GENITOURINARY: NA, chronic indwelling gonsalez  HEME:  No easy bruising or bleeding.  INTEGUMENT:  negative for rash and pruritus.  NEURO:  Negative for headache, vision changes, or numbness/tingling in extremities.         Past Medical History:     Past Medical History:   Diagnosis Date    Actinic keratosis     C. difficile diarrhea 09/08/2023    Hyperlipemia     Lichen sclerosus 2020    Osteopenias     SCC (squamous cell carcinoma) 07/26/2023            Past Surgical History:     Past Surgical History:   Procedure Laterality Date    COLONOSCOPY  2006    normal    COLPOSCOPY, BIOPSY, COMBINED N/A 02/08/2021    Procedure: COLPOSCOPY, WITH BIOPSY, LEEP procedure;  Surgeon: Jefe Koenig MD;  Location: [facility] OR    CYSTOSCOPY N/A 05/30/2023    Procedure: CYSTOSCOPY AND EXCISIONAL BIOPSY OF THE VAGINAL TISSUE AROUND THE URETHRA.  (look in the bladder and sample small area in the vagina near the urethra);  Surgeon: Lakeisha Mackenzie MD;  Location: [facility] OR    EYE SURGERY      cataracts bilateral    INSERT INTERSTITIAL NEEDLE, ULTRASOUND GUIDED N/A 9/18/2023    Procedure: INSERTION, NEEDLE, WITH ULTRASOUND GUIDANCE, FOR INTERSTITIAL BRACHYTHERAPY;  Surgeon: Marisa Pacheco MD;  Location: [facility] OR    OPEN REDUCTION INTERNAL FIXATION FOREARM  07/31/2012    Procedure: OPEN REDUCTION INTERNAL FIXATION FOREARM;  Open Reduction Internal Fixation, Irrigation & Debridment  of Right Distal Radius, application short arm splint;  Surgeon: Wade Beard MD;  Location: [facility] OR    REMOVE INTERSTITIAL NEEDLE N/A 9/20/2023    Procedure: REMOVAL, INTERSTITIAL NEEDLE, AFTER TEMPORARY BRACHYTHERAPY;  Surgeon: Marisa Pacheco MD;  Location: [facility] OR    TONSILLECTOMY & ADENOIDECTOMY  1960    [facility] TREAT ECTOPIC PREG,RMV TUBE/OVARY  1985    ectopic, right side-ovary and tube removed            Family History:     Family History   Problem Relation Age of Onset    Lung Cancer Mother 44     Cerebrovascular Disease Father     Hypertension Father     Alcohol/Drug Father     Cervical Cancer Maternal Aunt     Ovarian Cancer Maternal Aunt 60    Obesity Niece         niece    Mental Illness Nephew         nephew    Diabetes Other         paternal aunt    Hyperlipidemia Other     Obesity Other         self    Obesity Other     Diabetes Other         paternal aunt    Hypertension Other         father    Cerebrovascular Disease Other         father    C.A.D. No family hx of     Breast Cancer No family hx of     Cancer - colorectal No family hx of     Prostate Cancer No family hx of     Melanoma No family hx of     Anesthesia Reaction No family hx of     Venous thrombosis No family hx of             Social History:     Social History     Tobacco Use    Smoking status: Never     Passive exposure: Never    Smokeless tobacco: Never   Substance Use Topics    Alcohol use: Not Currently     History   Sexual Activity    Sexual activity: Not Currently    Partners: Male    Birth control/ protection: Post-menopausal            Current Medications:      amLODIPine  5 mg Oral QAM    diphenoxylate-atropine  1 tablet Oral TID    enoxaparin ANTICOAGULANT  40 mg Subcutaneous Q24H    magnesium oxide  400 mg Oral QAM    tolterodine  2 mg Oral BID    vancomycin  125 mg Oral 4x Daily            Allergies:     Allergies   Allergen Reactions    Blood Transfusion Related (Informational Only) Other (See Comments)     Patient has a history of a clinically significant antibody against RBC antigens.  A delay in compatible RBCs may occur.    Oxybutynin Itching    Seasonal Allergies             Physical Exam:   Vitals were reviewed  Patient Vitals for the past 24 hrs:   BP Temp Temp src Pulse Resp SpO2 Height Weight   10/08/23 0625 111/62 98.7  F (37.1  C) Oral 96 18 95 % -- --   10/08/23 0430 122/67 99  F (37.2  C) Oral 94 18 96 % -- --   10/08/23 0200 127/68 99.8  F (37.7  C) Oral 102 20 93 % -- --   10/08/23 0111 129/61 100  F (37.8  C)  "Oral 100 18 93 % -- --   10/07/23 2310 124/70 100  F (37.8  C) Oral 106 20 94 % -- --   10/07/23 2300 124/70 100  F (37.8  C) Oral 106 20 94 % -- --   10/07/23 2132 (!) 143/67 99.9  F (37.7  C) Oral 109 22 92 % -- --   10/07/23 2000 (!) 147/73 99.3  F (37.4  C) -- 113 18 94 % -- --   10/07/23 1945 (!) 141/73 -- -- 114 18 94 % -- --   10/07/23 1358 131/79 98.4  F (36.9  C) Oral 109 16 94 % 1.651 m (5' 5\") 93 kg (205 lb)       Physical Examination:  Constitutional: Pleasant female seen sitting up in bed, in NAD. Alert and interactive.   HEENT: NCAT, anicteric sclerae, conjunctiva clear. Mask in place over mouth.  Respiratory: Non-labored breathing on RA. No contact stethoscope available to use in iso room, did not assess.  Cardiovascular: No contact stethoscope available to use in iso room, did not assess.  GI: Abdomen is soft, non-distended, and TTP over LLQ and suprapubic regions.  Skin: Warm and dry. No rashes or lesions on exposed surfaces.  Musculoskeletal: Extremities grossly normal.   Neurologic: A &O x3, speech normal, answering questions appropriately. Moves all extremities spontaneously. Grossly non-focal.  Neuropsychiatric: Mentation and affect normal/appropriate.  VAD: PIV, gonsalez catheter          Laboratory Data:     Inflammatory Markers  No lab results found.    Hematology Studies    Recent Labs   Lab Test 10/08/23  0747 10/08/23  0704 10/07/23  1500 09/21/23  0648 09/20/23  1015 09/19/23  0700 09/18/23  0709 09/17/23  0922 09/13/23  0529 09/12/23  1536 09/12/23  0628 09/11/23  0719 09/10/23  0622 09/09/23  0710   WBC 8.0 8.1 10.0 2.1* 1.7* 2.0*  --  1.5*   < > 1.3* 1.2*   < > 1.0* 1.3*   ANEU  --   --   --  1.1*  --   --   --  1.1*  --  1.1* 0.8*  --  0.7* 0.9*   AEOS  --   --   --  0.0  --   --   --  0.0  --  0.0 0.0  --  0.0 0.0   HGB 6.4* 6.5* 8.1* 8.4* 7.8* 7.6*  --  7.9*   < > 8.3* 8.0*   < > 6.8* 7.9*   * 107* 109* 96 97 98  --  94   < > 91 92   < > 92 95    160 233 185 165 141*   < " > 106*   < > 45* 41*   < > 59* 67*    < > = values in this interval not displayed.       Metabolic Studies     Recent Labs   Lab Test 10/08/23  0542 10/07/23  1500 09/21/23  0648 09/20/23  0632 09/19/23  0700 09/18/23  1748 09/18/23  1442 09/17/23  0922   *  134* 134* 135*  --  135*  --   --  136   POTASSIUM 3.8  3.8 3.7 4.0 4.4 4.5   < >  --  4.3   CHLORIDE 100  100 95* 105  --  98  --   --  100   CO2 25  25 27 27  --  29  --   --  27   BUN 8.3 9.7 15.4  --  11.3  --   --  10.2   CR 1.03* 1.11* 1.00*  --  0.77  --  0.77 0.85   GFRESTIMATED 59* 54* 61  --  84  --  84 75    < > = values in this interval not displayed.       Hepatic Studies    Recent Labs   Lab Test 10/08/23  0542 10/07/23  1500 09/13/23  0529 09/10/23  0622 09/09/23  0710 09/08/23 2024 09/04/23  1859   BILITOTAL 0.3 0.5 0.4 0.4 0.7  --  0.5   ALKPHOS 64 83 49 49 50  --  58   ALBUMIN 2.7* 3.5 2.8* 2.5* 3.0* 2.9* 4.0   AST 16 23 30 25 27  --  25   ALT 6 10 22 22 28  --  24       Microbiology:  Culture   Date Value Ref Range Status   10/07/2023 No growth after 12 hours  Preliminary   10/07/2023 No growth after 12 hours  Preliminary   09/08/2023 No Growth  Final   09/08/2023 No Growth  Final   09/08/2023 No Growth  Final   09/04/2023 (A)  Final    >100,000 CFU/mL Streptococcus agalactiae (Group B Streptococcus)     Comment:     This organism is susceptible to ampicillin, penicillin, vancomycin and the cephalosporins. If treatment is required AND your patient is allergic to penicillin, contact the Microbiology Lab within 5 days to request susceptibility testing.   09/04/2023 >100,000 CFU/mL Corynebacterium striatum (A)  Final     Comment:     Identification obtained by MALDI-TOF mass spectrometry research use only database. Test characteristics determined and verified by the Infectious Diseases Diagnostic Laboratory.Susceptibilities not routinely done, refer to antibiogram to view typica  l susceptibility profiles   08/14/2023 >100,000 CFU/mL  Mixture of urogenital ciro  Final   07/25/2023 10,000-50,000 CFU/mL Mixture of urogenital ciro  Final   05/24/2023 (A)  Final    <10,000 CFU/mL Streptococcus dysgalactiae (Group C/G Streptococcus)     Comment:     This organism is susceptible to ampicillin, penicillin, vancomycin and the cephalosporins. If treatment is required AND your patient is allergic to penicillin, contact the Microbiology Lab within 5 days to request susceptibility testing.   05/24/2023 <10,000 CFU/mL Staphylococcus haemolyticus (A)  Final   05/24/2023 <10,000 CFU/mL Urogenital ciro  Final   05/24/2023 <10,000 CFU/mL Urogenital ciro  Final   05/24/2023 <10,000 CFU/mL Urogenital ciro  Final       Urine Studies    Recent Labs   Lab Test 10/07/23  2256 09/08/23 2027 09/04/23  2337 08/14/23  0857 05/24/23  0922   LEUKEST Large* Moderate* Large* Large* Moderate*   WBCU 178* 44* 141* >182* 25-50*       Vancomycin Levels  No lab results found.    Invalid input(s): VANCO    Hepatitis B Testing No lab results found.  Hepatitis C Testing     Hepatitis C Antibody   Date Value Ref Range Status   04/13/2018 Nonreactive NR^Nonreactive Final     Comment:     Assay performance characteristics have not been established for newborns,   infants, and children       Respiratory Virus Testing    No results found for: RS, FLUAG    IMAGING    CT AP w/ contrast (10/7/23)   IMPRESSION:   1. Bowel wall thickening and surrounding inflammation involving the  rectum and sigmoid colon, concerning for colitis.  2. Stable 3mm pulmonary nodule.  2. Wall thickening and surrounding fat stranding with small amount of  air within the urinary bladder (favored to be related to  catheterization). Findings are nonspecific and may be seen in setting  of cystitis.

## 2023-10-08 NOTE — H&P
[facility]  Gynecology Oncology History and Physical    Jessie Tamayo MRN# [id number]   Age: 67 year old YOB: 1955     Date of Admission:  10/7/2023    Primary care provider: Alba Layton             Chief Complaint:   Nause, vomiting  Acute abdominal pain  Watery diarrhea         History of Present Illness:   Jessie Tamayo is a 67 year old female with stage IVB SCC of the vagina who presents to the ED with 2 days of worsening nausea, vomiting, and diarrhea. Patient reports yesterday she began having some upper abdominal pain. These symptoms were associated with nausea and emesis x3 yesterday and once more today. Additionally reports multiple episodes of watery non-bloody stool over the last two days as well. She endorses associating lightheadedness and fatigue and suspects this is due to overall dehydration as she has only been able to have small po intake. She denies shortness of breath or chest palpitations. She reports some low grade fevers at home with a max temperature of 100 F. No chills.     She notes that last time she was here, she had more suprapubic pain due to her bladder spasms.At the time of discharge her pain was not resolved, but overall well controlled. She feels like it is stable at this time. She notably has a chronic indwelling gonsalez catheter and reports adequate urinary output with roughly ~1,400 ml out daily. This time her pain is mostly located in her upper abdomen.       ED Course:   - WBC 10.0, Hbg 8.1. Afebrile on presentation    - Mild hyponatremia (Na 134).   - Mild JACQUIE: Cr 1.00 > 1.11. S/p 1.0 L normal saline bolus   - Nausea/emesis s/p reglan, zofran,   - IV dilaudid, oxycodone for pain control   - CTAP bowel wall thickening concerning for colitis; 3 mm pulmonary nodule   - Stool studies positive for C. Diff   - Hypertensive to systolic 140s   - Tachycardic to 110s   - UA with positive nitrates and large leukocyte esterase         Cancer  Treatment History:   12/26/07, 10/15/08, 12/22/09, 11/1/11: Pap test NILM.      1/23/15: Pap test NILM, hrHPV16+.  2/27/15: Cervical polyp & cervical biopsy pathology: Negative for dysplasia/malignancy.     4/7/16: Pap test NILM, hrHPV16+.   5/19/16: Endocervical curettings pathology negative for dysplasia/malignancy.     7/11/17: Pap test NILM, hrHPV16+.   -Colposcopy recommended, not performed.      9/25/18:   -Pap test ASCUS, hrHPV16+.   -Endocervical curettings & cervical biopsy pathology: negative for dysplasia/malignancy.      9/20/19:  Pap test ASC-H, hrHPV16+.   12/16/19: Endocervical curettings pathology benign; cervical 1:00, 7:00 biopsies suggestive of LSIL (CIN1).      9/10/20: Pap test ASC-H, hrHPV+ (NOS).  2/8/21: LEEP.   -Pathology: LEEP & endocervical curettings: negative for dysplasia/malignancy.     3/23/22: Endocervical curettings & cervical biopsy pathology: negative for dysplasia/malignancy.     3/28/23:   -Pap test HSIL, hrHPV16+.   -Findings by gynecologist Dr. Koenig: External genitalia with erythematous plaques bilaterally  Urethra- mid position and well supported, suburethral tissue thickened and friable with bleeding elicited after placement of the speculum  Vagina is stenotic and cervix difficult to see.   5/30/23: Cystourethroscopy, vaginal biopsy by urologist Dr. Mackenzie.   -Findings: Exam revealed lichenified changes to bilateral labia majora and friable vestibular tissue (patient had significant bleeding from prep alone.) The urethra was somewhat stenotic and would not accommodate 19Fr rigid cystoscope, therefore a 16 Fr flexible cystoscope was inserted. Gentle pressure was required to access the bladder. The ureteral orifices were in their orthotopic positions bilaterally. There were no intravesical stones or diverticuli and the bladder was mildly trabeculated. There were no intravesical lesions. See media tab for urethral pictures - there were no obvious lesions but the entire  distal urethra was erythematous and stenosed (16Fr.)  -Pathology: Invasive moderately-differentiated squamous cell carcinoma, HPV-associated, with focus suspicious for lymphovascular space invasion; IHC: p16+.    6/8/23: Gynecologic Oncology consultation.  -Findings:   External genitalia notable for erythema and desquamation of the posterior medial labia, c/w lichen sclerosus changes. When the labia are , a friable mass is visible at the distal anterior vagina, just posterior to the urethra. On bimanual exam, the cervix is small and normal in contour. The palpable vaginal tumor is limited to the anterior distal vagina, approximately 4-5 cm laterally x 3 cm cranio-caudal. Tumor is friable. Remainder of the vagina smooth to palpation. Digital anorectal exam is without nodularity. No palpable tumor in the rectovaginal septum.   Enlarged firm, fixed right inguinal lymph node ~3-4 cm in size.      6/16/23: Pelvic MRI  IMPRESSION:   1. Irregular enhancing lesion along the anterior vaginal wall at the  level of the introitus measuring 2.5 x 0.9 cm with irregular  enhancement along the anterior vaginal wall towards the level of the  vaginal cuff however there is no definitive evidence or abnormal  signal intensity involving the cervix to suggest invasion.  2. Right inguinal lymphadenopathy compatible with metastatic disease.   3. There is some asymmetric enhancement involving the left sacrum,  incompletely evaluated on this study. Further evaluation of this and  further staging of the chest, abdomen and pelvis will be performed on  PET/CT scheduled for 6/20/2023.     6/20/23: PET CT  IMPRESSION:   1. Intense focal hypermetabolic FDG uptake in biopsy-proven vaginal  squamous cell carcinoma.  2. Multiple enlarged, hypermetabolic right inguinal nodes likely  represent regional metastasis. No definite evidence of distant  metastatic disease.  3. Scattered sub-4 mm solid pulmonary nodules are below the size  threshold  for characterization on PET. Attention on follow-up with CT.  4. 1.0 cm enhancing left parotid gland nodule with hypermetabolic  uptake could represent a primary parotid neoplasm such as pleomorphic  adenoma or Warthin's tumor; consider ultrasound for further  evaluation.  5. 1.3 cm hypoattenuating right thyroid nodule with mild FDG uptake  warrants ultrasound for characterization.      Plan: Cisplatin radiation      7/27/2023: urinary gonsalez catheter placement. Treat acute cystitis with bactrim per Urology.      8/1/2023: plan C1D1 Cisplatin radiation     8/28/23: Mg 1.3. plt 94, K 3.8  9/1/23: Plt 107, Mg1.3 , K 3.8  9/17/23: Mg 1.7, plt 106     9/18-9/20: HDR brachytherapy x 5          Past Medical History:     Past Medical History:   Diagnosis Date    Actinic keratosis     C. difficile diarrhea 09/08/2023    Hyperlipemia     Lichen sclerosus 2020    Osteopenias     SCC (squamous cell carcinoma) 07/26/2023            Past Surgical History:      Past Surgical History:   Procedure Laterality Date    COLONOSCOPY  2006    normal    COLPOSCOPY, BIOPSY, COMBINED N/A 02/08/2021    Procedure: COLPOSCOPY, WITH BIOPSY, LEEP procedure;  Surgeon: Jefe Koenig MD;  Location: [facility] OR    CYSTOSCOPY N/A 05/30/2023    Procedure: CYSTOSCOPY AND EXCISIONAL BIOPSY OF THE VAGINAL TISSUE AROUND THE URETHRA.  (look in the bladder and sample small area in the vagina near the urethra);  Surgeon: Lakeisha Mackenzie MD;  Location: [facility] OR    EYE SURGERY      cataracts bilateral    INSERT INTERSTITIAL NEEDLE, ULTRASOUND GUIDED N/A 9/18/2023    Procedure: INSERTION, NEEDLE, WITH ULTRASOUND GUIDANCE, FOR INTERSTITIAL BRACHYTHERAPY;  Surgeon: Marisa Pacheco MD;  Location: [facility] OR    OPEN REDUCTION INTERNAL FIXATION FOREARM  07/31/2012    Procedure: OPEN REDUCTION INTERNAL FIXATION FOREARM;  Open Reduction Internal Fixation, Irrigation & Debridment  of Right Distal Radius, application short arm splint;  Surgeon: Wade Beard  MD Tad;  Location: [facility] OR    REMOVE INTERSTITIAL NEEDLE N/A 9/20/2023    Procedure: REMOVAL, INTERSTITIAL NEEDLE, AFTER TEMPORARY BRACHYTHERAPY;  Surgeon: Marisa Pacheco MD;  Location: [facility] OR    TONSILLECTOMY & ADENOIDECTOMY  1960    [facility] TREAT ECTOPIC PREG,RMV TUBE/OVARY  1985    ectopic, right side-ovary and tube removed            Social History:     Social History     Tobacco Use    Smoking status: Never     Passive exposure: Never    Smokeless tobacco: Never   Substance Use Topics    Alcohol use: Not Currently            Family History:     Family History   Problem Relation Age of Onset    Lung Cancer Mother 44    Cerebrovascular Disease Father     Hypertension Father     Alcohol/Drug Father     Cervical Cancer Maternal Aunt     Ovarian Cancer Maternal Aunt 60    Obesity Niece         niece    Mental Illness Nephew         nephew    Diabetes Other         paternal aunt    Hyperlipidemia Other     Obesity Other         self    Obesity Other     Diabetes Other         paternal aunt    Hypertension Other         father    Cerebrovascular Disease Other         father    C.A.D. No family hx of     Breast Cancer No family hx of     Cancer - colorectal No family hx of     Prostate Cancer No family hx of     Melanoma No family hx of     Anesthesia Reaction No family hx of     Venous thrombosis No family hx of             Immunizations:     Immunization History   Administered Date(s) Administered    COVID-19 Bivalent 18+ (Moderna) 11/11/2022    COVID-19 Monovalent 18+ (Moderna) 02/12/2021, 03/12/2021, 10/28/2021, 06/10/2022    Hepatitis A (ADULT 19+) 02/27/2019, 09/20/2019    Influenza (IIV3) PF 10/16/2008, 11/01/2010, 10/02/2014    Influenza Vaccine 18-64 (Flublok) 09/20/2019    Influenza Vaccine 65+ (Fluzone HD) 10/22/2021    Influenza Vaccine >6 months (Alfuria,Fluzone) 09/30/2015, 10/18/2016, 10/08/2017, 09/24/2018    MMR 02/27/2019    Pneumococcal 20 valent Conjugate (Prevnar 20) 03/11/2022    TD,PF 7+  (Tenivac) 10/04/2004    TDAP Vaccine (Adacel) 07/31/2012    Typhoid IM 02/27/2019    Zoster recombinant adjuvanted (SHINGRIX) 09/20/2019, 12/04/2019            Allergies:     Allergies   Allergen Reactions    Blood Transfusion Related (Informational Only) Other (See Comments)     Patient has a history of a clinically significant antibody against RBC antigens.  A delay in compatible RBCs may occur.    Oxybutynin Itching    Seasonal Allergies             Medications:     Current Facility-Administered Medications   Medication    HYDROmorphone (PF) (DILAUDID) injection 0.3 mg    ondansetron (ZOFRAN) injection 4 mg    oxyCODONE (ROXICODONE) tablet 5-10 mg     Current Outpatient Medications   Medication Sig    acetaminophen (TYLENOL) 500 MG tablet Take 500-1,000 mg by mouth every 4 hours as needed for mild pain    amLODIPine (NORVASC) 5 MG tablet Take 1 tablet (5 mg) by mouth daily (Patient taking differently: Take 5 mg by mouth every morning)    calcium carbonate 750 MG CHEW Take 750 mg by mouth 3 times daily as needed    clotrimazole (LOTRIMIN) 1 % external cream Apply topically 2 times daily To affected areas    docusate sodium (COLACE) 100 MG capsule Take 100 mg by mouth daily as needed for constipation    loratadine (CLARITIN) 10 MG tablet Take 10 mg by mouth daily    magnesium oxide (MAG-OX) 400 MG tablet Take 1 tablet (400 mg) by mouth daily (Patient taking differently: Take 400 mg by mouth every morning)    naloxone (NARCAN) 4 MG/0.1ML nasal spray Spray 1 spray (4 mg) into one nostril alternating nostrils as needed for opioid reversal every 2-3 minutes until assistance arrives    NONFORMULARY Apply topically daily Cavalon cream    OLANZapine (ZYPREXA) 5 MG tablet Take 1 tablet (5 mg) by mouth 2 times daily as needed (nausea and vomiting)    ondansetron (ZOFRAN) 8 MG tablet Take 1 tablet (8 mg) by mouth every 8 hours as needed for nausea (vomiting) Do not take for 3 days after chemo.    oxyCODONE (ROXICODONE) 5 MG  "tablet Take 1-2 tablets (5-10 mg) by mouth every 6 hours as needed for pain    phenazopyridine (PYRIDIUM) 200 MG tablet Take 1 tablet (200 mg) by mouth 3 times daily as needed for irritation    prochlorperazine (COMPAZINE) 10 MG tablet Take 1 tablet (10 mg) by mouth every 6 hours as needed for nausea or vomiting    simethicone (MYLICON) 125 MG chewable tablet Take 125 mg by mouth 4 times daily as needed for intestinal gas    tolterodine (DETROL) 2 MG tablet Take 1 tablet (2 mg) by mouth 2 times daily    triamcinolone (KENALOG) 0.1 % external cream Apply topically 2 times daily            Review of Systems:   CONSTITUTIONAL:  Aox3, NAD, appears fatigued and overall uncomfortable  RESPIRATORY:  negative for  dry cough, cough with sputum, dyspnea, and chest pain  CARDIOVASCULAR:  negative for  chest pain, dyspnea  GASTROINTESTINAL:  positive for nausea, vomiting, diarrhea, abdominal pain. No abdominal distension or tenderness to palpation   GENITOURINARY:  negative unless stated in HPI  HEMATOLOGIC/LYMPHATIC:  negative for bleeding  ENDOCRINE:  negative for heat intolerance and cold intolerance         Physical Exam:     Vitals:    10/07/23 1358 10/07/23 1945 10/07/23 2000   BP: 131/79 (!) 141/73 (!) 147/73   BP Location:  Right arm Right arm   Pulse: 109 114 113   Resp: 16 18 18   Temp: 98.4  F (36.9  C)  99.3  F (37.4  C)   TempSrc: Oral     SpO2: 94% 94% 94%   Weight: 93 kg (205 lb)     Height: 1.651 m (5' 5\")       General: NAD  CV: RRR, no m/g/r  Resp: CTAB=  Abdomen: soft, nontender, non distended  : chronic indwellin gonsalez catheter in place with adequate void visualized   Extremities: WWP, no edema         Data:     Results for orders placed or performed during the hospital encounter of 10/07/23 (from the past 24 hour(s))   CBC with platelets differential    Narrative    The following orders were created for panel order CBC with platelets differential.  Procedure                               Abnormality       "   Status                     ---------                               -----------         ------                     CBC with platelets and d...[[id number]]  Abnormal            Final result                 Please view results for these tests on the individual orders.   Comprehensive metabolic panel   Result Value Ref Range    Sodium 134 (L) 135 - 145 mmol/L    Potassium 3.7 3.4 - 5.3 mmol/L    Carbon Dioxide (CO2) 27 22 - 29 mmol/L    Anion Gap 12 7 - 15 mmol/L    Urea Nitrogen 9.7 8.0 - 23.0 mg/dL    Creatinine 1.11 (H) 0.51 - 0.95 mg/dL    GFR Estimate 54 (L) >60 mL/min/1.73m2    Calcium 8.9 8.8 - 10.2 mg/dL    Chloride 95 (L) 98 - 107 mmol/L    Glucose 122 (H) 70 - 99 mg/dL    Alkaline Phosphatase 83 35 - 104 U/L    AST 23 0 - 45 U/L    ALT 10 0 - 50 U/L    Protein Total 6.2 (L) 6.4 - 8.3 g/dL    Albumin 3.5 3.5 - 5.2 g/dL    Bilirubin Total 0.5 <=1.2 mg/dL   Lipase   Result Value Ref Range    Lipase 11 (L) 13 - 60 U/L   CBC with platelets and differential   Result Value Ref Range    WBC Count 10.0 4.0 - 11.0 10e3/uL    RBC Count 2.38 (L) 3.80 - 5.20 10e6/uL    Hemoglobin 8.1 (L) 11.7 - 15.7 g/dL    Hematocrit 25.9 (L) 35.0 - 47.0 %     (H) 78 - 100 fL    MCH 34.0 (H) 26.5 - 33.0 pg    MCHC 31.3 (L) 31.5 - 36.5 g/dL    RDW 17.8 (H) 10.0 - 15.0 %    Platelet Count 233 150 - 450 10e3/uL    % Neutrophils 77 %    % Lymphocytes 11 %    % Monocytes 9 %    Mids % (Monos, Eos, Basos)      % Eosinophils 0 %    % Basophils 0 %    % Immature Granulocytes 3 %    NRBCs per 100 WBC 0 <1 /100    Absolute Neutrophils 7.7 1.6 - 8.3 10e3/uL    Absolute Lymphocytes 1.1 0.8 - 5.3 10e3/uL    Absolute Monocytes 0.9 0.0 - 1.3 10e3/uL    Mids Abs (Monos, Eos, Basos)      Absolute Eosinophils 0.0 0.0 - 0.7 10e3/uL    Absolute Basophils 0.0 0.0 - 0.2 10e3/uL    Absolute Immature Granulocytes 0.3 <=0.4 10e3/uL    Absolute NRBCs 0.0 10e3/uL   Magnesium   Result Value Ref Range    Magnesium 1.7 1.7 - 2.3 mg/dL   Phosphorus   Result  Value Ref Range    Phosphorus 3.8 2.5 - 4.5 mg/dL   CT Abdomen Pelvis w Contrast    Narrative    EXAMINATION: CT ABDOMEN PELVIS W CONTRAST, 10/7/2023 4:10 PM    INDICATION: abd distention and pain, vomiting 2 days and unable to  keep anything down, completed treatment for c diff recently, on chemo,  concern for obstruction / ileus / colitis    COMPARISON STUDY: CT abdomen and pelvis 9/4/2023    TECHNIQUE: CT scan of the abdomen and pelvis was performed on  multidetector CT scanner using volumetric acquisition technique and  images were reconstructed in multiple planes with variable thickness  and reviewed on dedicated workstations.     CONTRAST: iopamidol (ISOVUE-370) solution 126 mL injected IV without  oral contrast    CT scan radiation dose is optimized to minimum requisite dose using  automated dose modulation techniques.    FINDINGS:    Lower thorax: Stable 3 mm right middle lobe pulmonary nodule (series  4, image 18). Right lower lobe calcified granuloma.  Clear lung bases.  No pericardial effusion. Normal heart size.    Liver: No mass. No intrahepatic biliary ductal dilation.  Focal  hypoattenuation along the falciform ligament without mass effect,  likely focal fatty deposition.    Biliary System: Normal gallbladder. No extrahepatic biliary ductal  dilation.    Pancreas: No mass or pancreatic ductal dilation. Fatty infiltration.    Adrenal glands: No mass or nodules    Spleen: Unchanged 1.2 x 0.9 cm hypodensity within the spleen.    Kidneys: No suspicious mass, obstructing calculus or hydronephrosis.   Bilateral subcentimeter cortical hypodensities, too small to  accurately characterize, but likely representing simple renal cysts.    Gastrointestinal tract: Normal caliber small and large bowel.   Concentric bowel wall thickening, predominantly involving the rectum  and sigmoid colon with associated fat stranding. Prominent appendix,  measuring up to 9 mm in diameter without wall thickening  or  periappendiceal fat stranding.    Mesentery/peritoneum/retroperitoneum: No mass. No free fluid or air.    Lymph nodes: Decreased size of the prominent right inguinal lymph  nodes; for example a 1.1 cm short axis lymph node (series 3, image  78), previously 1.3 cm.    Vasculature: Patent major abdominal vasculature.    Pelvis: Partially decompressed urinary bladder with Thomas in place.  Small volume air within the urinary bladder, likely related to prior  instrumentation. Small amount of surrounding fat stranding.    Osseous structures: No aggressive or acute osseous lesion.  Multilevel  degenerative changes of the spine.      Soft tissues: Within normal limits.      Impression    IMPRESSION:   1. Bowel wall thickening and surrounding inflammation involving the  rectum and sigmoid colon, concerning for colitis.  2. Stable 3mm pulmonary nodule.  2. Wall thickening and surrounding fat stranding with small amount of  air within the urinary bladder (favored to be related to  catheterization). Findings are nonspecific and may be seen in setting  of cystitis.    I have personally reviewed the examination and initial interpretation  and I agree with the findings.    MARIO HERNANDEZ MD         SYSTEM ID:  [id number]             Assessment and Plan:   Assessment: 67 year old female stage IVB SCC of the vagina who presents to the ED with 2 days of worsening nausea, vomiting, and diarrhea.    Problem List:  Patient Active Problem List   Diagnosis    Hyperlipidemia LDL goal <130    Recurrent cold sores    Hearing loss    Pap smear of cervix with ASCUS, cannot exclude HGSIL    Introital dyspareunia    Vaginal atrophy    Cervical high risk HPV (human papillomavirus) test positive    Atrophic vaginitis    Class 1 obesity due to excess calories without serious comorbidity with body mass index (BMI) of 34.0 to 34.9 in adult    Urethral bleeding    Vaginal cancer (H)    Benign neoplasm of colon    Morbid obesity (H)    SCC (squamous  cell carcinoma)    Hypomagnesemia    Lower abdominal pain    Urinary tract infection associated with indwelling urethral catheter, initial encounter     Vulvar cancer (H)    C. difficile diarrhea    Pancytopenia (H)    Sepsis, due to unspecified organism, unspecified whether acute organ dysfunction present (H)    Colitis    Nausea and vomiting, unspecified vomiting type       Dz: Stage IVB SCC of the vagina. Currently undergoing C1 cisplatin/radiation. Now s/p HDR brachytherapy  FEN: She as initially provided a 1L bolus of normal saline in the ED and subsequently transitioned to mIVF. Now maintained on 125 ml/hr NS  Pain: PTA tylenol q4h, 5-10 mg oxycodone q4h. PRN ibuprofen ordered. S/p IV dilaudid in the ED. Pain is moderately controlled at this time.   Heme: Hgb 8.1. WBC 10.0. Plan for AM CBC, CMP, Mg, Phos  CV: HTN and HLD. Mildly hypertensive on arrival. PTA amlodipine ordered.    Pulm: CT: stable 3 mm R middle lobe pulm nodule, R lower lobe calcified granuloma. Sating on the lower end of normal on arrival, but appears stable. Supplemental O2 ordered if O2 sating < 92%.   GI: PTA zofran. Antiemetic panel ordered. CTAP: bowel wall thickening + surrounding inflam of rectum and sigmoid c/f colitis; 1.2 cm splenic hypodensity   : Chronic indwelling gonsalez. H/o bladder spasms - PTA pyridium, detrol. Cr 1.11. UA: +LE, pos nitrites, . Will follow up Repeat BMP and Ucx.   ID:  C. Diff pos. D#1 Vancomycin. However given re currency of C. Diff will consider fidamoxacin. ID consulted for further recommendations.    Endo: no issues   Psych/Neuro/MSK: no issues. However PT/OT consulted   PPX: SCDs, lovenox (ord'd)   Dispo: pending clinic course   Drains/Lines: chronic in dwelling       Johnson Romero MD, MPH, MS  Obstetrics, Gynecology & Women's Health   Resident, PGY-2  10/07/2023 8:26 PM

## 2023-10-08 NOTE — PROGRESS NOTES
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 Currently undergoing C1 cisplatin/radiation. HDR brachy x5.       24 hour events:   - Admission. Afebrile   - Mild tachy, resolved with bolus + maintenance IVF   - CT findings as listed above in pulm and GI   - Mild JACQUIE (Cr 1.11). UA +LE, pos nitrites   - Start Lvnx  - C. Diff positive - PO Vanc 125 mg QID     - Per pharm: Vanc or fidoxamicin  - Bcx NGTD   - Lomotil TID   - 1U pRBC    Subjective: Patient is doing well this morning. Pain is well controlled. Reports still predominately upper abdominal. Tolerating hydration, but not eating much solids at this time. Still having several bowel movements overnight. Denies lightheadedness or dizziness. Denies chest pain, SOB, nausea/vomiting, fevers/chills, dizziness.    Objective:   Patient Vitals for the past 24 hrs:   BP Temp Temp src Pulse Resp SpO2 Height Weight   10/08/23 0625 111/62 98.7  F (37.1  C) Oral 96 18 95 % -- --   10/08/23 0430 122/67 99  F (37.2  C) Oral 94 18 96 % -- --   10/08/23 0200 127/68 99.8  F (37.7  C) Oral 102 20 93 % -- --   10/08/23 0111 129/61 100  F (37.8  C) Oral 100 18 93 % -- --   10/07/23 2310 124/70 100  F (37.8  C) Oral 106 20 94 % -- --   10/07/23 2300 124/70 100  F (37.8  C) Oral 106 20 94 % -- --   10/07/23 2132 (!) 143/67 99.9  F (37.7  C) Oral 109 22 92 % -- --   10/07/23 2000 (!) 147/73 99.3  F (37.4  C) -- 113 18 94 % -- --   10/07/23 1945 (!) 141/73 -- -- 114 18 94 % -- --   10/07/23 1358 131/79 98.4  F (36.9  C) Oral 109 16 94 % 1.651 m (5' 5\") 93 kg (205 lb)       General: NAD, appears comfortable in bed  CV: RRR, no m/r/g  Resp: CTAB, no wheezes  Abdomen: soft, tender, nondistended  : chronic indwelling gonsalez catheter in place  Extremities: warm, well-perfused, nontender, trace edema    I/O last 3 completed shifts:  In: 240 [P.O.:240]  Out: 1600 [Urine:1600]      24 hour // Since MN:  PO: 120 ml // -- ml  IV: -- ml // 874 ml "   Urine:1600 ml // -- ml   Stool: x7 // -- ml     Lines/Drains: Thomas catheter, PIV    Assessment: 67 year old female stage IVB SCC of the vagina who presents to the ED with 2 days of worsening nausea, vomiting, and diarrhea.       Active Problem list:  Stage IVB SCC of the vagina  Recurrent C. Difficile   Poor pain control  Anemia  Hypertension   Hyperlipidemia   Pulmonary nodules   Bladder Spasms      Plan:      # Stage IVB SCC of the Vagina   # Poor pain control   - Currently undergoing C1 cisplatin/radiation therapy. S/p HDR brachytherapy last admission  - Current PTA includes tylenol and 5-10 mg oxycodone q4 hours. Will plan to add ibuprofen prn while in house.  - Palliative consulted. Appreciate recommendations     # Recurrent C. Difficile  - CTAP notable for bowel wall thickening + surrounding inflam of rectum and sigmoid c/f colitis; 1.2 cm splenic hypodensity   - Diagnosed with C. Difficile during 9/8 admission. At that time treated with vancomycin. Given recurrence will consider starting fidamoxacin versus vancomycin. Per pharm, vancomycin continues to be first line for recurrent C. Difficule  - D#1 Vancomycin 125 mg QID initiated this morning   - ID consulted. Appreciate recommendations on patient clinical care and possible approval for fidamoxacin as this is a restricted medication that can only be prescribed by their team.   - PTA zofran and scheduled antiemetic panel ordered.  - Lomotil TID ordered  - WBC stable 10.0 >8.0    # Anemia:   Hbg 8.1 on admission. Down trending to 6.4 this morning   - Will provide 1u pRBC. No s/sx of acute blood loss anemia this morning    # Chronic hypertension   # Hyperlipidemia   - Mildly hypertensive pressures on admission   - PTA amlodpine initiated       # Pulmonary nodules   # Low normal O2 sats on admission   - CTAP notable for stable 3 mm R middle lobe pulm nodule, R lower lobe calcified granuloma.   - Sating at 92-94% on admission, but does not appear to be short  of breath   - supplemental O2 ordered for O2 sats < 92%.     # Bladder spasms  # Chronic indwelling catheter   # Mild JACQUIE   - PTA include pyridium, detrol. Chronic catheter inplace due to spasms. Last replaed during 9/20 admission. Can consider replacing while in patient now.  - Cr 1.11 > 1.03. Continue monitoring   - UA: leuk estrase and nitrate positive concerning for a urinary tract infection. Will discuss with pharm/ID appropriate antibiotic to start in the setting of C. Diff therapy.     # PPX  -  Lovenox 40 mg daily   - Encouarage SCDs   - PT/OT consulted, appreciate recommendations.     Johnson Romero MD, MPH, MS  Obstetrics, Gynecology & Women's Health   Resident, PGY-2  10/08/2023 7:23 AM

## 2023-10-08 NOTE — PLAN OF CARE
[id number]:  Nursing Focus: Admission  D: Arrived at 1413 from ED via transportation. Patient accompanied by spouse and son. Admitted for nausea, vomiting and diarrhea.     I: Admission process began.  Patient oriented to room, enviroment, call light.  MD notified of patient's arrival on unit.     A: Vital signs stable, afebrile.  Patient stable at this time.  Complains of bladder pain, given 5 mg scheduled oxycodone.  Pt transferred to 5A patient care unit while one unit of pRBC transfuing which completed without incident.  Denies nausea, vomiting, chest pain and SOB.  NS infusing at 125 mL/hr.  C. Diff positive in enteric isolation.  Up with SBA.  Bedside commode ordered.  Thomas patent, with adequate urine output.  2 RN skin check still needs to be done, passed on to oncoming RN.    P: Implement plan of care when available. Continue to monitor patient. Nursing interventions as appropriate. Notify MD with changes in pt status.

## 2023-10-08 NOTE — MEDICATION SCRIBE - ADMISSION MEDICATION HISTORY
Medication Scribe Admission Medication History    Admission medication history is complete. The information provided in this note is only as accurate as the sources available at the time of the update.    Information Source(s): Patient via in-person    Pertinent Information:   Patient not taking  olanzapine 5 mg tablet by mouth 2 times daily, simethicone 125 MG chewable tablet by mouth 4 times daily, triamcinolone 0.1 % external cream topically 2 times daily  Changes made to PTA medication list:  Added: None  Deleted: olanzapine 5 mg tablet, simethicone 125 MG chewable tablet, triamcinolone 0.1 % external cream  Changed: from magnesium oxide 400 MG tablet daily to magnesium oxide 400 MG tablet every morning, amlodipine 5 MG tablet daily to amlodipine 5 mg every morning.    Medication Affordability:  Not including over the counter (OTC) medications, was there a time in the past 3 months when you did not take your medications as prescribed because of cost?: No    Allergies reviewed with patient and updates made in EHR: yes    Medication History Completed By: Marce Bush 10/7/2023 11:06 PM    PTA Med List   Medication Sig Last Dose    acetaminophen (TYLENOL) 500 MG tablet Take 500-1,000 mg by mouth every 4 hours as needed for mild pain 10/6/2023 at 6:30 pm    amLODIPine (NORVASC) 5 MG tablet Take 5 mg by mouth every morning 10/7/2023 at 8:45 am    calcium carbonate 750 MG CHEW Take 750 mg by mouth 3 times daily as needed 10/7/2023 at 11:50 am    clotrimazole (LOTRIMIN) 1 % external cream Apply topically 2 times daily To affected areas 10/7/2023 at am    docusate sodium (COLACE) 100 MG capsule Take 100 mg by mouth daily as needed for constipation Unknown at as needed    loratadine (CLARITIN) 10 MG tablet Take 10 mg by mouth daily 10/7/2023 at am    magnesium oxide (MAG-OX) 400 MG tablet Take 400 mg by mouth every morning 10/7/2023 at 8:45    naloxone (NARCAN) 4 MG/0.1ML nasal spray Spray 1 spray (4 mg) into one  nostril alternating nostrils as needed for opioid reversal every 2-3 minutes until assistance arrives Unknown at as needed    NONFORMULARY Apply topically daily Cavalon cream 10/7/2023 at am    ondansetron (ZOFRAN) 8 MG tablet Take 1 tablet (8 mg) by mouth every 8 hours as needed for nausea (vomiting) Do not take for 3 days after chemo. 10/7/2023 at 11:10 am    oxyCODONE (ROXICODONE) 5 MG tablet Take 1-2 tablets (5-10 mg) by mouth every 6 hours as needed for pain 10/7/2023 at 3:00 pm    phenazopyridine (PYRIDIUM) 200 MG tablet Take 1 tablet (200 mg) by mouth 3 times daily as needed for irritation 10/7/2023 at 8:45 am    prochlorperazine (COMPAZINE) 10 MG tablet Take 1 tablet (10 mg) by mouth every 6 hours as needed for nausea or vomiting 10/7/2023 at am    tolterodine (DETROL) 2 MG tablet Take 1 tablet (2 mg) by mouth 2 times daily 10/7/2023 at 8:45 am

## 2023-10-08 NOTE — PROGRESS NOTES
"Pt here in with a three day history of N/V and now diarrhea and unable to keep anything down. Abdominal pain is generalized.patient and  very upset that she was sitting in lobby and dow all day and evening.Was moved to a room and /61 (BP Location: Right arm)   Pulse 100   Temp 100  F (37.8  C) (Oral)   Resp 18   Ht 1.651 m (5' 5\")   Wt 93 kg (205 lb)   LMP 03/08/2008   SpO2 93%   BMI 34.11 kg/m   RA.was in a lot of pain and was given dilaudid x1 with very good relief and zofran IV.Has taken a few sips but not feeling like drinking much.Stool sent for c diff last evening and UA/UC sent also last evening.Has hx of Valvar Ca,Benign neoplasm of colon,SCC, Vag Ca patient has chronic catheter and urine is orange from medication.Has hx of bladder spasms  and on detrol.Was given a 500 ml bolus  and  left for the night.Has commode by bedside and is independent.Denies any nausea and had oxy for pain .Lungs clear and denies any cough.Will continue to monitor.    "

## 2023-10-08 NOTE — PROGRESS NOTES
Brief Med Note    Appreciate GynOnc input, at this time it does not appear medicine is involved in this patient's care     Please notify if you would like our involvement    PEYMAN Bolton [facility]  Contact information available via [facility] Paging/Directory

## 2023-10-08 NOTE — PROGRESS NOTES
Observation goals  PRIOR TO DISCHARGE        Comments: -diagnostic tests and consults completed and resulted- No  -vital signs normal or at patient baseline- No  -tolerating oral intake to maintain hydration- No  -infection is improving- No  Nurse to notify provider when observation goals have been met and patient is ready for discharge.         ELOPED

## 2023-10-08 NOTE — PLAN OF CARE
Goal Outcome Evaluation:      Plan of Care Reviewed With: patient    Overall Patient Progress: no changeOverall Patient Progress: no change  Observation goals  PRIOR TO DISCHARGE        Comments: -diagnostic tests and consults completed and resulted- No  -vital signs normal or at patient baseline- No  -tolerating oral intake to maintain hydration- No  -infection is improving- No positive for C diff  Nurse to notify provider when observation goals have been met and patient is ready for discharge.

## 2023-10-08 NOTE — CONSULTS
"Palliative Care Consultation Note  [facility]      Patient: Jessie Tamayo  Date of Admission:  10/7/2023    Requesting Clinician / Team: Johnson Romero/Gyn Onc  Reason for consult: \"recommendations for additional pain management in the setting of vaginal cancer and recurrent c diff.\"  Pain management  Symptom management       Recommendations & Counseling     GOALS OF CARE:   Life prolonging without limits    ADVANCE CARE PLANNING:  No health care directive on file. Per  informed consent policy, next of kin should be involved if patient becomes unable.  There is no POLST form on file, defer to patient and/or next of kin for decisions   Code status: Full Code; did not discuss given clear goals.    MEDICAL MANAGEMENT:   Pain due to bladder spasms, in context of recurrent c diff:   - has been using oxycodone every 4hrs at home, dropped from 10mg q4H to 5mg q4H and setting alarm on her watch to take meds.  - schedule oxycodone 5mg q4H here in hospital for now w additional oxycodone 5mg PO q4H prn breakthrough pain.  Aware that w c diff typically would want to minimize opioids, but concern that she was having such severe pain, will schedule low dose for now and monitor closely.  If symptoms of fever/any worsening of symptoms would stop opioid.  - consider acupuncture if available for pain, bladder support.  - continue detrol BID  - agree w catheter change  - not candidate for B&O suppositories per rad onc previously.      Fatigue, deconditioning  - no falls but episodes of lightheadedness w position changes, noting less endurance.  - she is wondering if she should have a walker.  - prioritize activities, energy conservation  - for now would not pursue stimulant medications  --highly recommend PT eval prior to discharge w assessment for possible walker to be dispensed at discharge.      PSYCHOSOCIAL/SPIRITUAL SUPPORT:  Family , son and DIL, and granddaugther who is " "5  Friends    Bebe community: Undesignated   Declined Spiritual Health Services  Would like to meet with Palli SW for emotional support (especially for  Jesse)    Palliative Care will continue to follow. Thank you for the consult and allowing us to aid in the care of Jessie Tamayo.       Ariana Huggins MD  Securely message with [employer] (more info)  Text page via [facility] Paging/Directory       Assessment      Jessie Tamayo is a 67 year old female with a past medical history of stage IVB SCC of vagina (diagnosed 5/2023) s/p cisplatin/radiation treatment, HDR brachytherapy and difficulties with UTI, sepsis, neutropenic fevers, and urethral edema contributing to urinary retention and gonsalez catheter.  She had c diff recently, finished vancomycin last month, and developed recurrent disease.    Jessie presented with 3 days of nausea and vomiting and while in ED developed severe bladder spasms and pain symptoms.    Today, the patient was seen for:  Management of pelvic pain from bladder spasms/urine retention  support    Palliative Care Summary:   Met with Jessie and  Jesse.     Introduced the role of palliative care as an interdisciplinary team that cares for patients with serious illness to help support symptom management, communication, coping for patients and their families as well as support with medical decision making.    At time of my visit, Jessie notes pain is better controlled--she had received dose of oral oxycodone, and a new gonsalez catheter was placed a couple hours earlier.  She notes bladder spasms are decreasing in intensity and are now bearable/manageable with her regimen.  She is having frequent Bms supporting dx of c diff.  She notes no dyspnea.  Endorses decreased endurance, no falls at home, sometimes a little dizzy. Not using assistive devices at home but \"I've been wondering if I should use a walker or have one on hand.\"  Has eaten some crackers, consumed water/juice, no " nausea.    Prognosis, Goals, & Planning:    Functional Status just prior to this current hospitalization:  ECOG1 (Restricted in physically strenuous activity but ambulatory and able to carry out work of a light or sedentary nature)    Prognosis, Goals, and/or Advance Care Planning:  We discussed general treatment options (full/restorative, selective/conservatives, and comfort only/hospice). We then discussed how these specifically apply to her situation.  Based on this discussion, Jessie has decided to continue present treatments with hope of recovery to prior level of function, at minimum life prolonging at this time.    Code Status was addressed today:   No      Patient's decision making preferences: independently  SHE AND  WOULD WANT TO KNOW PROGNOSIS REGARDING HER CANCER DIAGNOSIS.  NOTED THEY ARE UNCLEAR WHAT HER PROGNOSIS IS.        Patient has decision-making capacity today for complex decisions:Intact            Coping, Meaning, & Spirituality:   Mood, coping, and/or meaning in the context of serious illness were addressed today: Yes  Some existential distress with questioning why she has had a challenging response to treatments, what will happen with her disease, wondering if she'll wean from gonsalez catheter and what the future will bring.  - more spiritual than Religion.  - finds florencio in time with her granddaughter.  Fiercely proud of son and DIL.  -  Social:   Living situation:lives with significant other/spouse  Important relationships/caregivers: Jesse; has a son and his family in [place].  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 Notable medications:    24-hr MME: 25 mg oxycodone (31.25 MME) + 0.3mg hydromorphone (3.75MME)= 35MME  Oxycodone prn q6H  Hydromorphone IV (one dose in 24  hrs)  Tolterodine  Apap prn  Ibuprofen 400mg q6H prn pain  Metoclopramide  Ondansetron  Pyridium (PRN)  compazine    ROS:  Comprehensive ROS is reviewed and is negative except as here & per HPI:     Physical Exam   Vital Signs with Ranges  Temp:  [98.4  F (36.9  C)-100  F (37.8  C)] 98.7  F (37.1  C)  Pulse:  [] 96  Resp:  [16-22] 18  BP: (111-147)/(61-79) 111/62  SpO2:  [92 %-96 %] 95 %  205 lbs 0 oz      Intake/Output Summary (Last 24 hours) at 10/8/2023 1634  Last data filed at 10/8/2023 1523  Gross per 24 hour   Intake 1516.92 ml   Output 2000 ml   Net -483.08 ml     Body mass index is 34.11 kg/m .    PHYSICAL EXAM:  Alert 68 yo woman, NAD.  Short, close cropped hair.    Affect full, fluent speech, not somnolent or confused.  Coherent historian.  Breathing nonlabored.  Occasional episodes of wincing/grimacing w bladder spasms that last 10-15 seconds during my visit.  No JVD.  Extremities without edema.  Thomas with pale yellow urine.    Data reviewed:  Last Comprehensive Metabolic Panel:  Lab Results   Component Value Date     (L) 10/08/2023     (L) 10/08/2023    POTASSIUM 3.8 10/08/2023    POTASSIUM 3.8 10/08/2023    CHLORIDE 100 10/08/2023    CHLORIDE 100 10/08/2023    CO2 25 10/08/2023    CO2 25 10/08/2023    ANIONGAP 9 10/08/2023    ANIONGAP 9 10/08/2023    GLC 96 10/08/2023    BUN 8.3 10/08/2023    CR 1.03 (H) 10/08/2023    GFRESTIMATED 59 (L) 10/08/2023    KIM 8.0 (L) 10/08/2023       CBC RESULTS:   Recent Labs   Lab Test 10/08/23  1059 10/08/23  0747   WBC  --  8.0   RBC  --  1.83*   HGB 6.1* 6.4*   HCT  --  19.7*   MCV  --  108*   MCH  --  35.0*   MCHC  --  32.5   RDW  --  17.4*   PLT  --  159     Lab Results   Component Value Date    AST 16 10/08/2023    AST 28 10/15/2008     Lab Results   Component Value Date    ALT 6 10/08/2023    ALT 15 07/06/2011     Lab Results   Component Value Date    BILICONJ 0.0 10/15/2008      Lab Results   Component Value Date    BILITOTAL 0.3 10/08/2023     BILITOTAL 0.4 10/15/2008     Lab Results   Component Value Date    ALBUMIN 2.7 10/08/2023    ALBUMIN 4.7 10/15/2008     Lab Results   Component Value Date    PROTTOTAL 4.9 10/08/2023    PROTTOTAL 8.0 10/15/2008      Lab Results   Component Value Date    ALKPHOS 64 10/08/2023    ALKPHOS 84 10/15/2008     CT abdomen pelvis 10/3/23    IMPRESSION:   1. Bowel wall thickening and surrounding inflammation involving the   rectum and sigmoid colon, concerning for colitis.   2. Stable 3mm pulmonary nodule.   2. Wall thickening and surrounding fat stranding with small amount of   air within the urinary bladder (favored to be related to   catheterization). Findings are nonspecific and may be seen in setting   of cystitis.     65 minutes spent on the date of the encounter doing chart review, history and exam, documentation and further activities per the note.    Ariana Huggins MD  [facility], Palliative Care  Securely message with the [employer] Web Console (learn more here) or  Text page via [facility] Paging/Directory

## 2023-10-08 NOTE — PROGRESS NOTES
Goal Outcome Evaluation:       Plan of Care Reviewed With: patient     Overall Patient Progress: no changeOverall Patient Progress: no change  Observation goals  PRIOR TO DISCHARGE        Comments: -diagnostic tests and consults completed and resulted- No  -vital signs normal or at patient baseline- Yes  -tolerating oral intake to maintain hydration- No  -infection is improving- No - positive for C diff  Nurse to notify provider when observation goals have been met and patient is ready for discharge.    Hgb 6.5 - MD notified - will recheck cbc's now. - wait for lab results    Hgb 6.4 - paged MD with results.  MD ordered transfuse 1 unit PRBC.  Paged MD to get blood consent.

## 2023-10-09 ENCOUNTER — APPOINTMENT (OUTPATIENT)
Dept: ULTRASOUND IMAGING | Facility: CLINIC | Age: 68
DRG: 372 | End: 2023-10-09
Attending: NURSE PRACTITIONER
Payer: COMMERCIAL

## 2023-10-09 ENCOUNTER — APPOINTMENT (OUTPATIENT)
Dept: PHYSICAL THERAPY | Facility: CLINIC | Age: 68
DRG: 372 | End: 2023-10-09
Payer: COMMERCIAL

## 2023-10-09 ENCOUNTER — PATIENT OUTREACH (OUTPATIENT)
Dept: ONCOLOGY | Facility: CLINIC | Age: 68
End: 2023-10-09

## 2023-10-09 LAB
ALBUMIN SERPL BCG-MCNC: 2.7 G/DL (ref 3.5–5.2)
ALP SERPL-CCNC: 64 U/L (ref 35–104)
ALT SERPL W P-5'-P-CCNC: <5 U/L (ref 0–50)
ANION GAP SERPL CALCULATED.3IONS-SCNC: 8 MMOL/L (ref 7–15)
AST SERPL W P-5'-P-CCNC: 15 U/L (ref 0–45)
BACTERIA UR CULT: ABNORMAL
BASO+EOS+MONOS # BLD AUTO: ABNORMAL 10*3/UL
BASO+EOS+MONOS NFR BLD AUTO: ABNORMAL %
BASOPHILS # BLD AUTO: 0 10E3/UL (ref 0–0.2)
BASOPHILS NFR BLD AUTO: 0 %
BILIRUB SERPL-MCNC: 0.2 MG/DL
BUN SERPL-MCNC: 5.3 MG/DL (ref 8–23)
CALCIUM SERPL-MCNC: 8.2 MG/DL (ref 8.8–10.2)
CHLORIDE SERPL-SCNC: 104 MMOL/L (ref 98–107)
CREAT SERPL-MCNC: 0.9 MG/DL (ref 0.51–0.95)
DEPRECATED HCO3 PLAS-SCNC: 24 MMOL/L (ref 22–29)
EGFRCR SERPLBLD CKD-EPI 2021: 70 ML/MIN/1.73M2
EOSINOPHIL # BLD AUTO: 0 10E3/UL (ref 0–0.7)
EOSINOPHIL NFR BLD AUTO: 0 %
ERYTHROCYTE [DISTWIDTH] IN BLOOD BY AUTOMATED COUNT: 17.7 % (ref 10–15)
ERYTHROCYTE [DISTWIDTH] IN BLOOD BY AUTOMATED COUNT: 18 % (ref 10–15)
GLUCOSE SERPL-MCNC: 90 MG/DL (ref 70–99)
HCT VFR BLD AUTO: 22.3 % (ref 35–47)
HCT VFR BLD AUTO: 23 % (ref 35–47)
HGB BLD-MCNC: 7 G/DL (ref 11.7–15.7)
HGB BLD-MCNC: 7.2 G/DL (ref 11.7–15.7)
IMM GRANULOCYTES # BLD: 0.3 10E3/UL
IMM GRANULOCYTES NFR BLD: 4 %
LACTATE SERPL-SCNC: 0.4 MMOL/L (ref 0.7–2)
LYMPHOCYTES # BLD AUTO: 1 10E3/UL (ref 0.8–5.3)
LYMPHOCYTES NFR BLD AUTO: 13 %
MAGNESIUM SERPL-MCNC: 1.6 MG/DL (ref 1.7–2.3)
MCH RBC QN AUTO: 32.6 PG (ref 26.5–33)
MCH RBC QN AUTO: 33 PG (ref 26.5–33)
MCHC RBC AUTO-ENTMCNC: 31.3 G/DL (ref 31.5–36.5)
MCHC RBC AUTO-ENTMCNC: 31.4 G/DL (ref 31.5–36.5)
MCV RBC AUTO: 104 FL (ref 78–100)
MCV RBC AUTO: 105 FL (ref 78–100)
MONOCYTES # BLD AUTO: 0.8 10E3/UL (ref 0–1.3)
MONOCYTES NFR BLD AUTO: 11 %
NEUTROPHILS # BLD AUTO: 5.3 10E3/UL (ref 1.6–8.3)
NEUTROPHILS NFR BLD AUTO: 72 %
NRBC # BLD AUTO: 0 10E3/UL
NRBC BLD AUTO-RTO: 0 /100
PHOSPHATE SERPL-MCNC: 3.4 MG/DL (ref 2.5–4.5)
PLATELET # BLD AUTO: 148 10E3/UL (ref 150–450)
PLATELET # BLD AUTO: 172 10E3/UL (ref 150–450)
POTASSIUM SERPL-SCNC: 3.6 MMOL/L (ref 3.4–5.3)
PROT SERPL-MCNC: 5.1 G/DL (ref 6.4–8.3)
RBC # BLD AUTO: 2.12 10E6/UL (ref 3.8–5.2)
RBC # BLD AUTO: 2.21 10E6/UL (ref 3.8–5.2)
SODIUM SERPL-SCNC: 136 MMOL/L (ref 135–145)
WBC # BLD AUTO: 7.4 10E3/UL (ref 4–11)
WBC # BLD AUTO: 7.9 10E3/UL (ref 4–11)

## 2023-10-09 PROCEDURE — 97530 THERAPEUTIC ACTIVITIES: CPT | Mod: GP

## 2023-10-09 PROCEDURE — 99232 SBSQ HOSP IP/OBS MODERATE 35: CPT | Mod: GC | Performed by: OBSTETRICS & GYNECOLOGY

## 2023-10-09 PROCEDURE — 84100 ASSAY OF PHOSPHORUS: CPT

## 2023-10-09 PROCEDURE — 99232 SBSQ HOSP IP/OBS MODERATE 35: CPT | Performed by: FAMILY MEDICINE

## 2023-10-09 PROCEDURE — 85025 COMPLETE CBC W/AUTO DIFF WBC: CPT

## 2023-10-09 PROCEDURE — 250N000011 HC RX IP 250 OP 636

## 2023-10-09 PROCEDURE — 93970 EXTREMITY STUDY: CPT

## 2023-10-09 PROCEDURE — 36415 COLL VENOUS BLD VENIPUNCTURE: CPT

## 2023-10-09 PROCEDURE — 250N000011 HC RX IP 250 OP 636: Mod: JZ

## 2023-10-09 PROCEDURE — 99233 SBSQ HOSP IP/OBS HIGH 50: CPT | Performed by: INTERNAL MEDICINE

## 2023-10-09 PROCEDURE — 97116 GAIT TRAINING THERAPY: CPT | Mod: GP

## 2023-10-09 PROCEDURE — 97161 PT EVAL LOW COMPLEX 20 MIN: CPT | Mod: GP

## 2023-10-09 PROCEDURE — 83735 ASSAY OF MAGNESIUM: CPT

## 2023-10-09 PROCEDURE — 83605 ASSAY OF LACTIC ACID: CPT

## 2023-10-09 PROCEDURE — 120N000002 HC R&B MED SURG/OB UMMC

## 2023-10-09 PROCEDURE — 80053 COMPREHEN METABOLIC PANEL: CPT

## 2023-10-09 PROCEDURE — 85027 COMPLETE CBC AUTOMATED: CPT

## 2023-10-09 PROCEDURE — 258N000003 HC RX IP 258 OP 636

## 2023-10-09 PROCEDURE — 250N000013 HC RX MED GY IP 250 OP 250 PS 637

## 2023-10-09 PROCEDURE — 250N000011 HC RX IP 250 OP 636: Mod: JZ | Performed by: OBSTETRICS & GYNECOLOGY

## 2023-10-09 PROCEDURE — 93970 EXTREMITY STUDY: CPT | Mod: 26 | Performed by: RADIOLOGY

## 2023-10-09 PROCEDURE — 250N000013 HC RX MED GY IP 250 OP 250 PS 637: Performed by: FAMILY MEDICINE

## 2023-10-09 RX ORDER — CEFTRIAXONE 1 G/1
1 INJECTION, POWDER, FOR SOLUTION INTRAMUSCULAR; INTRAVENOUS EVERY 24 HOURS
Status: DISCONTINUED | OUTPATIENT
Start: 2023-10-09 | End: 2023-10-09

## 2023-10-09 RX ADMIN — TOLTERODINE TARTRATE 2 MG: 2 TABLET, FILM COATED ORAL at 10:30

## 2023-10-09 RX ADMIN — OXYCODONE HYDROCHLORIDE 5 MG: 5 TABLET ORAL at 23:48

## 2023-10-09 RX ADMIN — VANCOMYCIN HYDROCHLORIDE 125 MG: 125 CAPSULE ORAL at 06:33

## 2023-10-09 RX ADMIN — OXYCODONE HYDROCHLORIDE 5 MG: 5 TABLET ORAL at 06:33

## 2023-10-09 RX ADMIN — OXYCODONE HYDROCHLORIDE 5 MG: 5 TABLET ORAL at 11:46

## 2023-10-09 RX ADMIN — TOLTERODINE TARTRATE 2 MG: 2 TABLET, FILM COATED ORAL at 20:50

## 2023-10-09 RX ADMIN — OXYCODONE HYDROCHLORIDE 5 MG: 5 TABLET ORAL at 01:53

## 2023-10-09 RX ADMIN — IBUPROFEN 400 MG: 200 TABLET, FILM COATED ORAL at 11:46

## 2023-10-09 RX ADMIN — MAGNESIUM OXIDE TAB 400 MG (241.3 MG ELEMENTAL MG) 400 MG: 400 (241.3 MG) TAB at 10:30

## 2023-10-09 RX ADMIN — ONDANSETRON 4 MG: 4 TABLET, ORALLY DISINTEGRATING ORAL at 18:25

## 2023-10-09 RX ADMIN — VANCOMYCIN HYDROCHLORIDE 125 MG: 125 CAPSULE ORAL at 23:47

## 2023-10-09 RX ADMIN — ONDANSETRON 4 MG: 4 TABLET, ORALLY DISINTEGRATING ORAL at 10:29

## 2023-10-09 RX ADMIN — AMLODIPINE BESYLATE 5 MG: 5 TABLET ORAL at 10:29

## 2023-10-09 RX ADMIN — VANCOMYCIN HYDROCHLORIDE 125 MG: 125 CAPSULE ORAL at 19:09

## 2023-10-09 RX ADMIN — OXYCODONE HYDROCHLORIDE 5 MG: 5 TABLET ORAL at 10:30

## 2023-10-09 RX ADMIN — VANCOMYCIN HYDROCHLORIDE 125 MG: 125 CAPSULE ORAL at 11:46

## 2023-10-09 RX ADMIN — CEFTRIAXONE SODIUM 1 G: 1 INJECTION, POWDER, FOR SOLUTION INTRAMUSCULAR; INTRAVENOUS at 03:56

## 2023-10-09 RX ADMIN — SODIUM CHLORIDE: 9 INJECTION, SOLUTION INTRAVENOUS at 14:52

## 2023-10-09 RX ADMIN — OXYCODONE HYDROCHLORIDE 5 MG: 5 TABLET ORAL at 14:15

## 2023-10-09 RX ADMIN — ENOXAPARIN SODIUM 40 MG: 40 INJECTION SUBCUTANEOUS at 10:30

## 2023-10-09 RX ADMIN — OXYCODONE HYDROCHLORIDE 5 MG: 5 TABLET ORAL at 19:09

## 2023-10-09 RX ADMIN — SODIUM CHLORIDE: 9 INJECTION, SOLUTION INTRAVENOUS at 06:33

## 2023-10-09 ASSESSMENT — ACTIVITIES OF DAILY LIVING (ADL)
ADLS_ACUITY_SCORE: 30
ADLS_ACUITY_SCORE: 31
ADLS_ACUITY_SCORE: 31
ADLS_ACUITY_SCORE: 30
ADLS_ACUITY_SCORE: 31
ADLS_ACUITY_SCORE: 30
ADLS_ACUITY_SCORE: 31
ADLS_ACUITY_SCORE: 31
ADLS_ACUITY_SCORE: 30
ADLS_ACUITY_SCORE: 30

## 2023-10-09 NOTE — CONSULTS
Discharge Pharmacy Test Claim    Dificid and vancomycin are covered by patient's [employer] Medicare advantage plan. Expected copays listed below.    Test Claim Copay   dificid 1319.86   vancomycin 65.00     Radha Rivera  [facility] Pharmacy Liaison  Ph: [phone]   Available on Voc[employer] Web Console (learn more here)

## 2023-10-09 NOTE — PROGRESS NOTES
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 S/p Cis-Rt and HDR brachy x5.       24 hour events:   - + c. Diff,  D#2/10 Vanc PO  - 1U pRBC> Hgb 7.0  - Gonsalez replaced with coude cath  - UA LE/Nit +, Ucx pend, D#1 CTX  - febrile overnight (Tm 101.3),     Subjective: Patient is doing well this morning. Pain is well controlled. No longer reporting abdominal pain. Tolerating hydration, but not eating much solids at this time. Only one bowel movement overnight. Denies lightheadedness or dizziness. Denies chest pain, SOB, nausea/vomiting, dizziness. Febrile overnight, but does not report chills.     Objective:   Patient Vitals for the past 24 hrs:   BP Temp Temp src Pulse Resp SpO2   10/09/23 0411 119/55 99.1  F (37.3  C) Oral 98 16 92 %   10/09/23 0153 -- 99.9  F (37.7  C) Oral -- -- --   10/09/23 0016 (!) 141/58 (!) 101.3  F (38.5  C) Oral 104 16 92 %   10/08/23 2200 -- -- -- -- 16 --   10/08/23 1949 136/57 98.3  F (36.8  C) Oral 104 18 94 %   10/08/23 1810 -- -- -- -- 14 --   10/08/23 1523 116/61 98.3  F (36.8  C) Oral 92 18 94 %   10/08/23 1413 131/61 98.7  F (37.1  C) Oral 96 18 93 %   10/08/23 1155 135/71 98.4  F (36.9  C) Oral 98 18 96 %   10/08/23 0931 130/68 98.6  F (37  C) Oral -- -- --   10/08/23 0625 111/62 98.7  F (37.1  C) Oral 96 18 95 %   10/08/23 0430 122/67 99  F (37.2  C) Oral 94 18 96 %         General: NAD, appears comfortable in bed  CV: RRR, no m/r/g  Resp: CTAB, no wheezes  Abdomen: soft, tender, nondistended  : chronic indwelling gonsalez catheter in place  Extremities: warm, well-perfused, nontender, trace edema      Intake/Output Summary (Last 24 hours) at 10/9/2023 0653  Last data filed at 10/9/2023 0645  Gross per 24 hour   Intake 2690.67 ml   Output 2625 ml   Net 65.67 ml       Lines/Drains: Gonsalez catheter, PIV    Assessment: 67 year old female stage IVB SCC of the vagina who presents to the ED with 2 days of worsening nausea, vomiting, and  diarrhea.     Active Problem list:  Stage IVB SCC of the vagina  Recurrent C. Difficile   Poor pain control  Anemia  Hypertension   Hyperlipidemia   Pulmonary nodules   Bladder Spasms   Acute cystitis     Plan:      # Stage IVB SCC of the Vagina   # Poor pain control   - Completed Cis-rt and S/p HDR brachytherapy last admission  - Current PTA includes tylenol and 5 mg oxycodone q4 hours with prn 5mg doses  - Palliative following    # Recurrent C. Difficile  - CTAP notable for bowel wall thickening + surrounding inflam of rectum and sigmoid c/f colitis; 1.2 cm splenic hypodensity   - Diagnosed with C. Difficile during 9/8 admission. At that time treated with vancomycin. Given cost-prohibition of fidamoxacin, will continue vancomycin (day 2/14 followed by a taper). S/p ID consult.  - PTA zofran and scheduled antiemetic panel ordered.  - anti-diarrheals held      #Acute complex cystitis  #chronic indwelling catheter  Overnight patient became tachycardic with new onset of fever, 101.3. Sample from new gonsalez bag showed elevated WBC, Large LE and +nitrites. Given her inpatient status and now vital sign changes, decision was made to add coverage for gram negative organism. Discussed with pharmacy overnight, they would recommend ceftriaxone by antibiogram, with modifications as needed after culture is resulted.    -D#1 Ceftriaxone  -Ucx pending, change abx prn    # Anemia:   Chronic anemia secondary to recent chemoradiation therapy. Hemodynamically stable, will monitor.     # Chronic hypertension   # Hyperlipidemia   - Mildly hypertensive pressures on admission   - PTA amlodpine initiated       # Pulmonary nodules   # Low normal O2 sats on admission   - CTAP notable for stable 3 mm R middle lobe pulm nodule, R lower lobe calcified granuloma.   - Sating at 92-94% on admission, but does not appear to be short of breath   - supplemental O2 ordered for O2 sats < 92%.     # Bladder spasms  # Chronic indwelling catheter   # Mild  JACQUIE   - PTA include pyridium, detrol. Chronic catheter inplace due to spasms.       # PPX  -  Lovenox 40 mg daily   - Encouarage SCDs   - PT/OT consulted, appreciate recommendations.     Refugio Davis DO, MA  [facility]- PGY2  6:55 AM October 9, 2023       I have seen and examined the patient.  I have reviewed and edited Dr. Davis's note above.  -Plan for 14-day course of vancomycin followed by taper per ID given cost-prohibition of fidamoxacin.  -Thomas catheter removed, patient able to void, will monitor PVRs with bladder scans.  -Will reschedule post-treatment follow-up visit after discharge.       Reina Stahl MD, MS, FACOG, FACS  10/9/2023  3:19 PM

## 2023-10-09 NOTE — PLAN OF CARE
Goal Outcome Evaluation:    HD 2 admitted with worsening nausea, vomiting, and diarrhea; hx of stage IVB SCC of the vagina. S/p Cis-Rt and HDR brachy x5.      AVSS on room air. Pain managed with Oxycodone. On a regular diet, complained of intermittent nausea and received Zofran ODT. On oral Vancomycin for C diff. Having frequent loose BMs. Voided spontaneously with low post-void residual bladder scans (48 and 54ml). Up with SBA. Denied dizziness. IV fluids via PIV. Continue with plan of care.

## 2023-10-09 NOTE — PROGRESS NOTES
JOSIE Davis DO, MA OBGYN- PGY2 paged #[phone] MM mag 1.6, pls place RN managed electrolyte and conditional blood cx orders if applicable. Danielle GRAHAM [phone]

## 2023-10-09 NOTE — PLAN OF CARE
[id number]:  A&O x4.  Tmax 99.1 oral.  Hypertensive BP on scheduled 161/67, given scheduled amlodipine BP decreased to 111/64.  OVSS on room air.  Abdominal/bladder pain managed by scheduled Oxycodone and given 5 mg PRN Oxycodone x 1 with a decrease in pain.  Given PRN PO Zofran x 1 for nausea with some relief.  Trail of Void done, inserted 40 mL saline which pt tolerated, removed gonsalez and pt voided 120 mL.  Loose stool, continues on PO Vanco.  [place] dizzy, when walking to bathroom.  Up with SBA.   mL/hr.   visiting.  US Lower Extremtity Venous Duplex Bilateral ordered to r/o fever, eval dvt.  Pt denies BLE pain, no s/sx.

## 2023-10-09 NOTE — PLAN OF CARE
Goal Outcome Evaluation:    PMHx - stage IVB SCC of the vagina on chemotherapy and brachytherapy and recent C difficle infection (9/8/23) s/p treatment  - presents to the ED with abdominal pain, nausea, vomiting and diarrhea.     Temp spike 101.3 overnight - MD aware.  STAT labs and started IV Rocephin.  Oxycodone Q4hr scheduled.    PIV infusing NS@125.  Regular diet.  Denies nausea overnight.  Up with SBA to BSComm, loose stools.  Chronic gonsalez in place - good UVols, orange.  Cont with POC.

## 2023-10-09 NOTE — PROGRESS NOTES
General ID Service: Follow-up Note      Patient:  Jessie Tamayo, Date of birth 1955, Medical record number [id number]  Date of Visit:  October 9, 2023  Reason for consult: 1st recurrence C Diff         Physician Attestation      I, JUANITA LONDON MD, was present with the medical student who participated in the service and in the documentation of the note.  I have verified the history and personally performed the physical exam and medical decision making.  I agree with the assessment and plan of care as documented in the note.      [place] findings:   Jessie Tamayo is a 67 year old female with stage 4 vaginal cancer, s/p chemorad. She has been diagnosed with C diff in August and completed it but it has recurred. She is getting better now and will continue prolonged taper of oral vancomycin.     I don't think she has cystitis as she does not have urinary symptoms. Urinalysis and urine cultures are unreliable for patients with gonsalez catheter and will always be abnormal. Would recommend stopping ceftriaxone as this could worsen her recurrent C diff. Risks outweigh benefit.     If she develops another fever, would expand workup for infectious and non-infectious differentials particularly DVTs as she has cancer history.     MDM: >3 notes and tests reviewed, discussed with ID team and primary team, severe infection.     JUANITA LONDON MD  Date of Service (when I saw the patient): 10/09/23         Assessment and Recommendations:     IMPRESSION:  Clostridium difficile colitis, 1st recurrence   Chronic indwelling gonsalez catheter with asymptomatic bacteriuria   Stage IVB vaginal cancer s/p chemo/rads (completed therapy and under observations)    RECOMMENDATION:  Consider US dopplers to check for DVT if with recurrence of fever.   Continue PO vancomycin for ta 14 day course of QID dosing followed by PO vancomycin taper as follows: Vancomycin 125 mg twice daily x 1 week f/b 125 mg once daily x 1 week f/b 125  mg every other day x 1 week f/b 125 mg every three days x 2 weeks then discontinue.   Stop ceftriaxone.     Thank you for this consult. ID will continue to follow.     Diego Suggs on 10/9/2023 at 2:28 PM         Interval History:   She had fever. I talked to her over and asked her about her history of recurrent UTI diagnosis. She said she is asymptomatic and providers were just checking UA and UC. It's positive so she's being treated.            Current Antimicrobials   Vancomycin oral 10/8->  Ceftriaxone 10/9           Physical Exam:   Ranges for vital signs:  Temp:  [98.3  F (36.8  C)-101.3  F (38.5  C)] 99.1  F (37.3  C)  Pulse:  [] 107  Resp:  [14-18] 18  BP: (111-161)/(55-67) 111/64  SpO2:  [92 %-94 %] 92 %    Intake/Output Summary (Last 24 hours) at 10/9/2023 1416  Last data filed at 10/9/2023 1300  Gross per 24 hour   Intake 2288.75 ml   Output 3000 ml   Net -711.25 ml     Exam:  GENERAL:  well-developed, well-nourished, sitting in bed in no acute distress.   ENT:  Head is normocephalic, atraumatic. Oropharynx is moist without exudates or ulcers.  EYES:  Eyes have anicteric sclerae. No conjunctival injection.   NECK:  Supple.  LUNGS:  Unlabored breathing. Clear to auscultation.  CARDIOVASCULAR:  Regular rate and rhythm with no murmurs, rubs, or gallops.  ABDOMEN:  Non-distended, soft, generalized tenderness.  EXT: Extremities warm and well perfused. No edema.  SKIN:  No acute rashes.  Line is in place without any surrounding erythema.  NEUROLOGIC:  Awake, alert, interactive.  : Thomas in place         Laboratory Data:     WBC normal  Estimated Creatinine Clearance: 67.9 mL/min (based on SCr of 0.9 mg/dL).    MICRO:  10/7 C diff - triple positive   10/8 Ucx - Serratia marcescens          Imaging:     CT AP w/ contrast (10/7/23)   IMPRESSION:   1. Bowel wall thickening and surrounding inflammation involving the rectum and sigmoid colon, concerning for colitis.  2. Stable 3mm pulmonary  nodule.  2. Wall thickening and surrounding fat stranding with small amount of air within the urinary bladder (favored to be related to catheterization).

## 2023-10-09 NOTE — PLAN OF CARE
Admitted/transferred from: ED  2 RN skin assessment completed by Carissa Muñoz, RN and Elsa LENNON RN.  Skin assessment finding: mild redness to abdominal + groin folds, otherwise no issues noted.  Interventions/actions: interdry given to patient per request.    Will continue to monitor.

## 2023-10-09 NOTE — PROGRESS NOTES
"   10/09/23 1614   Appointment Info   Signing Clinician's Name / Credentials (PT) Kay Karimi PT, DPT   Rehab Comments (PT) monitor Hgb   Living Environment   People in Home spouse   Current Living Arrangements house   Home Accessibility stairs to enter home;stairs within home   Number of Stairs, Main Entrance 3   Stair Railings, Main Entrance railings on both sides of stairs   Number of Stairs, Within Home, Primary greater than 10 stairs   Stair Railings, Within Home, Primary railings on both sides of stairs   Transportation Anticipated family or friend will provide   Living Environment Comments Pt lives with spouse, spouse able to provide 24/7 A, split level home, required to complete 11 stairs up to main level.   Self-Care   Usual Activity Tolerance good   Current Activity Tolerance fair   Regular Exercise No   Equipment Currently Used at Home shower chair   Fall history within last six months no   Activity/Exercise/Self-Care Comment Pt denies falls, states used to be very active and enjoyed bike riding but has not been able to \"do much\" since diagnosis. States limited tolerance for community mobility and requires w/c. States has been thinking she needs a walker at home   General Information   Onset of Illness/Injury or Date of Surgery 10/07/23   Referring Physician Johnson Romero MD   Patient/Family Therapy Goals Statement (PT) return home   Pertinent History of Current Problem (include personal factors and/or comorbidities that impact the POC) per EMR \" 67 year old female stage IVB SCC of the vagina who presents to the ED with 2 days of worsening nausea, vomiting, and diarrhea.\"   Existing Precautions/Restrictions fall   General Observations pleasant and agreeable to therapy   Cognition   Affect/Mental Status (Cognition) WFL   Pain Assessment   Patient Currently in Pain No  (occ bladder spasms)   Integumentary/Edema   Integumentary/Edema Comments scattered bruising   Posture    Posture Forward head " EMERGENCY DEPARTMENT HISTORY AND PHYSICAL EXAM    Date: 7/20/2022  Patient Name: Chong Pugh    History of Presenting Illness     Chief Complaint   Patient presents with    Leg Pain    Rectal Bleeding         History Provided By: Patient    Additional History (Context):   5:51 AM  Chong Pugh is a 58 y.o. male with PMHX of double amputee, Tob abuse, enlarged phantom stump pain and prostate  who presents to the emergency department C/O phantom leg pain. I saw him about 5 days ago for the same complaint, fully worked up to include CT, all negative. See below  phantom leg pain and stump pain; he also complains of blood per rectum. Patient was brought in by ambulance complaints of pain to his lower extremities and his stumps. He states he is got phantom pain associated with this takes long-term medications including gabapentin and Percocet or tramadol. He also acknowledges chronic ER visits for urinary problems retention with chronic indwelling Servin catheter. He states he has medication prescriptions for his high blood pressure, Norvasc as well as Lipitor for his high cholesterol. Mariel Patsy He also has recurrent abdominal pain which seems to come and go depending on daily activities but he eats and drinks. He cannot really characterize it or give the location. There is no steady increasing or decreasing factors with this. He states when cleaning his rectum he has had blood which \"scares me because cancer runs in my family\". He is very lean and denies any knowledge of any weight loss or night sweats, although he acknowledges \"I use\"    I refilled his script of tramadol, which he did not fill         Social History    He acknowledges frequent marijuana for his phantom pain in addition to tobacco use. He also has frequent history of cocaine use    Family History  Cancer runs in the family.     PCP: Andre Fuller MD    Current Facility-Administered Medications   Medication Dose Route Frequency Provider Last Rate Last Admin    traMADoL (ULTRAM) tablet 100 mg  100 mg Oral NOW Madhuri Cho MD         Current Outpatient Medications   Medication Sig Dispense Refill    gabapentin (NEURONTIN) 300 mg capsule Take 1 Capsule by mouth three (3) times daily. Max Daily Amount: 900 mg. 60 Capsule 0    tamsulosin (Flomax) 0.4 mg capsule Take 1 Capsule by mouth daily for 15 days. 15 Capsule 0    oxyCODONE-acetaminophen (PERCOCET) 5-325 mg per tablet Take 1 Tab by mouth every six (6) hours as needed for Pain for up to 12 doses. Max Daily Amount: 4 Tabs. 12 Tab 0       Past History     Past Medical History:  Past Medical History:   Diagnosis Date    Anxiety     Depression     Enlarged prostate     PTSD (post-traumatic stress disorder)        Past Surgical History:  Past Surgical History:   Procedure Laterality Date    HX ORTHOPAEDIC      double amputee       Family History:  History reviewed. No pertinent family history. Social History:  Social History     Tobacco Use    Smoking status: Every Day   Substance Use Topics    Alcohol use: Yes    Drug use: Yes     Types: Marijuana, Cocaine     Comment: smokes daily       Allergies:  No Known Allergies      Review of Systems   Review of Systems   Gastrointestinal:  Positive for anal bleeding. Musculoskeletal:         Bilat AKA     Physical Exam     Vitals:    07/20/22 0036   BP: 116/88   Pulse: 73   Resp: 18   Temp: 97.6 °F (36.4 °C)   SpO2: 99%   Weight: 65.8 kg (145 lb)   Height: 5' 10\" (1.778 m)     Physical Exam  Vitals and nursing note reviewed. Constitutional:       General: He is not in acute distress. Appearance: He is well-developed. He is not diaphoretic. HENT:      Head: Normocephalic and atraumatic. Eyes:      General: No scleral icterus. Extraocular Movements:      Right eye: Normal extraocular motion. Left eye: Normal extraocular motion. Conjunctiva/sclera: Conjunctivae normal.      Pupils: Pupils are equal, round, and reactive to light. position;Protracted shoulders   Range of Motion (ROM)   Range of Motion ROM is WFL   Strength (Manual Muscle Testing)   Strength Comments deconditioning, functional per mobility screen   Bed Mobility   Comment, (Bed Mobility) SBA with bed rail and HOB elevated   Transfers   Comment, (Transfers) SBA STS from EOB wih UE support   Gait/Stairs (Locomotion)   Comment, (Gait/Stairs) SBA short distance in room with 1 UE support on IV pole,   Balance   Balance Comments impaired standing balance   Sensory Examination   Sensory Perception patient reports no sensory changes   Clinical Impression   Criteria for Skilled Therapeutic Intervention Yes, treatment indicated   PT Diagnosis (PT) impaired functional mobility   Influenced by the following impairments impaired strength, balance, act tolerance   Functional limitations due to impairments transfers, gait, stairs   Clinical Presentation (PT Evaluation Complexity) Stable/Uncomplicated   Clinical Presentation Rationale clinical judgement   Clinical Decision Making (Complexity) low complexity   Planned Therapy Interventions (PT) balance training;bed mobility training;gait training;home exercise program;motor coordination training;neuromuscular re-education;patient/family education;ROM (range of motion);stair training;strengthening;transfer training;progressive activity/exercise;risk factor education;home program guidelines   Anticipated Equipment Needs at Discharge (PT) walker, rolling   Risk & Benefits of therapy have been explained evaluation/treatment results reviewed;care plan/treatment goals reviewed;risks/benefits reviewed;current/potential barriers reviewed;participants voiced agreement with care plan;participants included;patient   PT Total Evaluation Time   PT Eval, Low Complexity Minutes (80225) 9   Physical Therapy Goals   PT Frequency 4x/week   PT Predicted Duration/Target Date for Goal Attainment 10/31/23   PT Goals Bed Mobility;Transfers;Gait;Stairs   PT: Bed  Mobility Independent;Supine to/from sit;Rolling   PT: Transfers Modified independent;Sit to/from stand;Bed to/from chair;Assistive device   PT: Gait Modified independent;Greater than 200 feet;Within precautions   PT: Stairs Supervision/stand-by assist;Greater than 10 stairs;Rail on both sides   PT Discharge Planning   PT Plan progress gait with LRAD, stais when able, iniate HEP   PT Discharge Recommendation (DC Rec) home with assist;home with home care physical therapy   PT Rationale for DC Rec Pt below baseline, limited by fatigue and feeling shakey today. Anticipate when medically ready, pt will be safe to dc home with support from spouse. recommend HHPT vs OP cancer rehab pending progress for ongoing strength and endurance and reduce fall risk   PT Brief overview of current status SBA   Total Session Time   Total Session Time (sum of timed and untimed services) 9      Neck:      Trachea: No tracheal deviation. Cardiovascular:      Rate and Rhythm: Normal rate and regular rhythm. Heart sounds: Normal heart sounds. Pulmonary:      Effort: Pulmonary effort is normal. No respiratory distress. Breath sounds: Normal breath sounds. No stridor. Abdominal:      General: Bowel sounds are normal. There is no distension. Palpations: Abdomen is soft. Tenderness: There is no abdominal tenderness. There is no rebound. Musculoskeletal:         General: No tenderness. Normal range of motion. Cervical back: Normal range of motion and neck supple. Comments: Bilat AKA   Skin:     General: Skin is warm and dry. Capillary Refill: Capillary refill takes less than 2 seconds. Findings: No erythema or rash. Neurological:      Mental Status: He is alert and oriented to person, place, and time. Cranial Nerves: No cranial nerve deficit. Motor: No weakness. Psychiatric:         Attention and Perception: Attention normal.         Mood and Affect: Mood is anxious. Behavior: Behavior normal.         Thought Content: Thought content normal.         Judgment: Judgment normal.       Diagnostic Study Results     Labs -  No results found for this or any previous visit (from the past 24 hour(s)). Radiologic Studies -   No orders to display     CT Results  (Last 48 hours)      None          CXR Results  (Last 48 hours)      None            Medications given in the ED-  Medications   traMADoL (ULTRAM) tablet 100 mg (has no administration in time range)         Medical Decision Making   I am the first provider for this patient. I reviewed the vital signs, available nursing notes, past medical history, past surgical history, family history and social history. Vital Signs-Reviewed the patient's vital signs.     Pulse Oximetry Analysis - 99% on room air   d by Javier Sierra MD      Records Reviewed: NURSING NOTES AND PREVIOUS MEDICAL RECORDS    Provider Notes (Medical Decision Making):   Chronic complaints, doubt new GI Bleed    Procedures:  Procedures    ED Course:   5:51 AM: Initial assessment performed. The patients presenting problems have been discussed, and they are in agreement with the care plan formulated and outlined with them. I have encouraged them to ask questions as they arise throughout their visit. Diagnosis and Disposition       DISCHARGE NOTE:    Brayden Hays's  results have been reviewed with him. He has been counseled regarding his diagnosis, treatment, and plan. He verbally conveys understanding and agreement of the signs, symptoms, diagnosis, treatment and prognosis and additionally agrees to follow up as discussed. He also agrees with the care-plan and conveys that all of his questions have been answered. I have also provided discharge instructions for him that include: educational information regarding their diagnosis and treatment, and list of reasons why they would want to return to the ED prior to their follow-up appointment, should his condition change. He has been provided with education for proper emergency department utilization. CLINICAL IMPRESSION:    1. Amputation stump pain ([address]        PLAN:  1. D/C Home  2. Current Discharge Medication List        3. Follow-up Information       Follow up With Specialties Details Why Juany Araujo MD Family Medicine In 3 days  [address] [place] [address]  [phone]      [facility] EMERGENCY DEPT Emergency Medicine  As needed [address] [id number]  [phone]          _______________________________    This note was partially transcribed via voice recognition software. Although efforts have been made to catch any discrepancies, it may contain sound alike words, grammatical errors, or nonsensical words.

## 2023-10-09 NOTE — PLAN OF CARE
[id number]. Tachycardic low 100's, otherwise VSS on RA. Tolerated blood transfusion earlier today without s/s of reaction. Plan to re-check Hemoglobin tomorrow AM per Gyn/Onc. Chronic gonsalez catheter in place, occasionally leaking at insertion site per pt report. Taking scheduled Oxycodone/Detrol and PRN Pyridium for c/o bladder pain/spasms. Having loose stools. On enteric isolation and receiving PO Vancomycin for C. Diff. Regular diet, appetite fair. IVF infusing into PIV @ 125 mL/hr. Getting up to bathroom or commode with minimal assistance. Continue to monitor and with POC.

## 2023-10-09 NOTE — PROGRESS NOTES
PALLIATIVE CARE SOCIAL WORK Progress Note   Location: [facility]      Palliative care social work attempted introductory visit this afternoon. Jessie sleeping soundly at time of visit, no family present in room. Did not attempt to wake patient.     Plan: PCSW will follow up again 10/10 to introduce role of social work as part of palliative care team.    Clinical Social Work Interventions:   Other: Attempted to complete intro visit      SIERRA Wise, [facility]  [facility], Palliative Care  Securely message with the [employer] Web Console (learn more here) or  Text page via [facility] Paging/Directory

## 2023-10-09 NOTE — PROVIDER NOTIFICATION
Gyn onc paged #[phone] Pt hesitant about 300 mL NS gonsalez trail, can amount be decreased? pt said look in chart. Thanks

## 2023-10-09 NOTE — PROGRESS NOTES
PALLIATIVE CARE PROGRESS NOTE  [facility]     Patient Name: Jessie Tamayo  Date of Admission: 10/7/2023   Today the patient was seen for: Vaginal CA, bladder spasm     Recommendations & Counseling     What Did We Do New for Today  Continue scheduled Oxycodone 5 mg q 4 hours; getting it scheduled rather than prn makes a huge difference. Consider long acting opioid but will hold off for now until plans for Thomas are clear.  Pt's understanding is that Gyn Onc is getting a Urology consult to see about a trial of Thomas removal. Her feeling is that bladder spasm/pain issues started after Thomas placement and wonders if she could do a trial without it. Deferred to primary team.  Pt would like further update from Onc re: disease status; she's not sure if her disease is curable or not  She's happy with current prn nausea meds.    GOALS OF CARE:   All life extending cares. Wants further discussion w Gyn Onc about prognosis, curability, etc.    ADVANCE CARE PLANNING:  No ACD or Polst on file  Code status: Full Code    MEDICAL MANAGEMENT:   See recommendations above re: pain management    PSYCHOSOCIAL/SPIRITUAL:  Pall SW will continue to see for support    Palliative Care will continue to follow. Thank you for the consult and allowing us to aid in the care of Jessie Tamayo.    These recommendations have been discussed with primary team    Kilo Stern MD  Securely message with [employer] (more info)  Text page via [employer] Paging/Directory   TT: 37 minutes, with > 50% spent in C/C/E patient/family/care teams re: GOC, POC, Sx management. [id number]  Seen today for bladder spasm, vaginal CA, C diff         Interval History:   Continues to have intermittent bladder spasms.  Pain management much better with SCHEDULED Oxy IR; see comments above    Palliative Summary/HPI            Palliative Care Consultation Note  [facility]        Patient: Jessie  "MEY Tamayo  Date of Admission:  10/7/2023     Requesting Clinician / Team: Johnson Romero/Gyn Onc  Reason for consult: \"recommendations for additional pain management in the setting of vaginal cancer and recurrent c diff.\"  Pain management  Symptom management         Recommendations & Counseling      GOALS OF CARE:   Life prolonging without limits     ADVANCE CARE PLANNING:  No health care directive on file. Per  informed consent policy, next of kin should be involved if patient becomes unable.  There is no POLST form on file, defer to patient and/or next of kin for decisions   Code status: Full Code; did not discuss given clear goals.     MEDICAL MANAGEMENT:   Pain due to bladder spasms, in context of recurrent c diff:   - has been using oxycodone every 4hrs at home, dropped from 10mg q4H to 5mg q4H and setting alarm on her watch to take meds.  - schedule oxycodone 5mg q4H here in hospital for now w additional oxycodone 5mg PO q4H prn breakthrough pain.  Aware that w c diff typically would want to minimize opioids, but concern that she was having such severe pain, will schedule low dose for now and monitor closely.  If symptoms of fever/any worsening of symptoms would stop opioid.  - consider acupuncture if available for pain, bladder support.  - continue detrol BID  - agree w catheter change  - not candidate for B&O suppositories per rad onc previously.        Fatigue, deconditioning  - no falls but episodes of lightheadedness w position changes, noting less endurance.  - she is wondering if she should have a walker.  - prioritize activities, energy conservation  - for now would not pursue stimulant medications  --highly recommend PT eval prior to discharge w assessment for possible walker to be dispensed at discharge.        PSYCHOSOCIAL/SPIRITUAL SUPPORT:  Family , son and DIL, and granddaugther who is 5  Friends    [place] community: Undesignated   Declined Spiritual Health Services  Would like to meet with " "Palli SW for emotional support (especially for  Jesse)     Palliative Care will continue to follow. Thank you for the consult and allowing us to aid in the care of Jessie Tamayo.        Ariana Huggins MD  Securely message with [employer] (more info)  Text page via [facility] Paging/Directory         Assessment       Jessie Tamayo is a 67 year old female with a past medical history of stage IVB SCC of vagina (diagnosed 5/2023) s/p cisplatin/radiation treatment, HDR brachytherapy and difficulties with UTI, sepsis, neutropenic fevers, and urethral edema contributing to urinary retention and gonsalez catheter.  She had c diff recently, finished vancomycin last month, and developed recurrent disease.     Jessie presented with 3 days of nausea and vomiting and while in ED developed severe bladder spasms and pain symptoms. Admitted for medical management.                Review of Systems:   + fatigue, + iintermittent nausea effectively managed with prn meds. + anxiety about what the future may bring       Physical Exam:   Blood pressure (!) 140/59, pulse 85, temperature 97.9  F (36.6  C), resp. rate 20, height 1.651 m (5' 5\"), weight 92.1 kg (203 lb 1.6 oz), last menstrual period 03/08/2008, SpO2 94%, not currently breastfeeding.   General: Awake alert interactive, able to participate in complex conversation.  ENT: Moist oral mucosa  Lungs: Clear in anterior listening fields  Abdomen: Mild diffuse generalized tenderness; no point tenderness.  : Gonsalez catheter in place  Extremities: Trace BLE edema.           Medications   Oxycodone 5 mg every 4 hours scheduled.           Data Reviewed:   ROUTINE ICU LABS (Last four results)  CMP  Recent Labs   Lab 10/11/23  0618 10/10/23  2130 10/10/23  1422 10/10/23  0543 10/09/23  0616 10/08/23  0542 10/07/23  1500     --   --  137 136 134*  134* 134*   POTASSIUM 4.0 3.4 3.5 3.3* 3.6 3.8  3.8 3.7   CHLORIDE 102  --   --  105 104 100  100 95*   CO2 23  --   --  23 24 25  25 27 "   ANIONGAP 10  --   --  9 8 9  9 12   GLC 88  --   --  87 90 96 122*   BUN 3.3*  --   --  4.5* 5.3* 8.3 9.7   CR 0.73  --   --  0.81 0.90 1.03* 1.11*   GFRESTIMATED 90  --   --  79 70 59* 54*   KIM 7.9*  --   --  8.1* 8.2* 8.0* 8.9   MAG 2.0 2.3 1.5* 1.5* 1.6* 1.7 1.7   PHOS  --   --   --  3.3 3.4 3.9 3.8   PROTTOTAL  --   --   --  4.7* 5.1* 4.9* 6.2*   ALBUMIN  --   --   --  2.5* 2.7* 2.7* 3.5   BILITOTAL  --   --   --  0.2 0.2 0.3 0.5   ALKPHOS  --   --   --  59 64 64 83   AST  --   --   --  14 15 16 23   ALT  --   --   --  5 <5 6 10     CBC  Recent Labs   Lab 10/11/23  0003 10/10/23  0543 10/09/23  0616 10/09/23  0047   WBC 10.5  10.5 6.1 7.9 7.4   RBC 2.61*  2.61* 2.13* 2.21* 2.12*   HGB 8.7*  8.7* 6.9* 7.2* 7.0*   HCT 26.5*  26.5* 22.3* 23.0* 22.3*   *  102* 105* 104* 105*   MCH 33.3*  33.3* 32.4 32.6 33.0   MCHC 32.8  32.8 30.9* 31.3* 31.4*   RDW 16.5*  16.5* 17.3* 17.7* 18.0*     169 141* 172 148*     INRNo lab results found in last 7 days.  Arterial Blood GasNo lab results found in last 7 days.

## 2023-10-09 NOTE — PROGRESS NOTES
SPIRITUAL HEALTH SERVICES  SPIRITUAL ASSESSMENT Progress Note (Palliative Focus)  [facility] ([place] 5A    REFERRAL SOURCE: Palliative care initial spiritual assessment.    Patient Jessie Tamayo and family decline  visits at this time.     Plan: No follow up planned unless spiritual or meaning-based support is requested; I am available for support as needed while Palliative Care is consulted.    Elizabeth Bennett M.Div., [facility]  Palliative Care   Pager [phone]  [facility] Palliative Care Inpatient Team  Securely message with the [employer] Web Console (learn more here) or  Text page via [employer] Paging/Directory

## 2023-10-09 NOTE — PROVIDER NOTIFICATION
[facility] paged #[phone] instilled 40 mL NS what pt could tolerate.  1500 pt urinated 120 mL

## 2023-10-09 NOTE — DISCHARGE SUMMARY
Gynecologic Oncology Discharge Summary    Jessie Tamayo  [id number]    Admit Date: 10/7/2023  Discharge Date: 10/17/2023  Admitting Provider: Gerald Antnoio MD  Discharge Provider: Valerio Ham MD    Admission Dx:   - Nausea and Vomiting  - Profuse diarrhea   - Recurrent C diff  - Mild hyponatremia  - Mild JACQUIE  - Tachycardia  - Stage IVB SCC of the vagina    Discharge Dx:  - Recurrent C diff  - Stage IVB SCC of the vagina    Patient Active Problem List   Diagnosis    Hyperlipidemia LDL goal <130    Recurrent cold sores    Hearing loss    Pap smear of cervix with ASCUS, cannot exclude HGSIL    Introital dyspareunia    Vaginal atrophy    Cervical high risk HPV (human papillomavirus) test positive    Atrophic vaginitis    Class 1 obesity due to excess calories without serious comorbidity with body mass index (BMI) of 34.0 to 34.9 in adult    Urethral bleeding    Vaginal cancer (H)    Benign neoplasm of colon    Morbid obesity (H)    SCC (squamous cell carcinoma)    Hypomagnesemia    Lower abdominal pain    Urinary tract infection associated with indwelling urethral catheter, initial encounter     Vulvar cancer (H)    C. difficile diarrhea    Pancytopenia (H)    Sepsis, due to unspecified organism, unspecified whether acute organ dysfunction present (H)    Colitis    Nausea and vomiting, unspecified vomiting type     Procedures: flexible sigmoidoscopy    Prior to Admission Medications:  No medications prior to admission.     Discharge Medications:     Review of your medicines        START taking        Dose / Directions   ibuprofen 400 MG tablet  Commonly known as: ADVIL/MOTRIN  Used for: Vulvar cancer (H)      Dose: 400 mg  Take 1 tablet (400 mg) by mouth every 6 hours as needed for inflammatory pain  Refills: 0            CONTINUE these medicines which have NOT CHANGED        Dose / Directions   acetaminophen 500 MG tablet  Commonly known as: TYLENOL      Dose: 500-1,000 mg  Take 500-1,000 mg by mouth every 4  hours as needed for mild pain  Refills: 0     amLODIPine 5 MG tablet  Commonly known as: NORVASC      Dose: 5 mg  Take 5 mg by mouth every morning  Refills: 0     calcium carbonate 750 MG Chew      Dose: 750 mg  Take 750 mg by mouth 3 times daily as needed  Refills: 0     clotrimazole 1 % external cream  Commonly known as: LOTRIMIN  Used for: Vaginal cancer (H)      Apply topically 2 times daily To affected areas  Quantity: 45 g  Refills: 3     docusate sodium 100 MG capsule  Commonly known as: COLACE      Dose: 100 mg  Take 100 mg by mouth daily as needed for constipation  Refills: 0     loratadine 10 MG tablet  Commonly known as: CLARITIN      Dose: 10 mg  Take 10 mg by mouth daily  Refills: 0     magnesium oxide 400 MG tablet  Commonly known as: MAG-OX      Dose: 400 mg  Take 400 mg by mouth every morning  Refills: 0     naloxone 4 MG/0.1ML nasal spray  Commonly known as: NARCAN  Used for: Vulvar cancer (H), Neoplasm related pain      Dose: 4 mg  Spray 1 spray (4 mg) into one nostril alternating nostrils as needed for opioid reversal every 2-3 minutes until assistance arrives  Quantity: 0.2 mL  Refills: 0     NONFORMULARY      Apply topically daily Cavalon cream  Refills: 0     ondansetron 8 MG tablet  Commonly known as: ZOFRAN  Used for: Vaginal cancer (H), SCC (squamous cell carcinoma)      Dose: 8 mg  Take 1 tablet (8 mg) by mouth every 8 hours as needed for nausea (vomiting) Do not take for 3 days after chemo.  Quantity: 30 tablet  Refills: 2     prochlorperazine 10 MG tablet  Commonly known as: COMPAZINE  Used for: Vaginal cancer (H), SCC (squamous cell carcinoma)      Dose: 10 mg  Take 1 tablet (10 mg) by mouth every 6 hours as needed for nausea or vomiting  Quantity: 30 tablet  Refills: 2            STOP taking      oxyCODONE 5 MG tablet  Commonly known as: ROXICODONE        phenazopyridine 200 MG tablet  Commonly known as: PYRIDIUM        tolterodine 2 MG tablet  Commonly known as: DETROL                   Where to get your medicines        Some of these will need a paper prescription and others can be bought over the counter. Ask your nurse if you have questions.    You don't need a prescription for these medications  ibuprofen 400 MG tablet       Consultations:  ID    Brief History of Illness:  Jessie Tamayo is a 67 year old female with stage IVB SCC of the vagina who presents to the ED with 2 days of worsening nausea, vomiting, and diarrhea. Patient reports yesterday she began having some upper abdominal pain. These symptoms were associated with nausea and emesis x3 yesterday and once more today. Additionally reports multiple episodes of watery non-bloody stool over the last two days as well. She endorses associating lightheadedness and fatigue and suspects this is due to overall dehydration as she has only been able to have small po intake. She denies shortness of breath or chest palpitations. She reports some low grade fevers at home with a max temperature of 100 F. No chills.      Hospital Course:  Dz:  Jessie is a 66yo with stage IVB SCC of vagina received cis RT and recently completed interstitial HDR brachytherapy x5 on 9/20. Patient will follow up in the gyn onc clinic.    FEN:   - She as initially provided a 1L bolus of normal saline in the ED and subsequently transitioned to mIVF. Maintained on 125 ml/hr NS until tolerating adequate PO intake. By discharge, PO intake was improving and she was tolerating small amounts of a regular diet without nausea and vomiting and able to maintain her hydration without IVF supplementation.   - Underwent repletion for electrolyte abnormalities, which has normalized on discharge.  Pain:   - PTA tylenol q4h, 5-10 mg oxycodone q4h. PRN ibuprofen ordered. S/p IV dilaudid in the ED.  Once tolerating PO pain meds, she was transitioned to a PO pain regimen. Her pain was well controlled on this and she was discharged home with these medications. She will follow up with  palliative care regarding pain management.  CV:   - HTN and HLD. Mildly hypertensive on arrival. PTA amlodipine was continued inpatient.  Her vital signs were stable while in house and she had no acute CV issues.  PULM:   - CT showed stable 3 mm R middle lobe pulmonary nodule, R lower lobe calcified granuloma. Supplemental O2 ordered if O2 sating < 92%.    By discharge, her O2 sats were greater than 94% on RA.  She was encouraged to use her bedside IS while in house.  She had no acute pulmonary issues while in house.  HEME:   - Acute on chronic anemia (associated with chronic disease); she received a total of 2u of pRBCs in house for Hgb rodger to 6.1 with appropriate rise. Her Hgb was stable at 8.9 prior to discharge.   - Doppler obtained for new LLE edema, found to be negative for DVT.  GI:   - She was diagnosed with recurrent/persistent C difficile on HD#1 (had recently completed a separate 14-day course of PO vancomycin) with reassuring abdominal exam without concern for toxic megacolon on imaging. Per ID recommendations, she was started on PO Vancomycin for 14 days, with taper at home as below.  On day 6, she was recommended for inpatient flexible sigmoidoscopy which confirmed diagnosis of C. diff. In addition to vancomycin she was started on IV flagyl. This was stopped 24 hours prior to discharge for observation. By discharge her bowel movements had decreased in frequency.  :    - The patient had a chronic indwelling catheter on arrival. On HD#1 she developed a fever overnight. In the setting of + LE and nitrites, the patient was started on IV ceftriaxone for broader coverage of acute complex cystitis. A mild JACQUIE was diagnosed on admission that resolved    - See ID below regarding serratia UTI.  ID:   - ID was consulted in the setting of re-infection with C. Diff. They agreed with the plan to continue vancomycin due to insurance barriers for alternative therapies. Plan for extended course of therapy with  gradual taper in dose, which was prescribed on discharge.   - Patient intermittently febrile in the setting of above C Diff infection. Blood cultures were negative, but urine culture grew serratia in the setting of chronic indwelling Thomas catheter for disease/treatment-related bladder outlet obstruction. This was treated with a 5-day course of Bactrim. Mild leukocytosis stable to improved at the time of discharge, no lactic acidosis noted.   - Patient was afebrile and hemodynamically stable at the time of discharge.  ENDO:   - No issues  PSYCH/NEURO:   - No issues  PPX:    - She was given SCDs and lovenox during her hospital course. These were discontinued on discharge.    Discharge Instructions and Follow up:  Ms. Jessie Tamayo was discharged from the hospital with follow up for    Discharge Diet: Regular  Discharge Activity: Activity as tolerated  Discharge Follow up: Follow up in gynecologic oncology clinic in 1 week    Discharge Disposition:  Discharged to home    Discharge Staff: MD Nan Norman MD  OB/GYN PGY-2  10/26/2023 12:42 PM    Saw patient day of discharge and I agree with the plan.   Valerio Ham MD

## 2023-10-10 ENCOUNTER — APPOINTMENT (OUTPATIENT)
Dept: GENERAL RADIOLOGY | Facility: CLINIC | Age: 68
DRG: 372 | End: 2023-10-10
Attending: OBSTETRICS & GYNECOLOGY
Payer: COMMERCIAL

## 2023-10-10 LAB
ALBUMIN SERPL BCG-MCNC: 2.5 G/DL (ref 3.5–5.2)
ALP SERPL-CCNC: 59 U/L (ref 35–104)
ALT SERPL W P-5'-P-CCNC: 5 U/L (ref 0–50)
ANION GAP SERPL CALCULATED.3IONS-SCNC: 9 MMOL/L (ref 7–15)
AST SERPL W P-5'-P-CCNC: 14 U/L (ref 0–45)
BACTERIA UR CULT: ABNORMAL
BILIRUB SERPL-MCNC: 0.2 MG/DL
BLD PROD TYP BPU: NORMAL
BLOOD COMPONENT TYPE: NORMAL
BUN SERPL-MCNC: 4.5 MG/DL (ref 8–23)
CALCIUM SERPL-MCNC: 8.1 MG/DL (ref 8.8–10.2)
CHLORIDE SERPL-SCNC: 105 MMOL/L (ref 98–107)
CODING SYSTEM: NORMAL
CREAT SERPL-MCNC: 0.81 MG/DL (ref 0.51–0.95)
CROSSMATCH: NORMAL
DEPRECATED HCO3 PLAS-SCNC: 23 MMOL/L (ref 22–29)
EGFRCR SERPLBLD CKD-EPI 2021: 79 ML/MIN/1.73M2
ERYTHROCYTE [DISTWIDTH] IN BLOOD BY AUTOMATED COUNT: 17.3 % (ref 10–15)
GLUCOSE SERPL-MCNC: 87 MG/DL (ref 70–99)
HCT VFR BLD AUTO: 22.3 % (ref 35–47)
HGB BLD-MCNC: 6.9 G/DL (ref 11.7–15.7)
ISSUE DATE AND TIME: NORMAL
MAGNESIUM SERPL-MCNC: 1.5 MG/DL (ref 1.7–2.3)
MAGNESIUM SERPL-MCNC: 1.5 MG/DL (ref 1.7–2.3)
MAGNESIUM SERPL-MCNC: 2.3 MG/DL (ref 1.7–2.3)
MCH RBC QN AUTO: 32.4 PG (ref 26.5–33)
MCHC RBC AUTO-ENTMCNC: 30.9 G/DL (ref 31.5–36.5)
MCV RBC AUTO: 105 FL (ref 78–100)
PHOSPHATE SERPL-MCNC: 3.3 MG/DL (ref 2.5–4.5)
PLATELET # BLD AUTO: 141 10E3/UL (ref 150–450)
POTASSIUM SERPL-SCNC: 3.3 MMOL/L (ref 3.4–5.3)
POTASSIUM SERPL-SCNC: 3.4 MMOL/L (ref 3.4–5.3)
POTASSIUM SERPL-SCNC: 3.5 MMOL/L (ref 3.4–5.3)
PROT SERPL-MCNC: 4.7 G/DL (ref 6.4–8.3)
RBC # BLD AUTO: 2.13 10E6/UL (ref 3.8–5.2)
SODIUM SERPL-SCNC: 137 MMOL/L (ref 135–145)
UNIT ABO/RH: NORMAL
UNIT NUMBER: NORMAL
UNIT STATUS: NORMAL
UNIT TYPE ISBT: 5100
WBC # BLD AUTO: 6.1 10E3/UL (ref 4–11)

## 2023-10-10 PROCEDURE — 80053 COMPREHEN METABOLIC PANEL: CPT

## 2023-10-10 PROCEDURE — 99233 SBSQ HOSP IP/OBS HIGH 50: CPT | Performed by: INTERNAL MEDICINE

## 2023-10-10 PROCEDURE — 258N000003 HC RX IP 258 OP 636

## 2023-10-10 PROCEDURE — 120N000002 HC R&B MED SURG/OB UMMC

## 2023-10-10 PROCEDURE — 99232 SBSQ HOSP IP/OBS MODERATE 35: CPT | Mod: GC | Performed by: OBSTETRICS & GYNECOLOGY

## 2023-10-10 PROCEDURE — 250N000013 HC RX MED GY IP 250 OP 250 PS 637: Performed by: FAMILY MEDICINE

## 2023-10-10 PROCEDURE — 83735 ASSAY OF MAGNESIUM: CPT | Performed by: OBSTETRICS & GYNECOLOGY

## 2023-10-10 PROCEDURE — 250N000013 HC RX MED GY IP 250 OP 250 PS 637

## 2023-10-10 PROCEDURE — 258N000003 HC RX IP 258 OP 636: Performed by: OBSTETRICS & GYNECOLOGY

## 2023-10-10 PROCEDURE — 85027 COMPLETE CBC AUTOMATED: CPT

## 2023-10-10 PROCEDURE — 250N000011 HC RX IP 250 OP 636: Mod: JZ | Performed by: OBSTETRICS & GYNECOLOGY

## 2023-10-10 PROCEDURE — 71045 X-RAY EXAM CHEST 1 VIEW: CPT | Mod: 26 | Performed by: RADIOLOGY

## 2023-10-10 PROCEDURE — 84132 ASSAY OF SERUM POTASSIUM: CPT | Performed by: OBSTETRICS & GYNECOLOGY

## 2023-10-10 PROCEDURE — P9016 RBC LEUKOCYTES REDUCED: HCPCS

## 2023-10-10 PROCEDURE — 250N000013 HC RX MED GY IP 250 OP 250 PS 637: Performed by: NURSE PRACTITIONER

## 2023-10-10 PROCEDURE — 83615 LACTATE (LD) (LDH) ENZYME: CPT | Performed by: OBSTETRICS & GYNECOLOGY

## 2023-10-10 PROCEDURE — 36415 COLL VENOUS BLD VENIPUNCTURE: CPT | Performed by: OBSTETRICS & GYNECOLOGY

## 2023-10-10 PROCEDURE — 83735 ASSAY OF MAGNESIUM: CPT

## 2023-10-10 PROCEDURE — 250N000011 HC RX IP 250 OP 636

## 2023-10-10 PROCEDURE — 71045 X-RAY EXAM CHEST 1 VIEW: CPT

## 2023-10-10 PROCEDURE — 36415 COLL VENOUS BLD VENIPUNCTURE: CPT

## 2023-10-10 PROCEDURE — 84100 ASSAY OF PHOSPHORUS: CPT

## 2023-10-10 RX ORDER — MAGNESIUM SULFATE HEPTAHYDRATE 40 MG/ML
4 INJECTION, SOLUTION INTRAVENOUS ONCE
Status: COMPLETED | OUTPATIENT
Start: 2023-10-10 | End: 2023-10-10

## 2023-10-10 RX ORDER — SODIUM CHLORIDE 9 MG/ML
INJECTION, SOLUTION INTRAVENOUS CONTINUOUS
Status: DISCONTINUED | OUTPATIENT
Start: 2023-10-10 | End: 2023-10-12

## 2023-10-10 RX ORDER — SIMETHICONE 80 MG
80 TABLET,CHEWABLE ORAL EVERY 6 HOURS PRN
Status: DISCONTINUED | OUTPATIENT
Start: 2023-10-10 | End: 2023-10-17 | Stop reason: HOSPADM

## 2023-10-10 RX ORDER — POTASSIUM CHLORIDE 7.45 MG/ML
10 INJECTION INTRAVENOUS
Status: COMPLETED | OUTPATIENT
Start: 2023-10-11 | End: 2023-10-11

## 2023-10-10 RX ORDER — POTASSIUM CHLORIDE 7.45 MG/ML
10 INJECTION INTRAVENOUS
Status: COMPLETED | OUTPATIENT
Start: 2023-10-10 | End: 2023-10-10

## 2023-10-10 RX ADMIN — TOLTERODINE TARTRATE 2 MG: 2 TABLET, FILM COATED ORAL at 20:09

## 2023-10-10 RX ADMIN — TOLTERODINE TARTRATE 2 MG: 2 TABLET, FILM COATED ORAL at 08:09

## 2023-10-10 RX ADMIN — SODIUM CHLORIDE, POTASSIUM CHLORIDE, SODIUM LACTATE AND CALCIUM CHLORIDE 500 ML: 600; 310; 30; 20 INJECTION, SOLUTION INTRAVENOUS at 23:57

## 2023-10-10 RX ADMIN — POTASSIUM CHLORIDE 10 MEQ: 7.46 INJECTION, SOLUTION INTRAVENOUS at 16:01

## 2023-10-10 RX ADMIN — OXYCODONE HYDROCHLORIDE 5 MG: 5 TABLET ORAL at 06:57

## 2023-10-10 RX ADMIN — ENOXAPARIN SODIUM 40 MG: 40 INJECTION SUBCUTANEOUS at 08:09

## 2023-10-10 RX ADMIN — PROCHLORPERAZINE EDISYLATE 5 MG: 5 INJECTION INTRAMUSCULAR; INTRAVENOUS at 13:08

## 2023-10-10 RX ADMIN — SIMETHICONE 80 MG: 80 TABLET, CHEWABLE ORAL at 14:27

## 2023-10-10 RX ADMIN — AMLODIPINE BESYLATE 5 MG: 5 TABLET ORAL at 08:09

## 2023-10-10 RX ADMIN — OXYCODONE HYDROCHLORIDE 5 MG: 5 TABLET ORAL at 20:13

## 2023-10-10 RX ADMIN — ONDANSETRON 4 MG: 4 TABLET, ORALLY DISINTEGRATING ORAL at 10:07

## 2023-10-10 RX ADMIN — ACETAMINOPHEN 1000 MG: 325 TABLET, FILM COATED ORAL at 23:41

## 2023-10-10 RX ADMIN — MAGNESIUM OXIDE TAB 400 MG (241.3 MG ELEMENTAL MG) 400 MG: 400 (241.3 MG) TAB at 08:10

## 2023-10-10 RX ADMIN — POTASSIUM CHLORIDE 10 MEQ: 7.46 INJECTION, SOLUTION INTRAVENOUS at 08:25

## 2023-10-10 RX ADMIN — OXYCODONE HYDROCHLORIDE 5 MG: 5 TABLET ORAL at 03:36

## 2023-10-10 RX ADMIN — VANCOMYCIN HYDROCHLORIDE 125 MG: 125 CAPSULE ORAL at 17:18

## 2023-10-10 RX ADMIN — SODIUM CHLORIDE: 9 INJECTION, SOLUTION INTRAVENOUS at 17:04

## 2023-10-10 RX ADMIN — OXYCODONE HYDROCHLORIDE 5 MG: 5 TABLET ORAL at 16:32

## 2023-10-10 RX ADMIN — OXYCODONE HYDROCHLORIDE 5 MG: 5 TABLET ORAL at 12:20

## 2023-10-10 RX ADMIN — MAGNESIUM SULFATE IN WATER 4 G: 40 INJECTION, SOLUTION INTRAVENOUS at 17:04

## 2023-10-10 RX ADMIN — VANCOMYCIN HYDROCHLORIDE 125 MG: 125 CAPSULE ORAL at 23:35

## 2023-10-10 RX ADMIN — POTASSIUM CHLORIDE 10 MEQ: 7.46 INJECTION, SOLUTION INTRAVENOUS at 14:30

## 2023-10-10 RX ADMIN — SODIUM CHLORIDE, POTASSIUM CHLORIDE, SODIUM LACTATE AND CALCIUM CHLORIDE 500 ML: 600; 310; 30; 20 INJECTION, SOLUTION INTRAVENOUS at 20:47

## 2023-10-10 RX ADMIN — VANCOMYCIN HYDROCHLORIDE 125 MG: 125 CAPSULE ORAL at 06:57

## 2023-10-10 RX ADMIN — OXYCODONE HYDROCHLORIDE 5 MG: 5 TABLET ORAL at 23:35

## 2023-10-10 RX ADMIN — VANCOMYCIN HYDROCHLORIDE 125 MG: 125 CAPSULE ORAL at 12:21

## 2023-10-10 RX ADMIN — POTASSIUM CHLORIDE 10 MEQ: 7.46 INJECTION, SOLUTION INTRAVENOUS at 13:06

## 2023-10-10 ASSESSMENT — ACTIVITIES OF DAILY LIVING (ADL)
ADLS_ACUITY_SCORE: 29
ADLS_ACUITY_SCORE: 30
ADLS_ACUITY_SCORE: 30
ADLS_ACUITY_SCORE: 29
ADLS_ACUITY_SCORE: 30
ADLS_ACUITY_SCORE: 30
ADLS_ACUITY_SCORE: 29
ADLS_ACUITY_SCORE: 30
ADLS_ACUITY_SCORE: 29
ADLS_ACUITY_SCORE: 29
ADLS_ACUITY_SCORE: 30
ADLS_ACUITY_SCORE: 30

## 2023-10-10 NOTE — PLAN OF CARE
"Goal Outcome Evaluation:    1500 - 1930:   BP (!) 140/60 (BP Location: Right arm, Cuff Size: Adult Large)   Pulse 100   Temp 99  F (37.2  C) (Oral)   Resp 18   Ht 1.651 m (5' 5\")   Wt 92.1 kg (203 lb 1.6 oz)   LMP 03/08/2008   SpO2 94%   BMI 33.80 kg/m      A&O x 4, being tired & frustrated with frequent diarrhea from C - diff.   Potassium replaced & on Mg replacement, recheck at 2130.  Gave scheduled oxycodone 5mg for vaginal & abdominal pain 6/10.  Started on MIVF  ml/hr d/t diarrhea & nausea.  UAL, adequate UOP this shift, had multiple small loose stool.  Continue with poc...                          "

## 2023-10-10 NOTE — PLAN OF CARE
[id number]  Goal Outcome Evaluation:    Reviewed with: Patient. Overall: Progressing. Goal outcome: Pain controlled, int nausea. Voiding adequate amount.     Vitals stable, afebrile. Pain controlled w/ scheduled oxy. Denies nausea overnight. BM's decreased in frequency, more gas now per pt. Voiding adequate amount overnight, post void bladder scan <30 ml. Intermittent dizziness w/ movement, SBA overnight. Hgb 6.9, plan to replace this morning.

## 2023-10-10 NOTE — PROVIDER NOTIFICATION
Paged Gyn Onc [phone] re: Patient started to vomit when walking back from the bathroom & started to choke on her vomit. Patient called out coughing & RN ran into room.  Patient able to cough out vomit & continued vomiting. Patient feels she aspirated vomit.  PRN IV Compazine 5mg given; full linen change done.  Requesting CXR to check for aspiration.    ADDENDUM:  GynOnc came to see patient; VSS, no CXR at this time.

## 2023-10-10 NOTE — PLAN OF CARE
Goal Outcome Evaluation:       Patient no longer feeling dizzy & is ambulating independently to the bathroom at the beginning of the day.  Voiding adequately; small loose stools with each void & experiencing gas pain.Simethicone PRN ordered & given.  Patient will poor appetite; ate a banana for breakfast & that is only intake besides water today. RBCs given for a Hgb of 6.9 without incident.  Patient received 3 of 4 bags of IV Potassium & still needs Magnesium 4g per Electrolyte protocol (patient declined 2nd IV, so electrolytes running later than listed in EMAR). Patient requested Zofran 4mg ODT after trying to eat.  Encouraged patient to take an antiemetic as a premed for meals, as needed. Patient vomited x1 after walking back from the bathroom; possible aspiration of vomit (see Provider Notification note); Compazine 5mg IV given with some relief.  RN updated patient's son per patient request re: not discharging today.

## 2023-10-10 NOTE — PROGRESS NOTES
Brief Gyn Onc Progress Note    G3 presented to bedside for evaluation after report of coughing/emesis episode that was very distressing to the patient. She has had no ongoing cough or emesis since this event about 25 minutes ago. She still feels nauseated but got some relief from the compazine that was recently given.    She denies chest pain or shortness of breath at this time.    SpO2 unchanged from prior:  Patient Vitals for the past 24 hrs:   BP Temp Temp src Pulse Resp SpO2 Weight   10/10/23 1330 (!) 146/61 99.8  F (37.7  C) Oral 100 20 93 % --   10/10/23 1317 -- -- -- -- -- -- 92.1 kg (203 lb 1.6 oz)   10/10/23 1200 (!) 153/59 98.2  F (36.8  C) Oral 104 18 96 % --   10/10/23 1023 139/61 98.6  F (37  C) Oral 93 18 93 % --   10/10/23 0931 134/53 98.3  F (36.8  C) Oral 96 18 -- --   10/10/23 0919 132/48 98.7  F (37.1  C) Oral 93 18 93 % --   10/10/23 0755 139/40 99  F (37.2  C) Oral 73 18 93 % --   10/10/23 0338 139/62 98.4  F (36.9  C) Oral 93 16 96 % --   10/09/23 2343 117/54 98  F (36.7  C) Oral 98 16 95 % --   10/09/23 2229 (!) 145/51 98.6  F (37  C) Oral 93 18 92 % --   10/09/23 2015 (!) 140/57 98.4  F (36.9  C) Oral 89 18 92 % --   10/09/23 1631 127/49 98.7  F (37.1  C) Oral 89 18 94 % --     Card: RRR, normal S1 and S2, no m/g/r, well perfused  Resp: CTAB across lower and upper lung fields without adventitious sounds    Low concern for aspiration event at this time given clinical stability. No imaging indicated at this time. Continue to monitor.    Sun Levin MD, PGY-3  3:36 PM  [facility] Res[facility]

## 2023-10-10 NOTE — PROGRESS NOTES
General ID Service: Follow-up Note      Patient:  Jessie Tamayo, Date of birth 1955, Medical record number [id number]  Date of Visit:  October 9, 2023  Reason for consult: 1st recurrence C Diff    Physician Attestation   I, JUANITA LONDON MD, was present with the medical/TONY student who participated in the service and in the documentation of the note.  I have verified the history and personally performed the physical exam and medical decision making.  I agree with the assessment and plan of care as documented in the note with some corrections in key findings.     Prado findings:   Jessie Tamayo is a 67 year old female with Stage IV vaginal cancer. She has recurrent C diff. It's odd that she was feeling better but has worsening diarrhea, nausea, and abdominal pain. If not better by tomorrow, would request GI colleagues to offer opinion on non-infectious differentials.     50 MINUTES SPENT BY ME on the date of service doing chart review, history, exam, documentation & further activities per the note.      JUANITA LONDON MD  Date of Service (when I saw the patient): 10/10/23         Assessment and Recommendations:     IMPRESSION:  Clostridium difficile colitis, 1st recurrence   Chronic indwelling gonsalez catheter with asymptomatic bacteriuria   Stage IVB vaginal cancer s/p chemo/rads (completed therapy and under observations)    RECOMMENDATION:  Consider US dopplers to check for DVT if with recurrence of fever.   Continue PO vancomycin for ta 14 day course of QID dosing (last dose 10/21) followed by PO vancomycin taper as follows:   Vancomycin 125 mg twice daily x 1 week  (last dose 10/28) f/b 125 mg once daily x 1 week (last dose 11/4) f/b 125 mg every other day x 1 week (last dose 11/11) f/b 125 mg every three days x 2 weeks then discontinue (11/25).     Discussion:   Jessie Tamayo is a 67 year old female with stage 4 vaginal cancer, s/p chemorad. She has been diagnosed with C diff in August  and completed it but it has recurred. She is getting better now and will continue prolonged taper of oral vancomycin, described above.     Of note, pt has hx of radiation therapy for SCC, which represents a risk factor for possible radiation proctitis which could lead to recurrent symptoms with similar presentation to recurrent C Diff infection. If in the future pt has another recurrence, consider sigmoidoscopy to assess for radiation proctitis.     Thank you for this consult. ID will continue to follow.     Diego Suggs on 10/9/2023 at 2:28 PM           Interval History:   Pt is feeling better. Improved abdominal discomfort.         Current Antimicrobials   Vancomycin oral 10/8->  Ceftriaxone 10/9           Physical Exam:   Ranges for vital signs:  Temp:  [98  F (36.7  C)-99.1  F (37.3  C)] 98.6  F (37  C)  Pulse:  [] 93  Resp:  [16-18] 18  BP: (111-145)/(40-64) 139/61  SpO2:  [92 %-96 %] 93 %    Intake/Output Summary (Last 24 hours) at 10/9/2023 1416  Last data filed at 10/9/2023 1300  Gross per 24 hour   Intake 2288.75 ml   Output 3000 ml   Net -711.25 ml     Exam:  GENERAL:  well-developed, well-nourished, sitting in bed in no acute distress.   ENT:  Head is normocephalic, atraumatic. Oropharynx is moist without exudates or ulcers.  EYES:  Eyes have anicteric sclerae. No conjunctival injection.   NECK:  Supple.  LUNGS:  Unlabored breathing. Clear to auscultation.  CARDIOVASCULAR:  Regular rate and rhythm with no murmurs, rubs, or gallops.  ABDOMEN:  Non-distended, soft, generalized tenderness.  EXT: Extremities warm and well perfused. No edema.  SKIN:  No acute rashes.  Line is in place without any surrounding erythema.  NEUROLOGIC:  Awake, alert, interactive.  : Thomas in place         Laboratory Data:     WBC normal  Estimated Creatinine Clearance: 75.4 mL/min (based on SCr of 0.81 mg/dL).    MICRO:  10/7 C diff - triple positive   10/8 Ucx - Serratia marcescens          Imaging:     CT AP w/  contrast (10/7/23)   IMPRESSION:   1. Bowel wall thickening and surrounding inflammation involving the rectum and sigmoid colon, concerning for colitis.  2. Stable 3mm pulmonary nodule.  2. Wall thickening and surrounding fat stranding with small amount of air within the urinary bladder (favored to be related to catheterization).

## 2023-10-10 NOTE — PROGRESS NOTES
Gynecology Oncology Progress Note  10/10/23    HD#2     Disease: stage IVB SCC of the vagina. S/p Cis-Rt and HDR brachy x5.       24 hour events:   - + c. Diff> Vanc PO (alt rec per ID cost-prohibitive)  - s/p gonsalez, TOV passed  - Hgb 6.9> 1u pRBCs    Subjective: Patient is doing well this morning. Pain is well controlled. No longer reporting abdominal pain. Tolerating hydration, but not eating much solids at this time. No bowel movements overnight, but a lot of flatus. Denies lightheadedness or dizziness. Denies chest pain, SOB, nausea/vomiting, dizziness.     Objective:   Patient Vitals for the past 24 hrs:   BP Temp Temp src Pulse Resp SpO2 Weight   10/10/23 0338 139/62 -- Oral 93 16 96 % --   10/09/23 2343 117/54 -- Oral 98 16 95 % --   10/09/23 2229 (!) 145/51 98.6  F (37  C) Oral 93 18 92 % --   10/09/23 2015 (!) 140/57 98.4  F (36.9  C) Oral 89 18 92 % --   10/09/23 1631 127/49 98.7  F (37.1  C) Oral 89 18 94 % --   10/09/23 1251 111/64 99.1  F (37.3  C) Oral 107 18 92 % --   10/09/23 1028 (!) 161/67 -- -- 109 -- -- --   10/09/23 1025 -- -- -- -- -- -- 91.8 kg (202 lb 6.4 oz)   10/09/23 0909 139/64 98.3  F (36.8  C) Oral 99 18 93 % --       General: NAD, appears comfortable in bed  CV: normal HR  Resp: non-labored breathing  Abdomen: soft, tender, non distended  Extremities: warm, well-perfused, nontender, trace edema        Intake/Output Summary (Last 24 hours) at 10/10/2023 0648  Last data filed at 10/10/2023 0344  Gross per 24 hour   Intake 2595 ml   Output 2635 ml   Net -40 ml           Lines/Drains: PIV    Assessment: 67 year old female stage IVB SCC of the vagina who presents to the ED with 2 days of worsening nausea, vomiting, and diarrhea.     Active Problem list:  Stage IVB SCC of the vagina  Recurrent C. Difficile   Poor pain control  Anemia  Hypertension   Hyperlipidemia   Pulmonary nodules   Bladder Spasms   Acute cystitis     Plan:      # Stage IVB SCC of the Vagina   # Poor pain control   -  Completed Cis-rt and S/p HDR brachytherapy last admission  - Current PTA includes tylenol and 5 mg oxycodone q4 hours with prn 5mg doses  - Palliative following    # Recurrent C. Difficile  - CTAP notable for bowel wall thickening + surrounding inflam of rectum and sigmoid c/f colitis; 1.2 cm splenic hypodensity   - Diagnosed with C. Difficile during 9/8 admission. At that time treated with vancomycin. Given cost-prohibition of fidamoxacin, will continue vancomycin (day 3/14 followed by a taper). S/p ID consult.  - PTA zofran and scheduled antiemetic panel ordered.  - anti-diarrheals held    #s/p chronic gonsalez  -voiding appropriately overnight.   -S/p 1D of CTX, stopped per ID reccs  -Ucx with Serratia, likely colonization    # Anemia:   Chronic anemia secondary to recent chemoradiation therapy.   -Hgb 6.9 AM > 1u pRBC    # Chronic hypertension   # Hyperlipidemia   - Mildly hypertensive pressures on admission   - PTA amlodpine initiated       # Pulmonary nodules   # Low normal O2 sats on admission   - CTAP notable for stable 3 mm R middle lobe pulm nodule, R lower lobe calcified granuloma.   - Sating at 92-94% on admission, but does not appear to be short of breath   - supplemental O2 ordered for O2 sats < 92%.     # Bladder spasms  # Mild JACQUIE    - PTA include pyridium, detrol.    # PPX  -  Lovenox 40 mg daily   - Encouarage SCDs   - PT/OT consulted, appreciate recommendations.     Possible discharge PM if tolerating liquids and solids today    Refugio Davis DO, MA  [facility]- PGY2  6:53 AM October 10, 2023        I have seen and examined the patient.  I have reviewed and edited Dr. Davis's note above.      Reina Stahl MD, MS, FACOG, FACS  10/10/2023  4:22 PM

## 2023-10-11 ENCOUNTER — APPOINTMENT (OUTPATIENT)
Dept: PHYSICAL THERAPY | Facility: CLINIC | Age: 68
DRG: 372 | End: 2023-10-11
Payer: COMMERCIAL

## 2023-10-11 ENCOUNTER — APPOINTMENT (OUTPATIENT)
Dept: GENERAL RADIOLOGY | Facility: CLINIC | Age: 68
DRG: 372 | End: 2023-10-11
Attending: OBSTETRICS & GYNECOLOGY
Payer: COMMERCIAL

## 2023-10-11 LAB
ANION GAP SERPL CALCULATED.3IONS-SCNC: 10 MMOL/L (ref 7–15)
BASO+EOS+MONOS # BLD AUTO: ABNORMAL 10*3/UL
BASO+EOS+MONOS # BLD AUTO: ABNORMAL 10*3/UL
BASO+EOS+MONOS NFR BLD AUTO: ABNORMAL %
BASO+EOS+MONOS NFR BLD AUTO: ABNORMAL %
BASOPHILS # BLD AUTO: 0 10E3/UL (ref 0–0.2)
BASOPHILS # BLD AUTO: 0 10E3/UL (ref 0–0.2)
BASOPHILS NFR BLD AUTO: 0 %
BASOPHILS NFR BLD AUTO: 0 %
BUN SERPL-MCNC: 3.3 MG/DL (ref 8–23)
C PNEUM DNA SPEC QL NAA+PROBE: NOT DETECTED
CALCIUM SERPL-MCNC: 7.9 MG/DL (ref 8.8–10.2)
CHLORIDE SERPL-SCNC: 102 MMOL/L (ref 98–107)
CREAT SERPL-MCNC: 0.73 MG/DL (ref 0.51–0.95)
DEPRECATED HCO3 PLAS-SCNC: 23 MMOL/L (ref 22–29)
EGFRCR SERPLBLD CKD-EPI 2021: 90 ML/MIN/1.73M2
EOSINOPHIL # BLD AUTO: 0 10E3/UL (ref 0–0.7)
EOSINOPHIL # BLD AUTO: 0 10E3/UL (ref 0–0.7)
EOSINOPHIL NFR BLD AUTO: 0 %
EOSINOPHIL NFR BLD AUTO: 0 %
ERYTHROCYTE [DISTWIDTH] IN BLOOD BY AUTOMATED COUNT: 16.5 % (ref 10–15)
ERYTHROCYTE [DISTWIDTH] IN BLOOD BY AUTOMATED COUNT: 16.5 % (ref 10–15)
ERYTHROCYTE [DISTWIDTH] IN BLOOD BY AUTOMATED COUNT: 17 % (ref 10–15)
FLUAV H1 2009 PAND RNA SPEC QL NAA+PROBE: NOT DETECTED
FLUAV H1 RNA SPEC QL NAA+PROBE: NOT DETECTED
FLUAV H3 RNA SPEC QL NAA+PROBE: NOT DETECTED
FLUAV RNA SPEC QL NAA+PROBE: NOT DETECTED
FLUBV RNA SPEC QL NAA+PROBE: NOT DETECTED
GLUCOSE SERPL-MCNC: 88 MG/DL (ref 70–99)
HADV DNA SPEC QL NAA+PROBE: NOT DETECTED
HAPTOGLOB SERPL-MCNC: 116 MG/DL (ref 32–197)
HCOV PNL SPEC NAA+PROBE: NOT DETECTED
HCT VFR BLD AUTO: 26.5 % (ref 35–47)
HCT VFR BLD AUTO: 26.5 % (ref 35–47)
HCT VFR BLD AUTO: 30.2 % (ref 35–47)
HGB BLD-MCNC: 8.7 G/DL (ref 11.7–15.7)
HGB BLD-MCNC: 8.7 G/DL (ref 11.7–15.7)
HGB BLD-MCNC: 9.9 G/DL (ref 11.7–15.7)
HMPV RNA SPEC QL NAA+PROBE: NOT DETECTED
HPIV1 RNA SPEC QL NAA+PROBE: NOT DETECTED
HPIV2 RNA SPEC QL NAA+PROBE: NOT DETECTED
HPIV3 RNA SPEC QL NAA+PROBE: NOT DETECTED
HPIV4 RNA SPEC QL NAA+PROBE: NOT DETECTED
IMM GRANULOCYTES # BLD: 0.3 10E3/UL
IMM GRANULOCYTES # BLD: 0.4 10E3/UL
IMM GRANULOCYTES NFR BLD: 3 %
IMM GRANULOCYTES NFR BLD: 3 %
LACTATE SERPL-SCNC: 0.5 MMOL/L (ref 0.7–2)
LACTATE SERPL-SCNC: 1.3 MMOL/L (ref 0.7–2)
LDH SERPL L TO P-CCNC: 228 U/L (ref 0–250)
LYMPHOCYTES # BLD AUTO: 1 10E3/UL (ref 0.8–5.3)
LYMPHOCYTES # BLD AUTO: 1.1 10E3/UL (ref 0.8–5.3)
LYMPHOCYTES NFR BLD AUTO: 10 %
LYMPHOCYTES NFR BLD AUTO: 9 %
M PNEUMO DNA SPEC QL NAA+PROBE: NOT DETECTED
MAGNESIUM SERPL-MCNC: 2 MG/DL (ref 1.7–2.3)
MCH RBC QN AUTO: 33.1 PG (ref 26.5–33)
MCH RBC QN AUTO: 33.3 PG (ref 26.5–33)
MCH RBC QN AUTO: 33.3 PG (ref 26.5–33)
MCHC RBC AUTO-ENTMCNC: 32.8 G/DL (ref 31.5–36.5)
MCV RBC AUTO: 101 FL (ref 78–100)
MCV RBC AUTO: 102 FL (ref 78–100)
MCV RBC AUTO: 102 FL (ref 78–100)
MONOCYTES # BLD AUTO: 1.2 10E3/UL (ref 0–1.3)
MONOCYTES # BLD AUTO: 1.2 10E3/UL (ref 0–1.3)
MONOCYTES NFR BLD AUTO: 10 %
MONOCYTES NFR BLD AUTO: 11 %
NEUTROPHILS # BLD AUTO: 8.1 10E3/UL (ref 1.6–8.3)
NEUTROPHILS # BLD AUTO: 9.6 10E3/UL (ref 1.6–8.3)
NEUTROPHILS NFR BLD AUTO: 76 %
NEUTROPHILS NFR BLD AUTO: 78 %
NRBC # BLD AUTO: 0 10E3/UL
NRBC # BLD AUTO: 0 10E3/UL
NRBC BLD AUTO-RTO: 0 /100
NRBC BLD AUTO-RTO: 0 /100
PLATELET # BLD AUTO: 169 10E3/UL (ref 150–450)
PLATELET # BLD AUTO: 169 10E3/UL (ref 150–450)
PLATELET # BLD AUTO: 228 10E3/UL (ref 150–450)
POTASSIUM SERPL-SCNC: 3.6 MMOL/L (ref 3.4–5.3)
POTASSIUM SERPL-SCNC: 4 MMOL/L (ref 3.4–5.3)
RBC # BLD AUTO: 2.61 10E6/UL (ref 3.8–5.2)
RBC # BLD AUTO: 2.61 10E6/UL (ref 3.8–5.2)
RBC # BLD AUTO: 2.99 10E6/UL (ref 3.8–5.2)
RSV RNA SPEC QL NAA+PROBE: NOT DETECTED
RSV RNA SPEC QL NAA+PROBE: NOT DETECTED
RV+EV RNA SPEC QL NAA+PROBE: NOT DETECTED
SARS-COV-2 RNA RESP QL NAA+PROBE: NEGATIVE
SODIUM SERPL-SCNC: 135 MMOL/L (ref 135–145)
WBC # BLD AUTO: 10.5 10E3/UL (ref 4–11)
WBC # BLD AUTO: 10.5 10E3/UL (ref 4–11)
WBC # BLD AUTO: 12.3 10E3/UL (ref 4–11)

## 2023-10-11 PROCEDURE — 84132 ASSAY OF SERUM POTASSIUM: CPT | Performed by: STUDENT IN AN ORGANIZED HEALTH CARE EDUCATION/TRAINING PROGRAM

## 2023-10-11 PROCEDURE — 83605 ASSAY OF LACTIC ACID: CPT | Performed by: OBSTETRICS & GYNECOLOGY

## 2023-10-11 PROCEDURE — 97110 THERAPEUTIC EXERCISES: CPT | Mod: GP

## 2023-10-11 PROCEDURE — 250N000013 HC RX MED GY IP 250 OP 250 PS 637

## 2023-10-11 PROCEDURE — 74018 RADEX ABDOMEN 1 VIEW: CPT

## 2023-10-11 PROCEDURE — 36415 COLL VENOUS BLD VENIPUNCTURE: CPT | Performed by: OBSTETRICS & GYNECOLOGY

## 2023-10-11 PROCEDURE — 83010 ASSAY OF HAPTOGLOBIN QUANT: CPT | Performed by: OBSTETRICS & GYNECOLOGY

## 2023-10-11 PROCEDURE — 87635 SARS-COV-2 COVID-19 AMP PRB: CPT

## 2023-10-11 PROCEDURE — 99233 SBSQ HOSP IP/OBS HIGH 50: CPT | Performed by: INTERNAL MEDICINE

## 2023-10-11 PROCEDURE — 74018 RADEX ABDOMEN 1 VIEW: CPT | Mod: 26 | Performed by: RADIOLOGY

## 2023-10-11 PROCEDURE — 250N000011 HC RX IP 250 OP 636: Mod: JZ | Performed by: STUDENT IN AN ORGANIZED HEALTH CARE EDUCATION/TRAINING PROGRAM

## 2023-10-11 PROCEDURE — 82947 ASSAY GLUCOSE BLOOD QUANT: CPT

## 2023-10-11 PROCEDURE — 258N000003 HC RX IP 258 OP 636

## 2023-10-11 PROCEDURE — 36415 COLL VENOUS BLD VENIPUNCTURE: CPT

## 2023-10-11 PROCEDURE — 250N000013 HC RX MED GY IP 250 OP 250 PS 637: Performed by: NURSE PRACTITIONER

## 2023-10-11 PROCEDURE — 120N000002 HC R&B MED SURG/OB UMMC

## 2023-10-11 PROCEDURE — 87040 BLOOD CULTURE FOR BACTERIA: CPT | Performed by: STUDENT IN AN ORGANIZED HEALTH CARE EDUCATION/TRAINING PROGRAM

## 2023-10-11 PROCEDURE — 250N000011 HC RX IP 250 OP 636

## 2023-10-11 PROCEDURE — 85027 COMPLETE CBC AUTOMATED: CPT | Performed by: OBSTETRICS & GYNECOLOGY

## 2023-10-11 PROCEDURE — 36415 COLL VENOUS BLD VENIPUNCTURE: CPT | Performed by: STUDENT IN AN ORGANIZED HEALTH CARE EDUCATION/TRAINING PROGRAM

## 2023-10-11 PROCEDURE — 999N000127 HC STATISTIC PERIPHERAL IV START W US GUIDANCE

## 2023-10-11 PROCEDURE — 87581 M.PNEUMON DNA AMP PROBE: CPT | Performed by: STUDENT IN AN ORGANIZED HEALTH CARE EDUCATION/TRAINING PROGRAM

## 2023-10-11 PROCEDURE — 85025 COMPLETE CBC W/AUTO DIFF WBC: CPT

## 2023-10-11 PROCEDURE — 99232 SBSQ HOSP IP/OBS MODERATE 35: CPT | Mod: GC | Performed by: OBSTETRICS & GYNECOLOGY

## 2023-10-11 PROCEDURE — 83735 ASSAY OF MAGNESIUM: CPT

## 2023-10-11 PROCEDURE — 250N000013 HC RX MED GY IP 250 OP 250 PS 637: Performed by: FAMILY MEDICINE

## 2023-10-11 PROCEDURE — 250N000011 HC RX IP 250 OP 636: Mod: JZ | Performed by: OBSTETRICS & GYNECOLOGY

## 2023-10-11 RX ORDER — SULFAMETHOXAZOLE/TRIMETHOPRIM 800-160 MG
1 TABLET ORAL 2 TIMES DAILY
Status: DISCONTINUED | OUTPATIENT
Start: 2023-10-11 | End: 2023-10-13

## 2023-10-11 RX ORDER — VANCOMYCIN HYDROCHLORIDE 125 MG/1
500 CAPSULE ORAL 4 TIMES DAILY
Status: DISCONTINUED | OUTPATIENT
Start: 2023-10-12 | End: 2023-10-16

## 2023-10-11 RX ADMIN — VANCOMYCIN HYDROCHLORIDE 125 MG: 125 CAPSULE ORAL at 12:09

## 2023-10-11 RX ADMIN — SULFAMETHOXAZOLE AND TRIMETHOPRIM 1 TABLET: 800; 160 TABLET ORAL at 11:09

## 2023-10-11 RX ADMIN — AMLODIPINE BESYLATE 5 MG: 5 TABLET ORAL at 09:10

## 2023-10-11 RX ADMIN — MAGNESIUM OXIDE TAB 400 MG (241.3 MG ELEMENTAL MG) 400 MG: 400 (241.3 MG) TAB at 09:08

## 2023-10-11 RX ADMIN — OXYCODONE HYDROCHLORIDE 5 MG: 5 TABLET ORAL at 17:07

## 2023-10-11 RX ADMIN — ONDANSETRON 4 MG: 4 TABLET, ORALLY DISINTEGRATING ORAL at 11:08

## 2023-10-11 RX ADMIN — TOLTERODINE TARTRATE 2 MG: 2 TABLET, FILM COATED ORAL at 21:24

## 2023-10-11 RX ADMIN — TOLTERODINE TARTRATE 2 MG: 2 TABLET, FILM COATED ORAL at 09:09

## 2023-10-11 RX ADMIN — VANCOMYCIN HYDROCHLORIDE 125 MG: 125 CAPSULE ORAL at 05:34

## 2023-10-11 RX ADMIN — ENOXAPARIN SODIUM 40 MG: 40 INJECTION SUBCUTANEOUS at 09:07

## 2023-10-11 RX ADMIN — SODIUM CHLORIDE: 9 INJECTION, SOLUTION INTRAVENOUS at 19:07

## 2023-10-11 RX ADMIN — OXYCODONE HYDROCHLORIDE 5 MG: 5 TABLET ORAL at 21:24

## 2023-10-11 RX ADMIN — POTASSIUM CHLORIDE 10 MEQ: 7.45 INJECTION INTRAVENOUS at 04:57

## 2023-10-11 RX ADMIN — POTASSIUM CHLORIDE 10 MEQ: 7.45 INJECTION INTRAVENOUS at 03:51

## 2023-10-11 RX ADMIN — PROCHLORPERAZINE EDISYLATE 5 MG: 5 INJECTION INTRAMUSCULAR; INTRAVENOUS at 14:31

## 2023-10-11 RX ADMIN — OXYCODONE HYDROCHLORIDE 5 MG: 5 TABLET ORAL at 04:57

## 2023-10-11 RX ADMIN — OXYCODONE HYDROCHLORIDE 5 MG: 5 TABLET ORAL at 09:10

## 2023-10-11 RX ADMIN — POTASSIUM CHLORIDE 10 MEQ: 7.45 INJECTION INTRAVENOUS at 02:42

## 2023-10-11 RX ADMIN — POTASSIUM CHLORIDE 10 MEQ: 7.45 INJECTION INTRAVENOUS at 01:06

## 2023-10-11 RX ADMIN — OXYCODONE HYDROCHLORIDE 5 MG: 5 TABLET ORAL at 13:12

## 2023-10-11 RX ADMIN — SULFAMETHOXAZOLE AND TRIMETHOPRIM 1 TABLET: 800; 160 TABLET ORAL at 21:24

## 2023-10-11 RX ADMIN — VANCOMYCIN HYDROCHLORIDE 125 MG: 125 CAPSULE ORAL at 19:07

## 2023-10-11 ASSESSMENT — ACTIVITIES OF DAILY LIVING (ADL)
ADLS_ACUITY_SCORE: 29

## 2023-10-11 NOTE — PROGRESS NOTES
"CLINICAL NUTRITION SERVICES - ASSESSMENT NOTE     Nutrition Prescription    RECOMMENDATIONS FOR MDs/PROVIDERS TO ORDER:  None at this time     Recommendations already ordered by Registered Dietitian (RD):  PRN supplements    Future/Additional Recommendations:  Encourage patient to consume at least 75% of meals TID or the equivalent with snacks/supplements.  If consuming less than this offer supplements, scheduled snacks, and/or consider ordering calorie counts to assess PO intake adequacy.       REASON FOR ASSESSMENT  Jessie Tamayo is a/an 67 year old female assessed by the dietitian for Admission Nutrition Risk Screen for positive    NUTRITION HISTORY  Per chart, pt admit with 2 days of worsening nausea, vomiting, and diarrhea and associated decreased PO intake. PMH includes stage IVB SCC of the vagina (S/p Cis-Rt and HDR brachy x5) and recurrent C.diff.     CURRENT NUTRITION ORDERS  Diet: Regular  Intake/Tolerance: mostly poor-fair appetite since admission; eating mostly 25-75% of meals documented in flowsheets.  Ordered 3 meals yesterday and 2 meals each on 10/9 and 10/8.    LABS  Labs reviewed  - BUN 3.3 (L)  - K+ and Mg++ WNL    MEDICATIONS  Medications reviewed  - Mag-Ox daily  - IVF @ 125 mL/hr    ANTHROPOMETRICS  Height: 165.1 cm (5' 5\")  Most Recent Weight: 92.1 kg (203 lb 1.6 oz)    IBW: 56.8 kg   BMI: Obesity Grade I BMI 30-34.9  Weight History: 7 lb wt loss the past month (3%)  Wt Readings from Last 10 Encounters:   10/10/23 92.1 kg (203 lb 1.6 oz)   09/18/23 93.3 kg (205 lb 11 oz)   09/15/23 95.3 kg (210 lb)   09/12/23 97.3 kg (214 lb 9.6 oz)   09/05/23 95.9 kg (211 lb 6.7 oz)   09/01/23 95.7 kg (210 lb 14.4 oz)   08/29/23 98.2 kg (216 lb 8 oz)   08/28/23 95.2 kg (209 lb 14.4 oz)   08/22/23 (P) 97.9 kg (215 lb 14.4 oz)   08/21/23 95.1 kg (209 lb 11.2 oz)      Dosing Weight: 66 kg (adjusted based on actual wt 92.1 kg and IBW)    ASSESSED NUTRITION NEEDS  Estimated Energy Needs: 6885-0401 kcals/day (25 - " 30 kcals/kg)  Justification: Maintenance  Estimated Protein Needs: 79-99 grams protein/day (1.2 - 1.5 grams of pro/kg)  Justification: Hypercatabolism with acute illness  Estimated Fluid Needs: (1 mL/kcal)   Justification: Maintenance, or other per provider pending fluid status    PHYSICAL FINDINGS  See malnutrition section below.    MALNUTRITION  % Intake: Unable to assess  % Weight Loss: Weight loss does not meet criteria  Subcutaneous Fat Loss: Unable to assess  Muscle Loss: Unable to assess  Fluid Accumulation/Edema: Trace edema per provider note today  Malnutrition Diagnosis: Unable to determine due to pt with other cares during attempts to visit    NUTRITION DIAGNOSIS  Inadequate oral intake related to decreased appetite as evidenced by eating 25-75% of meals documented in flowsheets since admission      INTERVENTIONS  Implementation  Nutrition Education: Unable to complete due to pt unavailable   Medical food supplement therapy     Goals  Patient to consume % of nutritionally adequate meal trays TID, or the equivalent with supplements/snacks.     Monitoring/Evaluation  Progress toward goals will be monitored and evaluated per protocol.   Jerrica Allen RD, , LD  Weekday Pager: [phone]  Weekday Units covered: 5A (4516-9313) and 7B ([phone])  Weekend/Holiday RD Pager: [phone]

## 2023-10-11 NOTE — PLAN OF CARE
Goal Outcome Evaluation:      Plan of Care Reviewed With: other (see comments)    Overall Patient Progress: no changeOverall Patient Progress: no change    Outcome Evaluation: see RD progress note

## 2023-10-11 NOTE — PLAN OF CARE
"0700 - 1500 - Goal Outcome Evaluation:    A&O x 4   VSS on room air   Lung sounds clear, pt denies any respiratory discomfort  Pt had multiple watery stools mixed with urine.  Able to ambulate independently to the bathroom to void.  Regular diet. poor appetite. Refused nutrition consult, states \"I've been like this for some time and I'm okay without consultation\"  c/o's nausea. Relieved w/ prn Zofran   Pain is mostly from abdominal cramping d/t c-diff/loose stools, scheduled Oxycodone given with some relief. Pt on P.O. vanco and started on Bactrim today.  Skin is pale & warm, w/ right, forearm bruising, blanchable noted on sacrum/coccyx, pt is repositioning independently.  Left upper forearm PIV infusing with  mL/hr  UAL  Gyn onc notified on pt status.   Continue with Plan of care.                            "

## 2023-10-11 NOTE — PROGRESS NOTES
Palliative Care Daily Progress Note        Palliative Care was consulted for decisional support and goals of care. At this time goals are clear and there is minimal symptom burden, so we will sign off.     Please feel free to reconsult us if we can be helpful in the future. Thank you for the opportunity to be involved in the care of this patient.      SANDI Horn, CNS  Palliative Care Consult Team

## 2023-10-11 NOTE — PROVIDER NOTIFICATION
Gyn/onc notifed via [facility] [id number].     Vitals in, . T102.3, tylenol given. Do you want to do chest xray per note and/or repeat blood cultures?     Claire GRAHAM     Plan: LR bolus ordered. Chest, abd xray ordered along w/ blood cultures, labs, respiratory panel, COVID swab. Plan to repeat vitals in 1 hr.

## 2023-10-11 NOTE — PROGRESS NOTES
General ID Service: Follow-up Note      Patient:  Jessie Tamayo, Date of birth 1955, Medical record number [id number]  Date of Visit:  October 9, 2023  Reason for consult: 1st recurrence C Diff           Assessment and Recommendations:     IMPRESSION:  Clostridium difficile colitis, 1st recurrence   Fevers, persistent diarrhea   Chronic indwelling gonsalez catheter with bacteriuria, possible cystitis    Stage IVB vaginal cancer s/p chemo/rads (completed therapy and under observations)    RECOMMENDATION:  Increase dose of oral vancomycin to 500mg every 6 hours  Start oral TMP-SMX 1DS tab 2x a day.   Consult GI for input of non-infectious causes of pt symptoms     Thank you for this consult. ID will continue to follow.     Diego Suggs on 10/9/2023 at 2:28 PM      Physician Attestation   I, JUANITA LONDON MD, was present with the medical student who participated in the service and in the documentation of the note.  I have verified the history and personally performed the physical exam and medical decision making.  I agree with the assessment and plan of care as documented in the note.      Prado findings:   Jessie Tamayo is a 67 year old female with stage 4 vaginal cancer, s/p chemorad. She has been diagnosed with C diff in August and completed it but it has recurred. She has persistent fevers and worsening diarrhea even while on treatment for C. Diff. We would increase the dose of vancomycin as therapeutic trial. There is a question of cystitis in CT abdomen but she does not have urinary symptoms which make the diagnosis of cystitis questionable. However, since she has recurrent fevers, reasonable to treat with TMP-SMX as this has less C diff risk. This would also treat colitis. We would request GI opinion on other possible causes we are missing. If not better by tomorrow, would add metronidazole.     MDM: >3 notes and tests reviewed, discussed with primary team, severe infection.     DON  CHARI LONDON MD  Date of Service (when I saw the patient): 10/11/23           Interval History:   Pt is feeling worse. Overnight diarrhea and fevers. CXR, AXR performed, no acute findings.         Current Antimicrobials   Vancomycin oral 10/8->  Ceftriaxone 10/9           Physical Exam:   Ranges for vital signs:  Temp:  [97.9  F (36.6  C)-102.3  F (39.1  C)] 97.9  F (36.6  C)  Pulse:  [] 85  Resp:  [18-20] 20  BP: (119-153)/(53-62) 140/59  SpO2:  [93 %-96 %] 94 %    Intake/Output Summary (Last 24 hours) at 10/9/2023 1416  Last data filed at 10/9/2023 1300  Gross per 24 hour   Intake 2288.75 ml   Output 3000 ml   Net -711.25 ml     Exam:  GENERAL:  well-developed, well-nourished, sitting in bed in no acute distress.   ENT:  Head is normocephalic, atraumatic. Oropharynx is moist without exudates or ulcers.  EYES:  Eyes have anicteric sclerae. No conjunctival injection.   NECK:  Supple.  LUNGS:  Unlabored breathing. Clear to auscultation.  CARDIOVASCULAR:  Regular rate and rhythm with no murmurs, rubs, or gallops.  ABDOMEN:  Non-distended, soft, generalized tenderness.  EXT: Extremities warm and well perfused. No edema.  SKIN:  No acute rashes.  Line is in place without any surrounding erythema.  NEUROLOGIC:  Awake, alert, interactive.  : Thomas in place         Laboratory Data:   WBC 12.3  Estimated Creatinine Clearance: 83.8 mL/min (based on SCr of 0.73 mg/dL).    MICRO:  10/11 BCx-   10/11 RPP - neg   10/7 C diff - triple positive   10/8 Ucx - Serratia marcescens          Imaging:     CT AP w/ contrast (10/7/23)   IMPRESSION:   1. Bowel wall thickening and surrounding inflammation involving the rectum and sigmoid colon, concerning for colitis.  2. Stable 3mm pulmonary nodule.  2. Wall thickening and surrounding fat stranding with small amount of air within the urinary bladder (favored to be related to catheterization).

## 2023-10-11 NOTE — PROGRESS NOTES
Gynecology Oncology Progress Note  10/11/23    HD#3    Disease: stage IVB SCC of the vagina. S/p Cis-Rt and HDR brachy x5.       24 hour events:   24hr   - Hgb 6.9> 1u pRBCs  - emesis x1 in AM, none since  - two stool overnight  - fever 102.3>98.5, s/p tylenol, Bcx, 1L fluids, Resp pan, Abd XR (no toxic megacolon), CXR wnl  - IO .72 ml/kg/hr    Subjective: Patient is doing well this morning. Pain is well controlled.  Tolerating hydration, but not eating much solids at this time. No bowel movements overnight, but a lot of flatus. Denies lightheadedness or dizziness. Denies chest pain, SOB, nausea/vomiting, dizziness.     Objective:   Patient Vitals for the past 24 hrs:   BP Temp Temp src Pulse Resp SpO2 Weight   10/11/23 0427 119/53 98.5  F (36.9  C) Oral 84 18 93 % --   10/11/23 0037 (!) 148/62 98.8  F (37.1  C) Oral 99 18 95 % --   10/10/23 2333 131/60 (!) 102.3  F (39.1  C) Oral 105 18 93 % --   10/10/23 2006 (!) 150/56 99.7  F (37.6  C) Oral 116 18 94 % --   10/10/23 1555 (!) 140/60 99  F (37.2  C) Oral -- 18 94 % --   10/10/23 1330 (!) 146/61 99.8  F (37.7  C) Oral 100 20 93 % --   10/10/23 1317 -- -- -- -- -- -- 92.1 kg (203 lb 1.6 oz)   10/10/23 1200 (!) 153/59 98.2  F (36.8  C) Oral 104 18 96 % --   10/10/23 1023 139/61 98.6  F (37  C) Oral 93 18 93 % --   10/10/23 0931 134/53 98.3  F (36.8  C) Oral 96 18 -- --   10/10/23 0919 132/48 98.7  F (37.1  C) Oral 93 18 93 % --   10/10/23 0755 139/40 99  F (37.2  C) Oral 73 18 93 % --       General: NAD, appears comfortable in bed  CV: normal HR  Resp: non-labored breathing  Abdomen: soft, tender, non distended  Extremities: warm, well-perfused, nontender, trace edema          Intake/Output Summary (Last 24 hours) at 10/11/2023 0626  Last data filed at 10/11/2023 0500  Gross per 24 hour   Intake 1439.58 ml   Output 3550 ml   Net -2110.42 ml               Lines/Drains: PIV    Assessment: 67 year old female stage IVB SCC of the vagina who presents to the ED with 2 days  of worsening nausea, vomiting, and diarrhea.     Active Problem list:  Stage IVB SCC of the vagina  Recurrent C. Difficile   Poor pain control  Anemia  Hypertension   Hyperlipidemia   Pulmonary nodules   Bladder Spasms   Acute cystitis     Plan:      # Stage IVB SCC of the Vagina   # Poor pain control   - Completed Cis-rt and S/p HDR brachytherapy last admission  - Current PTA includes tylenol and 5 mg oxycodone q4 hours with prn 5mg doses  - Palliative following    #isolated fever w/ tachydardia  - CXR neg  - Abd plain film not suggestive of toxic megacolon, patient does not appear toxic or septic  - Bcx sent, Abx not given, low suspicion for sepsis  - resp panel, covid sent  - WBC 10.5, LA wnl    # Recurrent C. Difficile  - CTAP notable for bowel wall thickening + surrounding inflam of rectum and sigmoid c/f colitis; 1.2 cm splenic hypodensity   - Diagnosed with C. Difficile during 9/8 admission. At that time treated with vancomycin. Given cost-prohibition of fidamoxacin, will continue vancomycin (day 3/14 followed by a taper). S/p ID consult.  - PTA zofran and scheduled antiemetic panel ordered.  - anti-diarrheals held  - Toxic renny colon ruled out w/ abd plain film, reassess as needed    #s/p chronic gonsalez  -Voiding without difficulty.  -S/p 1D of CTX, stopped per ID reccs  -Ucx with Serratia, likely colonization. Will re-discuss with ID given recurrent fevers overnight.     # Anemia:   Chronic anemia secondary to recent chemoradiation therapy.       # Chronic hypertension   # Hyperlipidemia   -BP normal  - PTA amlodpine initiated       # Pulmonary nodules   # Low normal O2 sats on admission   - CTAP notable for stable 3 mm R middle lobe pulm nodule, R lower lobe calcified granuloma.   - Sating at 92-94% on admission, but does not appear to be short of breath   - supplemental O2 ordered for O2 sats < 92%.    # PPX  -  Lovenox 40 mg daily   - Encouarage SCDs   - PT/OT consulted, appreciate recommendations.      Dispo: home today pending vitals      Refugio Davis DO, MA  [facility]- PGY2  6:28 AM October 11, 2023       I have seen and examined the patient.  I have reviewed and edited Dr. Davis's note above.      Reina Stahl MD, MS, FACOG, FACS  10/11/2023  8:41 AM

## 2023-10-11 NOTE — PLAN OF CARE
1900- 0700   Goal Outcome Evaluation:    Tachy high of 116. Hypertensive high of 150/56. Tmax 102.3, tylenol given. Recheck 98.8. Blood cultures, labs ordered. Chest xray ordered, showed possible pneumonia. Abd xray ordered, showed mild stool burden. Respiratory panel & COVID swab collected, results pending. Denies nausea overnight. Pain consistently 6/10, little relief w/ scheduled oxy. Declined PRN medication. Sleeping between cares but didn't sleep much due to interruptions. K recheck 3.4, replaced IV again. Recheck 9:30 am. LR bolus given x2 overnight.

## 2023-10-12 LAB
ANION GAP SERPL CALCULATED.3IONS-SCNC: 12 MMOL/L (ref 7–15)
BACTERIA BLD CULT: NO GROWTH
BACTERIA BLD CULT: NO GROWTH
BUN SERPL-MCNC: 6.1 MG/DL (ref 8–23)
CALCIUM SERPL-MCNC: 7.9 MG/DL (ref 8.8–10.2)
CHLORIDE SERPL-SCNC: 99 MMOL/L (ref 98–107)
CREAT SERPL-MCNC: 0.85 MG/DL (ref 0.51–0.95)
CRP SERPL-MCNC: 181 MG/L
DEPRECATED HCO3 PLAS-SCNC: 21 MMOL/L (ref 22–29)
EGFRCR SERPLBLD CKD-EPI 2021: 75 ML/MIN/1.73M2
ERYTHROCYTE [DISTWIDTH] IN BLOOD BY AUTOMATED COUNT: 16.2 % (ref 10–15)
GLUCOSE SERPL-MCNC: 87 MG/DL (ref 70–99)
HCT VFR BLD AUTO: 29 % (ref 35–47)
HGB BLD-MCNC: 9.6 G/DL (ref 11.7–15.7)
MAGNESIUM SERPL-MCNC: 1.6 MG/DL (ref 1.7–2.3)
MCH RBC QN AUTO: 32.8 PG (ref 26.5–33)
MCHC RBC AUTO-ENTMCNC: 33.1 G/DL (ref 31.5–36.5)
MCV RBC AUTO: 99 FL (ref 78–100)
PLATELET # BLD AUTO: 205 10E3/UL (ref 150–450)
POTASSIUM SERPL-SCNC: 3.5 MMOL/L (ref 3.4–5.3)
RBC # BLD AUTO: 2.93 10E6/UL (ref 3.8–5.2)
SODIUM SERPL-SCNC: 132 MMOL/L (ref 135–145)
WBC # BLD AUTO: 12.8 10E3/UL (ref 4–11)

## 2023-10-12 PROCEDURE — 99222 1ST HOSP IP/OBS MODERATE 55: CPT | Mod: GC | Performed by: INTERNAL MEDICINE

## 2023-10-12 PROCEDURE — 250N000013 HC RX MED GY IP 250 OP 250 PS 637: Performed by: FAMILY MEDICINE

## 2023-10-12 PROCEDURE — 86140 C-REACTIVE PROTEIN: CPT | Performed by: STUDENT IN AN ORGANIZED HEALTH CARE EDUCATION/TRAINING PROGRAM

## 2023-10-12 PROCEDURE — 250N000011 HC RX IP 250 OP 636: Mod: JZ

## 2023-10-12 PROCEDURE — 250N000013 HC RX MED GY IP 250 OP 250 PS 637

## 2023-10-12 PROCEDURE — 85014 HEMATOCRIT: CPT

## 2023-10-12 PROCEDURE — 99232 SBSQ HOSP IP/OBS MODERATE 35: CPT | Mod: GC | Performed by: OBSTETRICS & GYNECOLOGY

## 2023-10-12 PROCEDURE — 250N000013 HC RX MED GY IP 250 OP 250 PS 637: Performed by: INTERNAL MEDICINE

## 2023-10-12 PROCEDURE — 83735 ASSAY OF MAGNESIUM: CPT | Performed by: OBSTETRICS & GYNECOLOGY

## 2023-10-12 PROCEDURE — 80048 BASIC METABOLIC PNL TOTAL CA: CPT

## 2023-10-12 PROCEDURE — 120N000002 HC R&B MED SURG/OB UMMC

## 2023-10-12 PROCEDURE — 258N000003 HC RX IP 258 OP 636

## 2023-10-12 PROCEDURE — 250N000013 HC RX MED GY IP 250 OP 250 PS 637: Performed by: NURSE PRACTITIONER

## 2023-10-12 PROCEDURE — 36415 COLL VENOUS BLD VENIPUNCTURE: CPT

## 2023-10-12 PROCEDURE — 99233 SBSQ HOSP IP/OBS HIGH 50: CPT | Performed by: INTERNAL MEDICINE

## 2023-10-12 PROCEDURE — 250N000011 HC RX IP 250 OP 636: Mod: JZ | Performed by: OBSTETRICS & GYNECOLOGY

## 2023-10-12 RX ORDER — SODIUM CHLORIDE 9 MG/ML
INJECTION, SOLUTION INTRAVENOUS CONTINUOUS
Status: DISCONTINUED | OUTPATIENT
Start: 2023-10-12 | End: 2023-10-14

## 2023-10-12 RX ORDER — SODIUM CHLORIDE, SODIUM LACTATE, POTASSIUM CHLORIDE, CALCIUM CHLORIDE 600; 310; 30; 20 MG/100ML; MG/100ML; MG/100ML; MG/100ML
INJECTION, SOLUTION INTRAVENOUS CONTINUOUS
Status: DISCONTINUED | OUTPATIENT
Start: 2023-10-12 | End: 2023-10-12

## 2023-10-12 RX ADMIN — VANCOMYCIN HYDROCHLORIDE 500 MG: 125 CAPSULE ORAL at 06:19

## 2023-10-12 RX ADMIN — OXYCODONE HYDROCHLORIDE 5 MG: 5 TABLET ORAL at 23:30

## 2023-10-12 RX ADMIN — SODIUM CHLORIDE: 9 INJECTION, SOLUTION INTRAVENOUS at 04:26

## 2023-10-12 RX ADMIN — SULFAMETHOXAZOLE AND TRIMETHOPRIM 1 TABLET: 800; 160 TABLET ORAL at 20:44

## 2023-10-12 RX ADMIN — TOLTERODINE TARTRATE 2 MG: 2 TABLET, FILM COATED ORAL at 20:44

## 2023-10-12 RX ADMIN — ONDANSETRON 4 MG: 4 TABLET, ORALLY DISINTEGRATING ORAL at 13:26

## 2023-10-12 RX ADMIN — VANCOMYCIN HYDROCHLORIDE 500 MG: 125 CAPSULE ORAL at 00:39

## 2023-10-12 RX ADMIN — VANCOMYCIN HYDROCHLORIDE 500 MG: 125 CAPSULE ORAL at 13:29

## 2023-10-12 RX ADMIN — TOLTERODINE TARTRATE 2 MG: 2 TABLET, FILM COATED ORAL at 09:02

## 2023-10-12 RX ADMIN — OXYCODONE HYDROCHLORIDE 5 MG: 5 TABLET ORAL at 10:12

## 2023-10-12 RX ADMIN — ENOXAPARIN SODIUM 40 MG: 40 INJECTION SUBCUTANEOUS at 09:02

## 2023-10-12 RX ADMIN — SODIUM CHLORIDE: 9 INJECTION, SOLUTION INTRAVENOUS at 20:42

## 2023-10-12 RX ADMIN — OXYCODONE HYDROCHLORIDE 5 MG: 5 TABLET ORAL at 13:24

## 2023-10-12 RX ADMIN — MAGNESIUM OXIDE TAB 400 MG (241.3 MG ELEMENTAL MG) 400 MG: 400 (241.3 MG) TAB at 09:02

## 2023-10-12 RX ADMIN — VANCOMYCIN HYDROCHLORIDE 500 MG: 125 CAPSULE ORAL at 18:15

## 2023-10-12 RX ADMIN — OXYCODONE HYDROCHLORIDE 5 MG: 5 TABLET ORAL at 18:15

## 2023-10-12 RX ADMIN — VANCOMYCIN HYDROCHLORIDE 500 MG: 125 CAPSULE ORAL at 23:30

## 2023-10-12 RX ADMIN — OXYCODONE HYDROCHLORIDE 5 MG: 5 TABLET ORAL at 06:19

## 2023-10-12 RX ADMIN — AMLODIPINE BESYLATE 5 MG: 5 TABLET ORAL at 09:02

## 2023-10-12 RX ADMIN — OXYCODONE HYDROCHLORIDE 5 MG: 5 TABLET ORAL at 02:03

## 2023-10-12 RX ADMIN — SULFAMETHOXAZOLE AND TRIMETHOPRIM 1 TABLET: 800; 160 TABLET ORAL at 09:02

## 2023-10-12 RX ADMIN — ONDANSETRON 4 MG: 2 INJECTION INTRAMUSCULAR; INTRAVENOUS at 06:34

## 2023-10-12 RX ADMIN — SODIUM CHLORIDE, POTASSIUM CHLORIDE, SODIUM LACTATE AND CALCIUM CHLORIDE: 600; 310; 30; 20 INJECTION, SOLUTION INTRAVENOUS at 13:23

## 2023-10-12 ASSESSMENT — ACTIVITIES OF DAILY LIVING (ADL)
ADLS_ACUITY_SCORE: 29

## 2023-10-12 NOTE — PROGRESS NOTES
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 S/p Cis-Rt and HDR brachy x5.       24 hour events:   - afebrile overnight  - ID: bactrim DS x 5 days for UTI, rec inc PO vanc 500 mg q6 + GI consult   - resp panel neg  - GI: will see in AM, consider stopping PO Mg w/ ongoing diarrhea     Subjective: Patient is doing well this morning. Pain is well controlled.  Tolerating hydration and food. Multiple soft bowel movements overnight still complaining that it is not getting better.  Denies lightheadedness or dizziness. Denies chest pain, SOB, nausea/vomiting, dizziness.     Objective:   Patient Vitals for the past 24 hrs:   BP Temp Temp src Pulse Resp SpO2   10/12/23 0426 (!) 146/63 99.1  F (37.3  C) Oral 95 20 92 %   10/12/23 0015 131/60 99.4  F (37.4  C) Oral 100 18 93 %   10/11/23 2124 (!) 145/60 97.9  F (36.6  C) Oral 102 18 93 %   10/11/23 1745 127/56 98.3  F (36.8  C) Oral 103 18 92 %   10/11/23 1715 (!) 140/71 -- -- 104 18 92 %   10/11/23 1707 (!) 142/68 99.3  F (37.4  C) Oral 102 18 93 %   10/11/23 1554 136/63 99.1  F (37.3  C) Oral 103 18 91 %   10/11/23 1232 132/57 99.3  F (37.4  C) Oral 101 20 94 %   10/11/23 0858 (!) 140/59 97.9  F (36.6  C) -- 85 20 94 %       General: NAD, appears comfortable in bed  CV: normal HR  Resp: non-labored breathing  Abdomen: soft, tender, non distended  Extremities: warm, well-perfused, nontender, trace edema      Intake/Output Summary (Last 24 hours) at 10/12/2023 0617  Last data filed at 10/11/2023 2259  Gross per 24 hour   Intake 3617.5 ml   Output 2000 ml   Net 1617.5 ml       Lines/Drains: PIV    Assessment: 67 year old female stage IVB SCC of the vagina who presents to the ED with 2 days of worsening nausea, vomiting, and diarrhea.     Active Problem list:  Stage IVB SCC of the vagina  Recurrent C. Difficile   Poor pain control  Anemia  Hypertension   Hyperlipidemia   Pulmonary nodules   Bladder Spasms   Acute cystitis     Plan:       # Stage IVB SCC of the Vagina   # Poor pain control   - Completed Cis-rt and S/p HDR brachytherapy last admission  - Current PTA includes tylenol and 5 mg oxycodone q4 hours with prn 5mg doses  - Palliative following    #s/p chronic gonsalez  #Cystitis complex  - D#2/5 Bactrim  - afebrile overnight    # Recurrent C. Difficile  - CTAP notable for bowel wall thickening + surrounding inflam of rectum and sigmoid c/f colitis; 1.2 cm splenic hypodensity   - Diagnosed with C. Difficile during 9/8 admission.    - PTA zofran and scheduled antiemetic panel ordered.  - anti-diarrheals held  - Toxic renny colon ruled out w/ abd plain film, reassess as needed  - Increase Vancomycin to 500mg QID  - GI will consult today    # Anemia:   Chronic anemia secondary to recent chemoradiation therapy.       # Chronic hypertension   # Hyperlipidemia   -BP normal  - PTA amlodpine initiated       # Pulmonary nodules   # Low normal O2 sats on admission   - CTAP notable for stable 3 mm R middle lobe pulm nodule, R lower lobe calcified granuloma.   - Sating at 92-94% on admission, but does not appear to be short of breath   - supplemental O2 ordered for O2 sats < 92%.    # PPX  -  Lovenox 40 mg daily   - Encouarage SCDs   - PT/OT consulted, appreciate recommendations.     Dispo: home today pending  GI consult  Refugio Davis DO, MA  [facility]- PGY2  6:19 AM October 12, 2023         I have seen and examined the patient.  I have reviewed and edited Dr. Davis's note above.    Reina Stahl MD, MS, FACOG, FACS  10/12/2023  9:13 AM

## 2023-10-12 NOTE — PROGRESS NOTES
General ID Service: Follow-up Note      Patient:  Jessie Tamayo, Date of birth 1955, Medical record number [id number]  Date of Visit:  October 9, 2023  Reason for consult: 1st recurrence C Diff           Assessment and Recommendations:     IMPRESSION:  Clostridium difficile colitis, 1st recurrence   Fevers, persistent diarrhea, nausea, vomiting, anorexia  Chronic indwelling gonsalez catheter with bacteriuria, possible cystitis    Stage IVB vaginal cancer s/p chemo/rads (completed therapy and under observation)    RECOMMENDATION:  Continue oral vancomycin 500mg every 6 hours  Continue oral TMP-SMX 1DS tab 2x a day until Oct 15, 2023    Diego Suggs on 10/12/2023 at 11:46 AM    Physician Attestation   I, JUANITA LONDON MD, was present with the medical/TONY student who participated in the service and in the documentation of the note.  I have verified the history and personally performed the physical exam and medical decision making.  I agree with the assessment and plan of care as documented in the note.      [place] findings:   Jessie Tamayo is a 67 year old female with stage 4 vaginal cancer, s/p chemorad. She has been diagnosed with C diff in August and completed it but it has recurred. She has persistent fevers and worsening diarrhea even while on treatment for C. Diff. We increased the oral vancomycin dosing and added TMP-SMX to cover for another bacterial process for her colitis and possible cystitis.     Awaiting GI opinion on next steps and assistance in managing her including possible additional diagnostic testing and non-infectious differentials.     JUANITA LONDON MD  Date of Service (when I saw the patient): 10/12/23    50 MINUTES SPENT BY ME on the date of service doing chart review, history, exam, documentation & further activities per the note.           Interval History:   Unable to tolerate oral intake of solid or liquids. Overnight diarrhea. No fevers.         Current  Antimicrobials   Vancomycin oral 10/8->  Ceftriaxone 10/9  Bactrim 10/11-         Physical Exam:   Ranges for vital signs:  Temp:  [97.9  F (36.6  C)-99.4  F (37.4  C)] 97.9  F (36.6  C)  Pulse:  [] 103  Resp:  [16-20] 16  BP: (127-160)/(55-71) 160/55  SpO2:  [91 %-95 %] 95 %    Intake/Output Summary (Last 24 hours) at 10/9/2023 1416  Last data filed at 10/9/2023 1300  Gross per 24 hour   Intake 2288.75 ml   Output 3000 ml   Net -711.25 ml     Exam:  LUNGS:  Unlabored breathing. Clear to auscultation.  CARDIOVASCULAR:  Regular rate and rhythm with no murmurs,  ABDOMEN:  Non-distended, soft, generalized tenderness.         Laboratory Data:   WBC 12.8  Estimated Creatinine Clearance: 72 mL/min (based on SCr of 0.85 mg/dL).    MICRO:  10/11 BCx-   10/11 RPP - neg   10/7 C diff - triple positive   10/8 Ucx - Serratia marcescens

## 2023-10-12 NOTE — PLAN OF CARE
Goal Outcome Evaluation:    Assumed care [id number]. Triggered SIRS protocol for tachycardia and elevated WBC. Lactic acid 1.3. Pain managed with scheduled Oxycodone. On a regular diet. Denied nausea, however had minimal appetite and only took in small amounts of food. Up to commode with SBA. Voided spontaneously. Continued to have loose BMs. On scheduled oral Vanco for C diff. Continue with plan of care.

## 2023-10-12 NOTE — PLAN OF CARE
[id number]. Tachycardic to 106 and BP elevated this AM, otherwise VSS. Afebrile. Pain managed with scheduled Oxycodone. Had a small emesis after taking PO Vancomycin capsules this afternoon, Gyn/Onc notified via page. PRN Zofran given x1. IVF infusing into PIV @ 100 mL/hr. Regular diet, poor appetite. Getting up to commode independently, having frequent stools - some have been watery and others have been soft/semi-formed. Voiding. Continue to monitor and with POC.

## 2023-10-12 NOTE — CONSULTS
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 Difficile who was admitted 10/7 with nausea, vomiting and diarrhea, found to have recurrent C. Diff.     GI [facility] consulted for evaluation of non-infectious causes of above mentioned symptoms and further eval.     #C. Diff (1st recurrence)  #diarrhea  #nausea, vomiting  Patient has had two recent hospitalizations (9/8-9/13) & (9/18-9/21). During first hospitalization she was diagnosed with C.diff and completed 10 day course of vancomycin with complete resolution of symptoms. She returned to ER 10/7 with diarrhea, nausea, vomiting and abdominal pain--C. Diff positive by toxin, antigen & PCR. Reports this feels similar to her prior episode of C.diff. Baseline BM 1 formed stool daily in the AM. No new medications. She has been taking magnesium for the last month or so. Last underwent radiation a few weeks ago. Denies hematemesis, hematochezia or melena.     Colonoscopy 2017 with 2 polyps removed, normal terminal ileum. CT A/P from this admission with bowel wall thickening and surrounding inflammation involving rectum and sigmoid colon concerning for colitis. XR Abdomen with no evidence of toxic megacolon & mildly dilated small and large bowel with no evidence of obstruction. She has been intermittently febrile (101.3, 102.3). Enteric panel negative.     ID following, as of yesterday 10/11 vancomycin increased to 500mg q6h and bactrim to cover for possible other bacterial etiology/cystitis.     Overnight patient remained afebrile with continuing diarrhea (6-10 episodes). She is still unable to tolerate much po intake. One small episode of emesis. Labs notable for leukocytosis (12.8) and .     Ongoing symptoms likely related to ongoing C.diff infection, abx associated diarrhea, radiation  proctitis possible, though unlikely.     RECOMMENDATIONS  - NPO at midnight for flex sig   - tap water enema prior to procedure (ordered)  - discontinue po Mg, replace with IV Mg and then switch to Slow-Mag after procedure (did not order)  - CBC, CMP, INR, Mg, Phos prior to procedure in AM    Gastroenterology follow up recommendations: pending clinical course    Thank you for involving us in this patient's care. Please do not hesitate to contact the GI service with any questions or concerns.     Patient care plan discussed with Dr. Butt, GI staff physician.    Farzana Valentino MD  PGY-1 Internal Medicine            Chief Complaint:   We were asked to evaluate this patient with diarrhea, nausea & vomiting in the setting of recurrent C. Diff.     History is obtained from the patient and the medical record.          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 Difficile who was admitted 10/7 with nausea, vomiting and diarrhea, found to have recurrent C. Diff.     - prior hospitalization in early September with C. Diff, treated with 10 day course of vancomycin   - full resolution of symptoms with vancomycin   - diarrhea, nausea & emesis returned prior to this recent admission on 10/6 associated with elevated temp  - prior to this no issues with bowel movements, baseline 1 well formed stool daily   - denies blood in stool  - denies any issues like this in the past   - undergoing chemo and radiation  - feels that these symptoms are worse than with her previous episode, does not feel that they are different, just worse  - not tolerating po intake             Past Medical History:   Reviewed and edited as appropriate  Past Medical History:   Diagnosis Date    Actinic keratosis     C. difficile diarrhea 09/08/2023    Hyperlipemia     Lichen sclerosus 2020    Osteopenias     SCC (squamous cell carcinoma)  07/26/2023            Past Surgical History:   Reviewed and edited as appropriate   Past Surgical History:   Procedure Laterality Date    COLONOSCOPY  2006    normal    COLPOSCOPY, BIOPSY, COMBINED N/A 02/08/2021    Procedure: COLPOSCOPY, WITH BIOPSY, LEEP procedure;  Surgeon: Jefe Koenig MD;  Location: [facility]    CYSTOSCOPY N/A 05/30/2023    Procedure: CYSTOSCOPY AND EXCISIONAL BIOPSY OF THE VAGINAL TISSUE AROUND THE URETHRA.  (look in the bladder and sample small area in the vagina near the urethra);  Surgeon: Lakeisha Mackenzie MD;  Location: [facility] OR    EYE SURGERY      cataracts bilateral    INSERT INTERSTITIAL NEEDLE, ULTRASOUND GUIDED N/A 9/18/2023    Procedure: INSERTION, NEEDLE, WITH ULTRASOUND GUIDANCE, FOR INTERSTITIAL BRACHYTHERAPY;  Surgeon: Marisa Pacheco MD;  Location: [facility] OR    OPEN REDUCTION INTERNAL FIXATION FOREARM  07/31/2012    Procedure: OPEN REDUCTION INTERNAL FIXATION FOREARM;  Open Reduction Internal Fixation, Irrigation & Debridment  of Right Distal Radius, application short arm splint;  Surgeon: Wade Beard MD;  Location: [facility] OR    REMOVE INTERSTITIAL NEEDLE N/A 9/20/2023    Procedure: REMOVAL, INTERSTITIAL NEEDLE, AFTER TEMPORARY BRACHYTHERAPY;  Surgeon: Marisa Pacheco MD;  Location: [facility] OR    TONSILLECTOMY & ADENOIDECTOMY  [address] [place] [address] TREAT ECTOPIC PREG,RMV TUBE/OVARY  1985    ectopic, right side-ovary and tube removed            Previous Endoscopy:     Results for orders placed or performed during the hospital encounter of 08/30/17   COLONOSCOPY   Result Value Ref Range    COLONOSCOPY       [facility]  [address] [place], [place] [address]., [place] [address] [phone]     Endoscopy Department  _______________________________________________________________________________  Patient Name: Jessie Tamayo            Procedure Date: 8/30/2017 7:18 AM  MRN: [id number]                       Account Number: [id number]  YOB: 1955              Admit  Type: Outpatient  Age: 61                                Gender: Female  Note Status: Addendum                 Attending MD: Dioni Murcia ,   Pause for the Cause: pause for cause completed Total Sedation Time:   _______________________________________________________________________________     Procedure:           Colonoscopy  Providers:           Dioni Murcia, Stuart Pelayo RN  Patient Profile:     Colonoscopy in 2006 with hyperplastic polyp.  Referring MD:        Jefe Koenig Md, MD  Medicines:           Midazolam 3.5 mg IV, Fentanyl 125 micrograms IV  Complications:       No immediate complications.   _______________________________________________________________________________  Procedure:           Pre-Anesthesia Assessment:                       - Prior to the procedure, a History and Physical was                        performed, and patient medications and allergies were                        reviewed. The patient is competent. The risks and                        benefits of the procedure and the sedation options and                        risks were discussed with the patient. All questions                        were answered and informed consent was obtained. Patient                        identification and proposed procedure were verified by                        the physician and the nurse in the pre-procedure area in                        the procedure room. Mental Status Examination: alert and                        oriented. Airway Examination: normal oropharyngeal                        airway and neck mobility and Mallampati Class II (the                         uvula but not tonsillar pillars visualized). Respiratory                        Examination: clear to auscultation. CV Examination:                        normal. Prophylactic Antibiotics: The patient does not                        require prophylactic antibiotics. Prior Anticoagulants:                         The patient has taken no previous anticoagulant or                        antiplatelet agents. ASA Grade Assessment: I - A normal,                        healthy patient. After reviewing the risks and benefits,                        the patient was deemed in satisfactory condition to                        undergo the procedure. The anesthesia plan was to use                        moderate sedation / analgesia (conscious sedation).                        Immediately prior to administration of medications, the                        patient was re-assessed for adequacy to receive                        sedatives. The heart rate, respiratory rate, oxygen                         saturations, blood pressure, adequacy of pulmonary                        ventilation, and response to care were monitored                        throughout the procedure. The physical status of the                        patient was re-assessed after the procedure.                       After obtaining informed consent, the colonoscope was                        passed under direct vision. Throughout the procedure,                        the patient's blood pressure, pulse, and oxygen                        saturations were monitored continuously. The Colonoscope                        was introduced through the anus and advanced to the                        terminal ileum, with identification of the appendiceal                        orifice and IC valve. The colonoscopy was technically                        difficult and complex due to restricted mobility of the                        colon. Successful completion of the procedure was aided                        by withdrawing the  scope and replacing with the                        pediatric endoscope. The patient tolerated the procedure                        well. The quality of the bowel preparation was excellent.                                                                                    Findings:       The perianal and digital rectal examinations were normal.       A 3 mm polyp was found in the descending colon. The polyp was        semi-sessile. The polyp was removed with a hot snare. Resection and        retrieval were complete. Verification of patient identification for the        specimen was done. Estimated blood loss was minimal.       A 4 mm polyp was found in the cecum. The polyp was semi-sessile. The        polyp was removed with a hot snare. Resection and retrieval were        complete. Verification of patient identification for the specimen was        done. Estimated blood loss was minimal.       The terminal ileum appeared normal.                                                                                    Impression:          - One 3 mm polyp in the descending colon, removed with a                        hot snare. Resected and retrieved.                       - One 4 mm polyp in the cecum, removed with a hot snare.                        Resected and retrieved.                       - The examined portion of the ileum was normal.  Recommendation:      - Discharge patient to home.                       - Resume previous diet.                       - Continue present medications. No aspirin or NSAID                        restrictions.                       - Await pathology results.                       - Recommend to use pediatric colonoscope with future                        colonoscopies.                                                                                     Electronically signed by: Dioni Murcia MD  _____________________  Dioni Murcia,   8/30/2017 9:00:23 AM  I was physically present for the entire viewing portion of the exam.  ________ __________________  Signature of teaching physician  Dioni Murcia  Number of Addenda: 1    Note Initiated On: 8/30/2017 7:18 AM  Scope In:  Scope  Out:  _______________________________________________________________________________    Addendum Number: 1   Addendum Date: 9/1/2017 9:16:46 AM           Total sedation time: 53 minutes of face to face sedation time.        Electronically signed by: Dioni Murcia MD  _____________________  Dioni Murcia,   9/1/2017 9:17:27 AM  I was physically present for the entire viewing portion of the exam.  __________________________  Signature of teaching physician  Dioni Murcia              Social History:   Reviewed and edited as appropriate  Social History     Socioeconomic History    Marital status:      Spouse name: Jesse    Number of children: 0    Years of education: Not on file    Highest education level: Not on file   Occupational History     Employer: [employer]   Tobacco Use    Smoking status: Never     Passive exposure: Never    Smokeless tobacco: Never   Vaping Use    Vaping Use: Never used   Substance and Sexual Activity    Alcohol use: Not Currently    Drug use: No    Sexual activity: Not Currently     Partners: Male     Birth control/protection: Post-menopausal   Other Topics Concern    Parent/sibling w/ CABG, MI or angioplasty before 65F 55M? No   Social History Narrative    Not on file     Social Determinants of Health     Financial Resource Strain: Not on file   Food Insecurity: Not on file   Transportation Needs: Not on file   Physical Activity: Not on file   Stress: Not on file   Social Connections: Not on file   Interpersonal Safety: Not on file   Housing Stability: Not on file            Family History:   Reviewed and edited as appropriate  No known history of gastrointestinal/liver disease or  gastrointestinal malignancies       Allergies:   Reviewed and edited as appropriate     Allergies   Allergen Reactions    Blood Transfusion Related (Informational Only) Other (See Comments)     Patient has a history of a clinically significant antibody against RBC antigens.  A delay in  compatible RBCs may occur.    Oxybutynin Itching    Seasonal Allergies             Medications:     Medications Prior to Admission   Medication Sig Dispense Refill Last Dose    acetaminophen (TYLENOL) 500 MG tablet Take 500-1,000 mg by mouth every 4 hours as needed for mild pain   10/6/2023 at 6:30 pm    amLODIPine (NORVASC) 5 MG tablet Take 5 mg by mouth every morning   10/7/2023 at 8:45 am    calcium carbonate 750 MG CHEW Take 750 mg by mouth 3 times daily as needed   10/7/2023 at 11:50 am    clotrimazole (LOTRIMIN) 1 % external cream Apply topically 2 times daily To affected areas 45 g 3 10/7/2023 at am    docusate sodium (COLACE) 100 MG capsule Take 100 mg by mouth daily as needed for constipation   Unknown at as needed    loratadine (CLARITIN) 10 MG tablet Take 10 mg by mouth daily   10/7/2023 at am    magnesium oxide (MAG-OX) 400 MG tablet Take 400 mg by mouth every morning   10/7/2023 at 8:45    naloxone (NARCAN) 4 MG/0.1ML nasal spray Spray 1 spray (4 mg) into one nostril alternating nostrils as needed for opioid reversal every 2-3 minutes until assistance arrives 0.2 mL 0 Unknown at as needed    NONFORMULARY Apply topically daily Cavalon cream   10/7/2023 at am    ondansetron (ZOFRAN) 8 MG tablet Take 1 tablet (8 mg) by mouth every 8 hours as needed for nausea (vomiting) Do not take for 3 days after chemo. 30 tablet 2 10/7/2023 at 11:10 am    oxyCODONE (ROXICODONE) 5 MG tablet Take 1-2 tablets (5-10 mg) by mouth every 6 hours as needed for pain 120 tablet 0 10/7/2023 at 3:00 pm    phenazopyridine (PYRIDIUM) 200 MG tablet Take 1 tablet (200 mg) by mouth 3 times daily as needed for irritation 21 tablet 0 10/7/2023 at 8:45 am    prochlorperazine (COMPAZINE) 10 MG tablet Take 1 tablet (10 mg) by mouth every 6 hours as needed for nausea or vomiting 30 tablet 2 10/7/2023 at am    tolterodine (DETROL) 2 MG tablet Take 1 tablet (2 mg) by mouth 2 times daily 60 tablet 3 10/7/2023 at 8:45 am             Review of  "Systems:     A complete review of systems was performed and is negative except as noted in the HPI           Physical Exam:   BP (!) 146/63 (BP Location: Right arm)   Pulse 95   Temp 99.1  F (37.3  C) (Oral)   Resp 20   Ht 1.651 m (5' 5\")   Wt 92.1 kg (203 lb 1.6 oz)   LMP 03/08/2008   SpO2 92%   BMI 33.80 kg/m    Wt:   Wt Readings from Last 2 Encounters:   10/10/23 92.1 kg (203 lb 1.6 oz)   09/18/23 93.3 kg (205 lb 11 oz)      Constitutional: pleasant, appears uncomfortable   Eyes: Sclera anicteric  Neck: supple  CV: tachycardic, no edema  Respiratory: Unlabored breathing  Abdomen: mildly tender to palpation diffusely; no peritoneal signs  Skin: warm, perfused, no jaundice  Neuro: AAO x 3  Psych: Normal affect         Data:   Labs and imaging below were independently reviewed and interpreted    BMP  Recent Labs   Lab 10/12/23  0618 10/11/23  0954 10/11/23  0618 10/10/23  2130 10/10/23  1422 10/10/23  0543 10/09/23  0616   *  --  135  --   --  137 136   POTASSIUM 3.5 3.6 4.0 3.4   < > 3.3* 3.6   CHLORIDE 99  --  102  --   --  105 104   KIM 7.9*  --  7.9*  --   --  8.1* 8.2*   CO2 21*  --  23  --   --  23 24   BUN 6.1*  --  3.3*  --   --  4.5* 5.3*   CR 0.85  --  0.73  --   --  0.81 0.90   GLC 87  --  88  --   --  87 90    < > = values in this interval not displayed.     CBC  Recent Labs   Lab 10/12/23  0618 10/11/23  0954 10/11/23  0003 10/10/23  0543   WBC 12.8* 12.3* 10.5  10.5 6.1   RBC 2.93* 2.99* 2.61*  2.61* 2.13*   HGB 9.6* 9.9* 8.7*  8.7* 6.9*   HCT 29.0* 30.2* 26.5*  26.5* 22.3*   MCV 99 101* 102*  102* 105*   MCH 32.8 33.1* 33.3*  33.3* 32.4   MCHC 33.1 32.8 32.8  32.8 30.9*   RDW 16.2* 17.0* 16.5*  16.5* 17.3*    228 169  169 141*     INRNo lab results found in last 7 days.  LFTs  Recent Labs   Lab 10/10/23  0543 10/09/23  0616 10/08/23  0542 10/07/23  1500   ALKPHOS 59 64 64 83   AST 14 15 16 23   ALT 5 <5 6 10   BILITOTAL 0.2 0.2 0.3 0.5   PROTTOTAL 4.7* 5.1* 4.9* 6.2* "   ALBUMIN 2.5* 2.7* 2.7* 3.5      PANC  Recent Labs   Lab 10/07/23  1500   LIPASE 11*       Imaging: reviewed   O-Z Flap Text: The defect edges were debeveled with a #15 scalpel blade.  Given the location of the defect, shape of the defect and the proximity to free margins an O-Z flap was deemed most appropriate.  Using a sterile surgical marker, an appropriate transposition flap was drawn incorporating the defect and placing the expected incisions within the relaxed skin tension lines where possible. The area thus outlined was incised deep to adipose tissue with a #15 scalpel blade.  The skin margins were undermined to an appropriate distance in all directions utilizing iris scissors.

## 2023-10-12 NOTE — PLAN OF CARE
Goal Outcome Evaluation:  Cdiff. A&oX4. RA. Elevated BP. Elevated HR. Elevated Temp. UAL - SBA. Bedside commode. Reg diet - poor appetite. Nausea tolerated w/ zofran. Pain controlled w/ sched oxycodone. Passing gas. Multiple loose Bms. Voiding spontaneously. Small emesis this AM.     IV fluids discontinued this AM. If pt unable to tolerate PO fluids - page team.   Plan of Care Reviewed With: patient    Overall Patient Progress: no changeOverall Patient Progress: no change

## 2023-10-13 LAB
ANION GAP SERPL CALCULATED.3IONS-SCNC: 12 MMOL/L (ref 7–15)
BUN SERPL-MCNC: 7.3 MG/DL (ref 8–23)
CALCIUM SERPL-MCNC: 7.8 MG/DL (ref 8.8–10.2)
CHLORIDE SERPL-SCNC: 98 MMOL/L (ref 98–107)
CREAT SERPL-MCNC: 0.78 MG/DL (ref 0.51–0.95)
CRP SERPL-MCNC: 226 MG/L
DEPRECATED HCO3 PLAS-SCNC: 22 MMOL/L (ref 22–29)
EGFRCR SERPLBLD CKD-EPI 2021: 83 ML/MIN/1.73M2
ERYTHROCYTE [DISTWIDTH] IN BLOOD BY AUTOMATED COUNT: 15.9 % (ref 10–15)
FLEXIBLE SIGMOIDOSCOPY: NORMAL
GLUCOSE SERPL-MCNC: 85 MG/DL (ref 70–99)
HCT VFR BLD AUTO: 27.1 % (ref 35–47)
HGB BLD-MCNC: 8.8 G/DL (ref 11.7–15.7)
INR PPP: 1.38 (ref 0.85–1.15)
MAGNESIUM SERPL-MCNC: 1.7 MG/DL (ref 1.7–2.3)
MCH RBC QN AUTO: 32.7 PG (ref 26.5–33)
MCHC RBC AUTO-ENTMCNC: 32.5 G/DL (ref 31.5–36.5)
MCV RBC AUTO: 101 FL (ref 78–100)
PHOSPHATE SERPL-MCNC: 3.4 MG/DL (ref 2.5–4.5)
PLATELET # BLD AUTO: 201 10E3/UL (ref 150–450)
POTASSIUM SERPL-SCNC: 3.5 MMOL/L (ref 3.4–5.3)
RBC # BLD AUTO: 2.69 10E6/UL (ref 3.8–5.2)
SODIUM SERPL-SCNC: 132 MMOL/L (ref 135–145)
WBC # BLD AUTO: 13.9 10E3/UL (ref 4–11)

## 2023-10-13 PROCEDURE — 36415 COLL VENOUS BLD VENIPUNCTURE: CPT

## 2023-10-13 PROCEDURE — 0DBN8ZX EXCISION OF SIGMOID COLON, VIA NATURAL OR ARTIFICIAL OPENING ENDOSCOPIC, DIAGNOSTIC: ICD-10-PCS | Performed by: INTERNAL MEDICINE

## 2023-10-13 PROCEDURE — 258N000003 HC RX IP 258 OP 636

## 2023-10-13 PROCEDURE — 87077 CULTURE AEROBIC IDENTIFY: CPT | Performed by: INTERNAL MEDICINE

## 2023-10-13 PROCEDURE — 80048 BASIC METABOLIC PNL TOTAL CA: CPT

## 2023-10-13 PROCEDURE — 120N000002 HC R&B MED SURG/OB UMMC

## 2023-10-13 PROCEDURE — 250N000013 HC RX MED GY IP 250 OP 250 PS 637: Performed by: NURSE PRACTITIONER

## 2023-10-13 PROCEDURE — 88341 IMHCHEM/IMCYTCHM EA ADD ANTB: CPT | Mod: 26 | Performed by: PATHOLOGY

## 2023-10-13 PROCEDURE — 250N000013 HC RX MED GY IP 250 OP 250 PS 637: Performed by: FAMILY MEDICINE

## 2023-10-13 PROCEDURE — 87176 TISSUE HOMOGENIZATION CULTR: CPT | Performed by: INTERNAL MEDICINE

## 2023-10-13 PROCEDURE — 99233 SBSQ HOSP IP/OBS HIGH 50: CPT | Performed by: INTERNAL MEDICINE

## 2023-10-13 PROCEDURE — 45331 SIGMOIDOSCOPY AND BIOPSY: CPT | Performed by: INTERNAL MEDICINE

## 2023-10-13 PROCEDURE — 88305 TISSUE EXAM BY PATHOLOGIST: CPT | Mod: 26 | Performed by: PATHOLOGY

## 2023-10-13 PROCEDURE — 85610 PROTHROMBIN TIME: CPT

## 2023-10-13 PROCEDURE — 88305 TISSUE EXAM BY PATHOLOGIST: CPT | Mod: TC | Performed by: INTERNAL MEDICINE

## 2023-10-13 PROCEDURE — 250N000013 HC RX MED GY IP 250 OP 250 PS 637: Performed by: INTERNAL MEDICINE

## 2023-10-13 PROCEDURE — 250N000011 HC RX IP 250 OP 636: Mod: JZ

## 2023-10-13 PROCEDURE — 85027 COMPLETE CBC AUTOMATED: CPT

## 2023-10-13 PROCEDURE — 250N000013 HC RX MED GY IP 250 OP 250 PS 637

## 2023-10-13 PROCEDURE — 88342 IMHCHEM/IMCYTCHM 1ST ANTB: CPT | Mod: 26 | Performed by: PATHOLOGY

## 2023-10-13 PROCEDURE — 250N000011 HC RX IP 250 OP 636: Mod: JZ | Performed by: OBSTETRICS & GYNECOLOGY

## 2023-10-13 PROCEDURE — G0500 MOD SEDAT ENDO SERVICE >5YRS: HCPCS | Performed by: INTERNAL MEDICINE

## 2023-10-13 PROCEDURE — 250N000011 HC RX IP 250 OP 636: Performed by: INTERNAL MEDICINE

## 2023-10-13 PROCEDURE — 84100 ASSAY OF PHOSPHORUS: CPT

## 2023-10-13 PROCEDURE — 99232 SBSQ HOSP IP/OBS MODERATE 35: CPT | Mod: GC | Performed by: OBSTETRICS & GYNECOLOGY

## 2023-10-13 PROCEDURE — 0DBP8ZX EXCISION OF RECTUM, VIA NATURAL OR ARTIFICIAL OPENING ENDOSCOPIC, DIAGNOSTIC: ICD-10-PCS | Performed by: INTERNAL MEDICINE

## 2023-10-13 PROCEDURE — 99232 SBSQ HOSP IP/OBS MODERATE 35: CPT | Mod: 25 | Performed by: INTERNAL MEDICINE

## 2023-10-13 PROCEDURE — 83735 ASSAY OF MAGNESIUM: CPT

## 2023-10-13 PROCEDURE — 86140 C-REACTIVE PROTEIN: CPT | Performed by: STUDENT IN AN ORGANIZED HEALTH CARE EDUCATION/TRAINING PROGRAM

## 2023-10-13 RX ORDER — FENTANYL CITRATE 50 UG/ML
INJECTION, SOLUTION INTRAMUSCULAR; INTRAVENOUS PRN
Status: DISCONTINUED | OUTPATIENT
Start: 2023-10-13 | End: 2023-10-15

## 2023-10-13 RX ORDER — METRONIDAZOLE 500 MG/100ML
500 INJECTION, SOLUTION INTRAVENOUS EVERY 6 HOURS
Status: DISCONTINUED | OUTPATIENT
Start: 2023-10-13 | End: 2023-10-17 | Stop reason: HOSPADM

## 2023-10-13 RX ADMIN — OXYCODONE HYDROCHLORIDE 5 MG: 5 TABLET ORAL at 23:38

## 2023-10-13 RX ADMIN — TOLTERODINE TARTRATE 2 MG: 2 TABLET, FILM COATED ORAL at 19:57

## 2023-10-13 RX ADMIN — TOLTERODINE TARTRATE 2 MG: 2 TABLET, FILM COATED ORAL at 07:37

## 2023-10-13 RX ADMIN — ENOXAPARIN SODIUM 40 MG: 40 INJECTION SUBCUTANEOUS at 11:55

## 2023-10-13 RX ADMIN — METRONIDAZOLE 500 MG: 500 INJECTION, SOLUTION INTRAVENOUS at 18:05

## 2023-10-13 RX ADMIN — VANCOMYCIN HYDROCHLORIDE 500 MG: 125 CAPSULE ORAL at 06:01

## 2023-10-13 RX ADMIN — OXYCODONE HYDROCHLORIDE 5 MG: 5 TABLET ORAL at 15:40

## 2023-10-13 RX ADMIN — VANCOMYCIN HYDROCHLORIDE 500 MG: 125 CAPSULE ORAL at 18:05

## 2023-10-13 RX ADMIN — OXYCODONE HYDROCHLORIDE 5 MG: 5 TABLET ORAL at 11:53

## 2023-10-13 RX ADMIN — AMLODIPINE BESYLATE 5 MG: 5 TABLET ORAL at 07:37

## 2023-10-13 RX ADMIN — OXYCODONE HYDROCHLORIDE 5 MG: 5 TABLET ORAL at 19:57

## 2023-10-13 RX ADMIN — SULFAMETHOXAZOLE AND TRIMETHOPRIM 1 TABLET: 800; 160 TABLET ORAL at 07:37

## 2023-10-13 RX ADMIN — SODIUM CHLORIDE: 9 INJECTION, SOLUTION INTRAVENOUS at 23:44

## 2023-10-13 RX ADMIN — VANCOMYCIN HYDROCHLORIDE 500 MG: 125 CAPSULE ORAL at 11:54

## 2023-10-13 RX ADMIN — METRONIDAZOLE 500 MG: 500 INJECTION, SOLUTION INTRAVENOUS at 23:38

## 2023-10-13 RX ADMIN — VANCOMYCIN HYDROCHLORIDE 500 MG: 125 CAPSULE ORAL at 23:38

## 2023-10-13 RX ADMIN — OXYCODONE HYDROCHLORIDE 5 MG: 5 TABLET ORAL at 03:52

## 2023-10-13 RX ADMIN — SODIUM CHLORIDE: 9 INJECTION, SOLUTION INTRAVENOUS at 15:04

## 2023-10-13 RX ADMIN — OXYCODONE HYDROCHLORIDE 5 MG: 5 TABLET ORAL at 07:37

## 2023-10-13 RX ADMIN — SODIUM CHLORIDE: 9 INJECTION, SOLUTION INTRAVENOUS at 04:50

## 2023-10-13 ASSESSMENT — ACTIVITIES OF DAILY LIVING (ADL)
ADLS_ACUITY_SCORE: 29
ADLS_ACUITY_SCORE: 35
ADLS_ACUITY_SCORE: 29
ADLS_ACUITY_SCORE: 35

## 2023-10-13 NOTE — PLAN OF CARE
Assumed nursing care from [id number]. Patient alert, oriented, and up independently. Slightly tachycardic; all other vital signs stable on room air. Pain managed with scheduled oxycodone. Regular diet; appetite is poor; will be NPO at 0000 for flex sig tomorrow. Voiding spontaneously. Continues to have multiple episodes of loose, watery, brown colored stool. L PIV infusing IV fluids.

## 2023-10-13 NOTE — PROGRESS NOTES
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 Difficile who was admitted 10/7 with nausea, vomiting and diarrhea, found to have recurrent C. Diff.        #C. Diff (1st recurrence)  #diarrhea  #nausea, vomiting  Patient has had two recent hospitalizations (9/8-9/13) & (9/18-9/21). During first hospitalization she was diagnosed with C.diff and completed 10 day course of vancomycin with complete resolution of symptoms. She returned to ER 10/7 with diarrhea, nausea, vomiting and abdominal pain--C. Diff positive by toxin, antigen & PCR. Reports this feels similar to her prior episode of C.diff. Baseline BM 1 formed stool daily in the AM prior to dx of cancer. No new medications.  Last underwent radiation a few weeks ago. Denies hematemesis, hematochezia or melena.      Colonoscopy 2017 with 2 polyps removed, normal terminal ileum. CT A/P from this admission with bowel wall thickening and surrounding inflammation involving rectum and sigmoid colon concerning for colitis. XR Abdomen with no evidence of toxic megacolon & mildly dilated small and large bowel with no evidence of obstruction. She has been intermittently febrile (101.3, 102.3). Enteric panel negative.      ID following, increased vancomycin to 500mg q6h and bactrim to cover for possible other bacterial etiology/cystitis 10/11.     10/13: Mildly tachycardic overnight, WBC increasing (13.9), afebrile. Continues to have diarrhea, nausea. Not tolerating po intake. Ongoing symptoms likely related to ongoing C.diff infection, abx associated diarrhea, radiation proctitis possible, though unlikely.      RECOMMENDATIONS  - flex sig today   - switch to Slow-Mag  - trend CRP  - will discuss with ID    Gastroenterology follow up recommendations: pending clinical course/flex sig     Patient care plan discussed with   "[facility], GI staff physician.     Farzana Valentino MD  PGY-1 Internal Medicine        ====================================================  S: Nursing notes reviewed. Afebrile. Still having diarrhea, nausea. Enema administered for procedure today.     O:   Physical Exam:  /59 (BP Location: Right arm)   Pulse 93   Temp 98.5  F (36.9  C) (Oral)   Resp 16   Ht 1.651 m (5' 5\")   Wt 92.1 kg (203 lb 1.6 oz)   LMP 03/08/2008   SpO2 94%   BMI 33.80 kg/m      Constitutional: pleasant, appears uncomfortable   Eyes: Sclera anicteric  Neck: supple  CV: tachycardic, no edema  Respiratory: Unlabored breathing  Abdomen: mildly tender to palpation diffusely; no peritoneal signs  Skin: warm, perfused, no jaundice  Neuro: AAO x 3  Psych: Normal affect      BMP  Recent Labs   Lab 10/13/23  0542 10/12/23  0618 10/11/23  0954 10/11/23  0618 10/10/23  1422 10/10/23  0543   * 132*  --  135  --  137   POTASSIUM 3.5 3.5 3.6 4.0   < > 3.3*   CHLORIDE 98 99  --  102  --  105   KIM 7.8* 7.9*  --  7.9*  --  8.1*   CO2 22 21*  --  23  --  23   BUN 7.3* 6.1*  --  3.3*  --  4.5*   CR 0.78 0.85  --  0.73  --  0.81   GLC 85 87  --  88  --  87    < > = values in this interval not displayed.     CBC  Recent Labs   Lab 10/13/23  0542 10/12/23  0618 10/11/23  0954 10/11/23  0003   WBC 13.9* 12.8* 12.3* 10.5  10.5   RBC 2.69* 2.93* 2.99* 2.61*  2.61*   HGB 8.8* 9.6* 9.9* 8.7*  8.7*   HCT 27.1* 29.0* 30.2* 26.5*  26.5*   * 99 101* 102*  102*   MCH 32.7 32.8 33.1* 33.3*  33.3*   MCHC 32.5 33.1 32.8 32.8  32.8   RDW 15.9* 16.2* 17.0* 16.5*  16.5*    205 228 169  169     INR  Recent Labs   Lab 10/13/23  0542   INR 1.38*     LFTs  Recent Labs   Lab 10/10/23  0543 10/09/23  0616 10/08/23  0542 10/07/23  1500   ALKPHOS 59 64 64 83   AST 14 15 16 23   ALT 5 <5 6 10   BILITOTAL 0.2 0.2 0.3 0.5   PROTTOTAL 4.7* 5.1* 4.9* 6.2*   ALBUMIN 2.5* 2.7* 2.7* 3.5      PANC  Recent Labs   Lab 10/07/23  1500   LIPASE 11* "       IMAGING: reviewed  PATHOLOGY: reviewed  PROCEDURES/ENDOSCOPY: reviewed

## 2023-10-13 NOTE — PLAN OF CARE
Assumed nursing care from [id number]. Patient alert, oriented, and up independently. Tachycardic; all other vital signs stable on room air. Pain managed with scheduled oxycodone. NPO except meds starting at 0000 for flex sig today at 0900. Tap water enema given x 1. Voiding spontaneously. Continues to have frequent, loose stool. PIV infusing IV fluids. No acute events overnight; patient sleeping between nursing care.

## 2023-10-13 NOTE — PROGRESS NOTES
General ID Service: Follow-up Note      Patient:  Jessie Tamayo, Date of birth 1955, Medical record number [id number]  Date of Visit:  October 9, 2023  Reason for consult: 1st recurrence C Diff         Assessment and Recommendations:     IMPRESSION:  Clostridium difficile colitis, 1st recurrence   Chronic indwelling gonsalez catheter with bacteriuria, possible cystitis    Stage IVB vaginal cancer s/p chemo/rads (completed therapy and under observation)    RECOMMENDATION:  Continue oral vancomycin 500mg every 6 hours until Oct 20, 2023, then 125mg 2x a day until Oct 27, 2023, then once a day until Nov 3, 2023, then 3x a week until Nov 30, 2023.  Stop oral TMP-SMX.  Start IV metronidazole 500mg every 8 hours.     Diego Suggs on 10/13/2023     Physician Attestation   I, UJANITA LONDON MD, was present with the medical student who participated in the service and in the documentation of the note.  I have verified the history and personally performed the physical exam and medical decision making.  I agree with the assessment and plan of care as documented in the note.      [place] findings:   Jessie Tamayo is a 67 year old female with stage 4 vaginal cancer, s/p chemorad. She has been diagnosed with C diff in August and completed it but it has recurred. She has persistent fevers and worsening diarrhea even while on treatment for C. Diff. We increased the oral vancomycin dosing. TMP-SMX was added to cover for another bacterial process for her colitis and possible cystitis, however, given lack of improvement we will discontinue this. This would have empirically treated cystitis.     We will add metronidazole in an effort to hasten her recovery from C diff. I will outlined the tapered dosing for vancomycin and will schedule her in the clinic. I will be covered by Dr. Wallace over the weekend.     JUANITA LONDON MD  Date of Service (when I saw the patient): 10/13/23    50 MINUTES SPENT BY ME on the  date of service doing chart review, history, exam, documentation & further activities per the note.           Interval History:   Unable to tolerate oral intake of solid or liquids. Overnight diarrhea. No fevers.  In the afternoon, underwent flex sig. Pseudomembranes were found.          Current Antimicrobials   Vancomycin oral 10/8->  Ceftriaxone 10/9  Bactrim 10/11-10/13  Metronidazole 10/13->         Physical Exam:   Ranges for vital signs:  Temp:  [97.8  F (36.6  C)-100.2  F (37.9  C)] 97.8  F (36.6  C)  Pulse:  [] 90  Resp:  [12-23] 20  BP: (122-144)/(56-74) 141/67  SpO2:  [93 %-99 %] 96 %    Intake/Output Summary (Last 24 hours) at 10/9/2023 1416  Last data filed at 10/9/2023 1300  Gross per 24 hour   Intake 2288.75 ml   Output 3000 ml   Net -711.25 ml     Exam:  LUNGS:  Unlabored breathing. Clear to auscultation.  CARDIOVASCULAR:  Regular rate and rhythm with no murmurs,  ABDOMEN:  Non-distended, soft, generalized tenderness.  PSYCH: Normal mentation and affect  SKIN: No rashes lesions  HEENT: Normocephalic, atraumatic, mucous membranes moist and without erythema          Laboratory Data:   WBC 12.8  Estimated Creatinine Clearance: 78.4 mL/min (based on SCr of 0.78 mg/dL).    MICRO:  10/11 BCx-   10/11 RPP - neg   10/7 C diff - triple positive   10/8 Ucx - Serratia marcescens

## 2023-10-13 NOTE — PLAN OF CARE
Goal Outcome Evaluation:    Overall Patient Progress: improvingOverall Patient Progress: improving    Outcome Evaluation: Have flex sig (with biopsy) this morning. Continue to have LBM and passing gas. Appetite is improving, liquid and solid food well tolerable this shift. No report of nausea.    Admitted 10/7 for nausea, vomiting and diarrhea in the setting of  C-diff.    - Alert and oriented, UAL. Spouse at the bedside all most of the time and very attentive to patient's needs.  - Had flex sig this morning with some biopsy. Tolerated well the procedure.   - Abdomen soft, non-tender. Continue to have diarrhea, but frequency and consistency is improving, + gas. Have some minimal abdominal cramping. Managed with scheduled po oxycodone of 5 mg. No report of nausea. Tolerating regular diet. Report having improved appetite this shift.  - Denies SOB, CP. Heart with regular rate and rhythm. OVSS, afebrile and at RA.   - Magnesium chloride scheduled at noon, per Caridad Paris, DO, not to give today. To start tomorrow. On ERP with no replacement needed.   - IVF via PIV, at 125 ml/hr.  regarding the importance of fluid intake (gatorade/powerade as tolerated).    PLAN: Continue with the plan of care. Metronidazole IV added to the antibiotic plan. Monitor for any changes with GI status.

## 2023-10-13 NOTE — OR NURSING
Flex sig under conscious sedation.  Biopsies obtained.  Pt tolerated procedure.  Report called to [facility] at 1015 hrs.  Pt transported to room on room air, pt awake and alert post procedure.  
intact/subtle asymmetry but movement WFL

## 2023-10-13 NOTE — PROGRESS NOTES
Gynecology Oncology Progress Note  10/13/23    HD#7    Disease: stage IVB SCC of the vagina. S/p Cis-Rt and HDR brachy x5.       24 hour events:   - afebrile overnight, ongoing diarrhea   - vomiting, nausea > stopped Mg, restarted mIVF   - GI: flex sig in AM, should replete Mg IV    - ID: cont vanc, await GI recs    - PM emesis     Subjective: Patient is doing well this morning. Pain is well controlled.  Tolerating hydration and food. Multiple soft bowel movements overnight still complaining that it is not getting better.  Denies lightheadedness or dizziness. Denies chest pain, SOB, nausea/vomiting, dizziness.     Objective:   Patient Vitals for the past 24 hrs:   BP Temp Temp src Pulse Resp SpO2   10/13/23 0441 126/59 98.5  F (36.9  C) Oral 93 16 94 %   10/13/23 0003 130/59 98.5  F (36.9  C) Oral 99 16 95 %   10/12/23 1859 129/64 99.8  F (37.7  C) Oral 104 16 98 %   10/12/23 1548 134/56 100.2  F (37.9  C) Oral 102 16 93 %   10/12/23 1300 129/58 99.2  F (37.3  C) Oral 106 18 97 %   10/12/23 0900 (!) 160/55 97.9  F (36.6  C) Oral 103 16 95 %       General: NAD, appears comfortable in bed  CV: normal HR  Resp: non-labored breathing  Abdomen: soft, tender, non distended  Extremities: warm, well-perfused, nontender, trace edema          Intake/Output Summary (Last 24 hours) at 10/13/2023 0717  Last data filed at 10/12/2023 2259  Gross per 24 hour   Intake 1348.42 ml   Output 25 ml   Net 1323.42 ml             Lines/Drains: PIV    Assessment: 67 year old female stage IVB SCC of the vagina who presents to the ED with 2 days of worsening nausea, vomiting, and diarrhea.     Active Problem list:  Stage IVB SCC of the vagina  Recurrent C. Difficile   Poor pain control  Anemia  Hypertension   Hyperlipidemia   Pulmonary nodules   Bladder Spasms   Acute cystitis     Plan:      # Stage IVB SCC of the Vagina   # Poor pain control   - Completed Cis-rt and S/p HDR brachytherapy last admission  - Current PTA includes tylenol and 5 mg  oxycodone q4 hours with prn 5mg doses  - Palliative following    #s/p chronic gonsalez  #Cystitis complex  - D#3/5 Bactrim  - afebrile overnight    # Recurrent C. Difficile  - CTAP notable for bowel wall thickening + surrounding inflam of rectum and sigmoid c/f colitis; 1.2 cm splenic hypodensity   - Diagnosed with C. Difficile during 9/8 admission.    - PTA zofran and scheduled antiemetic panel ordered.  - anti-diarrheals held  - Toxic renny colon ruled out w/ abd plain film, reassess as needed  - Vancomycin 500mg QID w/ taper after discharge  - GI procedure today, flexible sigmoidoscopy    # Anemia:   Chronic anemia secondary to recent chemoradiation therapy.     # Chronic hypertension   # Hyperlipidemia   -BP normal  - PTA amlodpine initiated       # Pulmonary nodules   # Low normal O2 sats on admission   - CTAP notable for stable 3 mm R middle lobe pulm nodule, R lower lobe calcified granuloma.   - Sating at 92-94% on admission, but does not appear to be short of breath   - supplemental O2 ordered for O2 sats < 92%.    # PPX  -  Lovenox 40 mg daily   - Encouarage SCDs     Dispo: home pending GI procedure    Refugio Davis DO MA  [facility]- PGY2  3:52 AM October 13, 2023       I have seen and examined the patient.  I have reviewed and edited Dr. Davis's note above.  S/p flexible sigmoidoscopy with clinical findings c/w C.diff. Biopsy pathology pending.   Continue antibiotics per ID.   Patient states she is able to tolerate more po today. Supplement diet with Boost Breeze (intolerant of dairy). If she is unable to maintain po intake over the weekend, may need to consider tube feeds (vs TPN).       Reina Stahl MD, MS, FACOG, FACS  10/13/2023  4:18 PM

## 2023-10-14 DIAGNOSIS — C52 VAGINAL CANCER (H): Primary | ICD-10-CM

## 2023-10-14 LAB
ANION GAP SERPL CALCULATED.3IONS-SCNC: 8 MMOL/L (ref 7–15)
BUN SERPL-MCNC: 4.2 MG/DL (ref 8–23)
CALCIUM SERPL-MCNC: 7.8 MG/DL (ref 8.8–10.2)
CHLORIDE SERPL-SCNC: 102 MMOL/L (ref 98–107)
CREAT SERPL-MCNC: 0.72 MG/DL (ref 0.51–0.95)
CRP SERPL-MCNC: 147 MG/L
DEPRECATED HCO3 PLAS-SCNC: 22 MMOL/L (ref 22–29)
EGFRCR SERPLBLD CKD-EPI 2021: >90 ML/MIN/1.73M2
ERYTHROCYTE [DISTWIDTH] IN BLOOD BY AUTOMATED COUNT: 15.6 % (ref 10–15)
GLUCOSE SERPL-MCNC: 88 MG/DL (ref 70–99)
HCT VFR BLD AUTO: 26.1 % (ref 35–47)
HGB BLD-MCNC: 8.4 G/DL (ref 11.7–15.7)
MAGNESIUM SERPL-MCNC: 1.6 MG/DL (ref 1.7–2.3)
MCH RBC QN AUTO: 32.2 PG (ref 26.5–33)
MCHC RBC AUTO-ENTMCNC: 32.2 G/DL (ref 31.5–36.5)
MCV RBC AUTO: 100 FL (ref 78–100)
PHOSPHATE SERPL-MCNC: 2.7 MG/DL (ref 2.5–4.5)
PLATELET # BLD AUTO: 212 10E3/UL (ref 150–450)
POTASSIUM SERPL-SCNC: 3.1 MMOL/L (ref 3.4–5.3)
POTASSIUM SERPL-SCNC: 3.4 MMOL/L (ref 3.4–5.3)
RBC # BLD AUTO: 2.61 10E6/UL (ref 3.8–5.2)
SODIUM SERPL-SCNC: 132 MMOL/L (ref 135–145)
WBC # BLD AUTO: 8.7 10E3/UL (ref 4–11)

## 2023-10-14 PROCEDURE — 85027 COMPLETE CBC AUTOMATED: CPT

## 2023-10-14 PROCEDURE — 250N000013 HC RX MED GY IP 250 OP 250 PS 637: Performed by: INTERNAL MEDICINE

## 2023-10-14 PROCEDURE — 250N000013 HC RX MED GY IP 250 OP 250 PS 637

## 2023-10-14 PROCEDURE — 36415 COLL VENOUS BLD VENIPUNCTURE: CPT | Performed by: OBSTETRICS & GYNECOLOGY

## 2023-10-14 PROCEDURE — 86140 C-REACTIVE PROTEIN: CPT | Performed by: STUDENT IN AN ORGANIZED HEALTH CARE EDUCATION/TRAINING PROGRAM

## 2023-10-14 PROCEDURE — 83735 ASSAY OF MAGNESIUM: CPT | Performed by: OBSTETRICS & GYNECOLOGY

## 2023-10-14 PROCEDURE — 84100 ASSAY OF PHOSPHORUS: CPT

## 2023-10-14 PROCEDURE — 99232 SBSQ HOSP IP/OBS MODERATE 35: CPT | Mod: GC | Performed by: OBSTETRICS & GYNECOLOGY

## 2023-10-14 PROCEDURE — 120N000002 HC R&B MED SURG/OB UMMC

## 2023-10-14 PROCEDURE — 250N000013 HC RX MED GY IP 250 OP 250 PS 637: Performed by: FAMILY MEDICINE

## 2023-10-14 PROCEDURE — 36415 COLL VENOUS BLD VENIPUNCTURE: CPT

## 2023-10-14 PROCEDURE — 250N000011 HC RX IP 250 OP 636: Mod: JZ | Performed by: OBSTETRICS & GYNECOLOGY

## 2023-10-14 PROCEDURE — 84132 ASSAY OF SERUM POTASSIUM: CPT | Performed by: OBSTETRICS & GYNECOLOGY

## 2023-10-14 PROCEDURE — 250N000011 HC RX IP 250 OP 636

## 2023-10-14 PROCEDURE — 250N000011 HC RX IP 250 OP 636: Mod: JZ

## 2023-10-14 PROCEDURE — 80048 BASIC METABOLIC PNL TOTAL CA: CPT

## 2023-10-14 RX ORDER — POTASSIUM CHLORIDE 7.45 MG/ML
10 INJECTION INTRAVENOUS
Status: COMPLETED | OUTPATIENT
Start: 2023-10-14 | End: 2023-10-14

## 2023-10-14 RX ORDER — NALOXONE HYDROCHLORIDE 0.4 MG/ML
0.2 INJECTION, SOLUTION INTRAMUSCULAR; INTRAVENOUS; SUBCUTANEOUS
Status: DISCONTINUED | OUTPATIENT
Start: 2023-10-14 | End: 2023-10-17 | Stop reason: HOSPADM

## 2023-10-14 RX ORDER — NALOXONE HYDROCHLORIDE 0.4 MG/ML
0.4 INJECTION, SOLUTION INTRAMUSCULAR; INTRAVENOUS; SUBCUTANEOUS
Status: DISCONTINUED | OUTPATIENT
Start: 2023-10-14 | End: 2023-10-17 | Stop reason: HOSPADM

## 2023-10-14 RX ADMIN — POTASSIUM CHLORIDE 10 MEQ: 7.45 INJECTION INTRAVENOUS at 09:01

## 2023-10-14 RX ADMIN — OXYCODONE HYDROCHLORIDE 5 MG: 5 TABLET ORAL at 11:30

## 2023-10-14 RX ADMIN — MAGNESIUM 64 MG (MAGNESIUM CHLORIDE) TABLET,DELAYED RELEASE 535 MG: at 09:00

## 2023-10-14 RX ADMIN — POTASSIUM CHLORIDE 10 MEQ: 7.45 INJECTION INTRAVENOUS at 11:29

## 2023-10-14 RX ADMIN — POTASSIUM CHLORIDE 10 MEQ: 7.46 INJECTION, SOLUTION INTRAVENOUS at 18:35

## 2023-10-14 RX ADMIN — METRONIDAZOLE 500 MG: 500 INJECTION, SOLUTION INTRAVENOUS at 05:57

## 2023-10-14 RX ADMIN — VANCOMYCIN HYDROCHLORIDE 500 MG: 125 CAPSULE ORAL at 05:57

## 2023-10-14 RX ADMIN — VANCOMYCIN HYDROCHLORIDE 500 MG: 125 CAPSULE ORAL at 23:53

## 2023-10-14 RX ADMIN — VANCOMYCIN HYDROCHLORIDE 500 MG: 125 CAPSULE ORAL at 11:30

## 2023-10-14 RX ADMIN — METRONIDAZOLE 500 MG: 500 INJECTION, SOLUTION INTRAVENOUS at 11:29

## 2023-10-14 RX ADMIN — OXYCODONE HYDROCHLORIDE 5 MG: 5 TABLET ORAL at 15:35

## 2023-10-14 RX ADMIN — ENOXAPARIN SODIUM 40 MG: 40 INJECTION SUBCUTANEOUS at 09:00

## 2023-10-14 RX ADMIN — OXYCODONE HYDROCHLORIDE 5 MG: 5 TABLET ORAL at 03:20

## 2023-10-14 RX ADMIN — OXYCODONE HYDROCHLORIDE 5 MG: 5 TABLET ORAL at 23:54

## 2023-10-14 RX ADMIN — POTASSIUM CHLORIDE 10 MEQ: 7.45 INJECTION INTRAVENOUS at 12:40

## 2023-10-14 RX ADMIN — POTASSIUM CHLORIDE 10 MEQ: 7.46 INJECTION, SOLUTION INTRAVENOUS at 17:05

## 2023-10-14 RX ADMIN — OXYCODONE HYDROCHLORIDE 5 MG: 5 TABLET ORAL at 08:18

## 2023-10-14 RX ADMIN — VANCOMYCIN HYDROCHLORIDE 500 MG: 125 CAPSULE ORAL at 17:03

## 2023-10-14 RX ADMIN — METRONIDAZOLE 500 MG: 500 INJECTION, SOLUTION INTRAVENOUS at 17:05

## 2023-10-14 RX ADMIN — AMLODIPINE BESYLATE 5 MG: 5 TABLET ORAL at 09:00

## 2023-10-14 RX ADMIN — POTASSIUM CHLORIDE 10 MEQ: 7.45 INJECTION INTRAVENOUS at 10:16

## 2023-10-14 RX ADMIN — ONDANSETRON 4 MG: 4 TABLET, ORALLY DISINTEGRATING ORAL at 16:17

## 2023-10-14 RX ADMIN — METRONIDAZOLE 500 MG: 500 INJECTION, SOLUTION INTRAVENOUS at 23:54

## 2023-10-14 RX ADMIN — TOLTERODINE TARTRATE 2 MG: 2 TABLET, FILM COATED ORAL at 19:54

## 2023-10-14 RX ADMIN — POTASSIUM CHLORIDE 10 MEQ: 7.46 INJECTION, SOLUTION INTRAVENOUS at 19:50

## 2023-10-14 RX ADMIN — POTASSIUM CHLORIDE 10 MEQ: 7.46 INJECTION, SOLUTION INTRAVENOUS at 20:59

## 2023-10-14 RX ADMIN — ONDANSETRON 4 MG: 4 TABLET, ORALLY DISINTEGRATING ORAL at 06:12

## 2023-10-14 RX ADMIN — TOLTERODINE TARTRATE 2 MG: 2 TABLET, FILM COATED ORAL at 09:00

## 2023-10-14 RX ADMIN — OXYCODONE HYDROCHLORIDE 5 MG: 5 TABLET ORAL at 19:21

## 2023-10-14 RX ADMIN — PROCHLORPERAZINE EDISYLATE 5 MG: 5 INJECTION INTRAMUSCULAR; INTRAVENOUS at 19:26

## 2023-10-14 ASSESSMENT — ACTIVITIES OF DAILY LIVING (ADL)
ADLS_ACUITY_SCORE: 29
DEPENDENT_IADLS:: INDEPENDENT
ADLS_ACUITY_SCORE: 29

## 2023-10-14 NOTE — PLAN OF CARE
Patient is voiding, had small amounts of stool occasionally, continues on enteric precautions. Scheduled Oxycodone is managing pain. Potassium was 3.1, replaced per protocol, recheck is due at 3 pm. Continues on IV antibiotics. Patient tolerated her meals, no nausea reported.

## 2023-10-14 NOTE — DISCHARGE INSTRUCTIONS
You have been discharged with a Vancomycin antibiotic taper. Please take your medication as listed below:  Take 1 capsule (125 mg) 4 times daily until October 20th.   Then take 1 capsule 2 times daily for 7 days (until October 27th).   Then take 1 capsule once a day for 7 days until November 3rd.  Finally take 1 capsule on Monday, Wednesday and Friday (3 times a week) until November 30th.    [facility] Line: [phone]  community based services to assist older [facility]

## 2023-10-14 NOTE — PROGRESS NOTES
Gynecology Oncology Progress Note  10/14/23    HD#7    Disease: stage IVB SCC of the vagina. S/p Cis-Rt and HDR brachy x5.       24 hour events:   - flex sig: pseudomembranes seen, biopsies show C. Diff proctitis.  - started slow mag per GI   - added flagyl, d/cd Bactrim per ID     Subjective: Patient is doing well this morning. Pain is well controlled.  Tolerating hydration and food. Multiple soft bowel movements overnight still complaining that it is not getting better.  Denies lightheadedness or dizziness. Denies chest pain, SOB, nausea/vomiting, dizziness.     Objective:   Patient Vitals for the past 24 hrs:   BP Temp Temp src Pulse Resp SpO2   10/14/23 0435 135/64 97.5  F (36.4  C) Oral 89 18 96 %   10/14/23 0032 131/59 98.5  F (36.9  C) Oral 94 18 96 %   10/13/23 2013 121/55 97.9  F (36.6  C) Oral 89 18 96 %   10/13/23 1027 135/64 -- -- 95 20 95 %   10/13/23 1005 (!) 141/67 -- -- 90 20 96 %   10/13/23 1000 125/57 -- -- 92 18 97 %   10/13/23 0955 122/62 -- -- 93 21 98 %   10/13/23 0950 124/57 -- -- 93 19 97 %   10/13/23 0945 130/57 -- -- 96 19 96 %   10/13/23 0940 131/63 -- -- 98 12 99 %   10/13/23 0935 139/74 -- -- 99 20 98 %   10/13/23 0930 129/61 -- -- 96 12 97 %   10/13/23 0920 134/74 -- -- 98 23 96 %   10/13/23 0735 (!) 144/60 97.8  F (36.6  C) Oral 97 18 94 %       General: NAD, appears comfortable in bed  CV: normal HR  Resp: non-labored breathing  Abdomen: soft, tender, non distended  Extremities: warm, well-perfused, nontender, trace edema      Intake/Output Summary (Last 24 hours) at 10/14/2023 0652  Last data filed at 10/13/2023 1800  Gross per 24 hour   Intake 1720 ml   Output 1000 ml   Net 720 ml         Lines/Drains: PIV    Assessment: 67 year old female stage IVB SCC of the vagina who presents to the ED with 2 days of worsening nausea, vomiting, and diarrhea.     Active Problem list:  Stage IVB SCC of the vagina  Recurrent C. Difficile   Poor pain control  Anemia  Hypertension   Hyperlipidemia    Pulmonary nodules   Bladder Spasms   Acute cystitis     Plan:      # Stage IVB SCC of the Vagina   # Poor pain control   - Completed Cis-rt and S/p HDR brachytherapy last admission  - Current PTA includes tylenol and 5 mg oxycodone q4 hours with prn 5mg doses  - Palliative following    #s/p chronic gonsalez  #Cystitis complex  - s/p3D bactrim, discontinued per ID.   - afebrile overnight    # Recurrent C. Difficile  - Diagnosed with C. Difficile during 9/8 admission.    - PTA zofran and scheduled antiemetic panel ordered.  - anti-diarrheals held  - Toxic renny colon ruled out w/ abd plain film, reassess as needed  - Vancomycin 500mg QID w/ taper after discharge> D#1 Vanco/flagyl  Taper plan:   Continue oral vancomycin 500mg every 6 hours until Oct 20, 2023, then 125mg 2x a day until Oct 27, 2023, then once a day until Nov 3, 2023, then 3x a week until Nov 30, 2023.  Will follow-up with ID re: recommended metronidazole course.   - s/p flexible sigmoidoscopy w/ pseudomembranes    # Anemia:   Chronic anemia secondary to recent chemoradiation therapy.     # Chronic hypertension   # Hyperlipidemia   -BP normal  - PTA amlodpine initiated       # Pulmonary nodules   # Low normal O2 sats on admission   - CTAP notable for stable 3 mm R middle lobe pulm nodule, R lower lobe calcified granuloma.   - Sating at 92-94% on admission, but does not appear to be short of breath   - supplemental O2 ordered for O2 sats < 92%.    # PPX  -  Lovenox 40 mg daily during hospitalization  - Encouarage SCDs       Refugio Davis DO [facility]- PG[facility]  6:53 AM October 14, 2023           I have seen and examined the patient.  I have reviewed and edited Dr. Davis's note above.      Reina Stahl MD, MS, FACOG, FACS  10/14/2023  8:50 AM

## 2023-10-14 NOTE — CONSULTS
Care Management Initial Consult    General Information  Assessment completed with: Patient,    Type of CM/SW Visit: Initial Assessment  Primary Care Provider verified and updated as needed: Yes   Readmission within the last 30 days: current reason for admission unrelated to previous admission   Return Category: Exacerbation of disease  Reason for Consult: discharge planning  Advance Care Planning: Advance Care Planning Reviewed: no concerns identified       Communication Assessment  Patient's communication style: spoken language (English or Bilingual)    Hearing Difficulty or Deaf: yes   Wear Glasses or Blind: yes    Cognitive  Cognitive/Neuro/Behavioral: WDL                      Living Environment:   People in home: spouse     Current living Arrangements: house      Able to return to prior arrangements: yes       Family/Social Support:  Care provided by: self, spouse/significant other  Provides care for: no one  Marital Status:             Description of Support System: Supportive    Support Assessment: Adequate family and caregiver support, Limited social contact and support    Current Resources:   Patient receiving home care services: No  Community Resources: None  Equipment currently used at home: shower chair  Supplies currently used at home: None    Employment/Financial:  Employment Status: retired     Financial Concerns: none   Referral to Financial Worker: No    Does the patient's insurance plan have a 3 day qualifying hospital stay waiver?  Yes   Which insurance plan 3 day waiver is available? [employer]  Will the waiver be used for post-acute placement? No    Lifestyle & Psychosocial Needs: (pulled from chart)  Social Determinants of Health     Food Insecurity: Not on file   Depression: Not at risk (9/27/2023)    PHQ-2     PHQ-2 Score: 0   Housing Stability: Not on file   Tobacco Use: Low Risk  (10/7/2023)    Patient History     Smoking Tobacco Use: Never     Smokeless Tobacco Use: Never      Passive Exposure: Never   Financial Resource Strain: Not on file   Alcohol Use: Not on file   Transportation Needs: Not on file   Physical Activity: Not on file   Interpersonal Safety: Not on file   Stress: Not on file   Social Connections: Not on file       Functional Status:  Prior to admission patient needed assistance:   Dependent ADLs:: Independent  Dependent IADLs:: Independent       Mental Health Status:  Mental Health Status: No Current Concerns       Chemical Dependency Status:  Chemical Dependency Status: No Current Concerns             Values/Beliefs:  Spiritual, Cultural Beliefs, Latter-day Practices, Values that affect care: no               Additional Information:  Pt is a 67 year old female w/ PMH of stage IVB SCC of the vagina, recurrent C. Diff, anemia, HTN, HLD, pulmonary nodules, bladder spasms, and acute cystitis who presents to the ED with 2 days of worsening nausea, vomiting, and diarrhea.     RNCC completed CM assessment due to elevated risk score. RNCC spoke with pt and  over the phone due to active C diff precautions. Pt states she lives with her  in a split level home. There is 1 step to get into home and then 10 steps to get to main level. Pt was independent with all ADLs and IADLs before hospitalizations and only used a shower chair for assistive devices. Pt is not currently open to any home services. Pt states she has very limited support and can only rely on her . When RNCC brought up her son and DIL from contacts, pt states they cannot provide any support. Pt is retired on social security. Pt does not have any specific financial concerns but states she is concerns regarding her medical bill overall. RNCC provided pt resources to [facility] Line to get more community based services once she discharges. RNCC also brought up potential to sign up for MA if pt is feeling she needs more assistance. Pt states she is ok right now but will look into it in the future if  it ever becomes an actual need. Pt  will provide discharge transportation when pt is medically ready.    RNCC went over therapy recommendations of OP PT. Pt states she is interested and can get transportation to appointments. RNCC notified pt that she should be receiving a call from scheduling after discharge to get her set up with OP PT. No other discharge needs identified. RNCC will continue to follow and assist with discharge needs as they arise.    Asher Kat RN BSN  5A RN Care Coordinator   Ph: [phone]  Pager: [phone]

## 2023-10-14 NOTE — PLAN OF CARE
"[id number]    /61 (BP Location: Right arm, Cuff Size: Adult Regular)   Pulse 86   Temp 98  F (36.7  C) (Oral)   Resp 18   Ht 1.651 m (5' 5\")   Wt 92.1 kg (203 lb 1.6 oz)   LMP 03/08/2008   SpO2 96%   BMI 33.80 kg/m      Reason for admission: Admit 10/7/23 stage IVB SCC of the vagina who presents to the ED with 2 days of worsening nausea, vomiting, and diarrhea. Enteric precautions for CDiff     Activity: Up ad rody, commode at bedside.  Pain: Oxycodone scheduled - does well with this.   Neuro: A/O x4  Cardiac: WDL  Respiratory: WDL  GI/: Adequate UO, stooling some, prefers commode at bedside. IV Flagyl and oral Vanco. Nausea did increase this evening, Zofran given which helped.  Diet: Regular diet, half of dinner meal eaten, but advised bland foods and eggs ordered may not have helped.   Lines: PIV  Wounds: NA  Labs/imaging: Potassium recheck at Midnight.       New changes this shift: Replacing K, Zofran for nausea,  visited, ambulated in room.      Plan: Monitor labs and follow tx for Cdif and treat nausea.       Continue to monitor and follow POC        "

## 2023-10-14 NOTE — PLAN OF CARE
"[id number]    /64 (BP Location: Right arm)   Pulse 89   Temp 97.5  F (36.4  C) (Oral)   Resp 18   Ht 1.651 m (5' 5\")   Wt 92.1 kg (203 lb 1.6 oz)   LMP 03/08/2008   SpO2 96%   BMI 33.80 kg/m      Afebrile, OVSS on RA. Pain managed with scheduled oxycodone. Fluids running at 125 mL/hr. Nausea managed with Zofran x1. Denied SOB. Fair PO intake. UAL to commode. No difficulties urinating, LBM 10/13. Continue with POC.     "

## 2023-10-15 LAB
ANION GAP SERPL CALCULATED.3IONS-SCNC: 10 MMOL/L (ref 7–15)
BASO+EOS+MONOS # BLD AUTO: ABNORMAL 10*3/UL
BASO+EOS+MONOS NFR BLD AUTO: ABNORMAL %
BASOPHILS # BLD AUTO: ABNORMAL 10*3/UL
BASOPHILS # BLD MANUAL: 0.1 10E3/UL (ref 0–0.2)
BASOPHILS NFR BLD AUTO: ABNORMAL %
BASOPHILS NFR BLD MANUAL: 1 %
BUN SERPL-MCNC: 1.7 MG/DL (ref 8–23)
CALCIUM SERPL-MCNC: 8 MG/DL (ref 8.8–10.2)
CHLORIDE SERPL-SCNC: 101 MMOL/L (ref 98–107)
CREAT SERPL-MCNC: 0.66 MG/DL (ref 0.51–0.95)
CRP SERPL-MCNC: 73.3 MG/L
DEPRECATED HCO3 PLAS-SCNC: 24 MMOL/L (ref 22–29)
EGFRCR SERPLBLD CKD-EPI 2021: >90 ML/MIN/1.73M2
EOSINOPHIL # BLD AUTO: ABNORMAL 10*3/UL
EOSINOPHIL # BLD MANUAL: 0 10E3/UL (ref 0–0.7)
EOSINOPHIL NFR BLD AUTO: ABNORMAL %
EOSINOPHIL NFR BLD MANUAL: 0 %
ERYTHROCYTE [DISTWIDTH] IN BLOOD BY AUTOMATED COUNT: 15.5 % (ref 10–15)
GLUCOSE SERPL-MCNC: 102 MG/DL (ref 70–99)
HCT VFR BLD AUTO: 28.6 % (ref 35–47)
HGB BLD-MCNC: 9 G/DL (ref 11.7–15.7)
IMM GRANULOCYTES # BLD: ABNORMAL 10*3/UL
IMM GRANULOCYTES NFR BLD: ABNORMAL %
LYMPHOCYTES # BLD AUTO: ABNORMAL 10*3/UL
LYMPHOCYTES # BLD MANUAL: 0.6 10E3/UL (ref 0.8–5.3)
LYMPHOCYTES NFR BLD AUTO: ABNORMAL %
LYMPHOCYTES NFR BLD MANUAL: 9 %
MAGNESIUM SERPL-MCNC: 1.5 MG/DL (ref 1.7–2.3)
MAGNESIUM SERPL-MCNC: 2.2 MG/DL (ref 1.7–2.3)
MCH RBC QN AUTO: 32.3 PG (ref 26.5–33)
MCHC RBC AUTO-ENTMCNC: 31.5 G/DL (ref 31.5–36.5)
MCV RBC AUTO: 103 FL (ref 78–100)
METAMYELOCYTES # BLD MANUAL: 0.1 10E3/UL
METAMYELOCYTES NFR BLD MANUAL: 2 %
MONOCYTES # BLD AUTO: ABNORMAL 10*3/UL
MONOCYTES # BLD MANUAL: 0.4 10E3/UL (ref 0–1.3)
MONOCYTES NFR BLD AUTO: ABNORMAL %
MONOCYTES NFR BLD MANUAL: 6 %
NEUTROPHILS # BLD AUTO: ABNORMAL 10*3/UL
NEUTROPHILS # BLD MANUAL: 5.4 10E3/UL (ref 1.6–8.3)
NEUTROPHILS NFR BLD AUTO: ABNORMAL %
NEUTROPHILS NFR BLD MANUAL: 80 %
NRBC # BLD AUTO: 0 10E3/UL
NRBC BLD AUTO-RTO: 0 /100
PHOSPHATE SERPL-MCNC: 3 MG/DL (ref 2.5–4.5)
PLAT MORPH BLD: ABNORMAL
PLATELET # BLD AUTO: 250 10E3/UL (ref 150–450)
POTASSIUM SERPL-SCNC: 3.6 MMOL/L (ref 3.4–5.3)
POTASSIUM SERPL-SCNC: 3.8 MMOL/L (ref 3.4–5.3)
PROMYELOCYTES # BLD MANUAL: 0.1 10E3/UL
PROMYELOCYTES NFR BLD MANUAL: 2 %
RBC # BLD AUTO: 2.79 10E6/UL (ref 3.8–5.2)
RBC MORPH BLD: ABNORMAL
SODIUM SERPL-SCNC: 135 MMOL/L (ref 135–145)
VARIANT LYMPHS BLD QL SMEAR: PRESENT
WBC # BLD AUTO: 6.7 10E3/UL (ref 4–11)

## 2023-10-15 PROCEDURE — 250N000011 HC RX IP 250 OP 636: Performed by: OBSTETRICS & GYNECOLOGY

## 2023-10-15 PROCEDURE — 85027 COMPLETE CBC AUTOMATED: CPT

## 2023-10-15 PROCEDURE — 36415 COLL VENOUS BLD VENIPUNCTURE: CPT | Performed by: OBSTETRICS & GYNECOLOGY

## 2023-10-15 PROCEDURE — 36415 COLL VENOUS BLD VENIPUNCTURE: CPT

## 2023-10-15 PROCEDURE — 250N000013 HC RX MED GY IP 250 OP 250 PS 637

## 2023-10-15 PROCEDURE — 86140 C-REACTIVE PROTEIN: CPT

## 2023-10-15 PROCEDURE — 250N000011 HC RX IP 250 OP 636: Mod: JZ

## 2023-10-15 PROCEDURE — 85007 BL SMEAR W/DIFF WBC COUNT: CPT

## 2023-10-15 PROCEDURE — 250N000013 HC RX MED GY IP 250 OP 250 PS 637: Performed by: FAMILY MEDICINE

## 2023-10-15 PROCEDURE — 80048 BASIC METABOLIC PNL TOTAL CA: CPT

## 2023-10-15 PROCEDURE — 83735 ASSAY OF MAGNESIUM: CPT | Performed by: OBSTETRICS & GYNECOLOGY

## 2023-10-15 PROCEDURE — 258N000003 HC RX IP 258 OP 636: Performed by: STUDENT IN AN ORGANIZED HEALTH CARE EDUCATION/TRAINING PROGRAM

## 2023-10-15 PROCEDURE — 84100 ASSAY OF PHOSPHORUS: CPT

## 2023-10-15 PROCEDURE — 120N000002 HC R&B MED SURG/OB UMMC

## 2023-10-15 PROCEDURE — 250N000013 HC RX MED GY IP 250 OP 250 PS 637: Performed by: INTERNAL MEDICINE

## 2023-10-15 RX ORDER — MAGNESIUM SULFATE HEPTAHYDRATE 40 MG/ML
4 INJECTION, SOLUTION INTRAVENOUS ONCE
Status: COMPLETED | OUTPATIENT
Start: 2023-10-15 | End: 2023-10-15

## 2023-10-15 RX ORDER — OLANZAPINE 5 MG/1
5 TABLET ORAL 2 TIMES DAILY
Status: COMPLETED | OUTPATIENT
Start: 2023-10-15 | End: 2023-10-15

## 2023-10-15 RX ADMIN — TOLTERODINE TARTRATE 2 MG: 2 TABLET, FILM COATED ORAL at 07:38

## 2023-10-15 RX ADMIN — OXYCODONE HYDROCHLORIDE 5 MG: 5 TABLET ORAL at 19:39

## 2023-10-15 RX ADMIN — OXYCODONE HYDROCHLORIDE 5 MG: 5 TABLET ORAL at 11:37

## 2023-10-15 RX ADMIN — OXYCODONE HYDROCHLORIDE 5 MG: 5 TABLET ORAL at 15:13

## 2023-10-15 RX ADMIN — METRONIDAZOLE 500 MG: 500 INJECTION, SOLUTION INTRAVENOUS at 10:43

## 2023-10-15 RX ADMIN — OXYCODONE HYDROCHLORIDE 5 MG: 5 TABLET ORAL at 23:44

## 2023-10-15 RX ADMIN — METRONIDAZOLE 500 MG: 500 INJECTION, SOLUTION INTRAVENOUS at 17:19

## 2023-10-15 RX ADMIN — OLANZAPINE 5 MG: 5 TABLET, FILM COATED ORAL at 19:39

## 2023-10-15 RX ADMIN — OLANZAPINE 5 MG: 5 TABLET, FILM COATED ORAL at 10:44

## 2023-10-15 RX ADMIN — MAGNESIUM 64 MG (MAGNESIUM CHLORIDE) TABLET,DELAYED RELEASE 535 MG: at 07:38

## 2023-10-15 RX ADMIN — VANCOMYCIN HYDROCHLORIDE 500 MG: 125 CAPSULE ORAL at 11:37

## 2023-10-15 RX ADMIN — OXYCODONE HYDROCHLORIDE 5 MG: 5 TABLET ORAL at 04:09

## 2023-10-15 RX ADMIN — SODIUM CHLORIDE, POTASSIUM CHLORIDE, SODIUM LACTATE AND CALCIUM CHLORIDE 1000 ML: 600; 310; 30; 20 INJECTION, SOLUTION INTRAVENOUS at 12:21

## 2023-10-15 RX ADMIN — VANCOMYCIN HYDROCHLORIDE 500 MG: 125 CAPSULE ORAL at 05:40

## 2023-10-15 RX ADMIN — OXYCODONE HYDROCHLORIDE 5 MG: 5 TABLET ORAL at 07:37

## 2023-10-15 RX ADMIN — VANCOMYCIN HYDROCHLORIDE 500 MG: 125 CAPSULE ORAL at 17:19

## 2023-10-15 RX ADMIN — MAGNESIUM SULFATE IN WATER 4 G: 40 INJECTION, SOLUTION INTRAVENOUS at 11:39

## 2023-10-15 RX ADMIN — ENOXAPARIN SODIUM 40 MG: 40 INJECTION SUBCUTANEOUS at 07:40

## 2023-10-15 RX ADMIN — TOLTERODINE TARTRATE 2 MG: 2 TABLET, FILM COATED ORAL at 19:39

## 2023-10-15 RX ADMIN — AMLODIPINE BESYLATE 5 MG: 5 TABLET ORAL at 07:38

## 2023-10-15 RX ADMIN — METRONIDAZOLE 500 MG: 500 INJECTION, SOLUTION INTRAVENOUS at 05:41

## 2023-10-15 RX ADMIN — VANCOMYCIN HYDROCHLORIDE 500 MG: 125 CAPSULE ORAL at 23:44

## 2023-10-15 ASSESSMENT — ACTIVITIES OF DAILY LIVING (ADL)
ADLS_ACUITY_SCORE: 29

## 2023-10-15 NOTE — PROGRESS NOTES
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 S/p Cis-Rt and HDR brachy x5.     24 hour events:   - IV fluids off with improved PO tolerance  - somewhat decreased frequency of BMs  - per ID, IV Flagyl only (no role of PO for treating C Diff), continue until at least 10/15 pending stability, monitor inpatient until at least a day after stopping  - Mag, K repletion    Subjective: Patient is doing fine this morning. Pain is controlled. Continuing to tolerate small amounts of regular diet and supplements (working on strategies to make these more palatable). Multiple soft bowel movements overnight, reportedly about every hour, about like it has been. Nausea is stable, no emesis.    Objective:   Patient Vitals for the past 24 hrs:   BP Temp Temp src Pulse Resp SpO2   10/14/23 2002 134/58 98.3  F (36.8  C) Oral 94 18 94 %   10/14/23 1600 135/61 98  F (36.7  C) Oral 86 18 96 %   10/14/23 1221 135/63 97.8  F (36.6  C) Oral 87 18 95 %   10/14/23 0843 126/46 97.7  F (36.5  C) Oral 82 18 95 %   10/14/23 0435 135/64 97.5  F (36.4  C) Oral 89 18 96 %   10/14/23 0032 131/59 98.5  F (36.9  C) Oral 94 18 96 %       General: NAD, appears comfortable in bed  CV: normal HR  Resp: non-labored breathing  Abdomen: soft, tender, non distended  Extremities: warm, well-perfused, nontender, trace edema  Lines/Drains: PIV      Intake/Output (yesterday // since 0000):  PO: 420 mL // NR  Stool: 9x // 2x recorded      Recent Results (from the past 24 hour(s))   CRP inflammation    Collection Time: 10/14/23  5:58 AM   Result Value Ref Range    CRP Inflammation 147.00 (H) <5.00 mg/L   Magnesium    Collection Time: 10/14/23  5:58 AM   Result Value Ref Range    Magnesium 1.6 (L) 1.7 - 2.3 mg/dL   Phosphorus    Collection Time: 10/14/23  5:58 AM   Result Value Ref Range    Phosphorus 2.7 2.5 - 4.5 mg/dL   Basic metabolic panel     Collection Time: 10/14/23  5:58 AM   Result Value Ref Range    Sodium 132 (L) 135 - 145 mmol/L    Potassium 3.1 (L) 3.4 - 5.3 mmol/L    Chloride 102 98 - 107 mmol/L    Carbon Dioxide (CO2) 22 22 - 29 mmol/L    Anion Gap 8 7 - 15 mmol/L    Urea Nitrogen 4.2 (L) 8.0 - 23.0 mg/dL    Creatinine 0.72 0.51 - 0.95 mg/dL    GFR Estimate >90 >60 mL/min/1.73m2    Calcium 7.8 (L) 8.8 - 10.2 mg/dL    Glucose 88 70 - 99 mg/dL   CBC with platelets    Collection Time: 10/14/23  5:58 AM   Result Value Ref Range    WBC Count 8.7 4.0 - 11.0 10e3/uL    RBC Count 2.61 (L) 3.80 - 5.20 10e6/uL    Hemoglobin 8.4 (L) 11.7 - 15.7 g/dL    Hematocrit 26.1 (L) 35.0 - 47.0 %     78 - 100 fL    MCH 32.2 26.5 - 33.0 pg    MCHC 32.2 31.5 - 36.5 g/dL    RDW 15.6 (H) 10.0 - 15.0 %    Platelet Count 212 150 - 450 10e3/uL   Potassium    Collection Time: 10/14/23  2:20 PM   Result Value Ref Range    Potassium 3.4 3.4 - 5.3 mmol/L         Assessment: 67 year old female stage IVB SCC of the vagina who presented for worsening abdominal pain/nausea/vomiting/diarrhea with recurrent C Diff. Gradual improvement with treatments guided by GI and ID teams.    Plan:      # Stage IVB SCC of the Vagina   - Completed Cis-rt and S/p HDR brachytherapy last admission  - Pain:  Current PTA includes tylenol and 5 mg oxycodone q4 hours with prn 5mg doses  - Palliative following    #s/p chronic gonsalez  #Cystitis, complex  - s/p 3D bactrim, discontinued per ID.   - afebrile overnight    # Recurrent C. Difficile  - Diagnosed with C. Difficile during 9/8/23 admission, completed initial PO vancomycin treatment course (125 mg QID x14d)  - PTA zofran and scheduled antiemetic panel ordered.  - anti-diarrheals held  - Serial imaging not concerning for toxic megacolon to date  - Now on D#9/14 PO Vancomycin 500mg QID QID> Taper: Continue oral vancomycin 500mg every 6 hours until Oct 20, 2023, then 125mg 2x a day until Oct 27, 2023, then once a day until Nov 3, 2023, then 3x a  week until Nov 30, 2023.  - ID added IV metronidazole given severe/resistant case, now D#3. Consider discontinuing this pending clinical stability, followed by inpatient observation for continued stability for at least an additional day following discontinuation.   - s/p flexible sigmoidoscopy w/ pseudomembranes, biopsies and cultures pending    # Anemia:   - Chronic anemia secondary to recent chemoradiation therapy, stable s/p transfusion of a total of 2u pRBC this admission    # Chronic hypertension   # Hyperlipidemia   - BP normal  - PTA amlodpine continued       # Pulmonary nodules   # Low normal O2 sats on admission   - CTAP notable for stable 3 mm R middle lobe pulm nodule, R lower lobe calcified granuloma.   - Sating at 92-94% on admission, but does not appear to be short of breath   - supplemental O2 ordered for O2 sats < 92%.    # PPX  - Lovenox 40 mg daily during hospitalization  - [place] SCDs     Sun Levin MD, PGY-3  6:50 AM  [facility] Obstetric[facility] Gynecology Residency  Gyn Onc Pager: [phone]

## 2023-10-15 NOTE — PLAN OF CARE
Patient continues on enteric precautions, reported appx 4 BM's this shift, voiding without problems. Scheduled Oxycodone is managing pain. Continues on IV Flagyl, got a fluid bolus today, Mag replaced. No nausea reported, ate some apples and peanut butter, spouse was bringing in some takeout food.

## 2023-10-15 NOTE — PLAN OF CARE
"[id number]    /82 (BP Location: Right arm)   Pulse 92   Temp 97.6  F (36.4  C) (Oral)   Resp 16   Ht 1.651 m (5' 5\")   Wt 92.1 kg (203 lb 1.6 oz)   LMP 03/08/2008   SpO2 96%   BMI 33.80 kg/m      Afebrile, AVSS on RA. Pain man aged with scheduled oxycodone and heat packs. Compazine given for nausea x1. Denied SOB. UAL to commode. Fair PO intake. No difficulties urinating. LBM 10/14. Continue with POC.        "

## 2023-10-16 LAB
ANION GAP SERPL CALCULATED.3IONS-SCNC: 7 MMOL/L (ref 7–15)
BACTERIA BLD CULT: NO GROWTH
BACTERIA BLD CULT: NO GROWTH
BACTERIA TISS BX CULT: ABNORMAL
BUN SERPL-MCNC: <1.4 MG/DL (ref 8–23)
CALCIUM SERPL-MCNC: 8 MG/DL (ref 8.8–10.2)
CHLORIDE SERPL-SCNC: 105 MMOL/L (ref 98–107)
CREAT SERPL-MCNC: 0.66 MG/DL (ref 0.51–0.95)
DEPRECATED HCO3 PLAS-SCNC: 27 MMOL/L (ref 22–29)
EGFRCR SERPLBLD CKD-EPI 2021: >90 ML/MIN/1.73M2
ERYTHROCYTE [DISTWIDTH] IN BLOOD BY AUTOMATED COUNT: 15.9 % (ref 10–15)
GLUCOSE SERPL-MCNC: 106 MG/DL (ref 70–99)
HCT VFR BLD AUTO: 26 % (ref 35–47)
HGB BLD-MCNC: 8.4 G/DL (ref 11.7–15.7)
MAGNESIUM SERPL-MCNC: 1.7 MG/DL (ref 1.7–2.3)
MCH RBC QN AUTO: 32.6 PG (ref 26.5–33)
MCHC RBC AUTO-ENTMCNC: 32.3 G/DL (ref 31.5–36.5)
MCV RBC AUTO: 101 FL (ref 78–100)
PATH REPORT.ADDENDUM SPEC: NORMAL
PATH REPORT.COMMENTS IMP SPEC: NORMAL
PATH REPORT.FINAL DX SPEC: NORMAL
PATH REPORT.GROSS SPEC: NORMAL
PATH REPORT.MICROSCOPIC SPEC OTHER STN: NORMAL
PATH REPORT.RELEVANT HX SPEC: NORMAL
PHOSPHATE SERPL-MCNC: 3.4 MG/DL (ref 2.5–4.5)
PHOTO IMAGE: NORMAL
PLATELET # BLD AUTO: 215 10E3/UL (ref 150–450)
POTASSIUM SERPL-SCNC: 3.2 MMOL/L (ref 3.4–5.3)
POTASSIUM SERPL-SCNC: 3.5 MMOL/L (ref 3.4–5.3)
RBC # BLD AUTO: 2.58 10E6/UL (ref 3.8–5.2)
SODIUM SERPL-SCNC: 139 MMOL/L (ref 135–145)
WBC # BLD AUTO: 4.7 10E3/UL (ref 4–11)

## 2023-10-16 PROCEDURE — 99233 SBSQ HOSP IP/OBS HIGH 50: CPT | Performed by: INTERNAL MEDICINE

## 2023-10-16 PROCEDURE — 99232 SBSQ HOSP IP/OBS MODERATE 35: CPT | Mod: GC | Performed by: OBSTETRICS & GYNECOLOGY

## 2023-10-16 PROCEDURE — 80048 BASIC METABOLIC PNL TOTAL CA: CPT | Performed by: STUDENT IN AN ORGANIZED HEALTH CARE EDUCATION/TRAINING PROGRAM

## 2023-10-16 PROCEDURE — 84100 ASSAY OF PHOSPHORUS: CPT | Performed by: STUDENT IN AN ORGANIZED HEALTH CARE EDUCATION/TRAINING PROGRAM

## 2023-10-16 PROCEDURE — 83735 ASSAY OF MAGNESIUM: CPT | Performed by: STUDENT IN AN ORGANIZED HEALTH CARE EDUCATION/TRAINING PROGRAM

## 2023-10-16 PROCEDURE — 250N000013 HC RX MED GY IP 250 OP 250 PS 637

## 2023-10-16 PROCEDURE — 84132 ASSAY OF SERUM POTASSIUM: CPT | Performed by: OBSTETRICS & GYNECOLOGY

## 2023-10-16 PROCEDURE — 250N000013 HC RX MED GY IP 250 OP 250 PS 637: Performed by: INTERNAL MEDICINE

## 2023-10-16 PROCEDURE — 250N000011 HC RX IP 250 OP 636: Mod: JZ

## 2023-10-16 PROCEDURE — 250N000013 HC RX MED GY IP 250 OP 250 PS 637: Performed by: FAMILY MEDICINE

## 2023-10-16 PROCEDURE — 85027 COMPLETE CBC AUTOMATED: CPT | Performed by: STUDENT IN AN ORGANIZED HEALTH CARE EDUCATION/TRAINING PROGRAM

## 2023-10-16 PROCEDURE — 120N000002 HC R&B MED SURG/OB UMMC

## 2023-10-16 PROCEDURE — 36415 COLL VENOUS BLD VENIPUNCTURE: CPT | Performed by: STUDENT IN AN ORGANIZED HEALTH CARE EDUCATION/TRAINING PROGRAM

## 2023-10-16 PROCEDURE — 36415 COLL VENOUS BLD VENIPUNCTURE: CPT | Performed by: OBSTETRICS & GYNECOLOGY

## 2023-10-16 PROCEDURE — 250N000011 HC RX IP 250 OP 636: Mod: JZ | Performed by: OBSTETRICS & GYNECOLOGY

## 2023-10-16 RX ORDER — VANCOMYCIN HYDROCHLORIDE 125 MG/1
125 CAPSULE ORAL 4 TIMES DAILY
Status: DISCONTINUED | OUTPATIENT
Start: 2023-10-16 | End: 2023-10-17 | Stop reason: HOSPADM

## 2023-10-16 RX ORDER — POTASSIUM CHLORIDE 7.45 MG/ML
10 INJECTION INTRAVENOUS
Status: COMPLETED | OUTPATIENT
Start: 2023-10-16 | End: 2023-10-16

## 2023-10-16 RX ORDER — LABETALOL HYDROCHLORIDE 5 MG/ML
20 INJECTION, SOLUTION INTRAVENOUS ONCE
Status: DISCONTINUED | OUTPATIENT
Start: 2023-10-16 | End: 2023-10-17 | Stop reason: HOSPADM

## 2023-10-16 RX ADMIN — POTASSIUM CHLORIDE 10 MEQ: 7.46 INJECTION, SOLUTION INTRAVENOUS at 10:19

## 2023-10-16 RX ADMIN — VANCOMYCIN HYDROCHLORIDE 125 MG: 125 CAPSULE ORAL at 17:38

## 2023-10-16 RX ADMIN — POTASSIUM CHLORIDE 10 MEQ: 7.46 INJECTION, SOLUTION INTRAVENOUS at 11:37

## 2023-10-16 RX ADMIN — OXYCODONE HYDROCHLORIDE 5 MG: 5 TABLET ORAL at 11:35

## 2023-10-16 RX ADMIN — POTASSIUM CHLORIDE 10 MEQ: 7.46 INJECTION, SOLUTION INTRAVENOUS at 09:12

## 2023-10-16 RX ADMIN — TOLTERODINE TARTRATE 2 MG: 2 TABLET, FILM COATED ORAL at 20:00

## 2023-10-16 RX ADMIN — MAGNESIUM 64 MG (MAGNESIUM CHLORIDE) TABLET,DELAYED RELEASE 535 MG: at 07:51

## 2023-10-16 RX ADMIN — POTASSIUM CHLORIDE 10 MEQ: 7.46 INJECTION, SOLUTION INTRAVENOUS at 07:53

## 2023-10-16 RX ADMIN — ENOXAPARIN SODIUM 40 MG: 40 INJECTION SUBCUTANEOUS at 07:51

## 2023-10-16 RX ADMIN — OXYCODONE HYDROCHLORIDE 5 MG: 5 TABLET ORAL at 15:25

## 2023-10-16 RX ADMIN — TOLTERODINE TARTRATE 2 MG: 2 TABLET, FILM COATED ORAL at 07:51

## 2023-10-16 RX ADMIN — OXYCODONE HYDROCHLORIDE 5 MG: 5 TABLET ORAL at 07:50

## 2023-10-16 RX ADMIN — AMLODIPINE BESYLATE 5 MG: 5 TABLET ORAL at 07:51

## 2023-10-16 RX ADMIN — OXYCODONE HYDROCHLORIDE 5 MG: 5 TABLET ORAL at 20:00

## 2023-10-16 RX ADMIN — VANCOMYCIN HYDROCHLORIDE 125 MG: 125 CAPSULE ORAL at 12:51

## 2023-10-16 RX ADMIN — ONDANSETRON 4 MG: 2 INJECTION INTRAMUSCULAR; INTRAVENOUS at 08:45

## 2023-10-16 RX ADMIN — VANCOMYCIN HYDROCHLORIDE 500 MG: 125 CAPSULE ORAL at 05:45

## 2023-10-16 RX ADMIN — OXYCODONE HYDROCHLORIDE 5 MG: 5 TABLET ORAL at 03:51

## 2023-10-16 ASSESSMENT — ACTIVITIES OF DAILY LIVING (ADL)
ADLS_ACUITY_SCORE: 30
ADLS_ACUITY_SCORE: 29
ADLS_ACUITY_SCORE: 30
ADLS_ACUITY_SCORE: 29
ADLS_ACUITY_SCORE: 30
ADLS_ACUITY_SCORE: 29

## 2023-10-16 NOTE — PLAN OF CARE
Patient only had 1 BM overnight, none since, remains on enteric precautions, Vanco dose reduced by ID today. 1 emesis early this morning, got zofran, no nausea since. IV antibiotic discontinued. Possible discharge tomorrow if does well tonight. Potassium replaced per electrolyte protocol. Oxycodone is managing pain.

## 2023-10-16 NOTE — PLAN OF CARE
[id number]. VSS. Pain managed with scheduled oxycodone. Denies nausea. On scheduled zyprexa. Voiding spontaneously and one large BM this evening. IV flagyl held after evening dose. Per primary team- will hold off for the next 24 hours and see how patient does. Ambulating independently.Mg re-check 2.2. PIV SL. Continue POC.

## 2023-10-16 NOTE — PROGRESS NOTES
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 S/p Cis-Rt and HDR brachy x5 (9/20/23).     24 hour events:   - Decreased vanc due to nausea    Subjective: Patient is doing well this morning. Had an episode of emesis yesterday AM, none overnight. She has had 2 total, small volume bowel movements over the past 24h. Pain is controlled. Continuing to tolerate PO without vomiting.    Objective:   Patient Vitals for the past 24 hrs:   BP Temp Temp src Pulse Resp SpO2   10/17/23 0436 128/67 98.8  F (37.1  C) Oral 85 14 95 %   10/17/23 0010 (!) 150/78 98.5  F (36.9  C) Oral 97 14 95 %   10/16/23 2007 (!) 158/65 98.1  F (36.7  C) Oral 91 18 95 %   10/16/23 1731 (!) 150/65 98.1  F (36.7  C) Oral 88 18 96 %   10/16/23 1322 138/61 98.2  F (36.8  C) Oral 90 18 95 %   10/16/23 0929 (!) 141/75 97.9  F (36.6  C) Oral 95 18 96 %       General: NAD, appears comfortable in bed  CV: normal HR  Resp: non-labored breathing  Abdomen: soft, tender, non distended  Extremities: warm, well-perfused, nontender, trace edema  Lines/Drains: PIV    Intake/Output (yesterday // since 0000):  PO: NR // 480 mL  UOP: 2800 mL // NR  Stool: NR      Recent Results (from the past 24 hour(s))   Potassium    Collection Time: 10/16/23  2:27 PM   Result Value Ref Range    Potassium 3.5 3.4 - 5.3 mmol/L         Assessment: 67 year old female stage IVB SCC of the vagina who presented for worsening abdominal pain/nausea/vomiting/diarrhea with recurrent C Diff. Gradual improvement with treatments guided by GI and ID teams.    # Recurrent C. Difficile  - Diagnosed with C. Difficile during 9/8/23 admission, completed initial PO vancomycin treatment course (125 mg QID x14d)  - PTA zofran and scheduled antiemetic panel ordered.  - anti-diarrheals held  - Serial imaging not concerning for toxic megacolon to date  - Now on D#11/14 PO Vancomycin 500mg QID QID>  Taper: now on 125mg 2x a day until Oct 27, then once a day until Nov 3, 2023, then 3x a week until Nov 30, 2023.  - s/p 3d IV metronidazole given severe/resistant case. Observing off flagyl.   - s/p flexible sigmoidoscopy w/ pseudomembranes, cultures with C perfringens, E coli and Citrobacter koseri  - If patient stable for 24h off flagyl, may consider discharge. Reassuring that patient has had no further episodes of emesis and bowel movements have been small and infrequent. ID had decreased vanc dose yesterday, suspect episode of emesis due to vancomycin.      #Cystitis, complex  - s/p 3D bactrim, discontinued per ID.     # Stage IVB SCC of the Vagina   - Completed Cis-rt and S/p HDR brachytherapy last admission  - PTA tylenol and 5 mg oxycodone q4 hours with prn 5mg doses  - Palliative following, appreciate recs    # Anemia:   - Chronic anemia secondary to recent chemoradiation therapy,   - Hgb 8.1>>6.1> 1u pRBC>> 7.2>1u pRBC>>9.6>> 8.4>9.0, stable    # Hypertension   - PTA amlodpine    # Hyperlipidemia   - No PTA meds      # Pulmonary nodules   # Low normal O2 sats on admission   - CTAP notable for stable 3 mm R middle lobe pulm nodule, R lower lobe calcified granuloma.   - Sating at 92-94% on admission, but does not appear to be short of breath   - supplemental O2 ordered for O2 sats < 92%.    # Inpatient status  - Ppx: Lovenox 40 mg daily, SCDs  - Regular diet  - Dispo: Possibly 10/17    Valerio Rios MD - PGY4  Gynecologic Oncology Resident  5:39 AM 10/17/2023  Gyn Onc Pager: [phone]      Gynecologic Oncology Attending Attestation  I have seen the patient on rounds with the team. Patient is feeling very well. Monitor until ~48hrs off Flagyl then hopefully discharge later today if continues to feel well.  I have discussed the patient and plan of care with the resident as documented in the note above, which I have edited as necessary.  Valerio Ham MD

## 2023-10-16 NOTE — PROVIDER NOTIFICATION
GYN Resident pager 1st call.    [phone] Jessie MATHIAS    pt BP is in the 140s. recent one is 149/81. there is no vital signs parameters, could one be placed?    Thanks. MARCELLO I.samantha [phone]

## 2023-10-16 NOTE — PLAN OF CARE
"Goal Outcome Evaluation:    Time    BP (!) 149/81 (BP Location: Right arm)   Pulse 91   Temp 97.8  F (36.6  C) (Oral)   Resp 18   Ht 1.651 m (5' 5\")   Wt 92.1 kg (203 lb 1.6 oz)   LMP 03/08/2008   SpO2 95%   BMI 33.80 kg/m      Reason for admission: worsening abdominal pain/nausea/vomiting/diarrhea with recurrent C Diff.   Activity: UAL  Pain: 4/10 Ab pain, managed with Q4 Oxy scheduled.  Neuro: Alert and oriented. Denies dizziness  Cardiac: elevated BP denies chest pain. One time labetalol fir BP > 160/100  Respiratory: denies SOB  GI/: LBM 10/16, slightly loose, more of flatus per pt report, voiding spontaneously.  Diet: regular  Lines: PIV, SL, Cdi  Wounds:   Labs/imaging: please review lab in the chart      New changes this shift:     Plan:       Continue to monitor and follow POC      "

## 2023-10-16 NOTE — PROGRESS NOTES
General ID Service: Follow-up Note      Patient:  Jessie Tamayo, Date of birth 1955, Medical record number [id number]  Date of Visit:  October 9, 2023  Reason for consult: 1st recurrence C Diff         Assessment and Recommendations:     IMPRESSION:  Clostridium difficile colitis, 1st recurrence   Chronic indwelling gonsalez catheter with bacteriuria, possible cystitis    Stage IVB vaginal cancer s/p chemo/rads (completed therapy and under observation)  Nausea, vomiting possibly due to high dose oral vancomycin     RECOMMENDATION:  Reduce dose of oral vancomycin to 125mg every 6 hours. Continue until Oct 20, 2023, then 125mg 2x a day until Oct 27, 2023, then once a day until Nov 3, 2023, then 3x a week until Nov 30, 2023.  Stop metronidazole.     Physician Attestation   I, JUANITA LONDON MD, was present with the medical student Diego Suggs who participated in the service and in the documentation of the note.  I have verified the history and personally performed the physical exam and medical decision making.  I agree with the assessment and plan of care as documented in the note.      Prado findings:   Jessie Tamayo is a 67 year old female with stage 4 vaginal cancer, s/p chemorad. She has been diagnosed with C diff in August and completed it but it has recurred. She has persistent fevers and worsening diarrhea even while on treatment for C. Diff. We increased the oral vancomycin dosing and added metronidazole due to persistent symptoms. TMP-SMX was added to cover for another bacterial process for her colitis and possible cystitis.    Since her diarrhea has improved, we can reduce the oral vancomycin dose as her nausea might be related to high dose of vancomycin.     JUANITA LONDON MD  Date of Service (when I saw the patient): 10/16/23    MDM: >3 notes and tests reviewed, discussed with primary team, life threatening.         Interval History:   Improved tolerance of food and liquid  diet, still vomiting occasionally but improved. Overnight improved stools (one large bowel movement soft). No fevers.         Current Antimicrobials   Vancomycin oral 10/8->  Ceftriaxone 10/9  Bactrim 10/11-10/13  Metronidazole 10/13->10/16         Physical Exam:   Ranges for vital signs:  Temp:  [97.8  F (36.6  C)-98.5  F (36.9  C)] 97.9  F (36.6  C)  Pulse:  [87-96] 95  Resp:  [18-20] 18  BP: (136-149)/(55-81) 141/75  SpO2:  [95 %-96 %] 96 %    Intake/Output Summary (Last 24 hours) at 10/9/2023 1416  Last data filed at 10/9/2023 1300  Gross per 24 hour   Intake 2288.75 ml   Output 3000 ml   Net -711.25 ml     Exam:  LUNGS:  Normal Respiratory Effort. Clear to auscultation.  CARDIOVASCULAR:  Regular rate and rhythm with no murmurs,  ABDOMEN:  Non-distended, soft, generalized tenderness.  PSYCH: Normal mentation and affect  SKIN: No rashes lesions  HEENT: Normocephalic, atraumatic, mucous membranes moist and without erythema          Laboratory Data:   WBC 12.8  Estimated Creatinine Clearance: 92.7 mL/min (based on SCr of 0.66 mg/dL).    MICRO:  10/11 BCx- no growth  10/11 RPP - neg   10/7 C diff - triple positive   10/8 Ucx - Serratia marcescens

## 2023-10-16 NOTE — PROGRESS NOTES
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 S/p Cis-Rt and HDR brachy x5 (9/20/23).     24 hour events:   - Stopped flagyl    Subjective: Patient is doing well this morning. Pain is controlled. Continuing to tolerate PO without vomiting. Nausea is improved. She endorses a single soft bowel movement overnight.     Objective:   Patient Vitals for the past 24 hrs:   BP Temp Temp src Pulse Resp SpO2   10/16/23 0350 (!) 149/81 97.8  F (36.6  C) Oral 91 18 95 %   10/15/23 2342 (!) 142/55 97.9  F (36.6  C) Oral 87 18 95 %   10/15/23 2037 136/65 98.5  F (36.9  C) Oral 96 20 95 %   10/15/23 1539 139/69 98.5  F (36.9  C) Oral 87 20 95 %   10/15/23 0903 128/75 98  F (36.7  C) Oral 86 17 96 %       General: NAD, appears comfortable in bed  CV: normal HR  Resp: non-labored breathing  Abdomen: soft, tender, non distended  Extremities: warm, well-perfused, nontender, trace edema  Lines/Drains: PIV    Intake/Output (yesterday // since 0000):  PO: 375 mL // NR  UOP: 400 mL // 600 mL  Stool: 1000 mL // NR       Recent Results (from the past 24 hour(s))   Magnesium    Collection Time: 10/15/23  4:11 PM   Result Value Ref Range    Magnesium 2.2 1.7 - 2.3 mg/dL   CBC with platelets    Collection Time: 10/16/23  5:35 AM   Result Value Ref Range    WBC Count 4.7 4.0 - 11.0 10e3/uL    RBC Count 2.58 (L) 3.80 - 5.20 10e6/uL    Hemoglobin 8.4 (L) 11.7 - 15.7 g/dL    Hematocrit 26.0 (L) 35.0 - 47.0 %     (H) 78 - 100 fL    MCH 32.6 26.5 - 33.0 pg    MCHC 32.3 31.5 - 36.5 g/dL    RDW 15.9 (H) 10.0 - 15.0 %    Platelet Count 215 150 - 450 10e3/uL         Assessment: 67 year old female stage IVB SCC of the vagina who presented for worsening abdominal pain/nausea/vomiting/diarrhea with recurrent C Diff. Gradual improvement with treatments guided by GI and ID teams.    # Recurrent C. Difficile  - Diagnosed with C. Difficile during  9/8/23 admission, completed initial PO vancomycin treatment course (125 mg QID x14d)  - PTA zofran and scheduled antiemetic panel ordered.  - anti-diarrheals held  - Serial imaging not concerning for toxic megacolon to date  - Now on D#10/14 PO Vancomycin 500mg QID QID> Taper: Continue oral vancomycin 500mg every 6 hours until Oct 20, 2023, then 125mg 2x a day until Oct 27, 2023, then once a day until Nov 3, 2023, then 3x a week until Nov 30, 2023.  - s/p 3d IV metronidazole given severe/resistant case. Will observe for 24h off flagyl.  - s/p flexible sigmoidoscopy w/ pseudomembranes, biopsies and cultures pending  - If patient stable for 24h off flagyl, may consider discharge.     #s/p chronic gonsalez  #Cystitis, complex  - s/p 3D bactrim, discontinued per ID.   - afebrile overnight    # Stage IVB SCC of the Vagina   - Completed Cis-rt and S/p HDR brachytherapy last admission  - PTA tylenol and 5 mg oxycodone q4 hours with prn 5mg doses  - Palliative following, appreciate recs    # Anemia:   - Chronic anemia secondary to recent chemoradiation therapy,   - Hgb 8.1>>6.1> 1u pRBC>> 7.2>1u pRBC>>9.6>> 8.4>9.0, stable    # Hypertension   - PTA amlodpine    # Hyperlipidemia   - No PTA meds      # Pulmonary nodules   # Low normal O2 sats on admission   - CTAP notable for stable 3 mm R middle lobe pulm nodule, R lower lobe calcified granuloma.   - Sating at 92-94% on admission, but does not appear to be short of breath   - supplemental O2 ordered for O2 sats < 92%.    # Inpatient status  - Ppx: Lovenox 40 mg daily, SCDs  - Regular diet  - Dispo: Possibly 10/17    Valerio Rios MD - PGY4  Gynecologic Oncology Resident  6:14 AM 10/16/2023  Gyn Onc Pager: [phone]      I have seen and examined the patient.  I have reviewed and edited Dr. Rios's note above.  Jessie reports she just had an episode of emesis. Will monitor--if nausea/vomiting continues, will d/w ID re: restarting IV flagyl.  Discharge pending management  of emesis and diarrhea.   Clinic follow-up with repeat PET/CT in 2 months in progress.    Reina Stahl MD, MS, FACOG, FACS  10/16/2023  9:16 AM

## 2023-10-16 NOTE — PROGRESS NOTES
GASTROENTEROLOGY PROGRESS NOTE    ASSESSMENT:  68 y/o female with pmhx notable for stage IVB SCC of the vagina s/p Cis-Rt & HDR brachyx5, HTN, HLD, chronic anemia & prior hx of C. Difficile who was admitted 10/7 with nausea, vomiting and diarrhea, found to have recurrent C. Diff.         #C. Diff (1st recurrence)  #C. Perfingrens on colonic biopsy.  #diarrhea  #nausea, vomiting  Patient has had two recent hospitalizations (9/8-9/13) & (9/18-9/21). During first hospitalization she was diagnosed with C.diff and completed 10 day course of vancomycin with complete resolution of symptoms. She returned to ER 10/7 with diarrhea, nausea, vomiting and abdominal pain--C. Diff positive by toxin, antigen & PCR. Reports this feels similar to her prior episode of C.diff. Baseline BM 1 formed stool daily in the AM prior to dx of cancer. No new medications.  Last underwent radiation a few weeks ago. Denies hematemesis, hematochezia or melena.      Colonoscopy 2017 with 2 polyps removed, normal terminal ileum. CT A/P from this admission with bowel wall thickening and surrounding inflammation involving rectum and sigmoid colon concerning for colitis. XR Abdomen with no evidence of toxic megacolon & mildly dilated small and large bowel with no evidence of obstruction. She has been intermittently febrile (101.3, 102.3). Enteric panel negative.      ID following, increased vancomycin to 500mg q6h and bactrim to cover for possible other bacterial etiology/cystitis 10/11.     10/13: Mildly tachycardic overnight, WBC increasing (13.9), afebrile. Continues to have diarrhea, nausea. Not tolerating po intake. Ongoing symptoms likely related to ongoing C.diff infection, abx associated diarrhea, radiation proctitis possible, though unlikely.     RECOMMENDATIONS:  Complete Vanc Taper: Continue oral vancomycin 500mg every 6 hours until Oct 20, 2023, then 125mg 2x a day until Oct 27, 2023, then once a day until Nov 3, 2023, then 3x a week until  "Nov 30, 2023.     -- Inpatient GI consults service will sign off. No further recommendations at this time. If primary team has addition questions, please page consult fellow listed in Samir. Patient to Follow-up in the ID and C.difficile clinic (may coordinate this by placing new GI referral and contacting  General GI clinic - ([phone])     Current GI Consult Staff: Dr. Marcell Christiansen MD  PGY-4 Gastroenterology Fellow   [phone]   _______________________________________________________________  Last 24h:    S: NAEO. Patient with less abdominal pain, had a semi-solid BM overnight.    O:  Blood pressure (!) 149/81, pulse 91, temperature 97.8  F (36.6  C), temperature source Oral, resp. rate 18, height 1.651 m (5' 5\"), weight 92.1 kg (203 lb 1.6 oz), last menstrual period 03/08/2008, SpO2 95%, not currently breastfeeding.    Gen:No apparent distress  HEENT: No icterus  CV: Normal rate  Lungs: Comfortable on room air  Abd: Non-distended  Skin: No jaundice  Neuro: Moves all extremities  Psych: Normal affect      LABS:  BMP  Recent Labs   Lab 10/16/23  0535 10/15/23  0550 10/14/23  7749 10/14/23  1420 10/14/23  0558 10/13/23  0542    135  --   --  132* 132*   POTASSIUM 3.2* 3.6 3.8 3.4 3.1* 3.5   CHLORIDE 105 101  --   --  102 98   KIM 8.0* 8.0*  --   --  7.8* 7.8*   CO2 27 24  --   --  22 22   BUN <1.4* 1.7*  --   --  4.2* 7.3*   CR 0.66 0.66  --   --  0.72 0.78   * 102*  --   --  88 85     CBC  Recent Labs   Lab 10/16/23  0535 10/15/23  0550 10/14/23  0558 10/13/23  0542   WBC 4.7 6.7 8.7 13.9*   RBC 2.58* 2.79* 2.61* 2.69*   HGB 8.4* 9.0* 8.4* 8.8*   HCT 26.0* 28.6* 26.1* 27.1*   * 103* 100 101*   MCH 32.6 32.3 32.2 32.7   MCHC 32.3 31.5 32.2 32.5   RDW 15.9* 15.5* 15.6* 15.9*    250 212 201     INR  Recent Labs   Lab 10/13/23  0542   INR 1.38*     LFTs  Recent Labs   Lab 10/10/23  0543   ALKPHOS 59   AST 14   ALT 5   BILITOTAL 0.2   PROTTOTAL 4.7*   ALBUMIN 2.5*    "   PANCREAS ENZYMESNo lab results found in last 7 days.    IMAGING:    ENDOSCOPY: Flex sig 10/7 Findings:       Pseudomembranes were found in the recto-sigmoid colon between 20 and 30        cm (extent of exam) in a diffuse distribution. Small patches of        pseudomembranes were seen with normal intervening mucosa in the rectum        (up to 20 cm from the anal verge).        No bleeding seen. Extensive biopsies were taken with a cold forceps for        histology and tissue culture (stat).        The perianal and digital rectal examinations were normal.                                                                                    Moderate Sedation:        Moderate (conscious) sedation was administered by the nurse and        supervised by the endoscopist. The patient's oxygen saturation, heart        rate, blood pressure and response to care were monitored. Total        physician intraservice time was 23 minutes.   Impression:            - Pseudomembranous enterocolitis seen. Most likely due                          to known C.diff infection. Stat biopsies obtained to                          rule out other infectious and non-infectious causes   Recommendation:        - Resume regular diet.                          - Await pathology results.                          - Work with ID to effectively treat C.diff.                          - GI inpatient team will continue to follow.

## 2023-10-17 ENCOUNTER — APPOINTMENT (OUTPATIENT)
Dept: PHYSICAL THERAPY | Facility: CLINIC | Age: 68
DRG: 372 | End: 2023-10-17
Payer: COMMERCIAL

## 2023-10-17 VITALS
HEART RATE: 93 BPM | RESPIRATION RATE: 16 BRPM | HEIGHT: 65 IN | SYSTOLIC BLOOD PRESSURE: 145 MMHG | TEMPERATURE: 98 F | BODY MASS INDEX: 33.84 KG/M2 | DIASTOLIC BLOOD PRESSURE: 81 MMHG | OXYGEN SATURATION: 94 % | WEIGHT: 203.1 LBS

## 2023-10-17 LAB
ALBUMIN SERPL BCG-MCNC: 2.9 G/DL (ref 3.5–5.2)
ALP SERPL-CCNC: 54 U/L (ref 35–104)
ALT SERPL W P-5'-P-CCNC: 24 U/L (ref 0–50)
ANION GAP SERPL CALCULATED.3IONS-SCNC: 7 MMOL/L (ref 7–15)
AST SERPL W P-5'-P-CCNC: 52 U/L (ref 0–45)
BILIRUB SERPL-MCNC: 0.2 MG/DL
BUN SERPL-MCNC: 2.4 MG/DL (ref 8–23)
CALCIUM SERPL-MCNC: 8.7 MG/DL (ref 8.8–10.2)
CHLORIDE SERPL-SCNC: 102 MMOL/L (ref 98–107)
CREAT SERPL-MCNC: 0.81 MG/DL (ref 0.51–0.95)
DEPRECATED HCO3 PLAS-SCNC: 29 MMOL/L (ref 22–29)
EGFRCR SERPLBLD CKD-EPI 2021: 79 ML/MIN/1.73M2
ERYTHROCYTE [DISTWIDTH] IN BLOOD BY AUTOMATED COUNT: 15.9 % (ref 10–15)
GLUCOSE SERPL-MCNC: 103 MG/DL (ref 70–99)
HCT VFR BLD AUTO: 28.5 % (ref 35–47)
HGB BLD-MCNC: 8.9 G/DL (ref 11.7–15.7)
MCH RBC QN AUTO: 32.5 PG (ref 26.5–33)
MCHC RBC AUTO-ENTMCNC: 31.2 G/DL (ref 31.5–36.5)
MCV RBC AUTO: 104 FL (ref 78–100)
PLATELET # BLD AUTO: 188 10E3/UL (ref 150–450)
POTASSIUM SERPL-SCNC: 3.8 MMOL/L (ref 3.4–5.3)
PROT SERPL-MCNC: 5.1 G/DL (ref 6.4–8.3)
RBC # BLD AUTO: 2.74 10E6/UL (ref 3.8–5.2)
SODIUM SERPL-SCNC: 138 MMOL/L (ref 135–145)
WBC # BLD AUTO: 4.6 10E3/UL (ref 4–11)

## 2023-10-17 PROCEDURE — 85041 AUTOMATED RBC COUNT: CPT | Performed by: STUDENT IN AN ORGANIZED HEALTH CARE EDUCATION/TRAINING PROGRAM

## 2023-10-17 PROCEDURE — 250N000013 HC RX MED GY IP 250 OP 250 PS 637

## 2023-10-17 PROCEDURE — 250N000011 HC RX IP 250 OP 636

## 2023-10-17 PROCEDURE — 97530 THERAPEUTIC ACTIVITIES: CPT | Mod: GP | Performed by: REHABILITATION PRACTITIONER

## 2023-10-17 PROCEDURE — 250N000013 HC RX MED GY IP 250 OP 250 PS 637: Performed by: FAMILY MEDICINE

## 2023-10-17 PROCEDURE — 36415 COLL VENOUS BLD VENIPUNCTURE: CPT | Performed by: STUDENT IN AN ORGANIZED HEALTH CARE EDUCATION/TRAINING PROGRAM

## 2023-10-17 PROCEDURE — 99232 SBSQ HOSP IP/OBS MODERATE 35: CPT | Mod: GC | Performed by: OBSTETRICS & GYNECOLOGY

## 2023-10-17 PROCEDURE — 250N000011 HC RX IP 250 OP 636: Mod: JZ | Performed by: OBSTETRICS & GYNECOLOGY

## 2023-10-17 PROCEDURE — 99232 SBSQ HOSP IP/OBS MODERATE 35: CPT | Performed by: INTERNAL MEDICINE

## 2023-10-17 PROCEDURE — 80053 COMPREHEN METABOLIC PANEL: CPT | Performed by: STUDENT IN AN ORGANIZED HEALTH CARE EDUCATION/TRAINING PROGRAM

## 2023-10-17 RX ORDER — VANCOMYCIN HYDROCHLORIDE 125 MG/1
CAPSULE ORAL
Qty: 49 CAPSULE | Refills: 0 | Status: SHIPPED | OUTPATIENT
Start: 2023-10-17 | End: 2023-10-26

## 2023-10-17 RX ORDER — IBUPROFEN 400 MG/1
400 TABLET, FILM COATED ORAL EVERY 6 HOURS PRN
COMMUNITY
Start: 2023-10-17 | End: 2024-02-22

## 2023-10-17 RX ADMIN — OXYCODONE HYDROCHLORIDE 5 MG: 5 TABLET ORAL at 00:07

## 2023-10-17 RX ADMIN — ENOXAPARIN SODIUM 40 MG: 40 INJECTION SUBCUTANEOUS at 08:17

## 2023-10-17 RX ADMIN — VANCOMYCIN HYDROCHLORIDE 125 MG: 125 CAPSULE ORAL at 00:07

## 2023-10-17 RX ADMIN — TOLTERODINE TARTRATE 2 MG: 2 TABLET, FILM COATED ORAL at 08:17

## 2023-10-17 RX ADMIN — MAGNESIUM 64 MG (MAGNESIUM CHLORIDE) TABLET,DELAYED RELEASE 535 MG: at 08:17

## 2023-10-17 RX ADMIN — ONDANSETRON 4 MG: 4 TABLET, ORALLY DISINTEGRATING ORAL at 06:57

## 2023-10-17 RX ADMIN — VANCOMYCIN HYDROCHLORIDE 125 MG: 125 CAPSULE ORAL at 05:07

## 2023-10-17 RX ADMIN — OXYCODONE HYDROCHLORIDE 5 MG: 5 TABLET ORAL at 11:46

## 2023-10-17 RX ADMIN — OXYCODONE HYDROCHLORIDE 5 MG: 5 TABLET ORAL at 05:02

## 2023-10-17 RX ADMIN — OXYCODONE HYDROCHLORIDE 5 MG: 5 TABLET ORAL at 08:18

## 2023-10-17 RX ADMIN — AMLODIPINE BESYLATE 5 MG: 5 TABLET ORAL at 08:18

## 2023-10-17 RX ADMIN — VANCOMYCIN HYDROCHLORIDE 125 MG: 125 CAPSULE ORAL at 11:45

## 2023-10-17 ASSESSMENT — ACTIVITIES OF DAILY LIVING (ADL)
ADLS_ACUITY_SCORE: 29

## 2023-10-17 NOTE — PLAN OF CARE
Goal Outcome Evaluation:      Plan of Care Reviewed With: patient, spouse    Overall Patient Progress: improving    4x A/O. VSS. AVS reviewed and questions answered. PIV removed. Pt is ready to discharge.

## 2023-10-17 NOTE — PROGRESS NOTES
10/17/23 0800   Appointment Info   Signing Clinician's Name / Credentials (PT) clem Howrad   PT Assistant Visit Number 1   Therapeutic Activity   Therapeutic Activities: dynamic activities to improve functional performance Minutes (80944) 43   Symptoms Noted During/After Treatment Fatigue   Treatment Detail/Skilled Intervention PT pt sitting up in bed at start of PT session. pt stating ready fo D/c to home later today. pt able to demo all bed mob, and mulit STS. pt useing WW for support. pt stating will get 4WW for home through [facility]. pt ed on walking program for home. pt declined to demo stairs but review three tech for safety either up standing useing rail with step to pattern. going up sitting on steps with use of foot stool and chair at top of step or use of shower chair that pt has. pt stating understanding of all tech. pt ed on home walking program for slow cont progress of functional endurance. pt stating no other questions for D/c today.   PT Discharge Planning   PT Plan see D/c note.   PT Discharge Recommendation (DC Rec) home with assist;home with outpatient physical therapy   Total Session Time   Timed Code Treatment Minutes 43   Total Session Time (sum of timed and untimed services) 43     Pt met 2/4 goals  Pt declined stairs and not wanting to amb up to 200' today due to not wanting to over fatigue if going home.   Pt stating will get 4WW from [facility].   PT Will look into OP PT with PT that specialize in pelvic floor.

## 2023-10-17 NOTE — PLAN OF CARE
Goal Outcome Evaluation:  Cdiff. A&Ox4. RA. Elevated BP. Reg diet. UAL. Nausea tolerated w/ zofran. Rates pain 1/10, sched oxycodone given. Passing gas. No BM. Voiding spontaneously. PIV x1. Possible discharge today.       Plan of Care Reviewed With: patient    Overall Patient Progress: improvingOverall Patient Progress: improving

## 2023-10-17 NOTE — PLAN OF CARE
Assumed nursing care from 5922-1218. Patient alert, oriented, and up independently. Hypertensive; all other vital signs stable on room air. Pain managed with scheduled oxycodone. Regular diet; appetite is good; denies nausea. Voiding spontaneously; not saving. Small, soft stool x 1 this evening. K+ recheck = 3.5. L PIV saline locked.

## 2023-10-17 NOTE — PLAN OF CARE
0700 - 1500 -  Goal Outcome Evaluation:    A&O x 4  VSS on RA   Pain managed with scheduled oxy   Enteric precautions   + flatus, small BM  Regular diet, poor appetite  Early morning nausea relieved with antiemetics (zofran)   Tolerated lunch with no nausea, MD notified. Plan for discharge today.

## 2023-10-17 NOTE — PROGRESS NOTES
[facility] PROGRESS NOTE     Patient:  Jessie Tamayo   YOB: 1955, MRN: [id number]  Date of Visit: 10/17/2023  Date of Admission: 10/7/2023          ASSESSMENT AND PLAN     Jessie Tamayo is a 67 year old female with stage 4 vaginal cancer, s/p chemorad. She has been diagnosed with C diff in August and completed it but it has recurred. She has persistent fevers and worsening diarrhea even while on treatment for C. Diff. Due to persistent symptoms, she had flex sig which confirmed the diagnosis. We will give her a tapering dose of vancomycin and see her some time in November to check on her status.     IMPRESSION  Clostridium difficile colitis, 1st recurrence   Chronic indwelling gonsalez catheter with bacteriuria, possible cystitis    Stage IVB vaginal cancer s/p chemo/rads (completed therapy and under observation)  Nausea, vomiting possibly due to high dose oral vancomycin     RECOMMENDATIONS:  Continue oral vancomycin 125mg every 6 hours until Oct 20, 2023, then 125mg 2x a day until Oct 27, 2023, then once a day until Nov 3, 2023, then 3x a week until Nov 30, 2023.     ID will sign off. Please check [facility] for staff covering the service.     >3 notes and tests reviewed, discussed with primary team.     Daniela Jacobo MD  Infectious Diseases  Pager: [phone]  Vocera meeta         SUBJECTIVE      Interval History and Events:  Able to eat, no more diarrhea. Minimal nausea.     Antimicrobial Treatment:  Vancomycin oral 10/8-  Ceftriaxone 10/9  Bactrim 10/11-10/13  Metronidazole 10/13->10/16         OBJECTIVE       Physical Examination:    Temp:  [98.1  F (36.7  C)-98.8  F (37.1  C)] 98.4  F (36.9  C)  Pulse:  [85-97] 86  Resp:  [14-18] 16  BP: (128-158)/(61-78) 137/73  SpO2:  [94 %-96 %] 94 %    I/O last 3 completed shifts:  In: 480 [P.O.:480]  Out: 2800 [Urine:2800]    Vitals:    10/07/23 1358 10/09/23 1025 10/10/23 1317   Weight: 93 kg (205 lb) 91.8 kg (202 lb 6.4 oz) 92.1 kg (203  lb 1.6 oz)       Constitutional: Awake, alert, interactive.  GI: soft, non-distended and non-tender.    I reviewed the following laboratory data:    WBC normal   Estimated Creatinine Clearance: 75.5 mL/min (based on SCr of 0.81 mg/dL).    Microbiology:  10/13 sigmoid culture - Clostridium perfringens, Escherichia coli, Citrobacter koseri   10/11 BCx- no growth  10/11 RPP - neg   10/7 C diff - triple positive   10/8 Ucx - Serratia marcescens

## 2023-10-18 ENCOUNTER — PATIENT OUTREACH (OUTPATIENT)
Dept: CARE COORDINATION | Facility: CLINIC | Age: 68
End: 2023-10-18
Payer: COMMERCIAL

## 2023-10-18 NOTE — PROGRESS NOTES
Clinic Care Coordination Contact  [facility]: Post-Discharge Note  SITUATION                                                      Admission: 10/7/2023        Discharge: 10/17/2023     BACKGROUND                                                      Per hospital discharge summary and inpatient provider notes: Jessie Tamayo is a 67 year old female with stage IVB SCC of the vagina who presents to the ED with 2 days of worsening nausea, vomiting, and diarrhea. Patient reports yesterday she began having some upper abdominal pain. These symptoms were associated with nausea and emesis x3 yesterday and once more today. Additionally reports multiple episodes of watery non-bloody stool over the last two days as well. She endorses associating lightheadedness and fatigue and suspects this is due to overall dehydration as she has only been able to have small po intake. She denies shortness of breath or chest palpitations. She reports some low grade fevers at home with a max temperature of 100 F. No chills.   ASSESSMENT    CCRN spoke with patient's spouse today via phone. Has consent to communicate on file. Spouse states patient wasn't available to speak with CCRN but okay to talk to him. CCRN introduced self and explained the reason of the call. Spouse states patient is doing a bit better and at this time no assistance needed. Declined care coordination but stated that they know who and when to reach out if they need support. Declined the need for  or community resources. Declined to scheduled INF appt but they will do so soon. Has active Mychart and able to communicate with providers or care team. Instructed spouse to reach out to PCP or specialty clinics with questions or concerns.     PLAN                                                      Outpatient Plan: will schedule INF on their own  Discharge Follow up: Follow up in gynecologic oncology clinic in 1 week     Future Appointments   Date Time Provider  Department Center   11/1/2023 10:00 AM [facility]   11/13/2023  3:00 PM [place] Jacobo MD [facility]         For any urgent concerns, please contact our 24 hour nurse triage line: [phone] ([phone])         Juana Engle RN

## 2023-10-18 NOTE — PROGRESS NOTES
Pt spouse left message on voice mail  Pt is in the hospital currently  Will not be able to make 10 10 appt

## 2023-10-19 ENCOUNTER — PATIENT OUTREACH (OUTPATIENT)
Dept: ONCOLOGY | Facility: CLINIC | Age: 68
End: 2023-10-19
Payer: COMMERCIAL

## 2023-10-19 NOTE — PROGRESS NOTES
Pt calling has been on magnesium supplement during chemo  Just used last pill, wonders if needs refill  Last magnesium level normal  Chemo/radiation is complete  Nausea/vomiting are gone  Diarrhea due to c diff is improving    Reassured pt to no need to continue rx at this time, if desires can do multivitamin minerals as supplement  Eat healthy diet    Pt reports understanding

## 2023-10-20 ENCOUNTER — TELEPHONE (OUTPATIENT)
Dept: GASTROENTEROLOGY | Facility: CLINIC | Age: 68
End: 2023-10-20
Payer: COMMERCIAL

## 2023-10-20 LAB — BACTERIA TISS BX CULT: ABNORMAL

## 2023-10-20 NOTE — TELEPHONE ENCOUNTER
[facility] Call Center    Phone Message    May a detailed message be left on voicemail: yes     Reason for Call: The patient called to schedule her referral to see GI for C diff. While the Triage decision was Accept, there were no notes as to which of the C Diff providers they should be seen by and when. The patient is currently scheduled with Dr. Flores. Can someone please review this referral and advise which provider they should see?    Action Taken: Message routed to:  [facility] Referral Triage    Travel Screening: Not Applicable

## 2023-10-21 ENCOUNTER — TELEPHONE (OUTPATIENT)
Dept: GASTROENTEROLOGY | Facility: CLINIC | Age: 68
End: 2023-10-21
Payer: COMMERCIAL

## 2023-10-21 NOTE — CONFIDENTIAL NOTE
Called back the patient about her diarrhea    Recently discharged earlier this week.  Now on taper dose of oral vancomycin, still going to bathroom every 15 minute. Loose stool small chunk with lots of gas - small amount at a time. No blood in stool.     Mod pain at lower abdomen worsening with bowel movement.  No fever.    Can't eat much today. Nausea without vomiting. Mild improved since discharged.  Ate only rice soup, little fried chicken, and squash yesterday.  Is urinating.    # C.diff infection with pseudomembrane, gradually improving symptom   Patient is about to taper down from QID to BID today. Her stool with small amount each time with gas could be post C.diff.   - Patient is advised to look for abdominal pain, fever, more watery stool/or no stool in case of ileus/toxic megacolon and contact us/come to ED [place].  - OK to do vancomycin taper BID dose    Dispo: home, close monitor with low threshold for ED    Leny Edwards MD  Gastroenterology/Hepatology Fellow

## 2023-10-24 ENCOUNTER — TELEPHONE (OUTPATIENT)
Dept: RADIATION ONCOLOGY | Facility: CLINIC | Age: 68
End: 2023-10-24
Payer: COMMERCIAL

## 2023-10-24 NOTE — TELEPHONE ENCOUNTER
Good morning Dr. Junior Massey    Pt's  called wanting to know if he needs to schedule the MR that was ordered back in July prior to his wife's Nov 1st appt  I do see the order on the chart but I don't see it mentioned on the August 1st and early July progress notes  Let me know if she needs to schedule the exam prior to her appt. Thank you    Addendum:  Hi Dr. Altman    I tried scheduling the MRI but the Radiology Dept states that a vaginal Ultrasound would also need to be ordered since the MRI order states for it. They cannot schedule with just the MRI order alone. Can an order be added?  Thank you

## 2023-10-24 NOTE — TELEPHONE ENCOUNTER
Called pt's  to confirm that MRI needs to be completed prior to Nov 1st appt w/Dr. Pacheco. We are waiting on a supplemental order to the MRI per the Radiology Dept prior to scheduling  Once the order has been added we will schedule the appt and call pt and  back. He confirmed information  Thank you

## 2023-10-25 ENCOUNTER — PATIENT OUTREACH (OUTPATIENT)
Dept: ONCOLOGY | Facility: CLINIC | Age: 68
End: 2023-10-25
Payer: COMMERCIAL

## 2023-10-26 ENCOUNTER — ONCOLOGY VISIT (OUTPATIENT)
Dept: ONCOLOGY | Facility: CLINIC | Age: 68
End: 2023-10-26
Attending: OBSTETRICS & GYNECOLOGY
Payer: COMMERCIAL

## 2023-10-26 VITALS
SYSTOLIC BLOOD PRESSURE: 134 MMHG | RESPIRATION RATE: 16 BRPM | HEART RATE: 108 BPM | WEIGHT: 192.4 LBS | OXYGEN SATURATION: 96 % | TEMPERATURE: 97.7 F | BODY MASS INDEX: 32.02 KG/M2 | DIASTOLIC BLOOD PRESSURE: 82 MMHG

## 2023-10-26 DIAGNOSIS — C44.92 SCC (SQUAMOUS CELL CARCINOMA): ICD-10-CM

## 2023-10-26 DIAGNOSIS — C52 VAGINAL CANCER (H): ICD-10-CM

## 2023-10-26 DIAGNOSIS — A04.72 C. DIFFICILE DIARRHEA: Primary | ICD-10-CM

## 2023-10-26 PROCEDURE — 99215 OFFICE O/P EST HI 40 MIN: CPT | Performed by: NURSE PRACTITIONER

## 2023-10-26 PROCEDURE — G0463 HOSPITAL OUTPT CLINIC VISIT: HCPCS | Performed by: NURSE PRACTITIONER

## 2023-10-26 RX ORDER — VANCOMYCIN HYDROCHLORIDE 125 MG/1
CAPSULE ORAL
Qty: 9 CAPSULE | Refills: 0 | Status: SHIPPED | OUTPATIENT
Start: 2023-10-26 | End: 2023-12-01

## 2023-10-26 ASSESSMENT — PAIN SCALES - GENERAL: PAINLEVEL: MILD PAIN (2)

## 2023-10-26 NOTE — LETTER
10/26/2023         RE: Jessie Tamayo  [address] [id number]        Dear Colleague,    Thank you for referring your patient, Jessie Tamayo, to the [facility]. Please see a copy of my visit note below.    Gynecologic Oncology Return Visit Note    Date: Oct 26, 2023     RE: Jessie Tamayo  : 1955  ELMO: Oct 26, 2023     CC: Stage IVB SCC of the vagina     HPI:  Jessie Tamayo is a 67 year old woman with a diagnosis of stage IVB SCC of the vagina .  She is here today for follow up.     Oncology History:  07, 10/15/08, 09, 11: Pap test NILM.      1/23/15: Pap test NILM, hrHPV16+.  2/27/15: Cervical polyp & cervical biopsy pathology: Negative for dysplasia/malignancy.     16: Pap test NILM, hrHPV16+.   16: Endocervical curettings pathology negative for dysplasia/malignancy.     17: Pap test NILM, hrHPV16+.   -Colposcopy recommended, not performed.      18:   -Pap test ASCUS, hrHPV16+.   -Endocervical curettings & cervical biopsy pathology: negative for dysplasia/malignancy.      19:  Pap test ASC-H, hrHPV16+.   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      9/10/20: Pap test ASC-H, hrHPV+ (NOS).  21: LEEP.   -Pathology: LEEP & endocervical curettings: negative for dysplasia/malignancy.     3/23/22: Endocervical curettings & cervical biopsy pathology: negative for dysplasia/malignancy.     3/28/23:   -Pap test HSIL, hrHPV16+.   -Findings by gynecologist Dr. Koenig: External genitalia with erythematous plaques bilaterally  Urethra- mid position and well supported, suburethral tissue thickened and friable with bleeding elicited after placement of the speculum  Vagina is stenotic and cervix difficult to see.   23: Cystourethroscopy, vaginal biopsy by urologist Dr. Mackenzie.   -Findings: Exam revealed lichenified changes to bilateral labia majora and  friable vestibular tissue (patient had significant bleeding from prep alone.) The urethra was somewhat stenotic and would not accommodate 19Fr rigid cystoscope, therefore a 16 Fr flexible cystoscope was inserted. Gentle pressure was required to access the bladder. The ureteral orifices were in their orthotopic positions bilaterally. There were no intravesical stones or diverticuli and the bladder was mildly trabeculated. There were no intravesical lesions. See media tab for urethral pictures - there were no obvious lesions but the entire distal urethra was erythematous and stenosed (16Fr.)  -Pathology: Invasive moderately-differentiated squamous cell carcinoma, HPV-associated, with focus suspicious for lymphovascular space invasion; IHC: p16+.    6/8/23: Gynecologic Oncology consultation.  -Findings:   External genitalia notable for erythema and desquamation of the posterior medial labia, c/w lichen sclerosus changes. When the labia are , a friable mass is visible at the distal anterior vagina, just posterior to the urethra. On bimanual exam, the cervix is small and normal in contour. The palpable vaginal tumor is limited to the anterior distal vagina, approximately 4-5 cm laterally x 3 cm cranio-caudal. Tumor is friable. Remainder of the vagina smooth to palpation. Digital anorectal exam is without nodularity. No palpable tumor in the rectovaginal septum.   Enlarged firm, fixed right inguinal lymph node ~3-4 cm in size.      6/16/23: Pelvic MRI  IMPRESSION:   1. Irregular enhancing lesion along the anterior vaginal wall at the  level of the introitus measuring 2.5 x 0.9 cm with irregular  enhancement along the anterior vaginal wall towards the level of the  vaginal cuff however there is no definitive evidence or abnormal  signal intensity involving the cervix to suggest invasion.  2. Right inguinal lymphadenopathy compatible with metastatic disease.   3. There is some asymmetric enhancement involving the  left sacrum,  incompletely evaluated on this study. Further evaluation of this and  further staging of the chest, abdomen and pelvis will be performed on  PET/CT scheduled for 6/20/2023.     6/20/23: PET CT  IMPRESSION:   1. Intense focal hypermetabolic FDG uptake in biopsy-proven vaginal  squamous cell carcinoma.  2. Multiple enlarged, hypermetabolic right inguinal nodes likely  represent regional metastasis. No definite evidence of distant  metastatic disease.  3. Scattered sub-4 mm solid pulmonary nodules are below the size  threshold for characterization on PET. Attention on follow-up with CT.  4. 1.0 cm enhancing left parotid gland nodule with hypermetabolic  uptake could represent a primary parotid neoplasm such as pleomorphic  adenoma or Warthin's tumor; consider ultrasound for further  evaluation.  5. 1.3 cm hypoattenuating right thyroid nodule with mild FDG uptake  warrants ultrasound for characterization.      Plan: Cisplatin radiation      7/27/2023: urinary gonaslez catheter placement. Treat acute cystitis with bactrim per Urology.      8/1/2023: plan C1D1 Cisplatin radiation      8/28/23: Mg 1.3. plt 94, K 3.8  9/1/23: Plt 107, Mg1.3 , K 3.8    9/4-9/6/23: ED to hospital admission for worsening suprapubic abdominal pain found to have a UTI.  Discharged on Augmentin for 7 days and pyridum for bladder spasms.  9/8-9/13/23: ED to hospital admission for worsening watery diarrhea, fevers, and chills found to be c.diff positive.  Discharged on a 14-day course of PO Vancomycin.    9/17/23: Mg 1.7, plt 106     9/18-9/20/23: HDR brachytherapy x 5    10/7-10/17/23: ED to hospital admission with worsening nausea, vomiting, and diarrhea found to have recurrent c.diff.  Discharged on PO Vancomycin with at home taper, also received IV Flagyl while inpatient.         Today she reports since her most recent hospitalization;    Nausea or vomiting: Yes, at least daily, no vomiting, improved with zofran  PO intake: Eating  and drinking without difficulty, a lot of salads, drinking plenty of water  Appetite:  Good for some things, but not others  Weight loss: Yes, lost 10 lbs since last hospitalization, 20 lbs since completing radiation, feels at least some of the weight is loss of extra fluids, not interested in dietician referral  Diarrhea:  waking up multiple times per night to have BMs, every couple hours, mucous, very small amounts each time, yesterday had a soft formed stool, no pure liquid diarrhea   Insurance only approved 40 tablets of vanco so they need a prescription for the other 9 tablets  Bladder function: Urinating without difficulty after gonsalez removal  Leg swelling: No  Fevers: No  Chest pain: No  SOB: No  Vaginal bleeding/discharge: No  Struggling with vaginal dilator, purchased a dilator kit online, using small amount of lube with insertion, smallest dilator          Review of Systems:    See HPI    Past Medical History:    Past Medical History:   Diagnosis Date    Actinic keratosis     C. difficile diarrhea 09/08/2023    Hyperlipemia     Lichen sclerosus 2020    Osteopenias     SCC (squamous cell carcinoma) 07/26/2023         Past Surgical History:    Past Surgical History:   Procedure Laterality Date    COLONOSCOPY  2006    normal    COLPOSCOPY, BIOPSY, COMBINED N/A 02/08/2021    Procedure: COLPOSCOPY, WITH BIOPSY, LEEP procedure;  Surgeon: Jefe Koenig MD;  Location: [facility] OR    CYSTOSCOPY N/A 05/30/2023    Procedure: CYSTOSCOPY AND EXCISIONAL BIOPSY OF THE VAGINAL TISSUE AROUND THE URETHRA.  (look in the bladder and sample small area in the vagina near the urethra);  Surgeon: Lakeisha Mackenzie MD;  Location: [facility] OR    EYE SURGERY      cataracts bilateral    INSERT INTERSTITIAL NEEDLE, ULTRASOUND GUIDED N/A 9/18/2023    Procedure: INSERTION, NEEDLE, WITH ULTRASOUND GUIDANCE, FOR INTERSTITIAL BRACHYTHERAPY;  Surgeon: Marisa Pacheco MD;  Location: [facility] OR    OPEN REDUCTION INTERNAL FIXATION FOREARM   07/31/2012    Procedure: OPEN REDUCTION INTERNAL FIXATION FOREARM;  Open Reduction Internal Fixation, Irrigation & Debridment  of Right Distal Radius, application short arm splint;  Surgeon: Wade Beard MD;  Location: [facility] OR    REMOVE INTERSTITIAL NEEDLE N/A 9/20/2023    Procedure: REMOVAL, INTERSTITIAL NEEDLE, AFTER TEMPORARY BRACHYTHERAPY;  Surgeon: Marisa Pacheco MD;  Location: [facility] OR    TONSILLECTOMY & ADENOIDECTOMY  1960    [facility] TREAT ECTOPIC PREG,RMV TUBE/OVARY  1985    ectopic, right side-ovary and tube removed         Health Maintenance Due   Topic Date Due    RSV VACCINE 60+ (1 - 1-dose 60+ series) Never done    MEDICARE ANNUAL WELLNESS VISIT  05/26/2023    COLPOSCOPY  06/28/2023    INFLUENZA VACCINE (1) 09/01/2023    COVID-19 Vaccine (6 - 2023-24 season) 09/01/2023       Current Medications:     Current Outpatient Medications   Medication Sig Dispense Refill    acetaminophen (TYLENOL) 500 MG tablet Take 500-1,000 mg by mouth every 4 hours as needed for mild pain      amLODIPine (NORVASC) 5 MG tablet Take 5 mg by mouth every morning      clotrimazole (LOTRIMIN) 1 % external cream Apply topically 2 times daily To affected areas 45 g 3    ibuprofen (ADVIL/MOTRIN) 400 MG tablet Take 1 tablet (400 mg) by mouth every 6 hours as needed for inflammatory pain      NONFORMULARY Apply topically daily [place]alon cream      ondansetron (ZOFRAN) 8 MG tablet Take 1 tablet (8 mg) by mouth every 8 hours as needed for nausea (vomiting) Do not take for 3 days after chemo. 30 tablet 2    vancomycin (VANCOCIN) 125 MG capsule Take 1 capsule (125 mg) by mouth 2 times daily for 2 days, THEN 1 capsule (125 mg) daily for 7 days, THEN 1 capsule (125 mg) three times a week for 27 days. 9 capsule 0    calcium carbonate 750 MG CHEW Take 750 mg by mouth 3 times daily as needed (Patient not taking: Reported on 10/26/2023)      docusate sodium (COLACE) 100 MG capsule Take 100 mg by mouth daily as needed for constipation  (Patient not taking: Reported on 10/26/2023)      loratadine (CLARITIN) 10 MG tablet Take 10 mg by mouth daily (Patient not taking: Reported on 10/26/2023)      magnesium oxide (MAG-OX) 400 MG tablet Take 400 mg by mouth every morning (Patient not taking: Reported on 10/26/2023)      naloxone (NARCAN) 4 MG/0.1ML nasal spray Spray 1 spray (4 mg) into one nostril alternating nostrils as needed for opioid reversal every 2-3 minutes until assistance arrives (Patient not taking: Reported on 10/26/2023) 0.2 mL 0    prochlorperazine (COMPAZINE) 10 MG tablet Take 1 tablet (10 mg) by mouth every 6 hours as needed for nausea or vomiting (Patient not taking: Reported on 10/26/2023) 30 tablet 2         Allergies:        Allergies   Allergen Reactions    Blood Transfusion Related (Informational Only) Other (See Comments)     Patient has a history of a clinically significant antibody against RBC antigens.  A delay in compatible RBCs may occur.    Oxybutynin Itching    Seasonal Allergies         Social History:     Social History     Tobacco Use    Smoking status: Never     Passive exposure: Never    Smokeless tobacco: Never   Substance Use Topics    Alcohol use: Not Currently       History   Drug Use No         Family History:     The patient's family history is notable for:    Family History   Problem Relation Age of Onset    Lung Cancer Mother 44    Cerebrovascular Disease Father     Hypertension Father     Alcohol/Drug Father     Cervical Cancer Maternal Aunt     Ovarian Cancer Maternal Aunt 60    Obesity Niece         niece    Mental Illness Nephew         nephew    Diabetes Other         paternal aunt    Hyperlipidemia Other     Obesity Other         self    Obesity Other     Diabetes Other         paternal aunt    Hypertension Other         father    Cerebrovascular Disease Other         father    C.A.D. No family hx of     Breast Cancer No family hx of     Cancer - colorectal No family hx of     Prostate Cancer No family  hx of     Melanoma No family hx of     Anesthesia Reaction No family hx of     Venous thrombosis No family hx of          Physical Exam:     /82 (BP Location: Right arm, Patient Position: Sitting, Cuff Size: Adult Large)   Pulse 108   Temp 97.7  F (36.5  C) (Oral)   Resp 16   Wt 87.3 kg (192 lb 6.4 oz)   LMP 03/08/2008   SpO2 96%   BMI 32.02 kg/m    Body mass index is 32.02 kg/m .    General Appearance: alert, no distress     HEENT: no palpable nodules or masses        Cardiovascular: regular rate and rhythm, no gallops, rubs or murmurs     Respiratory: lungs clear, no rales, rhonchi or wheezes    Musculoskeletal: extremities non tender and without edema    Skin: no lesions or rashes     Neurological: normal gait, no gross defects     Psychiatric: appropriate mood and affect                               Hematological: normal cervical and supraclavicular lymph nodes     Gastrointestinal:       abdomen soft, non-tender, non-distended    Genitourinary: Deferred.       Assessment:    Jessie Tamayo is a 67 year old woman with a diagnosis of stage IVB SCC of the vagina .  She is here today for follow up.    50 minutes spent on the date of the encounter doing chart review, history and exam, documentation, and further activities as noted above.      Plan:     1.) Vaginal cancer:  Per Dr. Stahl's plan will sent message for 1 month post treatment follow up with her either this Friday or next Tue.  Plan for PET CT and follow up with Dr. Stahl in December as scheduled.  Encouraged continuing to attempt the dilator with liberal use of lubricant.  Provided her with a small tube of lidocaine to apply to the end of the dilator.  Reviewed signs and symptoms for when she should contact the clinic or seek additional care.  Patient to contact the clinic with any questions or concerns in the interim.      Genetic risk factors were assessed and she does not meet qualification for referral.     Labs and/or tests ordered  include:  None.     2.) Health maintenance:  Issues addressed today include following up with PCP for annual health maintenance and non-gynecologic issues.     3.) Recurrent/persistent c.diff: Continue vanco taper as prescribed on discharge.  She has ID follow up next week and GI referral in December.  Stool frequency has decreased significantly but still having small mucous stools every couple of hours.  One formed stool yesterday.  She appears to be meeting her hydration needs currently.  I suspect that the majority of her symptoms are due to radiation changes at this point.  Discussed decreasing fiber intake which may help with frequency.  Recommend a bland diet.      Andie Rios, DNP, APRN, FNP-C, AOCNP  Oncology Nurse Practitioner  Division of Gynecologic Oncology  Pager: [phone]     CC  Patient Care Team:  Alba Layton MD as PCP - General

## 2023-10-26 NOTE — PROGRESS NOTES
Gynecologic Oncology Return Visit Note    Date: Oct 26, 2023     RE: Jessie Tamayo  : 1955  ELMO: Oct 26, 2023     CC: Stage IVB SCC of the vagina     HPI:  Jessie Tamayo is a 67 year old woman with a diagnosis of stage IVB SCC of the vagina .  She is here today for follow up.     Oncology History:  07, 10/15/08, 09, 11: Pap test NILM.      1/23/15: Pap test NILM, hrHPV16+.  2/27/15: Cervical polyp & cervical biopsy pathology: Negative for dysplasia/malignancy.     16: Pap test NILM, hrHPV16+.   16: Endocervical curettings pathology negative for dysplasia/malignancy.     17: Pap test NILM, hrHPV16+.   -Colposcopy recommended, not performed.      18:   -Pap test ASCUS, hrHPV16+.   -Endocervical curettings & cervical biopsy pathology: negative for dysplasia/malignancy.      19:  Pap test ASC-H, hrHPV16+.   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      9/10/20: Pap test ASC-H, hrHPV+ (NOS).  21: LEEP.   -Pathology: LEEP & endocervical curettings: negative for dysplasia/malignancy.     3/23/22: Endocervical curettings & cervical biopsy pathology: negative for dysplasia/malignancy.     3/28/23:   -Pap test HSIL, hrHPV16+.   -Findings by gynecologist Dr. Koenig: External genitalia with erythematous plaques bilaterally  Urethra- mid position and well supported, suburethral tissue thickened and friable with bleeding elicited after placement of the speculum  Vagina is stenotic and cervix difficult to see.   23: Cystourethroscopy, vaginal biopsy by urologist Dr. Mackenzie.   -Findings: Exam revealed lichenified changes to bilateral labia majora and friable vestibular tissue (patient had significant bleeding from prep alone.) The urethra was somewhat stenotic and would not accommodate 19Fr rigid cystoscope, therefore a 16 Fr flexible cystoscope was inserted. Gentle pressure was required to access the  bladder. The ureteral orifices were in their orthotopic positions bilaterally. There were no intravesical stones or diverticuli and the bladder was mildly trabeculated. There were no intravesical lesions. See media tab for urethral pictures - there were no obvious lesions but the entire distal urethra was erythematous and stenosed (16Fr.)  -Pathology: Invasive moderately-differentiated squamous cell carcinoma, HPV-associated, with focus suspicious for lymphovascular space invasion; IHC: p16+.    6/8/23: Gynecologic Oncology consultation.  -Findings:   External genitalia notable for erythema and desquamation of the posterior medial labia, c/w lichen sclerosus changes. When the labia are , a friable mass is visible at the distal anterior vagina, just posterior to the urethra. On bimanual exam, the cervix is small and normal in contour. The palpable vaginal tumor is limited to the anterior distal vagina, approximately 4-5 cm laterally x 3 cm cranio-caudal. Tumor is friable. Remainder of the vagina smooth to palpation. Digital anorectal exam is without nodularity. No palpable tumor in the rectovaginal septum.   Enlarged firm, fixed right inguinal lymph node ~3-4 cm in size.      6/16/23: Pelvic MRI  IMPRESSION:   1. Irregular enhancing lesion along the anterior vaginal wall at the  level of the introitus measuring 2.5 x 0.9 cm with irregular  enhancement along the anterior vaginal wall towards the level of the  vaginal cuff however there is no definitive evidence or abnormal  signal intensity involving the cervix to suggest invasion.  2. Right inguinal lymphadenopathy compatible with metastatic disease.   3. There is some asymmetric enhancement involving the left sacrum,  incompletely evaluated on this study. Further evaluation of this and  further staging of the chest, abdomen and pelvis will be performed on  PET/CT scheduled for 6/20/2023.     6/20/23: PET CT  IMPRESSION:   1. Intense focal hypermetabolic FDG  uptake in biopsy-proven vaginal  squamous cell carcinoma.  2. Multiple enlarged, hypermetabolic right inguinal nodes likely  represent regional metastasis. No definite evidence of distant  metastatic disease.  3. Scattered sub-4 mm solid pulmonary nodules are below the size  threshold for characterization on PET. Attention on follow-up with CT.  4. 1.0 cm enhancing left parotid gland nodule with hypermetabolic  uptake could represent a primary parotid neoplasm such as pleomorphic  adenoma or Warthin's tumor; consider ultrasound for further  evaluation.  5. 1.3 cm hypoattenuating right thyroid nodule with mild FDG uptake  warrants ultrasound for characterization.      Plan: Cisplatin radiation      7/27/2023: urinary gonsalez catheter placement. Treat acute cystitis with bactrim per Urology.      8/1/2023: plan C1D1 Cisplatin radiation      8/28/23: Mg 1.3. plt 94, K 3.8  9/1/23: Plt 107, Mg1.3 , K 3.8    9/4-9/6/23: ED to hospital admission for worsening suprapubic abdominal pain found to have a UTI.  Discharged on Augmentin for 7 days and pyridum for bladder spasms.  9/8-9/13/23: ED to hospital admission for worsening watery diarrhea, fevers, and chills found to be c.diff positive.  Discharged on a 14-day course of PO Vancomycin.    9/17/23: Mg 1.7, plt 106     9/18-9/20/23: HDR brachytherapy x 5    10/7-10/17/23: ED to hospital admission with worsening nausea, vomiting, and diarrhea found to have recurrent c.diff.  Discharged on PO Vancomycin with at home taper, also received IV Flagyl while inpatient.         Today she reports since her most recent hospitalization;    Nausea or vomiting: Yes, at least daily, no vomiting, improved with zofran  PO intake: Eating and drinking without difficulty, a lot of salads, drinking plenty of water  Appetite:  Good for some things, but not others  Weight loss: Yes, lost 10 lbs since last hospitalization, 20 lbs since completing radiation, feels at least some of the weight is loss  of extra fluids, not interested in dietician referral  Diarrhea:  waking up multiple times per night to have BMs, every couple hours, mucous, very small amounts each time, yesterday had a soft formed stool, no pure liquid diarrhea   Insurance only approved 40 tablets of vanco so they need a prescription for the other 9 tablets  Bladder function: Urinating without difficulty after gonsalez removal  Leg swelling: No  Fevers: No  Chest pain: No  SOB: No  Vaginal bleeding/discharge: No  Struggling with vaginal dilator, purchased a dilator kit online, using small amount of lube with insertion, smallest dilator          Review of Systems:    See HPI    Past Medical History:    Past Medical History:   Diagnosis Date    Actinic keratosis     C. difficile diarrhea 09/08/2023    Hyperlipemia     Lichen sclerosus 2020    Osteopenias     SCC (squamous cell carcinoma) 07/26/2023         Past Surgical History:    Past Surgical History:   Procedure Laterality Date    COLONOSCOPY  2006    normal    COLPOSCOPY, BIOPSY, COMBINED N/A 02/08/2021    Procedure: COLPOSCOPY, WITH BIOPSY, LEEP procedure;  Surgeon: Jefe Koenig MD;  Location: [facility] OR    CYSTOSCOPY N/A 05/30/2023    Procedure: CYSTOSCOPY AND EXCISIONAL BIOPSY OF THE VAGINAL TISSUE AROUND THE URETHRA.  (look in the bladder and sample small area in the vagina near the urethra);  Surgeon: Lakeisha Mackenzie MD;  Location: [facility] OR    EYE SURGERY      cataracts bilateral    INSERT INTERSTITIAL NEEDLE, ULTRASOUND GUIDED N/A 9/18/2023    Procedure: INSERTION, NEEDLE, WITH ULTRASOUND GUIDANCE, FOR INTERSTITIAL BRACHYTHERAPY;  Surgeon: Marisa Pacheco MD;  Location: [facility] OR    OPEN REDUCTION INTERNAL FIXATION FOREARM  07/31/2012    Procedure: OPEN REDUCTION INTERNAL FIXATION FOREARM;  Open Reduction Internal Fixation, Irrigation & Debridment  of Right Distal Radius, application short arm splint;  Surgeon: Wade Beard MD;  Location: [facility] OR    REMOVE INTERSTITIAL  NEEDLE N/A 9/20/2023    Procedure: REMOVAL, INTERSTITIAL NEEDLE, AFTER TEMPORARY BRACHYTHERAPY;  Surgeon: Marisa Pacheco MD;  Location: [facility] OR    TONSILLECTOMY & ADENOIDECTOMY  1960    [facility] TREAT ECTOPIC PREG,RMV TUBE/OVARY  1985    ectopic, right side-ovary and tube removed         Health Maintenance Due   Topic Date Due    RSV VACCINE 60+ (1 - 1-dose 60+ series) Never done    MEDICARE ANNUAL WELLNESS VISIT  05/26/2023    COLPOSCOPY  06/28/2023    INFLUENZA VACCINE (1) 09/01/2023    COVID-19 Vaccine (6 - 2023-24 season) 09/01/2023       Current Medications:     Current Outpatient Medications   Medication Sig Dispense Refill    acetaminophen (TYLENOL) 500 MG tablet Take 500-1,000 mg by mouth every 4 hours as needed for mild pain      amLODIPine (NORVASC) 5 MG tablet Take 5 mg by mouth every morning      clotrimazole (LOTRIMIN) 1 % external cream Apply topically 2 times daily To affected areas 45 g 3    ibuprofen (ADVIL/MOTRIN) 400 MG tablet Take 1 tablet (400 mg) by mouth every 6 hours as needed for inflammatory pain      NONFORMULARY Apply topically daily [place] cream      ondansetron (ZOFRAN) 8 MG tablet Take 1 tablet (8 mg) by mouth every 8 hours as needed for nausea (vomiting) Do not take for 3 days after chemo. 30 tablet 2    vancomycin (VANCOCIN) 125 MG capsule Take 1 capsule (125 mg) by mouth 2 times daily for 2 days, THEN 1 capsule (125 mg) daily for 7 days, THEN 1 capsule (125 mg) three times a week for 27 days. 9 capsule 0    calcium carbonate 750 MG CHEW Take 750 mg by mouth 3 times daily as needed (Patient not taking: Reported on 10/26/2023)      docusate sodium (COLACE) 100 MG capsule Take 100 mg by mouth daily as needed for constipation (Patient not taking: Reported on 10/26/2023)      loratadine (CLARITIN) 10 MG tablet Take 10 mg by mouth daily (Patient not taking: Reported on 10/26/2023)      magnesium oxide (MAG-OX) 400 MG tablet Take 400 mg by mouth every morning (Patient not taking: Reported  on 10/26/2023)      naloxone (NARCAN) 4 MG/0.1ML nasal spray Spray 1 spray (4 mg) into one nostril alternating nostrils as needed for opioid reversal every 2-3 minutes until assistance arrives (Patient not taking: Reported on 10/26/2023) 0.2 mL 0    prochlorperazine (COMPAZINE) 10 MG tablet Take 1 tablet (10 mg) by mouth every 6 hours as needed for nausea or vomiting (Patient not taking: Reported on 10/26/2023) 30 tablet 2         Allergies:        Allergies   Allergen Reactions    Blood Transfusion Related (Informational Only) Other (See Comments)     Patient has a history of a clinically significant antibody against RBC antigens.  A delay in compatible RBCs may occur.    Oxybutynin Itching    Seasonal Allergies         Social History:     Social History     Tobacco Use    Smoking status: Never     Passive exposure: Never    Smokeless tobacco: Never   Substance Use Topics    Alcohol use: Not Currently       History   Drug Use No         Family History:     The patient's family history is notable for:    Family History   Problem Relation Age of Onset    Lung Cancer Mother 44    Cerebrovascular Disease Father     Hypertension Father     Alcohol/Drug Father     Cervical Cancer Maternal Aunt     Ovarian Cancer Maternal Aunt 60    Obesity Niece         niece    Mental Illness Nephew         nephew    Diabetes Other         paternal aunt    Hyperlipidemia Other     Obesity Other         self    Obesity Other     Diabetes Other         paternal aunt    Hypertension Other         father    Cerebrovascular Disease Other         father    C.A.D. No family hx of     Breast Cancer No family hx of     Cancer - colorectal No family hx of     Prostate Cancer No family hx of     Melanoma No family hx of     Anesthesia Reaction No family hx of     Venous thrombosis No family hx of          Physical Exam:     /82 (BP Location: Right arm, Patient Position: Sitting, Cuff Size: Adult Large)   Pulse 108   Temp 97.7  F (36.5  C)  (Oral)   Resp 16   Wt 87.3 kg (192 lb 6.4 oz)   LMP 03/08/2008   SpO2 96%   BMI 32.02 kg/m    Body mass index is 32.02 kg/m .    General Appearance: alert, no distress     HEENT: no palpable nodules or masses        Cardiovascular: regular rate and rhythm, no gallops, rubs or murmurs     Respiratory: lungs clear, no rales, rhonchi or wheezes    Musculoskeletal: extremities non tender and without edema    Skin: no lesions or rashes     Neurological: normal gait, no gross defects     Psychiatric: appropriate mood and affect                               Hematological: normal cervical and supraclavicular lymph nodes     Gastrointestinal:       abdomen soft, non-tender, non-distended    Genitourinary: Deferred.       Assessment:    Jessie Tamayo is a 67 year old woman with a diagnosis of stage IVB SCC of the vagina .  She is here today for follow up.    50 minutes spent on the date of the encounter doing chart review, history and exam, documentation, and further activities as noted above.      Plan:     1.) Vaginal cancer:  Per Dr. Stahl's plan will sent message for 1 month post treatment follow up with her either this Friday or next Tue.  Plan for PET CT and follow up with Dr. Stahl in December as scheduled.  Encouraged continuing to attempt the dilator with liberal use of lubricant.  Provided her with a small tube of lidocaine to apply to the end of the dilator.  Reviewed signs and symptoms for when she should contact the clinic or seek additional care.  Patient to contact the clinic with any questions or concerns in the interim.      Genetic risk factors were assessed and she does not meet qualification for referral.     Labs and/or tests ordered include:  None.     2.) Health maintenance:  Issues addressed today include following up with PCP for annual health maintenance and non-gynecologic issues.     3.) Recurrent/persistent c.diff: Continue vanco taper as prescribed on discharge.  She has ID follow up next  week and GI referral in December.  Stool frequency has decreased significantly but still having small mucous stools every couple of hours.  One formed stool yesterday.  She appears to be meeting her hydration needs currently.  I suspect that the majority of her symptoms are due to radiation changes at this point.  Discussed decreasing fiber intake which may help with frequency.  Recommend a bland diet.      Andie Rios, DNP, APRN, FNP-C, AOCNP  Oncology Nurse Practitioner  Division of Gynecologic Oncology  Pager: [phone]     CC  Patient Care Team:  Alba Layton MD as PCP - General  Jefe Koenig MD as Assigned OBGYN Provider  Alba Layton MD as Assigned PCP  Lakeisha Mackenzie MD as MD (Urology)  Lawrence Banuelos MD as MD (Dermatology)  Lakeisha Mackenzie MD as Assigned Surgical Provider  Lakeisha aMckenzie MD as MD (Urology)  Reina Stahl MD as MD (Gynecologic Oncology)  Marisa Pacheco MD as MD (Radiation Oncology)  Lily Fabian PA-C as Physician Assistant (Anesthesiology)  Marisa Pacheco MD as Assigned Cancer Care Provider  Alba Layton MD as Assigned Pain Medication Provider  Juana Engle RN as Lead Care Coordinator (Primary Care - CC)  Shannon Flores MD as Hospitalist (HOSPITALIST)  SELF, REFERRED

## 2023-10-26 NOTE — NURSING NOTE
"Oncology Rooming Note    October 26, 2023 11:24 AM   Jessie Tamayo is a 67 year old female who presents for:    Chief Complaint   Patient presents with    Oncology Clinic Visit     Vulvar cancer      Initial Vitals: /82 (BP Location: Right arm, Patient Position: Sitting, Cuff Size: Adult Large)   Pulse 108   Temp 97.7  F (36.5  C) (Oral)   Resp 16   Wt 87.3 kg (192 lb 6.4 oz)   LMP 03/08/2008   SpO2 96%   BMI 32.02 kg/m   Estimated body mass index is 32.02 kg/m  as calculated from the following:    Height as of 10/7/23: 1.651 m (5' 5\").    Weight as of this encounter: 87.3 kg (192 lb 6.4 oz). Body surface area is 2 meters squared.  Mild Pain (2) Comment: Data Unavailable   Patient's last menstrual period was 03/08/2008.  Allergies reviewed: Yes  Medications reviewed: Yes    Medications: MEDICATION REFILLS NEEDED TODAY. Provider was notified.  Pharmacy name entered into EPIC:    [id number] - [place] - [address] [facility] - [place], [place] - [address] [id number] [place] - [address] [facility] [place], [place] - [address] [facility] [place], [place] - [address] [address] [facility]    Clinical concerns: Needs refills on Vancomycin (would like 9 if possible). Is having frequent bowel movements with pain and is feeling weak     Yvonne Heller              "

## 2023-10-27 NOTE — PROGRESS NOTES
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        INTERVAL SINCE COMPLETION OF RADIATION THERAPY: 6 weeks    SUBJECTIVE:   Jessie Tamayo is a 67 year old female with squamous cell carcinoma of the vagina, HPV associated, clinical stage T1b N1 M0 who underwent definitive chemoradiation as described above.  She returns for 6-week follow-up.  Throughout her treatment, she had an indwelling Thomas catheter and struggled with bladder spasms and pain during chemoradiation.  She was discharged to home 1 day after completion of her interstitial brachytherapy on 2023.  She was readmitted to the hospital on 10/7/2023 with nausea, vomiting, profuse diarrhea and recurrent C. diffi.  In addition, she was found to have cystitis and was started on IV antibiotics.  She was discharged to home on 10/17/2023 on oral vancomycin.    Since discharge, she continues to have frequent mucousy stools.  She is not having any blood in stools.  She was told not to use any antidiarrheal medication.  She is eating a mostly low fiber diet although, until last week, she was eating salads.  She has not seen a dietitian yet.  She purchased vaginal dilators on the Internet and is using the smallest one which, according to the diagram, has a diameter of 1.3 cm.  She is able to insert it a few centimeters each time.  She is not having any swelling or heaviness of her lower extremities.  Pain has also markedly improved since the urinary catheter was removed  3 weeks ago.    She is also quite deconditioned.  She is able to ambulate in the house with a walker but has not gone for walks outside the house.    PAST MEDICAL/SURGICAL HISTORY:   Past Medical History:   Diagnosis Date    Actinic keratosis     C. difficile diarrhea 09/08/2023    Hyperlipemia     Lichen sclerosus 2020    Osteopenias     SCC (squamous cell carcinoma) 07/26/2023      Past Surgical History:   Procedure Laterality Date    COLONOSCOPY  2006    normal    COLPOSCOPY, BIOPSY, COMBINED N/A 02/08/2021    Procedure: COLPOSCOPY, WITH BIOPSY, LEEP procedure;  Surgeon: Jefe Koenig MD;  Location: [facility] OR    CYSTOSCOPY N/A 05/30/2023    Procedure: CYSTOSCOPY AND EXCISIONAL BIOPSY OF THE VAGINAL TISSUE AROUND THE URETHRA.  (look in the bladder and sample small area in the vagina near the urethra);  Surgeon: Lakeisha Mackenzie MD;  Location: [facility] OR    EYE SURGERY      cataracts bilateral    INSERT INTERSTITIAL NEEDLE, ULTRASOUND GUIDED N/A 9/18/2023    Procedure: INSERTION, NEEDLE, WITH ULTRASOUND GUIDANCE, FOR INTERSTITIAL BRACHYTHERAPY;  Surgeon: Marisa Pacheco MD;  Location: [facility] OR    OPEN REDUCTION INTERNAL FIXATION FOREARM  07/31/2012    Procedure: OPEN REDUCTION INTERNAL FIXATION FOREARM;  Open Reduction Internal Fixation, Irrigation & Debridment  of Right Distal Radius, application short arm splint;  Surgeon: Wade Beard MD;  Location: [facility] OR    REMOVE INTERSTITIAL NEEDLE N/A 9/20/2023    Procedure: REMOVAL, INTERSTITIAL NEEDLE, AFTER TEMPORARY BRACHYTHERAPY;  Surgeon: Marisa Pacheco MD;  Location: [facility] OR    TONSILLECTOMY & ADENOIDECTOMY  1960    [facility] TREAT ECTOPIC PREG,RMV TUBE/OVARY  1985    ectopic, right side-ovary and tube removed     ALLERGIES:    Allergies   Allergen Reactions    Blood Transfusion Related (Informational Only) Other (See Comments)     Patient has a history of a clinically significant antibody against RBC antigens.  A delay in compatible RBCs may  occur.    Oxybutynin Itching    Seasonal Allergies      MEDICATIONS:   Current Outpatient Medications   Medication Sig Dispense Refill    acetaminophen (TYLENOL) 500 MG tablet Take 500-1,000 mg by mouth every 4 hours as needed for mild pain      amLODIPine (NORVASC) 5 MG tablet Take 5 mg by mouth every morning      calcium carbonate 750 MG CHEW Take 750 mg by mouth 3 times daily as needed (Patient not taking: Reported on 10/26/2023)      clotrimazole (LOTRIMIN) 1 % external cream Apply topically 2 times daily To affected areas 45 g 3    docusate sodium (COLACE) 100 MG capsule Take 100 mg by mouth daily as needed for constipation (Patient not taking: Reported on 10/26/2023)      ibuprofen (ADVIL/MOTRIN) 400 MG tablet Take 1 tablet (400 mg) by mouth every 6 hours as needed for inflammatory pain      loratadine (CLARITIN) 10 MG tablet Take 10 mg by mouth daily (Patient not taking: Reported on 10/26/2023)      magnesium oxide (MAG-OX) 400 MG tablet Take 400 mg by mouth every morning (Patient not taking: Reported on 10/26/2023)      naloxone (NARCAN) 4 MG/0.1ML nasal spray Spray 1 spray (4 mg) into one nostril alternating nostrils as needed for opioid reversal every 2-3 minutes until assistance arrives (Patient not taking: Reported on 10/26/2023) 0.2 mL 0    NONFORMULARY Apply topically daily Cavalon cream      ondansetron (ZOFRAN) 8 MG tablet Take 1 tablet (8 mg) by mouth every 8 hours as needed for nausea (vomiting) Do not take for 3 days after chemo. 30 tablet 2    prochlorperazine (COMPAZINE) 10 MG tablet Take 1 tablet (10 mg) by mouth every 6 hours as needed for nausea or vomiting (Patient not taking: Reported on 10/26/2023) 30 tablet 2    vancomycin (VANCOCIN) 125 MG capsule Take 1 capsule (125 mg) by mouth 2 times daily for 2 days, THEN 1 capsule (125 mg) daily for 7 days, THEN 1 capsule (125 mg) three times a week for 27 days. 9 capsule 0       REVIEW OF SYSTEMS: A 12-point review of systems was obtained.  Pertinent findings are noted in the HPI and are otherwise unremarkable.     PHYSICAL EXAM:  VITALS: BP (!) 146/83   Pulse 116   Wt 85.7 kg (189 lb)   LMP 03/08/2008   BMI 31.45 kg/m    GEN: Alert, oriented, in no acute distress  HEENT: Normocephalic  CV: Regular rate, extremities warm, well-perfused  RESP: Breathing comfortably on room air no wheezing  ABDOMEN: Nondistended  Pelvic: Deferred  MSK: Normal muscle bulk and tone  Extremities: No lower extremity lymphedema  NEURO: CN II-XII grossly intact    LABS AND IMAGING:  Lab Results   Component Value Date    WBC 4.6 10/17/2023    HGB 8.9 (L) 10/17/2023    HCT 28.5 (L) 10/17/2023     (H) 10/17/2023     10/17/2023     Lab Results   Component Value Date     10/17/2023    POTASSIUM 3.8 10/17/2023    CHLORIDE 102 10/17/2023    CO2 29 10/17/2023     (H) 10/17/2023      IMPRESSION/RECOMMENDATION:  Jessie Tamayo is a 67 year old female with squamous cell carcinoma of the vagina, HPV associated, clinical stage T1b N1 M0 status post definitive chemoradiation presenting 6 weeks post completion of radiation.  Since hospital discharge, she has been readmitted with C. difficile and is taking oral vancomycin.  She continues to have frequent mucousy stools and has been told not to take an antidiarrheal medication.  The etiology of the persistent diarrhea is unclear.  Typically, most women have less diarrhea at this stage post chemoradiation and are only using antidiarrheal medication intermittently.  I have asked her to reach out to the C. difficile clinic for recommendations as to what antidiarrheal medication she can take.    Nutrition consult to discuss low fiber diet  Continue to use vaginal dilator  Increase activity, try walking half a block  If deconditioning persists, consider physical therapy referral for cancer rehab.  Patient will reach out to C. difficile clinic for recommendations regarding antidiarrheal medication    We appreciate the  opportunity to participate in Jessie Salinas's care.  Please call with questions.    45 minutes spent by me on the date of the encounter doing chart review, history and exam, documentation and further activities per the note.    Marisa Pacheco MD  Radiation Oncology    CC:  Patient Care Team:  Alba Layton MD as PCP - General  Jfee Koenig MD as Assigned OBGYN Provider  Alba Layton MD as Assigned PCP  Lakeisha Mackenzie MD as MD (Urology)  Lawrence Banuelos MD as MD (Dermatology)  Lakeisha Mackenzie MD as Assigned Surgical Provider  Lakeisha Mackenzie MD as MD (Urology)  Reina Stahl MD as MD (Gynecologic Oncology)  Marisa Pacheco MD as MD (Radiation Oncology)  Lily Fabian PA-C as Physician Assistant (Anesthesiology)  Marisa Pacheco MD as Assigned Cancer Care Provider  Alba Layton MD as Assigned Pain Medication Provider  Ulysses Gusman MD as Assigned Palliative Care Provider  Juana Engle RN as Lead Care Coordinator (Primary Care - CC)  Shannon Flores MD as Hospitalist (HOSPITALIST)     This note was dictated with voice recognition software and then edited. Please excuse any unintentional errors.

## 2023-10-29 ENCOUNTER — NURSE TRIAGE (OUTPATIENT)
Dept: NURSING | Facility: CLINIC | Age: 68
End: 2023-10-29
Payer: COMMERCIAL

## 2023-10-29 NOTE — TELEPHONE ENCOUNTER
"Patient calling.    Situation: Sore throat, cough, headache, vomiting, fever.    Background: Vaginal cancer - completed radiation treatment per patient. She is currently on Vancomycin for C. Diff/colitis with end date of vanco on 12/01.    Assessment: Yesterday, she developed a sore throat, cough, headache, and had a fever of 101 degrees F via temporal thermometer. This morning around 530am, she vomited up yellow liquid, 3-4 times. Still having diarrhea, but states that it is not foul smelling anymore. Stools are brown, liquidy, and with mucous. She reports that she did a covid test yesterday and it was negative.    Recommended Disposition: Go to ED Now (or PCP triage). Recommended ED. Patient verbalized understanding and plans to follow advice.    Aura Covington RN on 10/29/2023 at 10:05 AM     Reason for Disposition   [1] Fever > 100.0 F (37.8 C) AND [2] diabetes mellitus or weak immune system (e.g., HIV positive, cancer chemo, splenectomy, organ transplant, chronic steroids)   [1] MODERATE vomiting (e.g., 3 - 5 times/day) AND [2] age > 60 years    Additional Information   Negative: Shock suspected (e.g., cold/pale/clammy skin, too weak to stand, low BP, rapid pulse)   Negative: Difficult to awaken or acting confused (e.g., disoriented, slurred speech)   Negative: Sounds like a life-threatening emergency to the triager   Negative: [1] Vomiting AND [2] contains red blood or black (\"coffee ground\") material  (Exception: Few red streaks in vomit that only happened once.)   Negative: Severe pain in one eye   Negative: Recent head injury (within last 3 days)   Negative: Recent abdominal injury (within last 3 days)   Negative: [1] Insulin-dependent diabetes (Type I) AND [2] glucose > 400 mg/dl (22 mmol/l)   Negative: [1] Vomiting AND [2] hernia is more painful or swollen than usual   Negative: [1] SEVERE vomiting (e.g., 6 or more times/day) AND [2] present > 8 hours (Exception: Patient sounds well, is drinking " liquids, does not sound dehydrated, and vomiting has lasted less than 24 hours.)   Negative: SEVERE difficulty breathing (e.g., struggling for each breath, speaks in single words)   Negative: Sounds like a life-threatening emergency to the triager   Negative: Fever > 104 F (40 C)   Negative: Patient sounds very sick or weak to the triager   Negative: [1] Fever > 101 F (38.3 C) AND [2] age > 60 years   Negative: [1] Fever > 100.0 F (37.8 C) AND [2] bedridden (e.g., CVA, chronic illness, recovering from surgery)    Protocols used: Common Cold-A-AH, Vomiting-A-AH

## 2023-10-29 NOTE — TELEPHONE ENCOUNTER
calling back to ask for if okay to take Dayquil/Nyquil along with other medications. Baljeet has c2c on file.    FNA advised MAJOR drug interaction per [employer]. Pt took compazine this morning, too Tylenol 1000 mg last night.    Drugs: Severity: Documentation: Summary:    ACETAMINOPHEN/ DEXTROMETHORPHAN HYDROBROMIDE/ PHENYLEPHRINE HYDROCHLORIDE -- ONDANSETRON HYDROCHLORIDE    Major   Fair Concurrent use of ONDANSETRON and SEROTONERGIC AGENTS may result in increased risk of serotonin syndrome.   PROCHLORPERAZINE -- DOXYLAMINE SUCCINATE/ PHENYLEPHRINE HYDROCHLORIDE    Major   Fair Concurrent use of DOXYLAMINE and CNS DEPRESSANTS may result in increased risk of CNS depression     FNA reinforced previous disposition to go to ED,  verbalized agreement.    Delfina Mckenzie RN/[facility] Nurse Advisor      Reason for Disposition   Caller has medicine question, adult has minor symptoms, caller declines triage, AND triager answers question    Protocols used: Medication Question Call-A-

## 2023-10-31 ENCOUNTER — TELEPHONE (OUTPATIENT)
Dept: GASTROENTEROLOGY | Facility: CLINIC | Age: 68
End: 2023-10-31
Payer: COMMERCIAL

## 2023-10-31 ENCOUNTER — PATIENT OUTREACH (OUTPATIENT)
Dept: GASTROENTEROLOGY | Facility: CLINIC | Age: 68
End: 2023-10-31
Payer: COMMERCIAL

## 2023-10-31 DIAGNOSIS — Z12.11 SPECIAL SCREENING FOR MALIGNANT NEOPLASMS, COLON: Primary | ICD-10-CM

## 2023-10-31 NOTE — TELEPHONE ENCOUNTER
Writer received a message from [facility] to help rescheduled Pt's appointment on 12/19/2023 with Dr. Shannon Flores to see Dr. Hunter. Writer called and left message for the Pt, along with call back number.     Writer will also send a [employer] message to the Pt.

## 2023-10-31 NOTE — PROGRESS NOTES
"Patients last colonoscopy was in 2017 and was recommended to repeat in 5 years. Patient now overdue and meets CRC criteria.     CRC Screening Colonoscopy Referral Review    Patient meets the inclusion criteria for screening colonoscopy standing order.    Ordering/Referring Provider:  Alba Layton MD    BMI: Estimated body mass index is 32.02 kg/m  as calculated from the following:    Height as of 10/7/23: 1.651 m (5' 5\").    Weight as of 10/26/23: 87.3 kg (192 lb 6.4 oz).     Sedation:  Does patient have any of the following conditions affecting sedation?  No medical conditions affecting sedation.    Previous Scopes:  Any previous recommendations or follow up needs based on previous scope?  na / No recommendations.    Medical Concerns to Postpone Order:  Does patient have any of the following medical concerns that should postpone/delay colonoscopy referral?  No medical conditions affecting colonoscopy referral.    Final Referral Details:  Based on patient's medical history patient is appropriate for referral order with moderate sedation. If patient's BMI > 50 do not schedule in ASC.  "

## 2023-10-31 NOTE — TELEPHONE ENCOUNTER
Pt called back. Pt is rescheduled to see Dr. Hunter on 11/15/2023 at 12 PM, in-person at . Dr. Hunter ok'd the appointment overbook.

## 2023-10-31 NOTE — TELEPHONE ENCOUNTER
Pt is scheduled for an appointment with Dr. Hunter on 11/15/2023 at 12 PM. Writer will be closing this encounter.

## 2023-11-01 ENCOUNTER — OFFICE VISIT (OUTPATIENT)
Dept: RADIATION ONCOLOGY | Facility: CLINIC | Age: 68
End: 2023-11-01
Attending: RADIOLOGY
Payer: COMMERCIAL

## 2023-11-01 ENCOUNTER — E-VISIT (OUTPATIENT)
Dept: URGENT CARE | Facility: CLINIC | Age: 68
End: 2023-11-01
Payer: COMMERCIAL

## 2023-11-01 ENCOUNTER — TELEPHONE (OUTPATIENT)
Dept: FAMILY MEDICINE | Facility: CLINIC | Age: 68
End: 2023-11-01

## 2023-11-01 ENCOUNTER — MYC MEDICAL ADVICE (OUTPATIENT)
Dept: FAMILY MEDICINE | Facility: CLINIC | Age: 68
End: 2023-11-01

## 2023-11-01 VITALS
DIASTOLIC BLOOD PRESSURE: 83 MMHG | WEIGHT: 189 LBS | BODY MASS INDEX: 31.45 KG/M2 | SYSTOLIC BLOOD PRESSURE: 146 MMHG | HEART RATE: 116 BPM

## 2023-11-01 DIAGNOSIS — C52 VAGINAL CANCER (H): Primary | ICD-10-CM

## 2023-11-01 DIAGNOSIS — U07.1 CLINICAL DIAGNOSIS OF COVID-19: Primary | ICD-10-CM

## 2023-11-01 DIAGNOSIS — Z20.822 SUSPECTED COVID-19 VIRUS INFECTION: ICD-10-CM

## 2023-11-01 PROCEDURE — 99024 POSTOP FOLLOW-UP VISIT: CPT | Performed by: RADIOLOGY

## 2023-11-01 PROCEDURE — G0463 HOSPITAL OUTPT CLINIC VISIT: HCPCS | Performed by: RADIOLOGY

## 2023-11-01 PROCEDURE — 99207 PR NON-BILLABLE SERV PER CHARTING: CPT | Performed by: NURSE PRACTITIONER

## 2023-11-01 NOTE — TELEPHONE ENCOUNTER
Pt called seeking treatment for Covid.  However, pt has abnormal liver enzymes and is taking amlodipine so a provider visit it needed.    While speaking with with pt, she received notification that she has a pending virtual urgent care visit now.    Pt will attend virtual care visit.    Aimee Benitez RN

## 2023-11-01 NOTE — PROGRESS NOTES
FOLLOW-UP VISIT    Patient Name: Jessie Tamayo      : 1955     Age: 67 year old        ______________________________________________________________________________     Chief Complaint   Patient presents with    Cancer     Follow up:Vaginal Cancer: Pelvis 6600 cGy and brachytherapy 2000 cGy completed 23     BP (!) 146/83   Pulse 116   Wt 85.7 kg (189 lb)   LMP 2008   BMI 31.45 kg/m         Pain  Denies    Labs  Other Labs: No    Imaging  CT: 10/07/23        Diarrhea: 3- Six to eight soft or liquid bowel movements per day    Constipation: 0- None    Nausea: 0- None    Vomitin- No vomiting    Genitourinary system: 0- Normal    Dysuria: 0- None    Vaginal dilator use: yes    Other Appointments:     MD Name: Dr Stahl Appointment Date: 23   MD Name: Appointment Date:   MD Name: Appointment Date:   Other Appointment Notes:     Residual Radiation side effect: Fatigue, diarrhea all day, loss of muscle

## 2023-11-01 NOTE — LETTER
2023         RE: Jessie Tamayo  [address] [address]        Dear Colleague,    Thank you for referring your patient, Jessie Tamayo, to the [facility] RADIATION ONCOLOGY. Please see a copy of my visit note below.     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        INTERVAL SINCE COMPLETION OF RADIATION THERAPY: 6 weeks    SUBJECTIVE:   Jessie Tamayo is a 67 year old female with squamous cell carcinoma of the vagina, HPV associated, clinical stage T1b N1 M0 who underwent definitive chemoradiation as described above.  She returns for 6-week follow-up.  Throughout her treatment, she had an indwelling Thomas catheter and struggled with bladder spasms and pain during chemoradiation.  She was discharged to home 1 day after completion of her interstitial brachytherapy on 2023.  She was readmitted to the hospital on 10/7/2023 with nausea, vomiting, profuse diarrhea and recurrent C. diffi.  In addition, she was found to have cystitis and was started on IV antibiotics.  She was discharged to home on 10/17/2023 on oral vancomycin.    Since discharge, she continues to have frequent mucousy stools.  She is not having any blood in stools.  She was told not to use any antidiarrheal medication.  She is eating a mostly low fiber diet although, until last week, she was eating salads.  She has not seen a dietitian yet.  She purchased vaginal dilators on the Internet and is using the smallest one  which, according to the diagram, has a diameter of 1.3 cm.  She is able to insert it a few centimeters each time.  She is not having any swelling or heaviness of her lower extremities.  Pain has also markedly improved since the urinary catheter was removed 3 weeks ago.    She is also quite deconditioned.  She is able to ambulate in the house with a walker but has not gone for walks outside the house.    PAST MEDICAL/SURGICAL HISTORY:   Past Medical History:   Diagnosis Date     Actinic keratosis      C. difficile diarrhea 09/08/2023     Hyperlipemia      Lichen sclerosus 2020     Osteopenias      SCC (squamous cell carcinoma) 07/26/2023      Past Surgical History:   Procedure Laterality Date     COLONOSCOPY  2006    normal     COLPOSCOPY, BIOPSY, COMBINED N/A 02/08/2021    Procedure: COLPOSCOPY, WITH BIOPSY, LEEP procedure;  Surgeon: Jefe Koenig MD;  Location: [facility] OR     CYSTOSCOPY N/A 05/30/2023    Procedure: CYSTOSCOPY AND EXCISIONAL BIOPSY OF THE VAGINAL TISSUE AROUND THE URETHRA.  (look in the bladder and sample small area in the vagina near the urethra);  Surgeon: Lakeisha Mackenzie MD;  Location: [facility] OR     EYE SURGERY      cataracts bilateral     INSERT INTERSTITIAL NEEDLE, ULTRASOUND GUIDED N/A 9/18/2023    Procedure: INSERTION, NEEDLE, WITH ULTRASOUND GUIDANCE, FOR INTERSTITIAL BRACHYTHERAPY;  Surgeon: Marisa Pacheco MD;  Location: [facility] OR     OPEN REDUCTION INTERNAL FIXATION FOREARM  07/31/2012    Procedure: OPEN REDUCTION INTERNAL FIXATION FOREARM;  Open Reduction Internal Fixation, Irrigation & Debridment  of Right Distal Radius, application short arm splint;  Surgeon: Wade Beard MD;  Location: [facility] OR     REMOVE INTERSTITIAL NEEDLE N/A 9/20/2023    Procedure: REMOVAL, INTERSTITIAL NEEDLE, AFTER TEMPORARY BRACHYTHERAPY;  Surgeon: Marisa Pacheco MD;  Location: [facility] OR     TONSILLECTOMY & ADENOIDECTOMY  1960     [facility] TREAT ECTOPIC PREG,RMV TUBE/OVARY  1985    ectopic,  right side-ovary and tube removed     ALLERGIES:    Allergies   Allergen Reactions     Blood Transfusion Related (Informational Only) Other (See Comments)     Patient has a history of a clinically significant antibody against RBC antigens.  A delay in compatible RBCs may occur.     Oxybutynin Itching     Seasonal Allergies      MEDICATIONS:   Current Outpatient Medications   Medication Sig Dispense Refill     acetaminophen (TYLENOL) 500 MG tablet Take 500-1,000 mg by mouth every 4 hours as needed for mild pain       amLODIPine (NORVASC) 5 MG tablet Take 5 mg by mouth every morning       calcium carbonate 750 MG CHEW Take 750 mg by mouth 3 times daily as needed (Patient not taking: Reported on 10/26/2023)       clotrimazole (LOTRIMIN) 1 % external cream Apply topically 2 times daily To affected areas 45 g 3     docusate sodium (COLACE) 100 MG capsule Take 100 mg by mouth daily as needed for constipation (Patient not taking: Reported on 10/26/2023)       ibuprofen (ADVIL/MOTRIN) 400 MG tablet Take 1 tablet (400 mg) by mouth every 6 hours as needed for inflammatory pain       loratadine (CLARITIN) 10 MG tablet Take 10 mg by mouth daily (Patient not taking: Reported on 10/26/2023)       magnesium oxide (MAG-OX) 400 MG tablet Take 400 mg by mouth every morning (Patient not taking: Reported on 10/26/2023)       naloxone (NARCAN) 4 MG/0.1ML nasal spray Spray 1 spray (4 mg) into one nostril alternating nostrils as needed for opioid reversal every 2-3 minutes until assistance arrives (Patient not taking: Reported on 10/26/2023) 0.2 mL 0     NONFORMULARY Apply topically daily [place] cream       ondansetron (ZOFRAN) 8 MG tablet Take 1 tablet (8 mg) by mouth every 8 hours as needed for nausea (vomiting) Do not take for 3 days after chemo. 30 tablet 2     prochlorperazine (COMPAZINE) 10 MG tablet Take 1 tablet (10 mg) by mouth every 6 hours as needed for nausea or vomiting (Patient not taking: Reported on 10/26/2023) 30 tablet  2     vancomycin (VANCOCIN) 125 MG capsule Take 1 capsule (125 mg) by mouth 2 times daily for 2 days, THEN 1 capsule (125 mg) daily for 7 days, THEN 1 capsule (125 mg) three times a week for 27 days. 9 capsule 0       REVIEW OF SYSTEMS: A 12-point review of systems was obtained. Pertinent findings are noted in the HPI and are otherwise unremarkable.     PHYSICAL EXAM:  VITALS: BP (!) 146/83   Pulse 116   Wt 85.7 kg (189 lb)   LMP 03/08/2008   BMI 31.45 kg/m    GEN: Alert, oriented, in no acute distress  HEENT: Normocephalic  CV: Regular rate, extremities warm, well-perfused  RESP: Breathing comfortably on room air no wheezing  ABDOMEN: Nondistended  Pelvic: Deferred  MSK: Normal muscle bulk and tone  Extremities: No lower extremity lymphedema  NEURO: CN II-XII grossly intact    LABS AND IMAGING:  Lab Results   Component Value Date    WBC 4.6 10/17/2023    HGB 8.9 (L) 10/17/2023    HCT 28.5 (L) 10/17/2023     (H) 10/17/2023     10/17/2023     Lab Results   Component Value Date     10/17/2023    POTASSIUM 3.8 10/17/2023    CHLORIDE 102 10/17/2023    CO2 29 10/17/2023     (H) 10/17/2023      IMPRESSION/RECOMMENDATION:  Jessie Tamayo is a 67 year old female with squamous cell carcinoma of the vagina, HPV associated, clinical stage T1b N1 M0 status post definitive chemoradiation presenting 6 weeks post completion of radiation.  Since hospital discharge, she has been readmitted with C. difficile and is taking oral vancomycin.  She continues to have frequent mucousy stools and has been told not to take an antidiarrheal medication.  The etiology of the persistent diarrhea is unclear.  Typically, most women have less diarrhea at this stage post chemoradiation and are only using antidiarrheal medication intermittently.  I have asked her to reach out to the C. difficile clinic for recommendations as to what antidiarrheal medication she can take.    Nutrition consult to discuss low fiber  diet  Continue to use vaginal dilator  Increase activity, try walking half a block  If deconditioning persists, consider physical therapy referral for cancer rehab.  Patient will reach out to C. difficile clinic for recommendations regarding antidiarrheal medication    We appreciate the opportunity to participate in Jessie Salinas's care.  Please call with questions.    45 minutes spent by me on the date of the encounter doing chart review, history and exam, documentation and further activities per the note.    Marisa Pacheco MD  Radiation Oncology    CC:  Patient Care Team:  Alba Layton MD as PCP - General  Jefe Koenig MD as Assigned OBGYN Provider  Alba Layton MD as Assigned PCP  Lakeisha Mackenzie MD as MD (Urology)  Lawrence Banuelos MD as MD (Dermatology)  Lakeisha Mackenzie MD as Assigned Surgical Provider  Lakeisha Mackenzie MD as MD (Urology)  Reina Stahl MD as MD (Gynecologic Oncology)  Marisa Pacheco MD as MD (Radiation Oncology)  Lily Fabian PA-C as Physician Assistant (Anesthesiology)  Marisa Pacheco MD as Assigned Cancer Care Provider  Alba Layton MD as Assigned Pain Medication Provider  Ulysses Gusman MD as Assigned Palliative Care Provider  Juana Engle RN as Lead Care Coordinator (Primary Care - CC)  Shannon Flores MD as Hospitalist (HOSPITALIST)     This note was dictated with voice recognition software and then edited. Please excuse any unintentional errors.         FOLLOW-UP VISIT    Patient Name: Jessie Tamayo      : 1955     Age: 67 year old        ______________________________________________________________________________     Chief Complaint   Patient presents with     Cancer     Follow up:Vaginal Cancer: Pelvis 6600 cGy and brachytherapy 2000 cGy completed 23     BP (!) 146/83   Pulse 116   Wt 85.7 kg (189 lb)   LMP 2008   BMI 31.45 kg/m         Pain  Denies    Labs  Other Labs: No    Imaging  CT:  10/07/23        Diarrhea: 3- Six to eight soft or liquid bowel movements per day    Constipation: 0- None    Nausea: 0- None    Vomitin- No vomiting    Genitourinary system: 0- Normal    Dysuria: 0- None    Vaginal dilator use: yes    Other Appointments:     MD Name: Dr Stahl Appointment Date: 23   MD Name: Appointment Date:   MD Name: Appointment Date:   Other Appointment Notes:     Residual Radiation side effect: Fatigue, diarrhea all day, loss of muscle            Again, thank you for allowing me to participate in the care of your patient.        Sincerely,        Marisa Pacheco MD

## 2023-11-02 ENCOUNTER — VIRTUAL VISIT (OUTPATIENT)
Dept: FAMILY MEDICINE | Facility: CLINIC | Age: 68
End: 2023-11-02
Payer: COMMERCIAL

## 2023-11-02 DIAGNOSIS — A04.72 C. DIFFICILE DIARRHEA: ICD-10-CM

## 2023-11-02 DIAGNOSIS — U07.1 INFECTION DUE TO 2019 NOVEL CORONAVIRUS: Primary | ICD-10-CM

## 2023-11-02 DIAGNOSIS — C52 VAGINAL CANCER (H): ICD-10-CM

## 2023-11-02 DIAGNOSIS — D61.818 PANCYTOPENIA (H): ICD-10-CM

## 2023-11-02 DIAGNOSIS — R03.0 ELEVATED BLOOD PRESSURE READING WITHOUT DIAGNOSIS OF HYPERTENSION: ICD-10-CM

## 2023-11-02 PROCEDURE — 99442 PR PHYSICIAN TELEPHONE EVALUATION 11-20 MIN: CPT | Mod: 93 | Performed by: FAMILY MEDICINE

## 2023-11-02 NOTE — TELEPHONE ENCOUNTER
RN COVID TREATMENT VISIT  11/02/23      The patient has been triaged and does not require a higher level of care.    Jessie Tamayo  67 year old  Current weight? 189lbs    Has the patient been seen by a primary care provider at an [facility] or [facility] within the past two years? Yes.   Have you been in close proximity to/do you have a known exposure to a person with a confirmed case of influenza? No.     General treatment eligibility:  Date of positive COVID test (PCR or at home)?  11/1/2023    Are you or have you been hospitalized for this COVID-19 infection? No.   Have you received monoclonal antibodies or antiviral treatment for COVID-19 since this positive test? No.   Do you have any of the following conditions that place you at risk of being very sick from COVID-19?   - Age 50 years or older  - Cancer/active malignancy including patients with cancer who are currently receiving chemotherapy or radiation, metastatic cancer, advance cancer and are receiving palliative care  - Heart conditions such as cardiomyopathies, congenital heart defects, coronary artery disease, heart arrhythmias, heart failure, hypertension, valve disorders   - Overweight or Obesity (BMI >85th percentile or BMI 25 or higher)  Yes, patient has at least one high risk condition as noted above.     Current COVID symptoms:   - fever or chills  - cough  - fatigue  - muscle or body aches  - headache  - sore throat  - congestion or runny nose  - nausea or vomiting  - diarrhea  Yes. Patient has at least one symptom as selected.     How many days since symptoms started? 5 days or less. Established patient, 12 years or older weighing at least 88.2 lbs, who has symptoms that started in the past 5 days, has not been hospitalized nor received treatment already, and is at risk for being very sick from COVID-19.     Treatment eligibility by RN:  Are you currently pregnant or nursing? No  Do you have a clinically significant  hypersensitivity to nirmatrelvir or ritonavir, or toxic epidermal necrolysis (TEN) or Metzger-Gabriel Syndrome? No  Do you have a history of hepatitis, any hepatic impairment on the Problem List (such as Child-Wilkinson Class C, cirrhosis, fatty liver disease, alcoholic liver disease), or was the last liver lab (hepatic panel, ALT, AST, ALK Phos, bilirubin) elevated in the past 6 months? No  Do you have any history of severe renal impairment (eGFR < 30mL/min)? No    Is patient eligible to continue? Yes, patient meets all eligibility requirements for the RN COVID treatment (as denoted by all no responses above).     Current Outpatient Medications   Medication Sig Dispense Refill    acetaminophen (TYLENOL) 500 MG tablet Take 500-1,000 mg by mouth every 4 hours as needed for mild pain      amLODIPine (NORVASC) 5 MG tablet Take 5 mg by mouth every morning      clotrimazole (LOTRIMIN) 1 % external cream Apply topically 2 times daily To affected areas (Patient not taking: Reported on 11/1/2023) 45 g 3    ibuprofen (ADVIL/MOTRIN) 400 MG tablet Take 1 tablet (400 mg) by mouth every 6 hours as needed for inflammatory pain (Patient not taking: Reported on 11/1/2023)      ondansetron (ZOFRAN) 8 MG tablet Take 1 tablet (8 mg) by mouth every 8 hours as needed for nausea (vomiting) Do not take for 3 days after chemo. (Patient not taking: Reported on 11/1/2023) 30 tablet 2    vancomycin (VANCOCIN) 125 MG capsule Take 1 capsule (125 mg) by mouth 2 times daily for 2 days, THEN 1 capsule (125 mg) daily for 7 days, THEN 1 capsule (125 mg) three times a week for 27 days. 9 capsule 0       Medications from List 1 of the standing order (on medications that exclude the use of Paxlovid) that patient is taking: NONE. Is patient taking [place]'s Wort? No  Is patient taking [place]'s Wort or any meds from List 1? No.   Medications from List 2 of the standing order (on meds that provider needs to adjust) that patient is taking: amlodipine  ([facility]), explained a provider visit is necessary to discuss medication adjustments while taking Paxlovid. Is patient on any of the meds from List 2? Yes. Patient scheduled a telephone visit with Dr. Iniguez for today at 1040  Iisah Jenkins RN

## 2023-11-02 NOTE — PROGRESS NOTES
Jessie is a 67 year old who is being evaluated via a billable telephone visit.      What phone number would you like to be contacted at? [phone]   How would you like to obtain your AVS? [place]    Distant Location (provider location):  On-site    Assessment & Plan     (U07.1) Infection due to 2019 novel coronavirus  (primary encounter diagnosis)  Comment: Discussed risks/benefits/side effects pf paxlovid with the patient.  She will cut her amlodipine dose in half   Plan: nirmatrelvir and ritonavir (PAXLOVID) 300         mg/100 mg therapy pack       (R03.0) Elevated blood pressure reading without diagnosis of hypertension  Comment: buy home cuff. Record blood pressure for 2-3 weeks. Come in to see pcp in person to discuss blood pressure The patient indicates understanding of these issues and agrees with the plan.   Plan:     (D61.818) Pancytopenia (H)  Comment: being monitored in oncology. Currently being treated for vaginal ca   Plan:     (A04.72) C. difficile diarrhea  Comment: on vanco currently   Plan:     (C52) Vaginal cancer (H)  Comment: being treated with radiation   Plan:        MED REC REQUIRED  Post Medication Reconciliation Status: discharge medications reconciled, continue medications without change      Jacki Iniguez MD  [facility] SVETA Roman is a 67 year old, presenting for the following health issues:  Covid Concern        11/2/2023     8:58 AM   Additional Questions   Roomed by CASANDRA Isaac   Accompanied by self       HPI       COVID-19 Symptom Review  How many days ago did these symptoms start? x5 days; she tested for covid day 1 of symptoms which was negative but when she tested again yesterday result came back positive     Positive test on 11/1/23    Are any of the following symptoms significant for you?  New or worsening difficulty breathing? No  Worsening cough? Yes, it's a dry cough.   Fever or chills? Yes, the highest temperature was 100   Headache:  YES  Sore throat: YES  Chest pain: No  Diarrhea: YES- but she is also currently being treated for C diff   Body aches? YES    What treatments has patient tried? Acetaminophen   Does patient live in a nursing home, group home, or shelter? No  Does patient have a way to get food/medications during quarantined? Yes, I have a friend or family member who can help me.    Interactions profile for vancomycin/zofran/amlodipine/paxlovid - amlodipine concentration increased when taken with paxlovid       Follow up:Vaginal Cancer: Pelvis 6600 cGy and brachytherapy 2000 cGy completed 09/20/23     Took paxlovid last year and felt it was helpful. Minimal side effects     Says she started the amlodipine about a month ago due to hypertension while in the hospital  She is taking 5mg/day   BP Readings from Last 6 Encounters:   11/01/23 (!) 146/83   10/26/23 134/82   10/17/23 (!) 145/81   09/21/23 (!) 148/80   09/17/23 (!) 145/75   09/15/23 118/74             Review of Systems   cardiovascular, gi and gu systems are negative, except as otherwise noted.      Objective           Vitals:  No vitals were obtained today due to virtual visit.    Physical Exam   healthy, alert, and no distress  PSYCH: Alert and oriented times 3; coherent speech, normal   rate and volume, able to articulate logical thoughts, able   to abstract reason, no tangential thoughts, no hallucinations   or delusions  Her affect is normal  RESP: No cough, no audible wheezing, able to talk in full sentences  Remainder of exam unable to be completed due to telephone visits          Phone call duration: 15 minutes

## 2023-11-02 NOTE — PATIENT INSTRUCTIONS
"Dear Jessie,      Based on your responses, you may have COVID-19.     How do I self-isolate?  You isolate when you have symptoms of COVID or a test shows you have COVID, even if you don t have symptoms.   If you DO have symptoms:  Stay home and away from others  For at least 5 days after your symptoms started, AND   You are fever free for 24 hours (with no medicine that reduces fever), AND  Your other symptoms are better.  Wear a mask for 10 full days any time you are around others.  If you DON T have symptoms:  Stay at home and away from others for at least 5 days after your positive test.  Wear a mask for 10 full days any time you are around others.    How can I take care of myself?  Over the counter medications may help with your symptoms such as runny or stuffy nose, cough, chills, or fever.  Talk to your care team about your options.     Some people are at high risk of severe illness (for example, you have a weak immune system, you re 50 years or older, or you have certain medical problems). If your risk is high and your symptoms started in the last 5 days, we strongly recommend for you to get COVID treatment as soon as possible.There are safe and effective medicines available that can make you feel better faster, and prevent hospitalization and death.       To discuss COVID treatment you can:  Call your clinic OR [phone] ([phone]) and ask to speak to a nurse about a positive COVID test.  Send a [employer]ora message to your care team. In [employer]ora, select \"Ask a Medical Question\" then \"Do I need an appointment\" and put \"COVID\" in the subject line. Please include a phone number to call you back in the message.             Get lots of rest. Drink extra fluids (unless a doctor has told you not to)  Take Tylenol (acetaminophen) or ibuprofen for fever or pain. If you have liver or kidney problems, ask your family doctor if it's okay to take Tylenol or ibuprofen  Take over the counter medications for your " symptoms, as directed by your doctor. You may also talk to your pharmacist.    If you have other health problems (like cancer, heart failure, an organ transplant or severe kidney disease): Call your specialty clinic if you don't feel better in the next 2 days.  Know when to call 911. Emergency warning signs include:  Trouble breathing or shortness of breath  Pain or pressure in the chest that doesn't go away  Feeling confused like you haven't felt before, or not being able to wake up  Bluish-colored lips or face    Where can I get more information?  [facility] - About COVID-19: [email or web address]   CDC - What to Do If You're Sick: https://www.cdc.gov/coronavirus/2019-ncov/if-you-are-sick/index.html   CDC -  Isolation https://www.cdc.gov/coronavirus/2019-ncov/your-health/isolation.html

## 2023-11-03 ENCOUNTER — VIRTUAL VISIT (OUTPATIENT)
Dept: ONCOLOGY | Facility: CLINIC | Age: 68
End: 2023-11-03
Attending: RADIOLOGY
Payer: COMMERCIAL

## 2023-11-03 DIAGNOSIS — C52 VAGINAL CANCER (H): Primary | ICD-10-CM

## 2023-11-03 PROCEDURE — 97802 MEDICAL NUTRITION INDIV IN: CPT | Mod: VID,95 | Performed by: DIETITIAN, REGISTERED

## 2023-11-03 NOTE — LETTER
"    11/3/2023         RE: Jessie Tamayo  [address] [facility] [address]        Dear Colleague,    Thank you for referring your patient, Jessie Tamayo, to the [facility]. Please see a copy of my visit note below.    Video-Visit Details     Type of service:  Video Visit     Video Start Time (time video started): 7:45am     Video End Time (time video stopped): 8:34am    Originating Location (pt. Location): Home     Distant Location (provider location):  [facility] NUTRITION SERVICES      Mode of Communication:  Video Conference via [facility]      CLINICAL NUTRITION SERVICES - ASSESSMENT NOTE    Jessie Tamayo 67 year old referred for MNT related to     Time Spent: 49 minutes  Visit Type: video  Referring Physician: Dr. Pacheco 11/1/23  C52 (ICD-10-CM) - Vaginal cancer (H)     NUTRITION HISTORY  Current diet: poor taste, smells are bothersome and have been causing nausea and vomiting.  She also has diarrhea, many times per day, unable to count.  She is currently be treated for recurrent cdiff  with vacomycin.   She cannot eat things she loves such as coffee, chocolate, milk, yogurt etc due to irritation to her bladder and her bowels.   She has been eating 3 meals/day and trying to stay hydrated.  She cannot drink sports drinks due to taste/smell aversion from vomiting.   She has been drinking one Ensure shake/day.  She drinks Ensure Max and Ensure Complete. She is able to keep these down.   She has been focused on a low fiber diet. She does eat a Taco Bowl from [employer] that's build her own. She tries to keep it low in fiber.     Diet Recall  Breakfast Ensure Max   1 egg, +/- hashbrowns   Lunch [employer] bowl protein bowl w/ rice guac, onion tomato OR beef barly soup   Dinner Similar to lunch - Soup or pasta, rice noodles cooked veggie, grilled beef   Snacks Baked potato chip   Beverages Water,      ANTHROPOMETRICS  Height: 65\"  Weight: 189 lbs/85kg  BMI: 31  Weight " Status:  Obesity Grade I BMI 30-34.9  Weight History: down 20 lb x past 2 months  Wt Readings from Last 10 Encounters:   11/01/23 85.7 kg (189 lb)   10/26/23 87.3 kg (192 lb 6.4 oz)   10/10/23 92.1 kg (203 lb 1.6 oz)   09/18/23 93.3 kg (205 lb 11 oz)   09/15/23 95.3 kg (210 lb)   09/12/23 97.3 kg (214 lb 9.6 oz)   09/05/23 95.9 kg (211 lb 6.7 oz)   09/01/23 95.7 kg (210 lb 14.4 oz)   08/29/23 98.2 kg (216 lb 8 oz)   08/28/23 95.2 kg (209 lb 14.4 oz)     Dosing Weight: 64kg    Medications/vitamins/minerals/herbals:   Reviewed    Labs:  Reviewed    ASSESSED NUTRITION NEEDS:  Estimated Energy Needs: 5007-1336 kcals (25-30 Kcal/Kg)  Justification: maintenance  Estimated Protein Needs: 65-75 grams protein (1-1.2 g pro/Kg)  Justification: maintenance    NUTRITION DIAGNOSIS:  Altered GI function related to recurrent Cdiff as evidenced by ongoing loose stools, electrolyte abnormalities (K+, Mg), weight loss.     INTERVENTIONS  Provided written & verbal education:   --Reviewed low fiber diet. Discussed low fiber diet of <10-12 grams/day. Encouraged to spread fiber out throughout meal versus all at one meal.  Provided low fiber meal plan and foods to avoid/choose more often.   --Discussed hydration. Encouraged to start adding electrolytes, Smart water and/or making her own home made recipe (provided).  Encouraged to aim for at least 8 cups/day.  --Discussed nutrition goals for maintenance. Encouraged to aim for at least 1600 calories and 65-75g protein/day for maintenace/improvement of nutrition.   --Discussed supplements and ways to cope with diarrhea. Encouraged to try Banatrol supplement.      Provided pt with corresponding education materials/handouts on:  --Fiber content of common foods  --Low fiber diet (sample meal plan)  --Low fiber diet (<8 grams/day)  --Sources of protein  --Thrive recipes  --Coping with diarrhea  --Electrolyte hydration option  --High calorie, high protein recipes    Pt verbalize understanding of  materials provided during consult.   Patient Understanding: Excellent  Expected Compliance: Excellent     Goals  1.  Low fiber diet - <12 grams per day  2. Fluids 8+ cups water/electrolyte per day  3. Weight stability     Follow-Up Plans: Pt has RD contact information for questions.      Brandy Paige RD, , LD

## 2023-11-03 NOTE — PROGRESS NOTES
"Video-Visit Details     Type of service:  Video Visit     Video Start Time (time video started): 7:45am     Video End Time (time video stopped): 8:34am    Originating Location (pt. Location): Home     Distant Location (provider location):  [facility] NUTRITION SERVICES      Mode of Communication:  Video Conference via [facility]      CLINICAL NUTRITION SERVICES - ASSESSMENT NOTE    Jessie Tamayo 67 year old referred for MNT related to     Time Spent: 49 minutes  Visit Type: video  Referring Physician: Dr. Pacheco 11/1/23  C52 (ICD-10-CM) - Vaginal cancer (H)     NUTRITION HISTORY  Current diet: poor taste, smells are bothersome and have been causing nausea and vomiting.  She also has diarrhea, many times per day, unable to count.  She is currently be treated for recurrent cdiff  with vacomycin.   She cannot eat things she loves such as coffee, chocolate, milk, yogurt etc due to irritation to her bladder and her bowels.   She has been eating 3 meals/day and trying to stay hydrated.  She cannot drink sports drinks due to taste/smell aversion from vomiting.   She has been drinking one Ensure shake/day.  She drinks Ensure Max and Ensure Complete. She is able to keep these down.   She has been focused on a low fiber diet. She does eat a Taco Bowl from [employer] that's build her own. She tries to keep it low in fiber.     Diet Recall  Breakfast Ensure Max   1 egg, +/- hashbrowns   Lunch [employer] bowl protein bowl w/ rice guac, onion tomato OR beef barly soup   Dinner Similar to lunch - Soup or pasta, rice noodles cooked veggie, grilled beef   Snacks Baked potato chip   Beverages Water,      ANTHROPOMETRICS  Height: 65\"  Weight: 189 lbs/85kg  BMI: 31  Weight Status:  Obesity Grade I BMI 30-34.9  Weight History: down 20 lb x past 2 months  Wt Readings from Last 10 Encounters:   11/01/23 85.7 kg (189 lb)   10/26/23 87.3 kg (192 lb 6.4 oz)   10/10/23 92.1 kg (203 lb 1.6 oz)   09/18/23 93.3 kg (205 lb 11 oz) "   09/15/23 95.3 kg (210 lb)   09/12/23 97.3 kg (214 lb 9.6 oz)   09/05/23 95.9 kg (211 lb 6.7 oz)   09/01/23 95.7 kg (210 lb 14.4 oz)   08/29/23 98.2 kg (216 lb 8 oz)   08/28/23 95.2 kg (209 lb 14.4 oz)     Dosing Weight: 64kg    Medications/vitamins/minerals/herbals:   Reviewed    Labs:  Reviewed    ASSESSED NUTRITION NEEDS:  Estimated Energy Needs: 5641-8372 kcals (25-30 Kcal/Kg)  Justification: maintenance  Estimated Protein Needs: 65-75 grams protein (1-1.2 g pro/Kg)  Justification: maintenance    NUTRITION DIAGNOSIS:  Altered GI function related to recurrent Cdiff as evidenced by ongoing loose stools, electrolyte abnormalities (K+, Mg), weight loss.     INTERVENTIONS  Provided written & verbal education:   --Reviewed low fiber diet. Discussed low fiber diet of <10-12 grams/day. Encouraged to spread fiber out throughout meal versus all at one meal.  Provided low fiber meal plan and foods to avoid/choose more often.   --Discussed hydration. Encouraged to start adding electrolytes, Smart water and/or making her own home made recipe (provided).  Encouraged to aim for at least 8 cups/day.  --Discussed nutrition goals for maintenance. Encouraged to aim for at least 1600 calories and 65-75g protein/day for maintenace/improvement of nutrition.   --Discussed supplements and ways to cope with diarrhea. Encouraged to try Banatrol supplement.      Provided pt with corresponding education materials/handouts on:  --Fiber content of common foods  --Low fiber diet (sample meal plan)  --Low fiber diet (<8 grams/day)  --Sources of protein  --Thrive recipes  --Coping with diarrhea  --Electrolyte hydration option  --High calorie, high protein recipes    Pt verbalize understanding of materials provided during consult.   Patient Understanding: Excellent  Expected Compliance: Excellent     Goals  1.  Low fiber diet - <12 grams per day  2. Fluids 8+ cups water/electrolyte per day  3. Weight stability     Follow-Up Plans: Pt has RD  contact information for questions.      Brandy Paige RD, , LD

## 2023-11-03 NOTE — PATIENT INSTRUCTIONS
Preeti Roman,     It was nice meeting you and Baljeet.     I have attached the resources that I referred to during our conversation (see your email):  --Fiber content of common foods  --Low fiber diet (sample meal plan)  --Low fiber diet (<8 grams/day)  --Sources of protein  --Thrive recipes  --Coping with diarrhea  --Electrolyte hydration option  --High calorie, high protein recipes    Here are your nutrition goals:  --Calories: 4266-7352/day  --Protein: 65-75kg/day  --Fluids: 8 cups (64oz) water/electrolyte fluids/day  --Fiber: <10 grams/day    Here is the link to the banantrol supplement:  https://[email or web address]an[employer]/product/banatrol-plus/    Please reach out to me with any questions along the way!  I hope you feel better soon!    Be well,     Brandy Paige RD, , LD  Board Certified Specialist in Oncology Nutrition  [facility]  Oncology Services  [email or web address]   Office: [phone]  Fax: [phone]

## 2023-11-09 NOTE — PROGRESS NOTES
Spoke with pt  In hospital 10/7-17  Since discharge, stools are soft, no diarrhea, more mucousy, with some chunks  No smell to stool  Going in small amounts frequently  Sometimes every 10 minutes-1 hour  Lots of gas  Feels like she does not have much control  Stools are different than while in hospital  These issues have started since discharge  Gallo brown in color  Mushy no shape   Having some nausea, no vomiting, taking zofran as needed  Notes abdominal pain, remains but this is not new, no worse than it has been, comes and goes.  Seems to improve with stools  No pain meds needed  Really feels like things are improving  Wonders if problems are related to radiation  Eating and drinking well, no issues  Appetite is improving   Still trying to stick with bland diet  Staying away from spicy/greasy  Eating/drinking does not really affect loose stools  Denies fever  Yesterday noted aches and pain all over body, today feeling better  Will discuss with NP and call pt back if suggestions or appt needed    Likely just recovery from c diff along with radiation changes  Continue to push fluids, monitor diet and call if symptoms worsen

## 2023-11-13 ENCOUNTER — OFFICE VISIT (OUTPATIENT)
Dept: INFECTIOUS DISEASES | Facility: CLINIC | Age: 68
End: 2023-11-13
Attending: INTERNAL MEDICINE
Payer: COMMERCIAL

## 2023-11-13 VITALS
DIASTOLIC BLOOD PRESSURE: 86 MMHG | BODY MASS INDEX: 31.49 KG/M2 | HEART RATE: 112 BPM | TEMPERATURE: 97.8 F | WEIGHT: 189 LBS | HEIGHT: 65 IN | SYSTOLIC BLOOD PRESSURE: 131 MMHG

## 2023-11-13 DIAGNOSIS — A04.72 C. DIFFICILE DIARRHEA: Primary | ICD-10-CM

## 2023-11-13 DIAGNOSIS — K52.9 COLITIS: ICD-10-CM

## 2023-11-13 PROCEDURE — 99215 OFFICE O/P EST HI 40 MIN: CPT | Performed by: INTERNAL MEDICINE

## 2023-11-13 PROCEDURE — G0463 HOSPITAL OUTPT CLINIC VISIT: HCPCS | Performed by: INTERNAL MEDICINE

## 2023-11-13 ASSESSMENT — PAIN SCALES - GENERAL: PAINLEVEL: MILD PAIN (2)

## 2023-11-13 NOTE — LETTER
11/13/2023       RE: Jessie Tamayo  [address] [facility] [address]     Dear Colleague,    Thank you for referring your patient, Jessie Tamayo, to the [facility] INFECTIOUS DISEASE CLINIC [place] at [facility]. Please see a copy of my visit note below.    [facility]  General Infectious Disease Clinic: Follow up Note     Patient:  Jessie Tamayo, Date of birth 1955   Medical record number [id number]  Date of Visit:  11/13/2023    ASSESSMENT AND PLAN     Jessie Tamayo is a 68 year old female with stage 4 vaginal cancer, s/p chemorad. Her quality of stools have changed and I think we have control of her C diff. It might be proctitis due to radiation. We discussed that she can try anti-diarrheal loperamide if it helps. I told them I can't be 100% certain this will work or worsen her case. We made the shared decision to try loperamide. She will complete the therapy for C diff at the end of the month.     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      40 minutes spent by me on the date of the encounter doing chart review, history and exam, documentation and further activities per the note    JUANITA LONDON MD    Infectious Diseases  Contact me on [employer] or Find my pager on [employer].     SUBJECTIVE     History of Present lllness:  Jessie Tamayo is a 67 year old female with stage 4 vaginal cancer, s/p chemorad. She has been diagnosed with C diff in August and completed it but it has recurred. She was admitted at [facility] from 10/7/2023 - 10/17/2023. She  "underwent colonscopy and histopath showed Moderately active colitis, with pseudomembranes, compatible with C. Difficile colitis. She was discharged on tapering dose of oral vancomycin.    Since discharge, she has still been having mucous stools and having difficulty with incontinence due to mucous stools. She wakes up every 2 hours. Her stools are formed but with part mucous. They said the quality of her stools have changed and does not smell or look like C diff when compared to when she was admitted.     Before our visit, she ate leafy greens at a [employer] restaurant which made it a lot worse. She has some crampy abdominal pain on the sides and lower end. They said it's not that bad whenever she's at home. She does not have fever. She did not notice any worsening of her symptoms when her oral vanco was tapered but her nausea got better. She said her stools are worse with dairy, caffeine, carbonated drinks, chocolate.     OBJECTIVE     /86   Pulse 112   Temp 97.8  F (36.6  C) (Oral)   Ht 1.651 m (5' 5\")   Wt 85.7 kg (189 lb)   LMP 03/08/2008   BMI 31.45 kg/m    Wt Readings from Last 4 Encounters:   11/13/23 85.7 kg (189 lb)   11/01/23 85.7 kg (189 lb)   10/26/23 87.3 kg (192 lb 6.4 oz)   10/10/23 92.1 kg (203 lb 1.6 oz)     Physical Exam:  GENERAL: in no acute distress.  ABDOMEN: soft, nontender. No distention     Laboratory Data:  10/13 sigmoid culture - Clostridium perfringens, Escherichia coli, Citrobacter koseri   10/11 BCx- no growth  10/11 RPP - neg   10/7 C diff - triple positive   10/8 Ucx - Serratia marcescens     DON CHARI LONDON MD    "

## 2023-11-13 NOTE — PROGRESS NOTES
[facility]  General Infectious Disease Clinic: Follow up Note     Patient:  Jessie Tamayo, Date of birth 1955   Medical record number [id number]  Date of Visit:  11/13/2023    ASSESSMENT AND PLAN     Jessie Tamayo is a 68 year old female with stage 4 vaginal cancer, s/p chemorad. Her quality of stools have changed and I think we have control of her C diff. It might be proctitis due to radiation. We discussed that she can try anti-diarrheal loperamide if it helps. I told them I can't be 100% certain this will work or worsen her case. We made the shared decision to try loperamide. She will complete the therapy for C diff at the end of the month.     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      40 minutes spent by me on the date of the encounter doing chart review, history and exam, documentation and further activities per the note    JUANITA LONDON MD    Infectious Diseases  Contact me on [employer] or Find my pager on [employer].     SUBJECTIVE     History of Present lllness:  Jessie Tamayo is a 67 year old female with stage 4 vaginal cancer, s/p chemorad. She has been diagnosed with C diff in August and completed it but it has recurred. She was admitted at [facility] from 10/7/2023 - 10/17/2023. She underwent colonscopy and histopath showed Moderately active colitis, with pseudomembranes, compatible with C. Difficile colitis. She was discharged on tapering dose of oral vancomycin.    Since discharge, she has still been having mucous stools and having difficulty with incontinence due to mucous stools. She wakes up  "every 2 hours. Her stools are formed but with part mucous. They said the quality of her stools have changed and does not smell or look like C diff when compared to when she was admitted.     Before our visit, she ate leafy greens at a [employer] restaurant which made it a lot worse. She has some crampy abdominal pain on the sides and lower end. They said it's not that bad whenever she's at home. She does not have fever. She did not notice any worsening of her symptoms when her oral vanco was tapered but her nausea got better. She said her stools are worse with dairy, caffeine, carbonated drinks, chocolate.     OBJECTIVE     /86   Pulse 112   Temp 97.8  F (36.6  C) (Oral)   Ht 1.651 m (5' 5\")   Wt 85.7 kg (189 lb)   LMP 03/08/2008   BMI 31.45 kg/m    Wt Readings from Last 4 Encounters:   11/13/23 85.7 kg (189 lb)   11/01/23 85.7 kg (189 lb)   10/26/23 87.3 kg (192 lb 6.4 oz)   10/10/23 92.1 kg (203 lb 1.6 oz)     Physical Exam:  GENERAL: in no acute distress.  ABDOMEN: soft, nontender. No distention     Laboratory Data:  10/13 sigmoid culture - Clostridium perfringens, Escherichia coli, Citrobacter koseri   10/11 BCx- no growth  10/11 RPP - neg   10/7 C diff - triple positive   10/8 Ucx - Serratia marcescens   "

## 2023-11-13 NOTE — NURSING NOTE
"Chief Complaint   Patient presents with    RECHECK     /86   Pulse 112   Temp 97.8  F (36.6  C) (Oral)   Ht 1.651 m (5' 5\")   Wt 85.7 kg (189 lb)   LMP 03/08/2008   BMI 31.45 kg/m    Danielle Arguelles CMA on 11/13/2023 at 2:23 PM    "

## 2023-11-15 ENCOUNTER — OFFICE VISIT (OUTPATIENT)
Dept: GASTROENTEROLOGY | Facility: CLINIC | Age: 68
End: 2023-11-15
Payer: COMMERCIAL

## 2023-11-15 VITALS
HEIGHT: 65 IN | DIASTOLIC BLOOD PRESSURE: 72 MMHG | SYSTOLIC BLOOD PRESSURE: 103 MMHG | HEART RATE: 85 BPM | BODY MASS INDEX: 31.54 KG/M2 | WEIGHT: 189.3 LBS | OXYGEN SATURATION: 98 %

## 2023-11-15 DIAGNOSIS — A04.72 C. DIFFICILE DIARRHEA: ICD-10-CM

## 2023-11-15 PROCEDURE — 99205 OFFICE O/P NEW HI 60 MIN: CPT | Performed by: INTERNAL MEDICINE

## 2023-11-15 ASSESSMENT — PAIN SCALES - GENERAL: PAINLEVEL: MILD PAIN (2)

## 2023-11-15 NOTE — NURSING NOTE
"Chief Complaint   Patient presents with    New Patient     New consult for recurrent C-Diff.     She requests these members of her care team be copied on today's visit information:  PCP: Alba Layton    Vitals:    11/15/23 1145   BP: 103/72   BP Location: Left arm   Patient Position: Sitting   Cuff Size: Adult Regular   Pulse: 85   SpO2: 98%   Weight: 85.9 kg (189 lb 4.8 oz)   Height: 1.651 m (5' 5\")     Body mass index is 31.5 kg/m .    Medications were reconciled.        Argelia Craig CMA    "

## 2023-11-15 NOTE — CONFIDENTIAL NOTE
Ms. Tamayo is a 68-year-old woman who is being seen for recurrent C. difficile infection.      The patient was recently diagnosed and treated for vaginal cancer.  She received radiation and chemotherapy.  During that time she had an indwelling bladder catheter that was complicated by an infection.  Treatment of that with antibiotics was then complicated by C. difficile infection early September of this year. She was hospitalized for that. Testing was positive for both toxin PCR and antigen.  The patient was treated initially with vancomycin and had good response.  However in early October she relapsed, hospitalized again, and was once again diagnosed with C. difficile infection.  In fact, the infection was severe with pseudomembranous colitis seen on flexible sigmoidoscopy.  Her medical treatment included high-dose vancomycin orally and intravenous metronidazole.  After that she was discharged on vancomycin taper, and currently she is on the tail end of the taper with 1 vancomycin 3 times per week.  Until several days ago she was still having frequent bowel movements with high mucus content.  However she did have several formed bowel movements over the past 2 days.  Early November the patient had COVID that was treated with Paxlovid.    Past Medical History includes:  - Genitourinary Syndrome of Menopause. However, no history of recurrent UTIs  - Vaginal Cancer, stage IVB SCC.  - History of Ectopic Pregnancy.    Social History: The patient has no biological children but has one adopted. She is .    On physical examination she appears to be in no acute distress. Vital signs were stable.    Assessment and plan.  I presented all possible next steps in treatment of her recurrent C. difficile infection.  One option is simply to see if she relapses after completion of this taper.  I estimate the risk of a relapse at about 50%, especially since she had documented severe disease.  Another option is to administer  Zinplava.  However it is unlikely to be approved by insurance after a single recurrence.  I mentioned the tow FDA approved commercial microbiota-based products, Rebyota and Vowst.  Our preparation of microbiota is also a possibility and it would decrease the chances of relapse to 15%.  The patient was interested in learning more about it and I will send her frequently ask questions and consent handouts.  I spent the rest of the visit explaining some of the essentials of C. difficile infection pathogenesis and microbiota based treatments.  The patient is also interested in our diet study which she examines post microbiota transplant diet management management.    The tentative plan is to go ahead with oral capsule IMT. We discussed the colonoscopic version, especially as she was reminded to have her colonoscopy this year. The patient had 2 small adenomas in 2017, which translates into 7-10 year follow-up. Thus, there is no indication for a surveillance colonoscopy quite yet.    Total time spent in this visit today, including review of medical records, documentation and coordination of care was 75 minutes.

## 2023-11-15 NOTE — LETTER
11/15/2023         RE: Jessie Tamayo  [address] [place] [id number]        Dear Colleague,    Thank you for referring your patient, Jessie Tamayo, to the [facility]. Please see a copy of my visit note below.    No notes on file    Again, thank you for allowing me to participate in the care of your patient.        Sincerely,        Tad Hunter MD

## 2023-11-30 NOTE — PROGRESS NOTES
Pain Service Progress Note  [facility]  Date: 09/20/2023         Patient Name: Jessie Tamayo  MRN: [id number]  Age: 67 year old  Sex: female        Assessment:  67 year old female with stage IVB SCC of the vagina, c.diff positive, HTN, HLD, treatment induced pancytopenia, chronic indwelling gonsalez, lichen sclerosus, and actinic keratosis admitted for interstitial needle placement for HDR. Patient completed EBRT with concomitant weekly cisplatin and here for HDR. Patient stable and pain controlled with lumbar epidural. Initial concern for low platelets, level is increasing and appropriate.      Procedure: interstitial needle placement for HDR     Date of Surgery: 9/18/23     Date of Catheter Placement: 9/18/23     Plan/Recommendations:  1. Regional Anesthesia/Analgesia  -Continuous Catheter Type/Site:L2-3 Lumbar epidural   Infusate: Dilaudid 6mcg/ml, bupivicaine 0.0625% epidiural PCEA  Continuous Infusion at 10 mL/hr  PCEA dose 7 mL      Plan for catheter removal 9/20 after removal of brachytherapy needles.      Plan to maintain catheter, max of 7 days     2. Anticoagulation  -Please contact Inpatient Pain Service before ordering or making any anticoagulation changes.     Please obtain platelet level 9/20, in preparation for removal of epidural catheter.       3. Multimodal Analgesia  - per primary team   - Patient requested referral to chronic pain clinic, please place referral at the time of discharge.      Pain Service will continue to follow. - until catheter removal.      Discussed with attending anesthesiologist     Karena Savage MD  09/20/2023      Overnight Events: No acute events overnight.      Tubes/Drains: No     Subjective:  Needed one little bump, but otherwise my pain is gone.    Nausea: No  Vomiting: No  Pruritus: No  Symptoms of LAST: No     Pain Location:  None     Pain Intensity:    Pain at Rest: 0/10   Pain with Activity: 0/10  Comfort Goal: 0/10   Baseline Pain: 0/10  "  Satisfied with your level of pain control: Yes     Diet: NPO for Medical/Clinical Reasons Except for: Meds     Relevant Labs:      Recent Labs   Lab Test 09/19/23  0700   *   BUN 11.3         Physical Exam:  Vitals: BP (!) 140/60 (BP Location: Right arm)   Pulse 94   Temp 36.9  C (98.4  F) (Oral)   Resp 17   Ht 1.651 m (5' 5\")   Wt 93.3 kg (205 lb 11 oz)   LMP 03/08/2008   SpO2 93%   BMI 34.23 kg/m       Physical Exam:   Orientation:  Alert, oriented, and in no acute distress: Yes  Sedation: No     Motor Examination:  5/5 Strength in lower extremities: Yes, sensation in tact in bilateral lower extremities, moves BLE equally and symmetrically.     Sensory Level:   Decrease in sensation: No     Catheter Site:   Catheter entry site is clean/dry/intact: Yes     Tender: No        Relevant Medications:  Current Pain Medications:  Medications related to Pain Management (From now, onward)        Start     Dose/Rate Route Frequency Ordered Stop     09/18/23 1712   ibuprofen (ADVIL/MOTRIN) tablet 400 mg         400 mg Oral EVERY 6 HOURS PRN 09/18/23 1712       09/18/23 1530   HYDROmorphone (DILAUDID) 6 mcg/mL, BUPivacaine (MARCAINE) 0.0625 % in sodium chloride 0.9 % 250 mL EPIDURAL PCEA (patient controlled epidural)           EPIDURAL PCEA 09/18/23 1515       09/18/23 1452   simethicone (MYLICON) chewable tablet 80 mg        Note to Pharmacy: PTA Sig:Take 125 mg by mouth 4 times daily as needed for intestinal gas      80 mg Oral EVERY 4 HOURS PRN 09/18/23 1428       09/18/23 1451   loratadine (CLARITIN) tablet 10 mg        Note to Pharmacy: PTA Sig:Take 10 mg by mouth daily      10 mg Oral DAILY PRN 09/18/23 1428       09/18/23 1427   acetaminophen (TYLENOL) tablet 500-1,000 mg        Note to Pharmacy: PTA Sig:Take 500-1,000 mg by mouth every 4 hours as needed for mild pain      500-1,000 mg Oral EVERY 6 HOURS PRN 09/18/23 1428       09/18/23 0744   nalbuphine (NUBAIN) injection 2.5-5 mg         2.5-5 mg " Intravenous EVERY 6 HOURS PRN 09/18/23 0749                  Primary Service Contacted with Recommendations? Yes

## 2023-12-08 ENCOUNTER — TELEPHONE (OUTPATIENT)
Dept: GASTROENTEROLOGY | Facility: CLINIC | Age: 68
End: 2023-12-08
Payer: COMMERCIAL

## 2023-12-08 NOTE — TELEPHONE ENCOUNTER
The patient had her capsule IMT November 30th. She is in the MEND diet study as well, while investigates the optimal post-IMT diet.    The research coordinator called today that the patient continues to have frequent bowel movements, ~ 8-10 per day.    The patient says that overall the bowel movements are improving. She has formed, although loose stools (type 5-6). She denies cramps or excessive gas. She admits to copious mucus, as she described in the clinic visit. However, there may be some decrease in the mucus production relative to a week ago.    The patient's symptoms are likely multi-factorial. She had chemotherapy and radiation for vaginal cancer. She had COVID in early November, and she is recovering from C. Diff.    At this time there is no clinical suspicion for C. Difficile infection recurrence. The patient will contact me if there is any further deterioration.    Tad Hunter MD

## 2023-12-22 ENCOUNTER — HOSPITAL ENCOUNTER (OUTPATIENT)
Dept: PET IMAGING | Facility: CLINIC | Age: 68
Discharge: HOME | End: 2023-12-22
Attending: OBSTETRICS & GYNECOLOGY | Admitting: OBSTETRICS & GYNECOLOGY
Payer: COMMERCIAL

## 2023-12-22 DIAGNOSIS — C52 VAGINAL CANCER (H): ICD-10-CM

## 2023-12-22 PROCEDURE — A9552 F18 FDG: HCPCS | Performed by: OBSTETRICS & GYNECOLOGY

## 2023-12-22 PROCEDURE — 78816 PET IMAGE W/CT FULL BODY: CPT | Mod: PS

## 2023-12-22 PROCEDURE — 74177 CT ABD & PELVIS W/CONTRAST: CPT

## 2023-12-22 PROCEDURE — 250N000011 HC RX IP 250 OP 636: Performed by: OBSTETRICS & GYNECOLOGY

## 2023-12-22 PROCEDURE — 71260 CT THORAX DX C+: CPT | Mod: 26 | Performed by: RADIOLOGY

## 2023-12-22 PROCEDURE — 343N000001 HC RX 343: Performed by: OBSTETRICS & GYNECOLOGY

## 2023-12-22 PROCEDURE — 250N000011 HC RX IP 250 OP 636: Mod: JZ | Performed by: OBSTETRICS & GYNECOLOGY

## 2023-12-22 PROCEDURE — 74177 CT ABD & PELVIS W/CONTRAST: CPT | Mod: 26 | Performed by: RADIOLOGY

## 2023-12-22 PROCEDURE — 78816 PET IMAGE W/CT FULL BODY: CPT | Mod: 26 | Performed by: RADIOLOGY

## 2023-12-22 RX ORDER — IOPAMIDOL 755 MG/ML
10-135 INJECTION, SOLUTION INTRAVASCULAR ONCE
Status: COMPLETED | OUTPATIENT
Start: 2023-12-22 | End: 2023-12-22

## 2023-12-22 RX ORDER — FUROSEMIDE 10 MG/ML
40 INJECTION INTRAMUSCULAR; INTRAVENOUS ONCE
Status: COMPLETED | OUTPATIENT
Start: 2023-12-22 | End: 2023-12-22

## 2023-12-22 RX ADMIN — FLUDEOXYGLUCOSE F-18 11.38 MILLICURIE: 500 INJECTION, SOLUTION INTRAVENOUS at 09:48

## 2023-12-22 RX ADMIN — IOPAMIDOL 116 ML: 755 INJECTION, SOLUTION INTRAVENOUS at 10:47

## 2023-12-22 RX ADMIN — FUROSEMIDE 40 MG: 10 INJECTION, SOLUTION INTRAMUSCULAR; INTRAVENOUS at 10:47

## 2023-12-27 NOTE — PROGRESS NOTES
Follow-up Note on Referred Patient  Gynecologic Oncology Clinic       Date of visit: 2023       Referring Provider/Urologist:  Dr. Lakeisha Mackenzie MD  [address] [place] [address]     Gynecologist:   Jefe Koenig MD  [facility] [place]    Primary Care Provider:  Alba Layton  [address] [address] [facility] [address]     Radiation Oncologist:  Marisa Pacheco MD  [facility]      RE: Jessie Tamayo  : 1955  Language: English   Pronouns: she/her/hers       Reason for visit: FIGO Stage IVB squamous cell carcinoma of the vagina. Post-therapy visit.       History of Present Illness:   Jessie Tamayo is a 68 year old patient referred to the [facility] Gynecologic Oncology HPV Clinic by urologist Dr. Mackenzie for evaluation of a periurethral biopsy showing HPV-associated squamous cell carcinoma. Medical history significat for lichen sclerosus. History as follows:    *HPV vaccination status: unvaccinated    07, 10/15/08, 09, 11: Pap test NILM.     1/23/15: Pap test NILM, hrHPV16+.  2/27/15: Cervical polyp & cervical biopsy pathology: Negative for dysplasia/malignancy.    16: Pap test NILM, hrHPV16+.   16: Endocervical curettings pathology negative for dysplasia/malignancy.    17: Pap test NILM, hrHPV16+.   -Colposcopy recommended, not performed.     18:   -Pap test ASCUS, hrHPV16+.   -Endocervical curettings & cervical biopsy pathology: negative for dysplasia/malignancy.     19:  Pap test ASC-H, hrHPV16+.   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     9/10/20: Pap test ASC-H, hrHPV+ (NOS).  21: LEEP.   -Pathology: LEEP & endocervical curettings: negative for dysplasia/malignancy.    3/23/22: Endocervical curettings & cervical biopsy pathology: negative for dysplasia/malignancy.    3/28/23:   -Pap test HSIL, hrHPV16+.   -Findings by gynecologist   [place]: External genitalia with erythematous plaques bilaterally  Urethra- mid position and well supported, suburethral tissue thickened and friable with bleeding elicited after placement of the speculum  Vagina is stenotic and cervix difficult to see.   5/30/23: Cystourethroscopy, vaginal biopsy by urologist Dr. Mackenzie.   -Findings: Exam revealed lichenified changes to bilateral labia majora and friable vestibular tissue (patient had significant bleeding from prep alone.) The urethra was somewhat stenotic and would not accommodate 19Fr rigid cystoscope, therefore a 16 Fr flexible cystoscope was inserted. Gentle pressure was required to access the bladder. The ureteral orifices were in their orthotopic positions bilaterally. There were no intravesical stones or diverticuli and the bladder was mildly trabeculated. There were no intravesical lesions. See media tab for urethral pictures - there were no obvious lesions but the entire distal urethra was erythematous and stenosed (16Fr.)  -Pathology: Invasive moderately-differentiated squamous cell carcinoma, HPV-associated, with focus suspicious for lymphovascular space invasion; IHC: p16+.    6/8/23: Gynecologic Oncology consultation.  -Findings:   External genitalia notable for erythema and desquamation of the posterior medial labia, c/w lichen sclerosus changes. When the labia are , a friable mass is visible at the distal anterior vagina, just posterior to the urethra. On bimanual exam, the cervix is small and normal in contour. The palpable vaginal tumor is limited to the anterior distal vagina, approximately 4-5 cm laterally x 3 cm cranio-caudal. Tumor is friable. Remainder of the vagina smooth to palpation. Digital anorectal exam is without nodularity. No palpable tumor in the rectovaginal septum.   Enlarged firm, fixed right inguinal lymph node ~3-4 cm in size.   6/16/23: Pelvic MRI: Radiographic Stage IVB vaginal cancer. I have personally reviewed and  interpreted the MRI images.   Irregular enhancing lesion along the anterior vaginal wall  measuring approximately 2.5 x 0.9 cm at the  level of the vaginal introitus with irregular enhancement along the  anterior vagina extending towards the vaginal cuff. The cervix  demonstrates normal enhancement without evidence for invasion.   Partially visualized right inguinal lymphadenopathy concerning for  metastasis. Partially visualized 3.5 cm short axis node. Noted medial to this measures 3.6 x 2.4 cm. Subcentimeter  left inguinal nodes. Right external iliac borderline node measuring  1.1 cm short axis also noted.  There is subtle asymmetric enhancement in the left sacrum partially  Visualized and sagittal. This region is not fully evaluated on  remainder of the sequences. There may be some minimal restricted  diffusion in this region.  6/20/23: Staging PET/CT: Radiographic Stage IVB vaginal cancer. I have personally reviewed and interpreted the PET/CT images.   HEAD/NECK:  1.3 cm hypoattenuating right thyroid nodule demonstrates mild FDG  uptake with SUV max of 4.6.  There is a 1.0 x 0.9 cm enhancing nodule in the left parotid gland  with hypermetabolic uptake, SUV max of 5.4.  CHEST:   There are scattered sub-4 mm solid pulmonary nodules bilaterally,  below the size threshold for characterization on PET, for example a 2  mm solid pulmonary nodule in the right upper lobe anteriorly. Additionally, there are multiple non-FDG avid nodules  along the major and minor fissures, likely intrafissural lymph nodes.  ABDOMEN AND PELVIS:  Intense focal hypermetabolic uptake within the vaginal introitus with  associated soft tissue thickening on CT, SUV max of 13.6.  There are multiple enlarged, markedly hypermetabolic right inguinal  lymph nodes, the largest most superficial lymph node measures 3.5 x  3.3 x 3.7 cm with SUV  max of 15.6.   Few mildly avid, non-enlarged left inguinal nodes are likely reactive.  Non-avid subcentimeter  hypodense lesion in the caudate lobe likely  represents a cyst. Non-avid 1 cm hypodense lesion in the  spleen likely represent an incidental cyst/hemangioma.    8/1/23-9/20/23: Primary chemoradiation therapy with external beam radiation with concurrent chemosensitization with weekly cisplatin 40 mg/m2 followed by interstitial brachytherapy in 5 fractions.   -10/7/23-10/17/23: Therapy complicated by severe C.diff colitis requiring prolonged hospitalization and antibiotic regimen. Colonoscopy and biopsies performed during that hospitalization confirmed a diagnosis of severe C.diff colitis.      Subjective/Interval History:  Jessie returns to the gyn onc clinic today for a scheduled post-therapy visit, accompanied by her  and son.  Jessie reports doing well overall. She has completed the prolonged antibiotic course for C.diff and has completed a follow-up stool study which was negative. Normal bowel movements. No nausea/vomiting. She lost a lot of weight during the prolonged C.diff episode, but is now slowly starting to regain weight.  She is trying to use the vaginal dilators, and has even ordered a new set which is softer and starts at a smaller size, but is having difficulty even with the smallest dilator. She notes a small amount of blood with dilator use, but no other vaginal bleeding. She notes that the right inguinal mass has resolved.   In 2 weeks she is leaving for [place], and has an open-ended ticket.      Past Medical History:  Past Medical History:   Diagnosis Date    Actinic keratosis     C. difficile diarrhea 09/08/2023    Hyperlipemia     Lichen sclerosus 2020    Osteopenias     SCC (squamous cell carcinoma) 07/26/2023       Past Surgical History:  Past Surgical History:   Procedure Laterality Date    COLONOSCOPY  2006    normal    COLPOSCOPY, BIOPSY, COMBINED N/A 02/08/2021    Procedure: COLPOSCOPY, WITH BIOPSY, LEEP procedure;  Surgeon: Jefe Koenig MD;  Location: [facility] OR    CYSTOSCOPY N/A  2023    Procedure: CYSTOSCOPY AND EXCISIONAL BIOPSY OF THE VAGINAL TISSUE AROUND THE URETHRA.  (look in the bladder and sample small area in the vagina near the urethra);  Surgeon: Lakeisha Mackenzie MD;  Location: [facility] OR    EYE SURGERY      cataracts bilateral    INSERT INTERSTITIAL NEEDLE, ULTRASOUND GUIDED N/A 2023    Procedure: INSERTION, NEEDLE, WITH ULTRASOUND GUIDANCE, FOR INTERSTITIAL BRACHYTHERAPY;  Surgeon: Marisa Pacheco MD;  Location: [facility] OR    OPEN REDUCTION INTERNAL FIXATION FOREARM  2012    Procedure: OPEN REDUCTION INTERNAL FIXATION FOREARM;  Open Reduction Internal Fixation, Irrigation & Debridment  of Right Distal Radius, application short arm splint;  Surgeon: Wade Beard MD;  Location: [facility] OR    REMOVE INTERSTITIAL NEEDLE N/A 2023    Procedure: REMOVAL, INTERSTITIAL NEEDLE, AFTER TEMPORARY BRACHYTHERAPY;  Surgeon: Marisa Pacheco MD;  Location: [facility] OR    TONSILLECTOMY & ADENOIDECTOMY  [address] [place] [address] TREAT ECTOPIC PREG,RMV TUBE/OVARY      ectopic, right side-ovary and tube removed       Gynecologic History:  Menopause age unknown.   History of hormone therapy in .   No history of STIs.  Not currently sexually active due to perineal pain and bleeding since 2019.       Obstetric History:  OB History    Para Term  AB Living   1 0 0 0 1 0   SAB IAB Ectopic Multiple Live Births   0 0 1 0 0      # Outcome Date GA Lbr Felice/2nd Weight Sex Delivery Anes PTL Lv   1 Ectopic             She has 1 adopted child.       Health Maintenance:  Last Mammogram: 3/15/22              Result: normal      Last Colonoscopy: 17              Result: normal (polyps)         Last DEXA Scan: 11              Result: abnormal (osteopenia)  HPV vaccination status:  Unvaccinated      Current Medications:   Current Outpatient Medications   Medication    acetaminophen (TYLENOL) 500 MG tablet    amLODIPine (NORVASC) 5 MG tablet    clotrimazole (LOTRIMIN) 1  % external cream    ibuprofen (ADVIL/MOTRIN) 400 MG tablet    ondansetron (ZOFRAN) 8 MG tablet     No current facility-administered medications for this visit.        Allergies:   Allergies   Allergen Reactions    Blood Transfusion Related (Informational Only) Other (See Comments)     Patient has a history of a clinically significant antibody against RBC antigens.  A delay in compatible RBCs may occur.    Oxybutynin Itching    Seasonal Allergies          Social History:  Patient lives with her .   She has a son who is a pathologist in the [facility], who trained at [employer].  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 She now spends her days cycling and gardening. No activity limitations.     She does not have Advanced Directives, but has identified her son Prakash Tamayo as her desired Healthcare Power of .    Social History     Tobacco Use    Smoking status: Never     Passive exposure: Never    Smokeless tobacco: Never   Substance Use Topics    Alcohol use: Not Currently       History   Drug Use No       Family History:   The patient's family history is notable for a second-degree maternal relative with ovarian cancer. No known family history of breast,  uterine, colon, urothelial/renal, prostate or pancreatic cancers.  No known family history of melanoma.   Family History   Problem Relation Age of Onset    Lung Cancer Mother 44    Cerebrovascular Disease Father     Hypertension Father     Alcohol/Drug Father     Cervical Cancer Maternal Aunt     Ovarian Cancer Maternal Aunt 60    Obesity Niece         niece    Mental Illness Nephew         nephew    Diabetes Other         paternal aunt    Hyperlipidemia Other     Obesity Other         self    Obesity Other     Diabetes Other         paternal aunt    Hypertension Other         father    Cerebrovascular Disease Other         father    C.A.D. No family hx of     Breast Cancer No family hx of     Cancer - colorectal No family hx of     Prostate Cancer  No family hx of     Melanoma No family hx of     Anesthesia Reaction No family hx of     Venous thrombosis No family hx of        Physical Exam:   /81   Pulse 68   Temp 97.8  F (36.6  C) (Oral)   Resp 18   Wt 85.7 kg (189 lb)   LMP 03/08/2008   SpO2 98%   BMI 31.45 kg/m    Body mass index is 31.45 kg/m .    General Appearance: healthy and alert, no distress. Visible weight loss.      Musculoskeletal: extremities non tender and without edema    Skin: no lesions or rashes     Neurological: normal gait, no gross defects     Psychiatric: appropriate mood and affect                               Hematological: normal inguinal lymph nodes, including complete resolution of right inguinal mass.      Genitourinary: External genitalia grossly normal. Unable to insert even a pediatric speculum. One-digit exam attempted, but unable to insert finger past 0.5 cm due to scarring. Attempted to digitally reduce the scar tissue, but this was too uncomfortable to Jessie so procedure aborted.   Verbal consent obtained from the patient for the pelvic exam.   Each step of the exam was verbalized throughout.  Present for the exam: SANTOS Boyd.        Radiographic Examination:   12/22/23: PET/CT: TRISHA. I have personally reviewed and interpreted the PET/CT images.    SALIVARY GLANDS: Stable 1 mm soft tissue nodule in the left deep  parotid gland with an SUV max of 7.88.   THYROID GLAND: 9 mm right thyroid nodule with mild tracer uptake (SUV  max 4.40) is similar to prior.  LUNGS: Scattered solid  pulmonary nodules such as 2 mm nodule in the right lower lobe on and 2 mm partially calcified pulmonary nodule in the  right upper lobe are unchanged.  SPLEEN: Hypoenhancing lesion in the posterior aspect  of the spleen measuring up to 1.2 cm with no significant tracer uptake.   REPRODUCTIVE ORGANS: Significant decrease in hypermetabolic vaginal  mass. On current exam including the post Lasix pelvic imaging there is  tiny linear  uptake along the mucosa of the vaginal introitus. The  current max SUV 7.6 (previously 13.6,) current metabolic tumor volume  2.9  ml (previously 21.2 ml).   LYMPH NODES:  No FDG avid or abnormally enlarged lymph nodes. Previously seen hypermetabolic enlarged right inguinal lymph nodes are  resolved.      Performance Status:  ECOG Grade 0.       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     Treatment course complicated by severe C. Diff colitis infection requiring prolonged hospitalization and both IV and po antibiotics.     Medical history significant for lichen sclerosus.     Social history significant for the fact that her son is a pathologist in the [place] who trained at [employer].        Plan:     1.)    Vaginal cancer: I reviewed the PET/CT images with Jessie and provided her with a copy of the PET/CT report; also previously provided her with a copy of the PET/CT report via [employer].    Jessie will return to the gyn onc clinic in 3 months for a surveillance visit. Will repeat PET/CT at that time to assess for further decrease in inflammation (decreased FDG avidity) given inability to correlate exam findings with PET/CT findings.  Surveillance as follows (per the NCCN guidelines):  Every 3 months x2 years, then every 6 months x3 years.  Will omit cytology unless the vaginal adhesions can be further reduced.   She will call in the interim if she has any vaginal bleeding or other concerning symptoms.     Side-effects:  -Vaginal stenosis: Continue using vaginal dilators as tolerated. Will omit speculum exam, and attempt one-digit exams during surveillance.   -C.diff colitis: Resolved. Continue follow-up with GI prn.     2.) Lichen sclerosus: Topical clobetasol treatment 2x/week indefinitely, with increased frequency to 3-4x/week to treat increased  symptoms.      3.) Genetic risk factors were assessed and the patient does not meet the qualifications for a referral.      4.) Labs and/or tests ordered include: PET/C in 3 months.  Will notify patient of results via [employer].      5.) Health maintenance issues addressed today include resuming routine healthcare maintenance with her primary care provider.   -All pelvic exams to be done in the gyn onc clinic.  -Jessie is due for breast cancer screening.     6.) Code status:  Full-code.    7.) Prescriptions: None.      A total of 30 minutes was spent with the patient, 25 minutes of which were spent in counseling the patient and/or treatment planning; an additional 5minutes was spent in chart review (including independent review & interpretation of radiographic images) and documentation.        Reina Stahl MD, MS, FACOG, FACS  12/29/2023  11:36 AM      CC  Patient Care Team:  Alba Layton MD as PCP - Jefe Mosley MD as Assigned OBGYN Provider  Alba Layton MD as Assigned PCP  Lakeisha Matos MD as MD (Urology)  Lawrence Banuelos MD as MD (Dermatology)  Lakeisha Matos MD as Assigned Surgical Provider  Lakeisha Matos MD as MD (Urology)  Reina Stahl MD as MD (Gynecologic Oncology)  Marisa Pacheco MD as MD (Radiation Oncology)  Lily Fabian PA-C as Physician Assistant (Anesthesiology)  Marisa Pacheco MD as Assigned Cancer Care Provider  Alba Layton MD as Assigned Pain Medication Provider  Ulysses Gusman MD as Assigned Palliative Care Provider  Shannon Flores MD as Hospitalist (HOSPITALIST)  Boston Jacobo MD as Assigned Infectious Disease Provider  Tad Hunter MD as Assigned Gastroenterology Provider  LAKEISHA MATSO

## 2023-12-27 NOTE — PATIENT INSTRUCTIONS
SCHEDULING:  -RTC in 3 months for vaginal cancer surveillance visit (NP, in-person) --scheduled  -PET/CT in 3 months --order(s) placed       DIAGNOSIS:  Radiographic Stage IVB, Grade 2 squamous cell carcinoma of the vagina, currently without evidence of disease.  Treatment History:  -8/1/23-9/7/23: Primary chemoradiation therapy with pelvic radiation and concurrent chemosensitization with cisplatin followed by interstitial brachytherapy.    Severe C. Diff colitis.        PLAN:  1) Vaginal cancer: PET/CT shows complete response, likely with some residual inflammation at the vagina. Given the inability to perform a vaginal exam, will get one more PET/CT in 3 months to evaluate for further decrease in FDG avidity.   Surveillance as follows (per the NCCN guidelines):  Every 3 months x2 years, then every 6 months x3 years.  Please call in the interim if you have any vaginal bleeding or other concerning symptoms.     2) C. Diff colitis: Follow-up with GI as scheduled.     3) Healthcare maintenance: Resume routine healthcare maintenance with your primary care provider.  -All pelvic exams to be done in the gyn onc clinic.  -You are due for breast cancer screening (mammogram).       Congratulations on completing therapy!  Please call with any questions or concerns. [phone]       Reina Stahl MD, MS, FACOG, FACS  12/29/2023  11:30 AM

## 2023-12-29 ENCOUNTER — ONCOLOGY VISIT (OUTPATIENT)
Dept: ONCOLOGY | Facility: CLINIC | Age: 68
End: 2023-12-29
Attending: INTERNAL MEDICINE
Payer: COMMERCIAL

## 2023-12-29 VITALS
WEIGHT: 189 LBS | SYSTOLIC BLOOD PRESSURE: 135 MMHG | HEART RATE: 68 BPM | RESPIRATION RATE: 18 BRPM | OXYGEN SATURATION: 98 % | TEMPERATURE: 97.8 F | DIASTOLIC BLOOD PRESSURE: 81 MMHG | BODY MASS INDEX: 31.45 KG/M2

## 2023-12-29 DIAGNOSIS — C52 VAGINAL CANCER (H): Primary | ICD-10-CM

## 2023-12-29 PROBLEM — K52.9 COLITIS: Status: RESOLVED | Noted: 2023-10-07 | Resolved: 2023-12-29

## 2023-12-29 PROBLEM — C51.9 VULVAR CANCER (H): Status: RESOLVED | Noted: 2023-09-09 | Resolved: 2023-12-29

## 2023-12-29 PROBLEM — N36.8 URETHRAL BLEEDING: Status: RESOLVED | Noted: 2023-05-15 | Resolved: 2023-12-29

## 2023-12-29 PROBLEM — E66.01 MORBID OBESITY (H): Status: RESOLVED | Noted: 2023-06-18 | Resolved: 2023-12-29

## 2023-12-29 PROBLEM — N95.2 VAGINAL ATROPHY: Status: RESOLVED | Noted: 2017-07-11 | Resolved: 2023-12-29

## 2023-12-29 PROBLEM — R10.30 LOWER ABDOMINAL PAIN: Status: RESOLVED | Noted: 2023-09-05 | Resolved: 2023-12-29

## 2023-12-29 PROBLEM — N95.2 ATROPHIC VAGINITIS: Status: RESOLVED | Noted: 2021-01-19 | Resolved: 2023-12-29

## 2023-12-29 PROBLEM — N39.0 URINARY TRACT INFECTION ASSOCIATED WITH INDWELLING URETHRAL CATHETER, INITIAL ENCOUNTER (H): Status: RESOLVED | Noted: 2023-09-05 | Resolved: 2023-12-29

## 2023-12-29 PROBLEM — D61.818 PANCYTOPENIA (H): Status: RESOLVED | Noted: 2023-09-09 | Resolved: 2023-12-29

## 2023-12-29 PROBLEM — R11.2 NAUSEA AND VOMITING, UNSPECIFIED VOMITING TYPE: Status: RESOLVED | Noted: 2023-10-07 | Resolved: 2023-12-29

## 2023-12-29 PROBLEM — A41.9 SEPSIS, DUE TO UNSPECIFIED ORGANISM, UNSPECIFIED WHETHER ACUTE ORGAN DYSFUNCTION PRESENT (H): Status: RESOLVED | Noted: 2023-09-09 | Resolved: 2023-12-29

## 2023-12-29 PROBLEM — T83.511A URINARY TRACT INFECTION ASSOCIATED WITH INDWELLING URETHRAL CATHETER, INITIAL ENCOUNTER (H): Status: RESOLVED | Noted: 2023-09-05 | Resolved: 2023-12-29

## 2023-12-29 PROBLEM — R87.810 CERVICAL HIGH RISK HPV (HUMAN PAPILLOMAVIRUS) TEST POSITIVE: Status: RESOLVED | Noted: 2019-10-01 | Resolved: 2023-12-29

## 2023-12-29 PROBLEM — N94.11 INTROITAL DYSPAREUNIA: Status: RESOLVED | Noted: 2017-07-11 | Resolved: 2023-12-29

## 2023-12-29 PROBLEM — C44.92 SCC (SQUAMOUS CELL CARCINOMA): Status: RESOLVED | Noted: 2023-07-26 | Resolved: 2023-12-29

## 2023-12-29 PROCEDURE — G0463 HOSPITAL OUTPT CLINIC VISIT: HCPCS | Performed by: OBSTETRICS & GYNECOLOGY

## 2023-12-29 PROCEDURE — 99213 OFFICE O/P EST LOW 20 MIN: CPT | Performed by: OBSTETRICS & GYNECOLOGY

## 2023-12-29 ASSESSMENT — PAIN SCALES - GENERAL: PAINLEVEL: NO PAIN (0)

## 2023-12-29 NOTE — LETTER
2023         RE: Jessie Tamayo  [address] [facility] [address]        Dear Colleague,    Thank you for referring your patient, Jessie Tamayo, to the [facility]. Please see a copy of my visit note below.    Follow-up Note on Referred Patient  Gynecologic Oncology Clinic       Date of visit: 2023       Referring Provider/Urologist:  Dr. 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 Post-therapy visit.       History of Present Illness:   Jessie Tamayo is a 68 year old patient referred to the [facility] Gynecologic Oncology HPV Clinic by urologist Dr. Mackenzie for evaluation of a periurethral biopsy showing HPV-associated squamous cell carcinoma. Medical history significat for lichen sclerosus. History as follows:    *HPV vaccination status: unvaccinated    07, 10/15/08, 09, 11: Pap test NILM.     1/23/15: Pap test NILM, hrHPV16+.  2/27/15: Cervical polyp & cervical biopsy pathology: Negative for dysplasia/malignancy.    16: Pap test NILM, hrHPV16+.   16: Endocervical curettings pathology negative for dysplasia/malignancy.    17: Pap test NILM, hrHPV16+.   -Colposcopy recommended, not performed.     18:   -Pap test ASCUS, hrHPV16+.   -Endocervical curettings & cervical biopsy pathology: negative for dysplasia/malignancy.     19:  Pap test ASC-H, hrHPV16+.   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     9/10/20: Pap test ASC-H, hrHPV+ (NOS).  21: LEEP.    -Pathology: LEEP & endocervical curettings: negative for dysplasia/malignancy.    3/23/22: Endocervical curettings & cervical biopsy pathology: negative for dysplasia/malignancy.    3/28/23:   -Pap test HSIL, hrHPV16+.   -Findings by gynecologist Dr. Koenig: External genitalia with erythematous plaques bilaterally  Urethra- mid position and well supported, suburethral tissue thickened and friable with bleeding elicited after placement of the speculum  Vagina is stenotic and cervix difficult to see.   5/30/23: Cystourethroscopy, vaginal biopsy by urologist Dr. Mackenzie.   -Findings: Exam revealed lichenified changes to bilateral labia majora and friable vestibular tissue (patient had significant bleeding from prep alone.) The urethra was somewhat stenotic and would not accommodate 19Fr rigid cystoscope, therefore a 16 Fr flexible cystoscope was inserted. Gentle pressure was required to access the bladder. The ureteral orifices were in their orthotopic positions bilaterally. There were no intravesical stones or diverticuli and the bladder was mildly trabeculated. There were no intravesical lesions. See media tab for urethral pictures - there were no obvious lesions but the entire distal urethra was erythematous and stenosed (16Fr.)  -Pathology: Invasive moderately-differentiated squamous cell carcinoma, HPV-associated, with focus suspicious for lymphovascular space invasion; IHC: p16+.    6/8/23: Gynecologic Oncology consultation.  -Findings:   External genitalia notable for erythema and desquamation of the posterior medial labia, c/w lichen sclerosus changes. When the labia are , a friable mass is visible at the distal anterior vagina, just posterior to the urethra. On bimanual exam, the cervix is small and normal in contour. The palpable vaginal tumor is limited to the anterior distal vagina, approximately 4-5 cm laterally x 3 cm cranio-caudal. Tumor is friable. Remainder of the vagina smooth to  palpation. Digital anorectal exam is without nodularity. No palpable tumor in the rectovaginal septum.   Enlarged firm, fixed right inguinal lymph node ~3-4 cm in size.   6/16/23: Pelvic MRI: Radiographic Stage IVB vaginal cancer. I have personally reviewed and interpreted the MRI images.   Irregular enhancing lesion along the anterior vaginal wall  measuring approximately 2.5 x 0.9 cm at the  level of the vaginal introitus with irregular enhancement along the  anterior vagina extending towards the vaginal cuff. The cervix  demonstrates normal enhancement without evidence for invasion.   Partially visualized right inguinal lymphadenopathy concerning for  metastasis. Partially visualized 3.5 cm short axis node. Noted medial to this measures 3.6 x 2.4 cm. Subcentimeter  left inguinal nodes. Right external iliac borderline node measuring  1.1 cm short axis also noted.  There is subtle asymmetric enhancement in the left sacrum partially  Visualized and sagittal. This region is not fully evaluated on  remainder of the sequences. There may be some minimal restricted  diffusion in this region.  6/20/23: Staging PET/CT: Radiographic Stage IVB vaginal cancer. I have personally reviewed and interpreted the PET/CT images.   HEAD/NECK:  1.3 cm hypoattenuating right thyroid nodule demonstrates mild FDG  uptake with SUV max of 4.6.  There is a 1.0 x 0.9 cm enhancing nodule in the left parotid gland  with hypermetabolic uptake, SUV max of 5.4.  CHEST:   There are scattered sub-4 mm solid pulmonary nodules bilaterally,  below the size threshold for characterization on PET, for example a 2  mm solid pulmonary nodule in the right upper lobe anteriorly. Additionally, there are multiple non-FDG avid nodules  along the major and minor fissures, likely intrafissural lymph nodes.  ABDOMEN AND PELVIS:  Intense focal hypermetabolic uptake within the vaginal introitus with  associated soft tissue thickening on CT, SUV max of 13.6.  There are  multiple enlarged, markedly hypermetabolic right inguinal  lymph nodes, the largest most superficial lymph node measures 3.5 x  3.3 x 3.7 cm with SUV  max of 15.6.   Few mildly avid, non-enlarged left inguinal nodes are likely reactive.  Non-avid subcentimeter hypodense lesion in the caudate lobe likely  represents a cyst. Non-avid 1 cm hypodense lesion in the  spleen likely represent an incidental cyst/hemangioma.    8/1/23-9/20/23: Primary chemoradiation therapy with external beam radiation with concurrent chemosensitization with weekly cisplatin 40 mg/m2 followed by interstitial brachytherapy in 5 fractions.   -10/7/23-10/17/23: Therapy complicated by severe C.diff colitis requiring prolonged hospitalization and antibiotic regimen. Colonoscopy and biopsies performed during that hospitalization confirmed a diagnosis of severe C.diff colitis.      Subjective/Interval History:  Jessie returns to the gyn onc clinic today for a scheduled post-therapy visit, accompanied by her  and son.  Jessie reports doing well overall. She has completed the prolonged antibiotic course for C.diff and has completed a follow-up stool study which was negative. Normal bowel movements. No nausea/vomiting. She lost a lot of weight during the prolonged C.diff episode, but is now slowly starting to regain weight.  She is trying to use the vaginal dilators, and has even ordered a new set which is softer and starts at a smaller size, but is having difficulty even with the smallest dilator. She notes a small amount of blood with dilator use, but no other vaginal bleeding. She notes that the right inguinal mass has resolved.   In 2 weeks she is leaving for [place], and has an open-ended ticket.      Past Medical History:  Past Medical History:   Diagnosis Date    Actinic keratosis     C. difficile diarrhea 09/08/2023    Hyperlipemia     Lichen sclerosus 2020    Osteopenias     SCC (squamous cell carcinoma) 07/26/2023       Past Surgical  History:  Past Surgical History:   Procedure Laterality Date    COLONOSCOPY      normal    COLPOSCOPY, BIOPSY, COMBINED N/A 2021    Procedure: COLPOSCOPY, WITH BIOPSY, LEEP procedure;  Surgeon: Jefe Koenig MD;  Location: [facility] OR    CYSTOSCOPY N/A 2023    Procedure: CYSTOSCOPY AND EXCISIONAL BIOPSY OF THE VAGINAL TISSUE AROUND THE URETHRA.  (look in the bladder and sample small area in the vagina near the urethra);  Surgeon: Lakeisha Mackenzie MD;  Location: [facility] OR    EYE SURGERY      cataracts bilateral    INSERT INTERSTITIAL NEEDLE, ULTRASOUND GUIDED N/A 2023    Procedure: INSERTION, NEEDLE, WITH ULTRASOUND GUIDANCE, FOR INTERSTITIAL BRACHYTHERAPY;  Surgeon: Marisa Pacheco MD;  Location: [facility] OR    OPEN REDUCTION INTERNAL FIXATION FOREARM  2012    Procedure: OPEN REDUCTION INTERNAL FIXATION FOREARM;  Open Reduction Internal Fixation, Irrigation & Debridment  of Right Distal Radius, application short arm splint;  Surgeon: Wade Beard MD;  Location: [facility] OR    REMOVE INTERSTITIAL NEEDLE N/A 2023    Procedure: REMOVAL, INTERSTITIAL NEEDLE, AFTER TEMPORARY BRACHYTHERAPY;  Surgeon: Marisa Pacheco MD;  Location: [facility] OR    TONSILLECTOMY & ADENOIDECTOMY  [address] [place] [address] TREAT ECTOPIC PREG,RMV TUBE/OVARY      ectopic, right side-ovary and tube removed       Gynecologic History:  Menopause age unknown.   History of hormone therapy in .   No history of STIs.  Not currently sexually active due to perineal pain and bleeding since 2019.       Obstetric History:  OB History    Para Term  AB Living   1 0 0 0 1 0   SAB IAB Ectopic Multiple Live Births   0 0 1 0 0      # Outcome Date GA Lbr Felice/2nd Weight Sex Delivery Anes PTL Lv   1 Ectopic             She has 1 adopted child.       Health Maintenance:  Last Mammogram: 3/15/22              Result: normal      Last Colonoscopy: 17              Result: normal (polyps)         Last DEXA  Scan: 8/16/11              Result: abnormal (osteopenia)  HPV vaccination status:  Unvaccinated      Current Medications:   Current Outpatient Medications   Medication    acetaminophen (TYLENOL) 500 MG tablet    amLODIPine (NORVASC) 5 MG tablet    clotrimazole (LOTRIMIN) 1 % external cream    ibuprofen (ADVIL/MOTRIN) 400 MG tablet    ondansetron (ZOFRAN) 8 MG tablet     No current facility-administered medications for this visit.        Allergies:   Allergies   Allergen Reactions    Blood Transfusion Related (Informational Only) Other (See Comments)     Patient has a history of a clinically significant antibody against RBC antigens.  A delay in compatible RBCs may occur.    Oxybutynin Itching    Seasonal Allergies          Social History:  Patient lives with her .   She has a son who is a pathologist in the [employer], who trained at [employer].  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 She now spends her days cycling and gardening. No activity limitations.     She does not have Advanced Directives, but has identified her son Prakash Tamayo as her desired Healthcare Power of .    Social History     Tobacco Use    Smoking status: Never     Passive exposure: Never    Smokeless tobacco: Never   Substance Use Topics    Alcohol use: Not Currently       History   Drug Use No       Family History:   The patient's family history is notable for a second-degree maternal relative with ovarian cancer. No known family history of breast,  uterine, colon, urothelial/renal, prostate or pancreatic cancers.  No known family history of melanoma.   Family History   Problem Relation Age of Onset    Lung Cancer Mother 44    Cerebrovascular Disease Father     Hypertension Father     Alcohol/Drug Father     Cervical Cancer Maternal Aunt     Ovarian Cancer Maternal Aunt 60    Obesity Niece         niece    Mental Illness Nephew         nephew    Diabetes Other         paternal aunt    Hyperlipidemia Other     Obesity Other          self    Obesity Other     Diabetes Other         paternal aunt    Hypertension Other         father    Cerebrovascular Disease Other         father    C.A.D. No family hx of     Breast Cancer No family hx of     Cancer - colorectal No family hx of     Prostate Cancer No family hx of     Melanoma No family hx of     Anesthesia Reaction No family hx of     Venous thrombosis No family hx of        Physical Exam:   /81   Pulse 68   Temp 97.8  F (36.6  C) (Oral)   Resp 18   Wt 85.7 kg (189 lb)   LMP 03/08/2008   SpO2 98%   BMI 31.45 kg/m    Body mass index is 31.45 kg/m .    General Appearance: healthy and alert, no distress. Visible weight loss.      Musculoskeletal: extremities non tender and without edema    Skin: no lesions or rashes     Neurological: normal gait, no gross defects     Psychiatric: appropriate mood and affect                               Hematological: normal inguinal lymph nodes, including complete resolution of right inguinal mass.      Genitourinary: External genitalia grossly normal. Unable to insert even a pediatric speculum. One-digit exam attempted, but unable to insert finger past 0.5 cm due to scarring. Attempted to digitally reduce the scar tissue, but this was too uncomfortable to Jessie so procedure aborted.   Verbal consent obtained from the patient for the pelvic exam.   Each step of the exam was verbalized throughout.  Present for the exam: ASNTOS Boyd.        Radiographic Examination:   12/22/23: PET/CT: TRISHA. I have personally reviewed and interpreted the PET/CT images.    SALIVARY GLANDS: Stable 1 mm soft tissue nodule in the left deep  parotid gland with an SUV max of 7.88.   THYROID GLAND: 9 mm right thyroid nodule with mild tracer uptake (SUV  max 4.40) is similar to prior.  LUNGS: Scattered solid  pulmonary nodules such as 2 mm nodule in the right lower lobe on and 2 mm partially calcified pulmonary nodule in the  right upper lobe are unchanged.  SPLEEN:  Hypoenhancing lesion in the posterior aspect  of the spleen measuring up to 1.2 cm with no significant tracer uptake.   REPRODUCTIVE ORGANS: Significant decrease in hypermetabolic vaginal  mass. On current exam including the post Lasix pelvic imaging there is  tiny linear uptake along the mucosa of the vaginal introitus. The  current max SUV 7.6 (previously 13.6,) current metabolic tumor volume  2.9  ml (previously 21.2 ml).   LYMPH NODES:  No FDG avid or abnormally enlarged lymph nodes. Previously seen hypermetabolic enlarged right inguinal lymph nodes are  resolved.      Performance Status:  ECOG Grade 0.       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     Treatment course complicated by severe C. Diff colitis infection requiring prolonged hospitalization and both IV and po antibiotics.     Medical history significant for lichen sclerosus.     Social history significant for the fact that her son is a pathologist in the [place] who trained at [employer].        Plan:     1.)    Vaginal cancer: I reviewed the PET/CT images with Jessie and provided her with a copy of the PET/CT report; also previously provided her with a copy of the PET/CT report via [employer].    Jessie will return to the gyn onc clinic in 3 months for a surveillance visit. Will repeat PET/CT at that time to assess for further decrease in inflammation (decreased FDG avidity) given inability to correlate exam findings with PET/CT findings.  Surveillance as follows (per the NCCN guidelines):  Every 3 months x2 years, then every 6 months x3 years.  Will omit cytology unless the vaginal adhesions can be further reduced.   She will call in the interim if she has any vaginal bleeding or other concerning symptoms.     Side-effects:  -Vaginal stenosis: Continue using vaginal dilators as tolerated.  Will omit speculum exam, and attempt one-digit exams during surveillance.   -C.diff colitis: Resolved. Continue follow-up with GI prn.     2.) Lichen sclerosus: Topical clobetasol treatment 2x/week indefinitely, with increased frequency to 3-4x/week to treat increased symptoms.      3.) Genetic risk factors were assessed and the patient does not meet the qualifications for a referral.      4.) Labs and/or tests ordered include: PET/C in 3 months.  Will notify patient of results via [employer].      5.) Health maintenance issues addressed today include resuming routine healthcare maintenance with her primary care provider.   -All pelvic exams to be done in the gyn onc clinic.  -Jessie is due for breast cancer screening.     6.) Code status:  Full-code.    7.) Prescriptions: None.      A total of 30 minutes was spent with the patient, 25 minutes of which were spent in counseling the patient and/or treatment planning; an additional 5minutes was spent in chart review (including independent review & interpretation of radiographic images) and documentation.        Reina Stahl MD, MS, FACOG, FACS  12/29/2023  11:36 AM      CC  Patient Care Team:  Alba Layton MD as PCP - Jefe Mosley MD as Assigned OBGYN Provider  Alba Layton MD as Assigned PCP

## 2023-12-29 NOTE — NURSING NOTE
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    Weight as of this encounter: 85.7 kg (189 lb). Body surface area is 1.98 meters squared.  No Pain (0) Comment: Data Unavailable   Patient's last menstrual period was 03/08/2008.  Allergies reviewed: Yes  Medications reviewed: Yes    Medications: Medication refills not needed today.  Pharmacy name entered into EPIC:    [id number] - [place], [place] - [address] [facility] [address] [facility] [place] [address] [id number] - [place], [place] [facility] [address] [facility] [place], [place] - [address] [facility] [place] - [address] [facility] [address]    Frailty Screening:   Is the patient here for a new oncology consult visit in cancer care? 2. No      Clinical concerns: None       Tonya Castañeda CMA            "

## 2024-01-22 ENCOUNTER — PATIENT OUTREACH (OUTPATIENT)
Dept: ONCOLOGY | Facility: CLINIC | Age: 69
End: 2024-01-22

## 2024-01-23 NOTE — PROGRESS NOTES
Spoke with pt  Noted a few weeks ago small nodule hard, mobile, and not painful in fatty area on outside of had between pinky knuckle and wrist joint  Noted 2 more nodules today.    Advised pt will check with NP on thoughts would likely suggest to see PCP for evaluation

## 2024-01-23 NOTE — PROGRESS NOTES
859  Jessie alfaro  [phone]  Teoh pt  Saw md recently  Note bumps on left wrist on outside, 3 total  Has appt 2/22 with PCP

## 2024-02-20 ENCOUNTER — DOCUMENTATION ONLY (OUTPATIENT)
Dept: GASTROENTEROLOGY | Facility: CLINIC | Age: 69
End: 2024-02-20
Payer: COMMERCIAL

## 2024-02-20 NOTE — CONFIDENTIAL NOTE
The patient was seen for a research visit (post-IMT diet study). She is 3 months post-IMT for recurrent C. Diff. She reports no GI symptoms and is doing very well. The only new observation is milk intolerance.    The patient was instructed to contact us with any new antibiotic prescription. She remains at increased risk for C. Difficile recurrence with any new antibiotic provocation.    Tad Hunter MD

## 2024-02-22 ENCOUNTER — OFFICE VISIT (OUTPATIENT)
Dept: FAMILY MEDICINE | Facility: CLINIC | Age: 69
End: 2024-02-22
Payer: COMMERCIAL

## 2024-02-22 VITALS
SYSTOLIC BLOOD PRESSURE: 136 MMHG | DIASTOLIC BLOOD PRESSURE: 78 MMHG | OXYGEN SATURATION: 97 % | HEIGHT: 65 IN | BODY MASS INDEX: 31.16 KG/M2 | HEART RATE: 89 BPM | TEMPERATURE: 99.4 F | WEIGHT: 187 LBS

## 2024-02-22 DIAGNOSIS — I10 ESSENTIAL HYPERTENSION: ICD-10-CM

## 2024-02-22 DIAGNOSIS — E73.9 LACTOSE INTOLERANCE DUE TO ACQUIRED LACTASE DEFICIENCY: ICD-10-CM

## 2024-02-22 DIAGNOSIS — C52 VAGINAL CANCER (H): ICD-10-CM

## 2024-02-22 DIAGNOSIS — A04.72 C. DIFFICILE DIARRHEA: ICD-10-CM

## 2024-02-22 DIAGNOSIS — Z12.31 VISIT FOR SCREENING MAMMOGRAM: Primary | ICD-10-CM

## 2024-02-22 DIAGNOSIS — M67.432 GANGLION CYST OF WRIST, LEFT: ICD-10-CM

## 2024-02-22 PROCEDURE — 99214 OFFICE O/P EST MOD 30 MIN: CPT | Performed by: FAMILY MEDICINE

## 2024-02-22 RX ORDER — AMLODIPINE BESYLATE 5 MG/1
5 TABLET ORAL EVERY MORNING
Qty: 90 TABLET | Refills: 3 | Status: SHIPPED | OUTPATIENT
Start: 2024-02-22 | End: 2024-09-23

## 2024-02-22 NOTE — PROGRESS NOTES
"  Assessment & Plan     Visit for screening mammogram    - MA SCREENING DIGITAL BILAT - Future  (s+30); Future    Essential hypertension  Well controlled   - amLODIPine (NORVASC) 5 MG tablet; Take 1 tablet (5 mg) by mouth every morning    Vaginal cancer (H)  Seeing gyn onc 2    Ganglion cyst of wrist, left  Reassurance     C. difficile diarrhea  Finally cleared     Lactose intolerance due to acquired lactase deficiency  She avoids or uses lactaid             BMI  Estimated body mass index is 31.12 kg/m  as calculated from the following:    Height as of this encounter: 1.651 m (5' 5\").    Weight as of this encounter: 84.8 kg (187 lb).         Recheck 6 months cont planned follow up from onc     Subjective   Jessie is a 68 year old, presenting for the following health issues:  Recheck Medication        2/22/2024    10:23 AM   Additional Questions   Roomed by Cris BARRERA MA     History of Present Illness       Hypertension: She presents for follow up of hypertension.  She does check blood pressure  regularly outside of the clinic. Outpatient blood pressures have not been over 140/90. She does not follow a low salt diet.     She eats 2-3 servings of fruits and vegetables daily.She consumes 0 sweetened beverage(s) daily.She exercises with enough effort to increase her heart rate 20 to 29 minutes per day.  She exercises with enough effort to increase her heart rate 3 or less days per week.   She is taking medications regularly.     Recently had Cdiff, needs to have any antibiotics run through Josue Hunter MD [facility] provider. [phone] for the next 3-5 years.   We discussed this and if she is not able to speak her family knows   Started 3 days ago with sore throat and crunchy left ear and cough worse   Hypertension Follow-up    BP Readings from Last 6 Encounters:   02/22/24 136/78   12/29/23 135/81   11/15/23 103/72   11/13/23 131/86   11/01/23 (!) 146/83   10/26/23 134/82           Has had a bump on the left wrist not " "really painful firm not painful     Review of Systems  Constitutional, HEENT, cardiovascular, pulmonary, gi and gu systems are negative, except as otherwise noted.      Objective    /78 (BP Location: Right arm, Patient Position: Sitting, Cuff Size: Adult Regular)   Pulse 89   Temp 99.4  F (37.4  C) (Tympanic)   Ht 1.651 m (5' 5\")   Wt 84.8 kg (187 lb)   LMP 03/08/2008   SpO2 97%   BMI 31.12 kg/m    Body mass index is 31.12 kg/m .  Physical Exam   GENERAL: alert and no distress  EYES: Eyes grossly normal to inspection, PERRL and conjunctivae and sclerae normal  NECK: no adenopathy, no asymmetry, masses, or scars  RESP: lungs clear to auscultation - no rales, rhonchi or wheezes  CV: regular rate and rhythm, normal S1 S2, no S3 or S4, no murmur, click or rub, no peripheral edema  MS: LUE exam shows 1 cm ganglion cyst lateral flexor surface    No results found for any visits on 02/22/24.        Signed Electronically by: Alba Layton MD    "

## 2024-03-01 ENCOUNTER — ANCILLARY PROCEDURE (OUTPATIENT)
Dept: MAMMOGRAPHY | Facility: CLINIC | Age: 69
End: 2024-03-01
Attending: FAMILY MEDICINE
Payer: COMMERCIAL

## 2024-03-01 DIAGNOSIS — Z12.31 VISIT FOR SCREENING MAMMOGRAM: ICD-10-CM

## 2024-03-01 PROCEDURE — 77063 BREAST TOMOSYNTHESIS BI: CPT

## 2024-03-28 ENCOUNTER — ANCILLARY PROCEDURE (OUTPATIENT)
Dept: CT IMAGING | Facility: CLINIC | Age: 69
End: 2024-03-28
Attending: UROLOGY
Payer: COMMERCIAL

## 2024-03-28 DIAGNOSIS — C68.0 PRIMARY SQUAMOUS CELL CARCINOMA OF URETHRA (H): ICD-10-CM

## 2024-03-28 PROCEDURE — 74177 CT ABD & PELVIS W/CONTRAST: CPT | Performed by: RADIOLOGY

## 2024-03-28 RX ORDER — IOPAMIDOL 755 MG/ML
92 INJECTION, SOLUTION INTRAVASCULAR ONCE
Status: COMPLETED | OUTPATIENT
Start: 2024-03-28 | End: 2024-03-28

## 2024-03-28 RX ADMIN — IOPAMIDOL 92 ML: 755 INJECTION, SOLUTION INTRAVASCULAR at 09:40

## 2024-03-28 NOTE — PROGRESS NOTES
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  She is here today for a surveillance visit.     Oncology history:   07, 10/15/08, 09, 11: Pap test NILM.      1/23/15: Pap test NILM, hrHPV16+.  2/27/15: Cervical polyp & cervical biopsy pathology: Negative for dysplasia/malignancy.     16: Pap test NILM, hrHPV16+.   16: Endocervical curettings pathology negative for dysplasia/malignancy.     17: Pap test NILM, hrHPV16+.   -Colposcopy recommended, not performed.      18:   -Pap test ASCUS, hrHPV16+.   -Endocervical curettings & cervical biopsy pathology: negative for dysplasia/malignancy.      19:  Pap test ASC-H, hrHPV16+.   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      9/10/20: Pap test ASC-H, hrHPV+ (NOS).  21: LEEP.   -Pathology: LEEP & endocervical curettings: negative for dysplasia/malignancy.     3/23/22: Endocervical curettings & cervical biopsy pathology: negative for dysplasia/malignancy.     3/28/23:   -Pap test HSIL, hrHPV16+.   -Findings by gynecologist Dr. Koenig: External genitalia with erythematous plaques bilaterally  Urethra- mid position and well supported, suburethral tissue thickened and friable with bleeding elicited after placement of the speculum  Vagina is stenotic and cervix difficult to see.   23: Cystourethroscopy, vaginal biopsy by urologist Dr. Mackenzie.   -Findings: Exam revealed lichenified changes to bilateral labia majora and friable vestibular tissue (patient had significant bleeding from prep alone.) The urethra was somewhat stenotic and would not accommodate 19Fr rigid cystoscope, therefore a 16 Fr flexible cystoscope was inserted. Gentle  pressure was required to access the bladder. The ureteral orifices were in their orthotopic positions bilaterally. There were no intravesical stones or diverticuli and the bladder was mildly trabeculated. There were no intravesical lesions. See media tab for urethral pictures - there were no obvious lesions but the entire distal urethra was erythematous and stenosed (16Fr.)  -Pathology: Invasive moderately-differentiated squamous cell carcinoma, HPV-associated, with focus suspicious for lymphovascular space invasion; IHC: p16+.    6/8/23: Gynecologic Oncology consultation.  -Findings:   External genitalia notable for erythema and desquamation of the posterior medial labia, c/w lichen sclerosus changes. When the labia are , a friable mass is visible at the distal anterior vagina, just posterior to the urethra. On bimanual exam, the cervix is small and normal in contour. The palpable vaginal tumor is limited to the anterior distal vagina, approximately 4-5 cm laterally x 3 cm cranio-caudal. Tumor is friable. Remainder of the vagina smooth to palpation. Digital anorectal exam is without nodularity. No palpable tumor in the rectovaginal septum.   Enlarged firm, fixed right inguinal lymph node ~3-4 cm in size.      6/16/23: Pelvic MRI  IMPRESSION:   1. Irregular enhancing lesion along the anterior vaginal wall at the  level of the introitus measuring 2.5 x 0.9 cm with irregular  enhancement along the anterior vaginal wall towards the level of the  vaginal cuff however there is no definitive evidence or abnormal  signal intensity involving the cervix to suggest invasion.  2. Right inguinal lymphadenopathy compatible with metastatic disease.   3. There is some asymmetric enhancement involving the left sacrum,  incompletely evaluated on this study. Further evaluation of this and  further staging of the chest, abdomen and pelvis will be performed on  PET/CT scheduled for 6/20/2023.     6/20/23: PET CT  IMPRESSION:    1. Intense focal hypermetabolic FDG uptake in biopsy-proven vaginal  squamous cell carcinoma.  2. Multiple enlarged, hypermetabolic right inguinal nodes likely  represent regional metastasis. No definite evidence of distant  metastatic disease.  3. Scattered sub-4 mm solid pulmonary nodules are below the size  threshold for characterization on PET. Attention on follow-up with CT.  4. 1.0 cm enhancing left parotid gland nodule with hypermetabolic  uptake could represent a primary parotid neoplasm such as pleomorphic  adenoma or Warthin's tumor; consider ultrasound for further  evaluation.  5. 1.3 cm hypoattenuating right thyroid nodule with mild FDG uptake  warrants ultrasound for characterization.      Plan: Cisplatin radiation      7/27/2023: urinary gonsalez catheter placement. Treat acute cystitis with bactrim per Urology.      8/1/2023: plan C1D1 Cisplatin radiation      8/28/23: Mg 1.3. plt 94, K 3.8  9/1/23: Plt 107, Mg1.3 , K 3.8  9/17/23: Mg 1.7, plt 106     9/18-9/20/23: HDR brachytherapy x 5    12/22/23: PET CT IMPRESSION:  In this patient with stage IVb squamous cell carcinoma of the vagina:     1. Significant decreased size and FDG metabolism of previously seen  vaginal mass. Residual trace linear uptake along the mucosa of the  vaginal introitus, could be inflammatory. This can be correlated with  physical exam.   2. Resolution of previously seen hypermetabolic enlarged right  inguinal nodes.   3. Stable hypermetabolic soft tissue nodule in the left parotid gland.  ENT consultation is recommended. Given the stability over 6 months, it  could be a benign salivary tumor such as a Warthin's tumor or an  oncocytoma. Biopsy can be obtained to rule out a low grade salivary  originated tumor.   4. Four mm nodular density in the gallbladder, might represent a  polyp. This could be further evaluated with ultrasound.    Plan to repeat PET in 3 months    3/28/24: CT ap IMPRESSION: No evidence of metastatic disease  in the abdomen or pelvis.        Today she comes to clinic feeling well and is without concerns for her visit. She denies any vaginal bleeding, no changes in her bowel or bladder habits, no nausea/emesis, no lower extremity edema, and no difficulties eating or sleeping. She denies any abdominal discomfort/bloating, no fevers or chills, and no chest pain or shortness of breath. She has not been using her dilator, but does have a couple at home. She does have several questions. She does report one episode of bleeding/spotting when wiping (not sure of source) in January; none since    Annual exam with PCP:2/24  Mammogram:3/24  Colonoscopy:10/23 flex sig      Review of Systems  See HPI      Past Medical History:    Past Medical History:   Diagnosis Date    Actinic keratosis     C. difficile diarrhea 09/08/2023    Essential hypertension 2/22/2024    Hyperlipemia     Lichen sclerosus 2020    Osteopenias     SCC (squamous cell carcinoma) 07/26/2023         Past Surgical History:    Past Surgical History:   Procedure Laterality Date    COLONOSCOPY  2006    normal    COLPOSCOPY, BIOPSY, COMBINED N/A 02/08/2021    Procedure: COLPOSCOPY, WITH BIOPSY, LEEP procedure;  Surgeon: Jefe Koenig MD;  Location: [facility] OR    CYSTOSCOPY N/A 05/30/2023    Procedure: CYSTOSCOPY AND EXCISIONAL BIOPSY OF THE VAGINAL TISSUE AROUND THE URETHRA.  (look in the bladder and sample small area in the vagina near the urethra);  Surgeon: Lakeisha Mackenzie MD;  Location: [facility] OR    EYE SURGERY      cataracts bilateral    INSERT INTERSTITIAL NEEDLE, ULTRASOUND GUIDED N/A 9/18/2023    Procedure: INSERTION, NEEDLE, WITH ULTRASOUND GUIDANCE, FOR INTERSTITIAL BRACHYTHERAPY;  Surgeon: Marisa Pacheco MD;  Location: [facility] OR    OPEN REDUCTION INTERNAL FIXATION FOREARM  07/31/2012    Procedure: OPEN REDUCTION INTERNAL FIXATION FOREARM;  Open Reduction Internal Fixation, Irrigation & Debridment  of Right Distal Radius, application short arm splint;   Surgeon: Wade Beard MD;  Location: [facility] OR    REMOVE INTERSTITIAL NEEDLE N/A 9/20/2023    Procedure: REMOVAL, INTERSTITIAL NEEDLE, AFTER TEMPORARY BRACHYTHERAPY;  Surgeon: Marisa Pacheco MD;  Location: [facility] OR    TONSILLECTOMY & ADENOIDECTOMY  1960    [facility] TREAT ECTOPIC PREG,RMV TUBE/OVARY  1985    ectopic, right side-ovary and tube removed       Current Medications:     Current Outpatient Medications   Medication Sig Dispense Refill    amLODIPine (NORVASC) 5 MG tablet Take 1 tablet (5 mg) by mouth every morning 90 tablet 3    clotrimazole (LOTRIMIN) 1 % external cream Apply topically 2 times daily To affected areas (Patient taking differently: Apply topically as needed To affected areas) 45 g 3         Allergies:        Allergies   Allergen Reactions    Blood Transfusion Related (Informational Only) Other (See Comments)     Patient has a history of a clinically significant antibody against RBC antigens.  A delay in compatible RBCs may occur.    Oxybutynin Itching    Seasonal Allergies         Social History:     Social History     Tobacco Use    Smoking status: Never     Passive exposure: Never    Smokeless tobacco: Never   Substance Use Topics    Alcohol use: Not Currently       History   Drug Use No         Family History:     The patient's family history is notable for     Family History   Problem Relation Age of Onset    Lung Cancer Mother 44    Cerebrovascular Disease Father     Hypertension Father     Alcohol/Drug Father     Cervical Cancer Maternal Aunt     Ovarian Cancer Maternal Aunt 60    Obesity Niece         niece    Mental Illness Nephew         nephew    Diabetes Other         paternal aunt    Hyperlipidemia Other     Obesity Other         self    Obesity Other     Diabetes Other         paternal aunt    Hypertension Other         father    Cerebrovascular Disease Other         father    C.A.D. No family hx of     Breast Cancer No family hx of     Cancer - colorectal No family hx  "of     Prostate Cancer No family hx of     Melanoma No family hx of     Anesthesia Reaction No family hx of     Venous thrombosis No family hx of          Physical Exam:     /77 (BP Location: Right arm, Patient Position: Sitting, Cuff Size: Adult Large)   Pulse 78   Temp 97.6  F (36.4  C) (Oral)   Resp 16   Ht 1.643 m (5' 4.67\")   Wt 86.5 kg (190 lb 11.2 oz)   LMP 03/08/2008   SpO2 99%   BMI 32.06 kg/m    Body mass index is 32.06 kg/m .    General Appearance: healthy and alert, no distress     HEENT: no thyromegaly, no palpable nodules or masses        Cardiovascular: regular rate and rhythm, no gallops, rubs or murmurs     Respiratory: lungs clear, no rales, rhonchi or wheezes, normal diaphragmatic excursion    Musculoskeletal: extremities non tender and without edema    Skin: no lesions or rashes     Neurological: normal gait, no gross defects     Psychiatric: appropriate mood and affect                               Hematological: normal cervical, supraclavicular and inguinal lymph nodes     Gastrointestinal:       abdomen soft, non-tender, non-distended, no organomegaly or masses    Genitourinary: External genitalia and urethral meatus appears normal. 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  She is here today for a surveillance visit.     40 minutes spent on the date of the encounter doing chart review, history and exam, documentation and further activities as noted above      Plan:     1.)      Patient to RTC in 3 months for her next surveillance visit. Per Dr. Stahl, does not need to do a PET CT since CT looks good. She will continue to be seen every 3 months until 9/2025 and then every 6 months for 3 additional years. Reviewed signs and symptoms for when she should contact the clinic or seek additional " care. Patient to contact the clinic with any questions or concerns in the interim.  Answered all of her questions to the best of my ability.    -Discussed trying [employer] (stacking rings) to be used for intercourse  -recommend using the dilator; discussed looking soul source; once a week     2.) Genetic risk factors were assessed and the patient does not meet the qualifications for a referral.      3.) Labs and/or tests ordered include:  none.     4.) Health maintenance issues addressed today include annual health maintenance and non-gynecologic issues with PCP.    SANDI Gordillo, WHNP-BC, ANP-BC, AOCNP  Women's Health Nurse Practitioner  Adult Nurse Practitioner  Division of Gynecologic Oncology  .      CC  Patient Care Team:  Alba Layton MD as PCP - Jefe Mosley MD as Assigned OBGYN Provider  Alba Layton MD as Assigned PCP  Lakeisha Mackenzie MD as MD (Urology)  Lawrence Banuelos MD as MD (Dermatology)  Lakeisha Mackenzie MD as Assigned Surgical Provider  Lakeisha Mackenzie MD as MD (Urology)  Reina Stahl MD as MD (Gynecologic Oncology)  Marisa Pacheco MD as MD (Radiation Oncology)  Lily Fabian PA-C as Physician Assistant (Anesthesiology)  Marisa Pacheco MD as Assigned Cancer Care Provider  Ulysses Gusman MD as Assigned Palliative Care Provider  Shannon Flores MD as Hospitalist (HOSPITALIST)  Boston Jacobo MD as Assigned Infectious Disease Provider  Tad Hunter MD as Assigned Gastroenterology Provider  SELF, REFERRED

## 2024-03-28 NOTE — DISCHARGE INSTRUCTIONS

## 2024-03-29 ENCOUNTER — ONCOLOGY VISIT (OUTPATIENT)
Dept: ONCOLOGY | Facility: CLINIC | Age: 69
End: 2024-03-29
Attending: NURSE PRACTITIONER
Payer: COMMERCIAL

## 2024-03-29 VITALS
DIASTOLIC BLOOD PRESSURE: 77 MMHG | RESPIRATION RATE: 16 BRPM | SYSTOLIC BLOOD PRESSURE: 134 MMHG | TEMPERATURE: 97.6 F | HEART RATE: 78 BPM | WEIGHT: 190.7 LBS | OXYGEN SATURATION: 99 % | BODY MASS INDEX: 31.77 KG/M2 | HEIGHT: 65 IN

## 2024-03-29 DIAGNOSIS — C52 VAGINAL CANCER (H): Primary | ICD-10-CM

## 2024-03-29 PROCEDURE — 99215 OFFICE O/P EST HI 40 MIN: CPT | Performed by: NURSE PRACTITIONER

## 2024-03-29 PROCEDURE — G0463 HOSPITAL OUTPT CLINIC VISIT: HCPCS | Performed by: NURSE PRACTITIONER

## 2024-03-29 ASSESSMENT — PAIN SCALES - GENERAL: PAINLEVEL: NO PAIN (0)

## 2024-03-29 NOTE — LETTER
3/29/2024         RE: Jessie Tamayo  [address] [facility] [id number]        Dear Colleague,    Thank you for referring your patient, Jessie Tamayo, to the [facility]. Please see a copy of my visit note below.                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  She is here today for a surveillance visit.     Oncology history:   07, 10/15/08, 09, 11: Pap test NILM.      1/23/15: Pap test NILM, hrHPV16+.  2/27/15: Cervical polyp & cervical biopsy pathology: Negative for dysplasia/malignancy.     16: Pap test NILM, hrHPV16+.   16: Endocervical curettings pathology negative for dysplasia/malignancy.     17: Pap test NILM, hrHPV16+.   -Colposcopy recommended, not performed.      18:   -Pap test ASCUS, hrHPV16+.   -Endocervical curettings & cervical biopsy pathology: negative for dysplasia/malignancy.      19:  Pap test ASC-H, hrHPV16+.   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      9/10/20: Pap test ASC-H, hrHPV+ (NOS).  21: LEEP.   -Pathology: LEEP & endocervical curettings: negative for dysplasia/malignancy.     3/23/22: Endocervical curettings & cervical biopsy pathology: negative for dysplasia/malignancy.     3/28/23:   -Pap test HSIL, hrHPV16+.   -Findings by gynecologist Dr. Koenig: External genitalia with erythematous plaques bilaterally  Urethra- mid position and well supported, suburethral tissue thickened and friable with bleeding elicited after placement of the speculum  Vagina is stenotic and cervix difficult to see.   23: Cystourethroscopy, vaginal biopsy by urologist Dr. Mackenzie.   -Findings: Exam revealed lichenified changes  to bilateral labia majora and friable vestibular tissue (patient had significant bleeding from prep alone.) The urethra was somewhat stenotic and would not accommodate 19Fr rigid cystoscope, therefore a 16 Fr flexible cystoscope was inserted. Gentle pressure was required to access the bladder. The ureteral orifices were in their orthotopic positions bilaterally. There were no intravesical stones or diverticuli and the bladder was mildly trabeculated. There were no intravesical lesions. See media tab for urethral pictures - there were no obvious lesions but the entire distal urethra was erythematous and stenosed (16Fr.)  -Pathology: Invasive moderately-differentiated squamous cell carcinoma, HPV-associated, with focus suspicious for lymphovascular space invasion; IHC: p16+.    6/8/23: Gynecologic Oncology consultation.  -Findings:   External genitalia notable for erythema and desquamation of the posterior medial labia, c/w lichen sclerosus changes. When the labia are , a friable mass is visible at the distal anterior vagina, just posterior to the urethra. On bimanual exam, the cervix is small and normal in contour. The palpable vaginal tumor is limited to the anterior distal vagina, approximately 4-5 cm laterally x 3 cm cranio-caudal. Tumor is friable. Remainder of the vagina smooth to palpation. Digital anorectal exam is without nodularity. No palpable tumor in the rectovaginal septum.   Enlarged firm, fixed right inguinal lymph node ~3-4 cm in size.      6/16/23: Pelvic MRI  IMPRESSION:   1. Irregular enhancing lesion along the anterior vaginal wall at the  level of the introitus measuring 2.5 x 0.9 cm with irregular  enhancement along the anterior vaginal wall towards the level of the  vaginal cuff however there is no definitive evidence or abnormal  signal intensity involving the cervix to suggest invasion.  2. Right inguinal lymphadenopathy compatible with metastatic disease.   3. There is some  asymmetric enhancement involving the left sacrum,  incompletely evaluated on this study. Further evaluation of this and  further staging of the chest, abdomen and pelvis will be performed on  PET/CT scheduled for 6/20/2023.     6/20/23: PET CT  IMPRESSION:   1. Intense focal hypermetabolic FDG uptake in biopsy-proven vaginal  squamous cell carcinoma.  2. Multiple enlarged, hypermetabolic right inguinal nodes likely  represent regional metastasis. No definite evidence of distant  metastatic disease.  3. Scattered sub-4 mm solid pulmonary nodules are below the size  threshold for characterization on PET. Attention on follow-up with CT.  4. 1.0 cm enhancing left parotid gland nodule with hypermetabolic  uptake could represent a primary parotid neoplasm such as pleomorphic  adenoma or Warthin's tumor; consider ultrasound for further  evaluation.  5. 1.3 cm hypoattenuating right thyroid nodule with mild FDG uptake  warrants ultrasound for characterization.      Plan: Cisplatin radiation      7/27/2023: urinary gonsalez catheter placement. Treat acute cystitis with bactrim per Urology.      8/1/2023: plan C1D1 Cisplatin radiation      8/28/23: Mg 1.3. plt 94, K 3.8  9/1/23: Plt 107, Mg1.3 , K 3.8  9/17/23: Mg 1.7, plt 106     9/18-9/20/23: HDR brachytherapy x 5    12/22/23: PET CT IMPRESSION:  In this patient with stage IVb squamous cell carcinoma of the vagina:     1. Significant decreased size and FDG metabolism of previously seen  vaginal mass. Residual trace linear uptake along the mucosa of the  vaginal introitus, could be inflammatory. This can be correlated with  physical exam.   2. Resolution of previously seen hypermetabolic enlarged right  inguinal nodes.   3. Stable hypermetabolic soft tissue nodule in the left parotid gland.  ENT consultation is recommended. Given the stability over 6 months, it  could be a benign salivary tumor such as a Warthin's tumor or an  oncocytoma. Biopsy can be obtained to rule out a low  grade salivary  originated tumor.   4. Four mm nodular density in the gallbladder, might represent a  polyp. This could be further evaluated with ultrasound.    Plan to repeat PET in 3 months    3/28/24: CT ap IMPRESSION: No evidence of metastatic disease in the abdomen or pelvis.        Today she comes to clinic feeling well and is without concerns for her visit. She denies any vaginal bleeding, no changes in her bowel or bladder habits, no nausea/emesis, no lower extremity edema, and no difficulties eating or sleeping. She denies any abdominal discomfort/bloating, no fevers or chills, and no chest pain or shortness of breath. She has not been using her dilator, but does have a couple at home. She does have several questions. She does report one episode of bleeding/spotting when wiping (not sure of source) in January; none since    Annual exam with PCP:2/24  Mammogram:3/24  Colonoscopy:10/23 flex sig      Review of Systems  See HPI      Past Medical History:    Past Medical History:   Diagnosis Date     Actinic keratosis      C. difficile diarrhea 09/08/2023     Essential hypertension 2/22/2024     Hyperlipemia      Lichen sclerosus 2020     Osteopenias      SCC (squamous cell carcinoma) 07/26/2023         Past Surgical History:    Past Surgical History:   Procedure Laterality Date     COLONOSCOPY  2006    normal     COLPOSCOPY, BIOPSY, COMBINED N/A 02/08/2021    Procedure: COLPOSCOPY, WITH BIOPSY, LEEP procedure;  Surgeon: Jefe Koenig MD;  Location: [facility] OR     CYSTOSCOPY N/A 05/30/2023    Procedure: CYSTOSCOPY AND EXCISIONAL BIOPSY OF THE VAGINAL TISSUE AROUND THE URETHRA.  (look in the bladder and sample small area in the vagina near the urethra);  Surgeon: Lakeisha Mackenzie MD;  Location: [facility] [place]     EYE SURGERY      cataracts bilateral     INSERT INTERSTITIAL NEEDLE, ULTRASOUND GUIDED N/A 9/18/2023    Procedure: INSERTION, NEEDLE, WITH ULTRASOUND GUIDANCE, FOR INTERSTITIAL BRACHYTHERAPY;  Surgeon:  Marisa Pacheco MD;  Location: [facility] OR     OPEN REDUCTION INTERNAL FIXATION FOREARM  07/31/2012    Procedure: OPEN REDUCTION INTERNAL FIXATION FOREARM;  Open Reduction Internal Fixation, Irrigation & Debridment  of Right Distal Radius, application short arm splint;  Surgeon: Wade Beard MD;  Location: [facility] OR     REMOVE INTERSTITIAL NEEDLE N/A 9/20/2023    Procedure: REMOVAL, INTERSTITIAL NEEDLE, AFTER TEMPORARY BRACHYTHERAPY;  Surgeon: Marisa Pacheco MD;  Location: [facility] OR     TONSILLECTOMY & ADENOIDECTOMY  1960     [facility] TREAT ECTOPIC PREG,RMV TUBE/OVARY  1985    ectopic, right side-ovary and tube removed       Current Medications:     Current Outpatient Medications   Medication Sig Dispense Refill     amLODIPine (NORVASC) 5 MG tablet Take 1 tablet (5 mg) by mouth every morning 90 tablet 3     clotrimazole (LOTRIMIN) 1 % external cream Apply topically 2 times daily To affected areas (Patient taking differently: Apply topically as needed To affected areas) 45 g 3         Allergies:        Allergies   Allergen Reactions     Blood Transfusion Related (Informational Only) Other (See Comments)     Patient has a history of a clinically significant antibody against RBC antigens.  A delay in compatible RBCs may occur.     Oxybutynin Itching     Seasonal Allergies         Social History:     Social History     Tobacco Use     Smoking status: Never     Passive exposure: Never     Smokeless tobacco: Never   Substance Use Topics     Alcohol use: Not Currently       History   Drug Use No         Family History:     The patient's family history is notable for     Family History   Problem Relation Age of Onset     Lung Cancer Mother 44     Cerebrovascular Disease Father      Hypertension Father      Alcohol/Drug Father      Cervical Cancer Maternal Aunt      Ovarian Cancer Maternal Aunt 60     Obesity Niece         niece     Mental Illness Nephew         nephew     Diabetes Other         paternal aunt      "Hyperlipidemia Other      Obesity Other         self     Obesity Other      Diabetes Other         paternal aunt     Hypertension Other         father     Cerebrovascular Disease Other         father     C.A.D. No family hx of      Breast Cancer No family hx of      Cancer - colorectal No family hx of      Prostate Cancer No family hx of      Melanoma No family hx of      Anesthesia Reaction No family hx of      Venous thrombosis No family hx of          Physical Exam:     /77 (BP Location: Right arm, Patient Position: Sitting, Cuff Size: Adult Large)   Pulse 78   Temp 97.6  F (36.4  C) (Oral)   Resp 16   Ht 1.643 m (5' 4.67\")   Wt 86.5 kg (190 lb 11.2 oz)   LMP 03/08/2008   SpO2 99%   BMI 32.06 kg/m    Body mass index is 32.06 kg/m .    General Appearance: healthy and alert, no distress     HEENT: no thyromegaly, no palpable nodules or masses        Cardiovascular: regular rate and rhythm, no gallops, rubs or murmurs     Respiratory: lungs clear, no rales, rhonchi or wheezes, normal diaphragmatic excursion    Musculoskeletal: extremities non tender and without edema    Skin: no lesions or rashes     Neurological: normal gait, no gross defects     Psychiatric: appropriate mood and affect                               Hematological: normal cervical, supraclavicular and inguinal lymph nodes     Gastrointestinal:       abdomen soft, non-tender, non-distended, no organomegaly or masses    Genitourinary: External genitalia and urethral meatus appears normal. 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  She is here today for a surveillance visit.     40 minutes spent on the date of the encounter doing chart review, history and exam, documentation and further activities as noted above      Plan:     1.)      Patient to " RTC in 3 months for her next surveillance visit. Per Dr. Stahl, does not need to do a PET CT since CT looks good. She will continue to be seen every 3 months until 9/2025 and then every 6 months for 3 additional years. Reviewed signs and symptoms for when she should contact the clinic or seek additional care. Patient to contact the clinic with any questions or concerns in the interim.  Answered all of her questions to the best of my ability.    -Discussed trying OhNut (stacking rings) to be used for intercourse  -recommend using the dilator; discussed looking soul source; once a week     2.) Genetic risk factors were assessed and the patient does not meet the qualifications for a referral.      3.) Labs and/or tests ordered include:  none.     4.) Health maintenance issues addressed today include annual health maintenance and non-gynecologic issues with PCP.    Betina Kramer, APRN, WHNP-BC, ANP-BC, AOCNP  Women's Health Nurse Practitioner  Adult Nurse Practitioner  Division of Gynecologic Oncology  .      CC  Patient Care Team:  Alba Layton MD as PCP - Jefe Mosley MD as Assigned OBGYN Provider  Alba Layton MD as Assigned PCP  Lakeisah Mackenzie MD as MD (Urology)  Lawrence Banuelos MD as MD (Dermatology)  Lakeisha Mackenzie MD as Assigned Surgical Provider  Lakeisha Mackenzie MD as MD (Urology)  Reina Stahl MD as MD (Gynecologic Oncology)  Marisa Pacheco MD as MD (Radiation Oncology)  Lily Fabian PA-C as Physician Assistant (Anesthesiology)  Marisa Pacheco MD as Assigned Cancer Care Provider  Ulysses Gusman MD as Assigned Palliative Care Provider  Shannon Flores MD as Hospitalist (HOSPITALIST)  Boston Jacobo MD as Assigned Infectious Disease Provider  Tad Hunter MD as Assigned Gastroenterology Provider  SELF, REFERRED      Again, thank you for allowing me to participate in the care of your patient.        Sincerely,        Betina  SANDI yS CNP

## 2024-03-29 NOTE — NURSING NOTE
"Oncology Rooming Note    March 29, 2024 11:16 AM   Jessie Tamayo is a 68 year old female who presents for:    Chief Complaint   Patient presents with    Oncology Clinic Visit     Vaginal cancer     Initial Vitals: /77 (BP Location: Right arm, Patient Position: Sitting, Cuff Size: Adult Large)   Pulse 78   Temp 97.6  F (36.4  C) (Oral)   Resp 16   Ht 1.643 m (5' 4.67\")   Wt 86.5 kg (190 lb 11.2 oz)   LMP 03/08/2008   SpO2 99%   BMI 32.06 kg/m   Estimated body mass index is 32.06 kg/m  as calculated from the following:    Height as of this encounter: 1.643 m (5' 4.67\").    Weight as of this encounter: 86.5 kg (190 lb 11.2 oz). Body surface area is 1.99 meters squared.  No Pain (0) Comment: Data Unavailable   Patient's last menstrual period was 03/08/2008.  Allergies reviewed: Yes  Medications reviewed: Yes    Medications: Medication refills not needed today.  Pharmacy name entered into EPIC:    [id number] - [place], [place] - [address] [facility] [address] [place] [address] [facility] [address] [id number] [place] [address] [facility] [place], [place] - [address] [facility] [place] [address] [address] [facility]    Frailty Screening:   Is the patient here for a new oncology consult visit in cancer care? 2. No      Clinical concerns: none       Tiffany Franco              "

## 2024-04-03 NOTE — RESULT ENCOUNTER NOTE
Call placed and message left to let Jessie know that there is no sign of metastasis on this CT scan result.    Thank you,  Jewell Morton RN, BSN Urology Triage

## 2024-05-23 ENCOUNTER — ONCOLOGY VISIT (OUTPATIENT)
Dept: ONCOLOGY | Facility: CLINIC | Age: 69
End: 2024-05-23
Attending: NURSE PRACTITIONER
Payer: COMMERCIAL

## 2024-05-23 VITALS
OXYGEN SATURATION: 99 % | WEIGHT: 192.5 LBS | DIASTOLIC BLOOD PRESSURE: 74 MMHG | TEMPERATURE: 98 F | BODY MASS INDEX: 32.37 KG/M2 | HEART RATE: 75 BPM | SYSTOLIC BLOOD PRESSURE: 134 MMHG | RESPIRATION RATE: 14 BRPM

## 2024-05-23 DIAGNOSIS — Z85.44 HISTORY OF CANCER OF VAGINA: ICD-10-CM

## 2024-05-23 DIAGNOSIS — R19.09 LEFT GROIN MASS: Primary | ICD-10-CM

## 2024-05-23 PROCEDURE — 99213 OFFICE O/P EST LOW 20 MIN: CPT | Performed by: NURSE PRACTITIONER

## 2024-05-23 PROCEDURE — G0463 HOSPITAL OUTPT CLINIC VISIT: HCPCS | Performed by: NURSE PRACTITIONER

## 2024-05-23 ASSESSMENT — PAIN SCALES - GENERAL: PAINLEVEL: NO PAIN (0)

## 2024-05-23 NOTE — PATIENT INSTRUCTIONS
Imaging for further eval of the left groin mass  Consider compression garment with the biking because this may possibly be a fluid collection/lymphedema  If the imaging is negative and the swelling persists, we could refer to lymphedema therapy to discuss massage and management  If the mass goes away completely by the time the CT is scheduled, you can cancel it  I will call you with the results and we will go from there!

## 2024-05-23 NOTE — NURSING NOTE
"Oncology Rooming Note    May 23, 2024 3:45 PM   Jessie Tamayo is a 68 year old female who presents for:    Chief Complaint   Patient presents with    Oncology Clinic Visit     Rtn for Vaginal Cancer     Initial Vitals: /74 (BP Location: Right arm, Patient Position: Sitting, Cuff Size: Adult Regular)   Pulse 75   Temp 98  F (36.7  C) (Oral)   Resp 14   Wt 87.3 kg (192 lb 8 oz)   LMP 03/08/2008   SpO2 99%   BMI 32.37 kg/m   Estimated body mass index is 32.37 kg/m  as calculated from the following:    Height as of 3/29/24: 1.643 m (5' 4.67\").    Weight as of this encounter: 87.3 kg (192 lb 8 oz). Body surface area is 2 meters squared.  No Pain (0) Comment: Data Unavailable   Patient's last menstrual period was 03/08/2008.  Allergies reviewed: Yes  Medications reviewed: Yes    Medications: Medication refills not needed today.  Pharmacy name entered into EPIC:    [id number] - [place] - [address] [facility] [address] [place] [address] [address] [facility] [address] [id number] - [place] [address] [facility] [place] - [address] [facility] [place] - [address] [address] [facility]    Frailty Screening:   Is the patient here for a new oncology consult visit in cancer care? 2. No      Clinical concerns:  None      Srinivas Rios              "

## 2024-05-23 NOTE — PROGRESS NOTES
"Gynecologic Oncology Follow Up Note    RE: Jessie Tamayo   : 1955  PRONOUNS: she/her/hers  ELMO: 2024  GYNECOLOGIC ONCOLOGIST: Reina Stahl MD    CC: Jessie Tamayo is a 68 year old female with a history of stage IVB vaginal cancer s/p chemoradiation presenting for an acute visit regarding new mass in the left groin.    INTERVAL HISTORY:    Jessie has generally been feeling well. She routinely monitors for swollen nodes in the groin given her history. Within the past week, she was performing her normal check when she noticed a painless mass in the left groin that she describes as a \"long lump\", ropelike. May be slightly growing in size over the week. No known trauma or infectious symptoms, but notes she was in [place] on a bike trip when this appeared. Wore compression shorts, but not every day. Noticed some swelling in the legs, but she relates this to the long flight home- did wear compression stockings at that time.    Denies unintentional weight loss, soaking night sweats, trouble breathing, abdominal bloating/fullness, persistent abdominal or pelvic pain, vaginal bleeding, new bowel or bladder concerns. Attempting to use a vaginal dilator, but notes this is difficult given the degree of vaginal stenosis s/p radiation.          ONCOLOGY HISTORY:    07, 10/15/08, 09, 11: Pap test NILM.      1/23/15: Pap test NILM, hrHPV16+.  2/27/15: Cervical polyp & cervical biopsy pathology: Negative for dysplasia/malignancy.     16: Pap test NILM, hrHPV16+.   16: Endocervical curettings pathology negative for dysplasia/malignancy.     17: Pap test NILM, hrHPV16+.   -Colposcopy recommended, not performed.      18:   -Pap test ASCUS, hrHPV16+.   -Endocervical curettings & cervical biopsy pathology: negative for dysplasia/malignancy.      19:  Pap test ASC-H, hrHPV16+.   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 "      9/10/20: Pap test ASC-H, hrHPV+ (NOS).  2/8/21: LEEP.   -Pathology: LEEP & endocervical curettings: negative for dysplasia/malignancy.     3/23/22: Endocervical curettings & cervical biopsy pathology: negative for dysplasia/malignancy.     3/28/23:   -Pap test HSIL, hrHPV16+.   -Findings by gynecologist Dr. Koenig: External genitalia with erythematous plaques bilaterally  Urethra- mid position and well supported, suburethral tissue thickened and friable with bleeding elicited after placement of the speculum  Vagina is stenotic and cervix difficult to see.   5/30/23: Cystourethroscopy, vaginal biopsy by urologist Dr. Mackenzie.   -Findings: Exam revealed lichenified changes to bilateral labia majora and friable vestibular tissue (patient had significant bleeding from prep alone.) The urethra was somewhat stenotic and would not accommodate 19Fr rigid cystoscope, therefore a 16 Fr flexible cystoscope was inserted. Gentle pressure was required to access the bladder. The ureteral orifices were in their orthotopic positions bilaterally. There were no intravesical stones or diverticuli and the bladder was mildly trabeculated. There were no intravesical lesions. See media tab for urethral pictures - there were no obvious lesions but the entire distal urethra was erythematous and stenosed (16Fr.)  -Pathology: Invasive moderately-differentiated squamous cell carcinoma, HPV-associated, with focus suspicious for lymphovascular space invasion; IHC: p16+.    6/8/23: Gynecologic Oncology consultation.  -Findings:   External genitalia notable for erythema and desquamation of the posterior medial labia, c/w lichen sclerosus changes. When the labia are , a friable mass is visible at the distal anterior vagina, just posterior to the urethra. On bimanual exam, the cervix is small and normal in contour. The palpable vaginal tumor is limited to the anterior distal vagina, approximately 4-5 cm laterally x 3 cm cranio-caudal.  Tumor is friable. Remainder of the vagina smooth to palpation. Digital anorectal exam is without nodularity. No palpable tumor in the rectovaginal septum.   Enlarged firm, fixed right inguinal lymph node ~3-4 cm in size.      6/16/23: Pelvic MRI  IMPRESSION:   1. Irregular enhancing lesion along the anterior vaginal wall at the  level of the introitus measuring 2.5 x 0.9 cm with irregular  enhancement along the anterior vaginal wall towards the level of the  vaginal cuff however there is no definitive evidence or abnormal  signal intensity involving the cervix to suggest invasion.  2. Right inguinal lymphadenopathy compatible with metastatic disease.   3. There is some asymmetric enhancement involving the left sacrum,  incompletely evaluated on this study. Further evaluation of this and  further staging of the chest, abdomen and pelvis will be performed on  PET/CT scheduled for 6/20/2023.     6/20/23: PET CT  IMPRESSION:   1. Intense focal hypermetabolic FDG uptake in biopsy-proven vaginal  squamous cell carcinoma.  2. Multiple enlarged, hypermetabolic right inguinal nodes likely  represent regional metastasis. No definite evidence of distant  metastatic disease.  3. Scattered sub-4 mm solid pulmonary nodules are below the size  threshold for characterization on PET. Attention on follow-up with CT.  4. 1.0 cm enhancing left parotid gland nodule with hypermetabolic  uptake could represent a primary parotid neoplasm such as pleomorphic  adenoma or Warthin's tumor; consider ultrasound for further  evaluation.  5. 1.3 cm hypoattenuating right thyroid nodule with mild FDG uptake  warrants ultrasound for characterization.      Plan: Cisplatin radiation      7/27/2023: urinary gonsalez catheter placement. Treat acute cystitis with bactrim per Urology.      8/1/2023: plan C1D1 Cisplatin radiation      8/28/23: Mg 1.3. 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 Significant decreased size and FDG metabolism of previously seen  vaginal mass. Residual trace linear uptake along the mucosa of the  vaginal introitus, could be inflammatory. This can be correlated with  physical exam.   2. Resolution of previously seen hypermetabolic enlarged right  inguinal nodes.   3. Stable hypermetabolic soft tissue nodule in the left parotid gland.  ENT consultation is recommended. Given the stability over 6 months, it  could be a benign salivary tumor such as a Warthin's tumor or an  oncocytoma. Biopsy can be obtained to rule out a low grade salivary  originated tumor.   4. Four mm nodular density in the gallbladder, might represent a  polyp. This could be further evaluated with ultrasound.     Plan to repeat PET in 3 months     3/28/24: CT ap IMPRESSION: No evidence of metastatic disease in the abdomen or pelvis.           Past Medical History:   Diagnosis Date    Actinic keratosis     C. difficile diarrhea 09/08/2023    Essential hypertension 2/22/2024    Hyperlipemia     Lichen sclerosus 2020    Osteopenias     SCC (squamous cell carcinoma) 07/26/2023      Past Surgical History:   Procedure Laterality Date    COLONOSCOPY  2006    normal    COLPOSCOPY, BIOPSY, COMBINED N/A 02/08/2021    Procedure: COLPOSCOPY, WITH BIOPSY, LEEP procedure;  Surgeon: Jefe Koenig MD;  Location: [facility] OR    CYSTOSCOPY N/A 05/30/2023    Procedure: CYSTOSCOPY AND EXCISIONAL BIOPSY OF THE VAGINAL TISSUE AROUND THE URETHRA.  (look in the bladder and sample small area in the vagina near the urethra);  Surgeon: Lakeisha Mackenzie MD;  Location: [facility] OR    EYE SURGERY      cataracts bilateral    INSERT INTERSTITIAL NEEDLE, ULTRASOUND GUIDED N/A 9/18/2023    Procedure: INSERTION, NEEDLE, WITH ULTRASOUND GUIDANCE, FOR INTERSTITIAL BRACHYTHERAPY;  Surgeon: Marisa Pacheco MD;  Location:  OR     OPEN REDUCTION INTERNAL FIXATION FOREARM  07/31/2012    Procedure: OPEN REDUCTION INTERNAL FIXATION FOREARM;  Open Reduction Internal Fixation, Irrigation & Debridment  of Right Distal Radius, application short arm splint;  Surgeon: Wade Beard MD;  Location: [facility] OR    REMOVE INTERSTITIAL NEEDLE N/A 9/20/2023    Procedure: REMOVAL, INTERSTITIAL NEEDLE, AFTER TEMPORARY BRACHYTHERAPY;  Surgeon: Marisa Pacheco MD;  Location: [facility] OR    TONSILLECTOMY & ADENOIDECTOMY  1960    [facility] TREAT ECTOPIC PREG,RMV TUBE/OVARY  1985    ectopic, right side-ovary and tube removed     Social History     Socioeconomic History    Marital status:      Spouse name: Jesse    Number of children: 0    Years of education: None    Highest education level: None   Occupational History     Employer: [employer]   Tobacco Use    Smoking status: Never     Passive exposure: Never    Smokeless tobacco: Never   Vaping Use    Vaping status: Never Used   Substance and Sexual Activity    Alcohol use: Not Currently    Drug use: No    Sexual activity: Not Currently     Partners: Male     Birth control/protection: Post-menopausal   Other Topics Concern    Parent/sibling w/ CABG, MI or angioplasty before 65F 55M? No     Social Determinants of Health      Received from [employer] [employer]    Financial Resource Strain    Received from [employer] [employer]    Social Connections      Family History   Problem Relation Age of Onset    Lung Cancer Mother 44    Cerebrovascular Disease Father     Hypertension Father     Alcohol/Drug Father     Cervical Cancer Maternal Aunt     Ovarian Cancer Maternal Aunt 60    Obesity Niece         niece    Mental Illness Nephew         nephew    Diabetes Other         paternal aunt    Hyperlipidemia Other     Obesity Other         self    Obesity Other      Diabetes Other         paternal aunt    Hypertension Other         father    Cerebrovascular Disease Other         father    C.A.D. No family hx of     Breast Cancer No family hx of     Cancer - colorectal No family hx of     Prostate Cancer No family hx of     Melanoma No family hx of     Anesthesia Reaction No family hx of     Venous thrombosis No family hx of       Current Outpatient Medications   Medication Sig Dispense Refill    amLODIPine (NORVASC) 5 MG tablet Take 1 tablet (5 mg) by mouth every morning 90 tablet 3    clotrimazole (LOTRIMIN) 1 % external cream Apply topically 2 times daily To affected areas (Patient taking differently: Apply topically as needed To affected areas) 45 g 3     No current facility-administered medications for this visit.      Allergies   Allergen Reactions    Blood Transfusion Related (Informational Only) Other (See Comments)     Patient has a history of a clinically significant antibody against RBC antigens.  A delay in compatible RBCs may occur.    Oxybutynin Itching    Seasonal Allergies           OBJECTIVE:    PHYSICAL EXAM:  /74 (BP Location: Right arm, Patient Position: Sitting, Cuff Size: Adult Regular)   Pulse 75   Temp 98  F (36.7  C) (Oral)   Resp 14   Wt 87.3 kg (192 lb 8 oz)   LMP 03/08/2008   SpO2 99%   BMI 32.37 kg/m         CONSTITUTIONAL: Alert non-toxic appearing female in no acute distress  HEAD: Normocephalic, atraumatic  EYES: No scleral icterus  NECK: Neck supple without lymphadenopathy  RESPIRATORY: Lungs clear to auscultation, respirations unlabored  CV: Regular rate and rhythm, S1S2, no clicks, murmurs, rubs, or gallops; bilateral lower extremities without edema  GASTROINTESTINAL: Normoactive bowel sounds x4 quadrants, abdomen soft, non-distended, and non-tender to palpation without masses or organomegaly  GENITOURINARY: External genitalia and urethral meatus pink without lesions, masses, or excoriation. Significant vaginal stenosis, unable  to perform speculum or bimanual exam.  LYMPHATIC: L inguinal region with an approximately 3cm x1cm well defined, soft, mobile, mass- non-tender, no overlying erythema. Cervical, supraclavicular, and R inguinal nodes without palpable lymphadenopathy  MUSCULOSKELETAL: Moves all extremities, no obvious muscle wasting  NEUROLOGIC: No gross deficits, normal gait  SKIN: Appropriate color for race, warm and dry, no rashes or lesions to unclothed skin  PSYCHIATRIC: Pleasant and interactive, affect bright, makes appropriate eye contact, thought process linear    DATA:    Weight trend:  Wt Readings from Last 5 Encounters:   05/23/24 87.3 kg (192 lb 8 oz)   03/29/24 86.5 kg (190 lb 11.2 oz)   02/22/24 84.8 kg (187 lb)   12/29/23 85.7 kg (189 lb)   11/15/23 85.9 kg (189 lb 4.8 oz)           Imaging:  Reviewed 3/28/24 CT CAP- no evidence of metastatic disease. No lymphadenopathy noted.    ASSESSMENT/PLAN:    1) L groin mass: Suspect this could be lymphedema vs lymphocele, lower suspicion for hernia vs recurrent vaginal cancer. Given her history, imaging is warranted for further evaluation. Unable to obtain ultrasound of this region given Medicare covered diagnoses requirements. Given this, will repeat CT of the abdomen and pelvis for further evaluation. If her symptoms resolve by the time of her scan, she may cancel her imaging appt and keep her routine surveillance appointment as scheduled. Discussed that if she does have lymphedema secondary to radiation therapy, would recommend referral to lymphedema therapy, but management ultimately depends on imaging findings.  2) Patient verbalized understanding of and agreement with plan    MDM:  28 minutes spent on date of service on visit, including chart review, face to face visit, documentation, and coordination of care.    SANDI Frey, CNP (she/her)  Division of Gynecologic Oncology

## 2024-05-23 NOTE — LETTER
"    2024         RE: Jessie Tamayo  [address] [facility] [id number]        Dear Colleague,    Thank you for referring your patient, Jessie Tamayo, to the [facility]. Please see a copy of my visit note below.    Gynecologic Oncology Follow Up Note    RE: Jessie Tamayo   : 1955  PRONOUNS: she/her/hers  ELMO: 2024  GYNECOLOGIC ONCOLOGIST: Reina Stahl MD    CC: Jessie Tamayo is a 68 year old female with a history of stage IVB vaginal cancer s/p chemoradiation presenting for an acute visit regarding new mass in the left groin.    INTERVAL HISTORY:    Jessie has generally been feeling well. She routinely monitors for swollen nodes in the groin given her history. Within the past week, she was performing her normal check when she noticed a painless mass in the left groin that she describes as a \"long lump\", ropelike. May be slightly growing in size over the week. No known trauma or infectious symptoms, but notes she was in [place] on a bike trip when this appeared. Wore compression shorts, but not every day. Noticed some swelling in the legs, but she relates this to the long flight home- did wear compression stockings at that time.    Denies unintentional weight loss, soaking night sweats, trouble breathing, abdominal bloating/fullness, persistent abdominal or pelvic pain, vaginal bleeding, new bowel or bladder concerns. Attempting to use a vaginal dilator, but notes this is difficult given the degree of vaginal stenosis s/p radiation.          ONCOLOGY HISTORY:    07, 10/15/08, 09, 11: Pap test NILM.      1/23/15: Pap test NILM, hrHPV16+.  2/27/15: Cervical polyp & cervical biopsy pathology: Negative for dysplasia/malignancy.     16: Pap test NILM, hrHPV16+.   16: Endocervical curettings pathology negative for dysplasia/malignancy.     17: Pap test NILM, hrHPV16+.   -Colposcopy recommended, not performed.      18:   -Pap test " ASCUS, hrHPV16+.   -Endocervical curettings & cervical biopsy pathology: negative for dysplasia/malignancy.      9/20/19:  Pap test ASC-H, hrHPV16+.   12/16/19: Endocervical curettings pathology benign; cervical 1:00, 7:00 biopsies suggestive of LSIL (CIN1).      9/10/20: Pap test ASC-H, hrHPV+ (NOS).  2/8/21: LEEP.   -Pathology: LEEP & endocervical curettings: negative for dysplasia/malignancy.     3/23/22: Endocervical curettings & cervical biopsy pathology: negative for dysplasia/malignancy.     3/28/23:   -Pap test HSIL, hrHPV16+.   -Findings by gynecologist Dr. Koenig: External genitalia with erythematous plaques bilaterally  Urethra- mid position and well supported, suburethral tissue thickened and friable with bleeding elicited after placement of the speculum  Vagina is stenotic and cervix difficult to see.   5/30/23: Cystourethroscopy, vaginal biopsy by urologist Dr. Mackenzie.   -Findings: Exam revealed lichenified changes to bilateral labia majora and friable vestibular tissue (patient had significant bleeding from prep alone.) The urethra was somewhat stenotic and would not accommodate 19Fr rigid cystoscope, therefore a 16 Fr flexible cystoscope was inserted. Gentle pressure was required to access the bladder. The ureteral orifices were in their orthotopic positions bilaterally. There were no intravesical stones or diverticuli and the bladder was mildly trabeculated. There were no intravesical lesions. See media tab for urethral pictures - there were no obvious lesions but the entire distal urethra was erythematous and stenosed (16Fr.)  -Pathology: Invasive moderately-differentiated squamous cell carcinoma, HPV-associated, with focus suspicious for lymphovascular space invasion; IHC: p16+.    6/8/23: Gynecologic Oncology consultation.  -Findings:   External genitalia notable for erythema and desquamation of the posterior medial labia, c/w lichen sclerosus changes. When the labia are , a friable  mass is visible at the distal anterior vagina, just posterior to the urethra. On bimanual exam, the cervix is small and normal in contour. The palpable vaginal tumor is limited to the anterior distal vagina, approximately 4-5 cm laterally x 3 cm cranio-caudal. Tumor is friable. Remainder of the vagina smooth to palpation. Digital anorectal exam is without nodularity. No palpable tumor in the rectovaginal septum.   Enlarged firm, fixed right inguinal lymph node ~3-4 cm in size.      6/16/23: Pelvic MRI  IMPRESSION:   1. Irregular enhancing lesion along the anterior vaginal wall at the  level of the introitus measuring 2.5 x 0.9 cm with irregular  enhancement along the anterior vaginal wall towards the level of the  vaginal cuff however there is no definitive evidence or abnormal  signal intensity involving the cervix to suggest invasion.  2. Right inguinal lymphadenopathy compatible with metastatic disease.   3. There is some asymmetric enhancement involving the left sacrum,  incompletely evaluated on this study. Further evaluation of this and  further staging of the chest, abdomen and pelvis will be performed on  PET/CT scheduled for 6/20/2023.     6/20/23: PET CT  IMPRESSION:   1. Intense focal hypermetabolic FDG uptake in biopsy-proven vaginal  squamous cell carcinoma.  2. Multiple enlarged, hypermetabolic right inguinal nodes likely  represent regional metastasis. No definite evidence of distant  metastatic disease.  3. Scattered sub-4 mm solid pulmonary nodules are below the size  threshold for characterization on PET. Attention on follow-up with CT.  4. 1.0 cm enhancing left parotid gland nodule with hypermetabolic  uptake could represent a primary parotid neoplasm such as pleomorphic  adenoma or Warthin's tumor; consider ultrasound for further  evaluation.  5. 1.3 cm hypoattenuating right thyroid nodule with mild FDG uptake  warrants ultrasound for characterization.      Plan: Cisplatin radiation       7/27/2023: urinary gonsalez catheter placement. Treat acute cystitis with bactrim per Urology.      8/1/2023: plan C1D1 Cisplatin radiation      8/28/23: Mg 1.3. 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 Significant decreased size and FDG metabolism of previously seen  vaginal mass. Residual trace linear uptake along the mucosa of the  vaginal introitus, could be inflammatory. This can be correlated with  physical exam.   2. Resolution of previously seen hypermetabolic enlarged right  inguinal nodes.   3. Stable hypermetabolic soft tissue nodule in the left parotid gland.  ENT consultation is recommended. Given the stability over 6 months, it  could be a benign salivary tumor such as a Warthin's tumor or an  oncocytoma. Biopsy can be obtained to rule out a low grade salivary  originated tumor.   4. Four mm nodular density in the gallbladder, might represent a  polyp. This could be further evaluated with ultrasound.     Plan to repeat PET in 3 months     3/28/24: CT ap IMPRESSION: No evidence of metastatic disease in the abdomen or pelvis.           Past Medical History:   Diagnosis Date     Actinic keratosis      C. difficile diarrhea 09/08/2023     Essential hypertension 2/22/2024     Hyperlipemia      Lichen sclerosus 2020     Osteopenias      SCC (squamous cell carcinoma) 07/26/2023      Past Surgical History:   Procedure Laterality Date     COLONOSCOPY  2006    normal     COLPOSCOPY, BIOPSY, COMBINED N/A 02/08/2021    Procedure: COLPOSCOPY, WITH BIOPSY, LEEP procedure;  Surgeon: Jefe Koenig MD;  Location: [facility] OR     CYSTOSCOPY N/A 05/30/2023    Procedure: CYSTOSCOPY AND EXCISIONAL BIOPSY OF THE VAGINAL TISSUE AROUND THE URETHRA.  (look in the bladder and sample small area in the vagina near the urethra);  Surgeon: Avril  Lakeisha ADAMSON MD;  Location: [facility] OR     EYE SURGERY      cataracts bilateral     INSERT INTERSTITIAL NEEDLE, ULTRASOUND GUIDED N/A 9/18/2023    Procedure: INSERTION, NEEDLE, WITH ULTRASOUND GUIDANCE, FOR INTERSTITIAL BRACHYTHERAPY;  Surgeon: Marisa Pacheco MD;  Location: [facility] OR     OPEN REDUCTION INTERNAL FIXATION FOREARM  07/31/2012    Procedure: OPEN REDUCTION INTERNAL FIXATION FOREARM;  Open Reduction Internal Fixation, Irrigation & Debridment  of Right Distal Radius, application short arm splint;  Surgeon: Wade Beard MD;  Location: [facility] OR     REMOVE INTERSTITIAL NEEDLE N/A 9/20/2023    Procedure: REMOVAL, INTERSTITIAL NEEDLE, AFTER TEMPORARY BRACHYTHERAPY;  Surgeon: Marisa Pacheco MD;  Location: [facility] OR     TONSILLECTOMY & ADENOIDECTOMY  1960     [facility] TREAT ECTOPIC PREG,RMV TUBE/OVARY  1985    ectopic, right side-ovary and tube removed     Social History     Socioeconomic History     Marital status:      Spouse name: Jesse     Number of children: 0     Years of education: None     Highest education level: None   Occupational History     Employer: [employer]   Tobacco Use     Smoking status: Never     Passive exposure: Never     Smokeless tobacco: Never   Vaping Use     Vaping status: Never Used   Substance and Sexual Activity     Alcohol use: Not Currently     Drug use: No     Sexual activity: Not Currently     Partners: Male     Birth control/protection: Post-menopausal   Other Topics Concern     Parent/sibling w/ CABG, MI or angioplasty before 65F 55M? No     Social Determinants of Health      [facility] [employer] [facility]    Financial Resource Strain    Received from [employer] [facility], [employer] [facility]    Social Connections      Family History   Problem Relation Age of Onset     Lung Cancer Mother 44     Cerebrovascular Disease Father       Hypertension Father      Alcohol/Drug Father      Cervical Cancer Maternal Aunt      Ovarian Cancer Maternal Aunt 60     Obesity Niece         niece     Mental Illness Nephew         nephew     Diabetes Other         paternal aunt     Hyperlipidemia Other      Obesity Other         self     Obesity Other      Diabetes Other         paternal aunt     Hypertension Other         father     Cerebrovascular Disease Other         father     C.A.D. No family hx of      Breast Cancer No family hx of      Cancer - colorectal No family hx of      Prostate Cancer No family hx of      Melanoma No family hx of      Anesthesia Reaction No family hx of      Venous thrombosis No family hx of       Current Outpatient Medications   Medication Sig Dispense Refill     amLODIPine (NORVASC) 5 MG tablet Take 1 tablet (5 mg) by mouth every morning 90 tablet 3     clotrimazole (LOTRIMIN) 1 % external cream Apply topically 2 times daily To affected areas (Patient taking differently: Apply topically as needed To affected areas) 45 g 3     No current facility-administered medications for this visit.      Allergies   Allergen Reactions     Blood Transfusion Related (Informational Only) Other (See Comments)     Patient has a history of a clinically significant antibody against RBC antigens.  A delay in compatible RBCs may occur.     Oxybutynin Itching     Seasonal Allergies           OBJECTIVE:    PHYSICAL EXAM:  /74 (BP Location: Right arm, Patient Position: Sitting, Cuff Size: Adult Regular)   Pulse 75   Temp 98  F (36.7  C) (Oral)   Resp 14   Wt 87.3 kg (192 lb 8 oz)   LMP 03/08/2008   SpO2 99%   BMI 32.37 kg/m         CONSTITUTIONAL: Alert non-toxic appearing female in no acute distress  HEAD: Normocephalic, atraumatic  EYES: No scleral icterus  NECK: Neck supple without lymphadenopathy  RESPIRATORY: Lungs clear to auscultation, respirations unlabored  CV: Regular rate and rhythm, S1S2, no clicks, murmurs, rubs, or gallops;  bilateral lower extremities without edema  GASTROINTESTINAL: Normoactive bowel sounds x4 quadrants, abdomen soft, non-distended, and non-tender to palpation without masses or organomegaly  GENITOURINARY: External genitalia and urethral meatus pink without lesions, masses, or excoriation. Significant vaginal stenosis, unable to perform speculum or bimanual exam.  LYMPHATIC: L inguinal region with an approximately 3cm x1cm well defined, soft, mobile, mass- non-tender, no overlying erythema. Cervical, supraclavicular, and R inguinal nodes without palpable lymphadenopathy  MUSCULOSKELETAL: Moves all extremities, no obvious muscle wasting  NEUROLOGIC: No gross deficits, normal gait  SKIN: Appropriate color for race, warm and dry, no rashes or lesions to unclothed skin  PSYCHIATRIC: Pleasant and interactive, affect bright, makes appropriate eye contact, thought process linear    DATA:    Weight trend:  Wt Readings from Last 5 Encounters:   05/23/24 87.3 kg (192 lb 8 oz)   03/29/24 86.5 kg (190 lb 11.2 oz)   02/22/24 84.8 kg (187 lb)   12/29/23 85.7 kg (189 lb)   11/15/23 85.9 kg (189 lb 4.8 oz)           Imaging:  Reviewed 3/28/24 CT CAP- no evidence of metastatic disease. No lymphadenopathy noted.    ASSESSMENT/PLAN:    1) L groin mass: Suspect this could be lymphedema vs lymphocele, lower suspicion for hernia vs recurrent vaginal cancer. Given her history, imaging is warranted for further evaluation. Unable to obtain ultrasound of this region given Medicare covered diagnoses requirements. Given this, will repeat CT of the abdomen and pelvis for further evaluation. If her symptoms resolve by the time of her scan, she may cancel her imaging appt and keep her routine surveillance appointment as scheduled. Discussed that if she does have lymphedema secondary to radiation therapy, would recommend referral to lymphedema therapy, but management ultimately depends on imaging findings.  2) Patient verbalized understanding of and  agreement with plan    MDM:  28 minutes spent on date of service on visit, including chart review, face to face visit, documentation, and coordination of care.    SANDI Frey, CNP (she/her)  Division of Gynecologic Oncology

## 2024-06-05 ENCOUNTER — ANCILLARY PROCEDURE (OUTPATIENT)
Dept: CT IMAGING | Facility: CLINIC | Age: 69
End: 2024-06-05
Attending: NURSE PRACTITIONER
Payer: COMMERCIAL

## 2024-06-05 DIAGNOSIS — R19.09 LEFT GROIN MASS: ICD-10-CM

## 2024-06-05 DIAGNOSIS — Z85.44 HISTORY OF CANCER OF VAGINA: ICD-10-CM

## 2024-06-05 PROCEDURE — 74177 CT ABD & PELVIS W/CONTRAST: CPT | Mod: TC | Performed by: RADIOLOGY

## 2024-06-05 RX ORDER — IOPAMIDOL 755 MG/ML
500 INJECTION, SOLUTION INTRAVASCULAR ONCE
Status: COMPLETED | OUTPATIENT
Start: 2024-06-05 | End: 2024-06-05

## 2024-06-05 RX ADMIN — IOPAMIDOL 118 ML: 755 INJECTION, SOLUTION INTRAVASCULAR at 13:12

## 2024-06-05 RX ADMIN — Medication 74 ML: at 13:12

## 2024-06-26 NOTE — PROGRESS NOTES
Gynecologic Oncology Return Visit Note    Date: 2024     RE: Jessie Tamayo  : 1955  ELMO: 2024     CC: Stage IVB SCC of the vagina      HPI:  Jessie Tamayo is a 68 year old woman with a history of stage IVB SCC of the vagina.  She completed treatment with chemoradiation and brachytherapy 23.  She is here today for a surveillance visit.      Oncology History:  07, 10/15/08, 09, 11: Pap test NILM.      1/23/15: Pap test NILM, hrHPV16+.  2/27/15: Cervical polyp & cervical biopsy pathology: Negative for dysplasia/malignancy.     16: Pap test NILM, hrHPV16+.   16: Endocervical curettings pathology negative for dysplasia/malignancy.     17: Pap test NILM, hrHPV16+.   -Colposcopy recommended, not performed.      18:   -Pap test ASCUS, hrHPV16+.   -Endocervical curettings & cervical biopsy pathology: negative for dysplasia/malignancy.      19:  Pap test ASC-H, hrHPV16+.   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      9/10/20: Pap test ASC-H, hrHPV+ (NOS).  21: LEEP.   -Pathology: LEEP & endocervical curettings: negative for dysplasia/malignancy.     3/23/22: Endocervical curettings & cervical biopsy pathology: negative for dysplasia/malignancy.     3/28/23:   -Pap test HSIL, hrHPV16+.   -Findings by gynecologist Dr. Koenig: External genitalia with erythematous plaques bilaterally  Urethra- mid position and well supported, suburethral tissue thickened and friable with bleeding elicited after placement of the speculum  Vagina is stenotic and cervix difficult to see.   23: Cystourethroscopy, vaginal biopsy by urologist Dr. Mackenzie.   -Findings: Exam revealed lichenified changes to bilateral labia majora and friable vestibular tissue (patient had significant bleeding from prep alone.) The urethra was somewhat stenotic and would not accommodate 19Fr rigid cystoscope, therefore a 16  Fr flexible cystoscope was inserted. Gentle pressure was required to access the bladder. The ureteral orifices were in their orthotopic positions bilaterally. There were no intravesical stones or diverticuli and the bladder was mildly trabeculated. There were no intravesical lesions. See media tab for urethral pictures - there were no obvious lesions but the entire distal urethra was erythematous and stenosed (16Fr.)  -Pathology: Invasive moderately-differentiated squamous cell carcinoma, HPV-associated, with focus suspicious for lymphovascular space invasion; IHC: p16+.    6/8/23: Gynecologic Oncology consultation.  -Findings:   External genitalia notable for erythema and desquamation of the posterior medial labia, c/w lichen sclerosus changes. When the labia are , a friable mass is visible at the distal anterior vagina, just posterior to the urethra. On bimanual exam, the cervix is small and normal in contour. The palpable vaginal tumor is limited to the anterior distal vagina, approximately 4-5 cm laterally x 3 cm cranio-caudal. Tumor is friable. Remainder of the vagina smooth to palpation. Digital anorectal exam is without nodularity. No palpable tumor in the rectovaginal septum.   Enlarged firm, fixed right inguinal lymph node ~3-4 cm in size.      6/16/23: Pelvic MRI  IMPRESSION:   1. Irregular enhancing lesion along the anterior vaginal wall at the  level of the introitus measuring 2.5 x 0.9 cm with irregular  enhancement along the anterior vaginal wall towards the level of the  vaginal cuff however there is no definitive evidence or abnormal  signal intensity involving the cervix to suggest invasion.  2. Right inguinal lymphadenopathy compatible with metastatic disease.   3. There is some asymmetric enhancement involving the left sacrum,  incompletely evaluated on this study. Further evaluation of this and  further staging of the chest, abdomen and pelvis will be performed on  PET/CT scheduled for  6/20/2023.     6/20/23: PET CT  IMPRESSION:   1. Intense focal hypermetabolic FDG uptake in biopsy-proven vaginal  squamous cell carcinoma.  2. Multiple enlarged, hypermetabolic right inguinal nodes likely  represent regional metastasis. No definite evidence of distant  metastatic disease.  3. Scattered sub-4 mm solid pulmonary nodules are below the size  threshold for characterization on PET. Attention on follow-up with CT.  4. 1.0 cm enhancing left parotid gland nodule with hypermetabolic  uptake could represent a primary parotid neoplasm such as pleomorphic  adenoma or Warthin's tumor; consider ultrasound for further  evaluation.  5. 1.3 cm hypoattenuating right thyroid nodule with mild FDG uptake  warrants ultrasound for characterization.      8/1/23-9/20/23: Primary chemoradiation therapy with external beam radiation with concurrent chemosensitization with weekly cisplatin 40 mg/m2 followed by interstitial brachytherapy in 5 fractions.   -10/7/23-10/17/23: Therapy complicated by severe C.diff colitis requiring prolonged hospitalization and antibiotic regimen. Colonoscopy and biopsies performed during that hospitalization confirmed a diagnosis of severe C.diff colitis.    12/22/23: PET CT  IMPRESSION:   In this patient with stage IVb squamous cell carcinoma of the vagina:  1. Significant decreased size and FDG metabolism of previously seen  vaginal mass. Residual trace linear uptake along the mucosa of the  vaginal introitus, could be inflammatory. This can be correlated with  physical exam.   2. Resolution of previously seen hypermetabolic enlarged right  inguinal nodes.   3. Stable hypermetabolic soft tissue nodule in the left parotid gland.  ENT consultation is recommended. Given the stability over 6 months, it  could be a benign salivary tumor such as a Warthin's tumor or an  oncocytoma. Biopsy can be obtained to rule out a low grade salivary  originated tumor.   4. Four mm nodular density in the  "gallbladder, might represent a  polyp. This could be further evaluated with ultrasound.    Plan to repeat PET in 3 months     3/28/24: CT ap IMPRESSION: No evidence of metastatic disease in the abdomen or pelvis.    6/5/24: CT AP  IMPRESSION:   1.  No metastatic disease in the abdomen and pelvis.  2.  No CT evident cause for left groin palpable lump. Specifically, no  lymphadenopathy, hernia, mass or fluid collection.               Today she comes to clinic feeling well overall and is without gynecologic concern.  She has had urinary frequency recently and had some \"bloody show\" in her underwear.  She is not sure where the blood came from but suspects she has a bladder infection.  No abdominal pain.  The \"bump\" in her left groin is not present today and seems to \"come and go\".  Eating and drinking without difficulty, \"loves food\".  She is biking and enjoying life.  No changes in bowel habits.  She continues to not tolerate lactose and will have diarrhea if she has some.  She is not sure if this is from the prior radiation or recurrent c.diff.  If she needs antibiotics she would like recommendations from GI.  No leg swell.  She is attempting to use her dilator but is limited by discomfort and vaginal scaring.  She is current with her annual physical, mammogram, and colonoscopy.  She is not currently using clobetasol for her lichen sclerosis because she is having no symptoms.                Health Maintenance  Colonoscopy: 8/30/17  Mammogram: 3/1/24  Annual physical: 2/22/24  DEXA: 8/16/11    Past Medical History:    Past Medical History:   Diagnosis Date    Actinic keratosis     C. difficile diarrhea 09/08/2023    Essential hypertension 2/22/2024    Hyperlipemia     Lichen sclerosus 2020    Osteopenias     SCC (squamous cell carcinoma) 07/26/2023         Past Surgical History:    Past Surgical History:   Procedure Laterality Date    COLONOSCOPY  2006    normal    COLPOSCOPY, BIOPSY, COMBINED N/A 02/08/2021    " Procedure: COLPOSCOPY, WITH BIOPSY, LEEP procedure;  Surgeon: Jeef Koenig MD;  Location: [facility] OR    CYSTOSCOPY N/A 05/30/2023    Procedure: CYSTOSCOPY AND EXCISIONAL BIOPSY OF THE VAGINAL TISSUE AROUND THE URETHRA.  (look in the bladder and sample small area in the vagina near the urethra);  Surgeon: Lakeisha Mackenzie MD;  Location: [facility] OR    EYE SURGERY      cataracts bilateral    INSERT INTERSTITIAL NEEDLE, ULTRASOUND GUIDED N/A 9/18/2023    Procedure: INSERTION, NEEDLE, WITH ULTRASOUND GUIDANCE, FOR INTERSTITIAL BRACHYTHERAPY;  Surgeon: Marisa Pacheco MD;  Location: [facility] OR    OPEN REDUCTION INTERNAL FIXATION FOREARM  07/31/2012    Procedure: OPEN REDUCTION INTERNAL FIXATION FOREARM;  Open Reduction Internal Fixation, Irrigation & Debridment  of Right Distal Radius, application short arm splint;  Surgeon: Wade Beard MD;  Location: [facility] OR    REMOVE INTERSTITIAL NEEDLE N/A 9/20/2023    Procedure: REMOVAL, INTERSTITIAL NEEDLE, AFTER TEMPORARY BRACHYTHERAPY;  Surgeon: Marisa Pacheco MD;  Location: [facility] OR    TONSILLECTOMY & ADENOIDECTOMY  [address] [place] [address] TREAT ECTOPIC PREG,RMV TUBE/OVARY  1985    ectopic, right side-ovary and tube removed         Health Maintenance Due   Topic Date Due    LIPID  03/08/2022    MEDICARE ANNUAL WELLNESS VISIT  05/26/2023    COVID-19 Vaccine (6 - 2023-24 season) 09/01/2023    FALL RISK ASSESSMENT  06/08/2024       Current Medications:     Current Outpatient Medications   Medication Sig Dispense Refill    amLODIPine (NORVASC) 5 MG tablet Take 1 tablet (5 mg) by mouth every morning 90 tablet 3         Allergies:        Allergies   Allergen Reactions    Blood Transfusion Related (Informational Only) Other (See Comments)     Patient has a history of a clinically significant antibody against RBC antigens.  A delay in compatible RBCs may occur.    Oxybutynin Itching    Seasonal Allergies         Social History:     Social History     Tobacco Use    Smoking  status: Never     Passive exposure: Never    Smokeless tobacco: Never   Substance Use Topics    Alcohol use: Not Currently       History   Drug Use No         Family History:     The patient's family history is notable for:    Family History   Problem Relation Age of Onset    Lung Cancer Mother 44    Cerebrovascular Disease Father     Hypertension Father     Alcohol/Drug Father     Cervical Cancer Maternal Aunt     Ovarian Cancer Maternal Aunt 60    Obesity Niece         niece    Mental Illness Nephew         nephew    Diabetes Other         paternal aunt    Hyperlipidemia Other     Obesity Other         self    Obesity Other     Diabetes Other         paternal aunt    Hypertension Other         father    Cerebrovascular Disease Other         father    C.A.D. No family hx of     Breast Cancer No family hx of     Cancer - colorectal No family hx of     Prostate Cancer No family hx of     Melanoma No family hx of     Anesthesia Reaction No family hx of     Venous thrombosis No family hx of          Physical Exam:     /75   Pulse 75   Temp 98.2  F (36.8  C) (Oral)   Resp 16   Wt 86.6 kg (191 lb)   LMP 03/08/2008   SpO2 99%   BMI 32.11 kg/m    Body mass index is 32.11 kg/m .    General Appearance:  alert, no distress     HEENT: no palpable nodules or masses        Cardiovascular: regular rate and rhythm, no gallops, rubs or murmurs     Respiratory: lungs clear, no rales, rhonchi or wheezes    Musculoskeletal: extremities non tender and without edema    Skin: no lesions or rashes     Neurological: normal gait, no gross defects     Psychiatric: appropriate mood and affect                               Hematological: normal cervical, supraclavicular and inguinal lymph nodes     Gastrointestinal:       abdomen soft, non-tender, non-distended, no organomegaly or masses    Genitourinary: External genitalia and urethral meatus appears normal.  Attempted digital exam which was limited due to discomfort.   "Significant vaginal stenosis and foreshortening able to be appreciated.    No results found for this or any previous visit (from the past 24 hour(s)).       Assessment:    Jessie Tmaayo is a 68 year old woman with a history of stage IVB SCC of the vagina.  She completed treatment with chemoradiation and brachytherapy 9/20/23.  She is here today for a surveillance visit.     30 minutes spent on the date of the encounter doing chart review, history and exam, documentation, and further activities as noted above.      Plan:     1.) Vaginal cancer:  TRISHA on exam, though exam limited.  RTC in 3 months for next surveillance visit.  Plan for surveillance visit every 3 months for the first 2 years post treatment (9/2025), followed by every 6 months for 3 years thereafter (9/2028), then annually.  Per Dr. Stahl \"Will omit cytology unless the vaginal adhesions can be further reduced.  Will omit speculum exam, and attempt one-digit exams during surveillance.\" Reviewed signs and symptoms for when she should contact the clinic or seek additional care.  Patient to contact the clinic with any questions or concerns in the interim.      Genetic risk factors were assessed and she does not met qualification for referral.    Labs and/or tests ordered include:  UA with reflex to UC.     2.) Health maintenance:  Issues addressed today include following up with PCP for annual health maintenance and non-gynecologic issues.     3.) Urinary frequency: Urinary frequency with one episode of \"bloody show\" in her underwear.  No source of bleeding on her exam possibly from her urethra.  Orders place for UA/UC.      Andie Rios, DNP, APRN, FNP-C, AOCNP  Oncology Nurse Practitioner  Division of Gynecologic Oncology  Pager: [phone]     CC  Patient Care Team:  Alba Layton MD as PCP - General  Alba Layton MD as Assigned PCP  Lakeisha Mackenzie MD as MD (Urology)  Lawrence Banuelos MD as MD (Dermatology)  Lakeisha Mackenzie MD as " Assigned Surgical Provider  Lakeisha Mackenzie MD as MD (Urology)  Reina Stahl MD as MD (Gynecologic Oncology)  Marisa Pacheco MD as MD (Radiation Oncology)  Lily Fabian PA-C as Physician Assistant (Anesthesiology)  Marisa Pacheco MD as Assigned Cancer Care Provider  Ulysses Gusman MD as Assigned Palliative Care Provider  Shannon Flores MD as Hospitalist (HOSPITALIST)  Boston Jacobo MD as Assigned Infectious Disease Provider  Tad Hunter MD as Assigned Gastroenterology Provider  Emma Petersen APRN CNP as Assigned OBGYN Provider  SELF, REFERRED

## 2024-06-27 ENCOUNTER — ONCOLOGY VISIT (OUTPATIENT)
Dept: ONCOLOGY | Facility: CLINIC | Age: 69
End: 2024-06-27
Attending: NURSE PRACTITIONER
Payer: COMMERCIAL

## 2024-06-27 VITALS
SYSTOLIC BLOOD PRESSURE: 123 MMHG | HEART RATE: 75 BPM | BODY MASS INDEX: 32.11 KG/M2 | TEMPERATURE: 98.2 F | RESPIRATION RATE: 16 BRPM | OXYGEN SATURATION: 99 % | DIASTOLIC BLOOD PRESSURE: 75 MMHG | WEIGHT: 191 LBS

## 2024-06-27 DIAGNOSIS — Z01.818 PREOP EXAMINATION: ICD-10-CM

## 2024-06-27 DIAGNOSIS — C52 VAGINAL CANCER (H): Primary | ICD-10-CM

## 2024-06-27 DIAGNOSIS — R30.0 DYSURIA: ICD-10-CM

## 2024-06-27 LAB
ALBUMIN UR-MCNC: NEGATIVE MG/DL
APPEARANCE UR: CLEAR
BILIRUB UR QL STRIP: NEGATIVE
COLOR UR AUTO: ABNORMAL
GLUCOSE UR STRIP-MCNC: NEGATIVE MG/DL
HGB UR QL STRIP: NEGATIVE
KETONES UR STRIP-MCNC: NEGATIVE MG/DL
LEUKOCYTE ESTERASE UR QL STRIP: NEGATIVE
MUCOUS THREADS #/AREA URNS LPF: PRESENT /LPF
NITRATE UR QL: NEGATIVE
PH UR STRIP: 5 [PH] (ref 5–7)
RBC URINE: 1 /HPF
SP GR UR STRIP: 1.02 (ref 1–1.03)
UROBILINOGEN UR STRIP-MCNC: NORMAL MG/DL
WBC URINE: 1 /HPF

## 2024-06-27 PROCEDURE — 99214 OFFICE O/P EST MOD 30 MIN: CPT | Performed by: NURSE PRACTITIONER

## 2024-06-27 PROCEDURE — G0463 HOSPITAL OUTPT CLINIC VISIT: HCPCS | Performed by: NURSE PRACTITIONER

## 2024-06-27 PROCEDURE — 81001 URINALYSIS AUTO W/SCOPE: CPT | Performed by: NURSE PRACTITIONER

## 2024-06-27 ASSESSMENT — PAIN SCALES - GENERAL: PAINLEVEL: NO PAIN (0)

## 2024-06-27 NOTE — LETTER
2024      Jessie Tamayo  [address] [place] [id number]      Dear Colleague,    Thank you for referring your patient, Jessie Tamayo, to the [facility]. Please see a copy of my visit note below.    Gynecologic Oncology Return Visit Note    Date: 2024     RE: Jessie Tamayo  : 1955  ELMO: 2024     CC: Stage IVB SCC of the vagina      HPI:  Jessie Tamayo is a 68 year old woman with a history of stage IVB SCC of the vagina.  She completed treatment with chemoradiation and brachytherapy 23.  She is here today for a surveillance visit.      Oncology History:  07, 10/15/08, 09, 11: Pap test NILM.      1/23/15: Pap test NILM, hrHPV16+.  2/27/15: Cervical polyp & cervical biopsy pathology: Negative for dysplasia/malignancy.     16: Pap test NILM, hrHPV16+.   16: Endocervical curettings pathology negative for dysplasia/malignancy.     17: Pap test NILM, hrHPV16+.   -Colposcopy recommended, not performed.      18:   -Pap test ASCUS, hrHPV16+.   -Endocervical curettings & cervical biopsy pathology: negative for dysplasia/malignancy.      19:  Pap test ASC-H, hrHPV16+.   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      9/10/20: Pap test ASC-H, hrHPV+ (NOS).  21: LEEP.   -Pathology: LEEP & endocervical curettings: negative for dysplasia/malignancy.     3/23/22: Endocervical curettings & cervical biopsy pathology: negative for dysplasia/malignancy.     3/28/23:   -Pap test HSIL, hrHPV16+.   -Findings by gynecologist Dr. Koenig: External genitalia with erythematous plaques bilaterally  Urethra- mid position and well supported, suburethral tissue thickened and friable with bleeding elicited after placement of the speculum  Vagina is stenotic and cervix difficult to see.   23: Cystourethroscopy, vaginal biopsy by urologist Dr. Mackenzie.   -Findings:  Exam revealed lichenified changes to bilateral labia majora and friable vestibular tissue (patient had significant bleeding from prep alone.) The urethra was somewhat stenotic and would not accommodate 19Fr rigid cystoscope, therefore a 16 Fr flexible cystoscope was inserted. Gentle pressure was required to access the bladder. The ureteral orifices were in their orthotopic positions bilaterally. There were no intravesical stones or diverticuli and the bladder was mildly trabeculated. There were no intravesical lesions. See media tab for urethral pictures - there were no obvious lesions but the entire distal urethra was erythematous and stenosed (16Fr.)  -Pathology: Invasive moderately-differentiated squamous cell carcinoma, HPV-associated, with focus suspicious for lymphovascular space invasion; IHC: p16+.    6/8/23: Gynecologic Oncology consultation.  -Findings:   External genitalia notable for erythema and desquamation of the posterior medial labia, c/w lichen sclerosus changes. When the labia are , a friable mass is visible at the distal anterior vagina, just posterior to the urethra. On bimanual exam, the cervix is small and normal in contour. The palpable vaginal tumor is limited to the anterior distal vagina, approximately 4-5 cm laterally x 3 cm cranio-caudal. Tumor is friable. Remainder of the vagina smooth to palpation. Digital anorectal exam is without nodularity. No palpable tumor in the rectovaginal septum.   Enlarged firm, fixed right inguinal lymph node ~3-4 cm in size.      6/16/23: Pelvic MRI  IMPRESSION:   1. Irregular enhancing lesion along the anterior vaginal wall at the  level of the introitus measuring 2.5 x 0.9 cm with irregular  enhancement along the anterior vaginal wall towards the level of the  vaginal cuff however there is no definitive evidence or abnormal  signal intensity involving the cervix to suggest invasion.  2. Right inguinal lymphadenopathy compatible with metastatic  disease.   3. There is some asymmetric enhancement involving the left sacrum,  incompletely evaluated on this study. Further evaluation of this and  further staging of the chest, abdomen and pelvis will be performed on  PET/CT scheduled for 6/20/2023.     6/20/23: PET CT  IMPRESSION:   1. Intense focal hypermetabolic FDG uptake in biopsy-proven vaginal  squamous cell carcinoma.  2. Multiple enlarged, hypermetabolic right inguinal nodes likely  represent regional metastasis. No definite evidence of distant  metastatic disease.  3. Scattered sub-4 mm solid pulmonary nodules are below the size  threshold for characterization on PET. Attention on follow-up with CT.  4. 1.0 cm enhancing left parotid gland nodule with hypermetabolic  uptake could represent a primary parotid neoplasm such as pleomorphic  adenoma or Warthin's tumor; consider ultrasound for further  evaluation.  5. 1.3 cm hypoattenuating right thyroid nodule with mild FDG uptake  warrants ultrasound for characterization.      8/1/23-9/20/23: Primary chemoradiation therapy with external beam radiation with concurrent chemosensitization with weekly cisplatin 40 mg/m2 followed by interstitial brachytherapy in 5 fractions.   -10/7/23-10/17/23: Therapy complicated by severe C.diff colitis requiring prolonged hospitalization and antibiotic regimen. Colonoscopy and biopsies performed during that hospitalization confirmed a diagnosis of severe C.diff colitis.    12/22/23: PET CT  IMPRESSION:   In this patient with stage IVb squamous cell carcinoma of the vagina:  1. Significant decreased size and FDG metabolism of previously seen  vaginal mass. Residual trace linear uptake along the mucosa of the  vaginal introitus, could be inflammatory. This can be correlated with  physical exam.   2. Resolution of previously seen hypermetabolic enlarged right  inguinal nodes.   3. Stable hypermetabolic soft tissue nodule in the left parotid gland.  ENT consultation is  "recommended. Given the stability over 6 months, it  could be a benign salivary tumor such as a Warthin's tumor or an  oncocytoma. Biopsy can be obtained to rule out a low grade salivary  originated tumor.   4. Four mm nodular density in the gallbladder, might represent a  polyp. This could be further evaluated with ultrasound.    Plan to repeat PET in 3 months     3/28/24: CT ap IMPRESSION: No evidence of metastatic disease in the abdomen or pelvis.    6/5/24: CT AP  IMPRESSION:   1.  No metastatic disease in the abdomen and pelvis.  2.  No CT evident cause for left groin palpable lump. Specifically, no  lymphadenopathy, hernia, mass or fluid collection.               Today she comes to clinic feeling well overall and is without gynecologic concern.  She has had urinary frequency recently and had some \"bloody show\" in her underwear.  She is not sure where the blood came from but suspects she has a bladder infection.  No abdominal pain.  The \"bump\" in her left groin is not present today and seems to \"come and go\".  Eating and drinking without difficulty, \"loves food\".  She is biking and enjoying life.  No changes in bowel habits.  She continues to not tolerate lactose and will have diarrhea if she has some.  She is not sure if this is from the prior radiation or recurrent c.diff.  If she needs antibiotics she would like recommendations from GI.  No leg swell.  She is attempting to use her dilator but is limited by discomfort and vaginal scaring.  She is current with her annual physical, mammogram, and colonoscopy.  She is not currently using clobetasol for her lichen sclerosis because she is having no symptoms.                Health Maintenance  Colonoscopy: 8/30/17  Mammogram: 3/1/24  Annual physical: 2/22/24  DEXA: 8/16/11    Past Medical History:    Past Medical History:   Diagnosis Date     Actinic keratosis      C. difficile diarrhea 09/08/2023     Essential hypertension 2/22/2024     Hyperlipemia      Lichen " sclerosus 2020     Osteopenias      SCC (squamous cell carcinoma) 07/26/2023         Past Surgical History:    Past Surgical History:   Procedure Laterality Date     COLONOSCOPY  2006    normal     COLPOSCOPY, BIOPSY, COMBINED N/A 02/08/2021    Procedure: COLPOSCOPY, WITH BIOPSY, LEEP procedure;  Surgeon: Jefe Koenig MD;  Location: [facility] OR     CYSTOSCOPY N/A 05/30/2023    Procedure: CYSTOSCOPY AND EXCISIONAL BIOPSY OF THE VAGINAL TISSUE AROUND THE URETHRA.  (look in the bladder and sample small area in the vagina near the urethra);  Surgeon: Lakeisha Mackenzie MD;  Location: [facility] OR     EYE SURGERY      cataracts bilateral     INSERT INTERSTITIAL NEEDLE, ULTRASOUND GUIDED N/A 9/18/2023    Procedure: INSERTION, NEEDLE, WITH ULTRASOUND GUIDANCE, FOR INTERSTITIAL BRACHYTHERAPY;  Surgeon: Marisa Pacheco MD;  Location: [facility] OR     OPEN REDUCTION INTERNAL FIXATION FOREARM  07/31/2012    Procedure: OPEN REDUCTION INTERNAL FIXATION FOREARM;  Open Reduction Internal Fixation, Irrigation & Debridment  of Right Distal Radius, application short arm splint;  Surgeon: Wade Beard MD;  Location: [facility] OR     REMOVE INTERSTITIAL NEEDLE N/A 9/20/2023    Procedure: REMOVAL, INTERSTITIAL NEEDLE, AFTER TEMPORARY BRACHYTHERAPY;  Surgeon: Marisa Pacheco MD;  Location: [facility] OR     TONSILLECTOMY & ADENOIDECTOMY  [address] [place] [address] TREAT ECTOPIC PREG,RMV TUBE/OVARY  1985    ectopic, right side-ovary and tube removed         Health Maintenance Due   Topic Date Due     LIPID  03/08/2022     MEDICARE ANNUAL WELLNESS VISIT  05/26/2023     COVID-19 Vaccine (6 - 2023-24 season) 09/01/2023     FALL RISK ASSESSMENT  06/08/2024       Current Medications:     Current Outpatient Medications   Medication Sig Dispense Refill     amLODIPine (NORVASC) 5 MG tablet Take 1 tablet (5 mg) by mouth every morning 90 tablet 3         Allergies:        Allergies   Allergen Reactions     Blood Transfusion Related (Informational Only) Other  (See Comments)     Patient has a history of a clinically significant antibody against RBC antigens.  A delay in compatible RBCs may occur.     Oxybutynin Itching     Seasonal Allergies         Social History:     Social History     Tobacco Use     Smoking status: Never     Passive exposure: Never     Smokeless tobacco: Never   Substance Use Topics     Alcohol use: Not Currently       History   Drug Use No         Family History:     The patient's family history is notable for:    Family History   Problem Relation Age of Onset     Lung Cancer Mother 44     Cerebrovascular Disease Father      Hypertension Father      Alcohol/Drug Father      Cervical Cancer Maternal Aunt      Ovarian Cancer Maternal Aunt 60     Obesity Niece         niece     Mental Illness Nephew         nephew     Diabetes Other         paternal aunt     Hyperlipidemia Other      Obesity Other         self     Obesity Other      Diabetes Other         paternal aunt     Hypertension Other         father     Cerebrovascular Disease Other         father     C.A.D. No family hx of      Breast Cancer No family hx of      Cancer - colorectal No family hx of      Prostate Cancer No family hx of      Melanoma No family hx of      Anesthesia Reaction No family hx of      Venous thrombosis No family hx of          Physical Exam:     /75   Pulse 75   Temp 98.2  F (36.8  C) (Oral)   Resp 16   Wt 86.6 kg (191 lb)   LMP 03/08/2008   SpO2 99%   BMI 32.11 kg/m    Body mass index is 32.11 kg/m .    General Appearance:  alert, no distress     HEENT: no palpable nodules or masses        Cardiovascular: regular rate and rhythm, no gallops, rubs or murmurs     Respiratory: lungs clear, no rales, rhonchi or wheezes    Musculoskeletal: extremities non tender and without edema    Skin: no lesions or rashes     Neurological: normal gait, no gross defects     Psychiatric: appropriate mood and affect                               Hematological: normal cervical,  "supraclavicular and inguinal lymph nodes     Gastrointestinal:       abdomen soft, non-tender, non-distended, no organomegaly or masses    Genitourinary: External genitalia and urethral meatus appears normal.  Attempted digital exam which was limited due to discomfort.  Significant vaginal stenosis and foreshortening able to be appreciated.    No results found for this or any previous visit (from the past 24 hour(s)).       Assessment:    Jessie Tamayo is a 68 year old woman with a history of stage IVB SCC of the vagina.  She completed treatment with chemoradiation and brachytherapy 9/20/23.  She is here today for a surveillance visit.     30 minutes spent on the date of the encounter doing chart review, history and exam, documentation, and further activities as noted above.      Plan:     1.) Vaginal cancer:  TRISHA on exam, though exam limited.  RTC in 3 months for next surveillance visit.  Plan for surveillance visit every 3 months for the first 2 years post treatment (9/2025), followed by every 6 months for 3 years thereafter (9/2028), then annually.  Per Dr. Stahl \"Will omit cytology unless the vaginal adhesions can be further reduced.  Will omit speculum exam, and attempt one-digit exams during surveillance.\" Reviewed signs and symptoms for when she should contact the clinic or seek additional care.  Patient to contact the clinic with any questions or concerns in the interim.      Genetic risk factors were assessed and she does not met qualification for referral.    Labs and/or tests ordered include:  UA with reflex to UC.     2.) Health maintenance:  Issues addressed today include following up with PCP for annual health maintenance and non-gynecologic issues.     3.) Urinary frequency: Urinary frequency with one episode of \"bloody show\" in her underwear.  No source of bleeding on her exam possibly from her urethra.  Orders place for UA/UC.      Andie Rios, DNP, APRN, FNP-C, AOCNP  Oncology Nurse " Practitioner  Division of Gynecologic Oncology  Pager: [phone]     CC  Patient Care Team:  Alba Layton MD as PCP - General  Alba Layton MD as Assigned PCP  Lakeisha Mackenzie MD as MD (Urology)  Lawrence Banuelos MD as MD (Dermatology)  Lakeisha Mackenzie MD as Assigned Surgical Provider  Lakeisha Mackenzie MD as MD (Urology)  Reina Stahl MD as MD (Gynecologic Oncology)  Marisa Pacheco MD as MD (Radiation Oncology)  Lily Fabian PA-C as Physician Assistant (Anesthesiology)  Marisa Pacheco MD as Assigned Cancer Care Provider  Ulysses Gusman MD as Assigned Palliative Care Provider  Shannon Flores MD as Hospitalist (HOSPITALIST)  Boston Jacobo MD as Assigned Infectious Disease Provider  Tad Hunter MD as Assigned Gastroenterology Provider  Emma Petersen APRN CNP as Assigned OBGYN Provider  SELF, REFERRED      Again, thank you for allowing me to participate in the care of your patient.        Sincerely,        SANDI Weiner CNP

## 2024-06-27 NOTE — NURSING NOTE
"Oncology Rooming Note    June 27, 2024 1:32 PM   Jessie Tamayo is a 68 year old female who presents for:    Chief Complaint   Patient presents with    Oncology Clinic Visit     Vaginal CA     Initial Vitals: /75   Pulse 75   Temp 98.2  F (36.8  C) (Oral)   Resp 16   Wt 86.6 kg (191 lb)   LMP 03/08/2008   SpO2 99%   BMI 32.11 kg/m   Estimated body mass index is 32.11 kg/m  as calculated from the following:    Height as of 3/29/24: 1.643 m (5' 4.67\").    Weight as of this encounter: 86.6 kg (191 lb). Body surface area is 1.99 meters squared.  No Pain (0) Comment: Data Unavailable   Patient's last menstrual period was 03/08/2008.  Allergies reviewed: Yes  Medications reviewed: Yes    Medications: Medication refills not needed today.  Pharmacy name entered into EPIC:    [id number] - [place], [place] - [address] [facility] [facility] [address] [facility] - [place] [address] [id number] [place] [address] [facility] [place], [place] - [address] [facility] [place] - [address] [address] [facility] [address]    Frailty Screening:   Is the patient here for a new oncology consult visit in cancer care? 2. No      Clinical concerns:        Marlin Swift              "

## 2024-07-03 ENCOUNTER — OFFICE VISIT (OUTPATIENT)
Dept: FAMILY MEDICINE | Facility: CLINIC | Age: 69
End: 2024-07-03
Payer: COMMERCIAL

## 2024-07-03 VITALS
HEART RATE: 84 BPM | OXYGEN SATURATION: 98 % | DIASTOLIC BLOOD PRESSURE: 77 MMHG | TEMPERATURE: 99.9 F | SYSTOLIC BLOOD PRESSURE: 139 MMHG

## 2024-07-03 DIAGNOSIS — H10.32 ACUTE BACTERIAL CONJUNCTIVITIS OF LEFT EYE: ICD-10-CM

## 2024-07-03 DIAGNOSIS — R07.0 THROAT PAIN: Primary | ICD-10-CM

## 2024-07-03 DIAGNOSIS — J02.9 ACUTE VIRAL PHARYNGITIS: ICD-10-CM

## 2024-07-03 LAB
DEPRECATED S PYO AG THROAT QL EIA: NEGATIVE
GROUP A STREP BY PCR: NOT DETECTED

## 2024-07-03 PROCEDURE — 99213 OFFICE O/P EST LOW 20 MIN: CPT | Performed by: FAMILY MEDICINE

## 2024-07-03 PROCEDURE — 87651 STREP A DNA AMP PROBE: CPT | Performed by: FAMILY MEDICINE

## 2024-07-03 RX ORDER — GENTAMICIN SULFATE 3 MG/ML
1-2 SOLUTION/ DROPS OPHTHALMIC EVERY 4 HOURS
Qty: 5 ML | Refills: 2 | Status: SHIPPED | OUTPATIENT
Start: 2024-07-03 | End: 2024-07-08

## 2024-07-03 NOTE — PROGRESS NOTES
"  Assessment & Plan     Throat pain  neg  - Streptococcus A Rapid Screen w/Reflex to PCR - Clinic Collect  - Group A Streptococcus PCR Throat Swab    Acute viral pharyngitis      Acute bacterial conjunctivitis of left eye  treated  - gentamicin (GARAMYCIN) 0.3 % ophthalmic solution; Place 1-2 drops Into the left eye every 4 hours for 5 days          BMI  Estimated body mass index is 32.11 kg/m  as calculated from the following:    Height as of 3/29/24: 1.643 m (5' 4.67\").    Weight as of 6/27/24: 86.6 kg (191 lb).         Patient instructed to return to the office in 1 week for reevaluation unless improving. Signs and symptoms of worsening are reviewed with the patient. If symptoms acutely change and condition worsens patient is instructed to seek medical evaluation through the office or urgent care department or if during non business hours through the emergency department.      Diya Roman is a 68 year old, presenting for the following health issues:  Ear Problem and Cold Symptoms        7/3/2024     9:58 AM   Additional Questions   Roomed by Cris BARRERA CMA     History of Present Illness       Reason for visit:  R ear waxball, l pinkeye, sore throat &productive cough  Symptom onset:  More than a month    She eats 2-3 servings of fruits and vegetables daily.She consumes 0 sweetened beverage(s) daily.She exercises with enough effort to increase her heart rate 10 to 19 minutes per day.  She exercises with enough effort to increase her heart rate 4 days per week. She is missing 3 dose(s) of medications per week.                Symptoms: cc Present Absent Comment   Fever/Chills x      Fatigue  x     Headache   x    Muscle or Body  Aches   x    Eye Irritation x      Sneezing   x    Nasal Jose Roberto/Drg   x    Sinus Pressure/Pain   x    Dental pain   x    Sore Throat x      Swollen Glands  x     Ear Pain/Fullness   x Ear wax in right ear.    Cough x      Wheeze   x    Chest Discomfort   x    Shortness of breath   x  "   Abdominal pain   x    Emesis    x    Diarrhea   x    Other   x      Symptom duration:  Started Saturday into Sunday. Now in chest   Symptom severity:     Treatments tried:  Dayquil and nyquil   Contacts:  Yes, grandkids             Review of Systems  As above        Objective    /77 (BP Location: Right arm, Patient Position: Chair, Cuff Size: Adult Regular)   Pulse 84   Temp 99.9  F (37.7  C) (Tympanic)   LMP 03/08/2008   SpO2 98%   There is no height or weight on file to calculate BMI.  Physical Exam   GENERAL: alert and no distress  EYES: Eyes grossly normal to inspection, PERRL and conjunctivae and sclerae normal  HENT: ear canals and TM's normal, nose and mouth without ulcers or lesions  NECK: no adenopathy, no asymmetry, masses, or scars  RESP: lungs clear to auscultation - no rales, rhonchi or wheezes  CV: regular rate and rhythm, normal S1 S2, no S3 or S4, no murmur, click or rub, no peripheral edema  MS: no gross musculoskeletal defects noted, no edema    Results for orders placed or performed in visit on 07/03/24   Streptococcus A Rapid Screen w/Reflex to PCR - Clinic Collect     Status: Normal    Specimen: Throat; Swab   Result Value Ref Range    Group A Strep antigen Negative Negative   Group A Streptococcus PCR Throat Swab     Status: Normal    Specimen: Throat; Swab   Result Value Ref Range    Group A strep by PCR Not Detected Not Detected    Narrative    The Xpert Xpress Strep A test, performed on the [employer]  Instrument Systems, is a rapid, qualitative in vitro diagnostic test for the detection of Streptococcus pyogenes (Group A ß-hemolytic Streptococcus, Strep A) in throat swab specimens from patients with signs and symptoms of pharyngitis. The Xpert Xpress Strep A test can be used as an aid in the diagnosis of Group A Streptococcal pharyngitis. The assay is not intended to monitor treatment for Group A Streptococcus infections. The Xpert Xpress Strep A test utilizes an automated  real-time polymerase chain reaction (PCR) to detect Streptococcus pyogenes DNA.           Signed Electronically by: Alba Layton MD

## 2024-08-05 ENCOUNTER — OFFICE VISIT (OUTPATIENT)
Dept: FAMILY MEDICINE | Facility: CLINIC | Age: 69
End: 2024-08-05
Payer: COMMERCIAL

## 2024-08-05 VITALS
BODY MASS INDEX: 32.32 KG/M2 | TEMPERATURE: 97.3 F | HEIGHT: 65 IN | HEART RATE: 69 BPM | SYSTOLIC BLOOD PRESSURE: 121 MMHG | OXYGEN SATURATION: 98 % | WEIGHT: 194 LBS | RESPIRATION RATE: 16 BRPM | DIASTOLIC BLOOD PRESSURE: 74 MMHG

## 2024-08-05 DIAGNOSIS — H69.92 DYSFUNCTION OF LEFT EUSTACHIAN TUBE: Primary | ICD-10-CM

## 2024-08-05 PROCEDURE — 99213 OFFICE O/P EST LOW 20 MIN: CPT | Performed by: FAMILY MEDICINE

## 2024-08-05 NOTE — PROGRESS NOTES
"  Assessment & Plan   Problem List Items Addressed This Visit    None  Visit Diagnoses       Dysfunction of left eustachian tube    -  Primary         Can hear slightly better after valsalva    Afrin  Nasal lavage  flonase     BMI  Estimated body mass index is 32.61 kg/m  as calculated from the following:    Height as of this encounter: 1.643 m (5' 4.67\").    Weight as of this encounter: 88 kg (194 lb).       Diya Roman is a 68 year old, presenting for the following health issues:  Ear Problem (Unable to hear out of left ear since having an earwash done on 07/03/2024)        8/5/2024     4:12 PM   Additional Questions   Roomed by Anastasia MATHIAS CMA     History of Present Illness       Reason for visit:  Unable to hear out of left ear since having earwash done on 07/03/2024    She eats 4 or more servings of fruits and vegetables daily.She consumes 1 sweetened beverage(s) daily.She exercises with enough effort to increase her heart rate 30 to 60 minutes per day.  She exercises with enough effort to increase her heart rate 5 days per week.   She is taking medications regularly.     \"Crackling in the left ear\"      Objective    /74 (BP Location: Right arm, Patient Position: Chair, Cuff Size: Adult Large)   Pulse 69   Temp 97.3  F (36.3  C) (Temporal)   Resp 16   Ht 1.643 m (5' 4.67\")   Wt 88 kg (194 lb)   LMP 03/08/2008   SpO2 98%   BMI 32.61 kg/m    Body mass index is 32.61 kg/m .  Physical Exam  HENT:      Right Ear: Tympanic membrane, ear canal and external ear normal.      Left Ear: Tympanic membrane, ear canal and external ear normal. No decreased hearing noted. Tympanic membrane is not injected, erythematous, retracted or bulging. Left ear decreased TM mobility: able to valsalva with movement.        Gen FRANKLIN              Signed Electronically by: ADAMS CORRALES DO    "

## 2024-08-27 ENCOUNTER — OFFICE VISIT (OUTPATIENT)
Dept: FAMILY MEDICINE | Facility: CLINIC | Age: 69
End: 2024-08-27
Payer: COMMERCIAL

## 2024-08-27 VITALS
BODY MASS INDEX: 32.15 KG/M2 | SYSTOLIC BLOOD PRESSURE: 126 MMHG | DIASTOLIC BLOOD PRESSURE: 72 MMHG | TEMPERATURE: 97.1 F | HEART RATE: 66 BPM | HEIGHT: 65 IN | OXYGEN SATURATION: 98 % | WEIGHT: 193 LBS

## 2024-08-27 DIAGNOSIS — Z92.3 HISTORY OF RADIATION THERAPY: ICD-10-CM

## 2024-08-27 DIAGNOSIS — R30.0 BURNING WITH URINATION: Primary | ICD-10-CM

## 2024-08-27 LAB
ALBUMIN UR-MCNC: NEGATIVE MG/DL
APPEARANCE UR: CLEAR
BACTERIA #/AREA URNS HPF: ABNORMAL /HPF
BILIRUB UR QL STRIP: NEGATIVE
COLOR UR AUTO: YELLOW
GLUCOSE UR STRIP-MCNC: NEGATIVE MG/DL
HGB UR QL STRIP: ABNORMAL
KETONES UR STRIP-MCNC: NEGATIVE MG/DL
LEUKOCYTE ESTERASE UR QL STRIP: NEGATIVE
NITRATE UR QL: NEGATIVE
PH UR STRIP: 5.5 [PH] (ref 5–7)
RBC #/AREA URNS AUTO: ABNORMAL /HPF
SP GR UR STRIP: >=1.03 (ref 1–1.03)
SQUAMOUS #/AREA URNS AUTO: ABNORMAL /LPF
UROBILINOGEN UR STRIP-ACNC: 0.2 E.U./DL
WBC #/AREA URNS AUTO: ABNORMAL /HPF

## 2024-08-27 PROCEDURE — G2211 COMPLEX E/M VISIT ADD ON: HCPCS | Performed by: FAMILY MEDICINE

## 2024-08-27 PROCEDURE — 99213 OFFICE O/P EST LOW 20 MIN: CPT | Performed by: FAMILY MEDICINE

## 2024-08-27 PROCEDURE — 81001 URINALYSIS AUTO W/SCOPE: CPT | Performed by: FAMILY MEDICINE

## 2024-08-27 NOTE — PROGRESS NOTES
"  Assessment & Plan     Burning with urination    - UA Macroscopic with reflex to Microscopic and Culture - Lab Collect  - UA Microscopic with Reflex to Culture    History of radiation therapy  Patient Instructions   Please call the rad onc nurse   I think this may be late effect of radiation           BMI  Estimated body mass index is 32.52 kg/m  as calculated from the following:    Height as of this encounter: 1.641 m (5' 4.6\").    Weight as of this encounter: 87.5 kg (193 lb).             Diya Roman is a 68 year old, presenting for the following health issues:  Urinary Problem    History of Present Illness       Reason for visit:  Painful voiding  Symptom onset:  1-3 days ago  Symptoms include:  Painful urination, urgency, frequency  Symptom intensity:  Moderate  Symptom progression:  Staying the same  Had these symptoms before:  No  What makes it worse:  Waiting to urinate  What makes it better:  Haven't found anything yet She is missing 1 dose(s) of medications per week.  She is not taking prescribed medications regularly due to remembering to take.     Decreased flow  Painful at the very end of urinating  And increased urination.   There is no itching and there is no     Genitourinary - Female  Onset/Duration: 3 weeks   Description:   Painful urination (Dysuria): YES           Frequency: YES  Blood in urine (Hematuria): YES - once on August 20 bright red.   Delay in urine (Hesitency): YES- sometimes  Progression of Symptoms:  worsening  Accompanying Signs & Symptoms:  Fever/chills: No  Flank pain: No  Nausea and vomiting: No  Vaginal symptoms: none  Abdominal/Pelvic Pain: No  History:   History of frequent UTI s: No  History of kidney stones: YES - once  Sexually Active: No  Possibility of pregnancy: No  Precipitating or alleviating factors: None  Therapies tried and outcome:  none     She has had cancer in the area she did have gross hematuria at that time   Pain for 2-3 weeks no fever or chills only " "with urination reviewed last positive culture   Just pain at the end of urination           Objective    /72 (BP Location: Right arm, Patient Position: Sitting, Cuff Size: Adult Regular)   Pulse 66   Temp 97.1  F (36.2  C) (Tympanic)   Ht 1.641 m (5' 4.6\")   Wt 87.5 kg (193 lb)   LMP 03/08/2008   SpO2 98%   BMI 32.52 kg/m    Body mass index is 32.52 kg/m .  Physical Exam   GENERAL: alert and no distress    Results for orders placed or performed in visit on 08/27/24 (from the past 24 hour(s))   UA Macroscopic with reflex to Microscopic and Culture - Lab Collect    Specimen: Urine, Clean Catch   Result Value Ref Range    Color Urine Yellow Colorless, Straw, Light Yellow, Yellow    Appearance Urine Clear Clear    Glucose Urine Negative Negative mg/dL    Bilirubin Urine Negative Negative    Ketones Urine Negative Negative mg/dL    Specific Gravity Urine >=1.030 1.003 - 1.035    Blood Urine Trace (A) Negative    pH Urine 5.5 5.0 - 7.0    Protein Albumin Urine Negative Negative mg/dL    Urobilinogen Urine 0.2 0.2, 1.0 E.U./dL    Nitrite Urine Negative Negative    Leukocyte Esterase Urine Negative Negative   UA Microscopic with Reflex to Culture   Result Value Ref Range    Bacteria Urine Few (A) None Seen /HPF    RBC Urine 0-2 0-2 /HPF /HPF    WBC Urine 0-5 0-5 /HPF /HPF    Squamous Epithelials Urine Few (A) None Seen /LPF    Narrative    Urine Culture not indicated           Signed Electronically by: Alba Layton MD    "

## 2024-08-28 ENCOUNTER — MYC REFILL (OUTPATIENT)
Dept: FAMILY MEDICINE | Facility: CLINIC | Age: 69
End: 2024-08-28
Payer: COMMERCIAL

## 2024-08-28 DIAGNOSIS — I10 ESSENTIAL HYPERTENSION: ICD-10-CM

## 2024-08-29 RX ORDER — AMLODIPINE BESYLATE 5 MG/1
5 TABLET ORAL EVERY MORNING
Qty: 90 TABLET | Refills: 3 | OUTPATIENT
Start: 2024-08-29

## 2024-09-18 NOTE — PROGRESS NOTES
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  She completed treatment with chemoradiation and brachytherapy 23.  She is here today for a surveillance visit.      Oncology History:  07, 10/15/08, 09, 11: Pap test NILM.      1/23/15: Pap test NILM, hrHPV16+.  2/27/15: Cervical polyp & cervical biopsy pathology: Negative for dysplasia/malignancy.     16: Pap test NILM, hrHPV16+.   16: Endocervical curettings pathology negative for dysplasia/malignancy.     17: Pap test NILM, hrHPV16+.   -Colposcopy recommended, not performed.      18:   -Pap test ASCUS, hrHPV16+.   -Endocervical curettings & cervical biopsy pathology: negative for dysplasia/malignancy.      19:  Pap test ASC-H, hrHPV16+.   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      9/10/20: Pap test ASC-H, hrHPV+ (NOS).  21: LEEP.   -Pathology: LEEP & endocervical curettings: negative for dysplasia/malignancy.     3/23/22: Endocervical curettings & cervical biopsy pathology: negative for dysplasia/malignancy.     3/28/23:   -Pap test HSIL, hrHPV16+.   -Findings by gynecologist Dr. Koenig: External genitalia with erythematous plaques bilaterally  Urethra- mid position and well supported, suburethral tissue thickened and friable with bleeding elicited after placement of the speculum  Vagina is stenotic and cervix difficult to see.   23: Cystourethroscopy, vaginal biopsy by urologist Dr. Mackenzie.   -Findings: Exam revealed lichenified changes to bilateral labia majora and friable vestibular tissue (patient had significant bleeding from prep alone.) The urethra was somewhat stenotic and would not accommodate 19Fr rigid cystoscope, therefore a 16  Fr flexible cystoscope was inserted. Gentle pressure was required to access the bladder. The ureteral orifices were in their orthotopic positions bilaterally. There were no intravesical stones or diverticuli and the bladder was mildly trabeculated. There were no intravesical lesions. See media tab for urethral pictures - there were no obvious lesions but the entire distal urethra was erythematous and stenosed (16Fr.)  -Pathology: Invasive moderately-differentiated squamous cell carcinoma, HPV-associated, with focus suspicious for lymphovascular space invasion; IHC: p16+.    6/8/23: Gynecologic Oncology consultation.  -Findings:   External genitalia notable for erythema and desquamation of the posterior medial labia, c/w lichen sclerosus changes. When the labia are , a friable mass is visible at the distal anterior vagina, just posterior to the urethra. On bimanual exam, the cervix is small and normal in contour. The palpable vaginal tumor is limited to the anterior distal vagina, approximately 4-5 cm laterally x 3 cm cranio-caudal. Tumor is friable. Remainder of the vagina smooth to palpation. Digital anorectal exam is without nodularity. No palpable tumor in the rectovaginal septum.   Enlarged firm, fixed right inguinal lymph node ~3-4 cm in size.      6/16/23: Pelvic MRI  IMPRESSION:   1. Irregular enhancing lesion along the anterior vaginal wall at the  level of the introitus measuring 2.5 x 0.9 cm with irregular  enhancement along the anterior vaginal wall towards the level of the  vaginal cuff however there is no definitive evidence or abnormal  signal intensity involving the cervix to suggest invasion.  2. Right inguinal lymphadenopathy compatible with metastatic disease.   3. There is some asymmetric enhancement involving the left sacrum,  incompletely evaluated on this study. Further evaluation of this and  further staging of the chest, abdomen and pelvis will be performed on  PET/CT scheduled for  6/20/2023.     6/20/23: PET CT  IMPRESSION:   1. Intense focal hypermetabolic FDG uptake in biopsy-proven vaginal  squamous cell carcinoma.  2. Multiple enlarged, hypermetabolic right inguinal nodes likely  represent regional metastasis. No definite evidence of distant  metastatic disease.  3. Scattered sub-4 mm solid pulmonary nodules are below the size  threshold for characterization on PET. Attention on follow-up with CT.  4. 1.0 cm enhancing left parotid gland nodule with hypermetabolic  uptake could represent a primary parotid neoplasm such as pleomorphic  adenoma or Warthin's tumor; consider ultrasound for further  evaluation.  5. 1.3 cm hypoattenuating right thyroid nodule with mild FDG uptake  warrants ultrasound for characterization.      8/1/23-9/20/23: Primary chemoradiation therapy with external beam radiation with concurrent chemosensitization with weekly cisplatin 40 mg/m2 followed by interstitial brachytherapy in 5 fractions.   -10/7/23-10/17/23: Therapy complicated by severe C.diff colitis requiring prolonged hospitalization and antibiotic regimen. Colonoscopy and biopsies performed during that hospitalization confirmed a diagnosis of severe C.diff colitis.     12/22/23: PET CT  IMPRESSION:   In this patient with stage IVb squamous cell carcinoma of the vagina:  1. Significant decreased size and FDG metabolism of previously seen  vaginal mass. Residual trace linear uptake along the mucosa of the  vaginal introitus, could be inflammatory. This can be correlated with  physical exam.   2. Resolution of previously seen hypermetabolic enlarged right  inguinal nodes.   3. Stable hypermetabolic soft tissue nodule in the left parotid gland.  ENT consultation is recommended. Given the stability over 6 months, it  could be a benign salivary tumor such as a Warthin's tumor or an  oncocytoma. Biopsy can be obtained to rule out a low grade salivary  originated tumor.   4. Four mm nodular density in the  gallbladder, might represent a  polyp. This could be further evaluated with ultrasound.     Plan to repeat PET in 3 months     3/28/24: CT ap IMPRESSION: No evidence of metastatic disease in the abdomen or pelvis.     6/5/24: CT AP  IMPRESSION:   1.  No metastatic disease in the abdomen and pelvis.  2.  No CT evident cause for left groin palpable lump. Specifically, no  lymphadenopathy, hernia, mass or fluid collection.      Today patient reports -    -Urethral pain: pain located at the most external part of the urethra with every urination; does not burn or feel like a UTI   -Vaginal bleeding/discharge: One possible episode of bleeding 1 month ago, unsure if vaginal or urethral. Endorses frequent bicycle riding. Denies unusual discharge. Unable to use dilator due to vaginal adhesions.  -Pelvic/Abdominal pain: Denies abdominal/pelvic pain, but does have abdominal bloating when eating dairy-lactose intolerant   -Appetite: Good appetite  -Weight loss: No, patient has been gaining weight  -Bowel/bladder habits: Is lactose intolerant, otherwise normal bowel habits. 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 difficile diarrhea 09/08/2023    Essential hypertension 2/22/2024    Hyperlipemia     Lichen sclerosus 2020    Osteopenias     SCC (squamous cell carcinoma) 07/26/2023         Past Surgical History:    Past Surgical History:   Procedure Laterality Date    COLONOSCOPY  2006    normal    COLPOSCOPY, BIOPSY, COMBINED N/A 02/08/2021    Procedure: COLPOSCOPY, WITH BIOPSY, LEEP procedure;  Surgeon: Jefe Koenig MD;  Location: [facility] OR    CYSTOSCOPY N/A  05/30/2023    Procedure: CYSTOSCOPY AND EXCISIONAL BIOPSY OF THE VAGINAL TISSUE AROUND THE URETHRA.  (look in the bladder and sample small area in the vagina near the urethra);  Surgeon: Lakeisha Mackenzie MD;  Location: [facility] OR    EYE SURGERY      cataracts bilateral    INSERT INTERSTITIAL NEEDLE, ULTRASOUND GUIDED N/A 9/18/2023    Procedure: INSERTION, NEEDLE, WITH ULTRASOUND GUIDANCE, FOR INTERSTITIAL BRACHYTHERAPY;  Surgeon: Marisa Pacheco MD;  Location: [facility] OR    OPEN REDUCTION INTERNAL FIXATION FOREARM  07/31/2012    Procedure: OPEN REDUCTION INTERNAL FIXATION FOREARM;  Open Reduction Internal Fixation, Irrigation & Debridment  of Right Distal Radius, application short arm splint;  Surgeon: Wade Beard MD;  Location: [facility] OR    REMOVE INTERSTITIAL NEEDLE N/A 9/20/2023    Procedure: REMOVAL, INTERSTITIAL NEEDLE, AFTER TEMPORARY BRACHYTHERAPY;  Surgeon: Marisa Pacehco MD;  Location: [facility] OR    TONSILLECTOMY & ADENOIDECTOMY  [address] [place] [address] TREAT ECTOPIC PREG,RMV TUBE/OVARY  1985    ectopic, right side-ovary and tube removed         Health Maintenance Due   Topic Date Due    LIPID  03/08/2022    MEDICARE ANNUAL WELLNESS VISIT  05/26/2023    COVID-19 Vaccine (6 - 2024-25 season) 09/01/2024    BMP  10/17/2024       Current Medications:     Current Outpatient Medications   Medication Sig Dispense Refill    amLODIPine (NORVASC) 5 MG tablet Take 1 tablet (5 mg) by mouth daily. 90 tablet 1         Allergies:        Allergies   Allergen Reactions    Blood Transfusion Related (Informational Only) Other (See Comments)     Patient has a history of a clinically significant antibody against RBC antigens.  A delay in compatible RBCs may occur.    Oxybutynin Itching    Seasonal Allergies         Social History:     Social History     Tobacco Use    Smoking status: Never     Passive exposure: Never    Smokeless tobacco: Never   Substance Use Topics    Alcohol use: Not Currently       History   Drug Use No          Family History:     The patient's family history is notable for:    Family History   Problem Relation Age of Onset    Lung Cancer Mother 44    Cerebrovascular Disease Father     Hypertension Father     Alcohol/Drug Father     Cervical Cancer Maternal Aunt     Ovarian Cancer Maternal Aunt 60    Obesity Niece         niece    Mental Illness Nephew         nephew    Diabetes Other         paternal aunt    Hyperlipidemia Other     Obesity Other         self    Obesity Other     Diabetes Other         paternal aunt    Hypertension Other         father    Cerebrovascular Disease Other         father    C.A.D. No family hx of     Breast Cancer No family hx of     Cancer - colorectal No family hx of     Prostate Cancer No family hx of     Melanoma No family hx of     Anesthesia Reaction No family hx of     Venous thrombosis No family hx of          Physical Exam:     /77 (BP Location: Right arm, Patient Position: Sitting, Cuff Size: Adult Large)   Pulse 65   Temp 97.8  F (36.6  C) (Oral)   Resp 12   Wt 89.4 kg (197 lb 3.2 oz)   LMP 03/08/2008   SpO2 100%   BMI 33.22 kg/m    Body mass index is 33.22 kg/m .    General Appearance:  alert, no distress     HEENT: no palpable nodules or masses        Cardiovascular: regular rate and rhythm, no gallops, rubs or murmurs     Respiratory: lungs clear, no rales, rhonchi or wheezes    Musculoskeletal: extremities non tender and without edema    Skin: Mild  telangiectasia to RLQ alongside scar tissue    Neurological: normal gait, no gross defects     Psychiatric: appropriate mood and affect                               Hematological: normal cervical, supraclavicular and inguinal lymph nodes     Gastrointestinal:       abdomen soft, non-tender, non-distended, no organomegaly or masses    Genitourinary: External genitalia and urethral meatus appears normal but some radiation changes seen around urethra.  Some dryness noted on vulva.  Attempted digital exam which was  "unable to be performed due to discomfort.  Speculum exam unable to be performed.      No results found for this or any previous visit (from the past 24 hour(s)).       Assessment:    Jessie Tamayo is a 68 year old woman with a history of stage IVB SCC of the vagina.  She completed treatment with chemoradiation and brachytherapy 9/20/23.  She is here today for a surveillance visit.    20 minutes spent on the date of the encounter doing chart review, history and exam, documentation, and further activities as noted above.      Plan:      1.)        Vaginal cancer: TRISHA on exam, though exam limited.  RTC in 3 months for next surveillance visit with Dr. Stahl.  Plan for surveillance visit every 3 months for the first 2 years post treatment (9/2025), followed by every 6 months for 3 years thereafter (9/2028), then annually.  Per Dr. Stahl \"Will omit cytology unless the vaginal adhesions can be further reduced.  Will omit speculum exam, and attempt one-digit exams during surveillance.\" Reviewed signs and symptoms for when she should contact the clinic or seek additional care.  Patient to contact the clinic with any questions or concerns in the interim.      Genetic risk factors were assessed and she does not met qualification for referral.     Labs and/or tests ordered include:  N/A                2.)        Health maintenance:  Issues addressed today include following up with PCP for annual health maintenance and non-gynecologic issues.      3.) Urethral pain: Exam limited due to discomfort, urethra appears normal but some radiation changes present round urethra. Surrounding vaginal tissue appears dry. Suspect pain related to dryness of surrounding tissue and radiation changes.  No change in her symptoms since last visit when UA was negative.  Recommend to apply organic coconut oil to labia minora daily to reduce dryness and urethral pain.  Encouraged patient to follow up if coconut oil is not helpful.      Johana Lagos, " BSN, RN, OCN  Student Nurse Practitioner     I was with student Johana Lagos throughout visit and agree with HPI, exam, and plan as written.    Andie Rios, DNP, APRN, FNP-C, AOCNP  Oncology Nurse Practitioner   Division of Gynecologic Oncology  Pager: [phone]       CC  Patient Care Team:  Alba Layton MD as PCP - General  Alba Layton MD as Assigned PCP  Lakeisha Mackenzie MD as MD (Urology)  Lawrence Banuelos MD as MD (Dermatology)  Lakeisha Mackenzie MD as Assigned Surgical Provider  Lakeisha Mackenzie MD as MD (Urology)  Reina Stahl MD as MD (Gynecologic Oncology)  Marisa Pacheco MD as MD (Radiation Oncology)  Lily Fabian PA-C as Physician Assistant (Anesthesiology)  Marisa Pacheco MD as Assigned Cancer Care Provider  Ulysses Gusman MD as Assigned Palliative Care Provider  Shannon Flores MD as Hospitalist (HOSPITALIST)  Boston Jacobo MD as Assigned Infectious Disease Provider  Tad Hunter MD as Assigned Gastroenterology Provider  Emma Petersen APRN CNP as Assigned OBGYN Provider  SELF, REFERRED

## 2024-09-21 DIAGNOSIS — I10 ESSENTIAL HYPERTENSION: ICD-10-CM

## 2024-09-23 ENCOUNTER — ONCOLOGY VISIT (OUTPATIENT)
Dept: ONCOLOGY | Facility: CLINIC | Age: 69
End: 2024-09-23
Attending: NURSE PRACTITIONER
Payer: COMMERCIAL

## 2024-09-23 VITALS
WEIGHT: 197.2 LBS | OXYGEN SATURATION: 100 % | RESPIRATION RATE: 12 BRPM | TEMPERATURE: 97.8 F | BODY MASS INDEX: 33.22 KG/M2 | SYSTOLIC BLOOD PRESSURE: 119 MMHG | DIASTOLIC BLOOD PRESSURE: 77 MMHG | HEART RATE: 65 BPM

## 2024-09-23 DIAGNOSIS — C52 VAGINAL CANCER (H): Primary | ICD-10-CM

## 2024-09-23 PROCEDURE — G0463 HOSPITAL OUTPT CLINIC VISIT: HCPCS | Performed by: NURSE PRACTITIONER

## 2024-09-23 PROCEDURE — 99213 OFFICE O/P EST LOW 20 MIN: CPT | Performed by: NURSE PRACTITIONER

## 2024-09-23 RX ORDER — AMLODIPINE BESYLATE 5 MG/1
5 TABLET ORAL DAILY
Qty: 90 TABLET | Refills: 1 | Status: SHIPPED | OUTPATIENT
Start: 2024-09-23

## 2024-09-23 ASSESSMENT — PAIN SCALES - GENERAL: PAINLEVEL: NO PAIN (0)

## 2024-09-23 NOTE — LETTER
2024      Jessie Tamayo  [address] [place] [id number]      Dear Colleague,    Thank you for referring your patient, Jessie Tamayo, to the [facility]. Please see a copy of my visit note below.    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  She completed treatment with chemoradiation and brachytherapy 23.  She is here today for a surveillance visit.      Oncology History:  07, 10/15/08, 09, 11: Pap test NILM.      1/23/15: Pap test NILM, hrHPV16+.  2/27/15: Cervical polyp & cervical biopsy pathology: Negative for dysplasia/malignancy.     16: Pap test NILM, hrHPV16+.   16: Endocervical curettings pathology negative for dysplasia/malignancy.     17: Pap test NILM, hrHPV16+.   -Colposcopy recommended, not performed.      18:   -Pap test ASCUS, hrHPV16+.   -Endocervical curettings & cervical biopsy pathology: negative for dysplasia/malignancy.      19:  Pap test ASC-H, hrHPV16+.   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      9/10/20: Pap test ASC-H, hrHPV+ (NOS).  21: LEEP.   -Pathology: LEEP & endocervical curettings: negative for dysplasia/malignancy.     3/23/22: Endocervical curettings & cervical biopsy pathology: negative for dysplasia/malignancy.     3/28/23:   -Pap test HSIL, hrHPV16+.   -Findings by gynecologist Dr. Koenig: External genitalia with erythematous plaques bilaterally  Urethra- mid position and well supported, suburethral tissue thickened and friable with bleeding elicited after placement of the speculum  Vagina is stenotic and cervix difficult to see.   23: Cystourethroscopy, vaginal biopsy by urologist Dr. Mackenzie.   -Findings:  Exam revealed lichenified changes to bilateral labia majora and friable vestibular tissue (patient had significant bleeding from prep alone.) The urethra was somewhat stenotic and would not accommodate 19Fr rigid cystoscope, therefore a 16 Fr flexible cystoscope was inserted. Gentle pressure was required to access the bladder. The ureteral orifices were in their orthotopic positions bilaterally. There were no intravesical stones or diverticuli and the bladder was mildly trabeculated. There were no intravesical lesions. See media tab for urethral pictures - there were no obvious lesions but the entire distal urethra was erythematous and stenosed (16Fr.)  -Pathology: Invasive moderately-differentiated squamous cell carcinoma, HPV-associated, with focus suspicious for lymphovascular space invasion; IHC: p16+.    6/8/23: Gynecologic Oncology consultation.  -Findings:   External genitalia notable for erythema and desquamation of the posterior medial labia, c/w lichen sclerosus changes. When the labia are , a friable mass is visible at the distal anterior vagina, just posterior to the urethra. On bimanual exam, the cervix is small and normal in contour. The palpable vaginal tumor is limited to the anterior distal vagina, approximately 4-5 cm laterally x 3 cm cranio-caudal. Tumor is friable. Remainder of the vagina smooth to palpation. Digital anorectal exam is without nodularity. No palpable tumor in the rectovaginal septum.   Enlarged firm, fixed right inguinal lymph node ~3-4 cm in size.      6/16/23: Pelvic MRI  IMPRESSION:   1. Irregular enhancing lesion along the anterior vaginal wall at the  level of the introitus measuring 2.5 x 0.9 cm with irregular  enhancement along the anterior vaginal wall towards the level of the  vaginal cuff however there is no definitive evidence or abnormal  signal intensity involving the cervix to suggest invasion.  2. Right inguinal lymphadenopathy compatible with metastatic  disease.   3. There is some asymmetric enhancement involving the left sacrum,  incompletely evaluated on this study. Further evaluation of this and  further staging of the chest, abdomen and pelvis will be performed on  PET/CT scheduled for 6/20/2023.     6/20/23: PET CT  IMPRESSION:   1. Intense focal hypermetabolic FDG uptake in biopsy-proven vaginal  squamous cell carcinoma.  2. Multiple enlarged, hypermetabolic right inguinal nodes likely  represent regional metastasis. No definite evidence of distant  metastatic disease.  3. Scattered sub-4 mm solid pulmonary nodules are below the size  threshold for characterization on PET. Attention on follow-up with CT.  4. 1.0 cm enhancing left parotid gland nodule with hypermetabolic  uptake could represent a primary parotid neoplasm such as pleomorphic  adenoma or Warthin's tumor; consider ultrasound for further  evaluation.  5. 1.3 cm hypoattenuating right thyroid nodule with mild FDG uptake  warrants ultrasound for characterization.      8/1/23-9/20/23: Primary chemoradiation therapy with external beam radiation with concurrent chemosensitization with weekly cisplatin 40 mg/m2 followed by interstitial brachytherapy in 5 fractions.   -10/7/23-10/17/23: Therapy complicated by severe C.diff colitis requiring prolonged hospitalization and antibiotic regimen. Colonoscopy and biopsies performed during that hospitalization confirmed a diagnosis of severe C.diff colitis.     12/22/23: PET CT  IMPRESSION:   In this patient with stage IVb squamous cell carcinoma of the vagina:  1. Significant decreased size and FDG metabolism of previously seen  vaginal mass. Residual trace linear uptake along the mucosa of the  vaginal introitus, could be inflammatory. This can be correlated with  physical exam.   2. Resolution of previously seen hypermetabolic enlarged right  inguinal nodes.   3. Stable hypermetabolic soft tissue nodule in the left parotid gland.  ENT consultation is  recommended. Given the stability over 6 months, it  could be a benign salivary tumor such as a Warthin's tumor or an  oncocytoma. Biopsy can be obtained to rule out a low grade salivary  originated tumor.   4. Four mm nodular density in the gallbladder, might represent a  polyp. This could be further evaluated with ultrasound.     Plan to repeat PET in 3 months     3/28/24: CT ap IMPRESSION: No evidence of metastatic disease in the abdomen or pelvis.     6/5/24: CT AP  IMPRESSION:   1.  No metastatic disease in the abdomen and pelvis.  2.  No CT evident cause for left groin palpable lump. Specifically, no  lymphadenopathy, hernia, mass or fluid collection.      Today patient reports -    -Urethral pain: pain located at the most external part of the urethra with every urination; does not burn or feel like a UTI   -Vaginal bleeding/discharge: One possible episode of bleeding 1 month ago, unsure if vaginal or urethral. Endorses frequent bicycle riding. Denies unusual discharge. Unable to use dilator due to vaginal adhesions.  -Pelvic/Abdominal pain: Denies abdominal/pelvic pain, but does have abdominal bloating when eating dairy-lactose intolerant   -Appetite: Good appetite  -Weight loss: No, patient has been gaining weight  -Bowel/bladder habits: Is lactose intolerant, otherwise normal bowel habits. 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 difficile diarrhea 09/08/2023     Essential hypertension 2/22/2024     Hyperlipemia      Lichen sclerosus 2020     Osteopenias      SCC (squamous cell carcinoma) 07/26/2023         Past Surgical History:    Past  Surgical History:   Procedure Laterality Date     COLONOSCOPY  2006    normal     COLPOSCOPY, BIOPSY, COMBINED N/A 02/08/2021    Procedure: COLPOSCOPY, WITH BIOPSY, LEEP procedure;  Surgeon: Jefe Koenig MD;  Location: [facility]     CYSTOSCOPY N/A 05/30/2023    Procedure: CYSTOSCOPY AND EXCISIONAL BIOPSY OF THE VAGINAL TISSUE AROUND THE URETHRA.  (look in the bladder and sample small area in the vagina near the urethra);  Surgeon: Lakeisha Mackenzie MD;  Location: [facility] OR     EYE SURGERY      cataracts bilateral     INSERT INTERSTITIAL NEEDLE, ULTRASOUND GUIDED N/A 9/18/2023    Procedure: INSERTION, NEEDLE, WITH ULTRASOUND GUIDANCE, FOR INTERSTITIAL BRACHYTHERAPY;  Surgeon: Marisa Pacheco MD;  Location: [facility] OR     OPEN REDUCTION INTERNAL FIXATION FOREARM  07/31/2012    Procedure: OPEN REDUCTION INTERNAL FIXATION FOREARM;  Open Reduction Internal Fixation, Irrigation & Debridment  of Right Distal Radius, application short arm splint;  Surgeon: Wade Beard MD;  Location: [facility] OR     REMOVE INTERSTITIAL NEEDLE N/A 9/20/2023    Procedure: REMOVAL, INTERSTITIAL NEEDLE, AFTER TEMPORARY BRACHYTHERAPY;  Surgeon: Marisa Pacheco MD;  Location: [facility] OR     TONSILLECTOMY & ADENOIDECTOMY  [address] [place] [address] TREAT ECTOPIC PREG,RMV TUBE/OVARY  1985    ectopic, right side-ovary and tube removed         Health Maintenance Due   Topic Date Due     LIPID  03/08/2022     MEDICARE ANNUAL WELLNESS VISIT  05/26/2023     COVID-19 Vaccine (6 - 2024-25 season) 09/01/2024     BMP  10/17/2024       Current Medications:     Current Outpatient Medications   Medication Sig Dispense Refill     amLODIPine (NORVASC) 5 MG tablet Take 1 tablet (5 mg) by mouth daily. 90 tablet 1         Allergies:        Allergies   Allergen Reactions     Blood Transfusion Related (Informational Only) Other (See Comments)     Patient has a history of a clinically significant antibody against RBC antigens.  A delay in compatible RBCs may occur.      Oxybutynin Itching     Seasonal Allergies         Social History:     Social History     Tobacco Use     Smoking status: Never     Passive exposure: Never     Smokeless tobacco: Never   Substance Use Topics     Alcohol use: Not Currently       History   Drug Use No         Family History:     The patient's family history is notable for:    Family History   Problem Relation Age of Onset     Lung Cancer Mother 44     Cerebrovascular Disease Father      Hypertension Father      Alcohol/Drug Father      Cervical Cancer Maternal Aunt      Ovarian Cancer Maternal Aunt 60     Obesity Niece         niece     Mental Illness Nephew         nephew     Diabetes Other         paternal aunt     Hyperlipidemia Other      Obesity Other         self     Obesity Other      Diabetes Other         paternal aunt     Hypertension Other         father     Cerebrovascular Disease Other         father     C.A.D. No family hx of      Breast Cancer No family hx of      Cancer - colorectal No family hx of      Prostate Cancer No family hx of      Melanoma No family hx of      Anesthesia Reaction No family hx of      Venous thrombosis No family hx of          Physical Exam:     /77 (BP Location: Right arm, Patient Position: Sitting, Cuff Size: Adult Large)   Pulse 65   Temp 97.8  F (36.6  C) (Oral)   Resp 12   Wt 89.4 kg (197 lb 3.2 oz)   LMP 03/08/2008   SpO2 100%   BMI 33.22 kg/m    Body mass index is 33.22 kg/m .    General Appearance:  alert, no distress     HEENT: no palpable nodules or masses        Cardiovascular: regular rate and rhythm, no gallops, rubs or murmurs     Respiratory: lungs clear, no rales, rhonchi or wheezes    Musculoskeletal: extremities non tender and without edema    Skin: Mild  telangiectasia to RLQ alongside scar tissue    Neurological: normal gait, no gross defects     Psychiatric: appropriate mood and affect                               Hematological: normal cervical, supraclavicular and inguinal  "lymph nodes     Gastrointestinal:       abdomen soft, non-tender, non-distended, no organomegaly or masses    Genitourinary: External genitalia and urethral meatus appears normal but some radiation changes seen around urethra.  Some dryness noted on vulva.  Attempted digital exam which was unable to be performed due to discomfort.  Speculum exam unable to be performed.      No results found for this or any previous visit (from the past 24 hour(s)).       Assessment:    Jessie Tamayo is a 68 year old woman with a history of stage IVB SCC of the vagina.  She completed treatment with chemoradiation and brachytherapy 9/20/23.  She is here today for a surveillance visit.    20 minutes spent on the date of the encounter doing chart review, history and exam, documentation, and further activities as noted above.      Plan:      1.)        Vaginal cancer: TRISHA on exam, though exam limited.  RTC in 3 months for next surveillance visit with Dr. Stahl.  Plan for surveillance visit every 3 months for the first 2 years post treatment (9/2025), followed by every 6 months for 3 years thereafter (9/2028), then annually.  Per Dr. Stahl \"Will omit cytology unless the vaginal adhesions can be further reduced.  Will omit speculum exam, and attempt one-digit exams during surveillance.\" Reviewed signs and symptoms for when she should contact the clinic or seek additional care.  Patient to contact the clinic with any questions or concerns in the interim.      Genetic risk factors were assessed and she does not met qualification for referral.     Labs and/or tests ordered include:  N/A                2.)        Health maintenance:  Issues addressed today include following up with PCP for annual health maintenance and non-gynecologic issues.      3.) Urethral pain: Exam limited due to discomfort, urethra appears normal but some radiation changes present round urethra. Surrounding vaginal tissue appears dry. Suspect pain related to dryness of " surrounding tissue and radiation changes.  No change in her symptoms since last visit when UA was negative.  Recommend to apply organic coconut oil to labia minora daily to reduce dryness and urethral pain.  Encouraged patient to follow up if coconut oil is not helpful.      LISBETH PathakN, RN, OCN  Student Nurse Practitioner     I was with student Johana Lagos throughout visit and agree with HPI, exam, and plan as written.    Andie Rios, DNP, APRN, FNP-C, AOCNP  Oncology Nurse Practitioner   Division of Gynecologic Oncology  Pager: [phone]       CC  Patient Care Team:  Alba Layton MD as PCP - General  Alba Layton MD as Assigned PCP  Lakeisha Mackenzie MD as MD (Urology)  Lawrence Banuelos MD as MD (Dermatology)  Lakeisha Mackenzie MD as Assigned Surgical Provider  Lakeisha Mackenzie MD as MD (Urology)  Reina Stahl MD as MD (Gynecologic Oncology)  Marisa Pacheco MD as MD (Radiation Oncology)  Lily Fabian PA-C as Physician Assistant (Anesthesiology)  Marisa Pacheco MD as Assigned Cancer Care Provider  Ulysses Gusman MD as Assigned Palliative Care Provider  Shannon Flores MD as Hospitalist (HOSPITALIST)  Boston Jacobo MD as Assigned Infectious Disease Provider  Tad Hunter MD as Assigned Gastroenterology Provider  Emma Petersen APRN CNP as Assigned OBGYN Provider  SELF, REFERRED       Again, thank you for allowing me to participate in the care of your patient.        Sincerely,        SANDI Weiner CNP

## 2024-09-23 NOTE — NURSING NOTE
"Oncology Rooming Note    September 23, 2024 1:11 PM   Jessie Tamayo is a 68 year old female who presents for:    Chief Complaint   Patient presents with    Oncology Clinic Visit     Vaginal cancer     Initial Vitals: /77 (BP Location: Right arm, Patient Position: Sitting, Cuff Size: Adult Large)   Pulse 65   Temp 97.8  F (36.6  C) (Oral)   Resp 12   Wt 89.4 kg (197 lb 3.2 oz)   LMP 03/08/2008   SpO2 100%   BMI 33.22 kg/m   Estimated body mass index is 33.22 kg/m  as calculated from the following:    Height as of 8/27/24: 1.641 m (5' 4.6\").    Weight as of this encounter: 89.4 kg (197 lb 3.2 oz). Body surface area is 2.02 meters squared.  No Pain (0) Comment: Data Unavailable   Patient's last menstrual period was 03/08/2008.  Allergies reviewed: Yes  Medications reviewed: Yes    Medications: Medication refills not needed today.  Pharmacy name entered into EPIC:    [id number] - [place], [place] - [address] [facility] - [place] - [address] [id number] - [place] - [address] [facility] [place], [place] - [address] [facility] [place], [place] - [address]  [facility] - [place], [place] - [address] [place] [address]    Frailty Screening:   Is the patient here for a new oncology consult visit in cancer care? 2. No      Clinical concerns: Patient has been experiencing pain in the opening of her urethra when she voids. This is how her cancer started before, so the patient would like the provider to check it out.      Louie Daily EMT            "

## 2024-09-27 ENCOUNTER — MYC MEDICAL ADVICE (OUTPATIENT)
Dept: FAMILY MEDICINE | Facility: CLINIC | Age: 69
End: 2024-09-27
Payer: COMMERCIAL

## 2024-09-27 DIAGNOSIS — L57.0 AK (ACTINIC KERATOSIS): Primary | ICD-10-CM

## 2024-09-27 DIAGNOSIS — Z12.83 SCREENING FOR SKIN CANCER: ICD-10-CM

## 2024-09-28 ENCOUNTER — TELEPHONE (OUTPATIENT)
Dept: ONCOLOGY | Facility: CLINIC | Age: 69
End: 2024-09-28
Payer: COMMERCIAL

## 2024-10-03 ENCOUNTER — APPOINTMENT (OUTPATIENT)
Dept: URBAN - METROPOLITAN AREA CLINIC 252 | Age: 69
Setting detail: DERMATOLOGY
End: 2024-10-03

## 2024-10-03 VITALS — WEIGHT: 197 LBS | HEIGHT: 65 IN

## 2024-10-03 DIAGNOSIS — L90.5 SCAR CONDITIONS AND FIBROSIS OF SKIN: ICD-10-CM

## 2024-10-03 DIAGNOSIS — L81.4 OTHER MELANIN HYPERPIGMENTATION: ICD-10-CM

## 2024-10-03 DIAGNOSIS — D22 MELANOCYTIC NEVI: ICD-10-CM

## 2024-10-03 DIAGNOSIS — L57.0 ACTINIC KERATOSIS: ICD-10-CM

## 2024-10-03 DIAGNOSIS — Z71.89 OTHER SPECIFIED COUNSELING: ICD-10-CM

## 2024-10-03 DIAGNOSIS — L82.1 OTHER SEBORRHEIC KERATOSIS: ICD-10-CM

## 2024-10-03 DIAGNOSIS — L81.3 CAFÉ AU LAIT SPOTS: ICD-10-CM

## 2024-10-03 DIAGNOSIS — D18.0 HEMANGIOMA: ICD-10-CM

## 2024-10-03 PROBLEM — D22.61 MELANOCYTIC NEVI OF RIGHT UPPER LIMB, INCLUDING SHOULDER: Status: ACTIVE | Noted: 2024-10-03

## 2024-10-03 PROBLEM — D22.62 MELANOCYTIC NEVI OF LEFT UPPER LIMB, INCLUDING SHOULDER: Status: ACTIVE | Noted: 2024-10-03

## 2024-10-03 PROBLEM — D18.01 HEMANGIOMA OF SKIN AND SUBCUTANEOUS TISSUE: Status: ACTIVE | Noted: 2024-10-03

## 2024-10-03 PROBLEM — D22.4 MELANOCYTIC NEVI OF SCALP AND NECK: Status: ACTIVE | Noted: 2024-10-03

## 2024-10-03 PROCEDURE — OTHER COUNSELING: OTHER

## 2024-10-03 PROCEDURE — 99203 OFFICE O/P NEW LOW 30 MIN: CPT | Mod: 25

## 2024-10-03 PROCEDURE — 17000 DESTRUCT PREMALG LESION: CPT

## 2024-10-03 PROCEDURE — 17003 DESTRUCT PREMALG LES 2-14: CPT

## 2024-10-03 PROCEDURE — OTHER LIQUID NITROGEN: OTHER

## 2024-10-03 ASSESSMENT — LOCATION SIMPLE DESCRIPTION DERM
LOCATION SIMPLE: ABDOMEN
LOCATION SIMPLE: RIGHT CHEEK
LOCATION SIMPLE: LEFT CHEEK
LOCATION SIMPLE: RIGHT UPPER BACK
LOCATION SIMPLE: LEFT POSTERIOR UPPER ARM
LOCATION SIMPLE: RIGHT ANTERIOR NECK
LOCATION SIMPLE: RIGHT LOWER BACK
LOCATION SIMPLE: NOSE
LOCATION SIMPLE: RIGHT FOREARM
LOCATION SIMPLE: CHEST
LOCATION SIMPLE: LEFT ZYGOMA
LOCATION SIMPLE: LEFT ANKLE
LOCATION SIMPLE: LEFT FOREARM
LOCATION SIMPLE: RIGHT THIGH
LOCATION SIMPLE: SCALP

## 2024-10-03 ASSESSMENT — LOCATION DETAILED DESCRIPTION DERM
LOCATION DETAILED: EPIGASTRIC SKIN
LOCATION DETAILED: LEFT PROXIMAL POSTERIOR UPPER ARM
LOCATION DETAILED: MIDDLE STERNUM
LOCATION DETAILED: LEFT NASAL DORSUM
LOCATION DETAILED: LEFT CENTRAL ZYGOMA
LOCATION DETAILED: RIGHT INFERIOR UPPER BACK
LOCATION DETAILED: LEFT CENTRAL BUCCAL CHEEK
LOCATION DETAILED: RIGHT SUPERIOR MEDIAL MIDBACK
LOCATION DETAILED: LEFT SUPERIOR PARIETAL SCALP
LOCATION DETAILED: LEFT INFERIOR CENTRAL MALAR CHEEK
LOCATION DETAILED: RIGHT MEDIAL MALAR CHEEK
LOCATION DETAILED: RIGHT ANTERIOR PROXIMAL THIGH
LOCATION DETAILED: LEFT INFERIOR PREAURICULAR CHEEK
LOCATION DETAILED: LEFT PROXIMAL DORSAL FOREARM
LOCATION DETAILED: RIGHT INFERIOR CENTRAL MALAR CHEEK
LOCATION DETAILED: RIGHT MID-UPPER BACK
LOCATION DETAILED: RIGHT CENTRAL MALAR CHEEK
LOCATION DETAILED: RIGHT PROXIMAL DORSAL FOREARM
LOCATION DETAILED: RIGHT CLAVICULAR NECK
LOCATION DETAILED: LEFT POSTERIOR ANKLE

## 2024-10-03 ASSESSMENT — LOCATION ZONE DERM
LOCATION ZONE: NECK
LOCATION ZONE: NOSE
LOCATION ZONE: FACE
LOCATION ZONE: ARM
LOCATION ZONE: SCALP
LOCATION ZONE: TRUNK
LOCATION ZONE: LEG

## 2024-10-03 NOTE — PROCEDURE: LIQUID NITROGEN
Render Note In Bullet Format When Appropriate: Yes
Detail Level: Detailed
Consent: - Discussed that these are a result of cumulative sun exposure.\\n- Consent was obtained and risks were reviewed prior to procedure today. All questions were answered prior to procedure today.\\n- Risks discussed include but are not limited to pain, crusting, scabbing, blistering, scarring, temporary or permanent darker or lighter pigmentary change, recurrence, incomplete resolution, and infection.
Application Tool (Optional): Liquid Nitrogen Sprayer
Show Aperture Variable?: No
Post-Care Instructions: - Patient was instructed to avoid picking at any of the treated lesions.
Duration Of Freeze Thaw-Cycle (Seconds): 0

## 2024-10-03 NOTE — HPI: SKIN LESION
Is This A New Presentation, Or A Follow-Up?: Skin Lesion
Additional History: History of actinic keratosis.

## 2024-10-13 ENCOUNTER — HEALTH MAINTENANCE LETTER (OUTPATIENT)
Age: 69
End: 2024-10-13

## 2024-10-22 ENCOUNTER — PATIENT OUTREACH (OUTPATIENT)
Dept: CARE COORDINATION | Facility: CLINIC | Age: 69
End: 2024-10-22
Payer: COMMERCIAL

## 2024-11-01 ENCOUNTER — OFFICE VISIT (OUTPATIENT)
Dept: FAMILY MEDICINE | Facility: CLINIC | Age: 69
End: 2024-11-01
Payer: COMMERCIAL

## 2024-11-01 ENCOUNTER — PATIENT OUTREACH (OUTPATIENT)
Dept: GASTROENTEROLOGY | Facility: CLINIC | Age: 69
End: 2024-11-01

## 2024-11-01 VITALS
SYSTOLIC BLOOD PRESSURE: 121 MMHG | TEMPERATURE: 97.5 F | HEIGHT: 65 IN | RESPIRATION RATE: 16 BRPM | BODY MASS INDEX: 33.26 KG/M2 | HEART RATE: 74 BPM | DIASTOLIC BLOOD PRESSURE: 75 MMHG | WEIGHT: 199.6 LBS | OXYGEN SATURATION: 98 %

## 2024-11-01 DIAGNOSIS — I10 ESSENTIAL HYPERTENSION: ICD-10-CM

## 2024-11-01 DIAGNOSIS — D61.818 PANCYTOPENIA (H): ICD-10-CM

## 2024-11-01 DIAGNOSIS — E78.5 HYPERLIPIDEMIA LDL GOAL <130: ICD-10-CM

## 2024-11-01 DIAGNOSIS — Z00.00 MEDICARE ANNUAL WELLNESS VISIT, SUBSEQUENT: Primary | ICD-10-CM

## 2024-11-01 DIAGNOSIS — E66.811 CLASS 1 OBESITY DUE TO EXCESS CALORIES WITHOUT SERIOUS COMORBIDITY WITH BODY MASS INDEX (BMI) OF 30.0 TO 30.9 IN ADULT: ICD-10-CM

## 2024-11-01 DIAGNOSIS — E66.09 CLASS 1 OBESITY DUE TO EXCESS CALORIES WITHOUT SERIOUS COMORBIDITY WITH BODY MASS INDEX (BMI) OF 30.0 TO 30.9 IN ADULT: ICD-10-CM

## 2024-11-01 DIAGNOSIS — C52 VAGINAL CANCER (H): ICD-10-CM

## 2024-11-01 DIAGNOSIS — Z12.11 SPECIAL SCREENING FOR MALIGNANT NEOPLASMS, COLON: Primary | ICD-10-CM

## 2024-11-01 DIAGNOSIS — N39.490 OVERFLOW INCONTINENCE: ICD-10-CM

## 2024-11-01 LAB
ANION GAP SERPL CALCULATED.3IONS-SCNC: 12 MMOL/L (ref 7–15)
BUN SERPL-MCNC: 19.1 MG/DL (ref 8–23)
CALCIUM SERPL-MCNC: 9.3 MG/DL (ref 8.8–10.4)
CHLORIDE SERPL-SCNC: 105 MMOL/L (ref 98–107)
CHOLEST SERPL-MCNC: 254 MG/DL
CREAT SERPL-MCNC: 0.96 MG/DL (ref 0.51–0.95)
EGFRCR SERPLBLD CKD-EPI 2021: 64 ML/MIN/1.73M2
ERYTHROCYTE [DISTWIDTH] IN BLOOD BY AUTOMATED COUNT: 13.2 % (ref 10–15)
FASTING STATUS PATIENT QL REPORTED: NO
FASTING STATUS PATIENT QL REPORTED: NO
GLUCOSE SERPL-MCNC: 104 MG/DL (ref 70–99)
HCO3 SERPL-SCNC: 25 MMOL/L (ref 22–29)
HCT VFR BLD AUTO: 38 % (ref 35–47)
HDLC SERPL-MCNC: 60 MG/DL
HGB BLD-MCNC: 12.1 G/DL (ref 11.7–15.7)
LDLC SERPL CALC-MCNC: 157 MG/DL
MCH RBC QN AUTO: 30.5 PG (ref 26.5–33)
MCHC RBC AUTO-ENTMCNC: 31.8 G/DL (ref 31.5–36.5)
MCV RBC AUTO: 96 FL (ref 78–100)
NONHDLC SERPL-MCNC: 194 MG/DL
PLATELET # BLD AUTO: 169 10E3/UL (ref 150–450)
POTASSIUM SERPL-SCNC: 4.6 MMOL/L (ref 3.4–5.3)
RBC # BLD AUTO: 3.97 10E6/UL (ref 3.8–5.2)
SODIUM SERPL-SCNC: 142 MMOL/L (ref 135–145)
TRIGL SERPL-MCNC: 186 MG/DL
WBC # BLD AUTO: 4.9 10E3/UL (ref 4–11)

## 2024-11-01 PROCEDURE — 85027 COMPLETE CBC AUTOMATED: CPT

## 2024-11-01 PROCEDURE — 36415 COLL VENOUS BLD VENIPUNCTURE: CPT

## 2024-11-01 PROCEDURE — 80048 BASIC METABOLIC PNL TOTAL CA: CPT

## 2024-11-01 PROCEDURE — 80061 LIPID PANEL: CPT

## 2024-11-01 PROCEDURE — 99214 OFFICE O/P EST MOD 30 MIN: CPT | Mod: 25

## 2024-11-01 PROCEDURE — G0439 PPPS, SUBSEQ VISIT: HCPCS

## 2024-11-01 SDOH — HEALTH STABILITY: PHYSICAL HEALTH: ON AVERAGE, HOW MANY DAYS PER WEEK DO YOU ENGAGE IN MODERATE TO STRENUOUS EXERCISE (LIKE A BRISK WALK)?: 2 DAYS

## 2024-11-01 ASSESSMENT — SOCIAL DETERMINANTS OF HEALTH (SDOH): HOW OFTEN DO YOU GET TOGETHER WITH FRIENDS OR RELATIVES?: THREE TIMES A WEEK

## 2024-11-01 NOTE — PROGRESS NOTES
"Preventive Care Visit  [facility] SANDI Bravo CNP, Family Medicine  Nov 1, 2024      Assessment & Plan     Medicare annual wellness visit, subsequent      Hyperlipidemia LDL goal <130  Pt could not tolerate statins due to muscle pain. Will repeat lipid panel and consider alternative agent.   - Lipid panel reflex to direct LDL Non-fasting  - Lipid panel reflex to direct LDL Non-fasting    Essential hypertension  BP well controlled. Will check BMP and continue to monitor  - BASIC METABOLIC PANEL  - BASIC METABOLIC PANEL    Overflow incontinence  Referred to physical therapy for pelvic floor training.   - Physical Therapy  Referral    Vaginal Cancer  Pt has hx of vaginal cancer. Treated in hospital last October. Follows with oncology. 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 Recommended pt discuss positive HSIL pap result and how/when to monitor this with OB and oncology    Pancytopenia  Recheck CBC today      Class 1 obesity due to excess calories without serious comorbidity with body mass index (BMI) of 30.0 to 30.9 in adult   Pt is working on lifestyle modifications to reduce weight including diet and regular bike riding.      BMI  Estimated body mass index is 33.63 kg/m  as calculated from the following:    Height as of this encounter: 1.641 m (5' 4.6\").    Weight as of this encounter: 90.5 kg (199 lb 9.6 oz).   Weight management plan: Discussed healthy diet and exercise guidelines    Counseling  Appropriate preventive services were addressed with this patient via screening, questionnaire, or discussion as appropriate for fall prevention, nutrition, physical activity, Tobacco-use cessation, social engagement, weight loss and cognition.  Checklist reviewing preventive services available has been given to the patient.  Reviewed patient's diet, addressing concerns and/or questions.   She is at risk for lack of exercise and has been provided with information to increase physical " activity for the benefit of her well-being.   She is at risk for psychosocial distress and has been provided with information to reduce risk.   The patient was provided with written information regarding signs of hearing loss.   Information on urinary incontinence and treatment options given to patientEstrellita Roman is a 68 year old, presenting for the following:  Medicare Visit        11/1/2024    10:40 AM   Additional Questions   Roomed by Savannah GUAJARDO   Accompanied by self             Here for a physical and would like to talk about having to get up and go to the bathroom a lot at night.      Here for annual wellness    Urinary frequency. Has to wake up to urinate in the night. Has urgency as well. Not having stress incontinence. No incontinence.   Bikes 3x/week    No constipation.         Hx of vaginal cancer. 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     HTN: takes amlodipine, no side effects    HLD: was on a statin     Wt Readings from Last 4 Encounters:   11/01/24 90.5 kg (199 lb 9.6 oz)   09/23/24 89.4 kg (197 lb 3.2 oz)   08/27/24 87.5 kg (193 lb)   08/05/24 88 kg (194 lb)       Alcohol: none  Nicotine: none  Drugs: none    Health Care Directive  Patient does not have a Health Care Directive: Patient states has Advance Directive and will bring in a copy to clinic.      11/1/2024   General Health   How would you rate your overall physical health? Good   Feel stress (tense, anxious, or unable to sleep) Only a little      (!) STRESS CONCERN      11/1/2024   Nutrition   Diet: Other   If other, please elaborate: lactose intolerant            11/1/2024   Exercise   Days per week of moderate/strenous exercise 2 days      (!) EXERCISE CONCERN      11/1/2024   Social Factors   Frequency of gathering with friends or relatives Three times a week   Worry food won't last until get money to buy more No   Food not last or not have enough money for food? No   Do you have housing? (Housing is defined as stable  permanent housing and does not include staying ouside in a car, in a tent, in an abandoned building, in an overnight shelter, or couch-surfing.) Yes   Are you worried about losing your housing? No   Lack of transportation? No   Unable to get utilities (heat,electricity)? No            11/1/2024   Fall Risk   Fallen 2 or more times in the past year? No    Trouble with walking or balance? No        Patient-reported          11/1/2024   Activities of Daily Living- Home Safety   Needs help with the following daily activites None of the above   Safety concerns in the home None of the above            11/1/2024   Dental   Dentist two times every year? Yes            11/1/2024   Hearing Screening   Hearing concerns? (!) I NEED TO ASK PEOPLE TO SPEAK UP OR REPEAT THEMSELVES.    (!) IT'S HARDER TO UNDERSTAND WOMEN'S VOICES THAN MEN'S VOICES.    (!) IT'S HARD TO FOLLOW A CONVERSATION IN A NOISY RESTAURANT OR CROWDED ROOM.       Multiple values from one day are sorted in reverse-chronological order         11/1/2024   Driving Risk Screening   Patient/family members have concerns about driving No            11/1/2024   General Alertness/Fatigue Screening   Have you been more tired than usual lately? No            11/1/2024   Urinary Incontinence Screening   Bothered by leaking urine in past 6 months Yes            11/1/2024   TB Screening   Were you born outside of the [place]? No            Today's PHQ-2 Score:       11/1/2024    10:38 AM   PHQ-2 ( 1999 [employer])   Q1: Little interest or pleasure in doing things 0    Q2: Feeling down, depressed or hopeless 0    PHQ-2 Score 0    Q1: Little interest or pleasure in doing things Not at all   Q2: Feeling down, depressed or hopeless Not at all   PHQ-2 Score 0       Patient-reported           11/1/2024   Substance Use   Alcohol more than 3/day or more than 7/wk No   Do you have a current opioid prescription? No   How severe/bad is pain from 1 to 10? 0/10 (No Pain)   Do you use any other  substances recreationally? No        Social History     Tobacco Use    Smoking status: Never     Passive exposure: Never    Smokeless tobacco: Never   Vaping Use    Vaping status: Never Used   Substance Use Topics    Alcohol use: Not Currently    Drug use: No           3/1/2024   LAST FHS-7 RESULTS   1st degree relative breast or ovarian cancer No   Any relative bilateral breast cancer No   Any male have breast cancer No   Any ONE woman have BOTH breast AND ovarian cancer No   Any woman with breast cancer before 50yrs No   2 or more relatives with breast AND/OR ovarian cancer No   2 or more relatives with breast AND/OR bowel cancer No           Mammogram Screening - Patient under 40 years of age: Routine Mammogram Screening not recommended.       History of abnormal Pap smear: Pt has closed vaginal introitus due to radiation. Recommend patient speak with oncology and obggyn for recommendations        Latest Ref Rng & Units 3/28/2023    11:10 AM 9/10/2020    12:00 AM 9/20/2019     8:11 AM   PAP / HPV   PAP  High-grade squamous intraepithelial lesion (HSIL) encompassing mod/severe dysplasia, CIS, CIN2, CIN3      PAP (Historical)    ASC-H    HPV 16 DNA Negative Positive      HPV 18 DNA Negative Negative      Other HR HPV Negative Negative      PAP-ABSTRACT   See Scanned Document            This result is from an external source.     ASCVD Risk   The 10-year ASCVD risk score (Myrna DK, et al., 2019) is: 9.4%    Values used to calculate the score:      Age: 68 years      Sex: Female      Is Non- : No      Diabetic: No      Tobacco smoker: No      Systolic Blood Pressure: 119 mmHg      Is BP treated: Yes      HDL Cholesterol: 58 mg/dL      Total Cholesterol: 243 mg/dL            Reviewed and updated as needed this visit by Provider                    Past Medical History:   Diagnosis Date    Actinic keratosis     C. difficile diarrhea 09/08/2023    Essential hypertension 2/22/2024     Hyperlipemia     Lichen sclerosus 2020    Osteopenias     SCC (squamous cell carcinoma) 07/26/2023     Past Surgical History:   Procedure Laterality Date    COLONOSCOPY  2006    normal    COLPOSCOPY, BIOPSY, COMBINED N/A 02/08/2021    Procedure: COLPOSCOPY, WITH BIOPSY, LEEP procedure;  Surgeon: Jefe Koenig MD;  Location: [facility] OR    CYSTOSCOPY N/A 05/30/2023    Procedure: CYSTOSCOPY AND EXCISIONAL BIOPSY OF THE VAGINAL TISSUE AROUND THE URETHRA.  (look in the bladder and sample small area in the vagina near the urethra);  Surgeon: Lakeisha Mackenzie MD;  Location: [facility] OR    EYE SURGERY      cataracts bilateral    INSERT INTERSTITIAL NEEDLE, ULTRASOUND GUIDED N/A 9/18/2023    Procedure: INSERTION, NEEDLE, WITH ULTRASOUND GUIDANCE, FOR INTERSTITIAL BRACHYTHERAPY;  Surgeon: Marisa Pacheco MD;  Location: [facility]    OPEN REDUCTION INTERNAL FIXATION FOREARM  07/31/2012    Procedure: OPEN REDUCTION INTERNAL FIXATION FOREARM;  Open Reduction Internal Fixation, Irrigation & Debridment  of Right Distal Radius, application short arm splint;  Surgeon: Wade Beard MD;  Location: [facility] OR    REMOVE INTERSTITIAL NEEDLE N/A 9/20/2023    Procedure: REMOVAL, INTERSTITIAL NEEDLE, AFTER TEMPORARY BRACHYTHERAPY;  Surgeon: Marisa Pacheco MD;  Location: [facility] OR    TONSILLECTOMY & ADENOIDECTOMY  [address] [place] [address] TREAT ECTOPIC PREG,RMV TUBE/OVARY  1985    ectopic, right side-ovary and tube removed     Current providers sharing in care for this patient include:  Patient Care Team:  Alba Layton MD as PCP - General  Alba Layton MD as Assigned PCP  Lakeisha Mackeznie MD as MD (Urology)  Lawrence Banuelos MD as MD (Dermatology)  Lakeisha Mackenzie MD as Assigned Surgical Provider  Lakeisha Mackenzie MD as MD (Urology)  Reina Stahl MD as MD (Gynecologic Oncology)  Marisa Pacheco MD as MD (Radiation Oncology)  Lily Fabian PA-C as Physician Assistant (Anesthesiology)  Marisa Pacheco  "MD as Assigned Cancer Care Provider  Ulysses Gusman MD as Assigned Palliative Care Provider  Shannon Flores MD as Hospitalist (HOSPITALIST)  Boston Jacobo MD as Assigned Infectious Disease Provider  Tad Hunter MD as Assigned Gastroenterology Provider  Emma Petersen APRN CNP as Assigned OBGYN Provider    The following health maintenance items are reviewed in Epic and correct as of today:  Health Maintenance   Topic Date Due    LIPID  03/08/2022    MEDICARE ANNUAL WELLNESS VISIT  05/26/2023    ANNUAL REVIEW OF HM ORDERS  05/24/2024    BMP  10/17/2024    COVID-19 Vaccine (7 - 2024-25 season) 12/03/2024    MAMMO SCREENING  03/01/2025    FALL RISK ASSESSMENT  11/01/2025    DEXA  08/16/2026    GLUCOSE  10/17/2026    ADVANCE CARE PLANNING  05/24/2028    COLORECTAL CANCER SCREENING  10/13/2028    DTAP/TDAP/TD IMMUNIZATION (3 - Td or Tdap) 08/09/2032    HEPATITIS C SCREENING  Completed    PHQ-2 (once per calendar year)  Completed    COLPOSCOPY  Completed    INFLUENZA VACCINE  Completed    Pneumococcal Vaccine: 65+ Years  Completed    ZOSTER IMMUNIZATION  Completed    RSV VACCINE  Completed    HPV IMMUNIZATION  Aged Out    MENINGITIS IMMUNIZATION  Aged Out    RSV MONOCLONAL ANTIBODY  Aged Out    PAP  Discontinued    PAP FOLLOW-UP  Discontinued    HPV FOLLOW-UP  Discontinued         Review of Systems  Constitutional, HEENT, cardiovascular, pulmonary, GI, , musculoskeletal, neuro, skin, endocrine and psych systems are negative, except as otherwise noted.     Objective    Exam  LMP 03/08/2008    Estimated body mass index is 33.22 kg/m  as calculated from the following:    Height as of 8/27/24: 1.641 m (5' 4.6\").    Weight as of 9/23/24: 89.4 kg (197 lb 3.2 oz).    Physical Exam  GENERAL: alert and no distress  EYES: Eyes grossly normal to inspection, PERRL and conjunctivae and sclerae normal  HENT: ear canals and TM's normal, nose and mouth without ulcers or lesions  NECK: no adenopathy, no " asymmetry, masses, or scars  RESP: lungs clear to auscultation - no rales, rhonchi or wheezes  CV: regular rate and rhythm, normal S1 S2, no S3 or S4, no murmur, click or rub, no peripheral edema  ABDOMEN: soft, nontender, no hepatosplenomegaly, no masses and bowel sounds normal  MS: no gross musculoskeletal defects noted, no edema  SKIN: no suspicious lesions or rashes  NEURO: Normal strength and tone, mentation intact and speech normal  PSYCH: mentation appears normal, affect normal/bright         No data to display                       Signed Electronically by: SANDI Hawkins CNP

## 2024-11-01 NOTE — PROGRESS NOTES
"Hx adenomatous polyps with 5yr recall recommended on last colonoscopy performed in 2017    [facility] Screening Colonoscopy Referral Review    Patient meets the inclusion criteria for screening colonoscopy standing order.    Ordering/Referring Provider:  Alba Layton      BMI: Estimated body mass index is 33.63 kg/m  as calculated from the following:    Height as of an earlier encounter on 11/1/24: 1.641 m (5' 4.6\").    Weight as of an earlier encounter on 11/1/24: 90.5 kg (199 lb 9.6 oz).     Sedation:  Does patient have any of the following conditions affecting sedation?  No medical conditions affecting sedation.    Previous Scopes:  Any previous recommendations or follow up needs based on previous scope?  na / No recommendations.    Medical Concerns to Postpone Order:  Does patient have any of the following medical concerns that should postpone/delay colonoscopy referral?  No medical conditions affecting colonoscopy referral.    Final Referral Details:  Based on patient's medical history patient is appropriate for referral order with moderate sedation. If patient's BMI > 50 do not schedule in [facility].  "

## 2024-11-04 ENCOUNTER — TELEPHONE (OUTPATIENT)
Dept: GASTROENTEROLOGY | Facility: CLINIC | Age: 69
End: 2024-11-04
Payer: COMMERCIAL

## 2024-11-04 ENCOUNTER — HOSPITAL ENCOUNTER (OUTPATIENT)
Facility: CLINIC | Age: 69
End: 2024-11-04
Attending: INTERNAL MEDICINE | Admitting: INTERNAL MEDICINE
Payer: COMMERCIAL

## 2024-11-04 NOTE — TELEPHONE ENCOUNTER
"Endoscopy Scheduling Screen    Have you had any respiratory illness or flu-like symptoms in the last 10 days?  No    What is your communication preference for Instructions and/or Bowel Prep?   MyChart    What insurance is in the chart?  Other:  [employer]    Ordering/Referring Provider: NADJA MCCALLUM   (If ordering provider performs procedure, schedule with ordering provider unless otherwise instructed. )    BMI: Estimated body mass index is 33.63 kg/m  as calculated from the following:    Height as of 11/1/24: 1.641 m (5' 4.6\").    Weight as of 11/1/24: 90.5 kg (199 lb 9.6 oz).     Sedation Ordered  moderate sedation.   If patient BMI > 50 do not schedule in [facility].    If patient BMI > 45 do not schedule at [facility].    Are you taking methadone or Suboxone?  NO, No RN review required.    Have you been diagnosed and are being treated for severe PTSD or severe anxiety?  NO, No RN review required.    Are you taking any prescription medications for pain 3 or more times per week?   NO, No RN review required.    Do you have a history of malignant hyperthermia?  No    (Females) Are you currently pregnant?   No     Have you been diagnosed or told you have pulmonary hypertension?   No    Do you have an LVAD?  No    Have you been told you have moderate to severe sleep apnea?  No.    Have you been told you have COPD, asthma, or any other lung disease?  No    Do you have any heart conditions?  No     Have you ever had or are you waiting for an organ transplant?  No. Continue scheduling, no site restrictions.    Have you had a stroke or transient ischemic attack (TIA aka \"mini stroke\" in the last 6 months?   No    Have you been diagnosed with or been told you have cirrhosis of the liver?   No.    Are you currently on dialysis?   No    Do you need assistance transferring?   No    BMI: Estimated body mass index is 33.63 kg/m  as calculated from the following:    Height as of 11/1/24: 1.641 m (5' 4.6\").    Weight as of 11/1/24: 90.5 kg (199 " lb 9.6 oz).     Is patients BMI > 40 and scheduling location [facility]?  No    Do you take an injectable or oral medication for weight loss or diabetes (excluding insulin)?  No    Do you take the medication Naltrexone?  No    Do you take blood thinners?  No       Prep   Are you currently on dialysis or do you have chronic kidney disease?  No    Do you have a diagnosis of diabetes?  No    Do you have a diagnosis of cystic fibrosis (CF)?  No    On a regular basis do you go 3 -5 days between bowel movements?  No    BMI > 40?  No    Preferred Pharmacy:    [place], [place] - [address]  [address]  [facility] [address]  Phone: [phone] Fax: [phone]    Final Scheduling Details     Procedure scheduled  Colonoscopy    Surgeon:  FRANSISCA     Date of procedure:  12/10/24     Pre-OP / PAC:   No - Not required for this site.    Location  [facility] - Patient preference.    Sedation   Moderate Sedation - Per order.      Patient Reminders:   You will receive a call from a Nurse to review instructions and health history.  This assessment must be completed prior to your procedure.  Failure to complete the Nurse assessment may result in the procedure being cancelled.      On the day of your procedure, please designate an adult(s) who can drive you home stay with you for the next 24 hours. The medicines used in the exam will make you sleepy. You will not be able to drive.      You cannot take public transportation, ride share services, or non-medical taxi service without a responsible caregiver.  Medical transport services are allowed with the requirement that a responsible caregiver will receive you at your destination.  We require that drivers and caregivers are confirmed prior to your procedure.

## 2024-12-17 DIAGNOSIS — K82.8 GALLBLADDER MASS: ICD-10-CM

## 2024-12-17 DIAGNOSIS — K11.8 PAROTID NODULE: Primary | ICD-10-CM

## 2024-12-20 ENCOUNTER — HOSPITAL ENCOUNTER (OUTPATIENT)
Dept: ULTRASOUND IMAGING | Facility: HOSPITAL | Age: 69
Discharge: HOME | End: 2024-12-20
Attending: OBSTETRICS & GYNECOLOGY
Payer: COMMERCIAL

## 2024-12-20 ENCOUNTER — THERAPY VISIT (OUTPATIENT)
Dept: PHYSICAL THERAPY | Facility: REHABILITATION | Age: 69
End: 2024-12-20
Attending: OBSTETRICS & GYNECOLOGY
Payer: COMMERCIAL

## 2024-12-20 DIAGNOSIS — K11.8 PAROTID NODULE: ICD-10-CM

## 2024-12-20 DIAGNOSIS — K82.8 GALLBLADDER MASS: ICD-10-CM

## 2024-12-20 DIAGNOSIS — I89.0 LYMPHEDEMA: ICD-10-CM

## 2024-12-20 PROCEDURE — 97140 MANUAL THERAPY 1/> REGIONS: CPT | Mod: GP | Performed by: PHYSICAL THERAPIST

## 2024-12-20 PROCEDURE — 76705 ECHO EXAM OF ABDOMEN: CPT

## 2024-12-20 PROCEDURE — 97161 PT EVAL LOW COMPLEX 20 MIN: CPT | Mod: GP | Performed by: PHYSICAL THERAPIST

## 2024-12-20 PROCEDURE — 76536 US EXAM OF HEAD AND NECK: CPT

## 2024-12-20 NOTE — PROGRESS NOTES
PHYSICAL THERAPY EVALUATION  Type of Visit: Evaluation       Fall Risk Screen:  Fall screen completed by: PT  Have you fallen 2 or more times in the past year?: No  Have you fallen and had an injury in the past year?: No  Is patient a fall risk?: No    Subjective     Patient was diagnosed with vaginal stage IVB cancer. She had been treated with chemotherapy and radiation therapy. She noted swelling started to occur on the right side and some onto the left and left UE as well.   She has been wearing some compression socks.            Presenting condition or subjective complaint: (Patient-Rptd) Lymphedema post cancer  Date of onset: 12/06/24    Relevant medical history: (Patient-Rptd) Cancer; Hearing problems; High blood pressure; Overweight; Pain at night or rest   Dates & types of surgery: (Patient-Rptd) See EMR    Prior diagnostic imaging/testing results:       Prior therapy history for the same diagnosis, illness or injury: (Patient-Rptd) No      Prior Level of Function  Transfers: Independent  Ambulation: Independent  ADL: Independent    Living Environment  Social support: (Patient-Rptd) With a significant other or spouse   Type of home: (Patient-Rptd) House; Multi-level; Other   Stairs to enter the home: (Patient-Rptd) Yes (Patient-Rptd) 1 Is there a railing: (Patient-Rptd) Yes     Ramp: (Patient-Rptd) No   Stairs inside the home: (Patient-Rptd) Yes (Patient-Rptd) 14 Is there a railing: (Patient-Rptd) Yes     Help at home: (Patient-Rptd) None  Equipment owned: (Patient-Rptd) Walker; Bath bench     Employment: (Patient-Rptd) No    Hobbies/Interests: (Patient-Rptd) Bicycling, travel, trying to learn how to knit, reading, [facility] [employer], enjoying my granddaughter (rest of family too)    Patient goals for therapy: (Patient-Rptd) Not wear compression stockings everyday    Pain assessment: See objective evaluation for additional pain details     Objective       EDEMA EVALUATION  Additional history:  Body part  affected by edema: (Patient-Rptd) Lower extremities/abdomen/armpits  If cancer related, treatment: (Patient-Rptd) Vaginal Stage IV B with mets to rt inguinal node  If not cancer related, problems with veins or cause of swelling:    Distance able to walk: (Patient-Rptd) About 1/2 a mile  Time able to stand:    Sensation problems in hands/feet: (Patient-Rptd) Yes (Patient-Rptd) Neuropathy in both sets of toes  Edema etiology: Cancer with lymph node dissection, Chemo, Radiation    FUNCTIONAL SCALES   Lymphedema Life Impact Scale (LLIS):      Cognitive Status Examination  Orientation: Oriented to person, place and time   Level of Consciousness: Alert  Follows Commands and Answers Questions: 100% of the time  Personal Safety and Judgement: Intact  Memory: Intact    EDEMA  Skin Condition: Dryness, Hemosiderin deposits, Intact, Pitting  Scar: Yes  Capillary Refill: Symmetrical  Radial Pulse: Symmetrical  Dorsal Pedal Pulse: Symmetrical  Stemmer Sign: +  Ulceration: No    GIRTH MEASUREMENTS: Refer to separate girth measurement flowsheet for specific measurements.    VOLUME LE  Right LE Total Volume (mL): 9488.68  Left LE Total Volume (mL): 9231.95  LE Limb Comparison:  Identify AFFECTED Limb Volume-Vi (ml): 9488.7  Identify UNAFFECTED Limb Volume-Vu (ml): 9232  % LE Swellin.7  LE Limb % Difference: 2.78    Assessment & Plan   CLINICAL IMPRESSIONS  Medical Diagnosis: Lymphedema    Treatment Diagnosis: Lymphedema   Impression/Assessment: Patient is a 69 year old female with complaints of LE and abdominal swelling following treatments for vaginal cancer.  The following significant findings have been identified: Pain, Decreased ROM/flexibility, Decreased joint mobility, Decreased strength, Inflammation, Edema, Impaired muscle performance, and Decreased activity tolerance. These impairments interfere with their ability to perform self care tasks, recreational activities, and community mobility as compared to previous level  of function.     Clinical Decision Making (Complexity):  Clinical Presentation: Stable/Uncomplicated  Clinical Presentation Rationale: based on medical and personal factors listed in PT evaluation  Clinical Decision Making (Complexity): Low complexity    PLAN OF CARE  Treatment Interventions:  Interventions: Manual Therapy, Neuromuscular Re-education, Therapeutic Activity, Therapeutic Exercise, Self-Care/Home Management, Gradient Compression Bandaging    Long Term Goals     PT Goal 1  Goal Identifier: HEP  Goal Description: Santiago will be independent in a HEP for ongoing symptom management in 90 days  Target Date: 03/20/25  PT Goal 2  Goal Identifier: volumes  Goal Description: Santiago will demonstrate a decrease in LE volumes by 5% for increase ease in mobilitity and function, decrease risk for infections and ease in donning LE clothing in 90 days  Target Date: 03/20/25  PT Goal 3  Goal Identifier: self care-home management  Goal Description: Patient will be independent in self care and home management techniques such as skin care, use of compression with garmnets and/or wrapping, pump, self MLD for ongoing symptom management in 90 days  Target Date: 03/20/25      Frequency of Treatment: 1-2 times per week  Duration of Treatment: up to 90 days    Recommended Referrals to Other Professionals:  none  Education Assessment:   Learner/Method: Patient;Listening;Reading;Demonstration;Pictures/Video;No Barriers to Learning    Risks and benefits of evaluation/treatment have been explained.   Patient/Family/caregiver agrees with Plan of Care.     Evaluation Time:     PT Eval, Low Complexity Minutes (14426): 30       Signing Clinician: Radha Ellis, PT        [facility]                                                                                   OUTPATIENT PHYSICAL THERAPY      PLAN OF TREATMENT FOR OUTPATIENT REHABILITATION   Patient's Last Name, First Name, Jessie Araiza Date of  Birth:  1955   Provider's Name   [facility]   Medical Record No.  [id number]     Onset Date: 12/06/24  Start of Care Date: 12/20/24     Medical Diagnosis:  Lymphedema      PT Treatment Diagnosis:  Lymphedema Plan of Treatment  Frequency/Duration: 1-2 times per week/ up to 90 days    Certification date from 12/20/24 to 03/20/25         See note for plan of treatment details and functional goals     Radha Ellis, PT                         I CERTIFY THE NEED FOR THESE SERVICES FURNISHED UNDER        THIS PLAN OF TREATMENT AND WHILE UNDER MY CARE     (Physician attestation of this document indicates review and certification of the therapy plan).              Referring Provider:  Reina Stahl    Initial Assessment  See Epic Evaluation- Start of Care Date: 12/20/24

## 2024-12-24 ENCOUNTER — THERAPY VISIT (OUTPATIENT)
Dept: PHYSICAL THERAPY | Facility: REHABILITATION | Age: 69
End: 2024-12-24
Payer: COMMERCIAL

## 2024-12-24 DIAGNOSIS — M62.81 GENERALIZED MUSCLE WEAKNESS: Primary | ICD-10-CM

## 2024-12-24 DIAGNOSIS — I89.0 LYMPHEDEMA: ICD-10-CM

## 2024-12-24 PROCEDURE — 97140 MANUAL THERAPY 1/> REGIONS: CPT | Mod: CQ | Performed by: PHYSICAL THERAPY ASSISTANT

## 2024-12-31 ENCOUNTER — THERAPY VISIT (OUTPATIENT)
Dept: PHYSICAL THERAPY | Facility: REHABILITATION | Age: 69
End: 2024-12-31
Attending: OBSTETRICS & GYNECOLOGY
Payer: COMMERCIAL

## 2024-12-31 DIAGNOSIS — M62.81 GENERALIZED MUSCLE WEAKNESS: Primary | ICD-10-CM

## 2024-12-31 DIAGNOSIS — I89.0 LYMPHEDEMA: ICD-10-CM

## 2024-12-31 PROCEDURE — 97140 MANUAL THERAPY 1/> REGIONS: CPT | Mod: CQ | Performed by: PHYSICAL THERAPY ASSISTANT

## 2025-01-30 ENCOUNTER — PATIENT OUTREACH (OUTPATIENT)
Dept: CARE COORDINATION | Facility: CLINIC | Age: 70
End: 2025-01-30
Payer: COMMERCIAL

## 2025-02-26 DIAGNOSIS — I10 ESSENTIAL HYPERTENSION: ICD-10-CM

## 2025-02-27 ENCOUNTER — PATIENT OUTREACH (OUTPATIENT)
Dept: CARE COORDINATION | Facility: CLINIC | Age: 70
End: 2025-02-27
Payer: COMMERCIAL

## 2025-02-27 RX ORDER — AMLODIPINE BESYLATE 5 MG/1
5 TABLET ORAL DAILY
Qty: 90 TABLET | Refills: 1 | Status: SHIPPED | OUTPATIENT
Start: 2025-02-27

## 2025-03-13 ENCOUNTER — THERAPY VISIT (OUTPATIENT)
Dept: PHYSICAL THERAPY | Facility: REHABILITATION | Age: 70
End: 2025-03-13
Payer: COMMERCIAL

## 2025-03-13 DIAGNOSIS — I89.0 LYMPHEDEMA: Primary | ICD-10-CM

## 2025-03-13 NOTE — PROGRESS NOTES
[facility]                                                                                   OUTPATIENT PHYSICAL THERAPY    PLAN OF TREATMENT FOR OUTPATIENT REHABILITATION   Patient's Last Name, First Name, Jessie Araiza YOB: 1955   Provider's Name   [facility]   Medical Record No.  [id number]     Onset Date: 12/06/24  Start of Care Date: 12/20/24     Medical Diagnosis:  Lymphedema      PT Treatment Diagnosis:  Lymphedema Plan of Treatment  Frequency/Duration: 1-2 times per week/ up to 90 days    Certification date from 03/13/25 to 06/11/25         See note for plan of treatment details and functional goals     Radha Ellis PT                         I CERTIFY THE NEED FOR THESE SERVICES FURNISHED UNDER        THIS PLAN OF TREATMENT AND WHILE UNDER MY CARE     (Physician attestation of this document indicates review and certification of the therapy plan).              Referring Provider:  Mahogany Hartman    Initial Assessment  See Epic Evaluation- Start of Care Date: 12/20/24    PLAN  Continue therapy per current plan of care.    Beginning/End Dates of Progress Note Reporting Period:  12/20/24 to 03/13/2025    Referring Provider:  Mahogany Hartman     03/13/25 0500   Appointment Info   Signing clinician's name / credentials Radha Ellis PT, DPT, CLT-KACEY   Visits Used 5   Medical Diagnosis Lymphedema   PT Tx Diagnosis Lymphedema   Progress Note/Certification   Start of Care Date 12/20/24   Onset of illness/injury or Date of Surgery 12/06/24   Therapy Frequency 1-2 times per week   Predicted Duration up to 90 days   Certification date from 03/13/25   Certification date to 06/11/25   Progress Note Completed Date 12/20/24   Supervision   PT Assistant Visit Number 3       Present No   GOALS   PT Goals 2;3   PT Goal 1   Goal Identifier HEP   Goal Description Patinet will be independent in a HEP for ongoing symptom  management in 90 days   Target Date 06/11/25   PT Goal 2   Goal Identifier volumes   Goal Description Santiago will demonstrate a decrease in LE volumes by 5% for increase ease in mobilitity and function, decrease risk for infections and ease in donning LE clothing in 90 days   Goal Progress ongoing - no change in volumes noted today on measurements   Target Date 06/11/25   PT Goal 3   Goal Identifier self care-home management   Goal Description Patient will be independent in self care and home management techniques such as skin care, use of compression with garmnets and/or wrapping, pump, self MLD for ongoing symptom management in 90 days   Goal Progress ongoing and remains appropriate   Target Date 06/11/25   Subjective Report   Subjective Report [place] spent a lot of time in pool and biking for exercises, wearing knee high compression and biker shorts to manage. She feels the lower legs are doing ok but seems the lymphedema has dede increase in upper legs to lower abdomen especially on the right side. She noted increase firmness on the right side and some skin irritation under the abdomen.   Objective Measures   Objective Measures Objective Measure 1;Objective Measure 2   Objective Measure 1   Objective Measure Skin   Details noted increase in skin fibrosis and hyerplasia as well as hyperpigmanetation of the lower abdomen with some dermatitis of the skin underneath.   Objective Measure 2   Objective Measure volumes   Details noted +1% on R side since initial volumes with decreas ein circumferentail measurements in lower leg but increased in upper leg with the knee remaining the same, L LE nearly the same since initial visit. see flowsheet for details.   Treatment Interventions (PT)   Interventions Manual Therapy   Manual Therapy   Manual Therapy: Mobilization, MFR, MLD, friction massage minutes (41515) 58   Manual Therapy Manual Therapy 2   Manual Therapy 1  MLD   Manual Therapy 1 - Details measurements taken; performed MLD to the R > L LEs, deep abdominals, deep breathing and then inguinals and clearing the R > L LE with scar mobilizations and deep techniques for fibrosis. and time spend on abdomial swelling and upper right thigh, reviewed self MLD especially for the abdominals areas with deep breathings.   Manual Therapy 2 compression   Manual Therapy 2 - Details reviewed compressionl knee high socks, has thigh high socks and biker shorts she wears, discssed need for stronger compression for the upper leg to assist, discussed postential use of a lymphatic pump for home to assist in management as well as advanc.   Patient Response/Progress good tolerance to MLD, decrease firmness of the right lower abdomen.   Education   Learner/Method Patient;Listening;Reading;Demonstration;Pictures/Video;No Barriers to Learning   Plan   Plan for next session continue with therapy for MLD and discuss compression for reduction and managment, changes as needed, lymph pumping exercises   Comments   Comments Patient returned for therapy today after traveling for 2 months noted an increase in right upper leg and abdominal volumes. Patient could benefit from skilled therapy ongoing as well as use of an advance lymphatic pump to assist on lymphedema managmeent of the abdomen and lower extremities. 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 She demonstrated hyperpigmentation of the lower abdomen as well as hyperplasia, skin thicking and fibrosis of the lower abdomen and pubic area. She has been compliant with HEP, biking, walking, use of compression with compression stockings and biker shorts as well as with self MLD and elevation and despite this the lymphedema continues.   Total Session Time   Timed Code Treatment Minutes 58   Total Treatment Time (sum of timed and untimed services) 58     Radha Ellis  PT, DPT, ANDRE

## 2025-03-18 ENCOUNTER — THERAPY VISIT (OUTPATIENT)
Dept: PHYSICAL THERAPY | Facility: REHABILITATION | Age: 70
End: 2025-03-18
Payer: COMMERCIAL

## 2025-03-18 DIAGNOSIS — I89.0 LYMPHEDEMA: ICD-10-CM

## 2025-03-18 DIAGNOSIS — M62.81 GENERALIZED MUSCLE WEAKNESS: Primary | ICD-10-CM

## 2025-03-18 PROCEDURE — 97140 MANUAL THERAPY 1/> REGIONS: CPT | Mod: GP | Performed by: PHYSICAL THERAPY ASSISTANT

## 2025-03-25 ENCOUNTER — THERAPY VISIT (OUTPATIENT)
Dept: PHYSICAL THERAPY | Facility: REHABILITATION | Age: 70
End: 2025-03-25
Payer: COMMERCIAL

## 2025-03-25 DIAGNOSIS — I89.0 LYMPHEDEMA: Primary | ICD-10-CM

## 2025-03-25 DIAGNOSIS — M62.81 GENERALIZED MUSCLE WEAKNESS: ICD-10-CM

## 2025-03-25 PROCEDURE — 97140 MANUAL THERAPY 1/> REGIONS: CPT | Mod: GP | Performed by: PHYSICAL THERAPIST

## 2025-03-26 NOTE — PROGRESS NOTES
Gynecologic Oncology Return Visit Note    Date: Mar 27, 2025     RE: Jessie Tamayo  : 1955  ELMO: Mar 27, 2025     CC: Stage IVB SCC of the vagina      HPI:  Jessie Tamayo is a 69 year old woman with a history of stage IVB SCC of the vagina.  She completed treatment with chemoradiation and brachytherapy 23.  She is here today for a surveillance visit.      Oncology History:  07, 10/15/08, 09, 11: Pap test NILM.      1/23/15: Pap test NILM, hrHPV16+.  2/27/15: Cervical polyp & cervical biopsy pathology: Negative for dysplasia/malignancy.     16: Pap test NILM, hrHPV16+.   16: Endocervical curettings pathology negative for dysplasia/malignancy.     17: Pap test NILM, hrHPV16+.   -Colposcopy recommended, not performed.      18:   -Pap test ASCUS, hrHPV16+.   -Endocervical curettings & cervical biopsy pathology: negative for dysplasia/malignancy.      19:  Pap test ASC-H, hrHPV16+.   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      9/10/20: Pap test ASC-H, hrHPV+ (NOS).  21: LEEP.   -Pathology: LEEP & endocervical curettings: negative for dysplasia/malignancy.     3/23/22: Endocervical curettings & cervical biopsy pathology: negative for dysplasia/malignancy.     3/28/23:   -Pap test HSIL, hrHPV16+.   -Findings by gynecologist Dr. Koenig: External genitalia with erythematous plaques bilaterally  Urethra- mid position and well supported, suburethral tissue thickened and friable with bleeding elicited after placement of the speculum  Vagina is stenotic and cervix difficult to see.   23: Cystourethroscopy, vaginal biopsy by urologist Dr. Mackenzie.   -Findings: Exam revealed lichenified changes to bilateral labia majora and friable vestibular tissue (patient had significant bleeding from prep alone.) The urethra was somewhat stenotic and would not accommodate 19Fr rigid cystoscope, therefore a 16  Fr flexible cystoscope was inserted. Gentle pressure was required to access the bladder. The ureteral orifices were in their orthotopic positions bilaterally. There were no intravesical stones or diverticuli and the bladder was mildly trabeculated. There were no intravesical lesions. See media tab for urethral pictures - there were no obvious lesions but the entire distal urethra was erythematous and stenosed (16Fr.)  -Pathology: Invasive moderately-differentiated squamous cell carcinoma, HPV-associated, with focus suspicious for lymphovascular space invasion; IHC: p16+.    6/8/23: Gynecologic Oncology consultation.  -Findings:   External genitalia notable for erythema and desquamation of the posterior medial labia, c/w lichen sclerosus changes. When the labia are , a friable mass is visible at the distal anterior vagina, just posterior to the urethra. On bimanual exam, the cervix is small and normal in contour. The palpable vaginal tumor is limited to the anterior distal vagina, approximately 4-5 cm laterally x 3 cm cranio-caudal. Tumor is friable. Remainder of the vagina smooth to palpation. Digital anorectal exam is without nodularity. No palpable tumor in the rectovaginal septum.   Enlarged firm, fixed right inguinal lymph node ~3-4 cm in size.      6/16/23: Pelvic MRI  IMPRESSION:   1. Irregular enhancing lesion along the anterior vaginal wall at the  level of the introitus measuring 2.5 x 0.9 cm with irregular  enhancement along the anterior vaginal wall towards the level of the  vaginal cuff however there is no definitive evidence or abnormal  signal intensity involving the cervix to suggest invasion.  2. Right inguinal lymphadenopathy compatible with metastatic disease.   3. There is some asymmetric enhancement involving the left sacrum,  incompletely evaluated on this study. Further evaluation of this and  further staging of the chest, abdomen and pelvis will be performed on  PET/CT scheduled for  6/20/2023.     6/20/23: PET CT  IMPRESSION:   1. Intense focal hypermetabolic FDG uptake in biopsy-proven vaginal  squamous cell carcinoma.  2. Multiple enlarged, hypermetabolic right inguinal nodes likely  represent regional metastasis. No definite evidence of distant  metastatic disease.  3. Scattered sub-4 mm solid pulmonary nodules are below the size  threshold for characterization on PET. Attention on follow-up with CT.  4. 1.0 cm enhancing left parotid gland nodule with hypermetabolic  uptake could represent a primary parotid neoplasm such as pleomorphic  adenoma or Warthin's tumor; consider ultrasound for further  evaluation.  5. 1.3 cm hypoattenuating right thyroid nodule with mild FDG uptake  warrants ultrasound for characterization.      8/1/23-9/20/23: Primary chemoradiation therapy with external beam radiation with concurrent chemosensitization with weekly cisplatin 40 mg/m2 followed by interstitial brachytherapy in 5 fractions.   -10/7/23-10/17/23: Therapy complicated by severe C.diff colitis requiring prolonged hospitalization and antibiotic regimen. Colonoscopy and biopsies performed during that hospitalization confirmed a diagnosis of severe C.diff colitis.     12/22/23: PET CT  IMPRESSION:   In this patient with stage IVb squamous cell carcinoma of the vagina:  1. Significant decreased size and FDG metabolism of previously seen  vaginal mass. Residual trace linear uptake along the mucosa of the  vaginal introitus, could be inflammatory. This can be correlated with  physical exam.   2. Resolution of previously seen hypermetabolic enlarged right  inguinal nodes.   3. Stable hypermetabolic soft tissue nodule in the left parotid gland.  ENT consultation is recommended. Given the stability over 6 months, it  could be a benign salivary tumor such as a Warthin's tumor or an  oncocytoma. Biopsy can be obtained to rule out a low grade salivary  originated tumor.   4. Four mm nodular density in the  "gallbladder, might represent a  polyp. This could be further evaluated with ultrasound.     Plan to repeat PET in 3 months     3/28/24: CT ap IMPRESSION: No evidence of metastatic disease in the abdomen or pelvis.     6/5/24: CT AP  IMPRESSION:   1.  No metastatic disease in the abdomen and pelvis.  2.  No CT evident cause for left groin palpable lump. Specifically, no  lymphadenopathy, hernia, mass or fluid collection.    12/13/24: PET CT  IMPRESSION:   1.  Continued decreased FDG avidity of the previously seen vaginal  lesion.  2.  Stable hypermetabolic soft tissue nodule within the left parotid  gland, possibly representing a benign salivary tumor such as Warthin's  tumors or oncocytoma. Again, this may further evaluated with  ultrasound, with biopsy to rule out low-grade cell very originated  tumor.  3.  Unchanged enhancing nodule within the neck of the gallbladder,  nonspecific. Recommend further evaluation with ultrasound to evaluate  for vascularity.  4.  Grossly unchanged scattered pulmonary nodules. Recommend continued  attention on follow-up imaging. Slight increase in paraspinal opacity  within the right lower lobe, likely inflammatory. Recommend attention  on follow-up imaging.  5.  Incidental CT findings, as above    12/20/24: Abdominal US  IMPRESSION:  1.  No sonographic evidence for a gallbladder mass.    12/20/24: Parotid US  IMPRESSION:  1.  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 Continued follow-up or ultrasound FNA could be   performed.  Per Dr. Stahl \"benign neoplasm of the gland. No additional evaluation needed at this time\".              Today she comes to clinic with her  feeling really well overall.  She continues to have lymphedema that she feels is overall worsening.  She is following with lymphedema therapy and they have " recommended a pneumatic compression device.  She does wear bike shorts as the lymphedema is more concentrated in her thigh and lower abdomen.  She is having no vaginal bleeding.  No abdominal pain or bloating.  Normal appetite.  No unintended weight loss.  No changes in bowel or bladder habits.  She continues to be lactose intolerant but has been using Lactaid and lactose-free milk products with success.  She does have a worsening rash under her abdomen.  She is due for her colon cancer screening but notes she feels her last prep.  Due for her mammogram.  Current with her annual physical.              Health Maintenance  Colonoscopy: 8/30/17  Mammogram: 3/1/24  Annual physical: 11/1/24  DEXA: 8/16/11    Past Medical History:    Past Medical History:   Diagnosis Date    Actinic keratosis     C. difficile diarrhea 09/08/2023    Essential hypertension 02/22/2024    Hyperlipemia     Osteopenias     SCC (squamous cell carcinoma) 07/26/2023         Past Surgical History:    Past Surgical History:   Procedure Laterality Date    COLONOSCOPY  2006    normal    COLPOSCOPY, BIOPSY, COMBINED N/A 02/08/2021    Procedure: COLPOSCOPY, WITH BIOPSY, LEEP procedure;  Surgeon: Jefe Koenig MD;  Location: [facility] OR    CYSTOSCOPY N/A 05/30/2023    Procedure: CYSTOSCOPY AND EXCISIONAL BIOPSY OF THE VAGINAL TISSUE AROUND THE URETHRA.  (look in the bladder and sample small area in the vagina near the urethra);  Surgeon: Lakeisha Mackenzie MD;  [facility]: [facility]    EYE SURGERY      cataracts bilateral    INSERT INTERSTITIAL NEEDLE, ULTRASOUND GUIDED N/A 9/18/2023    Procedure: INSERTION, NEEDLE, WITH ULTRASOUND GUIDANCE, FOR INTERSTITIAL BRACHYTHERAPY;  Surgeon: Marisa Pacheco MD;  Location: [facility] OR    OPEN REDUCTION INTERNAL FIXATION FOREARM  07/31/2012    Procedure: OPEN REDUCTION INTERNAL FIXATION FOREARM;  Open Reduction Internal Fixation, Irrigation & Debridment  of Right Distal Radius, application short arm splint;  Surgeon:  Wade Beard MD;  Location: [facility] OR    REMOVE INTERSTITIAL NEEDLE N/A 9/20/2023    Procedure: REMOVAL, INTERSTITIAL NEEDLE, AFTER TEMPORARY BRACHYTHERAPY;  Surgeon: Marisa Pacheco MD;  Location: [facility] OR    TONSILLECTOMY & ADENOIDECTOMY  1960    [facility] TREAT ECTOPIC PREG,RMV TUBE/OVARY  1985    ectopic, right side-ovary and tube removed         Health Maintenance Due   Topic Date Due    COLORECTAL CANCER SCREENING  10/14/2023    PHQ-2 (once per calendar year)  01/01/2025    MAMMO SCREENING  03/01/2025       Current Medications:     Current Outpatient Medications   Medication Sig Dispense Refill    amLODIPine (NORVASC) 5 MG tablet TAKE 1 TABLET DAILY 90 tablet 1    nystatin (MYCOSTATIN) 116825 UNIT/GM external powder Apply topically 2 times daily as needed for other (rash or skin irritation). 60 g 2         Allergies:        Allergies   Allergen Reactions    Blood Transfusion Related (Informational Only) Other (See Comments)     Patient has a history of a clinically significant antibody against RBC antigens.  A delay in compatible RBCs may occur.    Oxybutynin Itching    Seasonal Allergies         Social History:     Social History     Tobacco Use    Smoking status: Never     Passive exposure: Never    Smokeless tobacco: Never   Substance Use Topics    Alcohol use: Not Currently       History   Drug Use No         Family History:     The patient's family history is notable for:    Family History   Problem Relation Age of Onset    Lung Cancer Mother 44    Cerebrovascular Disease Father     Hypertension Father     Alcohol/Drug Father     Cervical Cancer Maternal Aunt     Ovarian Cancer Maternal Aunt 60    Obesity Niece         niece    Mental Illness Nephew         nephew    Diabetes Other         paternal aunt    Hyperlipidemia Other     Obesity Other         self    Obesity Other     Diabetes Other         paternal aunt    Hypertension Other         father    Cerebrovascular Disease Other          father    Other Cancer Other         mother, Lung    C.A.D. No family hx of     Breast Cancer No family hx of     Cancer - colorectal No family hx of     Prostate Cancer No family hx of     Melanoma No family hx of     Anesthesia Reaction No family hx of     Venous thrombosis No family hx of          Physical Exam:     Wt 94.9 kg (209 lb 3.2 oz)   LMP 03/08/2008   BMI 35.25 kg/m    Body mass index is 35.25 kg/m .    General Appearance:  alert, no distress     HEENT: no palpable nodules or masses        Cardiovascular: regular rate and rhythm, no gallops, rubs or murmurs     Respiratory: lungs clear, no rales, rhonchi or wheezes    Musculoskeletal: extremities non tender and without edema    Skin: Moist red rash under pannus    Neurological: normal gait, no gross defects     Psychiatric: appropriate mood and affect                               Hematological: normal cervical, supraclavicular and inguinal lymph nodes     Gastrointestinal:       abdomen soft, non-tender, non-distended, no organomegaly or masses    Genitourinary: External genitalia and urethral meatus appears normal but some radiation changes seen around urethra.  Digital and speculum exam unable to be performed. Vagina appears completely scared down at this point without a vaginal opening present.      No results found for this or any previous visit (from the past 24 hours).       Assessment:    Jessie Tamayo is a 69 year old woman with a history of stage IVB SCC of the vagina.  She completed treatment with chemoradiation and brachytherapy 9/20/23.  She is here today for a surveillance visit.     20 minutes spent on the date of the encounter doing chart review, history and exam, documentation, and further activities as noted above.      Plan:     1.)        Vaginal cancer: TRISHA on exam, though exam limited.  RTC in 3 months for next surveillance visit .  Plan for surveillance visit every 3 months for the first 2 years post treatment (9/2025),  "followed by every 6 months for 3 years thereafter (9/2028), then annually.  Per Dr. Stahl \"Will omit cytology unless the vaginal adhesions can be further reduced.  Will omit speculum exam, and attempt one-digit exams during surveillance.\" Reviewed signs and symptoms for when she should contact the clinic or seek additional care.  Patient to contact the clinic with any questions or concerns in the interim.      Genetic risk factors were assessed and she does not met qualification for referral.     Labs and/or tests ordered include:  None                2.)        Health maintenance:  Issues addressed today include following up with PCP for annual health maintenance and non-gynecologic issues.      3.)        Skin candidiasis: Moist red rash under pannus consistent with skin candidiasis.  Prescription for nystatin powder sent to her local pharmacy to be used twice daily as needed.    This note was created in part by the use of Dragon voice recognition dictation system. Inadvertent grammatical errors and typographical errors may exist.      Andie Rios, IVANA, APRN, FNP-C, AOCNP  Oncology Nurse Practitioner  Division of Gynecologic Oncology  Pager: [phone]     CC  Patient Care Team:  Alba Layton MD as PCP - General  Alba Layton MD as Assigned PCP  Lakeisha Mackenzie MD as MD (Urology)  Lawrence Banuelos MD as MD (Dermatology)  Lakeisha Mackenzie MD as Assigned Surgical Provider  Lakeisha Mackenzie MD as MD (Urology)  Reina Stahl MD as MD (Gynecologic Oncology)  Marsia Pacheco MD as MD (Radiation Oncology)  Lily Fabian PA-C as Physician Assistant (Anesthesiology)  Ulysses Gusman MD as Assigned Palliative Care Provider  Shannon Flores MD as Hospitalist (HOSPITALIST)  Boston Jacobo MD as Assigned Infectious Disease Provider  Tad Hunter MD as Assigned Gastroenterology Provider  Emma Petersen APRN CNP as Assigned OBGYN Provider  Andie Rios APRN CNP " as Assigned Cancer Care Provider  LYNNE PELAYO

## 2025-03-27 ENCOUNTER — ONCOLOGY VISIT (OUTPATIENT)
Dept: ONCOLOGY | Facility: CLINIC | Age: 70
End: 2025-03-27
Attending: NURSE PRACTITIONER
Payer: COMMERCIAL

## 2025-03-27 VITALS
TEMPERATURE: 97 F | OXYGEN SATURATION: 97 % | SYSTOLIC BLOOD PRESSURE: 135 MMHG | WEIGHT: 209.2 LBS | HEIGHT: 65 IN | HEART RATE: 70 BPM | BODY MASS INDEX: 34.85 KG/M2 | RESPIRATION RATE: 18 BRPM | DIASTOLIC BLOOD PRESSURE: 83 MMHG

## 2025-03-27 DIAGNOSIS — B37.2 CANDIDIASIS OF SKIN: ICD-10-CM

## 2025-03-27 DIAGNOSIS — C52 VAGINAL CANCER (H): Primary | ICD-10-CM

## 2025-03-27 PROCEDURE — G0463 HOSPITAL OUTPT CLINIC VISIT: HCPCS | Performed by: NURSE PRACTITIONER

## 2025-03-27 RX ORDER — NYSTATIN 100000 [USP'U]/G
POWDER TOPICAL 2 TIMES DAILY PRN
Qty: 60 G | Refills: 2 | Status: SHIPPED | OUTPATIENT
Start: 2025-03-27

## 2025-03-27 ASSESSMENT — PAIN SCALES - GENERAL: PAINLEVEL_OUTOF10: NO PAIN (0)

## 2025-03-27 NOTE — LETTER
3/27/2025      Jessie Tamayo  [address] [place] [id number]      Dear Colleague,    Thank you for referring your patient, Jessie Tamayo, to the [facility]. Please see a copy of my visit note below.    Gynecologic Oncology Return Visit Note    Date: Mar 27, 2025     RE: Jessie Tamayo  : 1955  ELMO: Mar 27, 2025     CC: Stage IVB SCC of the vagina      HPI:  Jessie Tamayo is a 69 year old woman with a history of stage IVB SCC of the vagina.  She completed treatment with chemoradiation and brachytherapy 23.  She is here today for a surveillance visit.      Oncology History:  07, 10/15/08, 09, 11: Pap test NILM.      1/23/15: Pap test NILM, hrHPV16+.  2/27/15: Cervical polyp & cervical biopsy pathology: Negative for dysplasia/malignancy.     16: Pap test NILM, hrHPV16+.   16: Endocervical curettings pathology negative for dysplasia/malignancy.     17: Pap test NILM, hrHPV16+.   -Colposcopy recommended, not performed.      18:   -Pap test ASCUS, hrHPV16+.   -Endocervical curettings & cervical biopsy pathology: negative for dysplasia/malignancy.      19:  Pap test ASC-H, hrHPV16+.   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      9/10/20: Pap test ASC-H, hrHPV+ (NOS).  21: LEEP.   -Pathology: LEEP & endocervical curettings: negative for dysplasia/malignancy.     3/23/22: Endocervical curettings & cervical biopsy pathology: negative for dysplasia/malignancy.     3/28/23:   -Pap test HSIL, hrHPV16+.   -Findings by gynecologist Dr. Koenig: External genitalia with erythematous plaques bilaterally  Urethra- mid position and well supported, suburethral tissue thickened and friable with bleeding elicited after placement of the speculum  Vagina is stenotic and cervix difficult to see.   23: Cystourethroscopy, vaginal biopsy by urologist Dr. Mackenzie.   -Findings:  Exam revealed lichenified changes to bilateral labia majora and friable vestibular tissue (patient had significant bleeding from prep alone.) The urethra was somewhat stenotic and would not accommodate 19Fr rigid cystoscope, therefore a 16 Fr flexible cystoscope was inserted. Gentle pressure was required to access the bladder. The ureteral orifices were in their orthotopic positions bilaterally. There were no intravesical stones or diverticuli and the bladder was mildly trabeculated. There were no intravesical lesions. See media tab for urethral pictures - there were no obvious lesions but the entire distal urethra was erythematous and stenosed (16Fr.)  -Pathology: Invasive moderately-differentiated squamous cell carcinoma, HPV-associated, with focus suspicious for lymphovascular space invasion; IHC: p16+.    6/8/23: Gynecologic Oncology consultation.  -Findings:   External genitalia notable for erythema and desquamation of the posterior medial labia, c/w lichen sclerosus changes. When the labia are , a friable mass is visible at the distal anterior vagina, just posterior to the urethra. On bimanual exam, the cervix is small and normal in contour. The palpable vaginal tumor is limited to the anterior distal vagina, approximately 4-5 cm laterally x 3 cm cranio-caudal. Tumor is friable. Remainder of the vagina smooth to palpation. Digital anorectal exam is without nodularity. No palpable tumor in the rectovaginal septum.   Enlarged firm, fixed right inguinal lymph node ~3-4 cm in size.      6/16/23: Pelvic MRI  IMPRESSION:   1. Irregular enhancing lesion along the anterior vaginal wall at the  level of the introitus measuring 2.5 x 0.9 cm with irregular  enhancement along the anterior vaginal wall towards the level of the  vaginal cuff however there is no definitive evidence or abnormal  signal intensity involving the cervix to suggest invasion.  2. Right inguinal lymphadenopathy compatible with metastatic  disease.   3. There is some asymmetric enhancement involving the left sacrum,  incompletely evaluated on this study. Further evaluation of this and  further staging of the chest, abdomen and pelvis will be performed on  PET/CT scheduled for 6/20/2023.     6/20/23: PET CT  IMPRESSION:   1. Intense focal hypermetabolic FDG uptake in biopsy-proven vaginal  squamous cell carcinoma.  2. Multiple enlarged, hypermetabolic right inguinal nodes likely  represent regional metastasis. No definite evidence of distant  metastatic disease.  3. Scattered sub-4 mm solid pulmonary nodules are below the size  threshold for characterization on PET. Attention on follow-up with CT.  4. 1.0 cm enhancing left parotid gland nodule with hypermetabolic  uptake could represent a primary parotid neoplasm such as pleomorphic  adenoma or Warthin's tumor; consider ultrasound for further  evaluation.  5. 1.3 cm hypoattenuating right thyroid nodule with mild FDG uptake  warrants ultrasound for characterization.      8/1/23-9/20/23: Primary chemoradiation therapy with external beam radiation with concurrent chemosensitization with weekly cisplatin 40 mg/m2 followed by interstitial brachytherapy in 5 fractions.   -10/7/23-10/17/23: Therapy complicated by severe C.diff colitis requiring prolonged hospitalization and antibiotic regimen. Colonoscopy and biopsies performed during that hospitalization confirmed a diagnosis of severe C.diff colitis.     12/22/23: PET CT  IMPRESSION:   In this patient with stage IVb squamous cell carcinoma of the vagina:  1. Significant decreased size and FDG metabolism of previously seen  vaginal mass. Residual trace linear uptake along the mucosa of the  vaginal introitus, could be inflammatory. This can be correlated with  physical exam.   2. Resolution of previously seen hypermetabolic enlarged right  inguinal nodes.   3. Stable hypermetabolic soft tissue nodule in the left parotid gland.  ENT consultation is  "recommended. Given the stability over 6 months, it  could be a benign salivary tumor such as a Warthin's tumor or an  oncocytoma. Biopsy can be obtained to rule out a low grade salivary  originated tumor.   4. Four mm nodular density in the gallbladder, might represent a  polyp. This could be further evaluated with ultrasound.     Plan to repeat PET in 3 months     3/28/24: CT ap IMPRESSION: No evidence of metastatic disease in the abdomen or pelvis.     6/5/24: CT AP  IMPRESSION:   1.  No metastatic disease in the abdomen and pelvis.  2.  No CT evident cause for left groin palpable lump. Specifically, no  lymphadenopathy, hernia, mass or fluid collection.    12/13/24: PET CT  IMPRESSION:   1.  Continued decreased FDG avidity of the previously seen vaginal  lesion.  2.  Stable hypermetabolic soft tissue nodule within the left parotid  gland, possibly representing a benign salivary tumor such as Warthin's  tumors or oncocytoma. Again, this may further evaluated with  ultrasound, with biopsy to rule out low-grade cell very originated  tumor.  3.  Unchanged enhancing nodule within the neck of the gallbladder,  nonspecific. Recommend further evaluation with ultrasound to evaluate  for vascularity.  4.  Grossly unchanged scattered pulmonary nodules. Recommend continued  attention on follow-up imaging. Slight increase in paraspinal opacity  within the right lower lobe, likely inflammatory. Recommend attention  on follow-up imaging.  5.  Incidental CT findings, as above    12/20/24: Abdominal US  IMPRESSION:  1.  No sonographic evidence for a gallbladder mass.    12/20/24: Parotid US  IMPRESSION:  1.  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 Continued follow-up or ultrasound FNA could be   performed.  Per Dr. Stahl \"benign neoplasm of the gland. No additional " "evaluation needed at this time\".              Today she comes to clinic with her  feeling really well overall.  She continues to have lymphedema that she feels is overall worsening.  She is following with lymphedema therapy and they have recommended a pneumatic compression device.  She does wear bike shorts as the lymphedema is more concentrated in her thigh and lower abdomen.  She is having no vaginal bleeding.  No abdominal pain or bloating.  Normal appetite.  No unintended weight loss.  No changes in bowel or bladder habits.  She continues to be lactose intolerant but has been using Lactaid and lactose-free milk products with success.  She does have a worsening rash under her abdomen.  She is due for her colon cancer screening but notes she feels her last prep.  Due for her mammogram.  Current with her annual physical.              Health Maintenance  Colonoscopy: 8/30/17  Mammogram: 3/1/24  Annual physical: 11/1/24  DEXA: 8/16/11    Past Medical History:    Past Medical History:   Diagnosis Date     Actinic keratosis      C. difficile diarrhea 09/08/2023     Essential hypertension 02/22/2024     Hyperlipemia      Osteopenias      SCC (squamous cell carcinoma) 07/26/2023         Past Surgical History:    Past Surgical History:   Procedure Laterality Date     COLONOSCOPY  2006    normal     COLPOSCOPY, BIOPSY, COMBINED N/A 02/08/2021    Procedure: COLPOSCOPY, WITH BIOPSY, LEEP procedure;  Surgeon: Jefe Koenig MD;  Location: [facility] OR     CYSTOSCOPY N/A 05/30/2023    Procedure: CYSTOSCOPY AND EXCISIONAL BIOPSY OF THE VAGINAL TISSUE AROUND THE URETHRA.  (look in the bladder and sample small area in the vagina near the urethra);  Surgeon: Lakeisha Mackenzie MD;  [place] [facility]     EYE SURGERY      cataracts bilateral     INSERT INTERSTITIAL NEEDLE, ULTRASOUND GUIDED N/A 9/18/2023    Procedure: INSERTION, NEEDLE, WITH ULTRASOUND GUIDANCE, FOR INTERSTITIAL BRACHYTHERAPY;  Surgeon: Marisa Pacheco, " MD;  Location: [facility] OR     OPEN REDUCTION INTERNAL FIXATION FOREARM  07/31/2012    Procedure: OPEN REDUCTION INTERNAL FIXATION FOREARM;  Open Reduction Internal Fixation, Irrigation & Debridment  of Right Distal Radius, application short arm splint;  Surgeon: Wade Beard MD;  Location: [facility] OR     REMOVE INTERSTITIAL NEEDLE N/A 9/20/2023    Procedure: REMOVAL, INTERSTITIAL NEEDLE, AFTER TEMPORARY BRACHYTHERAPY;  Surgeon: Marisa Pacheco MD;  Location: [facility] OR     TONSILLECTOMY & ADENOIDECTOMY  1960     [facility] TREAT ECTOPIC PREG,RMV TUBE/OVARY  1985    ectopic, right side-ovary and tube removed         Health Maintenance Due   Topic Date Due     COLORECTAL CANCER SCREENING  10/14/2023     PHQ-2 (once per calendar year)  01/01/2025     MAMMO SCREENING  03/01/2025       Current Medications:     Current Outpatient Medications   Medication Sig Dispense Refill     amLODIPine (NORVASC) 5 MG tablet TAKE 1 TABLET DAILY 90 tablet 1     nystatin (MYCOSTATIN) 861093 UNIT/GM external powder Apply topically 2 times daily as needed for other (rash or skin irritation). 60 g 2         Allergies:        Allergies   Allergen Reactions     Blood Transfusion Related (Informational Only) Other (See Comments)     Patient has a history of a clinically significant antibody against RBC antigens.  A delay in compatible RBCs may occur.     Oxybutynin Itching     Seasonal Allergies         Social History:     Social History     Tobacco Use     Smoking status: Never     Passive exposure: Never     Smokeless tobacco: Never   Substance Use Topics     Alcohol use: Not Currently       History   Drug Use No         Family History:     The patient's family history is notable for:    Family History   Problem Relation Age of Onset     Lung Cancer Mother 44     Cerebrovascular Disease Father      Hypertension Father      Alcohol/Drug Father      Cervical Cancer Maternal Aunt      Ovarian Cancer Maternal Aunt 60     Obesity Niece          niece     Mental Illness Nephew         nephew     Diabetes Other         paternal aunt     Hyperlipidemia Other      Obesity Other         self     Obesity Other      Diabetes Other         paternal aunt     Hypertension Other         father     Cerebrovascular Disease Other         father     Other Cancer Other         mother, Lung     C.A.D. No family hx of      Breast Cancer No family hx of      Cancer - colorectal No family hx of      Prostate Cancer No family hx of      Melanoma No family hx of      Anesthesia Reaction No family hx of      Venous thrombosis No family hx of          Physical Exam:     Wt 94.9 kg (209 lb 3.2 oz)   LMP 03/08/2008   BMI 35.25 kg/m    Body mass index is 35.25 kg/m .    General Appearance:  alert, no distress     HEENT: no palpable nodules or masses        Cardiovascular: regular rate and rhythm, no gallops, rubs or murmurs     Respiratory: lungs clear, no rales, rhonchi or wheezes    Musculoskeletal: extremities non tender and without edema    Skin: Moist red rash under pannus    Neurological: normal gait, no gross defects     Psychiatric: appropriate mood and affect                               Hematological: normal cervical, supraclavicular and inguinal lymph nodes     Gastrointestinal:       abdomen soft, non-tender, non-distended, no organomegaly or masses    Genitourinary: External genitalia and urethral meatus appears normal but some radiation changes seen around urethra.  Digital and speculum exam unable to be performed. Vagina appears completely scared down at this point without a vaginal opening present.      No results found for this or any previous visit (from the past 24 hours).       Assessment:    Jessie Tamayo is a 69 year old woman with a history of stage IVB SCC of the vagina.  She completed treatment with chemoradiation and brachytherapy 9/20/23.  She is here today for a surveillance visit.     20 minutes spent on the date of the encounter doing chart review,  "history and exam, documentation, and further activities as noted above.      Plan:     1.)        Vaginal cancer: TRISHA on exam, though exam limited.  RTC in 3 months for next surveillance visit .  Plan for surveillance visit every 3 months for the first 2 years post treatment (9/2025), followed by every 6 months for 3 years thereafter (9/2028), then annually.  Per Dr. Stahl \"Will omit cytology unless the vaginal adhesions can be further reduced.  Will omit speculum exam, and attempt one-digit exams during surveillance.\" Reviewed signs and symptoms for when she should contact the clinic or seek additional care.  Patient to contact the clinic with any questions or concerns in the interim.      Genetic risk factors were assessed and she does not met qualification for referral.     Labs and/or tests ordered include:  None                2.)        Health maintenance:  Issues addressed today include following up with PCP for annual health maintenance and non-gynecologic issues.      3.)        Skin candidiasis: Moist red rash under pannus consistent with skin candidiasis.  Prescription for nystatin powder sent to her local pharmacy to be used twice daily as needed.    This note was created in part by the use of Dragon voice recognition dictation system. Inadvertent grammatical errors and typographical errors may exist.      Andie Rios, DNP, APRN, FNP-C, AOCNP  Oncology Nurse Practitioner  Division of Gynecologic Oncology  Pager: [phone]     CC  Patient Care Team:  Alba Layton MD as PCP - General  Alba Layton MD as Assigned PCP  Lakeisha Mackenzie MD as MD (Urology)  Lawrence Banuelos MD as MD (Dermatology)  Lakeisha Mackenzie MD as Assigned Surgical Provider  Lakeisha Mackenzie MD as MD (Urology)  Reina Stahl MD as MD (Gynecologic Oncology)  Marisa Pacheco MD as MD (Radiation Oncology)  Lily Fabian PA-C as Physician Assistant (Anesthesiology)  Ulysses Gusman MD as Assigned " Palliative Care Provider  Shannon Flores MD as Hospitalist (HOSPITALIST)  Boston Jacobo MD as Assigned Infectious Disease Provider  Tad Hunter MD as Assigned Gastroenterology Provider  Emma Petersen APRN CNP as Assigned OBGYN Provider  Lynne Pelayo APRN CNP as Assigned Cancer Care Provider  LYNNE PELAYO      Again, thank you for allowing me to participate in the care of your patient.        Sincerely,        SANDI Weiner CNP    Electronically signed

## 2025-03-27 NOTE — NURSING NOTE
"Oncology Rooming Note    March 27, 2025 12:21 PM   Jessie Tamayo is a 69 year old female who presents for:    Chief Complaint   Patient presents with    Oncology Clinic Visit     Vaginal cancer     Initial Vitals: Wt 94.9 kg (209 lb 3.2 oz)   LMP 03/08/2008   BMI 35.25 kg/m   Estimated body mass index is 35.25 kg/m  as calculated from the following:    Height as of 11/1/24: 1.641 m (5' 4.6\").    Weight as of this encounter: 94.9 kg (209 lb 3.2 oz). Body surface area is 2.08 meters squared.  Data Unavailable Comment: Data Unavailable   Patient's last menstrual period was 03/08/2008.  Allergies reviewed: Yes  Medications reviewed: Yes    Medications: Medication refills not needed today.  Pharmacy name entered into EPIC:    [id number] - [place], [place] - [address] [facility] [facility] - [place], [place] - [address] [id number] [place] - [address] [facility] [place], [place] [address] [facility] [place], [place] - [address]  [facility] - [facility], [place] - [address] [place] [address]    Frailty Screening:   Is the patient here for a new oncology consult visit in cancer care? 2. No      Clinical concerns: Pt reports having more of a rash and more swelling in abdomen area. Andie was notified via message.       Marie Burns, EMT     "

## 2025-04-01 ENCOUNTER — THERAPY VISIT (OUTPATIENT)
Dept: PHYSICAL THERAPY | Facility: REHABILITATION | Age: 70
End: 2025-04-01
Payer: COMMERCIAL

## 2025-04-01 ENCOUNTER — ANCILLARY PROCEDURE (OUTPATIENT)
Dept: MAMMOGRAPHY | Facility: CLINIC | Age: 70
End: 2025-04-01
Attending: FAMILY MEDICINE
Payer: COMMERCIAL

## 2025-04-01 DIAGNOSIS — I89.0 LYMPHEDEMA: ICD-10-CM

## 2025-04-01 DIAGNOSIS — M62.81 GENERALIZED MUSCLE WEAKNESS: Primary | ICD-10-CM

## 2025-04-01 DIAGNOSIS — Z12.31 VISIT FOR SCREENING MAMMOGRAM: ICD-10-CM

## 2025-04-01 PROCEDURE — 97140 MANUAL THERAPY 1/> REGIONS: CPT | Mod: GP | Performed by: PHYSICAL THERAPY ASSISTANT

## 2025-04-01 PROCEDURE — 77063 BREAST TOMOSYNTHESIS BI: CPT

## 2025-04-24 ENCOUNTER — THERAPY VISIT (OUTPATIENT)
Dept: PHYSICAL THERAPY | Facility: REHABILITATION | Age: 70
End: 2025-04-24
Payer: COMMERCIAL

## 2025-04-24 DIAGNOSIS — M62.81 GENERALIZED MUSCLE WEAKNESS: Primary | ICD-10-CM

## 2025-04-24 DIAGNOSIS — I89.0 LYMPHEDEMA: ICD-10-CM

## 2025-05-01 ENCOUNTER — THERAPY VISIT (OUTPATIENT)
Dept: PHYSICAL THERAPY | Facility: REHABILITATION | Age: 70
End: 2025-05-01
Payer: COMMERCIAL

## 2025-05-01 DIAGNOSIS — N39.490 OVERFLOW INCONTINENCE: ICD-10-CM

## 2025-05-01 DIAGNOSIS — M62.81 GENERALIZED MUSCLE WEAKNESS: Primary | ICD-10-CM

## 2025-05-01 DIAGNOSIS — I89.0 LYMPHEDEMA: ICD-10-CM

## 2025-05-28 ENCOUNTER — THERAPY VISIT (OUTPATIENT)
Dept: PHYSICAL THERAPY | Facility: REHABILITATION | Age: 70
End: 2025-05-28
Payer: COMMERCIAL

## 2025-05-28 DIAGNOSIS — M62.81 GENERALIZED MUSCLE WEAKNESS: Primary | ICD-10-CM

## 2025-05-28 DIAGNOSIS — I89.0 LYMPHEDEMA: ICD-10-CM

## 2025-05-28 PROCEDURE — 97140 MANUAL THERAPY 1/> REGIONS: CPT | Mod: GP | Performed by: PHYSICAL THERAPIST

## 2025-07-08 ENCOUNTER — ANCILLARY PROCEDURE (OUTPATIENT)
Dept: GENERAL RADIOLOGY | Facility: CLINIC | Age: 70
End: 2025-07-08
Attending: PHYSICIAN ASSISTANT
Payer: COMMERCIAL

## 2025-07-08 ENCOUNTER — OFFICE VISIT (OUTPATIENT)
Dept: FAMILY MEDICINE | Facility: CLINIC | Age: 70
End: 2025-07-08
Payer: COMMERCIAL

## 2025-07-08 VITALS
TEMPERATURE: 98.5 F | DIASTOLIC BLOOD PRESSURE: 53 MMHG | SYSTOLIC BLOOD PRESSURE: 113 MMHG | RESPIRATION RATE: 18 BRPM | HEART RATE: 85 BPM | WEIGHT: 205.8 LBS | OXYGEN SATURATION: 97 % | BODY MASS INDEX: 34.29 KG/M2 | HEIGHT: 65 IN

## 2025-07-08 DIAGNOSIS — R50.9 FEVER, UNSPECIFIED FEVER CAUSE: ICD-10-CM

## 2025-07-08 DIAGNOSIS — D61.818 PANCYTOPENIA (H): ICD-10-CM

## 2025-07-08 DIAGNOSIS — R50.9 FEVER, UNSPECIFIED FEVER CAUSE: Primary | ICD-10-CM

## 2025-07-08 DIAGNOSIS — R05.9 COUGH, UNSPECIFIED TYPE: ICD-10-CM

## 2025-07-08 DIAGNOSIS — C52 VAGINAL CANCER (H): ICD-10-CM

## 2025-07-08 LAB
ERYTHROCYTE [DISTWIDTH] IN BLOOD BY AUTOMATED COUNT: 13.5 % (ref 10–15)
FLUAV RNA SPEC QL NAA+PROBE: NEGATIVE
FLUBV RNA RESP QL NAA+PROBE: NEGATIVE
HCT VFR BLD AUTO: 37.7 % (ref 35–47)
HGB BLD-MCNC: 12.1 G/DL (ref 11.7–15.7)
MCH RBC QN AUTO: 30.3 PG (ref 26.5–33)
MCHC RBC AUTO-ENTMCNC: 32.1 G/DL (ref 31.5–36.5)
MCV RBC AUTO: 94 FL (ref 78–100)
PLATELET # BLD AUTO: 178 10E3/UL (ref 150–450)
RBC # BLD AUTO: 4 10E6/UL (ref 3.8–5.2)
RSV RNA SPEC NAA+PROBE: NEGATIVE
SARS-COV-2 RNA RESP QL NAA+PROBE: NEGATIVE
WBC # BLD AUTO: 9.6 10E3/UL (ref 4–11)

## 2025-07-08 PROCEDURE — 36415 COLL VENOUS BLD VENIPUNCTURE: CPT | Performed by: PHYSICIAN ASSISTANT

## 2025-07-08 PROCEDURE — 87637 SARSCOV2&INF A&B&RSV AMP PRB: CPT | Performed by: PHYSICIAN ASSISTANT

## 2025-07-08 PROCEDURE — 71046 X-RAY EXAM CHEST 2 VIEWS: CPT | Mod: TC | Performed by: RADIOLOGY

## 2025-07-08 PROCEDURE — 3074F SYST BP LT 130 MM HG: CPT | Performed by: PHYSICIAN ASSISTANT

## 2025-07-08 PROCEDURE — 99213 OFFICE O/P EST LOW 20 MIN: CPT | Performed by: PHYSICIAN ASSISTANT

## 2025-07-08 PROCEDURE — G2211 COMPLEX E/M VISIT ADD ON: HCPCS | Performed by: PHYSICIAN ASSISTANT

## 2025-07-08 PROCEDURE — 85027 COMPLETE CBC AUTOMATED: CPT | Performed by: PHYSICIAN ASSISTANT

## 2025-07-08 PROCEDURE — 3078F DIAST BP <80 MM HG: CPT | Performed by: PHYSICIAN ASSISTANT

## 2025-07-08 RX ORDER — IBUPROFEN 200 MG
200 TABLET ORAL EVERY 4 HOURS PRN
COMMUNITY

## 2025-07-08 RX ORDER — ACETAMINOPHEN 500 MG
500-1000 TABLET ORAL EVERY 6 HOURS PRN
COMMUNITY

## 2025-07-08 ASSESSMENT — ENCOUNTER SYMPTOMS: FEVER: 1

## 2025-07-08 NOTE — PROGRESS NOTES
"  Assessment & Plan     Fever, unspecified fever cause  Cough, unspecified type  - CBC with platelets  - XR Chest 2 Views  - Influenza A/B, RSV and SARS-CoV2 PCR (COVID-19)  - Influenza A/B, RSV and SARS-CoV2 PCR (COVID-19) Nasopharyngeal  - CBC with platelets  Patient is here today for evaluation of a fever up to 102 x 2 days with start of slight cough today, nausea and intense body aches and headaches. Vitals and exam are normal today. CBC normal, CXR normal and FLU/COVID/RSV pending. Discussed with patient high likelihood of viral illness given presentation. Advised to use OTC for symptom relief, push fluids and rest. Follow up if increased shortness of breath, worsening cough, chest pain, symptoms worsen or do not improve before the weekend. Risks, benefits and alternatives were discussed with patient. Agreeable to the plan of care.    Vaginal cancer (H)  Pancytopenia (H)  Patient follows with Oncology, currently TRISHA.   Was pancytopenic due to chemo and radiation though last CBC showed improvement. CBC was normal. Continue to monitor.      BMI  Estimated body mass index is 34.67 kg/m  as calculated from the following:    Height as of this encounter: 1.641 m (5' 4.6\").    Weight as of this encounter: 93.4 kg (205 lb 12.8 oz).   Weight management plan: Patient was referred to their PCP to discuss a diet and exercise plan.    Risks, benefits and alternatives were discussed with patient. Agreeable to the plan of care.    The longitudinal plan of care for the diagnosis(es)/condition(s) as documented were addressed during this visit. Due to the added complexity in care, I will continue to support the patient in the subsequent management and ongoing continuity of care.      Diya Roman is a 69 year old, presenting for the following health issues:  Fever (Fever, chills, weakness, cough, body aches, fatigue, nausea - started 07/06 in the afternoon, has slept more than usual, recently had soup balls last night " "(07/07),  states that is the most she has eaten in 2 days, home covid test negative, taken this morning, took dayquil 7:15 this morning, said she started feeling better, fever went away, after taking dayquil)        7/8/2025    10:03 AM   Additional Questions   Roomed by Jessie LYONS CMA   Accompanied by Baljeet -      Fever  Associated symptoms include a fever.   History of Present Illness       Headaches:   Since the patient's last clinic visit, headaches are: worsened  The patient is getting headaches:  Rarely  She is not able to do normal daily activities when she has a migraine.  The patient is taking the following rescue/relief medications:  Ibuprofen (Advil, Motrin) and Tylenol   Patient states \"I get some relief\" from the rescue/relief medications.   The patient is taking the following medications to prevent migraines:  No medications to prevent migraines  In the past 4 weeks, the patient has gone to an Urgent Care or Emergency Room 0 times times due to headaches.    Reason for visit:  Fever chills nausea joint pain  Symptom onset:  1-3 days ago   She is taking medications regularly.        Patient is here today for evaluation of not feeling well since this weekend, notes she \"hasn't been sick like this since cancer\"  [place] and Sunday afternoon went swimming in the pool  Started feeling weak and nauseated with bodyaches, tried to take a shower and went home and went to bed  Sleeping and resting since Sunday afternoon  Has chills, at home her fever has been up to 102, and then this morning her oral temp was >102.  Took some Dayquil this morning since she was starting a cough.  Fever has returned to normal with aide of medication.    Nauseated, no vomiting. No diarrhea. No bowel movements for 2 days, is passing gas.  Dry cough  No ear pain, + headache, no sore throat.  Body aches are awful and joint hurt.  Not eating at all, drinking some water.    Of note, hx of " "vaginal cancer for which she is following with oncology. Hx of pancytopenia 2/2 chemo and radiation.      Review of Systems  Constitutional, HEENT, cardiovascular, pulmonary, gi and gu systems are negative, except as otherwise noted.      Objective    /53   Pulse 85   Temp 98.5  F (36.9  C) (Oral)   Resp 18   Ht 1.641 m (5' 4.6\")   Wt 93.4 kg (205 lb 12.8 oz)   LMP 03/08/2008   SpO2 97%   BMI 34.67 kg/m    Body mass index is 34.67 kg/m .  Physical Exam   GENERAL: alert and no distress  EYES: Eyes grossly normal to inspection, PERRL and conjunctivae and sclerae normal  HENT: ear canals and TM's normal, nose and mouth without ulcers or lesions  NECK: no adenopathy, no asymmetry, masses, or scars  RESP: lungs clear to auscultation - no rales, rhonchi or wheezes  CV: regular rate and rhythm, normal S1 S2, no S3 or S4, no murmur, click or rub, no peripheral edema  ABDOMEN: soft, nontender, no hepatosplenomegaly, no masses and bowel sounds normal  MS: no gross musculoskeletal defects noted, no edema  SKIN: no suspicious lesions or rashes  PSYCH: mentation appears normal, affect normal/bright    CXR - Reviewed and interpreted by me Normal- no infiltrates, effusions, pneumothoraces, cardiomegaly or masses  Recent Results (from the past 24 hours)   CBC with platelets   Result Value Ref Range    WBC Count 9.6 4.0 - 11.0 10e3/uL    RBC Count 4.00 3.80 - 5.20 10e6/uL    Hemoglobin 12.1 11.7 - 15.7 g/dL    Hematocrit 37.7 35.0 - 47.0 %    MCV 94 78 - 100 fL    MCH 30.3 26.5 - 33.0 pg    MCHC 32.1 31.5 - 36.5 g/dL    RDW 13.5 10.0 - 15.0 %    Platelet Count 178 150 - 450 10e3/uL           Signed Electronically by: Yasmeen Ferrari PA-C    "

## 2025-07-09 ENCOUNTER — NURSE TRIAGE (OUTPATIENT)
Dept: FAMILY MEDICINE | Facility: CLINIC | Age: 70
End: 2025-07-09
Payer: COMMERCIAL

## 2025-07-09 ENCOUNTER — OFFICE VISIT (OUTPATIENT)
Dept: URGENT CARE | Facility: URGENT CARE | Age: 70
End: 2025-07-09
Payer: COMMERCIAL

## 2025-07-09 VITALS
DIASTOLIC BLOOD PRESSURE: 73 MMHG | HEART RATE: 95 BPM | TEMPERATURE: 98.5 F | WEIGHT: 205 LBS | OXYGEN SATURATION: 99 % | BODY MASS INDEX: 35 KG/M2 | SYSTOLIC BLOOD PRESSURE: 116 MMHG | RESPIRATION RATE: 16 BRPM | HEIGHT: 64 IN

## 2025-07-09 DIAGNOSIS — R21 RASH AND NONSPECIFIC SKIN ERUPTION: Primary | ICD-10-CM

## 2025-07-09 DIAGNOSIS — M25.50 MULTIPLE JOINT PAIN: ICD-10-CM

## 2025-07-09 DIAGNOSIS — R50.9 FEVER, UNSPECIFIED FEVER CAUSE: ICD-10-CM

## 2025-07-09 PROCEDURE — 87798 DETECT AGENT NOS DNA AMP: CPT

## 2025-07-09 PROCEDURE — 86618 LYME DISEASE ANTIBODY: CPT

## 2025-07-09 PROCEDURE — 87468 ANAPLSMA PHGCYTOPHLM AMP PRB: CPT

## 2025-07-09 RX ORDER — DOXYCYCLINE 100 MG/1
100 CAPSULE ORAL 2 TIMES DAILY
Qty: 20 CAPSULE | Refills: 0 | Status: CANCELLED | OUTPATIENT
Start: 2025-07-09 | End: 2025-07-19

## 2025-07-09 ASSESSMENT — PAIN SCALES - GENERAL: PAINLEVEL_OUTOF10: NO PAIN (0)

## 2025-07-09 NOTE — PROGRESS NOTES
Assessment & Plan     Rash and nonspecific skin eruption  Multiple joint pain  Fever, unspecified fever cause  Acute onset of illness about 3 days ago.  Started to feel sick while at the pool with her granddaughter.  Therefore initially thought it was heat exhaustion.  However, exhaustion and fatigue has persisted since then.  Evaluation yesterday including nasal swabs, CBC, CXR was unremarkable.  Patient presented again today due to new onset of rash.  She has a substantial rash on her lower abdomen; see media tab.  Rash is flat, erythematous, not painful or tender, nonpruritic.  On the right lower abdomen, there are a few vesicular lesions not actively draining.  With the presence of these vesicles, rash almost appears like a contact dermatitis but this seems less likely in the setting of no itching or discomfort in the area of the rash.  No new medications to suggest drug eruption rash.  Rash in conjunction with symptoms of illness including fevers and joint pains raises suspicion for tickborne illness.  Patient has a history of C. difficile so she is hesitant to preemptively treat.  Will start with lab workup first.  - Tick-Borne Disease Panel (Non-Lyme) by PCR  - LYME DISEASE TOTAL ANTIBODIES WITH REFLEX TO CONFIRMATION  - Advised that changes in rash characteristics including onset of pain should prompt follow-up for reevaluation  - Continue symptomatic cares    Xiomara Patel MD  [facility]    Diya Roman is a 69 year old female who presents to clinic today for the following health issues:  Chief Complaint   Patient presents with    Urgent Care    Rash     rash of right side of body, joint pain with fatigue low mild fever started 2 days flu/ covid was NEG          7/9/2025    11:55 AM   Additional Questions   Roomed by CA   Accompanied by      HPI  Patient presenting today for evaluation of illness and rash.  On Sunday, she was in the pool with her  "granddaughter.  About an hour in, she started to feel sick.  Initially thought it might be heat exhaustion.  However now, she has been sleeping ever since and not feeling well.  Nasal swab yesterday was negative.   noticed onset of rash late last night/early this morning.  Patient also continues to have fevers, chills, joint pain.  Is taking DayQuil and NyQuil which helps though can feel when they start to wear off.  No nausea.  Has noticed some mucus-like stools though they have been more formed today.  Does note a history of C. difficile.  Also has a history of vaginal cancer but has not had chemo for 2 years.  Also endorses cough and headache.  Primary concerning symptoms for her have been the weakness, fatigue, joint aches.  No history of similar symptoms.  No recent medication changes.        Objective    /73   Pulse 95   Temp 98.5  F (36.9  C) (Tympanic)   Resp 16   Ht 1.626 m (5' 4\")   Wt 93 kg (205 lb)   LMP 03/08/2008   SpO2 99%   BMI 35.19 kg/m    Physical Exam   GENERAL: alert and no distress  NECK: no asymmetry, masses, or scars  RESP: lungs clear to auscultation - no rales, rhonchi or wheezes  CV: regular rate and rhythm, normal S1 S2, no S3 or S4, no murmur  ABDOMEN: soft, nondistended, nontender, skin findings as below  SKIN: diffuse erythematous rash primarily on the right lower abdomen but crossing the midline, small number of non-draining vesicular lesions on right lower abdomen within the area of rash, reticular rash and right lower abdomen underneath the pannus is reportedly chronic since radiation therapy  NEURO: normal strength and tone, mentation intact and speech normal  PSYCH: mentation appears normal, affect normal/bright              "

## 2025-07-09 NOTE — TELEPHONE ENCOUNTER
Nurse Triage SBAR    Is this a 2nd Level Triage? YES, LICENSED PRACTITIONER REVIEW IS REQUIRED    Situation: Localized rash    Background: Was seen by Yasmeen Ferrari for a visit yesterday regarding fever and other symptoms. Rash not present during visit with provider yesterday.    Assessment: Located on right side of patients abdomen. Rash is described as pink with bumps. A foot long in length. Also experiencing  fever, chills, nausea, poor appetite, body aches, joint pain, fatigue and headache.    Protocol Recommended Disposition:   No disposition on file.    Recommendation: No clear disposition. Advised patient that we will call back with PCP recommendation.     Routed to provider    Does the patient meet one of the following criteria for ADS visit consideration? 16+ years old, with an [facility] PCP     TIP  Providers, please consider if this condition is appropriate for management at one of our Acute and Diagnostic Services sites.     If patient is a good candidate, please use dotphrase <dot>triageresponse and select Refer to ADS to document.       Additional Information   Negative: Sounds like a life-threatening emergency to the triager   Negative: Athlete's Foot suspected (i.e., itchy rash between the toes)   Negative: Insect bite(s) suspected   Negative: Jock Itch suspected (i.e., itchy rash on inner thighs near genital area)   Negative: Localized lump (or swelling) without redness or rash   Negative: MPOX SUSPECTED (e.g., direct skin contact such as sex, recent travel to [place] or [place]) and any SYMPTOMS OF MPOX (e.g., rash, fever, muscle aches, or swollen lymph nodes)   Negative: AT RISK FOR MPOX (men-who-have-sex-with-men) and POSSIBLE EXPOSURE (e.g., multiple sex partners in past 21 days) and ANY SYMPTOMS OF MPOX (e.g., rash, fever, muscle aches, or swollen lymph nodes)   Negative: Poison ivy, oak, or sumac rash suspected (e.g., itchy rash after contact with poison ivy)   Negative: Rash of female  genital area (e.g., labia, vagina, vulva)   Negative: Rash of male genital area (e.g., penis, scrotum)   Negative: Redness of immunization site   Negative: Shingles suspected (i.e., painful rash, multiple small blisters grouped together in one area of body; dermatomal distribution)   Negative: Small spot, skin growth, or mole   Negative: Wound infection suspected (i.e., pain, spreading redness, or pus; in a cut, puncture, scrape or sutured wound)   Negative: Fever and localized purple or blood-colored spots or dots that are not from injury or friction   Negative: Fever and localized rash is very painful   Negative: Patient sounds very sick or weak to the triager   Negative: Looks like a boil, infected sore, deep ulcer, or other infected rash (spreading redness, pus)   Negative: Painful rash with multiple small blisters grouped together (i.e., dermatomal distribution or 'band' or 'stripe')   Negative: Localized rash is very painful (no fever)   Negative: Localized purple or blood-colored spots or dots that are not from injury or friction (no fever)   Negative: Lyme disease suspected (e.g., bull's-eye rash or tick bite / exposure)   Negative: Patient wants to be seen   Negative: Tender bumps in armpits   Negative: Pimples (localized) and no improvement after using CARE ADVICE   Negative: SEVERE local itching persists after 2 days of steroid cream   Negative: Applying cream or ointment and it causes severe itch, burning, or pain   Negative: Medication patch causing local rash or itching   Negative: Localized rash present > 7 days   Negative: Red, moist, irritated area between skin folds (or under larger breasts)   Negative: Localized area of skin darkening or thickening on lower legs or ankles and has NOT been evaluated by a doctor (or NP/PA)   Negative: Mild localized rash   Negative: Pimples (localized)   Negative: Redness or itching where jewelry (or metal) touches skin and jewelry contains nickel   Negative: Mild  "localized rash from wearing a face mask    Answer Assessment - Initial Assessment Questions  1. APPEARANCE of RASH: \"Describe the rash.\"       Pink rash w/ bumps  2. LOCATION: \"Where is the rash located?\"       Right abdomen  3. NUMBER: \"How many spots are there?\"       One spot  4. SIZE: \"How big are the spots?\" (Inches, centimeters or compare to size of a coin)       A foot long  5. ONSET: \"When did the rash start?\"       Today  6. ITCHING: \"Does the rash itch?\" If Yes, ask: \"How bad is the itch?\"  (Scale 0-10; or none, mild, moderate, severe)      Denies  7. PAIN: \"Does the rash hurt?\" If Yes, ask: \"How bad is the pain?\"  (Scale 0-10; or none, mild, moderate, severe)      Denies  8. OTHER SYMPTOMS: \"Do you have any other symptoms?\" (e.g., fever)      Fever, chills, nauseous, poor appetite, body aches, and joint pain, fatigue and headache  9. PREGNANCY: \"Is there any chance you are pregnant?\" \"When was your last menstrual period?\"      N/a    Protocols used: Rash or Redness - [id number]    "

## 2025-07-09 NOTE — PROGRESS NOTES
Urgent Care Clinic Visit    Chief Complaint   Patient presents with    Urgent Care    Rash     rash of right side of body, joint pain with fatigue low mild fever started 2 days flu/ covid was NEG                7/9/2025    11:55 AM   Additional Questions   Roomed by CA   Accompanied by

## 2025-07-09 NOTE — TELEPHONE ENCOUNTER
Patient needs visit to address the rash as this may be a new symptom of her illness or may be secondary issue.    Yasmeen Ferrari PA-C

## 2025-07-10 ENCOUNTER — RESULTS FOLLOW-UP (OUTPATIENT)
Dept: URGENT CARE | Facility: URGENT CARE | Age: 70
End: 2025-07-10
Payer: COMMERCIAL

## 2025-07-10 LAB
A PHAGOCYTOPH DNA BLD QL NAA+PROBE: NOT DETECTED
B BURGDOR IGG+IGM SER QL: 0.51
BABESIA DNA BLD QL NAA+PROBE: NOT DETECTED
EHRLICHIA DNA SPEC QL NAA+PROBE: NOT DETECTED

## 2025-07-15 ENCOUNTER — MYC MEDICAL ADVICE (OUTPATIENT)
Dept: FAMILY MEDICINE | Facility: CLINIC | Age: 70
End: 2025-07-15
Payer: COMMERCIAL

## 2025-07-15 DIAGNOSIS — Z12.11 SCREENING FOR COLON CANCER: Primary | ICD-10-CM

## 2025-07-17 NOTE — TELEPHONE ENCOUNTER
Patient called to clinic and states she missed a call from [place]. Chart was reviewed. Unable to determine recent call. Noted a Mychart message was sent from Dr. Layton. She was already aware of referral.    Lydia Castillo RN  [facility]

## 2025-08-19 ENCOUNTER — TRANSFERRED RECORDS (OUTPATIENT)
Dept: ADMINISTRATIVE | Facility: CLINIC | Age: 70
End: 2025-08-19
Payer: COMMERCIAL

## 2025-08-21 PROBLEM — D12.6 ADENOMATOUS POLYP OF COLON: Status: ACTIVE | Noted: 2025-08-21

## 2025-08-25 DIAGNOSIS — I10 ESSENTIAL HYPERTENSION: ICD-10-CM

## 2025-08-26 RX ORDER — AMLODIPINE BESYLATE 5 MG/1
5 TABLET ORAL DAILY
Qty: 90 TABLET | Refills: 3 | Status: SHIPPED | OUTPATIENT
Start: 2025-08-26

## (undated) DEVICE — PITCHER STERILE 1000ML  SSK9004A

## (undated) DEVICE — SYR 10ML FINGER CONTROL W/O NDL 309695

## (undated) DEVICE — LINEN TOWEL PACK X5 5464

## (undated) DEVICE — DRSG KERLIX 4 1/2"X4YDS ROLL 6715

## (undated) DEVICE — SOL WATER IRRIG 1000ML BOTTLE 2F7114

## (undated) DEVICE — PAD PERI INDIV WRAP 11" 2022

## (undated) DEVICE — CATH FOLEY 24FR 5ML SILICONE LUBRI-SIL 175824

## (undated) DEVICE — DRSG TELFA 3X8" 1238

## (undated) DEVICE — SU ETHIBOND 1 CT-1 30" X425H

## (undated) DEVICE — CATH TRAY FOLEY SURESTEP 16FR W/URNE MTR STLK LATEX A303316A

## (undated) DEVICE — NDL COUNTER 20CT 31142493

## (undated) DEVICE — SPONGE RAY-TEC 4X8" 7318

## (undated) DEVICE — PREP POVIDONE-IODINE 7.5% SCRUB 4OZ BOTTLE MDS093945

## (undated) DEVICE — PREP SCRUB SOL EXIDINE 4% CHG 4OZ 29002-404

## (undated) DEVICE — GLOVE BIOGEL PI MICRO SZ 6.5 48565

## (undated) DEVICE — SOL NACL 0.9% IRRIG 1000ML BOTTLE 07138-09

## (undated) DEVICE — DRSG ABDOMINAL 07 1/2X8" 7197D

## (undated) DEVICE — ESU PENCIL W/COATED BLADE E2450H

## (undated) DEVICE — BASIN SET SINGLE STERILE 13752-624

## (undated) DEVICE — SUCTION MANIFOLD NEPTUNE 2 SYS 4 PORT 0702-020-000

## (undated) DEVICE — SUCTION TIP YANKAUER STR K87

## (undated) DEVICE — SU ETHIBOND 0 CT-2 30" X412H

## (undated) DEVICE — TUBING SUCTION 10'X3/16" N510

## (undated) DEVICE — PANTIES MESH LG/XLG 2PK 706M2

## (undated) DEVICE — GOWN XLG DISP 9545

## (undated) DEVICE — GLOVE SENSICARE 7.0 MSG1070 LATEX FREE

## (undated) DEVICE — PREP POVIDONE IODINE SOLUTION 10% 4OZ BOTTLE 29906-004

## (undated) DEVICE — SPONGE PACK VAGINAL 2"X9

## (undated) DEVICE — Device

## (undated) DEVICE — CATH FOLEY COUDE TIEMAN 16FR 30ML LATEX 0103SI16

## (undated) DEVICE — DRSG ULCER ALGINATE COMFEEL PLUS 8X8" SQUARE 3120

## (undated) DEVICE — SOL WATER IRRIG 3000ML BAG 2B7117

## (undated) DEVICE — WIPES FOLEY CARE SURESTEP PROVON DFC100

## (undated) DEVICE — PACK SET-UP STD 9102

## (undated) DEVICE — SOL WATER IRRIG 1000ML BOTTLE 07139-09

## (undated) DEVICE — PACK CYSTO CUSTOM ASC

## (undated) DEVICE — NDL SPINAL 22GA 3.5" QUINCKE 405181

## (undated) DEVICE — PREP SKIN SCRUB TRAY 4461A

## (undated) DEVICE — PAD CHUX UNDERPAD 23X24" 7136

## (undated) DEVICE — SWAB PROCTO 16" NON-STERILE

## (undated) DEVICE — ESU ELEC LEEP LOOP 10X10MM YELLOW DLP-S11

## (undated) DEVICE — ESU ELEC LEEP LOOP 15X12MM GREEN DLP-M11

## (undated) DEVICE — SYR PISTON URETHRAL 60ML 68000

## (undated) DEVICE — SUCTION MANIFOLD NEPTUNE 2 SYS 1 PORT 702-025-000

## (undated) DEVICE — GLOVE BIOGEL PI MICRO INDICATOR UNDERGLOVE SZ 7.5 48975

## (undated) DEVICE — SOL NACL 0.9% IRRIG 1000ML BOTTLE 2F7124

## (undated) DEVICE — ESU ELEC LEEP BALL 5MM

## (undated) DEVICE — CATH PLUG W/CAP 000076

## (undated) DEVICE — GLOVE BIOGEL PI MICRO SZ 7.0 48570

## (undated) DEVICE — JELLY LUBRICATING SURGILUBE 2OZ TUBE

## (undated) DEVICE — PACK LAPAROSCOPY/PELVISCOPY STD

## (undated) DEVICE — PAD CHUX UNDERPAD 30X30"

## (undated) DEVICE — WIPE PREMOIST CLEANSING WASHCLOTHS 7988

## (undated) DEVICE — BASIN EMESIS STERILE  SSK9005A

## (undated) DEVICE — DRAPE LEGGINGS CLEAR 8430

## (undated) DEVICE — COVER LIGHT HANDLE  HILL-ROM C LT-C02

## (undated) DEVICE — SOL WATER IRRIG 500ML BOTTLE 2F7113

## (undated) DEVICE — DRAPE BACK TABLE  44X90" 8377

## (undated) RX ORDER — LIDOCAINE HYDROCHLORIDE 10 MG/ML
INJECTION, SOLUTION EPIDURAL; INFILTRATION; INTRACAUDAL; PERINEURAL
Status: DISPENSED
Start: 2021-02-08

## (undated) RX ORDER — FENTANYL CITRATE 50 UG/ML
INJECTION, SOLUTION INTRAMUSCULAR; INTRAVENOUS
Status: DISPENSED
Start: 2023-09-18

## (undated) RX ORDER — LABETALOL HYDROCHLORIDE 5 MG/ML
INJECTION, SOLUTION INTRAVENOUS
Status: DISPENSED
Start: 2021-02-08

## (undated) RX ORDER — PROPOFOL 10 MG/ML
INJECTION, EMULSION INTRAVENOUS
Status: DISPENSED
Start: 2023-09-18

## (undated) RX ORDER — ACETAMINOPHEN 325 MG/1
TABLET ORAL
Status: DISPENSED
Start: 2021-02-08

## (undated) RX ORDER — FENTANYL CITRATE 50 UG/ML
INJECTION, SOLUTION INTRAMUSCULAR; INTRAVENOUS
Status: DISPENSED
Start: 2021-02-08

## (undated) RX ORDER — ACETAMINOPHEN 325 MG/1
TABLET ORAL
Status: DISPENSED
Start: 2023-05-30

## (undated) RX ORDER — DEXAMETHASONE SODIUM PHOSPHATE 4 MG/ML
INJECTION, SOLUTION INTRA-ARTICULAR; INTRALESIONAL; INTRAMUSCULAR; INTRAVENOUS; SOFT TISSUE
Status: DISPENSED
Start: 2023-09-18

## (undated) RX ORDER — METOPROLOL TARTRATE 1 MG/ML
INJECTION, SOLUTION INTRAVENOUS
Status: DISPENSED
Start: 2021-02-08

## (undated) RX ORDER — SIMETHICONE 40MG/0.6ML
SUSPENSION, DROPS(FINAL DOSAGE FORM)(ML) ORAL
Status: DISPENSED
Start: 2023-10-13

## (undated) RX ORDER — BUPIVACAINE HYDROCHLORIDE AND EPINEPHRINE 5; 5 MG/ML; UG/ML
INJECTION, SOLUTION EPIDURAL; INTRACAUDAL; PERINEURAL
Status: DISPENSED
Start: 2023-05-30

## (undated) RX ORDER — PROPOFOL 10 MG/ML
INJECTION, EMULSION INTRAVENOUS
Status: DISPENSED
Start: 2021-02-08

## (undated) RX ORDER — ESMOLOL HYDROCHLORIDE 10 MG/ML
INJECTION INTRAVENOUS
Status: DISPENSED
Start: 2023-09-18

## (undated) RX ORDER — KETOROLAC TROMETHAMINE 15 MG/ML
INJECTION, SOLUTION INTRAMUSCULAR; INTRAVENOUS
Status: DISPENSED
Start: 2021-02-08

## (undated) RX ORDER — FENTANYL CITRATE-0.9 % NACL/PF 10 MCG/ML
PLASTIC BAG, INJECTION (ML) INTRAVENOUS
Status: DISPENSED
Start: 2023-09-18

## (undated) RX ORDER — HYDROMORPHONE HCL IN WATER/PF 6 MG/30 ML
PATIENT CONTROLLED ANALGESIA SYRINGE INTRAVENOUS
Status: DISPENSED
Start: 2023-09-18

## (undated) RX ORDER — LABETALOL HYDROCHLORIDE 5 MG/ML
INJECTION, SOLUTION INTRAVENOUS
Status: DISPENSED
Start: 2023-09-18

## (undated) RX ORDER — FENTANYL CITRATE 50 UG/ML
INJECTION, SOLUTION INTRAMUSCULAR; INTRAVENOUS
Status: DISPENSED
Start: 2023-10-13

## (undated) RX ORDER — HYDROMORPHONE HYDROCHLORIDE 1 MG/ML
INJECTION, SOLUTION INTRAMUSCULAR; INTRAVENOUS; SUBCUTANEOUS
Status: DISPENSED
Start: 2023-05-30

## (undated) RX ORDER — CEFAZOLIN SODIUM 2 G/50ML
SOLUTION INTRAVENOUS
Status: DISPENSED
Start: 2023-05-30

## (undated) RX ORDER — CEFAZOLIN SODIUM/WATER 2 G/20 ML
SYRINGE (ML) INTRAVENOUS
Status: DISPENSED
Start: 2023-09-18

## (undated) RX ORDER — BUPIVACAINE HYDROCHLORIDE AND EPINEPHRINE 5; 5 MG/ML; UG/ML
INJECTION, SOLUTION EPIDURAL; INTRACAUDAL; PERINEURAL
Status: DISPENSED
Start: 2021-02-08

## (undated) RX ORDER — GLYCOPYRROLATE 0.2 MG/ML
INJECTION, SOLUTION INTRAMUSCULAR; INTRAVENOUS
Status: DISPENSED
Start: 2021-02-08

## (undated) RX ORDER — SIMETHICONE 40MG/0.6ML
SUSPENSION, DROPS(FINAL DOSAGE FORM)(ML) ORAL
Status: DISPENSED
Start: 2017-08-30

## (undated) RX ORDER — ONDANSETRON 2 MG/ML
INJECTION INTRAMUSCULAR; INTRAVENOUS
Status: DISPENSED
Start: 2023-09-18

## (undated) RX ORDER — PROPOFOL 10 MG/ML
INJECTION, EMULSION INTRAVENOUS
Status: DISPENSED
Start: 2023-05-30

## (undated) RX ORDER — FENTANYL CITRATE 50 UG/ML
INJECTION, SOLUTION INTRAMUSCULAR; INTRAVENOUS
Status: DISPENSED
Start: 2023-05-30

## (undated) RX ORDER — FUROSEMIDE 10 MG/ML
INJECTION INTRAMUSCULAR; INTRAVENOUS
Status: DISPENSED
Start: 2023-12-22

## (undated) RX ORDER — HYDROMORPHONE HYDROCHLORIDE 1 MG/ML
INJECTION, SOLUTION INTRAMUSCULAR; INTRAVENOUS; SUBCUTANEOUS
Status: DISPENSED
Start: 2023-09-18

## (undated) RX ORDER — FENTANYL CITRATE 50 UG/ML
INJECTION, SOLUTION INTRAMUSCULAR; INTRAVENOUS
Status: DISPENSED
Start: 2017-08-30